# Patient Record
Sex: MALE | Race: WHITE | Employment: OTHER | URBAN - NONMETROPOLITAN AREA
[De-identification: names, ages, dates, MRNs, and addresses within clinical notes are randomized per-mention and may not be internally consistent; named-entity substitution may affect disease eponyms.]

---

## 2017-04-27 ENCOUNTER — HOSPITAL ENCOUNTER (OUTPATIENT)
Dept: CT IMAGING | Age: 82
Discharge: HOME | End: 2017-04-27
Payer: MEDICARE

## 2017-04-27 ENCOUNTER — OFFICE VISIT (OUTPATIENT)
Dept: VASCULAR SURGERY | Age: 82
End: 2017-04-27
Payer: COMMERCIAL

## 2017-04-27 VITALS
RESPIRATION RATE: 18 BRPM | DIASTOLIC BLOOD PRESSURE: 73 MMHG | TEMPERATURE: 96.7 F | SYSTOLIC BLOOD PRESSURE: 117 MMHG | HEART RATE: 63 BPM

## 2017-04-27 DIAGNOSIS — I72.3 ILIAC ARTERY ANEURYSM, RIGHT (HCC): ICD-10-CM

## 2017-04-27 DIAGNOSIS — I71.40 AAA (ABDOMINAL AORTIC ANEURYSM) WITHOUT RUPTURE: ICD-10-CM

## 2017-04-27 DIAGNOSIS — I72.3 ILIAC ARTERY ANEURYSM, RIGHT (HCC): Primary | ICD-10-CM

## 2017-04-27 PROCEDURE — 4040F PNEUMOC VAC/ADMIN/RCVD: CPT | Performed by: PHYSICIAN ASSISTANT

## 2017-04-27 PROCEDURE — 99213 OFFICE O/P EST LOW 20 MIN: CPT | Performed by: PHYSICIAN ASSISTANT

## 2017-04-27 PROCEDURE — G8427 DOCREV CUR MEDS BY ELIG CLIN: HCPCS | Performed by: PHYSICIAN ASSISTANT

## 2017-04-27 PROCEDURE — 74176 CT ABD & PELVIS W/O CONTRAST: CPT

## 2017-04-27 PROCEDURE — 1036F TOBACCO NON-USER: CPT | Performed by: PHYSICIAN ASSISTANT

## 2017-04-27 PROCEDURE — 1123F ACP DISCUSS/DSCN MKR DOCD: CPT | Performed by: PHYSICIAN ASSISTANT

## 2017-04-27 PROCEDURE — G8421 BMI NOT CALCULATED: HCPCS | Performed by: PHYSICIAN ASSISTANT

## 2017-04-27 RX ORDER — OXYCODONE AND ACETAMINOPHEN 10; 325 MG/1; MG/1
1 TABLET ORAL EVERY 4 HOURS PRN
COMMUNITY
End: 2019-01-31 | Stop reason: ALTCHOICE

## 2017-12-11 ENCOUNTER — TRANSCRIBE ORDERS (OUTPATIENT)
Dept: ADMINISTRATIVE | Facility: HOSPITAL | Age: 82
End: 2017-12-11

## 2017-12-11 DIAGNOSIS — M54.16 LUMBAR RADICULOPATHY: ICD-10-CM

## 2017-12-11 DIAGNOSIS — M54.12 RADICULOPATHY, CERVICAL: Primary | ICD-10-CM

## 2017-12-11 DIAGNOSIS — M54.14 THORACIC RADICULOPATHY: ICD-10-CM

## 2017-12-18 ENCOUNTER — HOSPITAL ENCOUNTER (OUTPATIENT)
Dept: GENERAL RADIOLOGY | Facility: HOSPITAL | Age: 82
Discharge: HOME | End: 2017-12-18
Admitting: NEUROLOGICAL SURGERY

## 2017-12-18 ENCOUNTER — APPOINTMENT (OUTPATIENT)
Dept: GENERAL RADIOLOGY | Facility: HOSPITAL | Age: 82
End: 2017-12-18

## 2017-12-18 ENCOUNTER — HOSPITAL ENCOUNTER (OUTPATIENT)
Dept: GENERAL RADIOLOGY | Facility: HOSPITAL | Age: 82
Discharge: HOME | End: 2017-12-18

## 2017-12-18 ENCOUNTER — APPOINTMENT (OUTPATIENT)
Dept: CT IMAGING | Facility: HOSPITAL | Age: 82
End: 2017-12-18

## 2017-12-18 VITALS
DIASTOLIC BLOOD PRESSURE: 92 MMHG | OXYGEN SATURATION: 97 % | RESPIRATION RATE: 16 BRPM | WEIGHT: 224.13 LBS | HEART RATE: 67 BPM | BODY MASS INDEX: 28.76 KG/M2 | SYSTOLIC BLOOD PRESSURE: 184 MMHG | HEIGHT: 74 IN | TEMPERATURE: 98.7 F

## 2017-12-18 DIAGNOSIS — M54.16 LUMBAR RADICULOPATHY: ICD-10-CM

## 2017-12-18 DIAGNOSIS — M54.14 THORACIC RADICULOPATHY: ICD-10-CM

## 2017-12-18 DIAGNOSIS — M54.12 RADICULOPATHY, CERVICAL: ICD-10-CM

## 2017-12-18 LAB
APTT PPP: 30.9 SECONDS (ref 24.1–34.8)
INR PPP: 1.05 (ref 0.91–1.09)
PLATELET # BLD AUTO: 282 10*3/MM3 (ref 130–400)
PROTHROMBIN TIME: 14 SECONDS (ref 11.9–14.6)

## 2017-12-18 PROCEDURE — 36415 COLL VENOUS BLD VENIPUNCTURE: CPT

## 2017-12-18 PROCEDURE — 85730 THROMBOPLASTIN TIME PARTIAL: CPT | Performed by: RADIOLOGY

## 2017-12-18 PROCEDURE — 72040 X-RAY EXAM NECK SPINE 2-3 VW: CPT

## 2017-12-18 PROCEDURE — 72120 X-RAY BEND ONLY L-S SPINE: CPT

## 2017-12-18 PROCEDURE — 85049 AUTOMATED PLATELET COUNT: CPT | Performed by: RADIOLOGY

## 2017-12-18 PROCEDURE — 85610 PROTHROMBIN TIME: CPT | Performed by: RADIOLOGY

## 2017-12-18 PROCEDURE — 72270 MYELOGPHY 2/> SPINE REGIONS: CPT

## 2017-12-18 PROCEDURE — 72020 X-RAY EXAM OF SPINE 1 VIEW: CPT

## 2017-12-18 RX ORDER — GABAPENTIN 300 MG/1
300 CAPSULE ORAL 4 TIMES DAILY
Status: ON HOLD | COMMUNITY
End: 2023-02-14 | Stop reason: SDUPTHER

## 2017-12-18 RX ORDER — LIDOCAINE HYDROCHLORIDE 10 MG/ML
15 INJECTION, SOLUTION INFILTRATION; PERINEURAL ONCE
Status: COMPLETED | OUTPATIENT
Start: 2017-12-18 | End: 2017-12-18

## 2017-12-18 RX ADMIN — LIDOCAINE HYDROCHLORIDE 15 ML: 10 INJECTION, SOLUTION INFILTRATION; PERINEURAL at 12:45

## 2017-12-18 NOTE — NURSING NOTE
Dr. Ramey, radiologist, speaks by phone with wife and patient about failed attempt at myelogram.

## 2017-12-20 ENCOUNTER — APPOINTMENT (OUTPATIENT)
Dept: MRI IMAGING | Facility: HOSPITAL | Age: 82
End: 2017-12-20

## 2017-12-20 ENCOUNTER — APPOINTMENT (OUTPATIENT)
Dept: GENERAL RADIOLOGY | Facility: HOSPITAL | Age: 82
End: 2017-12-20

## 2018-05-08 ENCOUNTER — OFFICE VISIT (OUTPATIENT)
Dept: GASTROENTEROLOGY | Facility: CLINIC | Age: 83
End: 2018-05-08

## 2018-05-08 VITALS
SYSTOLIC BLOOD PRESSURE: 150 MMHG | DIASTOLIC BLOOD PRESSURE: 80 MMHG | OXYGEN SATURATION: 98 % | BODY MASS INDEX: 31.22 KG/M2 | HEART RATE: 74 BPM | WEIGHT: 223 LBS | HEIGHT: 71 IN

## 2018-05-08 DIAGNOSIS — Z86.010 HX OF COLONIC POLYPS: Primary | ICD-10-CM

## 2018-05-08 DIAGNOSIS — I10 HTN (HYPERTENSION), BENIGN: ICD-10-CM

## 2018-05-08 DIAGNOSIS — K59.03 DRUG-INDUCED CONSTIPATION: ICD-10-CM

## 2018-05-08 PROBLEM — Z86.0100 HX OF COLONIC POLYPS: Status: ACTIVE | Noted: 2018-05-08

## 2018-05-08 PROCEDURE — 99212 OFFICE O/P EST SF 10 MIN: CPT | Performed by: CLINICAL NURSE SPECIALIST

## 2018-05-08 NOTE — PROGRESS NOTES
Jose Alfredo Kirkpatrick  1935 5/8/2018  Chief Complaint   Patient presents with   • Colonoscopy     Subjective   HPI  Jose Alfredo Kirkpatrick is a 82 y.o. male who presents as a referral for preventative maintenance. He has no complaints of nausea or vomiting. He has had predominant  Constipation while being on pain medications for his chronic neck pain which he is having neck surgery for next week. No wt loss. No BRBPR. No melena. There is NO family hx for colon cancer. No abdominal pain.  Past Medical History:   Diagnosis Date   • Arthritis    • Cataract     BILATERAL   • History of transfusion    • Hx of colonic polyps    • Hypertension    • Reflux esophagitis    • Streptococcosis     IN SPINE FROM BACK INJECTIONS     Past Surgical History:   Procedure Laterality Date   • CATARACT EXTRACTION Bilateral    • COLONOSCOPY  02/11/2013    Hyperplastic polyp at 25 cm, Diverticulosis repeat exam in 5 years   • ENDOSCOPY  01/15/2014    [facility]   • HERNIA REPAIR Right     INGUINAL    • REPLACEMENT TOTAL KNEE BILATERAL     • THORACIC LAMINECTOMY DECOMPRESSIVE POSTERIOR N/A 12/14/2016    Procedure: LAMINECTOMY DECOMPRESSION, UNINSTRUMENTED POSTERIOR SPINAL FUSION, T 11-12;  Surgeon: LUZ Adan MD;  Location: [facility] OR;  Service:      Outpatient Prescriptions Marked as Taking for the 5/8/18 encounter (Office Visit) with HARISH Pedroza   Medication Sig Dispense Refill   • gabapentin (NEURONTIN) 300 MG capsule Take 300 mg by mouth 3 (Three) Times a Day.     • lisinopril (PRINIVIL,ZESTRIL) 10 MG tablet Take 10 mg by mouth daily.     • oxyCODONE-acetaminophen (PERCOCET)  MG per tablet Take 1 tablet by mouth Every 6 (Six) Hours As Needed for moderate pain (4-6).       No Known Allergies  Social History     Social History   • Marital status:      Spouse name: N/A   • Number of children: N/A   • Years of education: N/A     Occupational History   • Not on file.     Social History Main Topics   • Smoking status:  "Never Smoker   • Smokeless tobacco: Never Used   • Alcohol use No   • Drug use: No   • Sexual activity: Defer     Other Topics Concern   • Not on file     Social History Narrative   • No narrative on file     Family History   Problem Relation Age of Onset   • Colon cancer Father    • Colon polyps Neg Hx      Health Maintenance   Topic Date Due   • TDAP/TD VACCINES (1 - Tdap) 05/27/1954   • PNEUMOCOCCAL VACCINES (65+ LOW/MEDIUM RISK) (1 of 2 - PCV13) 05/27/2001   • MEDICARE ANNUAL WELLNESS  11/07/2017   • ZOSTER VACCINE  11/07/2017   • INFLUENZA VACCINE  08/01/2018       REVIEW OF SYSTEMS  General: well appearing, no fever chills or sweats, no unexplained wt loss  HEENT: no acute visual or hearing disturbances  Cardiovascular: No chest pain or palpitations  Pulmonary: No shortness of breath, coughing, wheezing or hemoptysis  : No burning, urgency, hematuria, or dysuria  Musculoskeletal: No joint pain or stiffness  Peripheral: no edema  Skin: No lesions or rashes  Neuro: No dizziness, headaches, stroke, syncope  Endocrine: No hot or cold intolerances  Hematological: No blood dyscrasias    Objective   Vitals:    05/08/18 0852   BP: 150/80   Pulse: 74   SpO2: 98%   Weight: 101 kg (223 lb)   Height: 180.3 cm (71\")     Body mass index is 31.1 kg/m².  Patient's Body mass index is 31.1 kg/m². BMI is above normal parameters. Recommendations include: nutrition counseling.      PHYSICAL EXAM  General: age appropriate well nourished well appearing, no acute distress  Head: normocephalic and atraumatic  Global assessment-supple  Neck-No JVD noted, no lymphadenopathy  Pulmonary-clear to auscultation bilaterally, normal respiratory effort  Cardiovascular-normal rate and rhythm, normal heart sounds, S1 and S2 noted  Abdomen-soft, non tender, non distended, normal bowel sounds all 4 quadrants, no hepatosplenomegaly noted  Extremities-No clubbing cyanosis or edema  Neuro-Non focal, converses appropriately, awake, alert, " oriented    Assessment/Plan     Jose Alfredo was seen today for colonoscopy.    Diagnoses and all orders for this visit:    Hx of colonic polyps  -     Case Request; Standing  -     Implement Anesthesia Orders Day of Procedure; Standing  -     Obtain Informed Consent; Standing  -     Verify bowel prep was successful; Standing  -     Case Request    HTN (hypertension), benign  Comments:  cont the am of procedure    I have advised his to do Miralax up to twice per day every day while constipated I explained that this could be complicated after surgery the 16 on his neck and that with pain medications this is a concern. He verbalizes and agrees to increased water intake and Miralax as needed.     COLONOSCOPY WITH ANESTHESIA (N/A)  Body mass index is 31.1 kg/m².    Patient instructions on prep prior to procedure provided to the patient.    All risks, benefits, alternatives, and indications of colonoscopy procedure have been discussed with the patient. Risks to include perforation of the colon requiring possible surgery or colostomy, risk of bleeding from biopsies or removal of colon tissue, possibility of missing a colon polyp or cancer, or adverse drug reaction.  Benefits to include the diagnosis and management of disease of the colon and rectum. Alternatives to include barium enema, radiographic evaluation, lab testing or no intervention. Pt verbalizes understanding and agrees.     Radhika Brown, APRN  2018  9:10 AM      IF YOU SMOKE OR USE TOBACCO PLEASE READ THE FOLLOWIN minutes reading provided    Why is smoking bad for me?  Smoking increases the risk of heart disease, lung disease, vascular disease, stroke, and cancer.     If you smoke, STOP!    If you would like more information on quitting smoking, please visit the [employer] website: [email or web address]   This link will provide additional resources including the QUIT line and the [employer] support  groups.     For more information:    Quit Now [place]  1-800-QUIT-NOW  https://[facility].quitlogix.org/en-US/    Obesity, Adult  Obesity is the condition of having too much total body fat. Being overweight or obese means that your weight is greater than what is considered healthy for your body size. Obesity is determined by a measurement called BMI. BMI is an estimate of body fat and is calculated from height and weight. For adults, a BMI of 30 or higher is considered obese.  Obesity can eventually lead to other health concerns and major illnesses, including:  · Stroke.  · Coronary artery disease (CAD).  · Type 2 diabetes.  · Some types of cancer, including cancers of the colon, breast, uterus, and gallbladder.  · Osteoarthritis.  · High blood pressure (hypertension).  · High cholesterol.  · Sleep apnea.  · Gallbladder stones.  · Infertility problems.  What are the causes?  The main cause of obesity is taking in (consuming) more calories than your body uses for energy. Other factors that contribute to this condition may include:  · Being born with genes that make you more likely to become obese.  · Having a medical condition that causes obesity. These conditions include:  ¨ Hypothyroidism.  ¨ Polycystic ovarian syndrome (PCOS).  ¨ Binge-eating disorder.  ¨ Cushing syndrome.  · Taking certain medicines, such as steroids, antidepressants, and seizure medicines.  · Not being physically active (sedentary lifestyle).  · Living where there are limited places to exercise safely or buy healthy foods.  · Not getting enough sleep.  What increases the risk?  The following factors may increase your risk of this condition:  · Having a family history of obesity.  · Being a woman of -American descent.  · Being a man of  descent.  What are the signs or symptoms?  Having excessive body fat is the main symptom of this condition.  How is this diagnosed?  This condition may be diagnosed based on:  · Your  symptoms.  · Your medical history.  · A physical exam. Your health care provider may measure:  ¨ Your BMI. If you are an adult with a BMI between 25 and less than 30, you are considered overweight. If you are an adult with a BMI of 30 or higher, you are considered obese.  ¨ The distances around your hips and your waist (circumferences). These may be compared to each other to help diagnose your condition.  ¨ Your skinfold thickness. Your health care provider may gently pinch a fold of your skin and measure it.  How is this treated?  Treatment for this condition often includes changing your lifestyle. Treatment may include some or all of the following:  · Dietary changes. Work with your health care provider and a dietitian to set a weight-loss goal that is healthy and reasonable for you. Dietary changes may include eating:  ¨ Smaller portions. A portion size is the amount of a particular food that is healthy for you to eat at one time. This varies from person to person.  ¨ Low-calorie or low-fat options.  ¨ More whole grains, fruits, and vegetables.  · Regular physical activity. This may include aerobic activity (cardio) and strength training.  · Medicine to help you lose weight. Your health care provider may prescribe medicine if you are unable to lose 1 pound a week after 6 weeks of eating more healthily and doing more physical activity.  · Surgery. Surgical options may include gastric banding and gastric bypass. Surgery may be done if:  ¨ Other treatments have not helped to improve your condition.  ¨ You have a BMI of 40 or higher.  ¨ You have life-threatening health problems related to obesity.  Follow these instructions at home:     Eating and drinking     · Follow recommendations from your health care provider about what you eat and drink. Your health care provider may advise you to:  ¨ Limit fast foods, sweets, and processed snack foods.  ¨ Choose low-fat options, such as low-fat milk instead of whole  milk.  ¨ Eat 5 or more servings of fruits or vegetables every day.  ¨ Eat at home more often. This gives you more control over what you eat.  ¨ Choose healthy foods when you eat out.  ¨ Learn what a healthy portion size is.  ¨ Keep low-fat snacks on hand.  ¨ Avoid sugary drinks, such as soda, fruit juice, iced tea sweetened with sugar, and flavored milk.  ¨ Eat a healthy breakfast.  · Drink enough water to keep your urine clear or pale yellow.  · Do not go without eating for long periods of time (do not fast) or follow a fad diet. Fasting and fad diets can be unhealthy and even dangerous.  Physical Activity   · Exercise regularly, as told by your health care provider. Ask your health care provider what types of exercise are safe for you and how often you should exercise.  · Warm up and stretch before being active.  · Cool down and stretch after being active.  · Rest between periods of activity.  Lifestyle   · Limit the time that you spend in front of your TV, computer, or video game system.  · Find ways to reward yourself that do not involve food.  · Limit alcohol intake to no more than 1 drink a day for nonpregnant women and 2 drinks a day for men. One drink equals 12 oz of beer, 5 oz of wine, or 1½ oz of hard liquor.  General instructions   · Keep a weight loss journal to keep track of the food you eat and how much you exercise you get.  · Take over-the-counter and prescription medicines only as told by your health care provider.  · Take vitamins and supplements only as told by your health care provider.  · Consider joining a support group. Your health care provider may be able to recommend a support group.  · Keep all follow-up visits as told by your health care provider. This is important.  Contact a health care provider if:  · You are unable to meet your weight loss goal after 6 weeks of dietary and lifestyle changes.  This information is not intended to replace advice given to you by your health care provider.  Make sure you discuss any questions you have with your health care provider.  Document Released: 01/25/2006 Document Revised: 05/22/2017 Document Reviewed: 10/05/2016  Elsevier Interactive Patient Education © 2017 [employer]

## 2018-06-04 ENCOUNTER — HOSPITAL ENCOUNTER (OUTPATIENT)
Dept: GENERAL RADIOLOGY | Facility: HOSPITAL | Age: 83
Discharge: HOME | End: 2018-06-04
Admitting: NEUROLOGICAL SURGERY

## 2018-06-04 ENCOUNTER — TRANSCRIBE ORDERS (OUTPATIENT)
Dept: ADMINISTRATIVE | Facility: HOSPITAL | Age: 83
End: 2018-06-04

## 2018-06-04 DIAGNOSIS — M54.12 RADICULOPATHY, CERVICAL: Primary | ICD-10-CM

## 2018-06-04 DIAGNOSIS — M54.12 RADICULOPATHY, CERVICAL: ICD-10-CM

## 2018-06-04 PROCEDURE — 72040 X-RAY EXAM NECK SPINE 2-3 VW: CPT

## 2018-10-16 ENCOUNTER — HOSPITAL ENCOUNTER (OUTPATIENT)
Facility: HOSPITAL | Age: 83
Setting detail: HOSPITAL OUTPATIENT SURGERY
Discharge: HOME | End: 2018-10-16
Attending: INTERNAL MEDICINE | Admitting: INTERNAL MEDICINE

## 2018-10-16 ENCOUNTER — ANESTHESIA EVENT (OUTPATIENT)
Dept: GASTROENTEROLOGY | Facility: HOSPITAL | Age: 83
End: 2018-10-16

## 2018-10-16 ENCOUNTER — ANESTHESIA (OUTPATIENT)
Dept: GASTROENTEROLOGY | Facility: HOSPITAL | Age: 83
End: 2018-10-16

## 2018-10-16 VITALS
OXYGEN SATURATION: 95 % | BODY MASS INDEX: 28.63 KG/M2 | SYSTOLIC BLOOD PRESSURE: 158 MMHG | HEIGHT: 73 IN | WEIGHT: 216 LBS | DIASTOLIC BLOOD PRESSURE: 74 MMHG | HEART RATE: 50 BPM | RESPIRATION RATE: 18 BRPM | TEMPERATURE: 97.5 F

## 2018-10-16 DIAGNOSIS — Z86.010 HX OF COLONIC POLYPS: ICD-10-CM

## 2018-10-16 PROCEDURE — 45385 COLONOSCOPY W/LESION REMOVAL: CPT | Performed by: INTERNAL MEDICINE

## 2018-10-16 PROCEDURE — 25010000002 PROPOFOL 10 MG/ML EMULSION: Performed by: NURSE ANESTHETIST, CERTIFIED REGISTERED

## 2018-10-16 PROCEDURE — 88305 TISSUE EXAM BY PATHOLOGIST: CPT | Performed by: INTERNAL MEDICINE

## 2018-10-16 RX ORDER — SODIUM CHLORIDE 0.9 % (FLUSH) 0.9 %
3 SYRINGE (ML) INJECTION AS NEEDED
Status: DISCONTINUED | OUTPATIENT
Start: 2018-10-16 | End: 2018-10-16 | Stop reason: HOSPADM

## 2018-10-16 RX ORDER — LIDOCAINE HYDROCHLORIDE 20 MG/ML
INJECTION, SOLUTION INFILTRATION; PERINEURAL AS NEEDED
Status: DISCONTINUED | OUTPATIENT
Start: 2018-10-16 | End: 2018-10-16 | Stop reason: SURG

## 2018-10-16 RX ORDER — PROPOFOL 10 MG/ML
VIAL (ML) INTRAVENOUS AS NEEDED
Status: DISCONTINUED | OUTPATIENT
Start: 2018-10-16 | End: 2018-10-16 | Stop reason: SURG

## 2018-10-16 RX ORDER — SODIUM CHLORIDE 9 MG/ML
500 INJECTION, SOLUTION INTRAVENOUS CONTINUOUS PRN
Status: DISCONTINUED | OUTPATIENT
Start: 2018-10-16 | End: 2018-10-16 | Stop reason: HOSPADM

## 2018-10-16 RX ADMIN — SODIUM CHLORIDE 500 ML: 9 INJECTION, SOLUTION INTRAVENOUS at 08:52

## 2018-10-16 RX ADMIN — LIDOCAINE HYDROCHLORIDE 50 MG: 20 INJECTION, SOLUTION INFILTRATION; PERINEURAL at 09:05

## 2018-10-16 RX ADMIN — PROPOFOL 200 MG: 10 INJECTION, EMULSION INTRAVENOUS at 09:05

## 2018-10-16 NOTE — ANESTHESIA POSTPROCEDURE EVALUATION
Patient: Jose Alfredo Kirkpatrick    Procedure Summary     Date:  10/16/18 Room / Location:  [facility] ENDOSCOPY 4 / [facility] ENDOSCOPY    Anesthesia Start:  0859 Anesthesia Stop:  0931    Procedure:  COLONOSCOPY WITH ANESTHESIA (N/A ) Diagnosis:       Hx of colonic polyps      (Hx of colonic polyps [Z86.010])    Surgeon:  Marques Pérez MD Provider:  Ronak Conley CRNA    Anesthesia Type:  general ASA Status:  3          Anesthesia Type: general  Last vitals  BP   158/65 (10/16/18 0829)   Temp   97.5 °F (36.4 °C) (10/16/18 0829)   Pulse   67 (10/16/18 0829)   Resp   18 (10/16/18 0829)     SpO2   97 % (10/16/18 0829)     Post Anesthesia Care and Evaluation    Patient location during evaluation: PACU  Patient participation: complete - patient participated  Level of consciousness: awake and awake and alert  Pain score: 0  Pain management: adequate  Airway patency: patent  Anesthetic complications: No anesthetic complications    Cardiovascular status: acceptable and stable  Respiratory status: acceptable and unassisted  Hydration status: acceptable

## 2018-10-16 NOTE — H&P
[facility]  Pre Procedure History & Physical    Chief Complaint:   History of polyps    Subjective     HPI:   Here for colonoscopy.  History of polyps    Past Medical History:   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       Past Surgical History:  Past Surgical History:   Procedure Laterality Date   • BACK SURGERY      cervical   • CATARACT EXTRACTION Bilateral    • COLONOSCOPY  02/11/2013    Hyperplastic polyp at 25 cm, Diverticulosis repeat exam in 5 years   • ENDOSCOPY  01/15/2014       • HERNIA REPAIR Right     INGUINAL    • REPLACEMENT TOTAL KNEE BILATERAL     • THORACIC LAMINECTOMY DECOMPRESSIVE POSTERIOR N/A 12/14/2016    Procedure: LAMINECTOMY DECOMPRESSION, UNINSTRUMENTED POSTERIOR SPINAL FUSION, T 11-12;  Surgeon: LUZ Adan MD;  Location: [facility];  Service:        Family History:  Family History   Problem Relation Age of Onset   • Colon cancer Father    • Colon polyps Neg Hx        Social History:   reports that he has never smoked. He has never used smokeless tobacco. He reports that he does not drink alcohol or use drugs.    Medications:   Prior to Admission medications    Medication Sig Start Date End Date Taking? Authorizing Provider   gabapentin (NEURONTIN) 300 MG capsule Take 300 mg by mouth 3 (Three) Times a Day.   Yes Joi Alvarez MD   lisinopril (PRINIVIL,ZESTRIL) 10 MG tablet Take 10 mg by mouth daily.   Yes Joi Alvarez MD   oxyCODONE-acetaminophen (PERCOCET)  MG per tablet Take 1 tablet by mouth Every 6 (Six) Hours As Needed for moderate pain (4-6).   Yes Joi Alvarez MD   polyethylene glycol (GoLYTELY) 236 g solution Take as directed by office instructions. 5/8/18   Radhika Brown, APRN  "      Allergies:  Patient has no known allergies.    Objective     Blood pressure 158/65, pulse 67, temperature 97.5 °F (36.4 °C), temperature source Temporal Artery , resp. rate 18, height 185.4 cm (73\"), weight 98 kg (216 lb), SpO2 97 %.    Physical Exam   Constitutional: Pt is oriented to person, place, and in no distress.   HENT: Mouth/Throat: Oropharynx is clear.   Cardiovascular: Normal rate, regular rhythm.    Pulmonary/Chest: Effort normal. No respiratory distress. No  wheezes.   Abdominal: Soft. Non-distended.  Skin: Skin is warm and dry.   Psychiatric: Mood, memory, affect and judgment appear normal.     Assessment/Plan     Diagnosis:  History of polyps    Anticipated Surgical Procedure:    Proceed with colonoscopy as scheduled    The following major R/B/A were discussed with the patient, however the list is not all inclusive . Risk:  Bleeding (immediate and delayed), perforation (rupture or tear), reaction to medication, missed lesion/cancer, pain during the procedure, infection, need for surgery, need for ostomy, need for mechanical ventilation (breathing machine), death.  Benefits: removal of polyp/tissue, burn/clip/or inject to stop bleeding, removal of foreign body, dilate any stricture.  Alternatives: Xray or CT, surgery, do nothing with associated risk   The patient was given time to ask question and received explanation, and agrees to proceed as per History and Physical.   No guarantee given or expressed.    EMR Dragon/transcription disclaimer: Much of this encounter note is an electronic transcription/translation of spoken language to printed text.  The electronic translation of spoken language may permit erroneous, or at times, nonsensical words or phrases to be inadvertently transcribed.  Although I have reviewed the note for such errors, some may still exist.    Marques Pérez MD  9:11 AM  10/16/2018    "

## 2018-10-16 NOTE — ANESTHESIA PREPROCEDURE EVALUATION
Anesthesia Evaluation     Patient summary reviewed   no history of anesthetic complications:  NPO Solid Status: > 8 hours  NPO Liquid Status: > 8 hours           Airway   Mallampati: I  TM distance: >3 FB  Neck ROM: full  No difficulty expected  Dental - normal exam         Pulmonary    (-) asthma, sleep apnea, not a smoker  Cardiovascular     (+) hypertension,   (-) past MI, CAD, angina, cardiac stents      Neuro/Psych  (-) seizures, TIA, CVA  GI/Hepatic/Renal/Endo    (-) liver disease, no renal disease, diabetes    Musculoskeletal     (+) back pain, neck pain (surgery in [place]- 05/2018),   Abdominal    Substance History      OB/GYN          Other        ROS/Med Hx Other: Constipation                Anesthesia Plan    ASA 3     general   total IV anesthesia  intravenous induction   Anesthetic plan, all risks, benefits, and alternatives have been provided, discussed and informed consent has been obtained with: patient.

## 2018-10-17 LAB
CYTO UR: NORMAL
LAB AP CASE REPORT: NORMAL
PATH REPORT.FINAL DX SPEC: NORMAL
PATH REPORT.GROSS SPEC: NORMAL

## 2019-01-31 ENCOUNTER — OFFICE VISIT (OUTPATIENT)
Dept: SURGERY | Age: 84
End: 2019-01-31
Payer: MEDICARE

## 2019-01-31 VITALS
DIASTOLIC BLOOD PRESSURE: 68 MMHG | SYSTOLIC BLOOD PRESSURE: 114 MMHG | WEIGHT: 218.2 LBS | HEIGHT: 72 IN | BODY MASS INDEX: 29.55 KG/M2 | TEMPERATURE: 97.2 F

## 2019-01-31 DIAGNOSIS — K62.5 ANAL BLEEDING: Primary | ICD-10-CM

## 2019-01-31 DIAGNOSIS — K62.5 ANAL BLEEDING: ICD-10-CM

## 2019-01-31 LAB
ANION GAP SERPL CALCULATED.3IONS-SCNC: 18 MMOL/L (ref 7–19)
BUN BLDV-MCNC: 14 MG/DL (ref 8–23)
CALCIUM SERPL-MCNC: 9.9 MG/DL (ref 8.8–10.2)
CHLORIDE BLD-SCNC: 101 MMOL/L (ref 98–111)
CO2: 23 MMOL/L (ref 22–29)
CREAT SERPL-MCNC: 1.2 MG/DL (ref 0.5–1.2)
GFR NON-AFRICAN AMERICAN: 58
GLUCOSE BLD-MCNC: 137 MG/DL (ref 74–109)
HCT VFR BLD CALC: 42.6 % (ref 42–52)
HEMOGLOBIN: 13.1 G/DL (ref 14–18)
MCH RBC QN AUTO: 25.2 PG (ref 27–31)
MCHC RBC AUTO-ENTMCNC: 30.8 G/DL (ref 33–37)
MCV RBC AUTO: 82.1 FL (ref 80–94)
PDW BLD-RTO: 14.6 % (ref 11.5–14.5)
PLATELET # BLD: 353 K/UL (ref 130–400)
PMV BLD AUTO: 8.6 FL (ref 9.4–12.4)
POTASSIUM SERPL-SCNC: 4.7 MMOL/L (ref 3.5–5)
RBC # BLD: 5.19 M/UL (ref 4.7–6.1)
SODIUM BLD-SCNC: 142 MMOL/L (ref 136–145)
WBC # BLD: 15.4 K/UL (ref 4.8–10.8)

## 2019-01-31 PROCEDURE — G8484 FLU IMMUNIZE NO ADMIN: HCPCS | Performed by: SURGERY

## 2019-01-31 PROCEDURE — G8419 CALC BMI OUT NRM PARAM NOF/U: HCPCS | Performed by: SURGERY

## 2019-01-31 PROCEDURE — 1101F PT FALLS ASSESS-DOCD LE1/YR: CPT | Performed by: SURGERY

## 2019-01-31 PROCEDURE — 99204 OFFICE O/P NEW MOD 45 MIN: CPT | Performed by: SURGERY

## 2019-01-31 PROCEDURE — G8427 DOCREV CUR MEDS BY ELIG CLIN: HCPCS | Performed by: SURGERY

## 2019-01-31 PROCEDURE — 1036F TOBACCO NON-USER: CPT | Performed by: SURGERY

## 2019-01-31 PROCEDURE — 1123F ACP DISCUSS/DSCN MKR DOCD: CPT | Performed by: SURGERY

## 2019-01-31 PROCEDURE — 4040F PNEUMOC VAC/ADMIN/RCVD: CPT | Performed by: SURGERY

## 2019-01-31 RX ORDER — HYDROCODONE BITARTRATE AND ACETAMINOPHEN 7.5; 325 MG/1; MG/1
TABLET ORAL
Refills: 0 | COMMUNITY
Start: 2018-12-26

## 2019-03-06 ASSESSMENT — ENCOUNTER SYMPTOMS
SHORTNESS OF BREATH: 0
CONSTIPATION: 0
DIARRHEA: 0
SORE THROAT: 0
BACK PAIN: 0
ABDOMINAL DISTENTION: 0
EYE REDNESS: 0
ANAL BLEEDING: 1
ABDOMINAL PAIN: 0
BLOOD IN STOOL: 1
NAUSEA: 0
EYE PAIN: 0
WHEEZING: 0
COUGH: 0

## 2019-10-01 ENCOUNTER — TRANSCRIBE ORDERS (OUTPATIENT)
Dept: ADMINISTRATIVE | Facility: HOSPITAL | Age: 84
End: 2019-10-01

## 2019-10-01 ENCOUNTER — HOSPITAL ENCOUNTER (OUTPATIENT)
Dept: GENERAL RADIOLOGY | Facility: HOSPITAL | Age: 84
Discharge: HOME | End: 2019-10-01
Admitting: NURSE PRACTITIONER

## 2019-10-01 ENCOUNTER — LAB (OUTPATIENT)
Dept: LAB | Facility: HOSPITAL | Age: 84
End: 2019-10-01

## 2019-10-01 DIAGNOSIS — M79.601 PAIN IN RIGHT ARM: ICD-10-CM

## 2019-10-01 DIAGNOSIS — R06.00 DYSPNEA, UNSPECIFIED TYPE: ICD-10-CM

## 2019-10-01 DIAGNOSIS — R05.9 COUGH: ICD-10-CM

## 2019-10-01 DIAGNOSIS — R53.83 OTHER FATIGUE: ICD-10-CM

## 2019-10-01 DIAGNOSIS — R05.9 COUGH: Primary | ICD-10-CM

## 2019-10-01 DIAGNOSIS — R06.00 DYSPNEA, UNSPECIFIED TYPE: Primary | ICD-10-CM

## 2019-10-01 LAB — D DIMER PPP FEU-MCNC: 6.28 MG/L (FEU) (ref 0–0.5)

## 2019-10-01 PROCEDURE — 82553 CREATINE MB FRACTION: CPT | Performed by: NURSE PRACTITIONER

## 2019-10-01 PROCEDURE — 81003 URINALYSIS AUTO W/O SCOPE: CPT | Performed by: NURSE PRACTITIONER

## 2019-10-01 PROCEDURE — 85379 FIBRIN DEGRADATION QUANT: CPT | Performed by: NURSE PRACTITIONER

## 2019-10-01 PROCEDURE — 85025 COMPLETE CBC W/AUTO DIFF WBC: CPT | Performed by: NURSE PRACTITIONER

## 2019-10-01 PROCEDURE — 83874 ASSAY OF MYOGLOBIN: CPT | Performed by: NURSE PRACTITIONER

## 2019-10-01 PROCEDURE — 82607 VITAMIN B-12: CPT | Performed by: NURSE PRACTITIONER

## 2019-10-01 PROCEDURE — 71046 X-RAY EXAM CHEST 2 VIEWS: CPT

## 2019-10-01 PROCEDURE — 80053 COMPREHEN METABOLIC PANEL: CPT | Performed by: NURSE PRACTITIONER

## 2019-10-01 PROCEDURE — 84443 ASSAY THYROID STIM HORMONE: CPT | Performed by: NURSE PRACTITIONER

## 2019-10-01 PROCEDURE — 36415 COLL VENOUS BLD VENIPUNCTURE: CPT

## 2019-10-01 PROCEDURE — 84484 ASSAY OF TROPONIN QUANT: CPT | Performed by: NURSE PRACTITIONER

## 2019-10-01 PROCEDURE — 84439 ASSAY OF FREE THYROXINE: CPT | Performed by: NURSE PRACTITIONER

## 2019-10-02 ENCOUNTER — TRANSCRIBE ORDERS (OUTPATIENT)
Dept: ADMINISTRATIVE | Facility: HOSPITAL | Age: 84
End: 2019-10-02

## 2019-10-02 ENCOUNTER — NURSE TRIAGE (OUTPATIENT)
Dept: CALL CENTER | Facility: HOSPITAL | Age: 84
End: 2019-10-02

## 2019-10-02 ENCOUNTER — HOSPITAL ENCOUNTER (OUTPATIENT)
Dept: ULTRASOUND IMAGING | Facility: HOSPITAL | Age: 84
Discharge: HOME | End: 2019-10-02
Admitting: INTERNAL MEDICINE

## 2019-10-02 ENCOUNTER — TRANSCRIBE ORDERS (OUTPATIENT)
Dept: OTHER | Facility: OTHER | Age: 84
End: 2019-10-02

## 2019-10-02 ENCOUNTER — HOSPITAL ENCOUNTER (INPATIENT)
Facility: HOSPITAL | Age: 84
LOS: 3 days | Discharge: HOME HEALTH | End: 2019-10-05
Attending: EMERGENCY MEDICINE | Admitting: INTERNAL MEDICINE

## 2019-10-02 ENCOUNTER — HOSPITAL ENCOUNTER (OUTPATIENT)
Dept: CT IMAGING | Facility: HOSPITAL | Age: 84
Discharge: HOME | End: 2019-10-02

## 2019-10-02 DIAGNOSIS — R79.1 ABNORMAL COAGULATION PROFILE: ICD-10-CM

## 2019-10-02 DIAGNOSIS — Z74.09 IMPAIRED FUNCTIONAL MOBILITY AND ACTIVITY TOLERANCE: ICD-10-CM

## 2019-10-02 DIAGNOSIS — R79.89 OTHER SPECIFIED ABNORMAL FINDINGS OF BLOOD CHEMISTRY: ICD-10-CM

## 2019-10-02 DIAGNOSIS — R79.89 OTHER SPECIFIED ABNORMAL FINDINGS OF BLOOD CHEMISTRY: Primary | ICD-10-CM

## 2019-10-02 DIAGNOSIS — I82.403 ACUTE DEEP VEIN THROMBOSIS (DVT) OF BOTH LOWER EXTREMITIES, UNSPECIFIED VEIN (HCC): ICD-10-CM

## 2019-10-02 DIAGNOSIS — I82.413 ACUTE BILATERAL DEEP VEIN THROMBOSIS (DVT) OF FEMORAL VEINS (HCC): ICD-10-CM

## 2019-10-02 DIAGNOSIS — R79.1 ABNORMAL COAGULATION PROFILE: Primary | ICD-10-CM

## 2019-10-02 DIAGNOSIS — R06.02 SHORTNESS OF BREATH: Primary | ICD-10-CM

## 2019-10-02 DIAGNOSIS — K92.1 MELENA: ICD-10-CM

## 2019-10-02 DIAGNOSIS — I26.09 ACUTE PULMONARY EMBOLISM WITH ACUTE COR PULMONALE, UNSPECIFIED PULMONARY EMBOLISM TYPE (HCC): Primary | ICD-10-CM

## 2019-10-02 DIAGNOSIS — R06.02 SHORTNESS OF BREATH: ICD-10-CM

## 2019-10-02 PROBLEM — Z86.718 HISTORY OF DVT OF LOWER EXTREMITY: Status: ACTIVE | Noted: 2019-10-02

## 2019-10-02 PROBLEM — R19.5 POSITIVE FECAL OCCULT BLOOD TEST: Status: ACTIVE | Noted: 2019-10-02

## 2019-10-02 LAB
ALBUMIN SERPL-MCNC: 4.1 G/DL (ref 3.5–5.2)
ALBUMIN SERPL-MCNC: 4.8 G/DL (ref 3.5–5.2)
ALBUMIN/GLOB SERPL: 1.3 G/DL
ALBUMIN/GLOB SERPL: 1.5 G/DL
ALP SERPL-CCNC: 113 U/L (ref 39–117)
ALP SERPL-CCNC: 98 U/L (ref 39–117)
ALT SERPL W P-5'-P-CCNC: 10 U/L (ref 1–41)
ALT SERPL W P-5'-P-CCNC: 14 U/L (ref 1–41)
ANION GAP SERPL CALCULATED.3IONS-SCNC: 11 MMOL/L (ref 5–15)
ANION GAP SERPL CALCULATED.3IONS-SCNC: 13 MMOL/L (ref 5–15)
ANION GAP SERPL CALCULATED.3IONS-SCNC: 18.1 MMOL/L (ref 5–15)
AST SERPL-CCNC: 14 U/L (ref 1–40)
AST SERPL-CCNC: 15 U/L (ref 1–40)
BASOPHILS # BLD AUTO: 0.05 10*3/MM3 (ref 0–0.2)
BASOPHILS # BLD AUTO: 0.06 10*3/MM3 (ref 0–0.2)
BASOPHILS NFR BLD AUTO: 0.5 % (ref 0–1.5)
BASOPHILS NFR BLD AUTO: 0.5 % (ref 0–1.5)
BILIRUB SERPL-MCNC: 0.3 MG/DL (ref 0.2–1.2)
BILIRUB SERPL-MCNC: 0.3 MG/DL (ref 0.2–1.2)
BILIRUB UR QL STRIP: NEGATIVE
BUN BLD-MCNC: 21 MG/DL (ref 8–23)
BUN BLD-MCNC: 23 MG/DL (ref 8–23)
BUN BLD-MCNC: 24 MG/DL (ref 8–23)
BUN/CREAT SERPL: 20.2 (ref 7–25)
BUN/CREAT SERPL: 21.9 (ref 7–25)
BUN/CREAT SERPL: 25.6 (ref 7–25)
CALCIUM SPEC-SCNC: 9.1 MG/DL (ref 8.6–10.5)
CALCIUM SPEC-SCNC: 9.3 MG/DL (ref 8.6–10.5)
CALCIUM SPEC-SCNC: 9.5 MG/DL (ref 8.6–10.5)
CHLORIDE SERPL-SCNC: 101 MMOL/L (ref 98–107)
CHLORIDE SERPL-SCNC: 99 MMOL/L (ref 98–107)
CHLORIDE SERPL-SCNC: 99 MMOL/L (ref 98–107)
CK MB SERPL-CCNC: 4.41 NG/ML
CLARITY UR: CLEAR
CO2 SERPL-SCNC: 21.9 MMOL/L (ref 22–29)
CO2 SERPL-SCNC: 23 MMOL/L (ref 22–29)
CO2 SERPL-SCNC: 26 MMOL/L (ref 22–29)
COLOR UR: YELLOW
CREAT BLD-MCNC: 0.9 MG/DL (ref 0.76–1.27)
CREAT BLD-MCNC: 0.96 MG/DL (ref 0.76–1.27)
CREAT BLD-MCNC: 1.19 MG/DL (ref 0.76–1.27)
DEPRECATED RDW RBC AUTO: 44.3 FL (ref 37–54)
DEPRECATED RDW RBC AUTO: 45.7 FL (ref 37–54)
DEVELOPER EXPIRATION DATE: ABNORMAL
DEVELOPER LOT NUMBER: 149
EOSINOPHIL # BLD AUTO: 0.05 10*3/MM3 (ref 0–0.4)
EOSINOPHIL # BLD AUTO: 0.08 10*3/MM3 (ref 0–0.4)
EOSINOPHIL NFR BLD AUTO: 0.5 % (ref 0.3–6.2)
EOSINOPHIL NFR BLD AUTO: 0.6 % (ref 0.3–6.2)
ERYTHROCYTE [DISTWIDTH] IN BLOOD BY AUTOMATED COUNT: 15.2 % (ref 12.3–15.4)
ERYTHROCYTE [DISTWIDTH] IN BLOOD BY AUTOMATED COUNT: 16 % (ref 12.3–15.4)
EXPIRATION DATE: ABNORMAL
FECAL OCCULT BLOOD SCREEN, POC: POSITIVE
GFR SERPL CREATININE-BSD FRML MDRD: 58 ML/MIN/1.73
GFR SERPL CREATININE-BSD FRML MDRD: 75 ML/MIN/1.73
GFR SERPL CREATININE-BSD FRML MDRD: 81 ML/MIN/1.73
GLOBULIN UR ELPH-MCNC: 3.2 GM/DL
GLOBULIN UR ELPH-MCNC: 3.3 GM/DL
GLUCOSE BLD-MCNC: 123 MG/DL (ref 65–99)
GLUCOSE BLD-MCNC: 145 MG/DL (ref 65–99)
GLUCOSE BLD-MCNC: 173 MG/DL (ref 65–99)
GLUCOSE UR STRIP-MCNC: NEGATIVE MG/DL
HCT VFR BLD AUTO: 34.9 % (ref 37.5–51)
HCT VFR BLD AUTO: 38.7 % (ref 37.5–51)
HGB BLD-MCNC: 10.8 G/DL (ref 13–17.7)
HGB BLD-MCNC: 11.6 G/DL (ref 13–17.7)
HGB UR QL STRIP.AUTO: NEGATIVE
IMM GRANULOCYTES # BLD AUTO: 0.04 10*3/MM3 (ref 0–0.05)
IMM GRANULOCYTES # BLD AUTO: 0.07 10*3/MM3 (ref 0–0.05)
IMM GRANULOCYTES NFR BLD AUTO: 0.4 % (ref 0–0.5)
IMM GRANULOCYTES NFR BLD AUTO: 0.5 % (ref 0–0.5)
INR PPP: 1.1 (ref 0.91–1.09)
IRON 24H UR-MRATE: 20 MCG/DL (ref 59–158)
IRON SATN MFR SERPL: 4 % (ref 20–50)
KETONES UR QL STRIP: NEGATIVE
LEUKOCYTE ESTERASE UR QL STRIP.AUTO: NEGATIVE
LYMPHOCYTES # BLD AUTO: 1.49 10*3/MM3 (ref 0.7–3.1)
LYMPHOCYTES # BLD AUTO: 2 10*3/MM3 (ref 0.7–3.1)
LYMPHOCYTES NFR BLD AUTO: 14.9 % (ref 19.6–45.3)
LYMPHOCYTES NFR BLD AUTO: 15.2 % (ref 19.6–45.3)
Lab: 149
MCH RBC QN AUTO: 24.1 PG (ref 26.6–33)
MCH RBC QN AUTO: 24.4 PG (ref 26.6–33)
MCHC RBC AUTO-ENTMCNC: 30 G/DL (ref 31.5–35.7)
MCHC RBC AUTO-ENTMCNC: 30.9 G/DL (ref 31.5–35.7)
MCV RBC AUTO: 78.8 FL (ref 79–97)
MCV RBC AUTO: 80.3 FL (ref 79–97)
MONOCYTES # BLD AUTO: 0.79 10*3/MM3 (ref 0.1–0.9)
MONOCYTES # BLD AUTO: 1.16 10*3/MM3 (ref 0.1–0.9)
MONOCYTES NFR BLD AUTO: 7.9 % (ref 5–12)
MONOCYTES NFR BLD AUTO: 8.8 % (ref 5–12)
MYOGLOBIN SERPL-MCNC: 43.8 NG/ML (ref 28–72)
NEGATIVE CONTROL: NEGATIVE
NEUTROPHILS # BLD AUTO: 7.56 10*3/MM3 (ref 1.7–7)
NEUTROPHILS # BLD AUTO: 9.81 10*3/MM3 (ref 1.7–7)
NEUTROPHILS NFR BLD AUTO: 74.4 % (ref 42.7–76)
NEUTROPHILS NFR BLD AUTO: 75.8 % (ref 42.7–76)
NITRITE UR QL STRIP: NEGATIVE
NRBC BLD AUTO-RTO: 0 /100 WBC (ref 0–0.2)
NRBC BLD AUTO-RTO: 0 /100 WBC (ref 0–0.2)
PH UR STRIP.AUTO: 6 [PH] (ref 5–8)
PLATELET # BLD AUTO: 364 10*3/MM3 (ref 140–450)
PLATELET # BLD AUTO: 404 10*3/MM3 (ref 140–450)
PMV BLD AUTO: 8.8 FL (ref 6–12)
PMV BLD AUTO: 9 FL (ref 6–12)
POSITIVE CONTROL: POSITIVE
POTASSIUM BLD-SCNC: 4.2 MMOL/L (ref 3.5–5.2)
POTASSIUM BLD-SCNC: 4.4 MMOL/L (ref 3.5–5.2)
POTASSIUM BLD-SCNC: 4.8 MMOL/L (ref 3.5–5.2)
PROT SERPL-MCNC: 7.3 G/DL (ref 6–8.5)
PROT SERPL-MCNC: 8.1 G/DL (ref 6–8.5)
PROT UR QL STRIP: NEGATIVE
PROTHROMBIN TIME: 14.6 SECONDS (ref 11.9–14.6)
RBC # BLD AUTO: 4.43 10*6/MM3 (ref 4.14–5.8)
RBC # BLD AUTO: 4.82 10*6/MM3 (ref 4.14–5.8)
SODIUM BLD-SCNC: 135 MMOL/L (ref 136–145)
SODIUM BLD-SCNC: 138 MMOL/L (ref 136–145)
SODIUM BLD-SCNC: 139 MMOL/L (ref 136–145)
SP GR UR STRIP: 1.02 (ref 1–1.03)
T4 FREE SERPL-MCNC: 1.31 NG/DL (ref 0.93–1.7)
TIBC SERPL-MCNC: 487 MCG/DL (ref 298–536)
TRANSFERRIN SERPL-MCNC: 327 MG/DL (ref 200–360)
TROPONIN T SERPL-MCNC: 0.02 NG/ML (ref 0–0.03)
TROPONIN T SERPL-MCNC: 0.02 NG/ML (ref 0–0.03)
TSH SERPL DL<=0.05 MIU/L-ACNC: 1.68 UIU/ML (ref 0.27–4.2)
UROBILINOGEN UR QL STRIP: NORMAL
VIT B12 BLD-MCNC: <150 PG/ML (ref 211–946)
WBC NRBC COR # BLD: 13.18 10*3/MM3 (ref 3.4–10.8)
WBC NRBC COR # BLD: 9.98 10*3/MM3 (ref 3.4–10.8)
WHOLE BLOOD HOLD SPECIMEN: NORMAL

## 2019-10-02 PROCEDURE — 85610 PROTHROMBIN TIME: CPT | Performed by: INTERNAL MEDICINE

## 2019-10-02 PROCEDURE — 84484 ASSAY OF TROPONIN QUANT: CPT | Performed by: EMERGENCY MEDICINE

## 2019-10-02 PROCEDURE — 71275 CT ANGIOGRAPHY CHEST: CPT

## 2019-10-02 PROCEDURE — 25810000003 SODIUM CHLORIDE 0.9 % WITH KCL 20 MEQ 20-0.9 MEQ/L-% SOLUTION: Performed by: NURSE PRACTITIONER

## 2019-10-02 PROCEDURE — 84466 ASSAY OF TRANSFERRIN: CPT | Performed by: NURSE PRACTITIONER

## 2019-10-02 PROCEDURE — 99284 EMERGENCY DEPT VISIT MOD MDM: CPT

## 2019-10-02 PROCEDURE — 93010 ELECTROCARDIOGRAM REPORT: CPT | Performed by: INTERNAL MEDICINE

## 2019-10-02 PROCEDURE — 93005 ELECTROCARDIOGRAM TRACING: CPT | Performed by: EMERGENCY MEDICINE

## 2019-10-02 PROCEDURE — 0 IOPAMIDOL PER 1 ML: Performed by: INTERNAL MEDICINE

## 2019-10-02 PROCEDURE — 82270 OCCULT BLOOD FECES: CPT | Performed by: NURSE PRACTITIONER

## 2019-10-02 PROCEDURE — 25010000002 ENOXAPARIN PER 10 MG: Performed by: EMERGENCY MEDICINE

## 2019-10-02 PROCEDURE — 85025 COMPLETE CBC W/AUTO DIFF WBC: CPT | Performed by: EMERGENCY MEDICINE

## 2019-10-02 PROCEDURE — 99222 1ST HOSP IP/OBS MODERATE 55: CPT | Performed by: SURGERY

## 2019-10-02 PROCEDURE — 80053 COMPREHEN METABOLIC PANEL: CPT | Performed by: EMERGENCY MEDICINE

## 2019-10-02 PROCEDURE — 83540 ASSAY OF IRON: CPT | Performed by: NURSE PRACTITIONER

## 2019-10-02 PROCEDURE — 94799 UNLISTED PULMONARY SVC/PX: CPT

## 2019-10-02 PROCEDURE — 99222 1ST HOSP IP/OBS MODERATE 55: CPT | Performed by: INTERNAL MEDICINE

## 2019-10-02 PROCEDURE — 93970 EXTREMITY STUDY: CPT

## 2019-10-02 PROCEDURE — 93970 EXTREMITY STUDY: CPT | Performed by: SURGERY

## 2019-10-02 RX ORDER — PANTOPRAZOLE SODIUM 40 MG/1
40 TABLET, DELAYED RELEASE ORAL
Status: DISCONTINUED | OUTPATIENT
Start: 2019-10-02 | End: 2019-10-03

## 2019-10-02 RX ORDER — SODIUM CHLORIDE AND POTASSIUM CHLORIDE 150; 900 MG/100ML; MG/100ML
50 INJECTION, SOLUTION INTRAVENOUS CONTINUOUS
Status: DISCONTINUED | OUTPATIENT
Start: 2019-10-02 | End: 2019-10-03

## 2019-10-02 RX ORDER — LISINOPRIL 20 MG/1
20 TABLET ORAL DAILY
Status: ON HOLD | COMMUNITY
End: 2019-10-05 | Stop reason: SDUPTHER

## 2019-10-02 RX ORDER — HYDROCODONE BITARTRATE AND ACETAMINOPHEN 10; 325 MG/1; MG/1
1 TABLET ORAL EVERY 12 HOURS PRN
Status: ON HOLD | COMMUNITY
End: 2023-02-08

## 2019-10-02 RX ORDER — HYDROCODONE BITARTRATE AND ACETAMINOPHEN 5; 325 MG/1; MG/1
1 TABLET ORAL EVERY 4 HOURS PRN
Status: DISCONTINUED | OUTPATIENT
Start: 2019-10-02 | End: 2019-10-05 | Stop reason: HOSPADM

## 2019-10-02 RX ORDER — SODIUM CHLORIDE 0.9 % (FLUSH) 0.9 %
10 SYRINGE (ML) INJECTION EVERY 12 HOURS SCHEDULED
Status: DISCONTINUED | OUTPATIENT
Start: 2019-10-02 | End: 2019-10-05 | Stop reason: HOSPADM

## 2019-10-02 RX ORDER — LISINOPRIL 10 MG/1
10 TABLET ORAL DAILY
Status: DISCONTINUED | OUTPATIENT
Start: 2019-10-03 | End: 2019-10-05 | Stop reason: HOSPADM

## 2019-10-02 RX ORDER — ONDANSETRON 2 MG/ML
4 INJECTION INTRAMUSCULAR; INTRAVENOUS EVERY 6 HOURS PRN
Status: DISCONTINUED | OUTPATIENT
Start: 2019-10-02 | End: 2019-10-05 | Stop reason: HOSPADM

## 2019-10-02 RX ORDER — SODIUM CHLORIDE 0.9 % (FLUSH) 0.9 %
10 SYRINGE (ML) INJECTION AS NEEDED
Status: DISCONTINUED | OUTPATIENT
Start: 2019-10-02 | End: 2019-10-05 | Stop reason: HOSPADM

## 2019-10-02 RX ADMIN — HYDROCODONE POLISTIREX AND CHLORPHENIRAMINE POLISTIREX 2.5 ML: 10; 8 SUSPENSION, EXTENDED RELEASE ORAL at 17:07

## 2019-10-02 RX ADMIN — PANTOPRAZOLE SODIUM 40 MG: 40 TABLET, DELAYED RELEASE ORAL at 16:42

## 2019-10-02 RX ADMIN — POTASSIUM CHLORIDE AND SODIUM CHLORIDE 50 ML/HR: 900; 150 INJECTION, SOLUTION INTRAVENOUS at 16:42

## 2019-10-02 RX ADMIN — ENOXAPARIN SODIUM 100 MG: 100 INJECTION SUBCUTANEOUS at 14:48

## 2019-10-02 RX ADMIN — HYDROCODONE BITARTRATE AND ACETAMINOPHEN 1 TABLET: 5; 325 TABLET ORAL at 16:48

## 2019-10-02 RX ADMIN — SODIUM CHLORIDE, PRESERVATIVE FREE 10 ML: 5 INJECTION INTRAVENOUS at 20:00

## 2019-10-02 RX ADMIN — IOPAMIDOL 100 ML: 755 INJECTION, SOLUTION INTRAVENOUS at 12:00

## 2019-10-02 RX ADMIN — HYDROCODONE BITARTRATE AND ACETAMINOPHEN 1 TABLET: 5; 325 TABLET ORAL at 22:28

## 2019-10-02 NOTE — TELEPHONE ENCOUNTER
Lana from Radiology called with stat venous scan results for Dr Ramos. Call to Leonardo Vargas and given phone number to radiology-[facility] will call radiology for results.

## 2019-10-02 NOTE — TELEPHONE ENCOUNTER
"    Reason for Disposition  • Lab or radiology calling with CRITICAL test results    Additional Information  • Negative: Lab calling with strep throat test results and triager can call in prescription  • Negative: Lab calling with urinalysis test results and triager can call in prescription  • Negative: Medication questions  • Negative: ED call to PCP  • Negative: Physician call to PCP  • Negative: Call about patient who is currently hospitalized    Answer Assessment - Initial Assessment Questions  1. REASON FOR CALL or QUESTION: \"What is your reason for calling today?\" or \"How can I best  help you?\" or \"What question do you have that I can help answer?\"      Telephone call from [facility] radiology. The radiologist is wanting to speak with Dr. Ramos. Leonardo Vargas on call. Call placed to Leonardo's cell phone and voicemail message left.  12:50pm Leonardo returned call aware of radiologist request Leonardo will call Landen Maria Isabel for return call to radiologist as this patient is Landen's  2. CALLER: Document the source of call. (e.g., laboratory, patient).      Radiology    Protocols used: PCP CALL - NO TRIAGE-ADULT-      "

## 2019-10-02 NOTE — TELEPHONE ENCOUNTER
"  Reason for Disposition  • [1] Follow-up call from patient regarding patient's clinical status AND [2] information urgent    Additional Information  • Negative: [1] Caller is not with the adult (patient) AND [2] reporting urgent symptoms  • Negative: Medication questions  • Lab result questions  • Negative: Lab calling with strep throat test results and triager can call in prescription  • Negative: Lab calling with urinalysis test results and triager can call in prescription  • Negative: Medication questions  • Negative: ED call to PCP  • Negative: Physician call to PCP  • Negative: Call about patient who is currently hospitalized  • Negative: Lab or radiology calling with CRITICAL test results  • Negative: [1] Prescription not at pharmacy AND [2] was prescribed today by PCP    Answer Assessment - Initial Assessment Questions  1. REASON FOR CALL or QUESTION: \"What is your reason for calling today?\" or \"How can I best help you?\" or \"What question do you have that I can help answer?\"      Radiology called with results for stat venous scan ordered by Dr Ramos.    Answer Assessment - Initial Assessment Questions  1. REASON FOR CALL or QUESTION: \"What is your reason for calling today?\" or \"How can I best  help you?\" or \"What question do you have that I can help answer?\"      Lana from radiology calling with results from stat venous scan.  2. CALLER: Document the source of call. (e.g., laboratory, patient).      radiology    Protocols used: PCP CALL - NO TRIAGE-ADULT-, INFORMATION ONLY CALL-ADULT-    "

## 2019-10-03 ENCOUNTER — APPOINTMENT (OUTPATIENT)
Dept: CARDIOLOGY | Facility: HOSPITAL | Age: 84
End: 2019-10-03

## 2019-10-03 ENCOUNTER — ANESTHESIA (OUTPATIENT)
Dept: GASTROENTEROLOGY | Facility: HOSPITAL | Age: 84
End: 2019-10-03

## 2019-10-03 ENCOUNTER — ANESTHESIA EVENT (OUTPATIENT)
Dept: GASTROENTEROLOGY | Facility: HOSPITAL | Age: 84
End: 2019-10-03

## 2019-10-03 PROBLEM — K80.20 CHOLELITHIASIS: Status: ACTIVE | Noted: 2019-10-03

## 2019-10-03 PROBLEM — E53.8 VITAMIN B12 DEFICIENCY: Status: ACTIVE | Noted: 2019-10-03

## 2019-10-03 PROBLEM — D50.9 IDA (IRON DEFICIENCY ANEMIA): Status: ACTIVE | Noted: 2019-10-03

## 2019-10-03 LAB
ABO GROUP BLD: NORMAL
ANION GAP SERPL CALCULATED.3IONS-SCNC: 10 MMOL/L (ref 5–15)
BASOPHILS # BLD AUTO: 0.05 10*3/MM3 (ref 0–0.2)
BASOPHILS NFR BLD AUTO: 0.6 % (ref 0–1.5)
BH CV ECHO MEAS - AO MAX PG (FULL): 11.3 MMHG
BH CV ECHO MEAS - AO MAX PG: 18.5 MMHG
BH CV ECHO MEAS - AO MEAN PG (FULL): 5.5 MMHG
BH CV ECHO MEAS - AO MEAN PG: 9.5 MMHG
BH CV ECHO MEAS - AO ROOT AREA (BSA CORRECTED): 1.5
BH CV ECHO MEAS - AO ROOT AREA: 8.6 CM^2
BH CV ECHO MEAS - AO ROOT DIAM: 3.3 CM
BH CV ECHO MEAS - AO V2 MAX: 215 CM/SEC
BH CV ECHO MEAS - AO V2 MEAN: 144 CM/SEC
BH CV ECHO MEAS - AO V2 VTI: 39.4 CM
BH CV ECHO MEAS - AVA(I,A): 2 CM^2
BH CV ECHO MEAS - AVA(I,D): 2 CM^2
BH CV ECHO MEAS - AVA(V,A): 2.2 CM^2
BH CV ECHO MEAS - AVA(V,D): 2.2 CM^2
BH CV ECHO MEAS - BSA(HAYCOCK): 2.3 M^2
BH CV ECHO MEAS - BSA: 2.3 M^2
BH CV ECHO MEAS - BZI_BMI: 26.7 KILOGRAMS/M^2
BH CV ECHO MEAS - BZI_METRIC_HEIGHT: 190.5 CM
BH CV ECHO MEAS - BZI_METRIC_WEIGHT: 97.1 KG
BH CV ECHO MEAS - EDV(CUBED): 37.3 ML
BH CV ECHO MEAS - EDV(MOD-SP4): 168 ML
BH CV ECHO MEAS - EDV(TEICH): 45.4 ML
BH CV ECHO MEAS - EF(MOD-SP4): 69.4 %
BH CV ECHO MEAS - ESV(MOD-SP4): 51.4 ML
BH CV ECHO MEAS - IVS/LVPW: 1.1
BH CV ECHO MEAS - IVSD: 1.6 CM
BH CV ECHO MEAS - LA DIMENSION: 2.8 CM
BH CV ECHO MEAS - LA/AO: 0.85
BH CV ECHO MEAS - LAT PEAK E' VEL: 7.8 CM/SEC
BH CV ECHO MEAS - LV DIASTOLIC VOL/BSA (35-75): 74.4 ML/M^2
BH CV ECHO MEAS - LV MASS(C)D: 179.6 GRAMS
BH CV ECHO MEAS - LV MASS(C)DI: 79.5 GRAMS/M^2
BH CV ECHO MEAS - LV MAX PG: 7.2 MMHG
BH CV ECHO MEAS - LV MEAN PG: 4 MMHG
BH CV ECHO MEAS - LV SYSTOLIC VOL/BSA (12-30): 22.8 ML/M^2
BH CV ECHO MEAS - LV V1 MAX: 134 CM/SEC
BH CV ECHO MEAS - LV V1 MEAN: 96.7 CM/SEC
BH CV ECHO MEAS - LV V1 VTI: 22.2 CM
BH CV ECHO MEAS - LVIDD: 3.3 CM
BH CV ECHO MEAS - LVLD AP4: 8.7 CM
BH CV ECHO MEAS - LVLS AP4: 7.2 CM
BH CV ECHO MEAS - LVOT AREA (M): 3.5 CM^2
BH CV ECHO MEAS - LVOT AREA: 3.5 CM^2
BH CV ECHO MEAS - LVOT DIAM: 2.1 CM
BH CV ECHO MEAS - LVPWD: 1.4 CM
BH CV ECHO MEAS - MED PEAK E' VEL: 5.8 CM/SEC
BH CV ECHO MEAS - MR MAX PG: 127.7 MMHG
BH CV ECHO MEAS - MR MAX VEL: 565 CM/SEC
BH CV ECHO MEAS - MR MEAN PG: 105 MMHG
BH CV ECHO MEAS - MR MEAN VEL: 489 CM/SEC
BH CV ECHO MEAS - MR VTI: 123 CM
BH CV ECHO MEAS - MV A MAX VEL: 95.6 CM/SEC
BH CV ECHO MEAS - MV DEC SLOPE: 371 CM/SEC^2
BH CV ECHO MEAS - MV DEC TIME: 0.2 SEC
BH CV ECHO MEAS - MV E MAX VEL: 73.2 CM/SEC
BH CV ECHO MEAS - MV E/A: 0.77
BH CV ECHO MEAS - SI(AO): 149.2 ML/M^2
BH CV ECHO MEAS - SI(LVOT): 34 ML/M^2
BH CV ECHO MEAS - SI(MOD-SP4): 51.6 ML/M^2
BH CV ECHO MEAS - SV(AO): 337 ML
BH CV ECHO MEAS - SV(LVOT): 76.9 ML
BH CV ECHO MEAS - SV(MOD-SP4): 116.6 ML
BH CV ECHO MEAS - TR MAX VEL: 333 CM/SEC
BH CV ECHO MEASUREMENTS AVERAGE E/E' RATIO: 10.76
BLD GP AB SCN SERPL QL: NEGATIVE
BUN BLD-MCNC: 18 MG/DL (ref 8–23)
BUN/CREAT SERPL: 18.8 (ref 7–25)
CALCIUM SPEC-SCNC: 8.7 MG/DL (ref 8.6–10.5)
CHLORIDE SERPL-SCNC: 102 MMOL/L (ref 98–107)
CO2 SERPL-SCNC: 26 MMOL/L (ref 22–29)
CREAT BLD-MCNC: 0.96 MG/DL (ref 0.76–1.27)
DEPRECATED RDW RBC AUTO: 45.3 FL (ref 37–54)
EOSINOPHIL # BLD AUTO: 0.08 10*3/MM3 (ref 0–0.4)
EOSINOPHIL NFR BLD AUTO: 1 % (ref 0.3–6.2)
ERYTHROCYTE [DISTWIDTH] IN BLOOD BY AUTOMATED COUNT: 15.9 % (ref 12.3–15.4)
GFR SERPL CREATININE-BSD FRML MDRD: 75 ML/MIN/1.73
GLUCOSE BLD-MCNC: 122 MG/DL (ref 65–99)
HCT VFR BLD AUTO: 30.4 % (ref 37.5–51)
HCT VFR BLD AUTO: 31 % (ref 37.5–51)
HCT VFR BLD AUTO: 31.2 % (ref 37.5–51)
HGB BLD-MCNC: 9.3 G/DL (ref 13–17.7)
HGB BLD-MCNC: 9.5 G/DL (ref 13–17.7)
HGB BLD-MCNC: 9.6 G/DL (ref 13–17.7)
IMM GRANULOCYTES # BLD AUTO: 0.04 10*3/MM3 (ref 0–0.05)
IMM GRANULOCYTES NFR BLD AUTO: 0.5 % (ref 0–0.5)
LEFT ATRIUM VOLUME INDEX: 16.2 ML/M2
LEFT ATRIUM VOLUME: 36.7 CM3
LYMPHOCYTES # BLD AUTO: 2.04 10*3/MM3 (ref 0.7–3.1)
LYMPHOCYTES NFR BLD AUTO: 24.7 % (ref 19.6–45.3)
MAXIMAL PREDICTED HEART RATE: 137 BPM
MCH RBC QN AUTO: 24.2 PG (ref 26.6–33)
MCHC RBC AUTO-ENTMCNC: 30.6 G/DL (ref 31.5–35.7)
MCV RBC AUTO: 79.2 FL (ref 79–97)
MONOCYTES # BLD AUTO: 0.66 10*3/MM3 (ref 0.1–0.9)
MONOCYTES NFR BLD AUTO: 8 % (ref 5–12)
NEUTROPHILS # BLD AUTO: 5.4 10*3/MM3 (ref 1.7–7)
NEUTROPHILS NFR BLD AUTO: 65.2 % (ref 42.7–76)
NRBC BLD AUTO-RTO: 0 /100 WBC (ref 0–0.2)
PLATELET # BLD AUTO: 336 10*3/MM3 (ref 140–450)
PMV BLD AUTO: 8.9 FL (ref 6–12)
POTASSIUM BLD-SCNC: 4.9 MMOL/L (ref 3.5–5.2)
RBC # BLD AUTO: 3.84 10*6/MM3 (ref 4.14–5.8)
RH BLD: POSITIVE
SODIUM BLD-SCNC: 138 MMOL/L (ref 136–145)
STRESS TARGET HR: 116 BPM
T&S EXPIRATION DATE: NORMAL
WBC NRBC COR # BLD: 8.27 10*3/MM3 (ref 3.4–10.8)

## 2019-10-03 PROCEDURE — 99222 1ST HOSP IP/OBS MODERATE 55: CPT | Performed by: CLINICAL NURSE SPECIALIST

## 2019-10-03 PROCEDURE — 25010000002 CYANOCOBALAMIN PER 1000 MCG: Performed by: NURSE PRACTITIONER

## 2019-10-03 PROCEDURE — 85014 HEMATOCRIT: CPT | Performed by: NURSE PRACTITIONER

## 2019-10-03 PROCEDURE — 85018 HEMOGLOBIN: CPT | Performed by: NURSE PRACTITIONER

## 2019-10-03 PROCEDURE — 86901 BLOOD TYPING SEROLOGIC RH(D): CPT | Performed by: INTERNAL MEDICINE

## 2019-10-03 PROCEDURE — 25010000002 ONDANSETRON PER 1 MG: Performed by: NURSE PRACTITIONER

## 2019-10-03 PROCEDURE — 86850 RBC ANTIBODY SCREEN: CPT | Performed by: INTERNAL MEDICINE

## 2019-10-03 PROCEDURE — 25010000002 PROPOFOL 10 MG/ML EMULSION: Performed by: NURSE ANESTHETIST, CERTIFIED REGISTERED

## 2019-10-03 PROCEDURE — 86923 COMPATIBILITY TEST ELECTRIC: CPT

## 2019-10-03 PROCEDURE — 94760 N-INVAS EAR/PLS OXIMETRY 1: CPT

## 2019-10-03 PROCEDURE — 80048 BASIC METABOLIC PNL TOTAL CA: CPT | Performed by: NURSE PRACTITIONER

## 2019-10-03 PROCEDURE — 93306 TTE W/DOPPLER COMPLETE: CPT

## 2019-10-03 PROCEDURE — 85025 COMPLETE CBC W/AUTO DIFF WBC: CPT | Performed by: NURSE PRACTITIONER

## 2019-10-03 PROCEDURE — 99232 SBSQ HOSP IP/OBS MODERATE 35: CPT | Performed by: SURGERY

## 2019-10-03 PROCEDURE — 25010000002 ENOXAPARIN PER 10 MG: Performed by: NURSE PRACTITIONER

## 2019-10-03 PROCEDURE — 0DJ08ZZ INSPECTION OF UPPER INTESTINAL TRACT, VIA NATURAL OR ARTIFICIAL OPENING ENDOSCOPIC: ICD-10-PCS | Performed by: INTERNAL MEDICINE

## 2019-10-03 PROCEDURE — 86900 BLOOD TYPING SEROLOGIC ABO: CPT | Performed by: INTERNAL MEDICINE

## 2019-10-03 PROCEDURE — 25010000002 IRON SUCROSE PER 1 MG: Performed by: NURSE PRACTITIONER

## 2019-10-03 PROCEDURE — 25010000002 PERFLUTREN 6.52 MG/ML SUSPENSION: Performed by: INTERNAL MEDICINE

## 2019-10-03 PROCEDURE — 94799 UNLISTED PULMONARY SVC/PX: CPT

## 2019-10-03 PROCEDURE — 43235 EGD DIAGNOSTIC BRUSH WASH: CPT | Performed by: INTERNAL MEDICINE

## 2019-10-03 PROCEDURE — 93306 TTE W/DOPPLER COMPLETE: CPT | Performed by: INTERNAL MEDICINE

## 2019-10-03 RX ORDER — SODIUM CHLORIDE 9 MG/ML
INJECTION, SOLUTION INTRAVENOUS CONTINUOUS PRN
Status: DISCONTINUED | OUTPATIENT
Start: 2019-10-03 | End: 2019-10-03 | Stop reason: SURG

## 2019-10-03 RX ORDER — CYANOCOBALAMIN 1000 UG/ML
1000 INJECTION, SOLUTION INTRAMUSCULAR; SUBCUTANEOUS DAILY
Status: DISCONTINUED | OUTPATIENT
Start: 2019-10-03 | End: 2019-10-05 | Stop reason: HOSPADM

## 2019-10-03 RX ORDER — DEXTROSE AND SODIUM CHLORIDE 5; .9 G/100ML; G/100ML
75 INJECTION, SOLUTION INTRAVENOUS CONTINUOUS
Status: DISCONTINUED | OUTPATIENT
Start: 2019-10-03 | End: 2019-10-04

## 2019-10-03 RX ORDER — PROPOFOL 10 MG/ML
VIAL (ML) INTRAVENOUS AS NEEDED
Status: DISCONTINUED | OUTPATIENT
Start: 2019-10-03 | End: 2019-10-03 | Stop reason: SURG

## 2019-10-03 RX ADMIN — ONDANSETRON HYDROCHLORIDE 4 MG: 2 SOLUTION INTRAMUSCULAR; INTRAVENOUS at 23:28

## 2019-10-03 RX ADMIN — IRON SUCROSE 200 MG: 20 INJECTION, SOLUTION INTRAVENOUS at 10:54

## 2019-10-03 RX ADMIN — HYDROCODONE BITARTRATE AND ACETAMINOPHEN 1 TABLET: 5; 325 TABLET ORAL at 03:10

## 2019-10-03 RX ADMIN — PANTOPRAZOLE SODIUM 40 MG: 40 TABLET, DELAYED RELEASE ORAL at 09:34

## 2019-10-03 RX ADMIN — ENOXAPARIN SODIUM 100 MG: 100 INJECTION SUBCUTANEOUS at 03:10

## 2019-10-03 RX ADMIN — PROPOFOL 50 MG: 10 INJECTION, EMULSION INTRAVENOUS at 12:07

## 2019-10-03 RX ADMIN — LISINOPRIL 10 MG: 10 TABLET ORAL at 09:34

## 2019-10-03 RX ADMIN — SODIUM CHLORIDE, PRESERVATIVE FREE 10 ML: 5 INJECTION INTRAVENOUS at 09:35

## 2019-10-03 RX ADMIN — PERFLUTREN 8.48 MG: 6.52 INJECTION, SUSPENSION INTRAVENOUS at 10:15

## 2019-10-03 RX ADMIN — DEXTROSE AND SODIUM CHLORIDE 75 ML/HR: 5; 900 INJECTION, SOLUTION INTRAVENOUS at 09:34

## 2019-10-03 RX ADMIN — PROPOFOL 50 MG: 10 INJECTION, EMULSION INTRAVENOUS at 12:11

## 2019-10-03 RX ADMIN — CYANOCOBALAMIN 1000 MCG: 1000 INJECTION, SOLUTION INTRAMUSCULAR at 09:34

## 2019-10-03 RX ADMIN — SODIUM CHLORIDE 8 MG/HR: 900 INJECTION INTRAVENOUS at 16:41

## 2019-10-03 RX ADMIN — HYDROCODONE BITARTRATE AND ACETAMINOPHEN 1 TABLET: 5; 325 TABLET ORAL at 17:59

## 2019-10-03 RX ADMIN — HYDROCODONE BITARTRATE AND ACETAMINOPHEN 1 TABLET: 5; 325 TABLET ORAL at 14:13

## 2019-10-03 RX ADMIN — LIDOCAINE HYDROCHLORIDE 60 MG: 20 INJECTION, SOLUTION INTRAVENOUS at 12:07

## 2019-10-03 RX ADMIN — SODIUM CHLORIDE 8 MG/HR: 900 INJECTION INTRAVENOUS at 12:26

## 2019-10-03 RX ADMIN — PROPOFOL 50 MG: 10 INJECTION, EMULSION INTRAVENOUS at 12:08

## 2019-10-03 RX ADMIN — HYDROCODONE BITARTRATE AND ACETAMINOPHEN 1 TABLET: 5; 325 TABLET ORAL at 23:22

## 2019-10-03 RX ADMIN — SODIUM CHLORIDE: 9 INJECTION, SOLUTION INTRAVENOUS at 11:55

## 2019-10-03 NOTE — ANESTHESIA PREPROCEDURE EVALUATION
Anesthesia Evaluation     Patient summary reviewed   no history of anesthetic complications:  NPO Solid Status: > 8 hours  NPO Liquid Status: > 8 hours           Airway   Mallampati: I  TM distance: >3 FB  Neck ROM: full  No difficulty expected  Dental - normal exam         Pulmonary    (+) pulmonary embolism,   (-) asthma, sleep apnea, not a smoker  Cardiovascular     ECG reviewed  PT is on anticoagulation therapy    (+) hypertension,   (-) past MI, CAD, angina, cardiac stents    ROS comment: RV strain from bilateral PEs on CT    Neuro/Psych  (-) seizures, TIA, CVA  GI/Hepatic/Renal/Endo    (+)  hiatal hernia, GERD, GI bleeding,   (-) liver disease, no renal disease, diabetes    Musculoskeletal     (+) back pain, neck pain (surgery in [place]- 05/2018),   Abdominal    Substance History      OB/GYN          Other        ROS/Med Hx Other: Constipation         CTA Chest:  IMPRESSION:  1. Bilateral pulmonary emboli are noted.     The right ventricular/ left ventricular ratio is 1.9 this represents  right ventricular strain.  2. Cholelithiasis.              Anesthesia Plan    ASA 3 - emergent     MAC   (Patient with bilateral PEs, although remains HD stable. Echo read pending. In setting of emergent nature of GI bleed and DVT/PE requriring anticoagulation, will proceed at bedside in ICU)  intravenous induction   Anesthetic plan, all risks, benefits, and alternatives have been provided, discussed and informed consent has been obtained with: patient and spouse/significant other.

## 2019-10-03 NOTE — ANESTHESIA POSTPROCEDURE EVALUATION
Patient: Jose Alfredo Kirkpatrick    Procedure Summary     Date:  10/03/19 Room / Location:  [facility] ENDOSCOPY 4 / [facility] ENDOSCOPY    Anesthesia Start:  1155 Anesthesia Stop:  1223    Procedure:  ESOPHAGOGASTRODUODENOSCOPY WITH ANESTHESIA (N/A ) Diagnosis:       Melena      (Melena [K92.1])    Surgeon:  Marques Pérez MD Provider:  Franko Stinson CRNA    Anesthesia Type:  MAC ASA Status:  3 - Emergent          Anesthesia Type: MAC  Last vitals  BP   126/58 (10/03/19 1005)   Temp   97.5 °F (36.4 °C) (10/03/19 0715)   Pulse   69 (10/03/19 1005)   Resp   15 (10/02/19 1404)     SpO2   91 % (10/03/19 1005)     Post Anesthesia Care and Evaluation    Patient location during evaluation: PHASE II  Patient participation: complete - patient participated  Level of consciousness: awake and sleepy but conscious  Pain score: 0  Pain management: adequate  Airway patency: patent  Anesthetic complications: No anesthetic complications    Cardiovascular status: acceptable  Respiratory status: acceptable  Hydration status: acceptable

## 2019-10-04 ENCOUNTER — ANESTHESIA EVENT (OUTPATIENT)
Dept: PERIOP | Facility: HOSPITAL | Age: 84
End: 2019-10-04

## 2019-10-04 ENCOUNTER — ANESTHESIA (OUTPATIENT)
Dept: PERIOP | Facility: HOSPITAL | Age: 84
End: 2019-10-04

## 2019-10-04 ENCOUNTER — APPOINTMENT (OUTPATIENT)
Dept: INTERVENTIONAL RADIOLOGY/VASCULAR | Facility: HOSPITAL | Age: 84
End: 2019-10-04

## 2019-10-04 LAB
ANION GAP SERPL CALCULATED.3IONS-SCNC: 10 MMOL/L (ref 5–15)
BASOPHILS # BLD AUTO: 0.07 10*3/MM3 (ref 0–0.2)
BASOPHILS NFR BLD AUTO: 1.2 % (ref 0–1.5)
BUN BLD-MCNC: 14 MG/DL (ref 8–23)
BUN/CREAT SERPL: 14.7 (ref 7–25)
CALCIUM SPEC-SCNC: 8.7 MG/DL (ref 8.6–10.5)
CHLORIDE SERPL-SCNC: 105 MMOL/L (ref 98–107)
CO2 SERPL-SCNC: 26 MMOL/L (ref 22–29)
CREAT BLD-MCNC: 0.95 MG/DL (ref 0.76–1.27)
DEPRECATED RDW RBC AUTO: 45.2 FL (ref 37–54)
EOSINOPHIL # BLD AUTO: 0.09 10*3/MM3 (ref 0–0.4)
EOSINOPHIL NFR BLD AUTO: 1.5 % (ref 0.3–6.2)
ERYTHROCYTE [DISTWIDTH] IN BLOOD BY AUTOMATED COUNT: 16.4 % (ref 12.3–15.4)
GFR SERPL CREATININE-BSD FRML MDRD: 76 ML/MIN/1.73
GLUCOSE BLD-MCNC: 121 MG/DL (ref 65–99)
HCT VFR BLD AUTO: 31.8 % (ref 37.5–51)
HCT VFR BLD AUTO: 33.4 % (ref 37.5–51)
HCT VFR BLD AUTO: 34.1 % (ref 37.5–51)
HGB BLD-MCNC: 10.4 G/DL (ref 13–17.7)
HGB BLD-MCNC: 10.5 G/DL (ref 13–17.7)
HGB BLD-MCNC: 9.6 G/DL (ref 13–17.7)
LYMPHOCYTES # BLD AUTO: 1.44 10*3/MM3 (ref 0.7–3.1)
LYMPHOCYTES NFR BLD AUTO: 24.6 % (ref 19.6–45.3)
MCH RBC QN AUTO: 25 PG (ref 26.6–33)
MCHC RBC AUTO-ENTMCNC: 31.4 G/DL (ref 31.5–35.7)
MCV RBC AUTO: 79.5 FL (ref 79–97)
MONOCYTES # BLD AUTO: 0.61 10*3/MM3 (ref 0.1–0.9)
MONOCYTES NFR BLD AUTO: 10.4 % (ref 5–12)
NEUTROPHILS # BLD AUTO: 3.58 10*3/MM3 (ref 1.7–7)
NEUTROPHILS NFR BLD AUTO: 61.3 % (ref 42.7–76)
PLATELET # BLD AUTO: 263 10*3/MM3 (ref 140–450)
PMV BLD AUTO: 11.3 FL (ref 6–12)
POTASSIUM BLD-SCNC: 4.8 MMOL/L (ref 3.5–5.2)
RBC # BLD AUTO: 4.2 10*6/MM3 (ref 4.14–5.8)
SODIUM BLD-SCNC: 141 MMOL/L (ref 136–145)
WBC NRBC COR # BLD: 5.85 10*3/MM3 (ref 3.4–10.8)

## 2019-10-04 PROCEDURE — 99232 SBSQ HOSP IP/OBS MODERATE 35: CPT | Performed by: NURSE PRACTITIONER

## 2019-10-04 PROCEDURE — 85014 HEMATOCRIT: CPT | Performed by: NURSE PRACTITIONER

## 2019-10-04 PROCEDURE — 25010000002 FENTANYL CITRATE (PF) 100 MCG/2ML SOLUTION: Performed by: NURSE ANESTHETIST, CERTIFIED REGISTERED

## 2019-10-04 PROCEDURE — 85018 HEMOGLOBIN: CPT | Performed by: NURSE PRACTITIONER

## 2019-10-04 PROCEDURE — C1769 GUIDE WIRE: HCPCS | Performed by: SURGERY

## 2019-10-04 PROCEDURE — 76000 FLUOROSCOPY <1 HR PHYS/QHP: CPT

## 2019-10-04 PROCEDURE — 25010000002 PROPOFOL 10 MG/ML EMULSION: Performed by: NURSE ANESTHETIST, CERTIFIED REGISTERED

## 2019-10-04 PROCEDURE — 06H03DZ INSERTION OF INTRALUMINAL DEVICE INTO INFERIOR VENA CAVA, PERCUTANEOUS APPROACH: ICD-10-PCS | Performed by: SURGERY

## 2019-10-04 PROCEDURE — 25010000002 CYANOCOBALAMIN PER 1000 MCG: Performed by: NURSE PRACTITIONER

## 2019-10-04 PROCEDURE — 37191 INS ENDOVAS VENA CAVA FILTR: CPT | Performed by: SURGERY

## 2019-10-04 PROCEDURE — 25010000002 IOPAMIDOL 61 % SOLUTION: Performed by: SURGERY

## 2019-10-04 PROCEDURE — C1880 VENA CAVA FILTER: HCPCS | Performed by: SURGERY

## 2019-10-04 PROCEDURE — C1894 INTRO/SHEATH, NON-LASER: HCPCS | Performed by: SURGERY

## 2019-10-04 PROCEDURE — 85025 COMPLETE CBC W/AUTO DIFF WBC: CPT | Performed by: NURSE PRACTITIONER

## 2019-10-04 PROCEDURE — 99232 SBSQ HOSP IP/OBS MODERATE 35: CPT | Performed by: SURGERY

## 2019-10-04 PROCEDURE — B5191ZZ FLUOROSCOPY OF INFERIOR VENA CAVA USING LOW OSMOLAR CONTRAST: ICD-10-PCS | Performed by: SURGERY

## 2019-10-04 PROCEDURE — 25010000002 IRON SUCROSE PER 1 MG: Performed by: NURSE PRACTITIONER

## 2019-10-04 PROCEDURE — 25010000002 HEPARIN (PORCINE) PER 1000 UNITS: Performed by: SURGERY

## 2019-10-04 PROCEDURE — 80048 BASIC METABOLIC PNL TOTAL CA: CPT | Performed by: NURSE PRACTITIONER

## 2019-10-04 DEVICE — DENALI® VENA CAVA FILTER FEMORAL
Type: IMPLANTABLE DEVICE | Status: FUNCTIONAL
Brand: DENALI® VENA CAVA FILTER

## 2019-10-04 RX ORDER — SODIUM CHLORIDE 0.9 % (FLUSH) 0.9 %
3-10 SYRINGE (ML) INJECTION AS NEEDED
Status: DISCONTINUED | OUTPATIENT
Start: 2019-10-04 | End: 2019-10-04 | Stop reason: HOSPADM

## 2019-10-04 RX ORDER — LABETALOL HYDROCHLORIDE 5 MG/ML
5 INJECTION, SOLUTION INTRAVENOUS
Status: DISCONTINUED | OUTPATIENT
Start: 2019-10-04 | End: 2019-10-04 | Stop reason: HOSPADM

## 2019-10-04 RX ORDER — OXYCODONE AND ACETAMINOPHEN 7.5; 325 MG/1; MG/1
1 TABLET ORAL EVERY 4 HOURS PRN
Status: DISCONTINUED | OUTPATIENT
Start: 2019-10-04 | End: 2019-10-05 | Stop reason: HOSPADM

## 2019-10-04 RX ORDER — BUPIVACAINE HCL/0.9 % NACL/PF 0.1 %
2 PLASTIC BAG, INJECTION (ML) EPIDURAL ONCE
Status: COMPLETED | OUTPATIENT
Start: 2019-10-04 | End: 2019-10-04

## 2019-10-04 RX ORDER — IPRATROPIUM BROMIDE AND ALBUTEROL SULFATE 2.5; .5 MG/3ML; MG/3ML
3 SOLUTION RESPIRATORY (INHALATION) ONCE AS NEEDED
Status: DISCONTINUED | OUTPATIENT
Start: 2019-10-04 | End: 2019-10-04 | Stop reason: HOSPADM

## 2019-10-04 RX ORDER — LIDOCAINE HYDROCHLORIDE 10 MG/ML
INJECTION, SOLUTION EPIDURAL; INFILTRATION; INTRACAUDAL; PERINEURAL AS NEEDED
Status: DISCONTINUED | OUTPATIENT
Start: 2019-10-04 | End: 2019-10-04 | Stop reason: HOSPADM

## 2019-10-04 RX ORDER — FENTANYL CITRATE 50 UG/ML
25 INJECTION, SOLUTION INTRAMUSCULAR; INTRAVENOUS AS NEEDED
Status: DISCONTINUED | OUTPATIENT
Start: 2019-10-04 | End: 2019-10-04 | Stop reason: HOSPADM

## 2019-10-04 RX ORDER — PROPOFOL 10 MG/ML
VIAL (ML) INTRAVENOUS AS NEEDED
Status: DISCONTINUED | OUTPATIENT
Start: 2019-10-04 | End: 2019-10-04 | Stop reason: SURG

## 2019-10-04 RX ORDER — SODIUM CHLORIDE, SODIUM LACTATE, POTASSIUM CHLORIDE, CALCIUM CHLORIDE 600; 310; 30; 20 MG/100ML; MG/100ML; MG/100ML; MG/100ML
100 INJECTION, SOLUTION INTRAVENOUS CONTINUOUS
Status: DISCONTINUED | OUTPATIENT
Start: 2019-10-04 | End: 2019-10-04

## 2019-10-04 RX ORDER — NALOXONE HCL 0.4 MG/ML
0.4 VIAL (ML) INJECTION AS NEEDED
Status: DISCONTINUED | OUTPATIENT
Start: 2019-10-04 | End: 2019-10-04 | Stop reason: HOSPADM

## 2019-10-04 RX ORDER — FENTANYL CITRATE 50 UG/ML
INJECTION, SOLUTION INTRAMUSCULAR; INTRAVENOUS AS NEEDED
Status: DISCONTINUED | OUTPATIENT
Start: 2019-10-04 | End: 2019-10-04 | Stop reason: SURG

## 2019-10-04 RX ORDER — ONDANSETRON 2 MG/ML
4 INJECTION INTRAMUSCULAR; INTRAVENOUS ONCE AS NEEDED
Status: DISCONTINUED | OUTPATIENT
Start: 2019-10-04 | End: 2019-10-04 | Stop reason: HOSPADM

## 2019-10-04 RX ORDER — METOCLOPRAMIDE HYDROCHLORIDE 5 MG/ML
5 INJECTION INTRAMUSCULAR; INTRAVENOUS
Status: DISCONTINUED | OUTPATIENT
Start: 2019-10-04 | End: 2019-10-04 | Stop reason: HOSPADM

## 2019-10-04 RX ORDER — SODIUM CHLORIDE 0.9 % (FLUSH) 0.9 %
3 SYRINGE (ML) INJECTION EVERY 12 HOURS SCHEDULED
Status: DISCONTINUED | OUTPATIENT
Start: 2019-10-04 | End: 2019-10-04 | Stop reason: HOSPADM

## 2019-10-04 RX ORDER — OXYCODONE HYDROCHLORIDE AND ACETAMINOPHEN 5; 325 MG/1; MG/1
1 TABLET ORAL ONCE AS NEEDED
Status: DISCONTINUED | OUTPATIENT
Start: 2019-10-04 | End: 2019-10-04 | Stop reason: HOSPADM

## 2019-10-04 RX ADMIN — SODIUM CHLORIDE, POTASSIUM CHLORIDE, SODIUM LACTATE AND CALCIUM CHLORIDE 100 ML/HR: 600; 310; 30; 20 INJECTION, SOLUTION INTRAVENOUS at 12:46

## 2019-10-04 RX ADMIN — SODIUM CHLORIDE, PRESERVATIVE FREE 10 ML: 5 INJECTION INTRAVENOUS at 09:09

## 2019-10-04 RX ADMIN — HYDROCODONE BITARTRATE AND ACETAMINOPHEN 1 TABLET: 5; 325 TABLET ORAL at 16:11

## 2019-10-04 RX ADMIN — HYDROCODONE BITARTRATE AND ACETAMINOPHEN 1 TABLET: 5; 325 TABLET ORAL at 07:31

## 2019-10-04 RX ADMIN — SODIUM CHLORIDE 8 MG/HR: 900 INJECTION INTRAVENOUS at 21:49

## 2019-10-04 RX ADMIN — Medication 2 G: at 13:39

## 2019-10-04 RX ADMIN — LIDOCAINE HYDROCHLORIDE 100 MG: 20 INJECTION, SOLUTION INTRAVENOUS at 13:37

## 2019-10-04 RX ADMIN — FENTANYL CITRATE 100 MCG: 50 INJECTION, SOLUTION INTRAMUSCULAR; INTRAVENOUS at 14:10

## 2019-10-04 RX ADMIN — PROPOFOL 81 MCG/KG/MIN: 10 INJECTION, EMULSION INTRAVENOUS at 13:40

## 2019-10-04 RX ADMIN — SODIUM CHLORIDE 8 MG/HR: 900 INJECTION INTRAVENOUS at 01:55

## 2019-10-04 RX ADMIN — LISINOPRIL 10 MG: 10 TABLET ORAL at 09:08

## 2019-10-04 RX ADMIN — DEXTROSE AND SODIUM CHLORIDE 75 ML/HR: 5; 900 INJECTION, SOLUTION INTRAVENOUS at 01:56

## 2019-10-04 RX ADMIN — IRON SUCROSE 200 MG: 20 INJECTION, SOLUTION INTRAVENOUS at 09:09

## 2019-10-04 RX ADMIN — FENTANYL CITRATE 100 MCG: 50 INJECTION, SOLUTION INTRAMUSCULAR; INTRAVENOUS at 13:37

## 2019-10-04 RX ADMIN — PROPOFOL 50 MG: 10 INJECTION, EMULSION INTRAVENOUS at 13:37

## 2019-10-04 RX ADMIN — CYANOCOBALAMIN 1000 MCG: 1000 INJECTION, SOLUTION INTRAMUSCULAR at 09:08

## 2019-10-04 RX ADMIN — SODIUM CHLORIDE 8 MG/HR: 900 INJECTION INTRAVENOUS at 12:02

## 2019-10-04 NOTE — ANESTHESIA PREPROCEDURE EVALUATION
Anesthesia Evaluation     Patient summary reviewed   no history of anesthetic complications:  NPO Solid Status: > 8 hours  NPO Liquid Status: < 2 hours           Airway   Mallampati: I  TM distance: >3 FB  Neck ROM: full  No difficulty expected  Dental - normal exam         Pulmonary    (+) pulmonary embolism,   (-) asthma, sleep apnea, not a smoker  Cardiovascular     ECG reviewed  PT is on anticoagulation therapy    (+) hypertension, DVT,   (-) past MI, CAD, angina, cardiac stents    ROS comment: RV strain from bilateral PEs on CT    Echo:  ·Left ventricular systolic function is normal. Estimated EF appears to be in the range of 61 - 65%.  ·Left ventricular diastolic dysfunction (grade I) consistent with impaired relaxation.  ·Left ventricular wall thickness is consistent with mild-to-moderate concentric hypertrophy.  ·Right ventricular cavity is mildly dilated. Mildly reduced right ventricular systolic function noted.  ·Mild mitral valve regurgitation is present  ·Mild aortic valve stenosis is present.  ·Estimated right ventricular systolic pressure from tricuspid regurgitation is normal (<35 mmHg).    Neuro/Psych  (-) seizures, TIA, CVA  GI/Hepatic/Renal/Endo    (+)  hiatal hernia, GERD, PUD, GI bleeding,   (-) liver disease, no renal disease, diabetes    Musculoskeletal     (+) back pain, neck pain (surgery in [place]- 05/2018),   Abdominal    Substance History      OB/GYN          Other                 CTA Chest:  IMPRESSION:  1. Bilateral pulmonary emboli are noted.     The right ventricular/ left ventricular ratio is 1.9 this represents  right ventricular strain.  2. Cholelithiasis.              Anesthesia Plan    ASA 3 - emergent     MAC   (Patient reports drinking sprite approx 1 hour ago.Have discussed with surgeon the need to wait until 130    Add on per surgeon for large clot burden bilateral DVT, and bilateral PE)  intravenous induction   Anesthetic plan, all risks, benefits, and alternatives have  been provided, discussed and informed consent has been obtained with: patient.

## 2019-10-04 NOTE — ANESTHESIA POSTPROCEDURE EVALUATION
"Patient: Jose Alfredo Kirkpatrick    Procedure Summary     Date:  10/04/19 Room / Location:  [facility] OR  /  [facility] HYBRID OR 12    Anesthesia Start:  1332 Anesthesia Stop:  1430    Procedure:  VENA CAVA FILTER INSERTION (N/A Groin) Diagnosis:       Acute pulmonary embolism with acute cor pulmonale, unspecified pulmonary embolism type (CMS/HCC)      (Acute pulmonary embolism with acute cor pulmonale, unspecified pulmonary embolism type (CMS/HCC) [I26.09])    Surgeon:  Dallin Coronado MD Provider:  Tod Irving CRNA    Anesthesia Type:  MAC ASA Status:  3 - Emergent          Anesthesia Type: MAC  Last vitals  BP   123/61 (10/04/19 1434)   Temp   98.9 °F (37.2 °C) (10/04/19 1429)   Pulse   69 (10/04/19 1434)   Resp   16 (10/04/19 1434)     SpO2   96 % (10/04/19 1434)     Post Anesthesia Care and Evaluation    Patient location during evaluation: PACU  Patient participation: complete - patient participated  Level of consciousness: awake and alert  Pain management: adequate  Airway patency: patent  Anesthetic complications: No anesthetic complications    Cardiovascular status: acceptable  Respiratory status: acceptable  Hydration status: acceptable    Comments: Blood pressure 123/61, pulse 69, temperature 98.9 °F (37.2 °C), temperature source Temporal, resp. rate 16, height 190.5 cm (75\"), weight 89 kg (196 lb 3.2 oz), SpO2 96 %.    Pt discharged from PACU based on kena score >8      "

## 2019-10-05 VITALS
DIASTOLIC BLOOD PRESSURE: 78 MMHG | SYSTOLIC BLOOD PRESSURE: 145 MMHG | OXYGEN SATURATION: 92 % | WEIGHT: 199.06 LBS | HEART RATE: 66 BPM | BODY MASS INDEX: 24.75 KG/M2 | HEIGHT: 75 IN | TEMPERATURE: 97.6 F | RESPIRATION RATE: 18 BRPM

## 2019-10-05 LAB
ANION GAP SERPL CALCULATED.3IONS-SCNC: 11 MMOL/L (ref 5–15)
BASOPHILS # BLD AUTO: 0.04 10*3/MM3 (ref 0–0.2)
BASOPHILS NFR BLD AUTO: 0.5 % (ref 0–1.5)
BUN BLD-MCNC: 11 MG/DL (ref 8–23)
BUN/CREAT SERPL: 11.8 (ref 7–25)
CALCIUM SPEC-SCNC: 8.7 MG/DL (ref 8.6–10.5)
CHLORIDE SERPL-SCNC: 104 MMOL/L (ref 98–107)
CO2 SERPL-SCNC: 24 MMOL/L (ref 22–29)
CREAT BLD-MCNC: 0.93 MG/DL (ref 0.76–1.27)
DEPRECATED RDW RBC AUTO: 45.6 FL (ref 37–54)
EOSINOPHIL # BLD AUTO: 0.15 10*3/MM3 (ref 0–0.4)
EOSINOPHIL NFR BLD AUTO: 1.9 % (ref 0.3–6.2)
ERYTHROCYTE [DISTWIDTH] IN BLOOD BY AUTOMATED COUNT: 16 % (ref 12.3–15.4)
GFR SERPL CREATININE-BSD FRML MDRD: 78 ML/MIN/1.73
GLUCOSE BLD-MCNC: 95 MG/DL (ref 65–99)
HCT VFR BLD AUTO: 33.9 % (ref 37.5–51)
HGB BLD-MCNC: 10.2 G/DL (ref 13–17.7)
IMM GRANULOCYTES # BLD AUTO: 0.04 10*3/MM3 (ref 0–0.05)
IMM GRANULOCYTES NFR BLD AUTO: 0.5 % (ref 0–0.5)
LYMPHOCYTES # BLD AUTO: 1.57 10*3/MM3 (ref 0.7–3.1)
LYMPHOCYTES NFR BLD AUTO: 19.5 % (ref 19.6–45.3)
MCH RBC QN AUTO: 24.2 PG (ref 26.6–33)
MCHC RBC AUTO-ENTMCNC: 30.1 G/DL (ref 31.5–35.7)
MCV RBC AUTO: 80.3 FL (ref 79–97)
MONOCYTES # BLD AUTO: 0.67 10*3/MM3 (ref 0.1–0.9)
MONOCYTES NFR BLD AUTO: 8.3 % (ref 5–12)
NEUTROPHILS # BLD AUTO: 5.6 10*3/MM3 (ref 1.7–7)
NEUTROPHILS NFR BLD AUTO: 69.3 % (ref 42.7–76)
NRBC BLD AUTO-RTO: 0 /100 WBC (ref 0–0.2)
PLATELET # BLD AUTO: 359 10*3/MM3 (ref 140–450)
PMV BLD AUTO: 9.1 FL (ref 6–12)
POTASSIUM BLD-SCNC: 4.5 MMOL/L (ref 3.5–5.2)
RBC # BLD AUTO: 4.22 10*6/MM3 (ref 4.14–5.8)
SODIUM BLD-SCNC: 139 MMOL/L (ref 136–145)
WBC NRBC COR # BLD: 8.07 10*3/MM3 (ref 3.4–10.8)

## 2019-10-05 PROCEDURE — 80048 BASIC METABOLIC PNL TOTAL CA: CPT | Performed by: NURSE PRACTITIONER

## 2019-10-05 PROCEDURE — 85025 COMPLETE CBC W/AUTO DIFF WBC: CPT | Performed by: NURSE PRACTITIONER

## 2019-10-05 PROCEDURE — 25010000002 CYANOCOBALAMIN PER 1000 MCG: Performed by: NURSE PRACTITIONER

## 2019-10-05 PROCEDURE — 99231 SBSQ HOSP IP/OBS SF/LOW 25: CPT | Performed by: SURGERY

## 2019-10-05 PROCEDURE — 25010000002 IRON SUCROSE PER 1 MG: Performed by: NURSE PRACTITIONER

## 2019-10-05 PROCEDURE — 99232 SBSQ HOSP IP/OBS MODERATE 35: CPT | Performed by: NURSE PRACTITIONER

## 2019-10-05 RX ORDER — PANTOPRAZOLE SODIUM 40 MG/1
40 TABLET, DELAYED RELEASE ORAL 2 TIMES DAILY
Qty: 60 TABLET | Refills: 0 | Status: SHIPPED | OUTPATIENT
Start: 2019-10-05 | End: 2020-08-12

## 2019-10-05 RX ORDER — LISINOPRIL 20 MG/1
10 TABLET ORAL DAILY
Start: 2019-10-05 | End: 2020-03-10

## 2019-10-05 RX ADMIN — IRON SUCROSE 200 MG: 20 INJECTION, SOLUTION INTRAVENOUS at 08:01

## 2019-10-05 RX ADMIN — CYANOCOBALAMIN 1000 MCG: 1000 INJECTION, SOLUTION INTRAMUSCULAR at 08:01

## 2019-10-05 RX ADMIN — HYDROCODONE BITARTRATE AND ACETAMINOPHEN 1 TABLET: 5; 325 TABLET ORAL at 00:16

## 2019-10-05 RX ADMIN — SODIUM CHLORIDE, PRESERVATIVE FREE 10 ML: 5 INJECTION INTRAVENOUS at 08:02

## 2019-10-05 RX ADMIN — SODIUM CHLORIDE 8 MG/HR: 900 INJECTION INTRAVENOUS at 02:18

## 2019-10-05 RX ADMIN — LISINOPRIL 10 MG: 10 TABLET ORAL at 08:02

## 2019-10-05 RX ADMIN — SODIUM CHLORIDE 8 MG/HR: 900 INJECTION INTRAVENOUS at 08:01

## 2019-10-05 RX ADMIN — HYDROCODONE BITARTRATE AND ACETAMINOPHEN 1 TABLET: 5; 325 TABLET ORAL at 08:19

## 2019-10-07 LAB
ABO + RH BLD: NORMAL
ABO + RH BLD: NORMAL
BH BB BLOOD EXPIRATION DATE: NORMAL
BH BB BLOOD EXPIRATION DATE: NORMAL
BH BB BLOOD TYPE BARCODE: 5100
BH BB BLOOD TYPE BARCODE: 5100
BH BB DISPENSE STATUS: NORMAL
BH BB DISPENSE STATUS: NORMAL
BH BB PRODUCT CODE: NORMAL
BH BB PRODUCT CODE: NORMAL
BH BB UNIT NUMBER: NORMAL
BH BB UNIT NUMBER: NORMAL
UNIT  ABO: NORMAL
UNIT  ABO: NORMAL
UNIT  RH: NORMAL
UNIT  RH: NORMAL

## 2019-10-31 ENCOUNTER — TELEPHONE (OUTPATIENT)
Dept: VASCULAR SURGERY | Facility: CLINIC | Age: 84
End: 2019-10-31

## 2019-10-31 NOTE — TELEPHONE ENCOUNTER
Pt called and wanted to know why his appt wasn't until January.  He stated that he had blood clots and didn't think we would want to wait that long to be seen.  I spoke with Dr. Coronado.  The patient had a filter placed on 10/4/19.  The patient was to go back to gastro to see if he was okay to start back on an anticoagulant.  After that, if he was able to start back, Dr. Ramos would see him to start the meds back.  He would then f/u with us and have testing done to see what the medication had done.  I explained this to the patient and he stated understanding.

## 2019-11-04 ENCOUNTER — OFFICE VISIT (OUTPATIENT)
Dept: GASTROENTEROLOGY | Facility: CLINIC | Age: 84
End: 2019-11-04

## 2019-11-04 VITALS
HEIGHT: 75 IN | WEIGHT: 210 LBS | HEART RATE: 66 BPM | SYSTOLIC BLOOD PRESSURE: 130 MMHG | DIASTOLIC BLOOD PRESSURE: 78 MMHG | OXYGEN SATURATION: 98 % | BODY MASS INDEX: 26.11 KG/M2

## 2019-11-04 DIAGNOSIS — K25.4 GASTRIC ULCER WITH HEMORRHAGE, UNSPECIFIED CHRONICITY: Primary | ICD-10-CM

## 2019-11-04 DIAGNOSIS — I10 HTN (HYPERTENSION), BENIGN: ICD-10-CM

## 2019-11-04 PROCEDURE — 99213 OFFICE O/P EST LOW 20 MIN: CPT | Performed by: CLINICAL NURSE SPECIALIST

## 2019-11-04 RX ORDER — POLYETHYLENE GLYCOL 3350 17 G/17G
17 POWDER, FOR SOLUTION ORAL DAILY PRN
COMMUNITY

## 2019-11-04 NOTE — PROGRESS NOTES
Jose Alfredo Kirkpatrick  1935 11/4/2019  Chief Complaint   Patient presents with   • GI Problem     Here to discuss ulcer         HPI    Jose Alfredo Kirkpatrick is a  84 y.o. male here for a follow up visit for gastric ulcer one non bleeding cratered gastric ulcer with pigmented material found posterior wall of the stomach. No further signs of GI bleeding. No BRBPR. He was admitted on 10/2/2019 showing shortness of breath sent to the ER with a new found DVT and pulmonary emboli. He now has an IVC filter placed. hgb on 10/5/2019 showed H/H 10.2/33.9. Today he says he is doing well. No chest pain or shortness of breath. No dark stool. No abdominal pain. No nausea or vomiting. He is on Protonix at this time.     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     Past Surgical History:   Procedure Laterality Date   • BACK SURGERY      cervical   • CATARACT EXTRACTION Bilateral    • COLONOSCOPY  02/11/2013    Hyperplastic polyp at 25 cm, Diverticulosis repeat exam in 5 years   • COLONOSCOPY N/A 10/16/2018    Tubular adenoma ascending colon repet prn   • ENDOSCOPY  01/15/2014    HH   • ENDOSCOPY N/A 10/3/2019    Large HH, non-bleeding gastric ulcer   • HERNIA REPAIR Right     INGUINAL    • REPLACEMENT TOTAL KNEE BILATERAL     • THORACIC LAMINECTOMY DECOMPRESSIVE POSTERIOR N/A 12/14/2016    Procedure: LAMINECTOMY DECOMPRESSION, UNINSTRUMENTED POSTERIOR SPINAL FUSION, T 11-12;  Surgeon: LUZ Adan MD;  Location: [facility] OR;  Service:    • VENA CAVA FILTER INSERTION N/A 10/4/2019    Procedure: VENA CAVA FILTER INSERTION;  Surgeon: Dallin Coronado MD;  Location: [facility] HYBRID OR 12;  Service: Vascular       Outpatient Medications Marked as Taking for the 11/4/19 encounter (Office Visit) with  Radhika Brown APRN   Medication Sig Dispense Refill   • cyanocobalamin 100 MCG tablet Take 5 tablets by mouth Daily.     • gabapentin (NEURONTIN) 300 MG capsule Take 300 mg by mouth 3 (Three) Times a Day.     • HYDROcodone-acetaminophen (NORCO)  MG per tablet Take 1 tablet by mouth Every 12 (Twelve) Hours As Needed for Moderate Pain .     • lisinopril (PRINIVIL,ZESTRIL) 20 MG tablet Take 0.5 tablets by mouth Daily.     • pantoprazole (PROTONIX) 40 MG EC tablet Take 1 tablet by mouth 2 (Two) Times a Day. 60 tablet 0   • polyethylene glycol (MIRALAX) packet Take 17 g by mouth Daily As Needed.         No Known Allergies    Social History     Socioeconomic History   • Marital status:      Spouse name: Not on file   • Number of children: Not on file   • Years of education: Not on file   • Highest education level: Not on file   Tobacco Use   • Smoking status: Never Smoker   • Smokeless tobacco: Never Used   Substance and Sexual Activity   • Alcohol use: No   • Drug use: No   • Sexual activity: Defer       Family History   Problem Relation Age of Onset   • Colon cancer Father    • Colon polyps Neg Hx        Review of Systems   Constitutional: Negative for activity change, appetite change, chills, diaphoresis, fatigue, fever and unexpected weight change.   HENT: Negative for ear pain, hearing loss, mouth sores, sore throat, trouble swallowing and voice change.    Eyes: Negative.    Respiratory: Negative for cough, choking, shortness of breath and wheezing.    Cardiovascular: Negative for chest pain and palpitations.   Gastrointestinal: Negative for abdominal pain, blood in stool, constipation, diarrhea, nausea and vomiting.   Endocrine: Negative for cold intolerance and heat intolerance.   Genitourinary: Negative for decreased urine volume, dysuria, frequency, hematuria and urgency.   Musculoskeletal: Negative for back pain, gait problem and myalgias.   Skin: Negative for color change, pallor and rash.  "  Allergic/Immunologic: Negative for food allergies and immunocompromised state.   Neurological: Negative for dizziness, tremors, seizures, syncope, weakness, light-headedness, numbness and headaches.   Hematological: Negative for adenopathy. Does not bruise/bleed easily.   Psychiatric/Behavioral: Negative for agitation and confusion. The patient is not nervous/anxious.    All other systems reviewed and are negative.      /78   Pulse 66   Ht 190.5 cm (75\")   Wt 95.3 kg (210 lb)   SpO2 98%   BMI 26.25 kg/m²   Body mass index is 26.25 kg/m².    Physical Exam   Constitutional: He is oriented to person, place, and time. He appears well-developed and well-nourished.   HENT:   Head: Normocephalic and atraumatic.   Eyes: Pupils are equal, round, and reactive to light.   Neck: Normal range of motion. Neck supple. No tracheal deviation present.   Cardiovascular: Normal rate, regular rhythm and normal heart sounds. Exam reveals no gallop and no friction rub.   No murmur heard.  Pulmonary/Chest: Effort normal and breath sounds normal. No respiratory distress. He has no wheezes. He has no rales. He exhibits no tenderness.   Abdominal: Soft. Bowel sounds are normal. He exhibits no distension. There is no hepatosplenomegaly. There is no tenderness. There is no rigidity, no rebound and no guarding.   Musculoskeletal: Normal range of motion. He exhibits no edema, tenderness or deformity.   Neurological: He is alert and oriented to person, place, and time. He has normal reflexes.   Skin: Skin is warm and dry. No rash noted. No pallor.   Psychiatric: He has a normal mood and affect. His behavior is normal. Judgment and thought content normal.       ASSESSMENT AND PLAN    Patient's Body mass index is 26.25 kg/m². BMI is above normal parameters. Recommendations include: nutrition counseling.    Jose Alfredo was seen today for gi problem.    Diagnoses and all orders for this visit:    Gastric ulcer with hemorrhage, unspecified " chronicity  -     Case Request; Standing  -     Implement Anesthesia Orders Day of Procedure; Standing  -     Obtain Informed Consent; Standing  -     Case Request    HTN (hypertension), benign      ESOPHAGOGASTRODUODENOSCOPY WITH ANESTHESIA (N/A)    There are no Patient Instructions on file for this visit.  Radhika Brown, HARISH  9:17 AM  11/4/2019    Obesity, Adult  Obesity is the condition of having too much total body fat. Being overweight or obese means that your weight is greater than what is considered healthy for your body size. Obesity is determined by a measurement called BMI. BMI is an estimate of body fat and is calculated from height and weight. For adults, a BMI of 30 or higher is considered obese.  Obesity can eventually lead to other health concerns and major illnesses, including:  · Stroke.  · Coronary artery disease (CAD).  · Type 2 diabetes.  · Some types of cancer, including cancers of the colon, breast, uterus, and gallbladder.  · Osteoarthritis.  · High blood pressure (hypertension).  · High cholesterol.  · Sleep apnea.  · Gallbladder stones.  · Infertility problems.  What are the causes?  The main cause of obesity is taking in (consuming) more calories than your body uses for energy. Other factors that contribute to this condition may include:  · Being born with genes that make you more likely to become obese.  · Having a medical condition that causes obesity. These conditions include:  ¨ Hypothyroidism.  ¨ Polycystic ovarian syndrome (PCOS).  ¨ Binge-eating disorder.  ¨ Cushing syndrome.  · Taking certain medicines, such as steroids, antidepressants, and seizure medicines.  · Not being physically active (sedentary lifestyle).  · Living where there are limited places to exercise safely or buy healthy foods.  · Not getting enough sleep.  What increases the risk?  The following factors may increase your risk of this condition:  · Having a family history of obesity.  · Being a woman of  -American descent.  · Being a man of  descent.  What are the signs or symptoms?  Having excessive body fat is the main symptom of this condition.  How is this diagnosed?  This condition may be diagnosed based on:  · Your symptoms.  · Your medical history.  · A physical exam. Your health care provider may measure:  ¨ Your BMI. If you are an adult with a BMI between 25 and less than 30, you are considered overweight. If you are an adult with a BMI of 30 or higher, you are considered obese.  ¨ The distances around your hips and your waist (circumferences). These may be compared to each other to help diagnose your condition.  ¨ Your skinfold thickness. Your health care provider may gently pinch a fold of your skin and measure it.  How is this treated?  Treatment for this condition often includes changing your lifestyle. Treatment may include some or all of the following:  · Dietary changes. Work with your health care provider and a dietitian to set a weight-loss goal that is healthy and reasonable for you. Dietary changes may include eating:  ¨ Smaller portions. A portion size is the amount of a particular food that is healthy for you to eat at one time. This varies from person to person.  ¨ Low-calorie or low-fat options.  ¨ More whole grains, fruits, and vegetables.  · Regular physical activity. This may include aerobic activity (cardio) and strength training.  · Medicine to help you lose weight. Your health care provider may prescribe medicine if you are unable to lose 1 pound a week after 6 weeks of eating more healthily and doing more physical activity.  · Surgery. Surgical options may include gastric banding and gastric bypass. Surgery may be done if:  ¨ Other treatments have not helped to improve your condition.  ¨ You have a BMI of 40 or higher.  ¨ You have life-threatening health problems related to obesity.  Follow these instructions at home:     Eating and drinking     · Follow recommendations  from your health care provider about what you eat and drink. Your health care provider may advise you to:  ¨ Limit fast foods, sweets, and processed snack foods.  ¨ Choose low-fat options, such as low-fat milk instead of whole milk.  ¨ Eat 5 or more servings of fruits or vegetables every day.  ¨ Eat at home more often. This gives you more control over what you eat.  ¨ Choose healthy foods when you eat out.  ¨ Learn what a healthy portion size is.  ¨ Keep low-fat snacks on hand.  ¨ Avoid sugary drinks, such as soda, fruit juice, iced tea sweetened with sugar, and flavored milk.  ¨ Eat a healthy breakfast.  · Drink enough water to keep your urine clear or pale yellow.  · Do not go without eating for long periods of time (do not fast) or follow a fad diet. Fasting and fad diets can be unhealthy and even dangerous.  Physical Activity   · Exercise regularly, as told by your health care provider. Ask your health care provider what types of exercise are safe for you and how often you should exercise.  · Warm up and stretch before being active.  · Cool down and stretch after being active.  · Rest between periods of activity.  Lifestyle   · Limit the time that you spend in front of your TV, computer, or video game system.  · Find ways to reward yourself that do not involve food.  · Limit alcohol intake to no more than 1 drink a day for nonpregnant women and 2 drinks a day for men. One drink equals 12 oz of beer, 5 oz of wine, or 1½ oz of hard liquor.  General instructions   · Keep a weight loss journal to keep track of the food you eat and how much you exercise you get.  · Take over-the-counter and prescription medicines only as told by your health care provider.  · Take vitamins and supplements only as told by your health care provider.  · Consider joining a support group. Your health care provider may be able to recommend a support group.  · Keep all follow-up visits as told by your health care provider. This is  important.  Contact a health care provider if:  · You are unable to meet your weight loss goal after 6 weeks of dietary and lifestyle changes.  This information is not intended to replace advice given to you by your health care provider. Make sure you discuss any questions you have with your health care provider.  Document Released: 01/25/2006 Document Revised: 05/22/2017 Document Reviewed: 10/05/2016  [employer] Interactive Patient Education © 2017 [employer]      IF YOU SMOKE OR USE TOBACCO PLEASE READ THE FOLLOWING:    Why is smoking bad for me?  Smoking increases the risk of heart disease, lung disease, vascular disease, stroke, and cancer.     If you smoke, STOP!    If you would like more information on quitting smoking, please visit the [facility] website: www.[email or web address]/corporate/healthier-together/smoke   This link will provide additional resources including the QUIT line and the [employer] the [employer] support groups.     For more information:    Quit Now [place]  1-800-QUIT-NOW  https://[place].quitlogix.org/en-[place]/

## 2019-11-15 ENCOUNTER — TRANSCRIBE ORDERS (OUTPATIENT)
Dept: ADMINISTRATIVE | Facility: HOSPITAL | Age: 84
End: 2019-11-15

## 2019-11-15 DIAGNOSIS — R22.33 LOCALIZED SWELLING, MASS AND LUMP, UPPER LIMB, BILATERAL: ICD-10-CM

## 2019-11-15 DIAGNOSIS — M79.603 PAIN OF UPPER EXTREMITY, UNSPECIFIED LATERALITY: Primary | ICD-10-CM

## 2019-11-20 ENCOUNTER — HOSPITAL ENCOUNTER (OUTPATIENT)
Dept: ULTRASOUND IMAGING | Facility: HOSPITAL | Age: 84
Discharge: HOME | End: 2019-11-20
Admitting: INTERNAL MEDICINE

## 2019-11-20 DIAGNOSIS — R22.33 LOCALIZED SWELLING, MASS AND LUMP, UPPER LIMB, BILATERAL: ICD-10-CM

## 2019-11-20 PROCEDURE — 93970 EXTREMITY STUDY: CPT | Performed by: SURGERY

## 2019-11-20 PROCEDURE — 93970 EXTREMITY STUDY: CPT

## 2019-11-25 ENCOUNTER — HOSPITAL ENCOUNTER (OUTPATIENT)
Facility: HOSPITAL | Age: 84
Setting detail: HOSPITAL OUTPATIENT SURGERY
Discharge: HOME | End: 2019-11-25
Attending: INTERNAL MEDICINE | Admitting: INTERNAL MEDICINE

## 2019-11-25 ENCOUNTER — ANESTHESIA (OUTPATIENT)
Dept: GASTROENTEROLOGY | Facility: HOSPITAL | Age: 84
End: 2019-11-25

## 2019-11-25 ENCOUNTER — ANESTHESIA EVENT (OUTPATIENT)
Dept: GASTROENTEROLOGY | Facility: HOSPITAL | Age: 84
End: 2019-11-25

## 2019-11-25 VITALS
HEART RATE: 68 BPM | BODY MASS INDEX: 28.71 KG/M2 | WEIGHT: 212 LBS | OXYGEN SATURATION: 97 % | RESPIRATION RATE: 16 BRPM | DIASTOLIC BLOOD PRESSURE: 83 MMHG | HEIGHT: 72 IN | TEMPERATURE: 97.6 F | SYSTOLIC BLOOD PRESSURE: 161 MMHG

## 2019-11-25 DIAGNOSIS — K25.4 GASTRIC ULCER WITH HEMORRHAGE, UNSPECIFIED CHRONICITY: ICD-10-CM

## 2019-11-25 PROCEDURE — 43235 EGD DIAGNOSTIC BRUSH WASH: CPT | Performed by: INTERNAL MEDICINE

## 2019-11-25 PROCEDURE — 25010000002 PROPOFOL 10 MG/ML EMULSION: Performed by: NURSE ANESTHETIST, CERTIFIED REGISTERED

## 2019-11-25 RX ORDER — SODIUM CHLORIDE 9 MG/ML
500 INJECTION, SOLUTION INTRAVENOUS CONTINUOUS PRN
Status: DISCONTINUED | OUTPATIENT
Start: 2019-11-25 | End: 2019-11-25 | Stop reason: HOSPADM

## 2019-11-25 RX ORDER — SODIUM CHLORIDE 0.9 % (FLUSH) 0.9 %
10 SYRINGE (ML) INJECTION AS NEEDED
Status: DISCONTINUED | OUTPATIENT
Start: 2019-11-25 | End: 2019-11-25 | Stop reason: HOSPADM

## 2019-11-25 RX ORDER — PROPOFOL 10 MG/ML
VIAL (ML) INTRAVENOUS AS NEEDED
Status: DISCONTINUED | OUTPATIENT
Start: 2019-11-25 | End: 2019-11-25 | Stop reason: SURG

## 2019-11-25 RX ADMIN — PROPOFOL 140 MG: 10 INJECTION, EMULSION INTRAVENOUS at 12:30

## 2019-11-25 RX ADMIN — SODIUM CHLORIDE 500 ML: 9 INJECTION, SOLUTION INTRAVENOUS at 11:35

## 2019-11-25 RX ADMIN — LIDOCAINE HYDROCHLORIDE 50 MG: 20 INJECTION, SOLUTION INTRAVENOUS at 12:29

## 2019-11-25 NOTE — ANESTHESIA PREPROCEDURE EVALUATION
Anesthesia Evaluation     Patient summary reviewed   no history of anesthetic complications:  NPO Solid Status: > 8 hours             Airway   Mallampati: I  TM distance: >3 FB  Neck ROM: full  No difficulty expected  Dental          Pulmonary    (+) pulmonary embolism (ivc filter 10/2019),   (-) asthma, sleep apnea, not a smoker  Cardiovascular     ECG reviewed    (+) hypertension, DVT,   (-) past MI, CAD, angina, cardiac stents      Neuro/Psych  (-) seizures, TIA, CVA  GI/Hepatic/Renal/Endo    (+)  hiatal hernia, GERD, GI bleeding ,   (-) liver disease, no renal disease, diabetes    Musculoskeletal     (+) back pain, neck pain (surgery in [place]- 05/2018),   Abdominal    Substance History      OB/GYN          Other        ROS/Med Hx Other: Constipation           CTA Chest:  IMPRESSION:  1. Bilateral pulmonary emboli are noted.     The right ventricular/ left ventricular ratio is 1.9 this represents  right ventricular strain.  2. Cholelithiasis.              Anesthesia Plan    ASA 3     MAC       Anesthetic plan, all risks, benefits, and alternatives have been provided, discussed and informed consent has been obtained with: patient.

## 2019-11-25 NOTE — ANESTHESIA POSTPROCEDURE EVALUATION
Patient: Jose Alfredo Kirkpatrick    Procedure Summary     Date:  11/25/19 Room / Location:  [facility] ENDOSCOPY 2 / [facility] ENDOSCOPY    Anesthesia Start:  1229 Anesthesia Stop:  1238    Procedure:  ESOPHAGOGASTRODUODENOSCOPY WITH ANESTHESIA (N/A ) Diagnosis:       Gastric ulcer with hemorrhage, unspecified chronicity      (Gastric ulcer with hemorrhage, unspecified chronicity [K25.4])    Surgeon:  Marques Pérez MD Provider:  Ronak Conley CRNA    Anesthesia Type:  MAC ASA Status:  3          Anesthesia Type: MAC  Last vitals  BP   170/95 (11/25/19 1122)   Temp   97.6 °F (36.4 °C) (11/25/19 1122)   Pulse   68 (11/25/19 1122)   Resp   18 (11/25/19 1122)     SpO2   97 % (11/25/19 1122)     Post Anesthesia Care and Evaluation    Patient location during evaluation: PACU  Patient participation: complete - patient participated  Level of consciousness: awake and awake and alert  Pain score: 0  Pain management: adequate  Airway patency: patent  Anesthetic complications: No anesthetic complications    Cardiovascular status: acceptable and stable  Respiratory status: acceptable and unassisted  Hydration status: acceptable

## 2019-12-19 ENCOUNTER — TRANSCRIBE ORDERS (OUTPATIENT)
Dept: ADMINISTRATIVE | Facility: HOSPITAL | Age: 84
End: 2019-12-19

## 2019-12-19 ENCOUNTER — HOSPITAL ENCOUNTER (OUTPATIENT)
Dept: GENERAL RADIOLOGY | Facility: HOSPITAL | Age: 84
Discharge: HOME | End: 2019-12-19
Admitting: INTERNAL MEDICINE

## 2019-12-19 ENCOUNTER — TRANSCRIBE ORDERS (OUTPATIENT)
Dept: OTHER | Facility: OTHER | Age: 84
End: 2019-12-19

## 2019-12-19 DIAGNOSIS — M50.30 OTHER CERVICAL DISC DEGENERATION, UNSPECIFIED CERVICAL REGION: Primary | ICD-10-CM

## 2019-12-19 DIAGNOSIS — M50.30 OTHER CERVICAL DISC DEGENERATION, UNSPECIFIED CERVICAL REGION: ICD-10-CM

## 2019-12-19 PROCEDURE — 72050 X-RAY EXAM NECK SPINE 4/5VWS: CPT

## 2020-01-07 ENCOUNTER — TELEPHONE (OUTPATIENT)
Dept: PODIATRY | Facility: CLINIC | Age: 85
End: 2020-01-07

## 2020-01-07 NOTE — TELEPHONE ENCOUNTER
Called the patient to remind him of his appt tomorrow with Dr. Coronado.  The patient was aware and confirmed.

## 2020-01-08 ENCOUNTER — OFFICE VISIT (OUTPATIENT)
Dept: VASCULAR SURGERY | Facility: CLINIC | Age: 85
End: 2020-01-08

## 2020-01-08 VITALS
DIASTOLIC BLOOD PRESSURE: 56 MMHG | SYSTOLIC BLOOD PRESSURE: 130 MMHG | HEIGHT: 75 IN | HEART RATE: 35 BPM | WEIGHT: 216 LBS | OXYGEN SATURATION: 93 % | BODY MASS INDEX: 26.86 KG/M2

## 2020-01-08 DIAGNOSIS — I82.413 ACUTE DEEP VEIN THROMBOSIS (DVT) OF FEMORAL VEIN OF BOTH LOWER EXTREMITIES (HCC): Primary | ICD-10-CM

## 2020-01-08 PROCEDURE — 99213 OFFICE O/P EST LOW 20 MIN: CPT | Performed by: SURGERY

## 2020-01-08 RX ORDER — APIXABAN 5 MG/1
5 TABLET, FILM COATED ORAL 2 TIMES DAILY
COMMUNITY
Start: 2019-12-24 | End: 2020-08-12

## 2020-01-08 NOTE — PATIENT INSTRUCTIONS
"BMI for Adults    Body mass index (BMI) is a number that is calculated from a person's weight and height. BMI may help to estimate how much of a person's weight is composed of fat. BMI can help identify those who may be at higher risk for certain medical problems.  How is BMI used with adults?  BMI is used as a screening tool to identify possible weight problems. It is used to check whether a person is obese, overweight, healthy weight, or underweight.  How is BMI calculated?  BMI measures your weight and compares it to your height. This can be done either in English (U[place] or metric measurements. Note that charts are available to help you find your BMI quickly and easily without having to do these calculations yourself.  To calculate your BMI in English (U.S[place] measurements, your health care provider will:  1. Measure your weight in pounds (lb).  2. Multiply the number of pounds by 703.  ? For example, for a person who weighs 180 lb, multiply that number by 703, which equals 126,540.  3. Measure your height in inches (in). Then multiply that number by itself to get a measurement called \"inches squared.\"  ? For example, for a person who is 70 in tall, the \"inches squared\" measurement is 70 in x 70 in, which equals 4900 inches squared.  4. Divide the total from Step 2 (number of lb x 703) by the total from Step 3 (inches squared): 126,540 ÷ 4900 = 25.8. This is your BMI.  To calculate your BMI in metric measurements, your health care provider will:  1. Measure your weight in kilograms (kg).  2. Measure your height in meters (m). Then multiply that number by itself to get a measurement called \"meters squared.\"  ? For example, for a person who is 1.75 m tall, the \"meters squared\" measurement is 1.75 m x 1.75 m, which is equal to 3.1 meters squared.  3. Divide the number of kilograms (your weight) by the meters squared number. In this example: 70 ÷ 3.1 = 22.6. This is your BMI.  How is BMI interpreted?  To interpret your " Skin Complaint HPI





- HPI Summary


HPI Summary: 





57 yo male removed tick from abd this AM


unsure how long it was attached











- History of Current Complaint


Chief Complaint: UCBiteInjury


Stated Complaint: TICK BITE


Hx Obtained From: Patient


Timing: Constant


Pain Intensity: 0


Pain Scale Used: 0-10 Numeric


Location: Other - abd


Character: Redness


Aggravating Factor(s): Nothing


Alleviating Factor(s): Nothing


Associated Signs & Symptoms: Positive: Negative


Related History: Insect Bite/Sting





- Allergy/Home Medications


Allergies/Adverse Reactions: 


 Allergies











Allergy/AdvReac Type Severity Reaction Status Date / Time


 


Penicillins Allergy  "Convulsions" Verified 06/23/19 11:51





   as an  





   infant  


 


Environmental Allergies Allergy  Itchy, Uncoded 06/23/19 11:51





   watery  





   eyes and  





   congestion  











Home Medications: 


 Home Medications





Allergy Shots 2 each SUBCUT Q14D 06/23/19 [History Confirmed 06/23/19]


Losartan Potassium 100 mg PO DAILY 06/23/19 [History Confirmed 06/23/19]


Pravastatin (NF) [Pravachol (NF)] 40 mg PO DAILY 06/23/19 [History Confirmed 06/ 23/19]


Primidone TAB(*) [Mysoline TAB(*)] 150 mg PO DAILY 06/23/19 [History Confirmed 

06/23/19]


amLODIPine TAB* [Norvasc 5 mg TAB*] 5 mg PO DAILY 06/23/19 [History Confirmed 06 /23/19]











PMH/Surg Hx/FS Hx/Imm Hx


Previously Healthy: Yes


Endocrine History: Dyslipidemia


Cardiovascular History: Hypertension





- Surgical History


Surgical History: Yes


Surgery Procedure, Year, and Place: CLUB FEET AS AN INFANT, BILAT KNEE SURGERY 

SCOPE AND OPEN PROCEDURE, TONSILECTOMY





- Family History


Known Family History: Positive: Hypertension, Diabetes





- Social History


Alcohol Use: 1/daily


Substance Use Type: None


Smoking Status (MU): Heavy Every Day Tobacco Smoker


Type: Smokeless Tobacco


Amount Used/How Often: ~1/2 can daily


Length of Time of Smoking/Using Tobacco: Since Age 38





Review of Systems


All Other Systems Reviewed And Are Negative: Yes


Constitutional: Positive: Negative


Skin: Positive: Negative


Eyes: Positive: Negative


ENT: Positive: Negative


Respiratory: Positive: Negative


Cardiovascular: Positive: Negative


Gastrointestinal: Positive: Negative


Genitourinary: Positive: Negative


Motor: Positive: Negative


Neurovascular: Positive: Negative


Musculoskeletal: Positive: Negative


Neurological: Positive: Negative


Psychological: Positive: Negative





Physical Exam


Triage Information Reviewed: Yes


Appearance: Well-Appearing, No Pain Distress, Well-Nourished


Vital Signs: 


 Initial Vital Signs











Temp  98.6 F   06/23/19 11:47


 


Pulse  70   06/23/19 11:47


 


Resp  16   06/23/19 11:47


 


BP  134/92   06/23/19 11:47


 


Pulse Ox  98   06/23/19 11:47











Vital Signs Reviewed: Yes


Eyes: Positive: Conjunctiva Clear


ENT: Positive: Hearing grossly normal.  Negative: Nasal congestion, Nasal 

drainage, Trismus, Muffled voice, Hoarse voice


Neck: Positive: Supple, Nontender, No Lymphadenopathy


Respiratory: Positive: Lungs clear, Normal breath sounds, No respiratory 

distress


Cardiovascular: Positive: RRR, No Murmur


Abdomen Description: Positive: Nontender.  Negative: Distended


Bowel Sounds: Positive: Present


Musculoskeletal: Positive: ROM Intact, No Edema


Neurological: Positive: Alert


Psychological Exam: Normal


Skin Exam: Other - see image





Images


Front/Back of Body, Lg (Mono): 


  __________________________














  __________________________





 1 - tick bite.  1cm redness.  no tick mouth parts








Course/Dx





- Course


Course Of Treatment: 





lyme disease prophylaxis





- Diagnoses


Provider Diagnosis: 


 Tick bite of abdomen, Prophylactic antibiotic








Discharge





- Sign-Out/Discharge


Documenting (check all that apply): Patient Departure


All imaging exams completed and their final reports reviewed: No Studies





- Discharge Plan


Condition: Stable


Disposition: HOME


Prescriptions: 


DOXYcycline CAP(*) [DOXYcycline 100MG CAP(*)] 200 mg PO ONCE #2 cap


Patient Education Materials:  Tick Bite (ED)


Referrals: 


Santi Mohr DO [Primary Care Provider] - If Needed





- Billing Disposition and Condition


Condition: STABLE


Disposition: Home results, your health care provider will use BMI charts to identify whether you are underweight, normal weight, overweight, or obese. The following guidelines will be used:  · Underweight: BMI less than 18.5.  · Normal weight: BMI between 18.5 and 24.9.  · Overweight: BMI between 25 and 29.9.  · Obese: BMI of 30 and above.  Please note:  · Weight includes both fat and muscle, so someone with a muscular build, such as an athlete, may have a BMI that is higher than 24.9. In cases like these, BMI is not an accurate measure of body fat.  · To determine if excess body fat is the cause of a BMI of 25 or higher, further assessments may need to be done by a health care provider.  · BMI is usually interpreted in the same way for men and women.  Why is BMI a useful tool?  BMI is useful in two ways:  · Identifying a weight problem that may be related to a medical condition, or that may increase the risk for medical problems.  · Promoting lifestyle and diet changes in order to reach a healthy weight.  Summary  · Body mass index (BMI) is a number that is calculated from a person's weight and height.  · BMI may help to estimate how much of a person's weight is composed of fat. BMI can help identify those who may be at higher risk for certain medical problems.  · BMI can be measured using English measurements or metric measurements.  · To interpret your results, your health care provider will use BMI charts to identify whether you are underweight, normal weight, overweight, or obese.  This information is not intended to replace advice given to you by your health care provider. Make sure you discuss any questions you have with your health care provider.  Document Released: 08/29/2005 Document Revised: 10/31/2018 Document Reviewed: 10/31/2018  [employer] Patient Education © 2019 [employer]

## 2020-01-08 NOTE — PROGRESS NOTES
01/08/2020      Otilio Ramos MD  [address]  [facility] [address]        Jose Alfredo Kirkpatrick  1935    Chief Complaint   Patient presents with   • Post-op     3 month po f/u of insertion of a vena cava filter.  Pt states that he has been doing okay since the procedure.        Dear Otilio Ramos MD:    HPI     I had the pleasure of seeing your patient in the office today for follow up.  As you recall, the patient is a 84 y.o. male who we are currently following for history of DVT and PE.  He was initially seen in the hospital about 3 months ago after being admitted with shortness of breath.  Ultimately he was found to have bilateral lower extremity DVT as well as PE.  He initially was started on anticoagulation but also was found to have guaiac positive stool and so this was held.  EGD had showed a gastric ulcer with some pigmented material in the base but no signs of active bleeding.  Ultimately given these findings I was consulted and IVC filter was placed as decision was made to hold anticoagulation.  Since that time he has had repeat follow-up with GI and had a repeat endoscopy which showed no further evidence of any gastric ulcer and no further signs of any bleeding.  As such she is now been started on Eliquis for about the last month by his primary care physician.  However he has not had any repeat venous duplex since being in the hospital.  He otherwise feels well and is without any acute complaint.  He reports some mild lower extremity edema to the level of the knee but otherwise no other acute complaints.      Review of Systems   Constitutional: Negative.  Negative for activity change, appetite change, chills, diaphoresis, fatigue and fever.   HENT: Negative.  Negative for congestion, sneezing, sore throat and trouble swallowing.    Eyes: Negative.  Negative for visual disturbance.   Respiratory: Negative.  Negative for chest tightness and shortness of breath.    Cardiovascular:  "Negative.  Negative for chest pain, palpitations and leg swelling.   Gastrointestinal: Negative.  Negative for abdominal distention, abdominal pain, nausea and vomiting.   Endocrine: Negative.    Genitourinary: Negative.    Musculoskeletal: Negative.    Skin: Negative.    Allergic/Immunologic: Negative.    Neurological: Negative.    Hematological: Negative.    Psychiatric/Behavioral: Negative.        /56   Pulse (!) 35   Ht 190.5 cm (75\")   Wt 98 kg (216 lb)   SpO2 93%   BMI 27.00 kg/m²   Physical Exam   Constitutional: He is oriented to person, place, and time. He appears well-developed and well-nourished.   HENT:   Head: Normocephalic and atraumatic.   Eyes: Pupils are equal, round, and reactive to light. EOM are normal.   Neck: Normal range of motion. Neck supple. No JVD present.   Cardiovascular: Normal rate, regular rhythm, intact distal pulses and normal pulses.   Pulses:       Carotid pulses are 2+ on the right side, and 2+ on the left side.       Radial pulses are 2+ on the right side, and 2+ on the left side.        Femoral pulses are 2+ on the right side, and 2+ on the left side.       Popliteal pulses are 2+ on the right side, and 2+ on the left side.        Dorsalis pedis pulses are 2+ on the right side, and 2+ on the left side.        Posterior tibial pulses are 2+ on the right side, and 2+ on the left side.   Pulmonary/Chest: Effort normal. No respiratory distress.   Abdominal: Soft. He exhibits no distension and no mass. There is no tenderness.   Musculoskeletal: Normal range of motion. He exhibits edema (Mild bilateral lower extremity edema to the level of the knee.). He exhibits no tenderness or deformity.   Neurological: He is alert and oriented to person, place, and time. No sensory deficit. He exhibits normal muscle tone.   Skin: Skin is warm and dry. Capillary refill takes less than 2 seconds.   Psychiatric: He has a normal mood and affect. His behavior is normal. Judgment and thought " content normal.   Vitals reviewed.      DIAGNOSTIC DATA:    Xr Spine Cervical Complete 4 Or 5 View    Result Date: 12/19/2019  Narrative: EXAMINATION:  XR SPINE CERVICAL COMPLETE 4 OR 5 VW-  12/19/2019 11:04 AM CST  HISTORY: CERVICAL DISC DISEASE; M50.30-Other cervical disc degeneration, unspecified cervical region  COMPARISON: 06/04/2018 cervical spine radiographs  TECHNIQUE: 6 views: AP lateral oblique, and swimmer's projection imaging  FINDINGS:  Changes from laminectomy and posterior fusion observed at C4-C5 and C6. The pedicle screws and associated support rods stable without evidence of hardware complication.  There is disc degeneration diffusely in the cervical spine with disc space narrowing and osteophyte formation.  There is no fracture or subluxation. Prevertebral soft tissues are normal.  Radiographically, cervical spine is unchanged.      Impression: 1. Changes from posterior fusion C4, C5 and C6, stable without hardware/postoperative complications. 2. Diffuse spondylosis and disc degeneration. This report was finalized on 12/19/2019 11:58 by Dr. Landen Arciniega MD.      Patient Active Problem List   Diagnosis   • Spinal stenosis, lumbar   • Hx of colonic polyps   • Drug-induced constipation   • HTN (hypertension), benign   • Acute pulmonary embolism with acute cor pulmonale (CMS/HCC)   • Acute bilateral deep vein thrombosis (DVT) of femoral veins (CMS/HCC)   • History of DVT of left lower extremity   • Positive fecal occult blood test   • Melena   • Vitamin B12 deficiency   • FARZANA (iron deficiency anemia)   • Cholelithiasis   • Gastric ulcer with hemorrhage         ICD-10-CM ICD-9-CM   1. Acute deep vein thrombosis (DVT) of femoral vein of both lower extremities (CMS/HCC) I82.413 453.41       Lab Frequency Next Occurrence   Diet: Once 11/25/2019   Advance Diet as Tolerated Once 11/25/2019   US Venous Doppler Lower Extremity Bilateral (duplex) Once 01/15/2020       PLAN: After thoroughly evaluating Jose Alfredo  Clive Kirkpatrick, I believe the best course of action is to obtain further imaging.  He has not had any repeat venous imaging since being admitted to the hospital with his DVTs and so I have ordered a lower extremity venous duplex for further evaluation.  If this shows no further evidence of DVT then I think we can discuss removing his IVC filter as he would no longer have an indication for it.  As well since he is able to tolerate anticoagulation and is having no issues should he have any recurrent DVT that he can simply continue with anticoagulation as needed.  If there is still evidence of DVT then we can leave the filter in place for now and plan for repeat imaging the future and could discuss filter removal at that time once the DVTs have resolved.  I plan to see him back here in the office in 1 week with a repeat venous duplex and can make further recommendations at that time.  Otherwise I recommended that he use compression to the bilateral lower extremities to help aid in edema reduction given his history of DVT.  The patient is to continue taking their medications as previously discussed.   I did discuss vascular risk factors as they pertain to the progression of vascular disease including controlling hypertension. These factors remain stable. Patient's Body mass index is 27 kg/m². BMI is above normal parameters. Recommendations include: educational material. This was all discussed in full with complete understanding.  Thank you for allowing me to participate in the care of your patient.  Please do not hesitate to call with any questions or concerns.  We will keep you aware of any further encounters with Jose Alfredo Kirkpatrick.      Sincerely Yours,      Dallin Coronado MD

## 2020-01-15 ENCOUNTER — TELEPHONE (OUTPATIENT)
Dept: VASCULAR SURGERY | Facility: CLINIC | Age: 85
End: 2020-01-15

## 2020-01-15 NOTE — TELEPHONE ENCOUNTER
Tried to call the patient to remind him of his testing appt and to see Dr. Coronado on Friday, but had to leave a message.

## 2020-01-17 ENCOUNTER — OFFICE VISIT (OUTPATIENT)
Dept: VASCULAR SURGERY | Facility: CLINIC | Age: 85
End: 2020-01-17

## 2020-01-17 ENCOUNTER — HOSPITAL ENCOUNTER (OUTPATIENT)
Dept: ULTRASOUND IMAGING | Facility: HOSPITAL | Age: 85
Discharge: HOME | End: 2020-01-17
Admitting: SURGERY

## 2020-01-17 VITALS
BODY MASS INDEX: 27.23 KG/M2 | HEIGHT: 75 IN | WEIGHT: 219 LBS | OXYGEN SATURATION: 98 % | HEART RATE: 72 BPM | DIASTOLIC BLOOD PRESSURE: 94 MMHG | SYSTOLIC BLOOD PRESSURE: 136 MMHG

## 2020-01-17 DIAGNOSIS — I82.413 ACUTE DEEP VEIN THROMBOSIS (DVT) OF FEMORAL VEIN OF BOTH LOWER EXTREMITIES (HCC): Primary | ICD-10-CM

## 2020-01-17 DIAGNOSIS — I82.413 ACUTE DEEP VEIN THROMBOSIS (DVT) OF FEMORAL VEIN OF BOTH LOWER EXTREMITIES (HCC): ICD-10-CM

## 2020-01-17 PROCEDURE — 99214 OFFICE O/P EST MOD 30 MIN: CPT | Performed by: SURGERY

## 2020-01-17 PROCEDURE — 93970 EXTREMITY STUDY: CPT | Performed by: SURGERY

## 2020-01-17 PROCEDURE — 93970 EXTREMITY STUDY: CPT

## 2020-01-17 NOTE — PATIENT INSTRUCTIONS

## 2020-01-17 NOTE — PROGRESS NOTES
"01/17/2020      Otilio Ramos MD  [address] [facility] [address]  [facility] [address]        Jose Alfredo Kirkpatrick  1935    Chief Complaint   Patient presents with   • Follow-up     1 wk f/u with venous duplex.         Dear Otilio Ramos MD:    HPI     I had the pleasure of seeing your patient in the office today for follow up.  As you recall, the patient is a 84 y.o. male who we are currently following for lower extremity DVT and PE status post IVC filter placement.  He returns today after having undergone a repeat venous duplex for continued surveillance.  I did review these images in the office today and it does show resolution of the right lower extremity DVT however there is still residual DVT on the left and the SFA and popliteal veins.  He otherwise is tolerating Eliquis without issue.  He does complain of some mild left lower extremity edema and I again encouraged him to use compression to help relieve this as well as leg elevation.  Otherwise he is without complaints today.      Review of Systems   Constitutional: Negative.  Negative for activity change, appetite change, chills, diaphoresis, fatigue and fever.   HENT: Negative.  Negative for congestion, sneezing, sore throat and trouble swallowing.    Eyes: Negative.  Negative for visual disturbance.   Respiratory: Negative.  Negative for chest tightness and shortness of breath.    Cardiovascular: Negative.  Negative for chest pain, palpitations and leg swelling.   Gastrointestinal: Negative.  Negative for abdominal distention, abdominal pain, nausea and vomiting.   Endocrine: Negative.    Genitourinary: Negative.    Musculoskeletal: Negative.    Skin: Negative.    Allergic/Immunologic: Negative.    Neurological: Negative.    Hematological: Negative.    Psychiatric/Behavioral: Negative.        /94   Pulse 72   Ht 190.5 cm (75\")   Wt 99.3 kg (219 lb)   SpO2 98%   BMI 27.37 kg/m²   Physical Exam   Constitutional: He is oriented to person, " place, and time. He appears well-developed and well-nourished.   HENT:   Head: Normocephalic and atraumatic.   Eyes: Pupils are equal, round, and reactive to light. EOM are normal.   Neck: Normal range of motion. Neck supple. No JVD present.   Cardiovascular: Normal rate, regular rhythm, intact distal pulses and normal pulses.   Pulses:       Carotid pulses are 2+ on the right side, and 2+ on the left side.       Radial pulses are 2+ on the right side, and 2+ on the left side.        Femoral pulses are 2+ on the right side, and 2+ on the left side.       Popliteal pulses are 2+ on the right side, and 2+ on the left side.        Dorsalis pedis pulses are 2+ on the right side, and 2+ on the left side.        Posterior tibial pulses are 2+ on the right side, and 2+ on the left side.   Pulmonary/Chest: Effort normal. No respiratory distress.   Abdominal: Soft. He exhibits no distension and no mass. There is no tenderness.   Musculoskeletal: Normal range of motion. He exhibits edema (Mild bilateral lower extremity edema to the level of the knee.). He exhibits no tenderness or deformity.   Neurological: He is alert and oriented to person, place, and time. No sensory deficit. He exhibits normal muscle tone.   Skin: Skin is warm and dry. Capillary refill takes less than 2 seconds.   Psychiatric: He has a normal mood and affect. His behavior is normal. Judgment and thought content normal.   Vitals reviewed.      DIAGNOSTIC DATA:    Xr Spine Cervical Complete 4 Or 5 View    Result Date: 12/19/2019  Narrative: EXAMINATION:  XR SPINE CERVICAL COMPLETE 4 OR 5 VW-  12/19/2019 11:04 AM CST  HISTORY: CERVICAL DISC DISEASE; M50.30-Other cervical disc degeneration, unspecified cervical region  COMPARISON: 06/04/2018 cervical spine radiographs  TECHNIQUE: 6 views: AP lateral oblique, and swimmer's projection imaging  FINDINGS:  Changes from laminectomy and posterior fusion observed at C4-C5 and C6. The pedicle screws and associated  support rods stable without evidence of hardware complication.  There is disc degeneration diffusely in the cervical spine with disc space narrowing and osteophyte formation.  There is no fracture or subluxation. Prevertebral soft tissues are normal.  Radiographically, cervical spine is unchanged.      Impression: 1. Changes from posterior fusion C4, C5 and C6, stable without hardware/postoperative complications. 2. Diffuse spondylosis and disc degeneration. This report was finalized on 12/19/2019 11:58 by Dr. Landen Arciniega MD.    Us Venous Doppler Lower Extremity Bilateral (duplex)    Result Date: 1/17/2020  Narrative: History: Previous DVT/PE      Impression: Impression: There is evidence of residual thrombus in the left superficial femoral vein, as well as partially occlusive thrombus in the popliteal vein and one of the posterior tibial veins. There is no evidence of any thrombus in the right lower extremity today.  Comments: Bilateral lower extremity venous duplex exam was performed using color Doppler flow, Doppler waveform analysis, and grayscale imaging, with and without compression. There is evidence of residual thrombus in the left superficial femoral vein, as well as partially occlusive thrombus in the popliteal vein and one of the posterior tibial veins. There is no evidence of any thrombus in the right lower extremity today.No thrombus is identified in the saphenofemoral junctions and greater saphenous veins bilaterally.   This report was finalized on 01/17/2020 12:41 by Dr. Dallin Coronado MD.      Patient Active Problem List   Diagnosis   • Spinal stenosis, lumbar   • Hx of colonic polyps   • Drug-induced constipation   • HTN (hypertension), benign   • Acute pulmonary embolism with acute cor pulmonale (CMS/HCC)   • Acute bilateral deep vein thrombosis (DVT) of femoral veins (CMS/HCC)   • History of DVT of left lower extremity   • Positive fecal occult blood test   • Melena   • Vitamin B12 deficiency    • FARZANA (iron deficiency anemia)   • Cholelithiasis   • Gastric ulcer with hemorrhage         ICD-10-CM ICD-9-CM   1. Acute deep vein thrombosis (DVT) of femoral vein of both lower extremities (CMS/[facility]) I82.413 453.41       Lab Frequency Next Occurrence   Diet: Once 11/25/2019   Advance Diet as Tolerated Once 11/25/2019   US Venous Doppler Lower Extremity Bilateral (duplex) Once 04/16/2020       PLAN: After thoroughly evaluating Jose Alfredo Kirkpatrick, I believe the best course of action is to remain conservative from a vascular standpoint.  Given that today's venous duplex shows continued residual thrombus in the left lower extremity femoral and popliteal veins feel that his filter should remain in place.  I will plan to see him back here in 3 months with a repeat venous duplex for further evaluation.  If at that time his DVT has resolved and we can discuss filter removal at that time.  Otherwise he should continue on his Eliquis as previously started here in the hospital and is following with his primary care physician for this.  Otherwise as above I recommend he use compression to the bilateral lower extremities given his history of DVT to help aid in edema reduction can also use leg elevation to help aid in edema reduction.  The patient is to continue taking their medications as previously discussed.   I did discuss vascular risk factors as they pertain to the progression of vascular disease including controlling hypertension. These factors remain stable. Patient's Body mass index is 27.37 kg/m². BMI is above normal parameters. Recommendations include: educational material. This was all discussed in full with complete understanding.  Thank you for allowing me to participate in the care of your patient.  Please do not hesitate to call with any questions or concerns.  We will keep you aware of any further encounters with Jose Alfreod Kirkpatrick.      Sincerely Yours,      Dallin Coronado MD

## 2020-01-27 ENCOUNTER — OFFICE VISIT (OUTPATIENT)
Dept: NEUROSURGERY | Facility: CLINIC | Age: 85
End: 2020-01-27

## 2020-01-27 VITALS
DIASTOLIC BLOOD PRESSURE: 94 MMHG | BODY MASS INDEX: 27.78 KG/M2 | WEIGHT: 223.4 LBS | SYSTOLIC BLOOD PRESSURE: 150 MMHG | HEIGHT: 75 IN

## 2020-01-27 DIAGNOSIS — G89.29 CHRONIC PAIN OF BOTH SHOULDERS: ICD-10-CM

## 2020-01-27 DIAGNOSIS — Z78.9 NON-SMOKER: ICD-10-CM

## 2020-01-27 DIAGNOSIS — G89.29 CHRONIC BILATERAL LOW BACK PAIN WITHOUT SCIATICA: Primary | ICD-10-CM

## 2020-01-27 DIAGNOSIS — M25.512 CHRONIC PAIN OF BOTH SHOULDERS: ICD-10-CM

## 2020-01-27 DIAGNOSIS — M54.50 CHRONIC BILATERAL LOW BACK PAIN WITHOUT SCIATICA: Primary | ICD-10-CM

## 2020-01-27 DIAGNOSIS — M40.209 KYPHOSIS, UNSPECIFIED KYPHOSIS TYPE, UNSPECIFIED SPINAL REGION: ICD-10-CM

## 2020-01-27 DIAGNOSIS — M25.511 CHRONIC PAIN OF BOTH SHOULDERS: ICD-10-CM

## 2020-01-27 PROCEDURE — 99214 OFFICE O/P EST MOD 30 MIN: CPT | Performed by: NURSE PRACTITIONER

## 2020-01-27 NOTE — PATIENT INSTRUCTIONS

## 2020-01-27 NOTE — PROGRESS NOTES
Chief complaint:   Chief Complaint   Patient presents with   • Back Pain     Jose Alfredo has been referred here today fro Dr. Ramos for follow up for back, shoulder and arm pain.  He has not had recent x-rays for imaging.  He has had a history of neck and back pain with surgeries.         Subjective     HPI: This is a 84-year-old male gentleman who was referred to us by Dr. Otilio Ramos for bilateral arm pain and back pain.  He is here to be evaluated today.  The patient states that he has been having pain that starts in his shoulders and goes down to about his elbows and his arms bilaterally.  It is worse on the left arm than it is on the right.  The left arm is constant and the right arm is intermittent.  Is not really complaining of any meaningful neck pain.  He has had a previous C4-6 posterior laminectomy and fusion for what sounds like cervical myelopathy.  And this to the patient is also complaining of back pain that is constant that is worse with walking and better with therapy.  This is been an ongoing issue for many years.  He apparently did have osteomyelitis at L1-2 from what I can tell from at least 2011.  He has gotten over this infection and this area has autofused.  The pain is not radiating into his lower extremities nor does he have numbness and tingling in his legs.  Rates his pain overall on a scale of 0-10 at a 5.  He says it does interfere with his actives of daily living.  He does use a cane to help get around.  He is .  Denies any tobacco, alcohol, or illicit drug use.  He is right-hand dominant    Review of Systems   Respiratory: Positive for cough and wheezing.    Cardiovascular: Positive for palpitations and leg swelling.   All other systems reviewed and are negative.       Past Medical History:   Diagnosis Date   • Arthritis    • Cataract     BILATERAL   • GERD (gastroesophageal reflux disease)    • History of transfusion    • Hx of colonic polyps    • Hypertension    • Reflux  "esophagitis    • Streptococcosis     IN SPINE FROM BACK INJECTIONS     Past Surgical History:   Procedure Laterality Date   • BACK SURGERY      cervical   • CATARACT EXTRACTION Bilateral    • COLONOSCOPY  02/11/2013    Hyperplastic polyp at 25 cm, Diverticulosis repeat exam in 5 years   • COLONOSCOPY N/A 10/16/2018    Tubular adenoma ascending colon repet prn   • ENDOSCOPY  01/15/2014    HH   • ENDOSCOPY N/A 10/3/2019    Large HH, non-bleeding gastric ulcer   • ENDOSCOPY N/A 11/25/2019    Procedure: ESOPHAGOGASTRODUODENOSCOPY WITH ANESTHESIA;  Surgeon: Marques Pérez MD;  Location: [facility] ENDOSCOPY;  Service: Gastroenterology   • HERNIA REPAIR Right     INGUINAL    • REPLACEMENT TOTAL KNEE BILATERAL     • THORACIC LAMINECTOMY DECOMPRESSIVE POSTERIOR N/A 12/14/2016    Procedure: LAMINECTOMY DECOMPRESSION, UNINSTRUMENTED POSTERIOR SPINAL FUSION, T 11-12;  Surgeon: LUZ Adan MD;  Location: [facility] OR;  Service:    • VENA CAVA FILTER INSERTION N/A 10/4/2019    Procedure: VENA CAVA FILTER INSERTION;  Surgeon: Dallin Coronado MD;  Location: [facility] HYBRID OR 12;  Service: Vascular     Family History   Problem Relation Age of Onset   • Colon cancer Father    • Colon polyps Neg Hx      Social History     Tobacco Use   • Smoking status: Never Smoker   • Smokeless tobacco: Never Used   Substance Use Topics   • Alcohol use: No   • Drug use: No       (Not in a hospital admission)  Allergies:  Patient has no known allergies.    Objective      Vital Signs  /94 (BP Location: Right arm, Patient Position: Sitting)   Ht 190.5 cm (75\")   Wt 101 kg (223 lb 6.4 oz)   BMI 27.92 kg/m²     Physical Exam   Constitutional: He is oriented to person, place, and time. He appears well-developed and well-nourished.   HENT:   Head: Normocephalic.   Eyes: Pupils are equal, round, and reactive to light. Conjunctivae, EOM and lids are normal.   Neck: Normal range of motion.   Cardiovascular: Normal rate, regular rhythm and " normal heart sounds.   Pulmonary/Chest: Effort normal and breath sounds normal.   Abdominal: Normal appearance.   Musculoskeletal: Normal range of motion.        Right shoulder: He exhibits pain.        Left shoulder: He exhibits pain.        Lumbar back: He exhibits pain.   Neurological: He is alert and oriented to person, place, and time. He has normal strength and normal reflexes. He displays normal reflexes. No cranial nerve deficit or sensory deficit. GCS eye subscore is 4. GCS verbal subscore is 5. GCS motor subscore is 6.   Skin: Skin is warm.   Psychiatric: He has a normal mood and affect. His speech is normal and behavior is normal. Thought content normal. Cognition and memory are normal.       Results Review: X-ray of the cervical spine that was done at [facility] December 2019 shows a C4-6 posterior instrumentation with laminectomy.  There is degeneration noted throughout the cervical spine.    There is no recent MRI of the lumbar spine however an MRI dated from 2011 shows acute osteomyelitis at L1-2.  The last x-ray from 2017 shows autofusion of L1-2 and there does appear to be a kyphotic deformity in the thoracic spine.        Assessment/Plan: At this point the patient is already involved in physical therapy.  I would like to send the patient for x-rays of his shoulders bilaterally and also for scoliosis x-rays to see how bad his kyphotic deformity is.  This patient I do not think would require any further surgical intervention at this time but he may need to be considered for pain pump.  He was getting injections by Dr. Martini but apparently was not receiving much benefit and is currently taking hydrocodone from his primary care doctor.  We will follow-up with him once the imaging is completed.  BMI shows that he is overweight.  BMI chart was given to the patient.  He is a non-smoker    Jose Alfredo was seen today for back pain.    Diagnoses and all orders for this visit:    Chronic bilateral low back pain  without sciatica    Chronic pain of both shoulders  -     XR Shoulder 2+ View Left; Future  -     XR Shoulder 2+ View Right; Future    Kyphosis, unspecified kyphosis type, unspecified spinal region  -     XR Scoliosis Complete Including Supine & Erect; Future    Body mass index (bmi) 27.0-27.9, adult    Non-smoker          I discussed the patients findings and my recommendations with patient    Mauricio Lee, APRN  01/27/20  2:50 PM

## 2020-01-28 ENCOUNTER — HOSPITAL ENCOUNTER (OUTPATIENT)
Dept: GENERAL RADIOLOGY | Facility: HOSPITAL | Age: 85
Discharge: HOME | End: 2020-01-28
Admitting: NURSE PRACTITIONER

## 2020-01-28 DIAGNOSIS — M25.512 CHRONIC PAIN OF BOTH SHOULDERS: ICD-10-CM

## 2020-01-28 DIAGNOSIS — G89.29 CHRONIC PAIN OF BOTH SHOULDERS: ICD-10-CM

## 2020-01-28 DIAGNOSIS — M25.511 CHRONIC PAIN OF BOTH SHOULDERS: ICD-10-CM

## 2020-01-28 DIAGNOSIS — M40.209 KYPHOSIS, UNSPECIFIED KYPHOSIS TYPE, UNSPECIFIED SPINAL REGION: ICD-10-CM

## 2020-01-28 PROCEDURE — 72082 X-RAY EXAM ENTIRE SPI 2/3 VW: CPT

## 2020-01-28 PROCEDURE — 73030 X-RAY EXAM OF SHOULDER: CPT

## 2020-03-10 ENCOUNTER — OFFICE VISIT (OUTPATIENT)
Dept: NEUROSURGERY | Facility: CLINIC | Age: 85
End: 2020-03-10

## 2020-03-10 VITALS — BODY MASS INDEX: 27.73 KG/M2 | WEIGHT: 223 LBS | HEIGHT: 75 IN

## 2020-03-10 DIAGNOSIS — M48.061 SPINAL STENOSIS OF LUMBAR REGION WITHOUT NEUROGENIC CLAUDICATION: ICD-10-CM

## 2020-03-10 DIAGNOSIS — G89.29 CHRONIC BILATERAL LOW BACK PAIN WITHOUT SCIATICA: Primary | ICD-10-CM

## 2020-03-10 DIAGNOSIS — M40.14 OTHER SECONDARY KYPHOSIS, THORACIC REGION: ICD-10-CM

## 2020-03-10 DIAGNOSIS — Z78.9 NON-SMOKER: ICD-10-CM

## 2020-03-10 DIAGNOSIS — E66.3 OVERWEIGHT (BMI 25.0-29.9): ICD-10-CM

## 2020-03-10 DIAGNOSIS — M54.50 CHRONIC BILATERAL LOW BACK PAIN WITHOUT SCIATICA: Primary | ICD-10-CM

## 2020-03-10 PROCEDURE — 99213 OFFICE O/P EST LOW 20 MIN: CPT | Performed by: NURSE PRACTITIONER

## 2020-03-10 RX ORDER — AMLODIPINE BESYLATE 5 MG/1
TABLET ORAL DAILY
Status: ON HOLD | COMMUNITY
Start: 2020-02-03 | End: 2023-02-08

## 2020-03-10 RX ORDER — HYDROCHLOROTHIAZIDE 25 MG/1
TABLET ORAL
Status: ON HOLD | COMMUNITY
Start: 2020-03-04 | End: 2023-02-08

## 2020-03-10 RX ORDER — CELECOXIB 200 MG/1
CAPSULE ORAL
COMMUNITY
Start: 2020-02-09 | End: 2020-08-12

## 2020-03-10 NOTE — PATIENT INSTRUCTIONS
"PATIENT TO CONTINUE TO FOLLOW UP WITH HIS/HER PRIMARY CARE PROVIDER FOR YEARLY PHYSICAL EXAMS TO ENSURE COMPLETE HEALTH MAINTENANCE      DASH Eating Plan  DASH stands for \"Dietary Approaches to Stop Hypertension.\" The DASH eating plan is a healthy eating plan that has been shown to reduce high blood pressure (hypertension). It may also reduce your risk for type 2 diabetes, heart disease, and stroke. The DASH eating plan may also help with weight loss.  What are tips for following this plan?    General guidelines  · Avoid eating more than 2,300 mg (milligrams) of salt (sodium) a day. If you have hypertension, you may need to reduce your sodium intake to 1,500 mg a day.  · Limit alcohol intake to no more than 1 drink a day for nonpregnant women and 2 drinks a day for men. One drink equals 12 oz of beer, 5 oz of wine, or 1½ oz of hard liquor.  · Work with your health care provider to maintain a healthy body weight or to lose weight. Ask what an ideal weight is for you.  · Get at least 30 minutes of exercise that causes your heart to beat faster (aerobic exercise) most days of the week. Activities may include walking, swimming, or biking.  · Work with your health care provider or diet and nutrition specialist (dietitian) to adjust your eating plan to your individual calorie needs.  Reading food labels    · Check food labels for the amount of sodium per serving. Choose foods with less than 5 percent of the Daily Value of sodium. Generally, foods with less than 300 mg of sodium per serving fit into this eating plan.  · To find whole grains, look for the word \"whole\" as the first word in the ingredient list.  Shopping  · Buy products labeled as \"low-sodium\" or \"no salt added.\"  · Buy fresh foods. Avoid canned foods and premade or frozen meals.  Cooking  · Avoid adding salt when cooking. Use salt-free seasonings or herbs instead of table salt or sea salt. Check with your health care provider or pharmacist before using salt " substitutes.  · Do not pizarro foods. Cook foods using healthy methods such as baking, boiling, grilling, and broiling instead.  · Cook with heart-healthy oils, such as olive, canola, soybean, or sunflower oil.  Meal planning  · Eat a balanced diet that includes:  ? 5 or more servings of fruits and vegetables each day. At each meal, try to fill half of your plate with fruits and vegetables.  ? Up to 6-8 servings of whole grains each day.  ? Less than 6 oz of lean meat, poultry, or fish each day. A 3-oz serving of meat is about the same size as a deck of cards. One egg equals 1 oz.  ? 2 servings of low-fat dairy each day.  ? A serving of nuts, seeds, or beans 5 times each week.  ? Heart-healthy fats. Healthy fats called Omega-3 fatty acids are found in foods such as flaxseeds and coldwater fish, like sardines, salmon, and mackerel.  · Limit how much you eat of the following:  ? Canned or prepackaged foods.  ? Food that is high in trans fat, such as fried foods.  ? Food that is high in saturated fat, such as fatty meat.  ? Sweets, desserts, sugary drinks, and other foods with added sugar.  ? Full-fat dairy products.  · Do not salt foods before eating.  · Try to eat at least 2 vegetarian meals each week.  · Eat more home-cooked food and less restaurant, buffet, and fast food.  · When eating at a restaurant, ask that your food be prepared with less salt or no salt, if possible.  What foods are recommended?  The items listed may not be a complete list. Talk with your dietitian about what dietary choices are best for you.  Grains  Whole-grain or whole-wheat bread. Whole-grain or whole-wheat pasta. Brown rice. Oatmeal. Quinoa. Bulgur. Whole-grain and low-sodium cereals. Glory bread. Low-fat, low-sodium crackers. Whole-wheat flour tortillas.  Vegetables  Fresh or frozen vegetables (raw, steamed, roasted, or grilled). Low-sodium or reduced-sodium tomato and vegetable juice. Low-sodium or reduced-sodium tomato sauce and tomato  paste. Low-sodium or reduced-sodium canned vegetables.  Fruits  All fresh, dried, or frozen fruit. Canned fruit in natural juice (without added sugar).  Meat and other protein foods  Skinless chicken or turkey. Ground chicken or turkey. Pork with fat trimmed off. Fish and seafood. Egg whites. Dried beans, peas, or lentils. Unsalted nuts, nut butters, and seeds. Unsalted canned beans. Lean cuts of beef with fat trimmed off. Low-sodium, lean deli meat.  Dairy  Low-fat (1%) or fat-free (skim) milk. Fat-free, low-fat, or reduced-fat cheeses. Nonfat, low-sodium ricotta or cottage cheese. Low-fat or nonfat yogurt. Low-fat, low-sodium cheese.  Fats and oils  Soft margarine without trans fats. Vegetable oil. Low-fat, reduced-fat, or light mayonnaise and salad dressings (reduced-sodium). Canola, safflower, olive, soybean, and sunflower oils. Avocado.  Seasoning and other foods  Herbs. Spices. Seasoning mixes without salt. Unsalted popcorn and pretzels. Fat-free sweets.  What foods are not recommended?  The items listed may not be a complete list. Talk with your dietitian about what dietary choices are best for you.  Grains  Baked goods made with fat, such as croissants, muffins, or some breads. Dry pasta or rice meal packs.  Vegetables  Creamed or fried vegetables. Vegetables in a cheese sauce. Regular canned vegetables (not low-sodium or reduced-sodium). Regular canned tomato sauce and paste (not low-sodium or reduced-sodium). Regular tomato and vegetable juice (not low-sodium or reduced-sodium). Pickles. Olives.  Fruits  Canned fruit in a light or heavy syrup. Fried fruit. Fruit in cream or butter sauce.  Meat and other protein foods  Fatty cuts of meat. Ribs. Fried meat. Marie. Sausage. Bologna and other processed lunch meats. Salami. Fatback. Hotdogs. Bratwurst. Salted nuts and seeds. Canned beans with added salt. Canned or smoked fish. Whole eggs or egg yolks. Chicken or turkey with skin.  Dairy  Whole or 2% milk,  cream, and half-and-half. Whole or full-fat cream cheese. Whole-fat or sweetened yogurt. Full-fat cheese. Nondairy creamers. Whipped toppings. Processed cheese and cheese spreads.  Fats and oils  Butter. Stick margarine. Lard. Shortening. Ghee. Marie fat. Tropical oils, such as coconut, palm kernel, or palm oil.  Seasoning and other foods  Salted popcorn and pretzels. Onion salt, garlic salt, seasoned salt, table salt, and sea salt. Worcestershire sauce. Tartar sauce. Barbecue sauce. Teriyaki sauce. Soy sauce, including reduced-sodium. Steak sauce. Canned and packaged gravies. Fish sauce. Oyster sauce. Cocktail sauce. Horseradish that you find on the shelf. Ketchup. Mustard. Meat flavorings and tenderizers. Bouillon cubes. Hot sauce and Tabasco sauce. Premade or packaged marinades. Premade or packaged taco seasonings. Relishes. Regular salad dressings.  Where to find more information:  · National Heart, [employer], [employer] [place]: www.nhlbi.nih.gov  · American Heart Association: www.heart.org  Summary  · The DASH eating plan is a healthy eating plan that has been shown to reduce high blood pressure (hypertension). It may also reduce your risk for type 2 diabetes, heart disease, and stroke.  · With the DASH eating plan, you should limit salt (sodium) intake to 2,300 mg a day. If you have hypertension, you may need to reduce your sodium intake to 1,500 mg a day.  · When on the DASH eating plan, aim to eat more fresh fruits and vegetables, whole grains, lean proteins, low-fat dairy, and heart-healthy fats.  · Work with your health care provider or diet and nutrition specialist (dietitian) to adjust your eating plan to your individual calorie needs.  This information is not intended to replace advice given to you by your health care provider. Make sure you discuss any questions you have with your health care provider.  Document Released: 12/06/2012 Document Revised: 12/11/2017 Document Reviewed: 12/11/2017  Vivian  Interactive Patient Education © 2020 [employer]

## 2020-03-10 NOTE — PROGRESS NOTES
"    Chief complaint:   Chief Complaint   Patient presents with   • Follow-up     Here for follow up back pain and review of xrays.         Subjective     HPI: This is a 84-year-old male gentleman who been following for back pain.  He is here to be evaluated today.  The patient continues to have pain in his back but does not complain of any lower extremity pain or numbness and tingling.  The patient had osteomyelitis at L1-2 and at this point it has autofused.  He does have lumbar stenosis at L3 4-5 and L5-S1 that is causing both central and bilateral foraminal narrowing.  No fractures visualized.  Rates his pain on a scale of 0-10 at a 7.  He says it does interfere with his actives of daily living.  He does walk with a cane.  He does receive hydrocodone from his primary care doctor.  He has had injections in his back in the past without any significant improvement.        Review of Systems   Musculoskeletal: Positive for back pain.   Neurological: Negative.          Objective      Vital Signs  Ht 190.5 cm (75\")   Wt 101 kg (223 lb)   BMI 27.87 kg/m²     Physical Exam   Constitutional: He is oriented to person, place, and time. He appears well-developed and well-nourished.   HENT:   Head: Normocephalic.   Eyes: Pupils are equal, round, and reactive to light. Conjunctivae, EOM and lids are normal.   Neck: Normal range of motion.   Cardiovascular: Normal rate, regular rhythm and normal heart sounds.   Pulmonary/Chest: Effort normal and breath sounds normal.   Abdominal: Normal appearance.   Musculoskeletal: Normal range of motion.        Lumbar back: He exhibits pain.   Neurological: He is alert and oriented to person, place, and time. He has normal strength and normal reflexes. He displays normal reflexes. No cranial nerve deficit or sensory deficit. Gait abnormal. GCS eye subscore is 4. GCS verbal subscore is 5. GCS motor subscore is 6.   Skin: Skin is warm.   Psychiatric: He has a normal mood and affect. His " speech is normal and behavior is normal. Thought content normal. Cognition and memory are normal.       Results Review: X-rays of the spine from scoliosis do show degenerative scoliosis deformity prominent at L1-2.  There is also kyphosis present.    X-rays of the shoulder show degeneration        Assessment/Plan: At this point I am going to send the patient for an MRI of the lumbar spine to see if there is anything that would require any surgical intervention versus a consideration for pain pump.  I will also make referral to Dr. Janak Pop for evaluation.  His questions and concerns were addressed.  BMI shows that he is overweight.  BMI chart was given to the patient.  He is a non-smoker    Advance Care Planning   Patient does not have an advance directive, information provided.      Jose Alfredo was seen today for follow-up.    Diagnoses and all orders for this visit:    Chronic bilateral low back pain without sciatica  -     MRI Lumbar Spine Without Contrast; Future  -     Ambulatory Referral to Pain Management    Other secondary kyphosis, thoracic region  -     MRI Lumbar Spine Without Contrast; Future  -     Ambulatory Referral to Pain Management    Spinal stenosis of lumbar region without neurogenic claudication  -     MRI Lumbar Spine Without Contrast; Future  -     Ambulatory Referral to Pain Management    Overweight (BMI 25.0-29.9)    Non-smoker        I discussed the patients findings and my recommendations with patient  Mauricio Lee, APRN  03/10/20  10:42

## 2020-03-18 ENCOUNTER — TELEPHONE (OUTPATIENT)
Dept: NEUROSURGERY | Facility: CLINIC | Age: 85
End: 2020-03-18

## 2020-03-18 NOTE — TELEPHONE ENCOUNTER
Jose Alfredo was seen in the office on 03/10/2020 and an MRI of the lumbar was ordered, he has called the office requesting to cancel this MRI at this time, he may at some point in the future call back to have rescheduled.

## 2020-03-19 ENCOUNTER — APPOINTMENT (OUTPATIENT)
Dept: MRI IMAGING | Facility: HOSPITAL | Age: 85
End: 2020-03-19

## 2020-04-06 ENCOUNTER — TELEPHONE (OUTPATIENT)
Dept: VASCULAR SURGERY | Facility: CLINIC | Age: 85
End: 2020-04-06

## 2020-04-06 NOTE — TELEPHONE ENCOUNTER
Left message that appointment and testing had been rs due to Covid 19.  Mailed all information to patient.

## 2020-04-17 ENCOUNTER — APPOINTMENT (OUTPATIENT)
Dept: ULTRASOUND IMAGING | Facility: HOSPITAL | Age: 85
End: 2020-04-17

## 2020-05-08 ENCOUNTER — TELEPHONE (OUTPATIENT)
Dept: VASCULAR SURGERY | Facility: CLINIC | Age: 85
End: 2020-05-08

## 2020-05-08 NOTE — TELEPHONE ENCOUNTER
Called the patient to remind him of his testing and to see Dr. Coronado on Monday.  The patient was aware and confirmed.  The patient was aware of the special screening process going on at this time.

## 2020-05-11 ENCOUNTER — OFFICE VISIT (OUTPATIENT)
Dept: VASCULAR SURGERY | Facility: CLINIC | Age: 85
End: 2020-05-11

## 2020-05-11 ENCOUNTER — HOSPITAL ENCOUNTER (OUTPATIENT)
Dept: ULTRASOUND IMAGING | Facility: HOSPITAL | Age: 85
Discharge: HOME | End: 2020-05-11
Admitting: SURGERY

## 2020-05-11 VITALS
SYSTOLIC BLOOD PRESSURE: 110 MMHG | WEIGHT: 223 LBS | DIASTOLIC BLOOD PRESSURE: 62 MMHG | HEIGHT: 75 IN | HEART RATE: 51 BPM | OXYGEN SATURATION: 98 % | BODY MASS INDEX: 27.73 KG/M2

## 2020-05-11 DIAGNOSIS — I82.413 ACUTE DEEP VEIN THROMBOSIS (DVT) OF FEMORAL VEIN OF BOTH LOWER EXTREMITIES (HCC): ICD-10-CM

## 2020-05-11 DIAGNOSIS — I82.513 CHRONIC DEEP VEIN THROMBOSIS (DVT) OF FEMORAL VEIN OF BOTH LOWER EXTREMITIES (HCC): Primary | ICD-10-CM

## 2020-05-11 DIAGNOSIS — I10 HTN (HYPERTENSION), BENIGN: ICD-10-CM

## 2020-05-11 DIAGNOSIS — G89.29 CHRONIC BILATERAL LOW BACK PAIN WITHOUT SCIATICA: ICD-10-CM

## 2020-05-11 DIAGNOSIS — M54.50 CHRONIC BILATERAL LOW BACK PAIN WITHOUT SCIATICA: ICD-10-CM

## 2020-05-11 PROCEDURE — 99214 OFFICE O/P EST MOD 30 MIN: CPT | Performed by: SURGERY

## 2020-05-11 PROCEDURE — 93970 EXTREMITY STUDY: CPT | Performed by: SURGERY

## 2020-05-11 PROCEDURE — 93970 EXTREMITY STUDY: CPT

## 2020-05-11 RX ORDER — METOPROLOL SUCCINATE 25 MG/1
TABLET, EXTENDED RELEASE ORAL
Status: ON HOLD | COMMUNITY
Start: 2020-04-13 | End: 2023-02-08

## 2020-05-11 RX ORDER — LISINOPRIL 10 MG/1
10 TABLET ORAL DAILY
COMMUNITY

## 2020-05-11 NOTE — PROGRESS NOTES
05/11/2020      Otilio Ramos MD  [address]  [place] [address]        Jose Alfredo Kirkpatrick  1935    Chief Complaint   Patient presents with   • Follow-up     3 month f/u with testing.  Pt states that he has been having issues with swelling of both legs.  Pt states that it is happening more frequently.  Pt states that he wears compression, however, he can't be on his feet very long due to the swelling.       Dear Otilio Ramos MD:    HPI     I had the pleasure of seeing your patient in the office today for follow up.  As you recall, the patient is a 84 y.o. male who we are currently following for previous bilateral lower extremity DVT and PE back in October 2019.  On presentation at that time he was noted to have significant anemia due to GI bleed and so could not be anticoagulated.  As such she required placement of an IVC filter.  Subsequent follow-up with GI showed resolution of his ulcer that caused the GI bleeding and so he was started on Eliquis and has been on that for the last 6 months.  He had a repeat venous duplex today which I was able to review personally.  It shows some residual, chronic appearing thrombus within the left superficial femoral vein, and no evidence of any DVT in the right lower extremity.  He does report that he does have some bilateral lower extremity edema that is worse with prolonged standing and improved with leg elevation and lying supine.  At our previous visit I had recommended that he wear compression stockings and he does this most days and on the days that he does wear compression he notes that his edema is much better controlled.  Today in the office he wanted to discuss possibly discontinuing his anticoagulation as given that he is on a fixed income it would be one less expense.  He otherwise denies any chest pain or shortness of breath.  He has no other acute complaints.      Review of Systems   Constitutional: Negative.  Negative for activity  "change, appetite change, chills, diaphoresis, fatigue and fever.   HENT: Negative.  Negative for congestion, sneezing, sore throat and trouble swallowing.    Eyes: Negative.  Negative for visual disturbance.   Respiratory: Negative.  Negative for chest tightness and shortness of breath.    Cardiovascular: Negative.  Negative for chest pain, palpitations and leg swelling.   Gastrointestinal: Negative.  Negative for abdominal distention, abdominal pain, nausea and vomiting.   Endocrine: Negative.    Genitourinary: Negative.    Musculoskeletal: Positive for back pain (Chronic lower back pain).   Skin: Negative.    Allergic/Immunologic: Negative.    Neurological: Negative.    Hematological: Negative.    Psychiatric/Behavioral: Negative.        /62   Pulse 51   Ht 190.5 cm (75\")   Wt 101 kg (223 lb)   SpO2 98%   BMI 27.87 kg/m²   Physical Exam   Constitutional: He is oriented to person, place, and time. He appears well-developed and well-nourished.   HENT:   Head: Normocephalic and atraumatic.   Eyes: Pupils are equal, round, and reactive to light. EOM are normal.   Neck: Normal range of motion. Neck supple. No JVD present.   Cardiovascular: Normal rate, regular rhythm, intact distal pulses and normal pulses.   Pulses:       Carotid pulses are 2+ on the right side, and 2+ on the left side.       Radial pulses are 2+ on the right side, and 2+ on the left side.        Femoral pulses are 2+ on the right side, and 2+ on the left side.       Popliteal pulses are 2+ on the right side, and 2+ on the left side.        Dorsalis pedis pulses are 2+ on the right side, and 2+ on the left side.        Posterior tibial pulses are 2+ on the right side, and 2+ on the left side.   Pulmonary/Chest: Effort normal. No respiratory distress.   Abdominal: Soft. He exhibits no distension and no mass. There is no tenderness.   Musculoskeletal: Normal range of motion. He exhibits edema (Mild bilateral lower extremity edema to the level " of the knee.). He exhibits no tenderness or deformity.   Neurological: He is alert and oriented to person, place, and time. No sensory deficit. He exhibits normal muscle tone.   Skin: Skin is warm and dry. Capillary refill takes less than 2 seconds.   Psychiatric: He has a normal mood and affect. His behavior is normal. Judgment and thought content normal.   Vitals reviewed.      DIAGNOSTIC DATA:    No results found.    Patient Active Problem List   Diagnosis   • Spinal stenosis, lumbar   • Hx of colonic polyps   • Drug-induced constipation   • HTN (hypertension), benign   • Acute pulmonary embolism with acute cor pulmonale (CMS/HCC)   • Acute bilateral deep vein thrombosis (DVT) of femoral veins (CMS/HCC)   • History of DVT of left lower extremity   • Positive fecal occult blood test   • Melena   • Vitamin B12 deficiency   • FARZANA (iron deficiency anemia)   • Cholelithiasis   • Gastric ulcer with hemorrhage   • Chronic bilateral low back pain without sciatica   • Chronic pain of both shoulders   • Kyphosis   • Overweight (BMI 25.0-29.9)   • Non-smoker         ICD-10-CM ICD-9-CM   1. Chronic deep vein thrombosis (DVT) of femoral vein of both lower extremities (CMS/HCC) I82.513 453.51   2. HTN (hypertension), benign I10 401.1   3. Chronic bilateral low back pain without sciatica M54.5 724.2    G89.29 338.29       Lab Frequency Next Occurrence   Diet: Once 11/25/2019   Advance Diet as Tolerated Once 11/25/2019   US Venous Doppler Lower Extremity Bilateral (duplex) Once 08/11/2020       PLAN: After thoroughly evaluating Jose Alfredo Kirkpatrick, I believe the best course of action is to remain conservative from a vascular standpoint.  We had a long discussion today regarding his previous DVT/PE as well as his IVC filter.  He has now completed a 6-month course of anticoagulation with Eliquis and has had no further issues with any GI bleeding.  He wishes to stop his anticoagulation if possible is given that he is on a fixed  income it would be one less expense.  I think given that he has completed a six-month course and is not unreasonable to stop anticoagulation at this point and monitor him.  We also discussed the possibility of removing his filter but he wishes to leave it in place and stop his anticoagulation and monitor things to make sure that he does not form any new thrombus.  Ultimately I will plan to see him back here in the office in 3 months with him off anticoagulation and will repeat a lower extremity venous duplex for further surveillance.  We can rediscuss the possibility of removing his filter at that time if he has no further issues and wishes it to be removed.  Otherwise moving forward I recommended that he continue with compression to the bilateral lower extremities as well as leg elevation as much as possible to help aid in edema reduction.  The patient is to continue taking their medications as previously discussed.   I did discuss vascular risk factors as they pertain to the progression of vascular disease including controlling hypertension. These factors remain stable. Patient's Body mass index is 27.87 kg/m². BMI is above normal parameters. Recommendations include: educational material. This was all discussed in full with complete understanding.  Thank you for allowing me to participate in the care of your patient.  Please do not hesitate to call with any questions or concerns.  We will keep you aware of any further encounters with Jose Alfredo Clive Kirkpatrick.      Sincerely Yours,      Dallin Coronado MD

## 2020-05-11 NOTE — PATIENT INSTRUCTIONS

## 2020-08-11 ENCOUNTER — TELEPHONE (OUTPATIENT)
Dept: VASCULAR SURGERY | Facility: CLINIC | Age: 85
End: 2020-08-11

## 2020-08-11 NOTE — TELEPHONE ENCOUNTER
Called the patient to remind him of his testing and appt with Dr. Coronado tomorrow.  Pt was aware and confirmed.

## 2020-08-12 ENCOUNTER — OFFICE VISIT (OUTPATIENT)
Dept: VASCULAR SURGERY | Facility: CLINIC | Age: 85
End: 2020-08-12

## 2020-08-12 ENCOUNTER — HOSPITAL ENCOUNTER (OUTPATIENT)
Dept: ULTRASOUND IMAGING | Facility: HOSPITAL | Age: 85
Discharge: HOME | End: 2020-08-12
Admitting: SURGERY

## 2020-08-12 ENCOUNTER — APPOINTMENT (OUTPATIENT)
Dept: ULTRASOUND IMAGING | Facility: HOSPITAL | Age: 85
End: 2020-08-12

## 2020-08-12 VITALS
BODY MASS INDEX: 25.86 KG/M2 | DIASTOLIC BLOOD PRESSURE: 52 MMHG | WEIGHT: 208 LBS | OXYGEN SATURATION: 95 % | HEART RATE: 50 BPM | SYSTOLIC BLOOD PRESSURE: 98 MMHG | HEIGHT: 75 IN

## 2020-08-12 DIAGNOSIS — K25.4 GASTRIC ULCER WITH HEMORRHAGE, UNSPECIFIED CHRONICITY: ICD-10-CM

## 2020-08-12 DIAGNOSIS — Z86.718 HISTORY OF DVT OF LOWER EXTREMITY: ICD-10-CM

## 2020-08-12 DIAGNOSIS — I10 HTN (HYPERTENSION), BENIGN: Primary | ICD-10-CM

## 2020-08-12 DIAGNOSIS — I82.513 CHRONIC DEEP VEIN THROMBOSIS (DVT) OF FEMORAL VEIN OF BOTH LOWER EXTREMITIES (HCC): ICD-10-CM

## 2020-08-12 PROCEDURE — 93970 EXTREMITY STUDY: CPT | Performed by: SURGERY

## 2020-08-12 PROCEDURE — 99214 OFFICE O/P EST MOD 30 MIN: CPT | Performed by: SURGERY

## 2020-08-12 PROCEDURE — 93970 EXTREMITY STUDY: CPT

## 2020-08-12 NOTE — PROGRESS NOTES
08/12/2020      Otilio Ramos MD  [address]  [place] [address]        Jose Alfredo Kirkpatrick  1935    Chief Complaint   Patient presents with   • Follow-up     3 month f/u with venous duplex. Last seen in the office 5/11/20.  Pt states that his left leg feels very heavy.  Pt not sure of his medications.  He denies taking a daily aspirin.       Dear Otilio Ramos MD:    HPI     I had the pleasure of seeing your patient in the office today for follow up.  As you recall, the patient is a 85 y.o. male who we are currently following for history of left lower extremity DVT as well as GI bleed requiring placement of IVC filter.  Patient returns today after having undergone repeat venous duplex for continued surveillance.  Duplex today again shows evidence of chronic left lower extremity superficial femoral vein DVT as well as partially occlusive, chronic appearing DVT within the popliteal vein.  There is no evidence of any acute thrombus.  At our last visit the patient's anticoagulation was discontinued and he is remained off the Eliquis with no further issues.  He reports minimal lower extremity edema and he has been using compression intermittently as needed.  Otherwise his only major complaint continues to be chronic back and joint pain from arthritis which makes it at times difficult for him to walk and do physical activity.  He otherwise has no further acute complaints.      Review of Systems   Constitutional: Negative.  Negative for activity change, appetite change, chills, diaphoresis, fatigue and fever.   HENT: Negative.  Negative for congestion, sneezing, sore throat and trouble swallowing.    Eyes: Negative.  Negative for visual disturbance.   Respiratory: Negative.  Negative for chest tightness and shortness of breath.    Cardiovascular: Negative.  Negative for chest pain, palpitations and leg swelling.   Gastrointestinal: Negative.  Negative for abdominal distention, abdominal pain,  "nausea and vomiting.   Endocrine: Negative.    Genitourinary: Negative.    Musculoskeletal: Positive for back pain (Chronic lower back pain).   Skin: Negative.    Allergic/Immunologic: Negative.    Neurological: Negative.    Hematological: Negative.    Psychiatric/Behavioral: Negative.        BP 98/52   Pulse 50   Ht 190.5 cm (75\")   Wt 94.3 kg (208 lb)   SpO2 95%   BMI 26.00 kg/m²   Physical Exam   Constitutional: He is oriented to person, place, and time. He appears well-developed and well-nourished.   HENT:   Head: Normocephalic and atraumatic.   Eyes: Pupils are equal, round, and reactive to light. EOM are normal.   Neck: Normal range of motion. Neck supple. No JVD present.   Cardiovascular: Normal rate, regular rhythm, intact distal pulses and normal pulses.   Pulses:       Carotid pulses are 2+ on the right side, and 2+ on the left side.       Radial pulses are 2+ on the right side, and 2+ on the left side.        Femoral pulses are 2+ on the right side, and 2+ on the left side.       Popliteal pulses are 2+ on the right side, and 2+ on the left side.        Dorsalis pedis pulses are 2+ on the right side, and 2+ on the left side.        Posterior tibial pulses are 2+ on the right side, and 2+ on the left side.   Pulmonary/Chest: Effort normal. No respiratory distress.   Abdominal: Soft. He exhibits no distension and no mass. There is no tenderness.   Musculoskeletal: Normal range of motion. He exhibits edema (Mild bilateral lower extremity edema to the level of the knee.). He exhibits no tenderness or deformity.   Neurological: He is alert and oriented to person, place, and time. No sensory deficit. He exhibits normal muscle tone.   Skin: Skin is warm and dry. Capillary refill takes less than 2 seconds.   Psychiatric: He has a normal mood and affect. His behavior is normal. Judgment and thought content normal.   Vitals reviewed.      DIAGNOSTIC DATA:    Lower extremity venous duplex was done today which I " did personally review.  Again it shows evidence of chronic left lower extremity superficial femoral vein DVT as well as some chronic appearing, partially occlusive thrombus within the popliteal vein.        Patient Active Problem List   Diagnosis   • Spinal stenosis, lumbar   • Hx of colonic polyps   • Drug-induced constipation   • HTN (hypertension), benign   • Acute pulmonary embolism with acute cor pulmonale (CMS/HCC)   • Acute bilateral deep vein thrombosis (DVT) of femoral veins (CMS/HCC)   • History of DVT of left lower extremity   • Positive fecal occult blood test   • Melena   • Vitamin B12 deficiency   • FARZANA (iron deficiency anemia)   • Cholelithiasis   • Gastric ulcer with hemorrhage   • Chronic bilateral low back pain without sciatica   • Chronic pain of both shoulders   • Kyphosis   • Overweight (BMI 25.0-29.9)   • Non-smoker         ICD-10-CM ICD-9-CM   1. HTN (hypertension), benign I10 401.1   2. History of DVT of left lower extremity Z86.718 V12.51   3. Gastric ulcer with hemorrhage, unspecified chronicity K25.4 531.40       Lab Frequency Next Occurrence   Diet: Once 11/25/2019   Advance Diet as Tolerated Once 11/25/2019       PLAN: After thoroughly evaluating Jose Alfredo Kirkpatrick, I believe the best course of action is to remain conservative from a vascular standpoint.  Overall he seems to be doing quite well from a DVT standpoint.  There is evidence of residual chronic thrombus within the left superficial femoral vein and the left popliteal vein but this is basically unchanged from previous exams.  He really has no significant symptoms from this and only mild edema of the bilateral lower extremities that is managed with compression as needed.  He has now been off anticoagulation since our last visit and had no further recurrence of DVT.  We did discuss removal of his IVC filter today however at this point he wishes not to undergo any further surgical procedures and would like to leave the filter in  place.  As such at this point I will plan to see him back in the office in 1 year for continued follow-up and surveillance.  The patient is to continue taking their medications as previously discussed.   I did discuss vascular risk factors as they pertain to the progression of vascular disease including controlling hypertension. These factors remain stable. Patient's Body mass index is 26 kg/m². BMI is above normal parameters. Recommendations include: educational material.   This was all discussed in full with complete understanding. Thank you for allowing me to participate in the care of your patient.  Please do not hesitate to call with any questions or concerns.  We will keep you aware of any further encounters with Jose Alfredo Kirkpatrick.      Sincerely Yours,      Dallin Coronado MD

## 2020-08-12 NOTE — PATIENT INSTRUCTIONS

## 2020-08-25 LAB — HOLD SPECIMEN: NORMAL

## 2020-10-13 ENCOUNTER — TRANSCRIBE ORDERS (OUTPATIENT)
Dept: ADMINISTRATIVE | Facility: HOSPITAL | Age: 85
End: 2020-10-13

## 2020-10-13 ENCOUNTER — HOSPITAL ENCOUNTER (OUTPATIENT)
Dept: GENERAL RADIOLOGY | Facility: HOSPITAL | Age: 85
Discharge: HOME | End: 2020-10-13
Admitting: NURSE PRACTITIONER

## 2020-10-13 DIAGNOSIS — M54.50 LOW BACK PAIN, UNSPECIFIED BACK PAIN LATERALITY, UNSPECIFIED CHRONICITY, UNSPECIFIED WHETHER SCIATICA PRESENT: ICD-10-CM

## 2020-10-13 DIAGNOSIS — M51.36 DEGENERATION OF LUMBAR INTERVERTEBRAL DISC: ICD-10-CM

## 2020-10-13 DIAGNOSIS — M51.36 DEGENERATION OF LUMBAR INTERVERTEBRAL DISC: Primary | ICD-10-CM

## 2020-10-13 DIAGNOSIS — M54.6 PAIN IN THORACIC SPINE: ICD-10-CM

## 2020-10-13 DIAGNOSIS — M54.2 CERVICALGIA: ICD-10-CM
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
## 2020-10-13 PROCEDURE — 72050 X-RAY EXAM NECK SPINE 4/5VWS: CPT

## 2020-10-13 PROCEDURE — 72072 X-RAY EXAM THORAC SPINE 3VWS: CPT

## 2020-10-13 PROCEDURE — 72110 X-RAY EXAM L-2 SPINE 4/>VWS: CPT

## 2020-10-16 ENCOUNTER — TRANSCRIBE ORDERS (OUTPATIENT)
Dept: ADMINISTRATIVE | Facility: HOSPITAL | Age: 85
End: 2020-10-16

## 2020-10-16 DIAGNOSIS — R94.5 ABNORMAL RESULTS OF LIVER FUNCTION STUDIES: Primary | ICD-10-CM

## 2020-10-16 DIAGNOSIS — M25.512 LEFT SHOULDER PAIN, UNSPECIFIED CHRONICITY: ICD-10-CM

## 2020-10-19 ENCOUNTER — HOSPITAL ENCOUNTER (OUTPATIENT)
Dept: ULTRASOUND IMAGING | Facility: HOSPITAL | Age: 85
Discharge: HOME | End: 2020-10-19
Admitting: NURSE PRACTITIONER

## 2020-10-19 DIAGNOSIS — R94.5 ABNORMAL RESULTS OF LIVER FUNCTION STUDIES: ICD-10-CM

## 2020-10-19 PROCEDURE — 76705 ECHO EXAM OF ABDOMEN: CPT

## 2021-02-25 ENCOUNTER — IMMUNIZATION (OUTPATIENT)
Age: 86
End: 2021-02-25
Payer: MEDICARE

## 2021-02-25 PROCEDURE — 0001A PR IMM ADMN SARSCOV2 30MCG/0.3ML DIL RECON 1ST DOSE: CPT | Performed by: FAMILY MEDICINE

## 2021-02-25 PROCEDURE — 91300 COVID-19, PFIZER VACCINE 30MCG/0.3ML DOSE: CPT | Performed by: FAMILY MEDICINE

## 2021-03-18 ENCOUNTER — IMMUNIZATION (OUTPATIENT)
Age: 86
End: 2021-03-18
Payer: MEDICARE

## 2021-03-18 PROCEDURE — 91300 COVID-19, PFIZER VACCINE 30MCG/0.3ML DOSE: CPT | Performed by: FAMILY MEDICINE

## 2021-03-18 PROCEDURE — 0002A PR IMM ADMN SARSCOV2 30MCG/0.3ML DIL RECON 2ND DOSE: CPT | Performed by: FAMILY MEDICINE

## 2021-07-09 ENCOUNTER — TELEPHONE (OUTPATIENT)
Dept: VASCULAR SURGERY | Facility: CLINIC | Age: 86
End: 2021-07-09

## 2021-07-12 ENCOUNTER — OFFICE VISIT (OUTPATIENT)
Dept: VASCULAR SURGERY | Facility: CLINIC | Age: 86
End: 2021-07-12

## 2021-07-12 VITALS
SYSTOLIC BLOOD PRESSURE: 126 MMHG | BODY MASS INDEX: 26.43 KG/M2 | HEIGHT: 75 IN | DIASTOLIC BLOOD PRESSURE: 62 MMHG | OXYGEN SATURATION: 95 % | HEART RATE: 52 BPM | WEIGHT: 212.6 LBS

## 2021-07-12 DIAGNOSIS — Z86.718 HISTORY OF DVT OF LOWER EXTREMITY: Primary | ICD-10-CM

## 2021-07-12 DIAGNOSIS — G89.29 CHRONIC BILATERAL LOW BACK PAIN WITHOUT SCIATICA: ICD-10-CM

## 2021-07-12 DIAGNOSIS — M54.50 CHRONIC BILATERAL LOW BACK PAIN WITHOUT SCIATICA: ICD-10-CM

## 2021-07-12 DIAGNOSIS — I10 HTN (HYPERTENSION), BENIGN: ICD-10-CM

## 2021-07-12 PROCEDURE — 99213 OFFICE O/P EST LOW 20 MIN: CPT | Performed by: SURGERY

## 2021-07-12 NOTE — PATIENT INSTRUCTIONS
"BMI for Adults  What is BMI?  Body mass index (BMI) is a number that is calculated from a person's weight and height. BMI can help estimate how much of a person's weight is composed of fat. BMI does not measure body fat directly. Rather, it is an alternative to procedures that directly measure body fat, which can be difficult and expensive.  BMI can help identify people who may be at higher risk for certain medical problems.  What are BMI measurements used for?  BMI is used as a screening tool to identify possible weight problems. It helps determine whether a person is obese, overweight, a healthy weight, or underweight.  BMI is useful for:  · Identifying a weight problem that may be related to a medical condition or may increase the risk for medical problems.  · Promoting changes, such as changes in diet and exercise, to help reach a healthy weight. BMI screening can be repeated to see if these changes are working.  How is BMI calculated?  BMI involves measuring your weight in relation to your height. Both height and weight are measured, and the BMI is calculated from those numbers. This can be done either in English ([place] or metric measurements. Note that charts and online BMI calculators are available to help you find your BMI quickly and easily without having to do these calculations yourself.  To calculate your BMI in English ([place] measurements:    1. Measure your weight in pounds (lb).  2. Multiply the number of pounds by 703.  ? For example, for a person who weighs 180 lb, multiply that number by 703, which equals 126,540.  3. Measure your height in inches. Then multiply that number by itself to get a measurement called \"inches squared.\"  ? For example, for a person who is 70 inches tall, the \"inches squared\" measurement is 70 inches x 70 inches, which equals 4,900 inches squared.  4. Divide the total from step 2 (number of lb x 703) by the total from step 3 (inches squared): 126,540 ÷ 4,900 = 25.8. This is " Pt has discharge orders, home with hospice. Waiting to complete discharge instructions until after hospice evaluates pt. Per Care Manager Frida Gonzales. "your BMI.  To calculate your BMI in metric measurements:  1. Measure your weight in kilograms (kg).  2. Measure your height in meters (m). Then multiply that number by itself to get a measurement called \"meters squared.\"  ? For example, for a person who is 1.75 m tall, the \"meters squared\" measurement is 1.75 m x 1.75 m, which is equal to 3.1 meters squared.  3. Divide the number of kilograms (your weight) by the meters squared number. In this example: 70 ÷ 3.1 = 22.6. This is your BMI.  What do the results mean?  BMI charts are used to identify whether you are underweight, normal weight, overweight, or obese. The following guidelines will be used:  · Underweight: BMI less than 18.5.  · Normal weight: BMI between 18.5 and 24.9.  · Overweight: BMI between 25 and 29.9.  · Obese: BMI of 30 or above.  Keep these notes in mind:  · Weight includes both fat and muscle, so someone with a muscular build, such as an athlete, may have a BMI that is higher than 24.9. In cases like these, BMI is not an accurate measure of body fat.  · To determine if excess body fat is the cause of a BMI of 25 or higher, further assessments may need to be done by a health care provider.  · BMI is usually interpreted in the same way for men and women.  Where to find more information  For more information about BMI, including tools to quickly calculate your BMI, go to these websites:  · Centers for Disease Control and Prevention: www.cdc.gov  · American Heart Association: www.heart.org  · National Heart, Lung, and Blood [place]: www.nhlbi.nih.gov  Summary  · Body mass index (BMI) is a number that is calculated from a person's weight and height.  · BMI may help estimate how much of a person's weight is composed of fat. BMI can help identify those who may be at higher risk for certain medical problems.  · BMI can be measured using English measurements or metric measurements.  · BMI charts are used to identify whether you are underweight, normal " weight, overweight, or obese.  This information is not intended to replace advice given to you by your health care provider. Make sure you discuss any questions you have with your health care provider.  Document Revised: 09/09/2020 Document Reviewed: 07/17/2020  [employer]vier Patient Education © 2021 [employer]

## 2021-07-12 NOTE — PROGRESS NOTES
07/12/2021      Otilio Ramos MD  [address]  [facility] [address]        Jose Alfredo Kirkpatrick  1935    Chief Complaint   Patient presents with   • Follow-up     1 yr follow up. hypertension. history of DVT of left lower extremities. pt states he is not diabetic. pt is a non smoker. pt states he is having trouble with balance and walking.        Dear Otilio Ramos MD:    HPI     I had the pleasure of seeing your patient in the office today for follow up.  As you recall, the patient is a 86 y.o. male who we are currently following for prior left lower extremity DVT.  Back at that time he had had issues bleeding while on anticoagulation and so I was consulted and IVC filter was placed.  He has been doing well since that time.  We had previously discussed removal of his filter however he wished not to remove the filter and leave it in place.  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  Since that time he has been wearing compression intermittently to the bilateral lower extremities and reports only minimal lower extremity edema mainly from ankle to knee.  His biggest complaint is ongoing chronic back and joint pain and he feels as though he has become more unsteady on his feet given joint instability with fear that he may fall.  He now requires the assistance of a cane as well as a significant amount of time a walker to get around.  He denies any arterial symptoms with no claudication or ischemic rest pain.  Otherwise he has no new significant complaints today.      Review of Systems   Constitutional: Negative.  Negative for activity change, appetite change, chills, diaphoresis, fatigue and fever.   HENT: Negative.  Negative for congestion, sneezing, sore throat and trouble swallowing.    Eyes: Negative.  Negative for visual disturbance.   Respiratory: Negative.  Negative for chest tightness and shortness of  "breath.    Cardiovascular: Negative.  Negative for chest pain, palpitations and leg swelling.   Gastrointestinal: Negative.  Negative for abdominal distention, abdominal pain, nausea and vomiting.   Endocrine: Negative.    Genitourinary: Negative.    Musculoskeletal: Positive for back pain (Chronic lower back pain).   Skin: Negative.    Allergic/Immunologic: Negative.    Neurological: Negative.    Hematological: Negative.    Psychiatric/Behavioral: Negative.        /62 (BP Location: Right arm, Patient Position: Sitting, Cuff Size: Adult)   Pulse 52   Ht 190.5 cm (75\")   Wt 96.4 kg (212 lb 9.6 oz)   SpO2 95%   BMI 26.57 kg/m²   Physical Exam  Vitals reviewed.   Constitutional:       Appearance: Normal appearance.      Comments: Walking with a cane.  Somewhat unsteady gait.   HENT:      Head: Normocephalic and atraumatic.      Nose: Nose normal.      Mouth/Throat:      Mouth: Mucous membranes are moist.   Eyes:      Extraocular Movements: Extraocular movements intact.      Pupils: Pupils are equal, round, and reactive to light.   Cardiovascular:      Rate and Rhythm: Normal rate and regular rhythm.      Pulses:           Carotid pulses are 2+ on the right side and 2+ on the left side.       Radial pulses are 2+ on the right side and 2+ on the left side.        Femoral pulses are 2+ on the right side and 2+ on the left side.       Popliteal pulses are 2+ on the right side and 2+ on the left side.        Dorsalis pedis pulses are detected w/ Doppler on the right side and detected w/ Doppler on the left side.        Posterior tibial pulses are detected w/ Doppler on the right side and detected w/ Doppler on the left side.      Comments: Both feet are warm and appear well-perfused.  He has minimal edema of the bilateral lower legs from ankle to knee.  Pulmonary:      Effort: Pulmonary effort is normal.      Breath sounds: Normal breath sounds.   Abdominal:      General: There is no distension.      Palpations: " Abdomen is soft. There is no mass.      Tenderness: There is no abdominal tenderness.   Musculoskeletal:         General: Normal range of motion.      Cervical back: Normal range of motion.      Right lower leg: Edema present.      Left lower leg: Edema present.   Skin:     General: Skin is warm and dry.      Capillary Refill: Capillary refill takes less than 2 seconds.   Neurological:      General: No focal deficit present.      Mental Status: He is alert and oriented to person, place, and time.   Psychiatric:         Mood and Affect: Mood normal.         Behavior: Behavior normal.         Thought Content: Thought content normal.         Judgment: Judgment normal.         DIAGNOSTIC DATA:    No results found.    Patient Active Problem List   Diagnosis   • Spinal stenosis, lumbar   • Hx of colonic polyps   • Drug-induced constipation   • HTN (hypertension), benign   • Acute pulmonary embolism with acute cor pulmonale (CMS/HCC)   • Acute bilateral deep vein thrombosis (DVT) of femoral veins (CMS/HCC)   • History of DVT of left lower extremity   • Positive fecal occult blood test   • Melena   • Vitamin B12 deficiency   • FARZANA (iron deficiency anemia)   • Cholelithiasis   • Gastric ulcer with hemorrhage   • Chronic bilateral low back pain without sciatica   • Chronic pain of both shoulders   • Kyphosis   • Overweight (BMI 25.0-29.9)   • Non-smoker         ICD-10-CM ICD-9-CM   1. History of DVT of left lower extremity  Z86.718 V12.51   2. HTN (hypertension), benign  I10 401.1   3. Chronic bilateral low back pain without sciatica  M54.5 724.2    G89.29 338.29       Lab Frequency Next Occurrence   XR Shoulder 2+ View Left Once 10/21/2020       PLAN: After thoroughly evaluating Jose Alfredo Kirkpatrick, I believe the best course of action is to remain conservative from a vascular standpoint.  Overall from a lower extremity venous standpoint he seems to doing quite well.  He has no significant edema of the bilateral lower  extremities and he continues to wear compression most days with good result.  He denies any significant lower extremity pain with no arterial symptoms of claudication or ischemic rest pain.  He does have a known history of chronic back issues and had previous back surgery and notes that he is having more stiffness as well as difficulty with ambulating given some unsteadiness with his gait and some joint discomfort.  Such she is now walking more with a walker as well as a cane.  He plans to discuss this more with his primary care physician and an upcoming visit next week.  Otherwise from my standpoint he should continue with compression to the bilateral lower extremities to help reduce edema I will plan to see him back here in the office in 1 year for continued surveillance.  His previously placed IVC filter will remain in place.  The patient is to continue taking their medications as previously discussed.   I did discuss vascular risk factors as they pertain to the progression of vascular disease including controlling hypertension. These factors remain stable. Patient's Body mass index is 26.57 kg/m². indicating that he is overweight (BMI 25-29.9). Obesity-related health conditions include the following: hypertension and lower extremity venous stasis disease. Obesity is unchanged. BMI is is above average; BMI management plan is completed. We discussed portion control and increasing exercise. This was all discussed in full with complete understanding.  Thank you for allowing me to participate in the care of your patient.  Please do not hesitate to call with any questions or concerns.  We will keep you aware of any further encounters with Jose Alfredo Kirkpatrick.      Sincerely Yours,      Dallin Coronado MD

## 2022-01-21 ENCOUNTER — TRANSCRIBE ORDERS (OUTPATIENT)
Dept: ADMINISTRATIVE | Facility: HOSPITAL | Age: 87
End: 2022-01-21

## 2022-01-21 DIAGNOSIS — H53.8 OTHER VISUAL DISTURBANCES: Primary | ICD-10-CM

## 2022-02-01 ENCOUNTER — HOSPITAL ENCOUNTER (OUTPATIENT)
Dept: CT IMAGING | Facility: HOSPITAL | Age: 87
Discharge: HOME | End: 2022-02-01
Admitting: INTERNAL MEDICINE

## 2022-02-01 DIAGNOSIS — H53.8 OTHER VISUAL DISTURBANCES: ICD-10-CM

## 2022-02-01 PROCEDURE — 70450 CT HEAD/BRAIN W/O DYE: CPT

## 2022-07-07 ENCOUNTER — TELEPHONE (OUTPATIENT)
Dept: VASCULAR SURGERY | Facility: CLINIC | Age: 87
End: 2022-07-07

## 2022-07-08 ENCOUNTER — TELEPHONE (OUTPATIENT)
Dept: NEUROLOGY | Facility: OTHER | Age: 87
End: 2022-07-08

## 2022-07-08 ENCOUNTER — TELEPHONE (OUTPATIENT)
Dept: VASCULAR SURGERY | Facility: CLINIC | Age: 87
End: 2022-07-08

## 2022-07-08 NOTE — TELEPHONE ENCOUNTER
Per Dr Coronado called pt and asked him to talk to PCP about dizziness and if he needs to be referred to any provider or check medications.

## 2022-07-08 NOTE — TELEPHONE ENCOUNTER
Caller: Jose Alfredo Kirkpatrick    Relationship: Self    Best call back number: [phone]    What is the best time to reach you: ANYTIME    Who are you requesting to speak with (clinical staff, provider,  specific staff member): DR. RO    Do you know the name of the person who called:     What was the call regarding: PT IS REQUESTING A REFERRAL TO NEUROLOGY TO [facility] AT . PT STATES HE HAS BEEN EXPERIENCING DIZZINESS, AND FALLING MULTIPLE TIMES. PT DOES ASK IF NEUROLOGY CAN CALL FOR AN APPT    Do you require a callback: YES

## 2022-07-12 ENCOUNTER — TELEPHONE (OUTPATIENT)
Dept: VASCULAR SURGERY | Facility: CLINIC | Age: 87
End: 2022-07-12

## 2022-07-12 NOTE — TELEPHONE ENCOUNTER
Attempted to reschedule cancelled appt from 07/08/22 with Dr. Coronado.  Pt stated that he had fallen and he is still working on getting better.  Pt will call back when ready to reschedule.

## 2023-01-28 ENCOUNTER — APPOINTMENT (OUTPATIENT)
Dept: GENERAL RADIOLOGY | Age: 88
DRG: 521 | End: 2023-01-28
Payer: MEDICARE

## 2023-01-28 ENCOUNTER — HOSPITAL ENCOUNTER (INPATIENT)
Age: 88
LOS: 5 days | Discharge: NURSING, REHAB OR LONG-TERM CARE | DRG: 521 | End: 2023-02-02
Attending: PEDIATRICS | Admitting: HOSPITALIST
Payer: MEDICARE

## 2023-01-28 ENCOUNTER — APPOINTMENT (OUTPATIENT)
Dept: CT IMAGING | Age: 88
DRG: 521 | End: 2023-01-28
Payer: MEDICARE

## 2023-01-28 DIAGNOSIS — S72.001A CLOSED DISPLACED FRACTURE OF RIGHT FEMORAL NECK (HCC): ICD-10-CM

## 2023-01-28 DIAGNOSIS — S72.001A CLOSED RIGHT HIP FRACTURE, INITIAL ENCOUNTER (HCC): Primary | ICD-10-CM

## 2023-01-28 DIAGNOSIS — U07.1 COVID-19 VIRUS INFECTION: ICD-10-CM
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
## 2023-01-28 PROBLEM — R29.6 FREQUENT FALLS: Status: ACTIVE | Noted: 2023-01-28

## 2023-01-28 LAB
ALBUMIN SERPL-MCNC: 4.1 G/DL (ref 3.5–5.2)
ALP BLD-CCNC: 94 U/L (ref 40–130)
ALT SERPL-CCNC: 10 U/L (ref 5–41)
ANION GAP SERPL CALCULATED.3IONS-SCNC: 11 MMOL/L (ref 7–19)
AST SERPL-CCNC: 18 U/L (ref 5–40)
BASOPHILS ABSOLUTE: 0.1 K/UL (ref 0–0.2)
BASOPHILS RELATIVE PERCENT: 0.5 % (ref 0–1)
BILIRUB SERPL-MCNC: 0.5 MG/DL (ref 0.2–1.2)
BILIRUBIN URINE: NEGATIVE
BLOOD, URINE: NEGATIVE
BUN BLDV-MCNC: 14 MG/DL (ref 8–23)
C-REACTIVE PROTEIN: <0.3 MG/DL (ref 0–0.5)
CALCIUM SERPL-MCNC: 9.4 MG/DL (ref 8.8–10.2)
CHLORIDE BLD-SCNC: 100 MMOL/L (ref 98–111)
CLARITY: CLEAR
CO2: 27 MMOL/L (ref 22–29)
COLOR: YELLOW
CREAT SERPL-MCNC: 0.9 MG/DL (ref 0.5–1.2)
EOSINOPHILS ABSOLUTE: 0.2 K/UL (ref 0–0.6)
EOSINOPHILS RELATIVE PERCENT: 2.3 % (ref 0–5)
FERRITIN: 81.3 NG/ML (ref 30–400)
GFR SERPL CREATININE-BSD FRML MDRD: >60 ML/MIN/{1.73_M2}
GLUCOSE BLD-MCNC: 123 MG/DL (ref 74–109)
GLUCOSE URINE: NEGATIVE MG/DL
HCT VFR BLD CALC: 45.1 % (ref 42–52)
HEMOGLOBIN: 14.4 G/DL (ref 14–18)
IMMATURE GRANULOCYTES #: 0 K/UL
INR BLD: 1.2 (ref 0.88–1.18)
KETONES, URINE: 15 MG/DL
LEUKOCYTE ESTERASE, URINE: NEGATIVE
LYMPHOCYTES ABSOLUTE: 2.4 K/UL (ref 1.1–4.5)
LYMPHOCYTES RELATIVE PERCENT: 25.4 % (ref 20–40)
MCH RBC QN AUTO: 30.3 PG (ref 27–31)
MCHC RBC AUTO-ENTMCNC: 31.9 G/DL (ref 33–37)
MCV RBC AUTO: 94.7 FL (ref 80–94)
MONOCYTES ABSOLUTE: 0.7 K/UL (ref 0–0.9)
MONOCYTES RELATIVE PERCENT: 6.8 % (ref 0–10)
NEUTROPHILS ABSOLUTE: 6.1 K/UL (ref 1.5–7.5)
NEUTROPHILS RELATIVE PERCENT: 64.7 % (ref 50–65)
NITRITE, URINE: NEGATIVE
PDW BLD-RTO: 14 % (ref 11.5–14.5)
PH UA: 6 (ref 5–8)
PLATELET # BLD: 220 K/UL (ref 130–400)
PMV BLD AUTO: 9.1 FL (ref 9.4–12.4)
POTASSIUM SERPL-SCNC: 4.3 MMOL/L (ref 3.5–5)
PROTEIN UA: ABNORMAL MG/DL
PROTHROMBIN TIME: 15.2 SEC (ref 12–14.6)
RBC # BLD: 4.76 M/UL (ref 4.7–6.1)
SARS-COV-2, NAAT: DETECTED
SODIUM BLD-SCNC: 138 MMOL/L (ref 136–145)
SPECIFIC GRAVITY UA: 1.02 (ref 1–1.03)
TOTAL PROTEIN: 7.1 G/DL (ref 6.6–8.7)
TROPONIN: <0.01 NG/ML (ref 0–0.03)
UROBILINOGEN, URINE: 1 E.U./DL
VITAMIN D 25-HYDROXY: 7.1 NG/ML
WBC # BLD: 9.5 K/UL (ref 4.8–10.8)

## 2023-01-28 PROCEDURE — 85025 COMPLETE CBC W/AUTO DIFF WBC: CPT

## 2023-01-28 PROCEDURE — 96375 TX/PRO/DX INJ NEW DRUG ADDON: CPT

## 2023-01-28 PROCEDURE — 86850 RBC ANTIBODY SCREEN: CPT

## 2023-01-28 PROCEDURE — 85610 PROTHROMBIN TIME: CPT

## 2023-01-28 PROCEDURE — 81003 URINALYSIS AUTO W/O SCOPE: CPT

## 2023-01-28 PROCEDURE — 86140 C-REACTIVE PROTEIN: CPT

## 2023-01-28 PROCEDURE — 85379 FIBRIN DEGRADATION QUANT: CPT

## 2023-01-28 PROCEDURE — 36415 COLL VENOUS BLD VENIPUNCTURE: CPT

## 2023-01-28 PROCEDURE — 86900 BLOOD TYPING SEROLOGIC ABO: CPT

## 2023-01-28 PROCEDURE — 71045 X-RAY EXAM CHEST 1 VIEW: CPT | Performed by: RADIOLOGY

## 2023-01-28 PROCEDURE — 86901 BLOOD TYPING SEROLOGIC RH(D): CPT

## 2023-01-28 PROCEDURE — 73502 X-RAY EXAM HIP UNI 2-3 VIEWS: CPT | Performed by: RADIOLOGY

## 2023-01-28 PROCEDURE — 6360000002 HC RX W HCPCS: Performed by: PEDIATRICS

## 2023-01-28 PROCEDURE — 87635 SARS-COV-2 COVID-19 AMP PRB: CPT

## 2023-01-28 PROCEDURE — 82306 VITAMIN D 25 HYDROXY: CPT

## 2023-01-28 PROCEDURE — 1210000000 HC MED SURG R&B

## 2023-01-28 PROCEDURE — 96374 THER/PROPH/DIAG INJ IV PUSH: CPT

## 2023-01-28 PROCEDURE — 82728 ASSAY OF FERRITIN: CPT

## 2023-01-28 PROCEDURE — 84484 ASSAY OF TROPONIN QUANT: CPT

## 2023-01-28 PROCEDURE — 51702 INSERT TEMP BLADDER CATH: CPT

## 2023-01-28 PROCEDURE — 70450 CT HEAD/BRAIN W/O DYE: CPT

## 2023-01-28 PROCEDURE — 71045 X-RAY EXAM CHEST 1 VIEW: CPT

## 2023-01-28 PROCEDURE — 6360000002 HC RX W HCPCS: Performed by: HOSPITALIST

## 2023-01-28 PROCEDURE — 80053 COMPREHEN METABOLIC PANEL: CPT

## 2023-01-28 PROCEDURE — 99285 EMERGENCY DEPT VISIT HI MDM: CPT

## 2023-01-28 PROCEDURE — 6370000000 HC RX 637 (ALT 250 FOR IP): Performed by: HOSPITALIST

## 2023-01-28 PROCEDURE — 2580000003 HC RX 258: Performed by: PEDIATRICS

## 2023-01-28 PROCEDURE — 70450 CT HEAD/BRAIN W/O DYE: CPT | Performed by: RADIOLOGY

## 2023-01-28 PROCEDURE — 73502 X-RAY EXAM HIP UNI 2-3 VIEWS: CPT

## 2023-01-28 RX ORDER — MORPHINE SULFATE 2 MG/ML
2 INJECTION, SOLUTION INTRAMUSCULAR; INTRAVENOUS EVERY 4 HOURS PRN
Status: DISCONTINUED | OUTPATIENT
Start: 2023-01-28 | End: 2023-02-02 | Stop reason: HOSPADM

## 2023-01-28 RX ORDER — NALOXONE HYDROCHLORIDE 0.4 MG/ML
0.4 INJECTION, SOLUTION INTRAMUSCULAR; INTRAVENOUS; SUBCUTANEOUS PRN
Status: DISCONTINUED | OUTPATIENT
Start: 2023-01-28 | End: 2023-02-02 | Stop reason: HOSPADM

## 2023-01-28 RX ORDER — SODIUM CHLORIDE 0.9 % (FLUSH) 0.9 %
5-40 SYRINGE (ML) INJECTION EVERY 12 HOURS SCHEDULED
Status: DISCONTINUED | OUTPATIENT
Start: 2023-01-28 | End: 2023-02-02 | Stop reason: HOSPADM

## 2023-01-28 RX ORDER — SODIUM CHLORIDE 0.9 % (FLUSH) 0.9 %
5-40 SYRINGE (ML) INJECTION PRN
Status: DISCONTINUED | OUTPATIENT
Start: 2023-01-28 | End: 2023-02-02 | Stop reason: HOSPADM

## 2023-01-28 RX ORDER — SODIUM CHLORIDE 9 MG/ML
INJECTION, SOLUTION INTRAVENOUS PRN
Status: DISCONTINUED | OUTPATIENT
Start: 2023-01-28 | End: 2023-01-29 | Stop reason: SDUPTHER

## 2023-01-28 RX ORDER — MAGNESIUM SULFATE IN WATER 40 MG/ML
2000 INJECTION, SOLUTION INTRAVENOUS PRN
Status: DISCONTINUED | OUTPATIENT
Start: 2023-01-28 | End: 2023-02-02 | Stop reason: HOSPADM

## 2023-01-28 RX ORDER — POLYETHYLENE GLYCOL 3350 17 G/17G
17 POWDER, FOR SOLUTION ORAL AS NEEDED
COMMUNITY

## 2023-01-28 RX ORDER — POTASSIUM CHLORIDE 20 MEQ/1
40 TABLET, EXTENDED RELEASE ORAL PRN
Status: DISCONTINUED | OUTPATIENT
Start: 2023-01-28 | End: 2023-02-02 | Stop reason: HOSPADM

## 2023-01-28 RX ORDER — OXYCODONE HYDROCHLORIDE 5 MG/1
10 TABLET ORAL EVERY 4 HOURS PRN
Status: DISCONTINUED | OUTPATIENT
Start: 2023-01-28 | End: 2023-02-02 | Stop reason: HOSPADM

## 2023-01-28 RX ORDER — ONDANSETRON 2 MG/ML
4 INJECTION INTRAMUSCULAR; INTRAVENOUS ONCE
Status: COMPLETED | OUTPATIENT
Start: 2023-01-28 | End: 2023-01-28

## 2023-01-28 RX ORDER — HYDROMORPHONE HYDROCHLORIDE 1 MG/ML
0.5 INJECTION, SOLUTION INTRAMUSCULAR; INTRAVENOUS; SUBCUTANEOUS EVERY 30 MIN PRN
Status: COMPLETED | OUTPATIENT
Start: 2023-01-28 | End: 2023-01-29

## 2023-01-28 RX ORDER — POTASSIUM CHLORIDE 7.45 MG/ML
10 INJECTION INTRAVENOUS PRN
Status: DISCONTINUED | OUTPATIENT
Start: 2023-01-28 | End: 2023-02-02 | Stop reason: HOSPADM

## 2023-01-28 RX ORDER — OXYCODONE HYDROCHLORIDE 5 MG/1
5 TABLET ORAL EVERY 4 HOURS PRN
Status: DISCONTINUED | OUTPATIENT
Start: 2023-01-28 | End: 2023-02-02 | Stop reason: HOSPADM

## 2023-01-28 RX ORDER — MORPHINE SULFATE 4 MG/ML
4 INJECTION, SOLUTION INTRAMUSCULAR; INTRAVENOUS EVERY 4 HOURS PRN
Status: DISCONTINUED | OUTPATIENT
Start: 2023-01-28 | End: 2023-02-02 | Stop reason: HOSPADM

## 2023-01-28 RX ORDER — GUAIFENESIN/DEXTROMETHORPHAN 100-10MG/5
5 SYRUP ORAL EVERY 4 HOURS PRN
Status: DISCONTINUED | OUTPATIENT
Start: 2023-01-28 | End: 2023-02-02 | Stop reason: HOSPADM

## 2023-01-28 RX ORDER — ONDANSETRON 4 MG/1
4 TABLET, ORALLY DISINTEGRATING ORAL EVERY 8 HOURS PRN
Status: DISCONTINUED | OUTPATIENT
Start: 2023-01-28 | End: 2023-01-29 | Stop reason: SDUPTHER

## 2023-01-28 RX ORDER — ACETAMINOPHEN 500 MG
1000 TABLET ORAL EVERY 8 HOURS SCHEDULED
Status: DISCONTINUED | OUTPATIENT
Start: 2023-01-28 | End: 2023-02-02 | Stop reason: HOSPADM

## 2023-01-28 RX ORDER — CALCIUM CARBONATE 200(500)MG
500 TABLET,CHEWABLE ORAL 3 TIMES DAILY PRN
Status: DISCONTINUED | OUTPATIENT
Start: 2023-01-28 | End: 2023-02-02 | Stop reason: HOSPADM

## 2023-01-28 RX ORDER — MORPHINE SULFATE 2 MG/ML
1 INJECTION, SOLUTION INTRAMUSCULAR; INTRAVENOUS EVERY 4 HOURS PRN
Status: DISCONTINUED | OUTPATIENT
Start: 2023-01-28 | End: 2023-02-02 | Stop reason: HOSPADM

## 2023-01-28 RX ORDER — ORPHENADRINE CITRATE 30 MG/ML
60 INJECTION INTRAMUSCULAR; INTRAVENOUS ONCE
Status: COMPLETED | OUTPATIENT
Start: 2023-01-28 | End: 2023-01-28

## 2023-01-28 RX ORDER — LISINOPRIL 10 MG/1
10 TABLET ORAL DAILY
Status: DISCONTINUED | OUTPATIENT
Start: 2023-01-29 | End: 2023-02-02 | Stop reason: HOSPADM

## 2023-01-28 RX ORDER — GABAPENTIN 300 MG/1
300 CAPSULE ORAL 4 TIMES DAILY
Status: DISCONTINUED | OUTPATIENT
Start: 2023-01-28 | End: 2023-02-02 | Stop reason: HOSPADM

## 2023-01-28 RX ORDER — OXYCODONE HYDROCHLORIDE AND ACETAMINOPHEN 5; 325 MG/1; MG/1
1 TABLET ORAL 4 TIMES DAILY
Status: ON HOLD | COMMUNITY
End: 2023-02-02 | Stop reason: SDUPTHER

## 2023-01-28 RX ORDER — GABAPENTIN 300 MG/1
300 CAPSULE ORAL 4 TIMES DAILY
Status: ON HOLD | COMMUNITY
End: 2023-02-02 | Stop reason: SDUPTHER

## 2023-01-28 RX ORDER — ONDANSETRON 2 MG/ML
4 INJECTION INTRAMUSCULAR; INTRAVENOUS EVERY 6 HOURS PRN
Status: DISCONTINUED | OUTPATIENT
Start: 2023-01-28 | End: 2023-01-29 | Stop reason: SDUPTHER

## 2023-01-28 RX ORDER — 0.9 % SODIUM CHLORIDE 0.9 %
1000 INTRAVENOUS SOLUTION INTRAVENOUS ONCE
Status: COMPLETED | OUTPATIENT
Start: 2023-01-28 | End: 2023-01-28

## 2023-01-28 RX ORDER — MECOBALAMIN 5000 MCG
5 TABLET,DISINTEGRATING ORAL NIGHTLY PRN
Status: DISCONTINUED | OUTPATIENT
Start: 2023-01-28 | End: 2023-02-02 | Stop reason: HOSPADM

## 2023-01-28 RX ORDER — POLYETHYLENE GLYCOL 3350 17 G/17G
17 POWDER, FOR SOLUTION ORAL DAILY PRN
Status: DISCONTINUED | OUTPATIENT
Start: 2023-01-28 | End: 2023-01-29 | Stop reason: SDUPTHER

## 2023-01-28 RX ADMIN — GABAPENTIN 300 MG: 300 CAPSULE ORAL at 23:09

## 2023-01-28 RX ADMIN — ACETAMINOPHEN 1000 MG: 500 TABLET, FILM COATED ORAL at 21:59

## 2023-01-28 RX ADMIN — OXYCODONE 10 MG: 5 TABLET ORAL at 23:07

## 2023-01-28 RX ADMIN — ORPHENADRINE CITRATE 60 MG: 30 INJECTION INTRAMUSCULAR; INTRAVENOUS at 21:53

## 2023-01-28 RX ADMIN — MORPHINE SULFATE 2 MG: 2 INJECTION, SOLUTION INTRAMUSCULAR; INTRAVENOUS at 22:28

## 2023-01-28 RX ADMIN — HYDROMORPHONE HYDROCHLORIDE 0.5 MG: 1 INJECTION, SOLUTION INTRAMUSCULAR; INTRAVENOUS; SUBCUTANEOUS at 21:54

## 2023-01-28 RX ADMIN — SODIUM CHLORIDE 1000 ML: 9 INJECTION, SOLUTION INTRAVENOUS at 21:52

## 2023-01-28 RX ADMIN — HYDROMORPHONE HYDROCHLORIDE 0.5 MG: 1 INJECTION, SOLUTION INTRAMUSCULAR; INTRAVENOUS; SUBCUTANEOUS at 21:11

## 2023-01-28 RX ADMIN — ONDANSETRON 4 MG: 2 INJECTION INTRAMUSCULAR; INTRAVENOUS at 21:14

## 2023-01-28 ASSESSMENT — ENCOUNTER SYMPTOMS
CHEST TIGHTNESS: 0
ABDOMINAL PAIN: 0
SHORTNESS OF BREATH: 0
ABDOMINAL DISTENTION: 0
NAUSEA: 0
COLOR CHANGE: 0
EYE DISCHARGE: 0
BACK PAIN: 1
RHINORRHEA: 0
CONSTIPATION: 0
VOMITING: 0
WHEEZING: 0
COUGH: 0

## 2023-01-28 ASSESSMENT — PAIN SCALES - GENERAL
PAINLEVEL_OUTOF10: 8
PAINLEVEL_OUTOF10: 8
PAINLEVEL_OUTOF10: 4
PAINLEVEL_OUTOF10: 8
PAINLEVEL_OUTOF10: 8
PAINLEVEL_OUTOF10: 9
PAINLEVEL_OUTOF10: 8

## 2023-01-28 ASSESSMENT — PAIN DESCRIPTION - DESCRIPTORS: DESCRIPTORS: SHARP;SHOOTING;DISCOMFORT

## 2023-01-28 ASSESSMENT — PAIN - FUNCTIONAL ASSESSMENT
PAIN_FUNCTIONAL_ASSESSMENT: NONE - DENIES PAIN
PAIN_FUNCTIONAL_ASSESSMENT: 0-10
PAIN_FUNCTIONAL_ASSESSMENT: 0-10

## 2023-01-28 ASSESSMENT — PAIN DESCRIPTION - ORIENTATION: ORIENTATION: RIGHT

## 2023-01-28 ASSESSMENT — PAIN DESCRIPTION - LOCATION: LOCATION: HIP

## 2023-01-29 ENCOUNTER — ANESTHESIA (OUTPATIENT)
Dept: OPERATING ROOM | Age: 88
End: 2023-01-29
Payer: MEDICARE

## 2023-01-29 ENCOUNTER — ANESTHESIA EVENT (OUTPATIENT)
Dept: OPERATING ROOM | Age: 88
End: 2023-01-29
Payer: MEDICARE

## 2023-01-29 ENCOUNTER — APPOINTMENT (OUTPATIENT)
Dept: CT IMAGING | Age: 88
DRG: 521 | End: 2023-01-29
Payer: MEDICARE

## 2023-01-29 LAB
ABO/RH: NORMAL
ANION GAP SERPL CALCULATED.3IONS-SCNC: 11 MMOL/L (ref 7–19)
ANTIBODY SCREEN: NORMAL
BASOPHILS ABSOLUTE: 0 K/UL (ref 0–0.2)
BASOPHILS RELATIVE PERCENT: 0.4 % (ref 0–1)
BUN BLDV-MCNC: 13 MG/DL (ref 8–23)
CALCIUM SERPL-MCNC: 8.5 MG/DL (ref 8.8–10.2)
CHLORIDE BLD-SCNC: 103 MMOL/L (ref 98–111)
CO2: 23 MMOL/L (ref 22–29)
CREAT SERPL-MCNC: 0.8 MG/DL (ref 0.5–1.2)
D DIMER: >20 UG/ML FEU (ref 0–0.48)
EKG P AXIS: 31 DEGREES
EKG P-R INTERVAL: 260 MS
EKG Q-T INTERVAL: 376 MS
EKG QRS DURATION: 92 MS
EKG QTC CALCULATION (BAZETT): 393 MS
EKG T AXIS: 5 DEGREES
EOSINOPHILS ABSOLUTE: 0.1 K/UL (ref 0–0.6)
EOSINOPHILS RELATIVE PERCENT: 0.5 % (ref 0–5)
GFR SERPL CREATININE-BSD FRML MDRD: >60 ML/MIN/{1.73_M2}
GLUCOSE BLD-MCNC: 126 MG/DL (ref 74–109)
HCT VFR BLD CALC: 41.2 % (ref 42–52)
HEMOGLOBIN: 13.6 G/DL (ref 14–18)
IMMATURE GRANULOCYTES #: 0 K/UL
LYMPHOCYTES ABSOLUTE: 1.2 K/UL (ref 1.1–4.5)
LYMPHOCYTES RELATIVE PERCENT: 10.7 % (ref 20–40)
MCH RBC QN AUTO: 30.7 PG (ref 27–31)
MCHC RBC AUTO-ENTMCNC: 33 G/DL (ref 33–37)
MCV RBC AUTO: 93 FL (ref 80–94)
MONOCYTES ABSOLUTE: 0.6 K/UL (ref 0–0.9)
MONOCYTES RELATIVE PERCENT: 5.4 % (ref 0–10)
NEUTROPHILS ABSOLUTE: 9 K/UL (ref 1.5–7.5)
NEUTROPHILS RELATIVE PERCENT: 82.6 % (ref 50–65)
PDW BLD-RTO: 14 % (ref 11.5–14.5)
PLATELET # BLD: 191 K/UL (ref 130–400)
PMV BLD AUTO: 9.6 FL (ref 9.4–12.4)
POTASSIUM REFLEX MAGNESIUM: 4.3 MMOL/L (ref 3.5–5)
RBC # BLD: 4.43 M/UL (ref 4.7–6.1)
SODIUM BLD-SCNC: 137 MMOL/L (ref 136–145)
WBC # BLD: 10.9 K/UL (ref 4.8–10.8)

## 2023-01-29 PROCEDURE — 6360000002 HC RX W HCPCS: Performed by: HOSPITALIST

## 2023-01-29 PROCEDURE — 0SRR0JZ REPLACEMENT OF RIGHT HIP JOINT, FEMORAL SURFACE WITH SYNTHETIC SUBSTITUTE, OPEN APPROACH: ICD-10-PCS | Performed by: ORTHOPAEDIC SURGERY

## 2023-01-29 PROCEDURE — 3600000014 HC SURGERY LEVEL 4 ADDTL 15MIN: Performed by: ORTHOPAEDIC SURGERY

## 2023-01-29 PROCEDURE — 94760 N-INVAS EAR/PLS OXIMETRY 1: CPT

## 2023-01-29 PROCEDURE — 80048 BASIC METABOLIC PNL TOTAL CA: CPT

## 2023-01-29 PROCEDURE — 3700000000 HC ANESTHESIA ATTENDED CARE: Performed by: ORTHOPAEDIC SURGERY

## 2023-01-29 PROCEDURE — 2580000003 HC RX 258: Performed by: ORTHOPAEDIC SURGERY

## 2023-01-29 PROCEDURE — 71275 CT ANGIOGRAPHY CHEST: CPT | Performed by: RADIOLOGY

## 2023-01-29 PROCEDURE — 3E0333Z INTRODUCTION OF ANTI-INFLAMMATORY INTO PERIPHERAL VEIN, PERCUTANEOUS APPROACH: ICD-10-PCS | Performed by: HOSPITALIST

## 2023-01-29 PROCEDURE — 2580000003 HC RX 258

## 2023-01-29 PROCEDURE — 6360000004 HC RX CONTRAST MEDICATION: Performed by: NURSE PRACTITIONER

## 2023-01-29 PROCEDURE — 2709999900 HC NON-CHARGEABLE SUPPLY: Performed by: ORTHOPAEDIC SURGERY

## 2023-01-29 PROCEDURE — 2580000003 HC RX 258: Performed by: HOSPITALIST

## 2023-01-29 PROCEDURE — 1210000000 HC MED SURG R&B

## 2023-01-29 PROCEDURE — 6360000002 HC RX W HCPCS

## 2023-01-29 PROCEDURE — 6370000000 HC RX 637 (ALT 250 FOR IP): Performed by: ORTHOPAEDIC SURGERY

## 2023-01-29 PROCEDURE — 2500000003 HC RX 250 WO HCPCS

## 2023-01-29 PROCEDURE — 7100000000 HC PACU RECOVERY - FIRST 15 MIN: Performed by: ORTHOPAEDIC SURGERY

## 2023-01-29 PROCEDURE — 71275 CT ANGIOGRAPHY CHEST: CPT

## 2023-01-29 PROCEDURE — 85025 COMPLETE CBC W/AUTO DIFF WBC: CPT

## 2023-01-29 PROCEDURE — 93005 ELECTROCARDIOGRAM TRACING: CPT | Performed by: PEDIATRICS

## 2023-01-29 PROCEDURE — 3700000001 HC ADD 15 MINUTES (ANESTHESIA): Performed by: ORTHOPAEDIC SURGERY

## 2023-01-29 PROCEDURE — C1776 JOINT DEVICE (IMPLANTABLE): HCPCS | Performed by: ORTHOPAEDIC SURGERY

## 2023-01-29 PROCEDURE — 3600000004 HC SURGERY LEVEL 4 BASE: Performed by: ORTHOPAEDIC SURGERY

## 2023-01-29 PROCEDURE — 6360000002 HC RX W HCPCS: Performed by: ANESTHESIOLOGY

## 2023-01-29 PROCEDURE — 36415 COLL VENOUS BLD VENIPUNCTURE: CPT

## 2023-01-29 PROCEDURE — 7100000001 HC PACU RECOVERY - ADDTL 15 MIN: Performed by: ORTHOPAEDIC SURGERY

## 2023-01-29 PROCEDURE — 2700000000 HC OXYGEN THERAPY PER DAY

## 2023-01-29 PROCEDURE — 93010 ELECTROCARDIOGRAM REPORT: CPT | Performed by: INTERNAL MEDICINE

## 2023-01-29 PROCEDURE — 6360000002 HC RX W HCPCS: Performed by: PEDIATRICS

## 2023-01-29 DEVICE — STEM FEM SZ 8 L111MM STD OFFSET HIP 12/14 TAPR GRIPTION FOR: Type: IMPLANTABLE DEVICE | Site: HIP | Status: FUNCTIONAL

## 2023-01-29 DEVICE — HEAD FEM DIA28MM NK L+1.5MM HIP MTL ON MTL 12/14 TAPR: Type: IMPLANTABLE DEVICE | Site: HIP | Status: FUNCTIONAL

## 2023-01-29 DEVICE — HEAD BPLR OD50MM ID28MM FEM HIP SELF CNTR: Type: IMPLANTABLE DEVICE | Site: HIP | Status: FUNCTIONAL

## 2023-01-29 RX ORDER — SODIUM CHLORIDE 0.9 % (FLUSH) 0.9 %
5-40 SYRINGE (ML) INJECTION PRN
Status: DISCONTINUED | OUTPATIENT
Start: 2023-01-29 | End: 2023-02-02 | Stop reason: HOSPADM

## 2023-01-29 RX ORDER — SODIUM CHLORIDE 0.9 % (FLUSH) 0.9 %
5-40 SYRINGE (ML) INJECTION EVERY 12 HOURS SCHEDULED
Status: DISCONTINUED | OUTPATIENT
Start: 2023-01-29 | End: 2023-02-02 | Stop reason: HOSPADM

## 2023-01-29 RX ORDER — SODIUM CHLORIDE, SODIUM LACTATE, POTASSIUM CHLORIDE, CALCIUM CHLORIDE 600; 310; 30; 20 MG/100ML; MG/100ML; MG/100ML; MG/100ML
INJECTION, SOLUTION INTRAVENOUS CONTINUOUS
Status: DISCONTINUED | OUTPATIENT
Start: 2023-01-29 | End: 2023-01-29 | Stop reason: ALTCHOICE

## 2023-01-29 RX ORDER — POLYETHYLENE GLYCOL 3350 17 G/17G
17 POWDER, FOR SOLUTION ORAL AS NEEDED
Status: DISCONTINUED | OUTPATIENT
Start: 2023-01-29 | End: 2023-01-29 | Stop reason: SDUPTHER

## 2023-01-29 RX ORDER — ONDANSETRON 2 MG/ML
INJECTION INTRAMUSCULAR; INTRAVENOUS PRN
Status: DISCONTINUED | OUTPATIENT
Start: 2023-01-29 | End: 2023-01-29 | Stop reason: SDUPTHER

## 2023-01-29 RX ORDER — HYDROMORPHONE HYDROCHLORIDE 1 MG/ML
0.25 INJECTION, SOLUTION INTRAMUSCULAR; INTRAVENOUS; SUBCUTANEOUS EVERY 5 MIN PRN
Status: DISCONTINUED | OUTPATIENT
Start: 2023-01-29 | End: 2023-01-29 | Stop reason: HOSPADM

## 2023-01-29 RX ORDER — LIDOCAINE HYDROCHLORIDE 10 MG/ML
INJECTION, SOLUTION EPIDURAL; INFILTRATION; INTRACAUDAL; PERINEURAL PRN
Status: DISCONTINUED | OUTPATIENT
Start: 2023-01-29 | End: 2023-01-29 | Stop reason: SDUPTHER

## 2023-01-29 RX ORDER — SODIUM CHLORIDE 9 MG/ML
INJECTION, SOLUTION INTRAVENOUS PRN
Status: DISCONTINUED | OUTPATIENT
Start: 2023-01-29 | End: 2023-01-29 | Stop reason: ALTCHOICE

## 2023-01-29 RX ORDER — ERGOCALCIFEROL 1.25 MG/1
50000 CAPSULE ORAL
Status: DISCONTINUED | OUTPATIENT
Start: 2023-01-30 | End: 2023-02-02 | Stop reason: HOSPADM

## 2023-01-29 RX ORDER — DEXAMETHASONE SODIUM PHOSPHATE 10 MG/ML
INJECTION, SOLUTION INTRAMUSCULAR; INTRAVENOUS PRN
Status: DISCONTINUED | OUTPATIENT
Start: 2023-01-29 | End: 2023-01-29 | Stop reason: SDUPTHER

## 2023-01-29 RX ORDER — SODIUM CHLORIDE 9 MG/ML
INJECTION, SOLUTION INTRAVENOUS PRN
Status: DISCONTINUED | OUTPATIENT
Start: 2023-01-29 | End: 2023-02-02 | Stop reason: HOSPADM

## 2023-01-29 RX ORDER — PROPOFOL 10 MG/ML
INJECTION, EMULSION INTRAVENOUS PRN
Status: DISCONTINUED | OUTPATIENT
Start: 2023-01-29 | End: 2023-01-29 | Stop reason: SDUPTHER

## 2023-01-29 RX ORDER — HYDROMORPHONE HYDROCHLORIDE 1 MG/ML
0.5 INJECTION, SOLUTION INTRAMUSCULAR; INTRAVENOUS; SUBCUTANEOUS EVERY 5 MIN PRN
Status: DISCONTINUED | OUTPATIENT
Start: 2023-01-29 | End: 2023-01-29 | Stop reason: HOSPADM

## 2023-01-29 RX ORDER — POLYETHYLENE GLYCOL 3350 17 G/17G
17 POWDER, FOR SOLUTION ORAL AS NEEDED
Status: DISCONTINUED | OUTPATIENT
Start: 2023-01-29 | End: 2023-02-02 | Stop reason: HOSPADM

## 2023-01-29 RX ORDER — SODIUM CHLORIDE 0.9 % (FLUSH) 0.9 %
5-40 SYRINGE (ML) INJECTION EVERY 12 HOURS SCHEDULED
Status: DISCONTINUED | OUTPATIENT
Start: 2023-01-29 | End: 2023-01-29 | Stop reason: HOSPADM

## 2023-01-29 RX ORDER — ROCURONIUM BROMIDE 10 MG/ML
INJECTION, SOLUTION INTRAVENOUS PRN
Status: DISCONTINUED | OUTPATIENT
Start: 2023-01-29 | End: 2023-01-29 | Stop reason: SDUPTHER

## 2023-01-29 RX ORDER — ONDANSETRON 4 MG/1
4 TABLET, ORALLY DISINTEGRATING ORAL EVERY 8 HOURS PRN
Status: DISCONTINUED | OUTPATIENT
Start: 2023-01-29 | End: 2023-02-02 | Stop reason: HOSPADM

## 2023-01-29 RX ORDER — ONDANSETRON 2 MG/ML
4 INJECTION INTRAMUSCULAR; INTRAVENOUS EVERY 6 HOURS PRN
Status: DISCONTINUED | OUTPATIENT
Start: 2023-01-29 | End: 2023-02-02 | Stop reason: HOSPADM

## 2023-01-29 RX ORDER — SODIUM CHLORIDE 9 MG/ML
INJECTION, SOLUTION INTRAVENOUS PRN
Status: DISCONTINUED | OUTPATIENT
Start: 2023-01-29 | End: 2023-01-29 | Stop reason: HOSPADM

## 2023-01-29 RX ORDER — FENTANYL CITRATE 50 UG/ML
INJECTION, SOLUTION INTRAMUSCULAR; INTRAVENOUS PRN
Status: DISCONTINUED | OUTPATIENT
Start: 2023-01-29 | End: 2023-01-29 | Stop reason: SDUPTHER

## 2023-01-29 RX ORDER — SODIUM CHLORIDE, SODIUM LACTATE, POTASSIUM CHLORIDE, CALCIUM CHLORIDE 600; 310; 30; 20 MG/100ML; MG/100ML; MG/100ML; MG/100ML
INJECTION, SOLUTION INTRAVENOUS CONTINUOUS PRN
Status: DISCONTINUED | OUTPATIENT
Start: 2023-01-29 | End: 2023-01-29 | Stop reason: SDUPTHER

## 2023-01-29 RX ORDER — ONDANSETRON 2 MG/ML
4 INJECTION INTRAMUSCULAR; INTRAVENOUS
Status: DISCONTINUED | OUTPATIENT
Start: 2023-01-29 | End: 2023-01-29 | Stop reason: HOSPADM

## 2023-01-29 RX ORDER — SODIUM CHLORIDE 9 MG/ML
INJECTION, SOLUTION INTRAVENOUS CONTINUOUS
Status: DISCONTINUED | OUTPATIENT
Start: 2023-01-29 | End: 2023-02-02 | Stop reason: HOSPADM

## 2023-01-29 RX ORDER — SODIUM CHLORIDE 0.9 % (FLUSH) 0.9 %
5-40 SYRINGE (ML) INJECTION PRN
Status: DISCONTINUED | OUTPATIENT
Start: 2023-01-29 | End: 2023-01-29 | Stop reason: HOSPADM

## 2023-01-29 RX ADMIN — PROPOFOL 30 MG: 10 INJECTION, EMULSION INTRAVENOUS at 11:06

## 2023-01-29 RX ADMIN — LIDOCAINE HYDROCHLORIDE 5 ML: 10 INJECTION, SOLUTION EPIDURAL; INFILTRATION; INTRACAUDAL; PERINEURAL at 11:04

## 2023-01-29 RX ADMIN — PROPOFOL 50 MG: 10 INJECTION, EMULSION INTRAVENOUS at 11:04

## 2023-01-29 RX ADMIN — SODIUM CHLORIDE: 9 INJECTION, SOLUTION INTRAVENOUS at 19:20

## 2023-01-29 RX ADMIN — SODIUM CHLORIDE, PRESERVATIVE FREE 10 ML: 5 INJECTION INTRAVENOUS at 19:22

## 2023-01-29 RX ADMIN — PROPOFOL 30 MG: 10 INJECTION, EMULSION INTRAVENOUS at 11:42

## 2023-01-29 RX ADMIN — SODIUM CHLORIDE, SODIUM LACTATE, POTASSIUM CHLORIDE, AND CALCIUM CHLORIDE: 600; 310; 30; 20 INJECTION, SOLUTION INTRAVENOUS at 12:07

## 2023-01-29 RX ADMIN — OXYCODONE 10 MG: 5 TABLET ORAL at 14:29

## 2023-01-29 RX ADMIN — CEFAZOLIN 2000 MG: 2 INJECTION, POWDER, FOR SOLUTION INTRAMUSCULAR; INTRAVENOUS at 19:20

## 2023-01-29 RX ADMIN — CEFAZOLIN 2000 MG: 2 INJECTION, POWDER, FOR SOLUTION INTRAMUSCULAR; INTRAVENOUS at 11:20

## 2023-01-29 RX ADMIN — FENTANYL CITRATE 50 MCG: 50 INJECTION, SOLUTION INTRAMUSCULAR; INTRAVENOUS at 11:40

## 2023-01-29 RX ADMIN — FENTANYL CITRATE 25 MCG: 50 INJECTION, SOLUTION INTRAMUSCULAR; INTRAVENOUS at 10:59

## 2023-01-29 RX ADMIN — ONDANSETRON 4 MG: 2 INJECTION INTRAMUSCULAR; INTRAVENOUS at 12:02

## 2023-01-29 RX ADMIN — MORPHINE SULFATE 1 MG: 2 INJECTION, SOLUTION INTRAMUSCULAR; INTRAVENOUS at 09:04

## 2023-01-29 RX ADMIN — FENTANYL CITRATE 25 MCG: 50 INJECTION, SOLUTION INTRAMUSCULAR; INTRAVENOUS at 11:04

## 2023-01-29 RX ADMIN — OXYCODONE 10 MG: 5 TABLET ORAL at 19:21

## 2023-01-29 RX ADMIN — HYDROMORPHONE HYDROCHLORIDE 0.25 MG: 1 INJECTION, SOLUTION INTRAMUSCULAR; INTRAVENOUS; SUBCUTANEOUS at 13:05

## 2023-01-29 RX ADMIN — HYDROMORPHONE HYDROCHLORIDE 0.25 MG: 1 INJECTION, SOLUTION INTRAMUSCULAR; INTRAVENOUS; SUBCUTANEOUS at 13:00

## 2023-01-29 RX ADMIN — GABAPENTIN 300 MG: 300 CAPSULE ORAL at 20:00

## 2023-01-29 RX ADMIN — GABAPENTIN 300 MG: 300 CAPSULE ORAL at 16:50

## 2023-01-29 RX ADMIN — ROCURONIUM BROMIDE 50 MG: 10 INJECTION, SOLUTION INTRAVENOUS at 11:04

## 2023-01-29 RX ADMIN — SODIUM CHLORIDE, SODIUM LACTATE, POTASSIUM CHLORIDE, AND CALCIUM CHLORIDE: 600; 310; 30; 20 INJECTION, SOLUTION INTRAVENOUS at 10:59

## 2023-01-29 RX ADMIN — FENTANYL CITRATE 50 MCG: 50 INJECTION, SOLUTION INTRAMUSCULAR; INTRAVENOUS at 12:02

## 2023-01-29 RX ADMIN — HYDROMORPHONE HYDROCHLORIDE 0.5 MG: 1 INJECTION, SOLUTION INTRAMUSCULAR; INTRAVENOUS; SUBCUTANEOUS at 12:53

## 2023-01-29 RX ADMIN — FENTANYL CITRATE 50 MCG: 50 INJECTION, SOLUTION INTRAMUSCULAR; INTRAVENOUS at 12:36

## 2023-01-29 RX ADMIN — DEXAMETHASONE SODIUM PHOSPHATE 4 MG: 10 INJECTION, SOLUTION INTRAMUSCULAR; INTRAVENOUS at 11:04

## 2023-01-29 RX ADMIN — HYDROMORPHONE HYDROCHLORIDE 0.5 MG: 1 INJECTION, SOLUTION INTRAMUSCULAR; INTRAVENOUS; SUBCUTANEOUS at 09:46

## 2023-01-29 RX ADMIN — SUGAMMADEX 100 MG: 100 INJECTION, SOLUTION INTRAVENOUS at 12:15

## 2023-01-29 RX ADMIN — MORPHINE SULFATE 2 MG: 2 INJECTION, SOLUTION INTRAMUSCULAR; INTRAVENOUS at 01:41

## 2023-01-29 RX ADMIN — ACETAMINOPHEN 1000 MG: 500 TABLET, FILM COATED ORAL at 14:29

## 2023-01-29 RX ADMIN — IOPAMIDOL 70 ML: 755 INJECTION, SOLUTION INTRAVENOUS at 10:46

## 2023-01-29 RX ADMIN — SUGAMMADEX 100 MG: 100 INJECTION, SOLUTION INTRAVENOUS at 12:19

## 2023-01-29 ASSESSMENT — PAIN SCALES - GENERAL
PAINLEVEL_OUTOF10: 7
PAINLEVEL_OUTOF10: 5
PAINLEVEL_OUTOF10: 0
PAINLEVEL_OUTOF10: 9
PAINLEVEL_OUTOF10: 5
PAINLEVEL_OUTOF10: 6

## 2023-01-29 ASSESSMENT — ENCOUNTER SYMPTOMS
CONSTIPATION: 0
COUGH: 0
WHEEZING: 0
ABDOMINAL DISTENTION: 0
DIARRHEA: 0
SHORTNESS OF BREATH: 0
ABDOMINAL PAIN: 0
NAUSEA: 0
COLOR CHANGE: 0
VOMITING: 0
BLOOD IN STOOL: 0

## 2023-01-29 ASSESSMENT — PAIN DESCRIPTION - LOCATION
LOCATION: HIP

## 2023-01-29 ASSESSMENT — PAIN DESCRIPTION - DESCRIPTORS
DESCRIPTORS: THROBBING
DESCRIPTORS: ACHING

## 2023-01-29 ASSESSMENT — PAIN DESCRIPTION - ORIENTATION
ORIENTATION: RIGHT

## 2023-01-29 ASSESSMENT — LIFESTYLE VARIABLES: SMOKING_STATUS: 0

## 2023-01-29 NOTE — H&P
[facility] (formerly [facility]3]ists      Hospitalist - History & Physical      PCP: Whitney Wiseman MD    Date of Admission: 1/28/2023    Date of Service: 1/28/2023    Chief Complaint: Fall    History Of Present Illness: The patient is a 81 y.o. male who presented to [facility] ER with PMH DVT, HTN, OA, s/p back surgery, complaining of fall. Patient states he was in his normal state of health today when he was arranging his clothing, reached up and fell backwards hitting his head on the door frame and landing on his right hip. He denies loss of consciousness, headache, visual change, or loss of bowel or bladder. Family assisted him to vehicle to [address] [place] [address] for further evaluation. Patient states he he was unable to bear weight, decreased range of motion and significant pain. Right hip is internally rotated and shortened. In addition, he does state that he has been falling more often and has been dizzy upon position change. Denies fever, chills, nausea, vomiting, abdominal pain, shortness of breath, chest pain. Work up in [address] no acute cardiopulmonary process, XR right hip pelvis right femoral neck fracture, troponin negative, and COVID positive. Has had COVID vaccination. Patient is to be admitted to hospitalist service with consultation to orthopedic surgery. Past Medical History:        Diagnosis Date    DVT (deep venous thrombosis) (HCC)     Hypertension     Iliac artery aneurysm, right ([address] 3/19/2013    Osteoarthritis        Past Surgical History:        Procedure Laterality Date    BACK SURGERY  2008    Dr Leila Alcocer  2017    Dr Hernandez Mina      Dr Ethan Rodriguez Left     separation repair-pin placed       Home Medications:  Prior to Admission medications    Medication Sig Start Date End Date Taking?  Authorizing Provider   oxyCODONE-acetaminophen (PERCOCET) 5-325 MG per tablet Take 1 tablet by mouth 4 times daily. Yes Historical Provider, MD   gabapentin (NEURONTIN) 300 MG capsule Take 300 mg by mouth 4 times daily. Yes Historical Provider, MD   lisinopril (PRINIVIL;ZESTRIL) 10 MG tablet Take 10 mg by mouth daily. Historical Provider, MD       Allergies:    Patient has no known allergies. Social History:    The patient currently lives home   Tobacco:   reports that he has never smoked. He has never used smokeless tobacco.  Alcohol:   reports no history of alcohol use. Illicit Drugs: denies    Family History:      Problem Relation Age of Onset    Stroke Mother     High Blood Pressure Mother     High Blood Pressure Father        Review of Systems:   Review of Systems   Constitutional:  Negative for chills, diaphoresis, fatigue and fever. Respiratory:  Negative for cough, chest tightness, shortness of breath and wheezing. Cardiovascular:  Negative for chest pain and palpitations. Gastrointestinal:  Negative for abdominal distention, abdominal pain, constipation, nausea and vomiting. Musculoskeletal:  Positive for arthralgias (right hip), gait problem and myalgias. Skin:  Negative for color change, pallor and rash. Neurological:  Negative for tremors, syncope, weakness, light-headedness, numbness and headaches. Psychiatric/Behavioral:  Negative for agitation, behavioral problems and confusion. 14 point review of systems is negative except as specifically addressed above. Physical Examination:  BP (!) 172/84   Pulse 72   Temp 98 °F (36.7 °C)   Resp 20   Ht 5' 11\" (1.803 m)   Wt 217 lb (98.4 kg)   SpO2 94%   BMI 30.27 kg/m²   Physical Exam  Vitals and nursing note reviewed. Constitutional:       Appearance: Normal appearance. HENT:      Mouth/Throat:      Mouth: Mucous membranes are moist.      Pharynx: Oropharynx is clear. Eyes:      Extraocular Movements: Extraocular movements intact.       Conjunctiva/sclera: Conjunctivae normal.      Pupils: Pupils are equal, round, and reactive to light. Cardiovascular:      Rate and Rhythm: Normal rate and regular rhythm. Pulses: Normal pulses. Heart sounds: Normal heart sounds. No murmur heard. Pulmonary:      Effort: Pulmonary effort is normal. No respiratory distress. Breath sounds: Normal breath sounds. Abdominal:      General: Bowel sounds are normal. There is no distension. Palpations: Abdomen is soft. Tenderness: There is no abdominal tenderness. There is no guarding or rebound. Musculoskeletal:         General: No swelling. Cervical back: Normal range of motion and neck supple. No rigidity or tenderness. Right hip: Tenderness present. Decreased range of motion. Decreased strength. Right lower leg: No edema. Left lower leg: No edema. Comments: Internally rotated, shortened  Neurovascular intact    Skin:     General: Skin is warm and dry. Capillary Refill: Capillary refill takes less than 2 seconds. Neurological:      General: No focal deficit present. Mental Status: He is alert and oriented to person, place, and time. GCS: GCS eye subscore is 4. GCS verbal subscore is 5. GCS motor subscore is 6. Cranial Nerves: No cranial nerve deficit. Psychiatric:         Mood and Affect: Mood normal.         Behavior: Behavior normal.        Diagnostic Data:  CBC:  Recent Labs     01/28/23 2052   WBC 9.5   HGB 14.4   HCT 45.1        BMP:  Recent Labs     01/28/23 2052      K 4.3      CO2 27   BUN 14   CREATININE 0.9   CALCIUM 9.4     Recent Labs     01/28/23 2052   AST 18   ALT 10   BILITOT 0.5   ALKPHOS 94     Coag Panel:   Recent Labs     01/28/23 2052   INR 1.20*   PROTIME 15.2*     Cardiac Enzymes:   Recent Labs     01/28/23 2052   TROPONINI <0.01       XR CHEST PORTABLE    Result Date: 1/28/2023  NO PRIOR REPORT AVAILABLE Exam: X-RAY OF [facility] Clinical data:Fall, preop.  Technique:Single view of the chest. Prior studies: No prior studies submitted. Findings: The lungs are grossly clear; noevidence of acute infiltrate or pleural effusion. Mild cardiomegaly. No acute osseous abnormality is detected. No acute pulmonary abnormality. Mild cardiomegaly. Recommendation: Follow up as clinically indicated. Dictated and Electronically Signed by Carson Nguyễn at 28-Jan-2023 10:43:58 PM EST               Assessment/Plan:  Principal Problem:    Closed right hip fracture, initial encounter ([facility] [address]  -Consultation to orthopedic surgery   -XR right hip pelvis   -CXR   -Urinalysis    -Neurovascular checks    -Bedrest   -As needed pain medication   Active Problems:    COVID  -Currently asymptomatic on RA 94%   -CRP, D-Dimer    - Incentive spirometry   - Encourage deep breathing and cough   - Supplemental oxygen as needed   - Droplet plus isolation  Frequent falls   -CT head    -Fall precautions    -bed alarm on      DVT prophylactic: SCD     Signed:  JOE Gonsalez - CNP, 1/28/2023 9:52 PM       EMR Dragon/Transcription disclaimer:   Much of this encounter note is an electronic transcription/translation of spoken language to printed text.  The electronic translation of spoken language may permit erroneous, or at times, nonsensical words or phrases to be inadvertently transcribed; although attempts have made to review the note for such errors, some may still exist.

## 2023-01-29 NOTE — CONSULTS
Orthopaedic Surgery  Inpatient Consultation    Jaye Mao ([id number])  1/29/2023    Requesting Physician: DR Anahi Santizo Physician: DR Kari Ho  1/28/2023  8:45 PM    CHIEF COMPLAINT:  right HIP FX S/P FALL    History Obtained From:  patient/CHART/STAFF    HISTORY OF PRESENT ILLNESS:               Jaye Mao is a 81 y.o. male who presents to the emergency department after fall. Patient states that he was getting close ready for [employer] when he fell backwards and hurt his right hip. Patient was not able to stand or move. Patient states that \"my hip muscles are spasming. \"  Patient gives severity is an 8 out of 10. Patient notes that he is unable to move his right hip without severe pain. Alleviating factors are \"keeping it still. \"  Patient denies radiation. Patient denies head injury or loss of consciousness    PAIN IS CONSTANT, WORSE WITH MOVEMENT. SHARP, SEVERE. NO SIMILAR C/O. COVID +, ELEVATED D DIMER, CTA PENDING.     Past Medical History:        Diagnosis Date    DVT (deep venous thrombosis) (HCC)     Hypertension     Iliac artery aneurysm, right ([address] 3/19/2013    Osteoarthritis        Past Surgical History:        Procedure Laterality Date    BACK SURGERY  2008    Dr Patel Weldona  2017    Dr Ramesh Tatum Left     separation repair-pin placed       Current Medications:   Current Facility-Administered Medications: tranexamic acid (CYKLOKAPRON) 1,000 mg in sodium chloride 0.9 % 100 mL IVPB, 1,000 mg, IntraVENous, On Call to OR  ceFAZolin (ANCEF) 2,000 mg in sterile water 20 mL IV syringe, 2,000 mg, IntraVENous, On Call to OR  [START ON 1/30/2023] vitamin D (ERGOCALCIFEROL) capsule 50,000 Units, 50,000 Units, Oral, Once per day on Mon Wed Fri  HYDROmorphone HCl PF (DILAUDID) injection 0.5 mg, 0.5 mg, IntraVENous, Q30 Min PRN  naloxone (NARCAN) injection 0.4 mg, 0.4 mg, IntraVENous, PRN  morphine (PF) injection 1 mg, 1 mg, IntraVENous, Q4H PRN  morphine sulfate (PF) injection 4 mg, 4 mg, IntraVENous, Q4H PRN  morphine (PF) injection 2 mg, 2 mg, IntraVENous, Q4H PRN  acetaminophen (TYLENOL) tablet 1,000 mg, 1,000 mg, Oral, 3 times per day  sodium chloride flush 0.9 % injection 5-40 mL, 5-40 mL, IntraVENous, 2 times per day  sodium chloride flush 0.9 % injection 5-40 mL, 5-40 mL, IntraVENous, PRN  0.9 % sodium chloride infusion, , IntraVENous, PRN  ondansetron (ZOFRAN-ODT) disintegrating tablet 4 mg, 4 mg, Oral, Q8H PRN **OR** ondansetron (ZOFRAN) injection 4 mg, 4 mg, IntraVENous, Q6H PRN  potassium chloride (KLOR-CON M) extended release tablet 40 mEq, 40 mEq, Oral, PRN **OR** potassium bicarb-citric acid (EFFER-K) effervescent tablet 40 mEq, 40 mEq, Oral, PRN **OR** potassium chloride 10 mEq/100 mL IVPB (Peripheral Line), 10 mEq, IntraVENous, PRN  magnesium sulfate 2000 mg in 50 mL IVPB premix, 2,000 mg, IntraVENous, PRN  oxyCODONE (ROXICODONE) immediate release tablet 5 mg, 5 mg, Oral, Q4H PRN **OR** oxyCODONE (ROXICODONE) immediate release tablet 10 mg, 10 mg, Oral, Q4H PRN  polyethylene glycol (GLYCOLAX) packet 17 g, 17 g, Oral, Daily PRN  melatonin disintegrating tablet 5 mg, 5 mg, Oral, Nightly PRN  calcium carbonate (TUMS) chewable tablet 500 mg, 500 mg, Oral, TID PRN  lisinopril (PRINIVIL;ZESTRIL) tablet 10 mg, 10 mg, Oral, Daily  gabapentin (NEURONTIN) capsule 300 mg, 300 mg, Oral, 4x Daily  guaiFENesin-dextromethorphan (ROBITUSSIN DM) 100-10 MG/5ML syrup 5 mL, 5 mL, Oral, Q4H PRN  Prior to Admission medications    Medication Sig Start Date End Date Taking? Authorizing Provider   oxyCODONE-acetaminophen (PERCOCET) 5-325 MG per tablet Take 1 tablet by mouth 4 times daily. Yes Historical Provider, MD   gabapentin (NEURONTIN) 300 MG capsule Take 300 mg by mouth 4 times daily.    Yes Historical Provider, MD   polyethylene glycol (MIRALAX) 17 g PACK packet Take 17 g by mouth as needed   Yes Historical Provider, MD   lisinopril (PRINIVIL;ZESTRIL) 10 MG tablet Take 10 mg by mouth daily. Historical Provider, MD       Allergies:  Patient has no known allergies.     Social History:   Social History     Socioeconomic History    Marital status:      Spouse name: Not on file    Number of children: Not on file    Years of education: Not on file    Highest education level: Not on file   Occupational History    Not on file   Tobacco Use    Smoking status: Never    Smokeless tobacco: Never   Substance and Sexual Activity    Alcohol use: No    Drug use: No    Sexual activity: Not on file   Other Topics Concern    Not on file   Social History Narrative    Not on file     Social Determinants of Health     Financial Resource Strain: Not on file   Food Insecurity: Not on file   Transportation Needs: Not on file   Physical Activity: Not on file   Stress: Not on file   Social Connections: Not on file   Intimate Partner Violence: Not on file   Housing Stability: Not on file       Family History:   Family History   Problem Relation Age of Onset    Stroke Mother     High Blood Pressure Mother     High Blood Pressure Father        REVIEW OF SYSTEMS:    CONSTITUTIONAL:  negative for  fevers, chills, sweats, fatigue, malaise, anorexia and weight loss  EYES:  negative for  double vision, blurred vision and visual disturbance  HEENT:  negative for  hearing loss, tinnitus, ear drainage, earaches, nasal congestion, epistaxis, snoring, sore mouth, sore throat, hoarseness and voice change  RESPIRATORY:  negative for  dry cough, cough with sputum, dyspnea, wheezing, hemoptysis, chest pain, pleuritic pain and cyanosis  CARDIOVASCULAR:  negative for  chest pain, palpitations, orthopnea, exertional chest pressure/discomfort, edema  GASTROINTESTINAL:  negative for nausea, vomiting, change in bowel habits, diarrhea, constipation, abdominal pain, jaundice, dysphagia, regurgitation, hematemesis and hemtochezia  GENITOURINARY:  negative for frequency, dysuria, nocturia, hesitancy and hematuria  INTEGUMENT/BREAST:  negative for rash, skin lesion(s), dryness, skin color change, pruritus, changes in hair and changes in nails  HEMATOLOGIC/LYMPHATIC:  negative for easy bruising, bleeding, lymphadenopathy, petechiae and swelling/edema  ALLERGIC/IMMUNOLOGIC:  negative for recurrent infections and urticaria  ENDOCRINE:  negative for heat intolerance, cold intolerance, tremor, weight changes, hair loss and diabetic symptoms including neither polyuria nor polydipsia  MUSCULOSKELETAL:  AS ABOVE  NEUROLOGICAL:  negative for headaches, dizziness, seizures, memory problems, speech problems, visual disturbance, coordination problems, gait problems, tremor, syncope and near syncope  BEHAVIOR/PSYCH:  negative for depressed mood, elated mood, increased agitation and anxiety    PHYSICAL EXAM:    Vitals:   Vitals:    01/28/23 2229 01/28/23 2258 01/29/23 0423 01/29/23 0745   BP: (!) 146/88 (!) 161/88 (!) 143/70 (!) 163/82   Pulse: 88 88 71 75   Resp: 20 18 18 18   Temp: 98 °F (36.7 °C) 97.8 °F (36.6 °C) 98.7 °F (37.1 °C) 97.5 °F (36.4 °C)   TempSrc:  Temporal  Temporal   SpO2: 95% 92% 90% 91%   Weight:  191 lb (86.6 kg)     Height:  5' 11\" (1.803 m)       General:  Appears stated age, no distress. Orientation:  Alert and oriented to time, place, and person. Mood and Affect:  Cooperative and pleasant. Gait:  Resting comfortably in bed. Cardiovascular:  Symmetric 1-2 plus pulses in upper and lower extremities. Lymph:  No cervical or inguinal lymphadenopathy noted. Sensation:  Grossly intact to light touch. DTR:  Normal, no pathologic reflexes. Coordination/balance:  NOT TESTED    Musculoskeletal:  Right upper extremity exam:  There is no tenderness to palpation about the shoulder, elbow, wrist or hand. Unrestricted full function motion is present.   Stability is normal with provocative tests, 5/5 strength, and skin is normal. Left upper extremity exam:  There is no tenderness to palpation about the shoulder, elbow, wrist or hand. Unrestricted full function motion is present. Stability is normal with provocative tests, 5/5 strength, and skin is normal.     Right lower extremity exam:  ROTATED, SHORTENED. NO ROM TESTED. NVI DISTALLY  Left lower extremity exam:  There is no tenderness to palpation about the hip, knee, ankle or foot. Unrestricted full function motion is present.   Stability is normal with provocative tests, 5/5 strength, and skin is normal.      DATA:    CBC with Differential:    Lab Results   Component Value Date/Time    WBC 10.9 01/29/2023 03:50 AM    RBC 4.43 01/29/2023 03:50 AM    HGB 13.6 01/29/2023 03:50 AM    HCT 41.2 01/29/2023 03:50 AM     01/29/2023 03:50 AM    MCV 93.0 01/29/2023 03:50 AM    MCH 30.7 01/29/2023 03:50 AM    MCHC 33.0 01/29/2023 03:50 AM    RDW 14.0 01/29/2023 03:50 AM    LYMPHOPCT 10.7 01/29/2023 03:50 AM    MONOPCT 5.4 01/29/2023 03:50 AM    BASOPCT 0.4 01/29/2023 03:50 AM    MONOSABS 0.60 01/29/2023 03:50 AM    LYMPHSABS 1.2 01/29/2023 03:50 AM    EOSABS 0.10 01/29/2023 03:50 AM    BASOSABS 0.00 01/29/2023 03:50 AM     CMP:    Lab Results   Component Value Date/Time     01/29/2023 03:50 AM    K 4.3 01/29/2023 03:50 AM     01/29/2023 03:50 AM    CO2 23 01/29/2023 03:50 AM    BUN 13 01/29/2023 03:50 AM    CREATININE 0.8 01/29/2023 03:50 AM    LABGLOM >60 01/29/2023 03:50 AM    GLUCOSE 126 01/29/2023 03:50 AM    PROT 7.1 01/28/2023 08:52 PM    CALCIUM 8.5 01/29/2023 03:50 AM    BILITOT 0.5 01/28/2023 08:52 PM    ALKPHOS 94 01/28/2023 08:52 PM    AST 18 01/28/2023 08:52 PM    ALT 10 01/28/2023 08:52 PM     BMP:    Lab Results   Component Value Date/Time     01/29/2023 03:50 AM    K 4.3 01/29/2023 03:50 AM     01/29/2023 03:50 AM    CO2 23 01/29/2023 03:50 AM    BUN 13 01/29/2023 03:50 AM    CREATININE 0.8 01/29/2023 03:50 AM    CALCIUM 8.5 01/29/2023 03:50 AM LABGLOM >60 01/29/2023 03:50 AM    GLUCOSE 126 01/29/2023 03:50 AM       Radiology:    CTA PULMONARY W CONTRAST [[id number]]      Order Status: No result      CT HEAD WO CONTRAST [[id number]] Resulted: 01/28/23 2229     Order Status: Completed Updated: 01/28/23 2331     Narrative:       Patient: Olivier Dickerson  Time Out: 23:29Exam(s): CT HEAD Without Contrast EXAM:  CT Head Without Intravenous ContrastCLINICAL HISTORY:   Reason for exam: frequent falls, dizziness. TECHNIQUE:  Axial computed tomography images of the head/brain without   intravenous contrast.COMPARISON:  No relevant prior studies available. FINDINGS:No acute intracranial hemorrhage. No midline shift or mass effect. The territorial gray-white matter differentiation is maintained throughout. Age-related cerebral volume   loss. Periventricular and subcortical white matter hypoattenuation, consistent with chronic microangiopathy. The visualized orbits appear grossly unremarkable. The calvarium is intact. Opacified maxillary sinuses. Impression:       No acute intracranial hemorrhage, midline shift, or mass effect. Electronically signed by Gus Guerrier MD on 01-28-23 at 2329     XR HIP 2-3 VW W PELVIS RIGHT [[id number]] Resulted: 01/28/23 2209     Order Status: Completed Updated: 01/28/23 2212     Narrative:       NO PRIOR REPORT AVAILABLE   Exam: X-RAYS OF THE RIGHT HIP   Clinical data: Fall, deformity. Technique: Three views of the right hip with AP view of the pelvis. Prior studies: No prior studies submitted. Findings: Right femoral neck fracture with varus angulation and slight superior migration along shaft component whereas the femoral head remains within the acetabulum. No evidence of superior pubic ramus fracture. Impression:       Right femoral neck fracture as described above. Recommendation:   Follow up as clinically indicated.    Dictated and Electronically Signed by Daniele Prieto at 28-Jan-2023 11:09:05 PM EST              XR CHEST PORTABLE [[id number]] Resulted: 01/28/23 2143     Order Status: Completed Updated: 01/28/23 2147     Narrative:       NO PRIOR REPORT AVAILABLE   Exam: X-RAY OF [facility]   Clinical data:Fall, preop. Technique:Single view of the chest.   Prior studies: No prior studies submitted. Findings: The lungs are grossly clear; noevidence of acute infiltrate or pleural effusion. Mild cardiomegaly. No acute osseous abnormality is detected. Impression:       No acute pulmonary abnormality. Mild cardiomegaly. Recommendation: Follow up as clinically indicated. Dictated and Electronically Signed by Eneida Mix at 28-Jan-2023 10:43:58 PM EST                  IMPRESSION/RECOMMENDATIONS:    Assessment:   RIGHT CLOSED, DISPLACED FEMORAL NECK FX S/P FALL  COVID +  ELEVATED D DIMER-- CTA PENDING. H/O DVT      Plan:  RIGHT HIP JAMIL THIS MORNING. RISKS D/W PT TO INCUDE BUT NOT LIMITED TO POST ANESTHESIA DELIRIUM, BLEEDING, INFECTION, NVI, DVT/PE, INCOMPLETE RESOLUTION OF PAIN, HARDWARE MALFXN, DEATH. HE UNDERSTANDS AND WISHES TO PROCEED. WILL FOLLOW ACCORDINGLY. THANKS FOR THE CONSULT. Approximately 45 minutes was spent face to face with the patient discussing the current condition. All risks and benefits of treatment options were discussed at great length and all expressed understanding and agreement with medial reasoning.     Timothy Vera PA-C 01/29/23 9:30 AM

## 2023-01-29 NOTE — ANESTHESIA POSTPROCEDURE EVALUATION
Department of Anesthesiology  Postprocedure Note    Patient: Luz Aguillon  MRN: [id number]  YOB: 1935  Date of evaluation: 1/29/2023      Procedure Summary     Date: 01/29/23 Room / Location: [address]    Anesthesia Start: 1059 Anesthesia Stop: 1237    Procedure: HIP HEMIARTHROPLASTY (Right: Hip) Diagnosis:       Fracture      (Fracture [T14. [place] Cruise)    Surgeons: Rinku Johnson MD Responsible Provider: JOE Hanna CRNA    Anesthesia Type: general ASA Status: 3 - Emergent          Anesthesia Type: No value filed.     Laura Phase I:      Laura Phase II:        Anesthesia Post Evaluation    Patient location during evaluation: PACU  Patient participation: complete - patient participated  Level of consciousness: sleepy but conscious  Pain score: 0  Airway patency: patent  Nausea & Vomiting: no nausea and no vomiting  Complications: no  Cardiovascular status: blood pressure returned to baseline  Respiratory status: acceptable and nasal cannula  Hydration status: euvolemic

## 2023-01-29 NOTE — ANESTHESIA PRE PROCEDURE
Department of Anesthesiology  Preprocedure Note       Name:  Wero Newby   Age:  81 y.o.  :  1935                                          MRN:  [id number]         Date:  2023      Surgeon: Anabella Hay):  Any Jain MD    Procedure: Procedure(s):  HIP HEMIARTHROPLASTY    Medications prior to admission:   Prior to Admission medications    Medication Sig Start Date End Date Taking? Authorizing Provider   oxyCODONE-acetaminophen (PERCOCET) 5-325 MG per tablet Take 1 tablet by mouth 4 times daily. Yes Historical Provider, MD   gabapentin (NEURONTIN) 300 MG capsule Take 300 mg by mouth 4 times daily. Yes Historical Provider, MD   polyethylene glycol (MIRALAX) 17 g PACK packet Take 17 g by mouth as needed   Yes Historical Provider, MD   lisinopril (PRINIVIL;ZESTRIL) 10 MG tablet Take 10 mg by mouth daily.     Historical Provider, MD       Current medications:    Current Facility-Administered Medications   Medication Dose Route Frequency Provider Last Rate Last Admin    tranexamic acid (CYKLOKAPRON) 1,000 mg in sodium chloride 0.9 % 100 mL IVPB  1,000 mg IntraVENous On Call to Melanie Horton MD        ceFAZolin (ANCEF) 2,000 mg in sterile water 20 mL IV syringe  2,000 mg IntraVENous On Call to MD Maisha Arora Seley Older ON 2023] vitamin D (ERGOCALCIFEROL) capsule 50,000 Units  50,000 Units Oral Once per day on  JOE Mcnamara        naloxone [facility]) injection 0.4 mg  0.4 mg IntraVENous PRN Iker Esqueda MD        morphine (PF) injection 1 mg  1 mg IntraVENous Q4H PRN Iker Esqueda MD   1 mg at 23 0904    morphine sulfate (PF) injection 4 mg  4 mg IntraVENous Q4H PRN Iker Esqueda MD        morphine (PF) injection 2 mg  2 mg IntraVENous Q4H PRN Iker Esqueda MD   2 mg at 23 0141    acetaminophen (TYLENOL) tablet 1,000 mg  1,000 mg Oral 3 times per day Iker Esqueda MD   1,000 mg at 23 215    sodium chloride flush 0.9 % injection 5-40 mL 5-40 mL IntraVENous 2 times per day Beverlie Dubin, MD        sodium chloride flush 0.9 % injection 5-40 mL  5-40 mL IntraVENous PRN Beverlie Dubin, MD        0.9 % sodium chloride infusion   IntraVENous PRN Beverlie Dubin, MD        ondansetron (ZOFRAN-ODT) disintegrating tablet 4 mg  4 mg Oral Q8H PRN Beverlie Dubin, MD        Or    ondansetron [facility]) injection 4 mg  4 mg IntraVENous Q6H PRN Beverlie Dubin, MD        potassium chloride (KLOR-CON M) extended release tablet 40 mEq  40 mEq Oral PRN Beverlie Dubin, MD        Or    potassium bicarb-citric acid (EFFER-K) effervescent tablet 40 mEq  40 mEq Oral PRN Beverlie Dubin, MD        Or    potassium chloride 10 mEq/100 mL IVPB (Peripheral Line)  10 mEq IntraVENous PRN Beverlie Dubin, MD        magnesium sulfate 2000 mg in 50 mL IVPB premix  2,000 mg IntraVENous PRN Beverlie Dubin, MD        oxyCODONE (ROXICODONE) immediate release tablet 5 mg  5 mg Oral Q4H PRN Beverlie Dubin, MD        Or    oxyCODONE (ROXICODONE) immediate release tablet 10 mg  10 mg Oral Q4H PRN Beverlie Dubin, MD   10 mg at 01/28/23 2307    polyethylene glycol (GLYCOLAX) packet 17 g  17 g Oral Daily PRN Beverlie Dubin, MD        melatonin disintegrating tablet 5 mg  5 mg Oral Nightly PRN Beverlie Dubin, MD        calcium carbonate (TUMS) chewable tablet 500 mg  500 mg Oral TID PRN Beverlie Dubin, MD        lisinopril (PRINIVIL;ZESTRIL) tablet 10 mg  10 mg Oral Daily Beverlie Dubin, MD        gabapentin (NEURONTIN) capsule 300 mg  300 mg Oral 4x Daily Beverlie Dubin, MD   300 mg at 01/28/23 2309    guaiFENesin-dextromethorphan (ROBITUSSIN DM) 100-10 MG/5ML syrup 5 mL  5 mL Oral Q4H PRN Beverlie Dubin, MD           Allergies:  No Known Allergies    Problem List:    Patient Active Problem List   Diagnosis Code    Hypertension I10    Osteoarthritis M19.90    AAA (abdominal aortic aneurysm) without rupture I71.40    Iliac artery aneurysm, right ([address] I72.3    Closed right hip fracture, initial encounter ([facility] S72.001A    COVID U07.1    Frequent falls R29.6       Past Medical History:        Diagnosis Date    DVT (deep venous thrombosis) (HCC)     Hypertension     Iliac artery aneurysm, right ([facility] 3/19/2013    Osteoarthritis        Past Surgical History:        Procedure Laterality Date    BACK SURGERY  2008    Dr Fabio Grider  2017    Dr Roddie Lagos Dr Donnice Frankel Left     separation repair-pin placed       Social History:    Social History     Tobacco Use    Smoking status: Never    Smokeless tobacco: Never   Substance Use Topics    Alcohol use: No                                Counseling given: Not Answered      Vital Signs (Current):   Vitals:    01/28/23 2229 01/28/23 2258 01/29/23 0423 01/29/23 0745   BP: (!) 146/88 (!) 161/88 (!) 143/70 (!) 163/82   Pulse: 88 88 71 75   Resp: 20 18 18 18   Temp: 98 °F (36.7 °C) 97.8 °F (36.6 °C) 98.7 °F (37.1 °C) 97.5 °F (36.4 °C)   TempSrc:  Temporal  Temporal   SpO2: 95% 92% 90% 91%   Weight:  191 lb (86.6 kg)     Height:  5' 11\" (1.803 m)                                                BP Readings from Last 3 Encounters:   01/29/23 (!) 163/82   01/31/19 114/68   04/27/17 117/73       NPO Status:                                                                                 BMI:   Wt Readings from Last 3 Encounters:   01/28/23 191 lb (86.6 kg)   01/31/19 218 lb 3.2 oz (99 kg)   04/12/16 206 lb (93.4 kg)     Body mass index is 26.64 kg/m².     CBC:   Lab Results   Component Value Date/Time    WBC 10.9 01/29/2023 03:50 AM    RBC 4.43 01/29/2023 03:50 AM    HGB 13.6 01/29/2023 03:50 AM    HCT 41.2 01/29/2023 03:50 AM    MCV 93.0 01/29/2023 03:50 AM    RDW 14.0 01/29/2023 03:50 AM     01/29/2023 03:50 AM       CMP:   Lab Results   Component Value Date/Time     01/29/2023 03:50 AM    K 4.3 01/29/2023 03:50 AM    CL 103 01/29/2023 03:50 AM    CO2 23 01/29/2023 03:50 AM    BUN 13 01/29/2023 03:50 AM    CREATININE 0.8 01/29/2023 03:50 AM    LABGLOM >60 01/29/2023 03:50 AM    GLUCOSE 126 01/29/2023 03:50 AM    PROT 7.1 01/28/2023 08:52 PM    CALCIUM 8.5 01/29/2023 03:50 AM    BILITOT 0.5 01/28/2023 08:52 PM    ALKPHOS 94 01/28/2023 08:52 PM    AST 18 01/28/2023 08:52 PM    ALT 10 01/28/2023 08:52 PM       POC Tests: No results for input(s): POCGLU, POCNA, POCK, POCCL, POCBUN, POCHEMO, POCHCT in the last 72 hours. Coags:   Lab Results   Component Value Date/Time    PROTIME 15.2 01/28/2023 08:52 PM    INR 1.20 01/28/2023 08:52 PM       HCG (If Applicable): No results found for: PREGTESTUR, PREGSERUM, HCG, HCGQUANT     ABGs: No results found for: PHART, PO2ART, FUF3CBI, UWN6JOM, BEART, T8WIUEKQ     Type & Screen (If Applicable):  No results found for: LABABO, LABRH    Drug/Infectious Status (If Applicable):  No results found for: HIV, HEPCAB    COVID-19 Screening (If Applicable):   Lab Results   Component Value Date/Time    COVID19 DETECTED 01/28/2023 09:14 PM           Anesthesia Evaluation  Patient summary reviewed no history of anesthetic complications:   Airway: Mallampati: II  TM distance: >3 FB   Neck ROM: full  Mouth opening: > = 3 FB   Dental:          Pulmonary:normal exam  breath sounds clear to auscultation      (-) asthma, recent URI, sleep apnea and not a current smoker          Patient did not smoke on day of surgery.                  Cardiovascular:  Exercise tolerance: good (>4 METS),   (+) hypertension:,     (-) pacemaker, past MI, CABG/stent and  angina    ECG reviewed  Rhythm: regular  Rate: normal           Beta Blocker:  Dose within 24 Hrs         Neuro/Psych:      (-) seizures, TIA and CVA           GI/Hepatic/Renal:        (-) GERD, liver disease and no renal disease       Endo/Other:    (+) : arthritis: OA., .    (-) diabetes mellitus, hypothyroidism, hyperthyroidism               Abdominal: Vascular:   + PVD, aortic or cerebral (AAA), . Other Findings:           Anesthesia Plan      general     ASA 3 - emergent     (COVID +)  Induction: intravenous. MIPS: Postoperative opioids intended and Prophylactic antiemetics administered. Anesthetic plan and risks discussed with patient. Use of blood products discussed with patient whom consented to blood products.                      Nicci Barone MD   1/29/2023

## 2023-01-29 NOTE — ED PROVIDER NOTES
[facility] [address]  eMERGENCY dEPARTMENT eNCOUnter      Pt Name: Dylan Chambers  MRN: [id number]  [place] 1935  Date of evaluation: 1/28/2023  Provider: Amanda Max MD    CHIEF COMPLAINT       Chief Complaint   Patient presents with    Hip Pain     Charyl Decent while getting clothes ready for [employer]  C/o pain with movement unable to stretch out         HISTORY OF PRESENT ILLNESS   (Location/Symptom, Timing/Onset,Context/Setting, Quality, Duration, Modifying Factors, Severity)  Note limiting factors. Dylan Chambers is a 81 y.o. male who presents to the emergency department after fall. Patient states that he was getting close ready for [employer] when he fell backwards and hurt his right hip. Patient was not able to stand or move. Patient states that \"my hip muscles are spasming. \"  Patient gives severity is an 8 out of 10. Patient notes that he is unable to move his right hip without severe pain. Alleviating factors are \"keeping it still. \"  Patient denies radiation. Patient denies head injury or loss of consciousness. HPI    NursingNotes were reviewed. REVIEW OF SYSTEMS    (2-9 systems for level 4, 10 or more for level 5)     Review of Systems   Constitutional:  Negative for chills and fever. HENT:  Negative for congestion and rhinorrhea. Eyes:  Negative for discharge. Respiratory:  Negative for cough and shortness of breath. Cardiovascular:  Negative for chest pain and palpitations. Gastrointestinal:  Negative for abdominal pain, nausea and vomiting. Genitourinary:  Negative for difficulty urinating and dysuria. Musculoskeletal:  Positive for back pain (Chronic but unchanged). Right hip pain   Skin:  Negative for color change and pallor. Neurological:  Negative for syncope and light-headedness. Psychiatric/Behavioral:  Negative for agitation and confusion. All other systems reviewed and are negative.          PAST MEDICALHISTORY     Past Medical History:   Diagnosis Date DVT (deep venous thrombosis) (HCC)     Hypertension     Iliac artery aneurysm, right ([address] 3/19/2013    Osteoarthritis          SURGICAL HISTORY       Past Surgical History:   Procedure Laterality Date    BACK SURGERY  2008    Dr Bary Boxer  2017    Dr Martha Grande Adjutant Left     separation repair-pin placed         CURRENT MEDICATIONS     Current Discharge Medication List        CONTINUE these medications which have NOT CHANGED    Details   oxyCODONE-acetaminophen (PERCOCET) 5-325 MG per tablet Take 1 tablet by mouth 4 times daily. gabapentin (NEURONTIN) 300 MG capsule Take 300 mg by mouth 4 times daily. polyethylene glycol (MIRALAX) 17 g PACK packet Take 17 g by mouth as needed      lisinopril (PRINIVIL;ZESTRIL) 10 MG tablet Take 10 mg by mouth daily. ALLERGIES     Patient has no known allergies. FAMILY HISTORY       Family History   Problem Relation Age of Onset    Stroke Mother     High Blood Pressure Mother     High Blood Pressure Father           SOCIAL HISTORY       Social History     Socioeconomic History    Marital status:      Spouse name: None    Number of children: None    Years of education: None    Highest education level: None   Tobacco Use    Smoking status: Never    Smokeless tobacco: Never   Substance and Sexual Activity    Alcohol use: No    Drug use: No       SCREENINGS    Lampe Coma Scale  Eye Opening: Spontaneous  Best Verbal Response: Oriented  Best Motor Response: Obeys commands  Lampe Coma Scale Score: 15        PHYSICAL EXAM    (up to 7 for level 4, 8 or more for level 5)     ED Triage Vitals [01/28/23 2050]   BP Temp Temp src Heart Rate Resp SpO2 Height Weight   (!) 172/84 98 °F (36.7 °C) -- 72 20 94 % 5' 11\" (1.803 m) 217 lb (98.4 kg)       Physical Exam  Vitals and nursing note reviewed.    Constitutional:       General: He is not in acute distress. Appearance: Normal appearance. HENT:      Head: Normocephalic and atraumatic. Right Ear: External ear normal.      Left Ear: External ear normal.      Nose: Nose normal. No congestion or rhinorrhea. Mouth/Throat:      Mouth: Mucous membranes are moist.      Pharynx: Oropharynx is clear. No oropharyngeal exudate. Eyes:      General: No scleral icterus. Conjunctiva/sclera: Conjunctivae normal.      Pupils: Pupils are equal, round, and reactive to light. Cardiovascular:      Rate and Rhythm: Normal rate and regular rhythm. Pulses: Normal pulses. Heart sounds: Normal heart sounds. Pulmonary:      Effort: Pulmonary effort is normal. No respiratory distress. Breath sounds: Normal breath sounds. No stridor. No wheezing, rhonchi or rales. Abdominal:      General: Bowel sounds are normal. There is no distension. Palpations: Abdomen is soft. Tenderness: There is no abdominal tenderness. There is no guarding or rebound. Musculoskeletal:         General: Tenderness, deformity and signs of injury present. Cervical back: Neck supple. No rigidity. Right lower leg: No edema. Left lower leg: No edema. Comments: Right hip is tender to palpation with shortening and external rotation. Sensation is intact to foot. Palpable pulses are equal dorsalis pedis bilaterally. Skin:     General: Skin is warm and dry. Capillary Refill: Capillary refill takes less than 2 seconds. Coloration: Skin is not jaundiced. Neurological:      General: No focal deficit present. Mental Status: He is alert and oriented to person, place, and time. Mental status is at baseline. Cranial Nerves: No cranial nerve deficit. Sensory: No sensory deficit. Motor: No weakness.       Coordination: Coordination normal.   Psychiatric:         Mood and Affect: Mood normal.         Behavior: Behavior normal.       DIAGNOSTIC RESULTS     EKG: All EKG's areinterpreted by the Emergency Department Physician who either signs or Co-signs this chart in the absence of a cardiologist.    EKG dated 1/28/2023 at 20 2:10 PM: Normal sinus rhythm, rate 87. Possible left atrial enlargement. Artifact present. T wave flattening or inversion in 3 and aVF.  QRS 94 . RADIOLOGY:  Non-plain film images such as CT, Ultrasound and MRI are read by the radiologist. Plain radiographic images are visualized and preliminarily interpreted bythe emergency physician with the below findings:          XR CHEST PORTABLE   Final Result   No acute pulmonary abnormality. Mild cardiomegaly. Recommendation: Follow up as clinically indicated. Dictated and Electronically Signed by Lucius Hanks at 28-Jan-2023 10:43:58 PM EST               XR HIP 2-3 VW W PELVIS RIGHT   Final Result   Right femoral neck fracture as described above. Recommendation:    Follow up as clinically indicated.    Dictated and Electronically Signed by Lucius Hanks at 28-Jan-2023 11:09:05 PM EST               CT HEAD WO CONTRAST    (Results Pending)           LABS:  Labs Reviewed   COVID-19, RAPID - Abnormal; Notable for the following components:       Result Value    SARS-CoV-2, NAAT DETECTED (*)     All other components within normal limits   CBC WITH AUTO DIFFERENTIAL - Abnormal; Notable for the following components:    MCV 94.7 (*)     MCHC 31.9 (*)     MPV 9.1 (*)     All other components within normal limits   COMPREHENSIVE METABOLIC PANEL - Abnormal; Notable for the following components:    Glucose 123 (*)     All other components within normal limits   PROTIME-INR - Abnormal; Notable for the following components:    Protime 15.2 (*)     INR 1.20 (*)     All other components within normal limits   VITAMIN D 25 HYDROXY - Abnormal; Notable for the following components:    Vit D, 25-Hydroxy 7.1 (*)     All other components within normal limits   URINALYSIS WITH REFLEX TO CULTURE - Abnormal; Notable for the following components:    Ketones, Urine 15 (*)     Protein, UA TRACE (*)     All other components within normal limits   TROPONIN   FERRITIN   C-REACTIVE PROTEIN   D-DIMER, QUANTITATIVE   CBC WITH AUTO DIFFERENTIAL   BASIC METABOLIC PANEL W/ REFLEX TO MG FOR LOW K       All other labs were within normal range or not returned as of this dictation. EMERGENCY DEPARTMENT COURSE and DIFFERENTIAL DIAGNOSIS/MDM:   Vitals:    Vitals:    01/28/23 2050 01/28/23 2150 01/28/23 2229   BP: (!) 172/84 (!) 172/88 (!) 146/88   Pulse: 72 78 88   Resp: 20 20 20   Temp: 98 °F (36.7 °C)  98 °F (36.7 °C)   SpO2: 94% 98% 95%   Weight: 217 lb (98.4 kg)     Height: 5' 11\" (1.803 m)         MDM     Amount and/or Complexity of Data Reviewed  Clinical lab tests: reviewed  Tests in the radiology section of CPT®: reviewed    81-year-old male presents after fall at home. Patient has right hip pain. Lab, EKG, and radiology results were reviewed. Patient has fracture of right femoral neck. Patient is also COVID-positive. Dr. Renita Rowley has evaluated patient and will admit patient to hospitalist service for further evaluation and treatment. Orthopedic set to be contacted by hospitalist service in a.m. CONSULTS:  IP CONSULT TO ORTHOPEDIC SURGERY    PROCEDURES:  Unless otherwise noted below, none     Procedures    FINAL IMPRESSION      1. Closed displaced fracture of right femoral neck ([address]    2. Fall, initial encounter    3.  COVID-19 virus infection          DISPOSITION/PLAN   DISPOSITION Admitted 01/28/2023 09:47:19 PM               (Please note that portions of this note were completed with a voice recognition program.  Efforts were made to edit thedictations but occasionally words are mis-transcribed.)    Nicolle Ace MD (electronically signed)  Attending Emergency Physician          Nicolle Ace MD  01/28/23 9123

## 2023-01-29 NOTE — OP NOTE
Operative Note        Pt Name: Jacqueline Hurd  MRN: [id number]  YOB: 1935  Date: 1/29/2023    PREOPERATIVE DIAGNOSIS:  Right subcapital femoral neck fracture    POSTOPERATIVE DIAGNOSIS:  Right subcapital femoral neck fracture    PROCEDURE: Right Hip Hemiarthroplasty      SURGEON:  Celena Gill MD    ASSISTANT:  Elena Comer PA-C  The assistant aided in limb position as well as retractor placement. The assistant was also critical in implantation of the prosthesis. In addition to this, the assistant was responsible for wound closure. It was necessary to have the assistant present in order to complete the procedure. ANESTHESIA:  General    ESTIMATED BLOOD LOSS:  Minimal.    COMPLICATIONS:  None. CONDITION:  Stable. Implant Name Type Inv. Item Serial No.  Lot No. LRB No. Used Action   STEM FEM SZ 8 L111MM STD OFFSET HIP 12/14 TAPR GRIPTION FOR - [id number]  STEM FEM SZ 8 L111MM STD OFFSET HIP 12/14 TAPR GRIPTION FOR  [facility] [employer] [id number] Right 1 Implanted   HEAD FEM [id number] NK L+1.5MM HIP MTL ON MTL 12/14 TAPR - [id number]  HEAD FEM [id number] NK L+1.5MM HIP MTL ON MTL 12/14 TAPR  [employer] [id number] Right 1 Implanted   HEAD BPLR OD50MM ID28MM FEM HIP SELF CNTR - [id number]  HEAD BPLR OD50MM ID28MM FEM HIP SELF CNTR  [employer] [id number] Right 1 Implanted       BRIEF HISTORY:  This is an 21-year-old male who fell at home. The patient presented  to the Emergency Department with hip pain where x-rays demonstrated a femoral  neck fracture at the mid portion of the femoral neck. This was a displaced,  closed fracture. Based on this, decision made to take the patient to the  operating room for the above-mentioned procedure. After risks and benefits  had been discussed with the patient, she agreed to proceed.     DESCRIPTION OF PROCEDURE:  The patient was interviewed in the preanesthesia  area where her   operative hip was marked with a marking pen. She was then taken to the  operative suite where general endotracheal anesthesia was performed by the  anesthesia team.  A timeout was then called confirming the patient, the  operative site as well as the planned procedure and administration of  antibiotics. The patient had been positioned in lateral decubitus position  over an axillary roll on a peg board. She was then prepped and draped in  standard sterile fashion using ChloraPrep. Once fully prepped and draped, a standard posterior approach was carried out  to the hip. The IT band and gluteal femoral fascia were divided in line with  its fibers. The Charnley retractor was placed, the anterior blade beneath the  gluteus medius and the posterior blade retracting the gluteus jamaal. Short  external rotators were then identified. The short external rotators and  capsules were then taken down in a T-type fashion and tagged with a #2 Vicryl  sutures. The femoral head was then removed. It sized on the back table for a size 50mm. Attention is then turned to the femur. With the femur in an exposed position  with the foot facing the ceiling and a proximal femoral elevator placed behind  the femur, we began initially with a box osteotome and then a canal finding  reamer. We then broached up to a size 8 broach. We did use the calcar planer  to make our neck resection about a fingerbreadth above the lesser trochanter. We did use the lateralizing rasp as well to lateralize. We got excellent  fill with the trial stem matching the patient's own anteversion. A trial reduction was then performed with a +1.5 mm head and a 50 mm outer  bipolar cup. Limb lengths felt symmetric at the level of the patella and  heel. The leg was also stable up to about 75 degrees of internal rotation. It is also stable in the sleeper position. All trial components were removed at this point in time. The hip was  copiously irrigated.   We then placed a size 8 DePuy Tri-Lock stem with a +1.5  mm head and a 50 mm outer bipolar cup. The hip was then reduced. Once again,  it was taken through an arc of motion and found to be very stable with  symmetric limb lengths. Attention was then turned to meticulous posterior capsular closure. A drill  bit was used to drill through the trochanter and a Church suture passer were  used to pass our sutures. The capsule and short external rotators were then  tied over a bone bridge. We then reinforced the posterior capsule repair with  a #2 Vicryl figure-of-eight sutures. The iliotibial band was then closed with  a #2 Vicryl suture. The deep tissue was approximated with Vicryl. The skin  was then approximated with 3-0 Vicryl and closed with a Prineo wound closure  system. The patient was placed in an abductor pillow. The patient awoke from  anesthesia without difficulty and transferred to the PACU in stable condition. All sponge, needle, and instrument counts were correct from procedure.         David Leach MD       Electronically signed by David Leach MD on 1/29/2023 at 12:14 PM

## 2023-01-29 NOTE — ED NOTES
\[facility]""[facility]     Melly Montana RN  01/28/23 4905

## 2023-01-29 NOTE — PROGRESS NOTES
[facility]      Patient:  Nataliya Garcia  YOB: 1935  Date of Service: 1/29/2023  MRN: [id number]   Acct: [de-identified]   Primary Care Physician: Ezequiel Macedo MD  Advance Directive: Full Code  Admit Date: 1/28/2023       Hospital Day: 1  Portions of this note have been copied forward, however, changed to reflect the most current clinical status of this patient. CHIEF COMPLAINT Fall    SUBJECTIVE: Seen postoperatively He states that he is \"glad the surgery is over\" and pain is currently well controlled. He remains on RA. Denies cough, congestion, or SOA. BP improved    CUMULATIVE HOSPITAL STAY:  The patient is a 81 y.o. male PMH DVT, HTN, OA, s/p back surgery who presented to [facility] ER on 1/28/2023 complaining of fall and right hip pain. He reported that he was arranging his [employer] clothes when he was reaching up to hang them up, lost his balance, and fell against the door frame and landing on his right hip. He denies hitting his head, LOC, HA, or vision changes. His family assisted his to POV to ED for further evaluation. Pt reported that he was unable to bear weight on his right leg, had severe pain, and decreased ROM. He reported that he has been having \"balance issues\" in they last 2-3 months. Work up in [facility] [address] no acute cardiopulmonary process, XR right hip pelvis right femoral neck fracture, troponin negative, and COVID positive. He has been vaccinated for Covid-19. He was admitted to hospital medicine for right femoral neck fracture after a fall with an orthopedic consult. D-Dimer was >20.00, therefore, a CTA pulmonary was ordered-which was negative for PE. His chemistries are WNL with the exception of mild hyperglycemia-126. WBC-10.9, H&H-13.6/41.2 and platelet-191. Remains on RA and relatively asymptomatic for covid symptoms. Vitamin D-7.1-supplemented appropriately. He underwent a right hemiarthroplasty per Dr. Tanesha Sethi today. Case mangagement consulted for discharge planning.      Review of Systems:   Review of Systems   Constitutional:  Positive for fatigue. Negative for chills, diaphoresis and fever. HENT:  Negative for congestion and ear pain. Eyes:  Negative for visual disturbance. Respiratory:  Negative for cough, shortness of breath and wheezing. Cardiovascular:  Negative for chest pain, palpitations and leg swelling. Gastrointestinal:  Negative for abdominal distention, abdominal pain, blood in stool, constipation, diarrhea, nausea and vomiting. Endocrine: Negative for cold intolerance and heat intolerance. Genitourinary:  Negative for difficulty urinating, flank pain, frequency and urgency. Musculoskeletal:  Positive for arthralgias, gait problem and joint swelling. Negative for myalgias. Skin:  Negative for color change and wound. Neurological:  Positive for dizziness and weakness. Negative for syncope, light-headedness, numbness and headaches. Hematological:  Does not bruise/bleed easily. Psychiatric/Behavioral:  Negative for agitation, confusion and dysphoric mood. 14 point review of systems is negative except as specifically addressed above. Objective:   VITALS:  BP 90/63   Pulse 88   Temp 98 °F (36.7 °C) (Temporal)   Resp 18   Ht 5' 11\" (1.803 m)   Wt 191 lb (86.6 kg)   SpO2 91%   BMI 26.64 kg/m²   24HR INTAKE/OUTPUT:    Intake/Output Summary (Last 24 hours) at 1/29/2023 1800  Last data filed at 1/29/2023 1207  Gross per 24 hour   Intake 1000 ml   Output 1285 ml   Net -285 ml       Physical Exam  Vitals and nursing note reviewed. Constitutional:       General: He is not in acute distress. Appearance: Normal appearance. He is not ill-appearing. HENT:      Head: Normocephalic and atraumatic. Right Ear: External ear normal.      Left Ear: External ear normal.      Nose: Nose normal.      Mouth/Throat:      Mouth: Mucous membranes are moist.   Eyes:      Extraocular Movements: Extraocular movements intact.       Conjunctiva/sclera: Conjunctivae normal.      Pupils: Pupils are equal, round, and reactive to light. Cardiovascular:      Rate and Rhythm: Normal rate and regular rhythm. Pulses: Normal pulses. Heart sounds: Normal heart sounds. Pulmonary:      Effort: Pulmonary effort is normal. No respiratory distress. Breath sounds: Normal breath sounds. No wheezing, rhonchi or rales. Abdominal:      General: Bowel sounds are normal. There is no distension. Palpations: Abdomen is soft. Tenderness: There is no abdominal tenderness. Musculoskeletal:         General: No swelling or deformity. Normal range of motion. Cervical back: Normal range of motion and neck supple. No muscular tenderness. Right lower leg: Tenderness present. Left lower leg: No edema. Skin:     General: Skin is warm and dry. Coloration: Skin is pale. Findings: No bruising or lesion. Neurological:      Mental Status: He is alert and oriented to person, place, and time. Motor: Weakness present. Psychiatric:         Mood and Affect: Mood normal.         Behavior: Behavior normal.         Thought Content:  Thought content normal.           Medications:      sodium chloride        [START ON 1/30/2023] vitamin D  50,000 Units Oral Once per day on Mon Wed Fri    sodium chloride flush  5-40 mL IntraVENous 2 times per day    ceFAZolin (ANCEF) IVPB  2,000 mg IntraVENous Q8H    acetaminophen  1,000 mg Oral 3 times per day    [facility] Hold] sodium chloride flush  5-40 mL IntraVENous 2 times per day    lisinopril  10 mg Oral Daily    gabapentin  300 mg Oral 4x Daily     sodium chloride flush, naloxone, morphine, morphine, morphine, [MAR Hold] sodium chloride flush, sodium chloride, ondansetron **OR** ondansetron, potassium chloride **OR** potassium alternative oral replacement **OR** potassium chloride, magnesium sulfate, oxyCODONE **OR** oxyCODONE, polyethylene glycol, melatonin, calcium carbonate, guaiFENesin-dextromethorphan  ADULT DIET; Regular     Lab and other Data:     Recent Labs     01/28/23 2052 01/29/23  0350   WBC 9.5 10.9*   HGB 14.4 13.6*    191     Recent Labs     01/28/23 2052 01/29/23  0350    137   K 4.3 4.3    103   CO2 27 23   BUN 14 13   CREATININE 0.9 0.8   GLUCOSE 123* 126*     Recent Labs     01/28/23 2052   AST 18   ALT 10   BILITOT 0.5   ALKPHOS 94     Troponin T:   Recent Labs     01/28/23 2052   TROPONINI <0.01     Pro-BNP: No results for input(s): BNP in the last 72 hours. INR:   Recent Labs     01/28/23 2052   INR 1.20*     UA:  Recent Labs     01/28/23 2219   COLORU YELLOW   PHUR 6.0   CLARITYU Clear   SPECGRAV 1.019   LEUKOCYTESUR Negative   UROBILINOGEN 1.0   BILIRUBINUR Negative   BLOODU Negative   GLUCOSEU Negative     A1C: No results for input(s): LABA1C in the last 72 hours. ABG:No results for input(s): PHART, KLP8PBK, PO2ART, NIN7OHS, BEART, HGBAE, M8RTWLDQ, CARBOXHGBART in the last 72 hours. RAD:   CT HEAD WO CONTRAST    Result Date: 1/28/2023  No acute intracranial hemorrhage, midline shift, or mass effect. Electronically signed by Dominique Mcleod MD on 01-28-23 at 2329    XR CHEST PORTABLE    Result Date: 1/28/2023  No acute pulmonary abnormality. Mild cardiomegaly. Recommendation: Follow up as clinically indicated. Dictated and Electronically Signed by Jyoti Ureña at 28-Jan-2023 10:43:58 PM EST             CTA PULMONARY W CONTRAST    Result Date: 1/29/2023  1. No acute pulmonary emboli appreciated, within the limits, as above. 2. Large hiatal hernia, as described. 3. Mild bilateral pleuroparenchymal pathology, as detailed above. 4. Chronic-appearing osseous changes in the visualized spine, as detailed above, correlate clinically. 5. Cholelithiasis. 6. Borderline aneurysmal thoracic aortic arch. Mild cardiomegaly, CAD, atherosclerosis. 7. Other findings, as above. Please see comments above. Recommendation: Follow up as clinically indicated.  All CT scans at this facility utilize dose modulation, iterative reconstruction, and/or weight based dosing when appropriate to reduce radiation dose to as low as reasonably achievable. Dictated and Electronically Signed by Mima Canchola MD at 29-Jan-2023 02:18:02 PM EST             XR HIP 2-3 VW W PELVIS RIGHT    Result Date: 1/28/2023  Right femoral neck fracture as described above. Recommendation: Follow up as clinically indicated. Dictated and Electronically Signed by Liz Hoover at 28-Jan-2023 11:09:05 PM EST                  Assessment/Plan   Principal Problem:    Closed right hip fracture, initial encounter ([facility] [address]   -S/P right hemiarthroplasty per Dr. Rogerio Phillips   -Pain management   -Defer Pt/OT to ortho   -Case management consult for discharge planning   -Monitor daily labs   -vitals per unit routine   -Supportive care    Active Problems:    COVID   -D-Dimer >20-CTA pulmonary ordered-negative for PE   -O2 per protocol if needed   -Droplet + precautions   -Monitor vital signs   -Daily labs       Frequent falls   -Fall precautions       Hypertension  Resolved Problems:    * No resolved hospital problems.  *      DVT Prophylaxis: SCD's    Disposition: TBBA Qureshi, JOE, 1/29/2023 6:00 PM

## 2023-01-30 LAB
ADENOVIRUS BY PCR: NOT DETECTED
ANION GAP SERPL CALCULATED.3IONS-SCNC: 9 MMOL/L (ref 7–19)
BASOPHILS ABSOLUTE: 0 K/UL (ref 0–0.2)
BASOPHILS RELATIVE PERCENT: 0.1 % (ref 0–1)
BORDETELLA PARAPERTUSSIS BY PCR: NOT DETECTED
BORDETELLA PERTUSSIS BY PCR: NOT DETECTED
BUN BLDV-MCNC: 13 MG/DL (ref 8–23)
CALCIUM SERPL-MCNC: 8.1 MG/DL (ref 8.8–10.2)
CHLAMYDOPHILIA PNEUMONIAE BY PCR: NOT DETECTED
CHLORIDE BLD-SCNC: 102 MMOL/L (ref 98–111)
CO2: 24 MMOL/L (ref 22–29)
CORONAVIRUS 229E BY PCR: NOT DETECTED
CORONAVIRUS HKU1 BY PCR: NOT DETECTED
CORONAVIRUS NL63 BY PCR: NOT DETECTED
CORONAVIRUS OC43 BY PCR: NOT DETECTED
CREAT SERPL-MCNC: 0.8 MG/DL (ref 0.5–1.2)
EOSINOPHILS ABSOLUTE: 0 K/UL (ref 0–0.6)
EOSINOPHILS RELATIVE PERCENT: 0 % (ref 0–5)
GFR SERPL CREATININE-BSD FRML MDRD: >60 ML/MIN/{1.73_M2}
GLUCOSE BLD-MCNC: 168 MG/DL (ref 74–109)
HCT VFR BLD CALC: 37.1 % (ref 42–52)
HEMOGLOBIN: 12 G/DL (ref 14–18)
HUMAN METAPNEUMOVIRUS BY PCR: NOT DETECTED
HUMAN RHINOVIRUS/ENTEROVIRUS BY PCR: NOT DETECTED
IMMATURE GRANULOCYTES #: 0 K/UL
INFLUENZA A BY PCR: NOT DETECTED
INFLUENZA B BY PCR: NOT DETECTED
LYMPHOCYTES ABSOLUTE: 0.6 K/UL (ref 1.1–4.5)
LYMPHOCYTES RELATIVE PERCENT: 6.2 % (ref 20–40)
MCH RBC QN AUTO: 30.3 PG (ref 27–31)
MCHC RBC AUTO-ENTMCNC: 32.3 G/DL (ref 33–37)
MCV RBC AUTO: 93.7 FL (ref 80–94)
MONOCYTES ABSOLUTE: 0.5 K/UL (ref 0–0.9)
MONOCYTES RELATIVE PERCENT: 5.1 % (ref 0–10)
MYCOPLASMA PNEUMONIAE BY PCR: NOT DETECTED
NEUTROPHILS ABSOLUTE: 8.5 K/UL (ref 1.5–7.5)
NEUTROPHILS RELATIVE PERCENT: 88.2 % (ref 50–65)
PARAINFLUENZA VIRUS 1 BY PCR: NOT DETECTED
PARAINFLUENZA VIRUS 2 BY PCR: NOT DETECTED
PARAINFLUENZA VIRUS 3 BY PCR: NOT DETECTED
PARAINFLUENZA VIRUS 4 BY PCR: NOT DETECTED
PDW BLD-RTO: 14.2 % (ref 11.5–14.5)
PLATELET # BLD: 166 K/UL (ref 130–400)
PMV BLD AUTO: 9.7 FL (ref 9.4–12.4)
POTASSIUM REFLEX MAGNESIUM: 4.4 MMOL/L (ref 3.5–5)
RBC # BLD: 3.96 M/UL (ref 4.7–6.1)
RESPIRATORY SYNCYTIAL VIRUS BY PCR: NOT DETECTED
SARS-COV-2, PCR: NOT DETECTED
SODIUM BLD-SCNC: 135 MMOL/L (ref 136–145)
WBC # BLD: 9.6 K/UL (ref 4.8–10.8)

## 2023-01-30 PROCEDURE — 97162 PT EVAL MOD COMPLEX 30 MIN: CPT

## 2023-01-30 PROCEDURE — 2580000003 HC RX 258: Performed by: HOSPITALIST

## 2023-01-30 PROCEDURE — 97165 OT EVAL LOW COMPLEX 30 MIN: CPT

## 2023-01-30 PROCEDURE — 94760 N-INVAS EAR/PLS OXIMETRY 1: CPT

## 2023-01-30 PROCEDURE — 80048 BASIC METABOLIC PNL TOTAL CA: CPT

## 2023-01-30 PROCEDURE — 97530 THERAPEUTIC ACTIVITIES: CPT

## 2023-01-30 PROCEDURE — 36415 COLL VENOUS BLD VENIPUNCTURE: CPT

## 2023-01-30 PROCEDURE — 2580000003 HC RX 258: Performed by: ORTHOPAEDIC SURGERY

## 2023-01-30 PROCEDURE — 6370000000 HC RX 637 (ALT 250 FOR IP): Performed by: ORTHOPAEDIC SURGERY

## 2023-01-30 PROCEDURE — 97110 THERAPEUTIC EXERCISES: CPT

## 2023-01-30 PROCEDURE — 85025 COMPLETE CBC W/AUTO DIFF WBC: CPT

## 2023-01-30 PROCEDURE — 0202U NFCT DS 22 TRGT SARS-COV-2: CPT

## 2023-01-30 PROCEDURE — 97116 GAIT TRAINING THERAPY: CPT

## 2023-01-30 PROCEDURE — 2580000003 HC RX 258: Performed by: ANESTHESIOLOGY

## 2023-01-30 PROCEDURE — 6360000002 HC RX W HCPCS: Performed by: HOSPITALIST

## 2023-01-30 PROCEDURE — 1210000000 HC MED SURG R&B

## 2023-01-30 RX ORDER — POTASSIUM CHLORIDE 7.45 MG/ML
10 INJECTION INTRAVENOUS PRN
Status: DISCONTINUED | OUTPATIENT
Start: 2023-01-30 | End: 2023-02-02 | Stop reason: HOSPADM

## 2023-01-30 RX ADMIN — GABAPENTIN 300 MG: 300 CAPSULE ORAL at 14:08

## 2023-01-30 RX ADMIN — GABAPENTIN 300 MG: 300 CAPSULE ORAL at 17:05

## 2023-01-30 RX ADMIN — CEFAZOLIN 2000 MG: 2 INJECTION, POWDER, FOR SOLUTION INTRAMUSCULAR; INTRAVENOUS at 03:17

## 2023-01-30 RX ADMIN — OXYCODONE 10 MG: 5 TABLET ORAL at 20:40

## 2023-01-30 RX ADMIN — SODIUM CHLORIDE, PRESERVATIVE FREE 10 ML: 5 INJECTION INTRAVENOUS at 20:38

## 2023-01-30 RX ADMIN — SODIUM CHLORIDE, PRESERVATIVE FREE 10 ML: 5 INJECTION INTRAVENOUS at 20:41

## 2023-01-30 RX ADMIN — ACETAMINOPHEN 1000 MG: 500 TABLET, FILM COATED ORAL at 20:41

## 2023-01-30 RX ADMIN — OXYCODONE 10 MG: 5 TABLET ORAL at 09:10

## 2023-01-30 RX ADMIN — ACETAMINOPHEN 1000 MG: 500 TABLET, FILM COATED ORAL at 05:22

## 2023-01-30 RX ADMIN — ACETAMINOPHEN 1000 MG: 500 TABLET, FILM COATED ORAL at 14:08

## 2023-01-30 RX ADMIN — ERGOCALCIFEROL 50000 UNITS: 1.25 CAPSULE ORAL at 09:00

## 2023-01-30 RX ADMIN — SODIUM CHLORIDE: 9 INJECTION, SOLUTION INTRAVENOUS at 20:38

## 2023-01-30 RX ADMIN — SODIUM CHLORIDE, PRESERVATIVE FREE 10 ML: 5 INJECTION INTRAVENOUS at 09:02

## 2023-01-30 RX ADMIN — OXYCODONE 10 MG: 5 TABLET ORAL at 03:16

## 2023-01-30 RX ADMIN — GABAPENTIN 300 MG: 300 CAPSULE ORAL at 20:40

## 2023-01-30 RX ADMIN — ACETAMINOPHEN 1000 MG: 500 TABLET, FILM COATED ORAL at 00:19

## 2023-01-30 RX ADMIN — GABAPENTIN 300 MG: 300 CAPSULE ORAL at 09:00

## 2023-01-30 RX ADMIN — OXYCODONE 10 MG: 5 TABLET ORAL at 14:09

## 2023-01-30 RX ADMIN — LISINOPRIL 10 MG: 10 TABLET ORAL at 09:05

## 2023-01-30 ASSESSMENT — PAIN DESCRIPTION - DESCRIPTORS
DESCRIPTORS: DISCOMFORT
DESCRIPTORS: DISCOMFORT
DESCRIPTORS: ACHING;DISCOMFORT
DESCRIPTORS: THROBBING
DESCRIPTORS: ACHING;CRAMPING
DESCRIPTORS: ACHING

## 2023-01-30 ASSESSMENT — PAIN DESCRIPTION - LOCATION
LOCATION: HIP

## 2023-01-30 ASSESSMENT — PAIN DESCRIPTION - ORIENTATION
ORIENTATION: RIGHT

## 2023-01-30 ASSESSMENT — ENCOUNTER SYMPTOMS
EYES NEGATIVE: 1
RESPIRATORY NEGATIVE: 1
GASTROINTESTINAL NEGATIVE: 1
ALLERGIC/IMMUNOLOGIC NEGATIVE: 1

## 2023-01-30 ASSESSMENT — PAIN SCALES - WONG BAKER: WONGBAKER_NUMERICALRESPONSE: 4

## 2023-01-30 ASSESSMENT — PAIN SCALES - GENERAL
PAINLEVEL_OUTOF10: 3
PAINLEVEL_OUTOF10: 7
PAINLEVEL_OUTOF10: 3
PAINLEVEL_OUTOF10: 8
PAINLEVEL_OUTOF10: 2
PAINLEVEL_OUTOF10: 2
PAINLEVEL_OUTOF10: 7
PAINLEVEL_OUTOF10: 7

## 2023-01-30 ASSESSMENT — PAIN - FUNCTIONAL ASSESSMENT
PAIN_FUNCTIONAL_ASSESSMENT: ACTIVITIES ARE NOT PREVENTED

## 2023-01-30 ASSESSMENT — PAIN DESCRIPTION - FREQUENCY: FREQUENCY: INTERMITTENT

## 2023-01-30 NOTE — ACP (ADVANCE CARE PLANNING)
Advance Care Planning     Advance Care Planning Activator (Inpatient)  Conversation Note      Date of ACP Conversation: 1/30/2023     Conversation Conducted with: Patient with Decision Making Capacity    ACP Activator: [id number] Decision Maker:     Current Designated Health Care Decision Maker:     Primary Decision Maker: Jarvis Barksdale Child - [phone]    Care Preferences    Ventilation: \"If you were in your present state of health and suddenly became very ill and were unable to breathe on your own, what would your preference be about the use of a ventilator (breathing machine) if it were available to you? \"      Would the patient desire the use of ventilator (breathing machine)?: no    \"If your health worsens and it becomes clear that your chance of recovery is unlikely, what would your preference be about the use of a ventilator (breathing machine) if it were available to you? \"     Would the patient desire the use of ventilator (breathing machine)?: No      Resuscitation  \"CPR works best to restart the heart when there is a sudden event, like a heart attack, in someone who is otherwise healthy. Unfortunately, CPR does not typically restart the heart for people who have serious health conditions or who are very sick. \"    \"In the event your heart stopped as a result of an underlying serious health condition, would you want attempts to be made to restart your heart (answer \"yes\" for attempt to resuscitate) or would you prefer a natural death (answer \"no\" for do not attempt to resuscitate)? \" no       [] Yes   [] No   Educated Patient / Alberto Row regarding differences between Advance Directives and portable DNR orders.     Length of ACP Conversation in minutes:  25    Conversation Outcomes:  [x] ACP discussion completed      Follow-up plan:  None    Electronically signed by ALEX Pandey on 1/30/2023 at 3:59 PM

## 2023-01-30 NOTE — PROGRESS NOTES
Orthopedic Surgery Progress Note    Ruthann Monge  1/30/2023    Subjective:     Post-Operative Day # 1 s/p right  hip hemiarthroplasty    Systemic or Specific Complaints: No Complaints, doing well. PT/OT in room    Objective:     /62   Pulse 58   Temp 97.5 °F (36.4 °C) (Temporal)   Resp 20   Ht 5' 11\" (1.803 m)   Wt 191 lb 9 oz (86.9 kg)   SpO2 91%   BMI 26.72 kg/m²     General: alert, appears stated age and cooperative   Wound: clean, dry, intact              Dressing: clean, dry, and intact   Extremity: Distal NVI, mild pain with passive hip flexion            DVT Exam: No evidence of DVT seen on physical exam.                   Data Review  CBC:   Lab Results   Component Value Date/Time    WBC 9.6 01/30/2023 02:23 AM    RBC 3.96 01/30/2023 02:23 AM    HGB 12.0 01/30/2023 02:23 AM    HCT 37.1 01/30/2023 02:23 AM     01/30/2023 02:23 AM       Assessment:     Status Post right hip surgery, doing well     Plan:      1:  DVT prophylaxis  2:  Continue pain control  3:  Physical therapy as per protocol  4:  Anticipate discharge when medically stable  5: Weight bearing as tolerated  6: f/u in office 2 weeks.     Charlotte Elam PA-C

## 2023-01-30 NOTE — CARE COORDINATION
Case Management Assessment  Initial Evaluation    Date/Time of Evaluation: 1/30/2023 1:32 PM  Assessment Completed by: Shweta Gama RN    If patient is discharged prior to next notation, then this note serves as note for discharge by case management. Patient Name: Shane Wade                   YOB: 1935  Diagnosis: Closed right hip fracture, initial encounter [facility]) Emmy Arenas                   Date / Time: 1/28/2023  8:45 PM    Patient Admission Status: Inpatient   Readmission Risk (Low < 19, Mod (19-27), High > 27): Readmission Risk Score: 9.7    Current PCP: Faby Otero MD  PCP verified by CM? Yes    Chart Reviewed: Yes      History Provided by: Patient  Patient Orientation: Alert and Oriented    Patient Cognition: Alert    Hospitalization in the last 30 days (Readmission):  No    If yes, Readmission Assessment in CM Navigator will be completed. Advance Directives:      Code Status: Full Code   Patient's Primary Decision Maker is:        Discharge Planning:    Patient lives with: Alone Type of Home: House  Primary Care Giver: Private caregiver  Patient Support Systems include: Children, Other (Comment) (Private CGs through [facility])   Current Financial resources:  (no financial needs identified)  Current community resources:    Current services prior to admission: [facility], [facility] (private CGs 8 hours day/7)            Current DME: Leisa Caraballo            Type of Home Care services:  OT, PT, Nursing Services    ADLS  Prior functional level: Assistance with the following:, Cooking, Housework, Shopping  Current functional level: Assistance with the following:, Bathing, Dressing, Toileting, Cooking, Housework, Shopping, Mobility    PT AM-PAC:   /24  OT AM-PAC:   /24    Family can provide assistance at DC: Would you like Case Management to discuss the discharge plan with any other family members/significant others, and if so, who?     Plans to Return to Present Housing: Yes  Other Identified Issues/Barriers to RETURNING to current housing: N/A  Potential Assistance needed at discharge: 1 Yoanna Tony, Other (Comment) (increase in amount of time care is provided by private CGs)            Potential DME:    Patient expects to discharge to: [address] [place] [address] for transportation at discharge: Family    Financial    Payor: Walter Correa / Plan: Regina Sanabria ESSENTIAL/PLUS / Product Type: *No Product type* /     Does insurance require precert for SNF: Yes    Potential assistance Purchasing Medications: No  Meds-to-Beds request:        [facility] [address] [place] [address] [facility] Sonia Restrepo [id number] [facility] [address]  Phone: [phone] Fax: [phone]      Notes:    Factors facilitating achievement of predicted outcomes: Caregiver support, Cooperative, and Pleasant    Barriers to discharge: N/A    Additional Case Management Notes:   Patient lives at home alone and plans to return home at discharge. Prior to admission, patient was receiving 8 hours of Caregiver service from [facility]. [facility]    He has been receiving Caregiver service since last month after his spouse of 61 years passed away. He plans to increase their services from 8 hours a day to 24 hours day after discharge. This CM spoke with Rio Matta at [facility]. She has requested an advanced notice of discharge in order to schedule staff. Note placed on chart. Patient has 2 walkers and a Lift Juanita. He is interested in [address] [place] [address] services. He received the Covid vaccine. His daughter, Gay Cogan, or a Caregiver will provide transportation home.     The Plan for Transition of Care is related to the following treatment goals of Closed right hip fracture, initial encounter ([facility] [address] [Z07.520F]    IF APPLICABLE: The Patient and/or patient representative Michelle Livingston and his family were provided with a choice of provider and agrees with the discharge plan. Freedom of choice list with basic dialogue that supports the patient's individualized plan of care/goals and shares the quality data associated with the providers was provided to: Patient   Patient Representative Name:       The Patient and/or Patient Representative Agree with the Discharge Plan?  Yes    Freya Galvan RN  Case Management Department  Ph: [phone] Fax: [phone]

## 2023-01-30 NOTE — CARE COORDINATION
Spoke with patient regarding MD orders for [place] services. Patient agreeable and has chosen [facility]. Referral Faxed. [address] [place] [address] [phone]. [phone]. Please notify [facility] when patient discharges and fax DC Summary,  DC med list and any new [place] orders. The Patient and/or patient representative was provided with a choice of provider and agrees   with the discharge plan. [x] Yes [] No    Freedom of choice list was provided with basic dialogue that supports the patient's individualized plan of care/goals, treatment preferences and shares the quality data associated with the providers.  [x] Yes [] No  Electronically signed by Rio David on 1/30/2023 at 3:48 PM

## 2023-01-30 NOTE — PROGRESS NOTES
01/30/23 1409   Subjective   Subjective Patient BTB. Reviewed PHP. Patient only knew not to cross legs. All 3 reviewed. Pain Assessment   Pain Level 7   Vitals   Resp 18   Patient Education   Education Given To Patient   Education Provided Role of Therapy;Plan of Care;Precautions; Fall Prevention Strategies   Education Provided Comments PHP'S   Education Method Demonstration;Verbal   Barriers to Learning None   Education Outcome Verbalized understanding;Continued education needed       Electronically signed by Tania Patten PTA on 1/30/2023 at 2:58 PM

## 2023-01-30 NOTE — PROGRESS NOTES
Occupational Therapy  Facility/Department: [facility] 4 ONCOLOGY UNIT  Occupational Therapy Initial Assessment    Name: Dylan Chambers  : 1935  MRN: [id number]  Date of Service: 2023    Discharge Recommendations:             Patient Diagnosis(es): The primary encounter diagnosis was Closed displaced fracture of right femoral neck ([facility] [address] Diagnoses of Fall, initial encounter and COVID-19 virus infection were also pertinent to this visit. Past Medical History:  has a past medical history of DVT (deep venous thrombosis) ([facility] [address] Hypertension, Iliac artery aneurysm, right ([facility] [address] and Osteoarthritis. Past Surgical History:  has a past surgical history that includes back surgery (2008); joint replacement (Bilateral); back surgery (2017); hernia repair; Neck surgery (2018); and shoulder surgery (Left). Treatment Diagnosis: R posterior hip replacement, Covid      Assessment   Performance deficits / Impairments: Decreased ADL status; Decreased balance;Decreased functional mobility ; Decreased strength;Decreased endurance  Assessment: Will progress as tolerated. Will likely need extended therapy following acute care before returning home  Treatment Diagnosis: R posterior hip replacement, Covid  Prognosis: Good  Decision Making: Low Complexity  REQUIRES OT FOLLOW-UP: Yes  Activity Tolerance  Activity Tolerance: Patient Tolerated treatment well        Plan   Occupational Therapy Plan  Times Per Week: 3-5x/week  Times Per Day:  Once a day     Restrictions  Position Activity Restriction  Hip Precautions: Posterior hip precautions    Subjective   General  Chart Reviewed: Yes  Patient assessed for rehabilitation services?: Yes  Family / Caregiver Present: No  Diagnosis: R posterior hip, COVID     Social/Functional History  Social/Functional History  Lives With: Alone (Has caregivers 6-8 hours a day.)  Home Equipment: Lift chair, Walker, rolling  Receives Help From: Personal care attendant  ADL Assistance: Independent  Ambulation Assistance: Independent  Transfer Assistance: Independent       Objective   Heart Rate: 58  Heart Rate Source: Monitor  BP: 107/62  BP Location: Left upper arm  MAP (Calculated): 77  Resp: 18  SpO2: 91 %  O2 Device: None (Room air)                      AROM: Within functional limits  Strength: Within functional limits  ADL  Feeding: Independent  Grooming: Independent  UE Bathing: Supervision  LE Bathing: Maximum assistance  UE Dressing: Supervision  LE Dressing: Dependent/Total  Toileting: Maximum assistance  Additional Comments: Posterior hip precautions           Transfers  Stand Step Transfers: Minimal assistance  Sit to stand: 2 Person assistance; Moderate assistance;Minimal assistance (Mod of 1 and Vanda of 2)  Transfer Comments: RW  Vision  Vision: Within Functional Limits  Hearing  Hearing: Within functional limits  Cognition  Overall Cognitive Status: WFL  Orientation  Overall Orientation Status: Within Functional Limits                                           G-Code     OutComes Score                                                  AM-PAC Score             Tinneti Score       Goals  Short Term Goals  Time Frame for Short Term Goals: 1 week  Short Term Goal 1: Vanda of 1 BSC tfers  Short Term Goal 2: Vanda with balance for managing clothing in standing  Short Term Goal 3: Vanda with donning shorts to knee level with AE  Short Term Goal 4: Verbalize posterior hip precautions  Long Term Goals  Long Term Goal 1: Return to PLOF       Therapy Time   Individual Concurrent Group Co-treatment   Time In           Time Out           Minutes                   Zachary England OT

## 2023-01-30 NOTE — PROGRESS NOTES
Occupational Therapy     01/30/23 1700   Subjective   Subjective Pt sitting up in recliner upon arrival for therapy. Pt agreeable to participate. Pain Assessment   Pain Assessment Banegas-Baker FACES   Banegas-Baker Pain Rating 4   Pain Location Hip   Pain Orientation Right   Pain Descriptors Discomfort   Functional Pain Assessment Activities are not prevented   Pain Frequency Intermittent   Non-Pharmaceutical Pain Intervention(s) Ambulation/Increased Activity;Repositioned; Rest   Response to Pain Intervention Patient satisfied   Cognition   Overall Cognitive Status WFL   Orientation   Overall Orientation Status WFL   Bed Mobility Training   Bed Mobility Training Yes   Overall Level of Assistance Minimum assistance; Moderate assistance   Interventions Verbal cues; Tactile cues;Manual cues   Sit to Supine Minimum assistance; Moderate assistance   Scooting Minimum assistance   Transfer Training   Transfer Training Yes   Overall Level of Assistance Moderate assistance   Interventions Verbal cues; Tactile cues;Manual cues   Sit to Stand Moderate assistance   Stand to Sit Moderate assistance   Bed to Chair Moderate assistance;Minimum assistance   Balance   Sitting Intact   Standing With support  (RW)   ADL   Feeding Independent   Grooming Independent   UE Bathing Supervision   LE Bathing Maximum assistance   UE Dressing Supervision   LE Dressing Maximum assistance;Dependent/Total   Toileting Maximum assistance   Additional Comments Posterior hip precautions   Assessment   Assessment Tx focused on functional t/f from recliner to EOB at RW level with Mod assist. Pt demo fear of falling. Pt instructed on AROM exercises sitting EOB.  Pt instructed on bed mobility to get back in bed.,   Activity Tolerance Patient tolerated treatment well   Discharge Recommendations Patient would benefit from continued therapy after discharge   Occupational Therapy Plan   Times Per Week 3-5x/week   Times Per Day Once a day   OT Plan of Care   Monday X Electronically signed by SHANNAN Alexander on 1/30/2023 at 5:15 PM

## 2023-01-30 NOTE — PROGRESS NOTES
Physical Therapy  Facility/Department: [facility] 4 ONCOLOGY UNIT  Physical Therapy Initial Assessment    Name: Tori Vicente  : 1935  MRN: [id number]  Date of Service: 2023    Discharge Recommendations:  Continue to assess pending progress, Patient would benefit from continued therapy after discharge (IF Pt WERE TO [facility], HE WOULD REQUIRE ASSIST . ADVISE SHORT TERM REHAB PRIOR TO DC)          Patient Diagnosis(es): The primary encounter diagnosis was Closed displaced fracture of right femoral neck ([address] Diagnoses of Fall, initial encounter and COVID-19 virus infection were also pertinent to this visit. Past Medical History:  has a past medical history of DVT (deep venous thrombosis) ([address] Hypertension, Iliac artery aneurysm, right ([address] and Osteoarthritis. Past Surgical History:  has a past surgical history that includes back surgery (2008); joint replacement (Bilateral); back surgery (2017); hernia repair; Neck surgery (2018); and shoulder surgery (Left). Assessment   Body Structures, Functions, Activity Limitations Requiring Skilled Therapeutic Intervention: Decreased functional mobility ; Decreased strength;Decreased endurance;Decreased balance;Decreased posture; Increased pain  Assessment: pt WOULD BENEFIT FROM SKILLED PT IN THIS SETTING TO ADDRESS HIS MOBILITY DEFICITS POST FALL WITH R HIP FX. Therapy Prognosis: Good  Decision Making: Medium Complexity  Requires PT Follow-Up: Yes  Activity Tolerance  Activity Tolerance: Patient tolerated treatment well     Plan   Physcial Therapy Plan  General Plan: 5-7 times per week  Therapy Duration: 2 Weeks  Current Treatment Recommendations: Strengthening, Functional mobility training, Transfer training, Gait training, Pain management, Safety education & training, Positioning, Therapeutic activities  Additional Comments: WILL REQUIRE ASSIST OF TWO INITIALLY FOR SAFETY. PHP'S, ABD PILLOW IN BED.   Safety Devices  Type of Devices: Call light within reach, Gait belt, Nurse notified, Left in chair     Restrictions  Restrictions/Precautions  Restrictions/Precautions: Weight Bearing  Lower Extremity Weight Bearing Restrictions  Right Lower Extremity Weight Bearing: Weight Bearing As Tolerated  Position Activity Restriction  Hip Precautions: Posterior hip precautions  Other position/activity restrictions: ABDUCTION PILLOW WHEN IN BED     Subjective   General  Patient assessed for rehabilitation services?: Yes  Diagnosis: FALL WITH R HIP FX, JAMIL HIP BY DR Melanie Fontenot 1-29  Follows Commands: Within Functional Limits  Subjective  Subjective: pt STATES HE HAS HAD PAIN MEDICINE AND IS READY TO WORK WITH PT. Social/Functional History  Social/Functional History  Lives With: Alone  Home Equipment: Lift chair, Walker, rolling  Receives Help From: Personal care attendant (8 HRS/DAY)  ADL Assistance: Independent  Ambulation Assistance: Independent  Transfer Assistance: Independent  Vision/Hearing       Cognition   Orientation  Overall Orientation Status: Within Functional Limits  Cognition  Overall Cognitive Status: WFL     Objective   Heart Rate: 58  Heart Rate Source: Monitor  BP: 107/62  BP Location: Left upper arm  MAP (Calculated): 77  Resp: 18  SpO2: 91 %  O2 Device: None (Room air)              AROM RLE (degrees)  RLE AROM: WFL  AROM LLE (degrees)  LLE AROM : WFL  Strength RLE  Comment: AS ANTICIPATED FOR POST OP  Strength LLE  Strength LLE: WFL           Bed mobility  Supine to Sit: Moderate assistance  Transfers  Sit to Stand: Moderate Assistance (1+1 FOR SAFETY)  Stand to Sit: Minimal Assistance  Ambulation  Surface: Level tile  Device: Rolling Walker  Assistance: Minimal assistance (1+1)  Quality of Gait: FLEXED POSTURE, ANTALGIC.   Gait Deviations: Slow Vibha;Decreased step length;Decreased step height  Distance: 3 FT FROM BED TO RECLINER                   Goals  Short Term Goals  Time Frame for Short Term Goals: 2 WKS  Short Term Goal 1: SUP<>SIT, MIN A 1  Short Term Goal 2: TF'S WITH RW, CGA 1  Short Term Goal 3: AMB 50 FT WITH RW, CGA       Education  Patient Education  Education Given To: Patient  Education Provided: Role of Therapy;Plan of Care;Precautions;Transfer Training  Education Provided Comments: PHP'S  Education Method: Demonstration;Verbal  Barriers to Learning: None  Education Outcome: Verbalized understanding;Continued education needed      Therapy Time   Individual Concurrent Group Co-treatment   Time In           Time Out           Minutes                   Kirk Fatima PT   Electronically signed by Kirk Fatima PT on 1/30/2023 at 10:52 AM

## 2023-01-30 NOTE — PROGRESS NOTES
[facility]      Progress Note    Patient:  Rai Goff  YOB: 1935  Date of Service: 1/30/2023  MRN: [id number]   Acct: [de-identified]   Primary Care Physician: Fan Bean MD  Advance Directive: Full Code  Admit Date: 1/28/2023       Hospital Day: 2    Portions of this note have been copied forward, however, updated to reflect the most current clinical status of this patient. CHIEF COMPLAINT fall with right hip pain    SUBJECTIVE: Sitting up in chair on room air no complaints of COVID symptoms pain is controlled and anxious to get home      Brittni 10: Patient is a 81year-old with past medical history of DVT, hypertension, osteoarthritis, history of back surgery. On date of admission patient was getting dressed and fell losing his balance while trying to hang up close. He fell backwards hit the door frame and landed on his right hip patient had immediate pain. Any other injury or loss of consciousness. Was ambulatory to his private vehicle and brought to the ED. Patient did report balance issues for 2 to 3 months. [facility] x-ray of the right pelvis showed a right femoral neck fracture troponins negative and he was COVID-positive. All other lab was within normal limits. She underwent a right hemiarthroplasty by Dr. Tommy Galo. He is done well and progressing with physical therapy . Andreas Enciso He wants to return home he has a previous sitter service hired and ready and he has all durable medical equipment he needs. Repeat COVID PCR done in case he requires SNF placement. Review of Systems   Constitutional: Negative. Eyes: Negative. Respiratory: Negative. Cardiovascular: Negative. Gastrointestinal: Negative. Endocrine: Negative. Genitourinary: Negative. Musculoskeletal: Negative. Skin: Negative. Allergic/Immunologic: Negative. Neurological: Negative. Hematological: Negative. Psychiatric/Behavioral: Negative.         Objective:   VITALS: /62   Pulse 58   Temp 97.5 °F (36.4 °C) (Temporal)   Resp 18   Ht 5' 11\" (1.803 m)   Wt 191 lb 9 oz (86.9 kg)   SpO2 90%   BMI 26.72 kg/m²   24HR INTAKE/OUTPUT:    Intake/Output Summary (Last 24 hours) at 1/30/2023 1506  Last data filed at 1/30/2023 1027  Gross per 24 hour   Intake 240 ml   Output 750 ml   Net -510 ml       Physical Exam  Vitals and nursing note reviewed. Constitutional:       Appearance: Normal appearance. HENT:      Mouth/Throat:      Mouth: Mucous membranes are moist.      Pharynx: Oropharynx is clear. Eyes:      Extraocular Movements: Extraocular movements intact. Conjunctiva/sclera: Conjunctivae normal.      Pupils: Pupils are equal, round, and reactive to light. Cardiovascular:      Rate and Rhythm: Normal rate and regular rhythm. Pulses: Normal pulses. Heart sounds: Normal heart sounds. No murmur heard. Pulmonary:      Effort: Pulmonary effort is normal. No respiratory distress. Breath sounds: Normal breath sounds. Abdominal:      General: Bowel sounds are normal. There is no distension. Palpations: Abdomen is soft. Tenderness: There is no abdominal tenderness. There is no guarding or rebound. Musculoskeletal:         General: No swelling. Cervical back: Normal range of motion and neck supple. No rigidity or tenderness. Right hip: Tenderness present. Decreased range of motion. Decreased strength. Right lower leg: No edema. Left lower leg: No edema. Comments:   Neurovascular intact    Skin:     General: Skin is warm and dry. Capillary Refill: Capillary refill takes less than 2 seconds. Neurological:      General: No focal deficit present. Mental Status: He is alert and oriented to person, place, and time. GCS: GCS eye subscore is 4. GCS verbal subscore is 5. GCS motor subscore is 6. Cranial Nerves: No cranial nerve deficit.    Psychiatric:         Mood and Affect: Mood normal. Behavior: Behavior normal.        Medications:      sodium chloride      sodium chloride Stopped (01/30/23 0317)      vitamin D  50,000 Units Oral Once per day on Mon Wed Fri    sodium chloride flush  5-40 mL IntraVENous 2 times per day    sodium chloride flush  5-40 mL IntraVENous 2 times per day    acetaminophen  1,000 mg Oral 3 times per day    sodium chloride flush  5-40 mL IntraVENous 2 times per day    lisinopril  10 mg Oral Daily    gabapentin  300 mg Oral 4x Daily     potassium chloride, sodium chloride flush, sodium chloride flush, sodium chloride, ondansetron **OR** ondansetron, polyethylene glycol, naloxone, morphine, morphine, morphine, sodium chloride flush, potassium chloride **OR** potassium alternative oral replacement **OR** potassium chloride, magnesium sulfate, oxyCODONE **OR** oxyCODONE, melatonin, calcium carbonate, guaiFENesin-dextromethorphan  ADULT DIET; Regular; Safety Tray; Safety Tray (Disposables)     Lab and other Data:     Recent Labs     01/28/23 2052 01/29/23 0350 01/30/23 0223   WBC 9.5 10.9* 9.6   HGB 14.4 13.6* 12.0*    191 166     Recent Labs     01/28/23 2052 01/29/23 0350 01/30/23 0223    137 135*   K 4.3 4.3 4.4    103 102   CO2 27 23 24   BUN 14 13 13   CREATININE 0.9 0.8 0.8   GLUCOSE 123* 126* 168*     Recent Labs     01/28/23 2052   AST 18   ALT 10   BILITOT 0.5   ALKPHOS 94     Troponin T:   Recent Labs     01/28/23 2052   TROPONINI <0.01     Pro-BNP: No results for input(s): BNP in the last 72 hours. INR:   Recent Labs     01/28/23 2052   INR 1.20*     UA:  Recent Labs     01/28/23 2219   COLORU YELLOW   PHUR 6.0   CLARITYU Clear   SPECGRAV 1.019   LEUKOCYTESUR Negative   UROBILINOGEN 1.0   BILIRUBINUR Negative   BLOODU Negative   GLUCOSEU Negative     A1C: No results for input(s): LABA1C in the last 72 hours. ABG:No results for input(s): PHART, AUM0ZBW, PO2ART, UBJ4SPK, BEART, HGBAE, O2CGCKBC, CARBOXHGBART in the last 72 hours.     RAD:   CT HEAD WO CONTRAST    Result Date: 1/28/2023  Patient: Geetha Section  Time Out: 23:29Exam(s): CT HEAD Without Contrast EXAM:  CT Head Without Intravenous ContrastCLINICAL HISTORY:   Reason for exam: frequent falls, dizziness. TECHNIQUE:  Axial computed tomography images of the head/brain without intravenous contrast.COMPARISON:  No relevant prior studies available. FINDINGS:No acute intracranial hemorrhage. No midline shift or mass effect. The territorial gray-white matter differentiation is maintained throughout. Age-related cerebral volume loss. Periventricular and subcortical white matter hypoattenuation, consistent with chronic microangiopathy. The visualized orbits appear grossly unremarkable. The calvarium is intact. Opacified maxillary sinuses. No acute intracranial hemorrhage, midline shift, or mass effect. Electronically signed by Larry Mcacrthy MD on 01-28-23 at 2329    XR CHEST PORTABLE    Result Date: 1/28/2023  NO PRIOR REPORT AVAILABLE Exam: X-RAY OF [facility] Clinical data:Fall, preop. Technique:Single view of the chest. Prior studies: No prior studies submitted. Findings: The lungs are grossly clear; noevidence of acute infiltrate or pleural effusion. Mild cardiomegaly. No acute osseous abnormality is detected. No acute pulmonary abnormality. Mild cardiomegaly. Recommendation: Follow up as clinically indicated. Dictated and Electronically Signed by Marah Gottlieb at 28-Jan-2023 10:43:58 PM EST             CTA PULMONARY W CONTRAST    Result Date: 1/29/2023  NO PRIOR REPORT AVAILABLE Exam: CTA OF THE CHEST WITH CONTRAST Clinical data: Elevated D-dimer. Technique: Axial CT angiography images through the lungs were acquired with contrast and imaged using soft tissue and lung algorithms. Coronal, sagittal, and 3D volume reconstructions were performed. Reformatted/3D-MPR images were performed. Radiation dose: CTDIvol =38.77 mGy, DLP =736 mGy x cm. Prior studies: Radiographs of the chest dated 01/28/2023. Findings: Lungs: Tiny bilateral pleural effusions, greater on the left, with associated dependent atelectasis, as well as some compressive atelectasis in the left lower lobe abutting the large hiatal hernia. Presumable focal nodular scarring at the left base, image 118. Scattered bilateral subsegmental atelectasis/scarring. No pneumothorax. Soft Tissues: There is a large hiatal hernia, extending to the left, containing most of the stomach. Scattered small to borderline nonspecific lymph nodes. Vascular: No filling defect within the pulmonary arteries to the segmental branch level. The more distal evaluation is partially suboptimal. Mildly enlarged pulmonary artery could be seen with pulmonary arterial hypertension. 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 No evidence of dissection, although opacification of the descending thoracic aorta is partially suboptimal. Mild cardiomegaly, CAD, atherosclerosis. Bony structures: Significant multilevel spondylosis, DJD, possible osteopenia. There is a nondisplaced to minimally displaced distraction-type fracture involving the T9 vertebral body, which appears to be more likely chronic than acute, partially corticated, although correlate with patient's symptoms. The adjacent posterior elements are intact. Chronic L1-2 ankylosis, with a focal kyphosis a chronic compression fracture deformity, with a moderate chronic retropulsion. Some postoperative changes noted in the lower thoracic spine, with posterior laminectomies. Upper Abdomen:  Cholelithiasis. 1.1 cm left adrenal adenoma. IVC filter. Mildly aneurysmal upper abdominal aorta, 3.6 cm significant atherosclerosis. Significant amount of stool within the visualized portion of the colon. Diverticulosis, without diverticulitis in the field of view. Calcified granulomas in the spleen. 1. No acute pulmonary emboli appreciated, within the limits, as above. 2. Large hiatal hernia, as described.  3. Mild bilateral pleuroparenchymal pathology, as detailed above. 4. Chronic-appearing osseous changes in the visualized spine, as detailed above, correlate clinically. 5. Cholelithiasis. 6. Borderline aneurysmal thoracic aortic arch. Mild cardiomegaly, CAD, atherosclerosis. 7. Other findings, as above. Please see comments above. Recommendation: Follow up as clinically indicated. All CT scans at this facility utilize dose modulation, iterative reconstruction, and/or weight based dosing when appropriate to reduce radiation dose to as low as reasonably achievable. Dictated and Electronically Signed by Dylan Garcia MD at 29-Jan-2023 02:18:02 PM EST             XR HIP 2-3 VW W PELVIS RIGHT    Result Date: 1/28/2023  NO PRIOR REPORT AVAILABLE Exam: X-RAYS OF THE RIGHT HIP Clinical data: Fall, deformity. Technique: Three views of the right hip with AP view of the pelvis. Prior studies: No prior studies submitted. Findings: Right femoral neck fracture with varus angulation and slight superior migration along shaft component whereas the femoral head remains within the acetabulum. No evidence of superior pubic ramus fracture. Right femoral neck fracture as described above. Recommendation: Follow up as clinically indicated. Dictated and Electronically Signed by Sosa Marvin at 28-Jan-2023 11:09:05 PM EST                       Assessment/Plan   Principal Problem:    Closed right hip fracture, initial encounter ([address]   Followed by [facility] right hip repain   PT/OT    Active Problems:    COVID   Asymptomatic   Repeat PCR    Frequent falls   PT/OT     Fall precautions    Hypertension   Cont home meds   Moniter for need to change  Resolved Problems:    * No resolved hospital problems. *     DVT Prophylaxis: SCD     Discharge planning: Pt plans to DC home. Has sitter service hired and will order [facility]       Further Orders per Clinical course/attending.      Electronically signed by JOE Mccartney CNP on 1/30/2023 at 3:06 PM       EMR Dragon/Transcription disclaimer:   Much of this encounter note is an electronic transcription/translation of spoken language to printed text.  The electronic translation of spoken language may permit erroneous, or at times, nonsensical words or phrases to be inadvertently transcribed; although attempts have made to review the note for such errors, some may still exist.

## 2023-01-31 LAB
ADENOVIRUS BY PCR: NOT DETECTED
ANION GAP SERPL CALCULATED.3IONS-SCNC: 7 MMOL/L (ref 7–19)
BASOPHILS ABSOLUTE: 0.1 K/UL (ref 0–0.2)
BASOPHILS RELATIVE PERCENT: 0.6 % (ref 0–1)
BORDETELLA PARAPERTUSSIS BY PCR: NOT DETECTED
BORDETELLA PERTUSSIS BY PCR: NOT DETECTED
BUN BLDV-MCNC: 14 MG/DL (ref 8–23)
CALCIUM SERPL-MCNC: 7.9 MG/DL (ref 8.8–10.2)
CHLAMYDOPHILIA PNEUMONIAE BY PCR: NOT DETECTED
CHLORIDE BLD-SCNC: 107 MMOL/L (ref 98–111)
CO2: 25 MMOL/L (ref 22–29)
CORONAVIRUS 229E BY PCR: NOT DETECTED
CORONAVIRUS HKU1 BY PCR: NOT DETECTED
CORONAVIRUS NL63 BY PCR: NOT DETECTED
CORONAVIRUS OC43 BY PCR: NOT DETECTED
CREAT SERPL-MCNC: 0.8 MG/DL (ref 0.5–1.2)
EOSINOPHILS ABSOLUTE: 0.3 K/UL (ref 0–0.6)
EOSINOPHILS RELATIVE PERCENT: 3.6 % (ref 0–5)
GFR SERPL CREATININE-BSD FRML MDRD: >60 ML/MIN/{1.73_M2}
GLUCOSE BLD-MCNC: 105 MG/DL (ref 74–109)
HCT VFR BLD CALC: 34 % (ref 42–52)
HEMOGLOBIN: 11 G/DL (ref 14–18)
HUMAN METAPNEUMOVIRUS BY PCR: NOT DETECTED
HUMAN RHINOVIRUS/ENTEROVIRUS BY PCR: NOT DETECTED
IMMATURE GRANULOCYTES #: 0.1 K/UL
INFLUENZA A BY PCR: NOT DETECTED
INFLUENZA B BY PCR: NOT DETECTED
LYMPHOCYTES ABSOLUTE: 1.5 K/UL (ref 1.1–4.5)
LYMPHOCYTES RELATIVE PERCENT: 18.2 % (ref 20–40)
MCH RBC QN AUTO: 30.7 PG (ref 27–31)
MCHC RBC AUTO-ENTMCNC: 32.4 G/DL (ref 33–37)
MCV RBC AUTO: 95 FL (ref 80–94)
MONOCYTES ABSOLUTE: 0.8 K/UL (ref 0–0.9)
MONOCYTES RELATIVE PERCENT: 9.4 % (ref 0–10)
MYCOPLASMA PNEUMONIAE BY PCR: NOT DETECTED
NEUTROPHILS ABSOLUTE: 5.7 K/UL (ref 1.5–7.5)
NEUTROPHILS RELATIVE PERCENT: 67.5 % (ref 50–65)
PARAINFLUENZA VIRUS 1 BY PCR: NOT DETECTED
PARAINFLUENZA VIRUS 2 BY PCR: NOT DETECTED
PARAINFLUENZA VIRUS 3 BY PCR: NOT DETECTED
PARAINFLUENZA VIRUS 4 BY PCR: NOT DETECTED
PDW BLD-RTO: 14.4 % (ref 11.5–14.5)
PLATELET # BLD: 150 K/UL (ref 130–400)
PMV BLD AUTO: 9.3 FL (ref 9.4–12.4)
POTASSIUM REFLEX MAGNESIUM: 4.3 MMOL/L (ref 3.5–5)
RBC # BLD: 3.58 M/UL (ref 4.7–6.1)
RESPIRATORY SYNCYTIAL VIRUS BY PCR: NOT DETECTED
SARS-COV-2, PCR: NOT DETECTED
SODIUM BLD-SCNC: 139 MMOL/L (ref 136–145)
WBC # BLD: 8.4 K/UL (ref 4.8–10.8)

## 2023-01-31 PROCEDURE — 94760 N-INVAS EAR/PLS OXIMETRY 1: CPT

## 2023-01-31 PROCEDURE — 6370000000 HC RX 637 (ALT 250 FOR IP): Performed by: ORTHOPAEDIC SURGERY

## 2023-01-31 PROCEDURE — 85025 COMPLETE CBC W/AUTO DIFF WBC: CPT

## 2023-01-31 PROCEDURE — 6360000002 HC RX W HCPCS: Performed by: ORTHOPAEDIC SURGERY

## 2023-01-31 PROCEDURE — 97530 THERAPEUTIC ACTIVITIES: CPT

## 2023-01-31 PROCEDURE — 36415 COLL VENOUS BLD VENIPUNCTURE: CPT

## 2023-01-31 PROCEDURE — 97110 THERAPEUTIC EXERCISES: CPT

## 2023-01-31 PROCEDURE — 80048 BASIC METABOLIC PNL TOTAL CA: CPT

## 2023-01-31 PROCEDURE — 2580000003 HC RX 258: Performed by: ORTHOPAEDIC SURGERY

## 2023-01-31 PROCEDURE — 2580000003 HC RX 258: Performed by: ANESTHESIOLOGY

## 2023-01-31 PROCEDURE — 0202U NFCT DS 22 TRGT SARS-COV-2: CPT

## 2023-01-31 PROCEDURE — 97116 GAIT TRAINING THERAPY: CPT

## 2023-01-31 PROCEDURE — 1210000000 HC MED SURG R&B

## 2023-01-31 RX ADMIN — OXYCODONE 10 MG: 5 TABLET ORAL at 04:53

## 2023-01-31 RX ADMIN — GABAPENTIN 300 MG: 300 CAPSULE ORAL at 18:21

## 2023-01-31 RX ADMIN — OXYCODONE 10 MG: 5 TABLET ORAL at 11:23

## 2023-01-31 RX ADMIN — SODIUM CHLORIDE: 9 INJECTION, SOLUTION INTRAVENOUS at 04:51

## 2023-01-31 RX ADMIN — SODIUM CHLORIDE, PRESERVATIVE FREE 10 ML: 5 INJECTION INTRAVENOUS at 20:52

## 2023-01-31 RX ADMIN — LISINOPRIL 10 MG: 10 TABLET ORAL at 11:03

## 2023-01-31 RX ADMIN — MORPHINE SULFATE 2 MG: 2 INJECTION, SOLUTION INTRAMUSCULAR; INTRAVENOUS at 20:48

## 2023-01-31 RX ADMIN — MORPHINE SULFATE 2 MG: 2 INJECTION, SOLUTION INTRAMUSCULAR; INTRAVENOUS at 15:20

## 2023-01-31 RX ADMIN — SODIUM CHLORIDE: 9 INJECTION, SOLUTION INTRAVENOUS at 20:47

## 2023-01-31 RX ADMIN — SODIUM CHLORIDE, PRESERVATIVE FREE 10 ML: 5 INJECTION INTRAVENOUS at 11:26

## 2023-01-31 RX ADMIN — GABAPENTIN 300 MG: 300 CAPSULE ORAL at 20:48

## 2023-01-31 RX ADMIN — SODIUM CHLORIDE, PRESERVATIVE FREE 10 ML: 5 INJECTION INTRAVENOUS at 09:00

## 2023-01-31 RX ADMIN — ACETAMINOPHEN 1000 MG: 500 TABLET, FILM COATED ORAL at 20:48

## 2023-01-31 RX ADMIN — GABAPENTIN 300 MG: 300 CAPSULE ORAL at 11:04

## 2023-01-31 RX ADMIN — ACETAMINOPHEN 1000 MG: 500 TABLET, FILM COATED ORAL at 04:53

## 2023-01-31 RX ADMIN — GABAPENTIN 300 MG: 300 CAPSULE ORAL at 15:15

## 2023-01-31 RX ADMIN — ACETAMINOPHEN 1000 MG: 500 TABLET, FILM COATED ORAL at 15:15

## 2023-01-31 RX ADMIN — SODIUM CHLORIDE, PRESERVATIVE FREE 10 ML: 5 INJECTION INTRAVENOUS at 20:48

## 2023-01-31 RX ADMIN — Medication 5 MG: at 20:48

## 2023-01-31 ASSESSMENT — PAIN DESCRIPTION - LOCATION
LOCATION: HIP
LOCATION: LEG
LOCATION: HIP

## 2023-01-31 ASSESSMENT — PAIN SCALES - GENERAL
PAINLEVEL_OUTOF10: 8
PAINLEVEL_OUTOF10: 7
PAINLEVEL_OUTOF10: 7

## 2023-01-31 ASSESSMENT — PAIN DESCRIPTION - DESCRIPTORS
DESCRIPTORS: ACHING
DESCRIPTORS: ACHING;CRAMPING;DISCOMFORT
DESCRIPTORS: ACHING

## 2023-01-31 ASSESSMENT — PAIN DESCRIPTION - ORIENTATION
ORIENTATION: RIGHT;LEFT
ORIENTATION: RIGHT

## 2023-01-31 ASSESSMENT — ENCOUNTER SYMPTOMS
EYES NEGATIVE: 1
RESPIRATORY NEGATIVE: 1
ALLERGIC/IMMUNOLOGIC NEGATIVE: 1
GASTROINTESTINAL NEGATIVE: 1

## 2023-01-31 ASSESSMENT — PAIN DESCRIPTION - FREQUENCY
FREQUENCY: INTERMITTENT
FREQUENCY: INTERMITTENT

## 2023-01-31 ASSESSMENT — PAIN - FUNCTIONAL ASSESSMENT
PAIN_FUNCTIONAL_ASSESSMENT: PREVENTS OR INTERFERES SOME ACTIVE ACTIVITIES AND ADLS

## 2023-01-31 NOTE — PROGRESS NOTES
01/31/23 1200   Subjective   Subjective \" Why is this leg so weak \"   Pain Assessment   Pain Assessment 0-10   Pain Level 8   Patient's Stated Pain Goal 0 - No pain   Pain Location Hip   Pain Orientation Right   Pain Descriptors Aching   Functional Pain Assessment Prevents or interferes some active activities and ADLs   Pain Frequency Intermittent  (With movement.)   Non-Pharmaceutical Pain Intervention(s) Rest   Response to Pain Intervention Patient satisfied   Cognition   Overall Cognitive Status WFL   Orientation   Overall Orientation Status WFL   Vitals   O2 Device None (Room air)   Bed Mobility Training   Bed Mobility Training Yes   Supine to Sit Moderate assistance  (With increased time.)   Scooting Minimum assistance   Balance   Sitting Intact   Standing With support   Transfer Training   Transfer Training Yes   Sit to Stand Maximum assistance  (From bed 2nd attempt.)   Stand to Sit Minimum assistance   Bed to Chair Moderate assistance   Gait Training   Gait Training Yes   Gait   Overall Level of Assistance Moderate assistance   Speed/Vibha Slow   Step Length Right lengthened;Left shortened   Distance (ft) 3 Feet  (Cues  for weight shifting;proper RW placement;Proper gait sequence.)   Assistive Device Walker, rolling   Patient Education   Education Given To Patient   Education Provided Plan of Care; Fall Prevention Strategies; Equipment   Education Provided Comments PHP   Education Method Demonstration;Verbal   Barriers to Learning None   Education Outcome Verbalized understanding;Continued education needed   Other Specialty Interventions   Other Treatments/Modalities Patient in chair all needs in reach;personal alarm attached;warm blanket;ABD pillow between legs.    Assessment   Activity Tolerance Patient tolerated treatment well   PT Plan of Care   Tuesday X       Electronically signed by Shannan Malagon PTA on 1/31/2023 at 12:48 PM

## 2023-01-31 NOTE — CARE COORDINATION
Spoke to Pt and Pt daughter who was on speaker phone at Pt bedside about d/c plans. [facility] has offered Pt bed. Pt has accepted the bed offer. Per Dyke Lundborg Pt will need  two negative test 24hr apart before d/c to snf. Pt will need precert as well. Once two negatives are reported to SNF Dyke Lundborg will start precert.  Bettye J[address]: [phone] B:[phone]  Electronically signed by OLI Sierra on 1/31/2023 at 11:36 AM

## 2023-01-31 NOTE — PROGRESS NOTES
[facility]      Progress Note    Patient:  Pema Espinosa  YOB: 1935  Date of Service: 1/31/2023  MRN: [id number]   Acct: [de-identified]   Primary Care Physician: Misha Bonilla MD  Advance Directive: Full Code  Admit Date: 1/28/2023       Hospital Day: 3    Portions of this note have been copied forward, however, updated to reflect the most current clinical status of this patient. CHIEF COMPLAINT fall with right hip pain    SUBJECTIVE:  Tearful today. Has decided he cannot afford a 24 hour sitter service      Brittni 10:      Patient is a 81year-old with past medical history of DVT, hypertension, osteoarthritis, history of back surgery. On date of admission patient was getting dressed and fell losing his balance while trying to hang up close. He fell backwards hit the door frame and landed on his right hip patient had immediate pain. Any other injury or loss of consciousness. Was ambulatory to his private vehicle and brought to the ED. Patient did report balance issues for 2 to 3 months. [facility] x-ray of the right pelvis showed a right femoral neck fracture troponins negative and he was COVID-positive. All other lab was within normal limits. She underwent a right hemiarthroplasty by Dr. Jessi Erwin. He is done well and progressing with physical therapy . Lucy Poole He wants to return home he has a previous sitter service hired and ready and he has all durable medical equipment he needs. Repeat COVID PCR was done and is negative. Repeat tonight at 6 p.m. planned. Precert in progress for [facility]. Review of Systems   Constitutional: Negative. HENT: Negative. Eyes: Negative. Respiratory: Negative. Cardiovascular: Negative. Gastrointestinal: Negative. Endocrine: Negative. Genitourinary: Negative. Musculoskeletal: Negative. RIght hip pain   Skin: Negative. Allergic/Immunologic: Negative. Neurological: Negative.     Hematological: Negative. Psychiatric/Behavioral: Negative. Objective:   VITALS:  BP (!) 149/82   Pulse 63   Temp 97.5 °F (36.4 °C) (Temporal)   Resp 17   Ht 5' 11\" (1.803 m)   Wt 191 lb 9 oz (86.9 kg)   SpO2 92%   BMI 26.72 kg/m²   24HR INTAKE/OUTPUT:    Intake/Output Summary (Last 24 hours) at 1/31/2023 1243  Last data filed at 1/31/2023 0452  Gross per 24 hour   Intake 1231.67 ml   Output 1850 ml   Net -618.33 ml           Physical Exam  Vitals and nursing note reviewed. Constitutional:       Appearance: He is ill-appearing. HENT:      Head: Normocephalic and atraumatic. Nose: Nose normal.      Mouth/Throat:      Mouth: Mucous membranes are moist.   Eyes:      Extraocular Movements: Extraocular movements intact. Cardiovascular:      Rate and Rhythm: Normal rate and regular rhythm. Heart sounds: Normal heart sounds. Pulmonary:      Effort: Pulmonary effort is normal.      Breath sounds: Normal breath sounds. Abdominal:      General: Bowel sounds are normal.      Palpations: Abdomen is soft. Musculoskeletal:      Cervical back: Normal range of motion. Comments: Right hip pain   Skin:     General: Skin is warm and dry. Coloration: Skin is pale. Neurological:      General: No focal deficit present. Mental Status: He is alert.    Psychiatric:      Comments: Tearful today          Medications:      sodium chloride      sodium chloride 125 mL/hr at 01/31/23 0451      vitamin D  50,000 Units Oral Once per day on Mon Wed Fri    sodium chloride flush  5-40 mL IntraVENous 2 times per day    sodium chloride flush  5-40 mL IntraVENous 2 times per day    acetaminophen  1,000 mg Oral 3 times per day    sodium chloride flush  5-40 mL IntraVENous 2 times per day    lisinopril  10 mg Oral Daily    gabapentin  300 mg Oral 4x Daily     potassium chloride, sodium chloride flush, sodium chloride flush, sodium chloride, ondansetron **OR** ondansetron, polyethylene glycol, naloxone, morphine, morphine, morphine, sodium chloride flush, potassium chloride **OR** potassium alternative oral replacement **OR** potassium chloride, magnesium sulfate, oxyCODONE **OR** oxyCODONE, melatonin, calcium carbonate, guaiFENesin-dextromethorphan  ADULT DIET; Regular; Safety Tray; Safety Tray (Disposables)     Lab and other Data:     Recent Labs     01/29/23 0350 01/30/23 0223 01/31/23 0312   WBC 10.9* 9.6 8.4   HGB 13.6* 12.0* 11.0*    166 150     Recent Labs     01/29/23  0350 01/30/23 0223 01/31/23 0312    135* 139   K 4.3 4.4 4.3    102 107   CO2 23 24 25   BUN 13 13 14   CREATININE 0.8 0.8 0.8   GLUCOSE 126* 168* 105     Recent Labs     01/28/23 2052   AST 18   ALT 10   BILITOT 0.5   ALKPHOS 94     Troponin T:   Recent Labs     01/28/23 2052   TROPONINI <0.01     Pro-BNP: No results for input(s): BNP in the last 72 hours. INR:   Recent Labs     01/28/23 2052   INR 1.20*     UA:  Recent Labs     01/28/23 2219   COLORU YELLOW   PHUR 6.0   CLARITYU Clear   SPECGRAV 1.019   LEUKOCYTESUR Negative   UROBILINOGEN 1.0   BILIRUBINUR Negative   BLOODU Negative   GLUCOSEU Negative     A1C: No results for input(s): LABA1C in the last 72 hours. ABG:No results for input(s): PHART, EHC0OIV, PO2ART, WXN8QMV, BEART, HGBAE, S1YJQTIA, CARBOXHGBART in the last 72 hours. RAD:   CT HEAD WO CONTRAST    Result Date: 1/28/2023  Patient: Buzz Easley  Time Out: 23:29Exam(s): CT HEAD Without Contrast EXAM:  CT Head Without Intravenous ContrastCLINICAL HISTORY:   Reason for exam: frequent falls, dizziness. TECHNIQUE:  Axial computed tomography images of the head/brain without intravenous contrast.COMPARISON:  No relevant prior studies available. FINDINGS:No acute intracranial hemorrhage. No midline shift or mass effect. The territorial gray-white matter differentiation is maintained throughout. Age-related cerebral volume loss.   Periventricular and subcortical white matter hypoattenuation, consistent with chronic microangiopathy. The visualized orbits appear grossly unremarkable. The calvarium is intact. Opacified maxillary sinuses. No acute intracranial hemorrhage, midline shift, or mass effect. Electronically signed by Anahy Francois MD on 01-28-23 at 2329    XR CHEST PORTABLE    Result Date: 1/28/2023  NO PRIOR REPORT AVAILABLE Exam: X-RAY OF [facility] Clinical data:Fall, preop. Technique:Single view of the chest. Prior studies: No prior studies submitted. Findings: The lungs are grossly clear; noevidence of acute infiltrate or pleural effusion. Mild cardiomegaly. No acute osseous abnormality is detected. No acute pulmonary abnormality. Mild cardiomegaly. Recommendation: Follow up as clinically indicated. Dictated and Electronically Signed by Sushil Albert at 28-Jan-2023 10:43:58 PM EST             CTA PULMONARY W CONTRAST    Result Date: 1/29/2023  NO PRIOR REPORT AVAILABLE Exam: CTA OF THE CHEST WITH CONTRAST Clinical data: Elevated D-dimer. Technique: Axial CT angiography images through the lungs were acquired with contrast and imaged using soft tissue and lung algorithms. Coronal, sagittal, and 3D volume reconstructions were performed. Reformatted/3D-MPR images were performed. Radiation dose: CTDIvol =38.77 mGy, DLP =736 mGy x cm. Prior studies: Radiographs of the chest dated 01/28/2023. Findings: Lungs: Tiny bilateral pleural effusions, greater on the left, with associated dependent atelectasis, as well as some compressive atelectasis in the left lower lobe abutting the large hiatal hernia. Presumable focal nodular scarring at the left base, image 118. Scattered bilateral subsegmental atelectasis/scarring. No pneumothorax. Soft Tissues: There is a large hiatal hernia, extending to the left, containing most of the stomach. Scattered small to borderline nonspecific lymph nodes. Vascular: No filling defect within the pulmonary arteries to the segmental branch level.  The more distal evaluation is partially suboptimal. Mildly enlarged pulmonary artery could be seen with pulmonary arterial hypertension. 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 No evidence of dissection, although opacification of the descending thoracic aorta is partially suboptimal. Mild cardiomegaly, CAD, atherosclerosis. Bony structures: Significant multilevel spondylosis, DJD, possible osteopenia. There is a nondisplaced to minimally displaced distraction-type fracture involving the T9 vertebral body, which appears to be more likely chronic than acute, partially corticated, although correlate with patient's symptoms. The adjacent posterior elements are intact. Chronic L1-2 ankylosis, with a focal kyphosis a chronic compression fracture deformity, with a moderate chronic retropulsion. Some postoperative changes noted in the lower thoracic spine, with posterior laminectomies. Upper Abdomen:  Cholelithiasis. 1.1 cm left adrenal adenoma. IVC filter. Mildly aneurysmal upper abdominal aorta, 3.6 cm significant atherosclerosis. Significant amount of stool within the visualized portion of the colon. Diverticulosis, without diverticulitis in the field of view. Calcified granulomas in the spleen. 1. No acute pulmonary emboli appreciated, within the limits, as above. 2. Large hiatal hernia, as described. 3. Mild bilateral pleuroparenchymal pathology, as detailed above. 4. Chronic-appearing osseous changes in the visualized spine, as detailed above, correlate clinically. 5. Cholelithiasis. 6. Borderline aneurysmal thoracic aortic arch. Mild cardiomegaly, CAD, atherosclerosis. 7. Other findings, as above. Please see comments above. Recommendation: Follow up as clinically indicated. All CT scans at this facility utilize dose modulation, iterative reconstruction, and/or weight based dosing when appropriate to reduce radiation dose to as low as reasonably achievable.  Dictated and Electronically Signed by Viktoria Andersen MD at 29-Jan-2023 02:18:02 PM EST             XR HIP 2-3 VW W PELVIS RIGHT    Result Date: 1/28/2023  NO PRIOR REPORT AVAILABLE Exam: X-RAYS OF THE RIGHT HIP Clinical data: Fall, deformity. Technique: Three views of the right hip with AP view of the pelvis. Prior studies: No prior studies submitted. Findings: Right femoral neck fracture with varus angulation and slight superior migration along shaft component whereas the femoral head remains within the acetabulum. No evidence of superior pubic ramus fracture. Right femoral neck fracture as described above. Recommendation: Follow up as clinically indicated. Dictated and Electronically Signed by Flor Pfeiffer at 28-Jan-2023 11:09:05 PM EST                       Assessment/Plan     Principal Problem:    Closed right hip fracture, initial encounter ([facility] [address]              Followed by Ortho SP right hip repain              PT/OT     Active Problems:    COVID              Asymptomatic              Repeat PCR negative  Repeat at 6 p.m for 2nd reading    Frequent falls              PT/OT                Fall precautions    Hypertension              Cont home meds              Moniter for need to change  Resolved Problems:    * No resolved hospital problems. *      DVT Prophylaxis: SCD      Discharge planning: SNF placement      Further Orders per Clinical course/attending. DVT Prophylaxis: SCD     Discharge planning: SNF       Further Orders per Clinical course/attending. Electronically signed by JOE Madrid CNP on 1/31/2023 at 12:43 PM       EMR Dragon/Transcription disclaimer:   Much of this encounter note is an electronic transcription/translation of spoken language to printed text.  The electronic translation of spoken language may permit erroneous, or at times, nonsensical words or phrases to be inadvertently transcribed; although attempts have made to review the note for such errors, some may still exist.

## 2023-01-31 NOTE — PLAN OF CARE
Problem: Safety - Adult  Goal: Free from fall injury  Outcome: Progressing  Flowsheets (Taken 1/31/2023 0248)  Free From Fall Injury: Based on caregiver fall risk screen, instruct family/caregiver to ask for assistance with transferring infant if caregiver noted to have fall risk factors     Problem: Pain  Goal: Verbalizes/displays adequate comfort level or baseline comfort level  Outcome: Progressing

## 2023-01-31 NOTE — PROGRESS NOTES
01/31/23 1500   Subjective   Subjective Patient in chair ready for BTB. Pain Assessment   Pain Assessment 0-10   Pain Level 8   Patient's Stated Pain Goal 0 - No pain   Pain Location Hip   Pain Orientation Right   Pain Descriptors Aching   Functional Pain Assessment Prevents or interferes some active activities and ADLs   Pain Frequency Intermittent   Non-Pharmaceutical Pain Intervention(s) Rest   Response to Pain Intervention Patient satisfied   Cognition   Overall Cognitive Status WFL   Orientation   Overall Orientation Status WFL   Vitals   O2 Device None (Room air)   Bed Mobility Training   Bed Mobility Training Yes   Sit to Supine Maximum assistance;Assist X2   Scooting Minimum assistance   Balance   Sitting Intact   Standing With support   Transfer Training   Transfer Training Yes   Sit to Stand Maximum assistance;Assist X2  (From recliner)   Stand to Sit Moderate assistance;Assist X2   Bed to Chair Moderate assistance;Assist X2  (Several verbal and tactile cues with TR.)   Gait   Overall Level of Assistance Moderate assistance;Assist X2   Speed/Vibha Slow   Step Length Right lengthened;Left shortened   Distance (ft) 2 Feet  (Bed moved closer to chair.)   Assistive Device Walker, rolling   Other Specialty Interventions   Other Treatments/Modalities Patient BTB alarm set;SOCORRO Moran performing zimmerman care when this therapist left.    PT Plan of Care   Tuesday X       Electronically signed by Tania Patten PTA on 1/31/2023 at 3:15 PM

## 2023-01-31 NOTE — PROGRESS NOTES
Occupational Therapy  Facility/Department: [facility] 4 ONCOLOGY UNIT  Daily Treatment Note  NAME: Hardeep Gonzales  : 1935  MRN: [id number]    Date of Service: 2023    Discharge Recommendations:  Patient would benefit from continued therapy after discharge       Assessment   Performance deficits / Impairments: Decreased ADL status; Decreased balance;Decreased functional mobility ; Decreased strength;Decreased endurance  Assessment: Pt sitting up in bed and able to participate with UE ex. Pt repositioned in bed with all needs within reach. Pt continues to benefit from skilled OT services. Pt will need inpatient therapy before returning home. Treatment Diagnosis: R posterior hip replacement, Covid  Prognosis: Good  Activity Tolerance  Activity Tolerance: Patient Tolerated treatment well         Patient Diagnosis(es): The primary encounter diagnosis was Closed displaced fracture of right femoral neck ([facility] [address] Diagnoses of Fall, initial encounter and COVID-19 virus infection were also pertinent to this visit. has a past medical history of DVT (deep venous thrombosis) ([address] Hypertension, Iliac artery aneurysm, right ([address] and Osteoarthritis. has a past surgical history that includes back surgery (2008); joint replacement (Bilateral); back surgery (2017); hernia repair; Neck surgery (2018); shoulder surgery (Left); and hip surgery (Right, 2023).     Restrictions  Restrictions/Precautions  Restrictions/Precautions: Weight Bearing  Lower Extremity Weight Bearing Restrictions  Right Lower Extremity Weight Bearing: Weight Bearing As Tolerated  Position Activity Restriction  Hip Precautions: Posterior hip precautions  Other position/activity restrictions: ABDUCTION PILLOW WHEN IN BED  Subjective   General  Chart Reviewed: Yes  Patient assessed for rehabilitation services?: Yes  Response to previous treatment: Patient with no complaints from previous session  Family / Caregiver Present: No  Diagnosis: R posterior hip, COVID  Pre Treatment Pain Screening  Pain at present: 5  Scale Used: Faces  Intervention List: Patient able to continue with treatment;Nurse/Physician notified  Comments / Details: Hurts with movement. Orientation     Objective        01/31/23 1708   OT Exercises   Exercise Treatment Gregory UE ex 10x1. Cognition  Overall Cognitive Status: [facility]   Occupational Therapy Plan  Times Per Week: 3-5x/week  Times Per Day:  Once a day  Goals  Short Term Goals  Time Frame for Short Term Goals: 1 week  Short Term Goal 1: Vanda of 1 BSC tfers  Short Term Goal 2: Vanda with balance for managing clothing in standing  Short Term Goal 3: Vanda with donning shorts to knee level with AE  Short Term Goal 4: Verbalize posterior hip precautions  Long Term Goals  Long Term Goal 1: Return to PLOF       Therapy Time   Individual Concurrent Group Co-treatment   Time In           Time Out           Minutes              SHANNAN Glynn  Electronically signed by SHANNAN Glynn on 1/31/2023 at 5:12 PM

## 2023-01-31 NOTE — CARE COORDINATION
Pt requested to speak to SW about D/C plans. Pt reports that he does not have enough funds to cover 24hr care though Love, Right at [facility]. Pt is agreeable to SNF referral [facility]. Pt will have to quarantine before d/c to snf [facility]. Jasmine PH: [phone] Q:[phone]  Sw made referral to [facility]. Pt gave permission for CESAR to contact his daughter Fouzia Crow with updates to d/c plans. Cesar reached out to Daisy to discuss Pt wishes for D/C. Cesar explained the 10 quarantine and the Snf of Pt choice. Daisy was supportive of Pt plans. No other questions or concerns.    Electronically signed by OLI Paul on 1/31/2023 at 9:30 AM

## 2023-02-01 LAB
ANION GAP SERPL CALCULATED.3IONS-SCNC: 9 MMOL/L (ref 7–19)
BASOPHILS ABSOLUTE: 0.1 K/UL (ref 0–0.2)
BASOPHILS RELATIVE PERCENT: 0.6 % (ref 0–1)
BUN BLDV-MCNC: 10 MG/DL (ref 8–23)
CALCIUM SERPL-MCNC: 8 MG/DL (ref 8.8–10.2)
CHLORIDE BLD-SCNC: 107 MMOL/L (ref 98–111)
CO2: 24 MMOL/L (ref 22–29)
CREAT SERPL-MCNC: 0.7 MG/DL (ref 0.5–1.2)
EOSINOPHILS ABSOLUTE: 0.5 K/UL (ref 0–0.6)
EOSINOPHILS RELATIVE PERCENT: 6 % (ref 0–5)
GFR SERPL CREATININE-BSD FRML MDRD: >60 ML/MIN/{1.73_M2}
GLUCOSE BLD-MCNC: 107 MG/DL (ref 74–109)
HCT VFR BLD CALC: 35.9 % (ref 42–52)
HEMOGLOBIN: 11.4 G/DL (ref 14–18)
IMMATURE GRANULOCYTES #: 0 K/UL
LYMPHOCYTES ABSOLUTE: 1.6 K/UL (ref 1.1–4.5)
LYMPHOCYTES RELATIVE PERCENT: 20.2 % (ref 20–40)
MCH RBC QN AUTO: 29.9 PG (ref 27–31)
MCHC RBC AUTO-ENTMCNC: 31.8 G/DL (ref 33–37)
MCV RBC AUTO: 94.2 FL (ref 80–94)
MONOCYTES ABSOLUTE: 0.9 K/UL (ref 0–0.9)
MONOCYTES RELATIVE PERCENT: 10.8 % (ref 0–10)
NEUTROPHILS ABSOLUTE: 5 K/UL (ref 1.5–7.5)
NEUTROPHILS RELATIVE PERCENT: 62 % (ref 50–65)
PDW BLD-RTO: 14.3 % (ref 11.5–14.5)
PLATELET # BLD: 172 K/UL (ref 130–400)
PMV BLD AUTO: 9.5 FL (ref 9.4–12.4)
POTASSIUM REFLEX MAGNESIUM: 4 MMOL/L (ref 3.5–5)
RBC # BLD: 3.81 M/UL (ref 4.7–6.1)
SODIUM BLD-SCNC: 140 MMOL/L (ref 136–145)
WBC # BLD: 8.1 K/UL (ref 4.8–10.8)

## 2023-02-01 PROCEDURE — 6370000000 HC RX 637 (ALT 250 FOR IP): Performed by: ORTHOPAEDIC SURGERY

## 2023-02-01 PROCEDURE — 2580000003 HC RX 258: Performed by: ORTHOPAEDIC SURGERY

## 2023-02-01 PROCEDURE — 97116 GAIT TRAINING THERAPY: CPT

## 2023-02-01 PROCEDURE — 97530 THERAPEUTIC ACTIVITIES: CPT

## 2023-02-01 PROCEDURE — 1210000000 HC MED SURG R&B

## 2023-02-01 PROCEDURE — 97110 THERAPEUTIC EXERCISES: CPT

## 2023-02-01 PROCEDURE — 85025 COMPLETE CBC W/AUTO DIFF WBC: CPT

## 2023-02-01 PROCEDURE — 6360000002 HC RX W HCPCS: Performed by: ORTHOPAEDIC SURGERY

## 2023-02-01 PROCEDURE — 36415 COLL VENOUS BLD VENIPUNCTURE: CPT

## 2023-02-01 PROCEDURE — 80048 BASIC METABOLIC PNL TOTAL CA: CPT

## 2023-02-01 PROCEDURE — 2580000003 HC RX 258: Performed by: ANESTHESIOLOGY

## 2023-02-01 RX ORDER — GABAPENTIN 300 MG/1
300 CAPSULE ORAL 4 TIMES DAILY
Qty: 12 CAPSULE | Refills: 0 | Status: CANCELLED | OUTPATIENT
Start: 2023-02-01 | End: 2023-02-04

## 2023-02-01 RX ADMIN — ACETAMINOPHEN 1000 MG: 500 TABLET, FILM COATED ORAL at 20:13

## 2023-02-01 RX ADMIN — GABAPENTIN 300 MG: 300 CAPSULE ORAL at 13:00

## 2023-02-01 RX ADMIN — Medication 5 MG: at 20:13

## 2023-02-01 RX ADMIN — SODIUM CHLORIDE: 9 INJECTION, SOLUTION INTRAVENOUS at 03:27

## 2023-02-01 RX ADMIN — SODIUM CHLORIDE, PRESERVATIVE FREE 10 ML: 5 INJECTION INTRAVENOUS at 08:40

## 2023-02-01 RX ADMIN — SODIUM CHLORIDE, PRESERVATIVE FREE 10 ML: 5 INJECTION INTRAVENOUS at 20:13

## 2023-02-01 RX ADMIN — ACETAMINOPHEN 1000 MG: 500 TABLET, FILM COATED ORAL at 05:02

## 2023-02-01 RX ADMIN — LISINOPRIL 10 MG: 10 TABLET ORAL at 08:40

## 2023-02-01 RX ADMIN — GABAPENTIN 300 MG: 300 CAPSULE ORAL at 20:13

## 2023-02-01 RX ADMIN — ERGOCALCIFEROL 50000 UNITS: 1.25 CAPSULE ORAL at 16:47

## 2023-02-01 RX ADMIN — ACETAMINOPHEN 1000 MG: 500 TABLET, FILM COATED ORAL at 13:00

## 2023-02-01 RX ADMIN — GABAPENTIN 300 MG: 300 CAPSULE ORAL at 08:40

## 2023-02-01 RX ADMIN — OXYCODONE 10 MG: 5 TABLET ORAL at 20:12

## 2023-02-01 RX ADMIN — MORPHINE SULFATE 2 MG: 2 INJECTION, SOLUTION INTRAMUSCULAR; INTRAVENOUS at 05:01

## 2023-02-01 RX ADMIN — GABAPENTIN 300 MG: 300 CAPSULE ORAL at 16:47

## 2023-02-01 RX ADMIN — OXYCODONE 10 MG: 5 TABLET ORAL at 03:24

## 2023-02-01 RX ADMIN — SODIUM CHLORIDE: 9 INJECTION, SOLUTION INTRAVENOUS at 20:12

## 2023-02-01 ASSESSMENT — ENCOUNTER SYMPTOMS
RESPIRATORY NEGATIVE: 1
EYES NEGATIVE: 1
ALLERGIC/IMMUNOLOGIC NEGATIVE: 1
GASTROINTESTINAL NEGATIVE: 1

## 2023-02-01 ASSESSMENT — PAIN DESCRIPTION - FREQUENCY: FREQUENCY: INTERMITTENT

## 2023-02-01 ASSESSMENT — PAIN DESCRIPTION - LOCATION
LOCATION: HIP
LOCATION: HIP

## 2023-02-01 ASSESSMENT — PAIN DESCRIPTION - ORIENTATION
ORIENTATION: RIGHT
ORIENTATION: RIGHT

## 2023-02-01 ASSESSMENT — PAIN DESCRIPTION - DESCRIPTORS: DESCRIPTORS: ACHING;DISCOMFORT

## 2023-02-01 ASSESSMENT — PAIN DESCRIPTION - PAIN TYPE: TYPE: ACUTE PAIN;SURGICAL PAIN

## 2023-02-01 ASSESSMENT — PAIN SCALES - GENERAL
PAINLEVEL_OUTOF10: 7
PAINLEVEL_OUTOF10: 7
PAINLEVEL_OUTOF10: 6

## 2023-02-01 ASSESSMENT — PAIN - FUNCTIONAL ASSESSMENT: PAIN_FUNCTIONAL_ASSESSMENT: PREVENTS OR INTERFERES SOME ACTIVE ACTIVITIES AND ADLS

## 2023-02-01 ASSESSMENT — PAIN SCALES - WONG BAKER: WONGBAKER_NUMERICALRESPONSE: 4

## 2023-02-01 NOTE — PROGRESS NOTES
Occupational Therapy     02/01/23 1500   Subjective   Subjective Pt in bed and agreeable to participate. Pain Assessment   Pain Assessment Banegas-Baker FACES   Banegas-Baker Pain Rating 4   Pain Location Hip   Pain Orientation Right   Pain Descriptors Aching;Discomfort   Functional Pain Assessment Prevents or interferes some active activities and ADLs   Pain Type Acute pain;Surgical pain   Pain Frequency Intermittent   Non-Pharmaceutical Pain Intervention(s) Ambulation/Increased Activity;Repositioned; Rest   Response to Pain Intervention Patient satisfied   Cognition   Overall Cognitive Status WFL   Orientation   Overall Orientation Status WFL   Bed Mobility Training   Bed Mobility Training Yes   Overall Level of Assistance Moderate assistance   Interventions Verbal cues; Tactile cues;Manual cues   Supine to Sit Moderate assistance   Sit to Supine Moderate assistance   Scooting Minimum assistance   Transfer Training   Transfer Training Yes   Overall Level of Assistance Moderate assistance   Interventions Verbal cues; Tactile cues;Manual cues   Sit to Stand Moderate assistance   Stand to Sit Minimum assistance   Balance   Sitting Intact   Standing With support   ADL   Feeding Independent   Grooming Independent   UE Bathing Supervision   LE Bathing Maximum assistance   UE Dressing Supervision   LE Dressing Maximum assistance;Dependent/Total   Toileting Maximum assistance   Additional Comments Posterior hip precautions   Assessment   Assessment Tx focused on sitting EOB, STS mobility at RW level, functional mobility at RW level in room, and AROM exercises while sitting EOB.    Activity Tolerance Patient tolerated treatment well;Patient limited by pain   Discharge Recommendations Patient would benefit from continued therapy after discharge   Occupational Therapy Plan   Times Per Week 3-5x/week   Times Per Day Once a day   OT Plan of Care   Wednesday X   Electronically signed by SHANNAN Watts on 2/1/2023 at 3:16 PM

## 2023-02-01 NOTE — CARE COORDINATION
02/01/23 0906   IMM Letter   IMM Letter given to Patient/Family/Significant other/Guardian/POA/by: Zelda Ramirez gave Pt IMM  letter. IMM Letter date given: 02/01/23   IMM Letter time given: 0907   Zelda gave Pt IMM letter and explained Pt rights. All questions and concerns answered.    Electronically signed by OLI Ordonez on 2/1/2023 at 9:08 AM

## 2023-02-01 NOTE — DISCHARGE INSTR - DIET

## 2023-02-01 NOTE — PROGRESS NOTES
Physical Therapy  Name: Chris Eid  MRN:  [id number]  Date of service:  2/1/2023 02/01/23 1506   Restrictions/Precautions   Restrictions/Precautions Weight Bearing   Position Activity Restriction   Hip Precautions Posterior hip precautions   Other position/activity restrictions ABDUCTION PILLOW WHEN IN BED   Subjective   Subjective I am so stiff. I am confused as to what is going on. I need to move back to my house   Ambulation   Surface Level tile   Device Rolling Walker   Assistance Minimal assistance   Quality of Gait FLEXED POSTURE, ANTALGIC. Gait Deviations Slow Vibha;Decreased step length;Decreased step height   Distance 20'   Exercises   Heelslides x 10   Gluteal Sets x 10   Hip Abduction x 10   Knee Long Arc Quad x 10   Ankle Pumps x 10   Conditions Requiring Skilled Therapeutic Intervention   Assessment Patient did well with session. I think her surprised himself at how well he did.    PT Whiteboard Notes   Therapy Whiteboard lLHEMI HIP, VARKAUS, PHP'S RE 2-13         Electronically signed by Patricia Oneill PTA on 2/1/2023 at 3:20 PM

## 2023-02-01 NOTE — DISCHARGE INSTRUCTIONS
Dr Smith Ill and ice affected extremity 20-30 minutes every 2 hours as needed for swelling and pain. Use a barrier ( cloth) between ice pack and skin to prevent frostbite. *Surgical Site Care: You may shower on post op day 4 and keep incision open to the air         The adhesive dressing (glue strip) will be removed at the office appointment          May shower and clean wound but do not scrub incision. Pat dry. NO tub bathing or swimming. Wash hands frequently during the day. *Activity:         SAFETY FIRST walk with a walker          Do NOT walk for exercise ( it will increase pain and swelling )          Walk several times a day but only for short distances                                                                                                           FEVER of 101.5 or less  *Pain Medicines:                                                                            Take Tylenol x 2          Take prescribed medicine as needed for pain                         Deep breath x 10          Take Tylenol as your pain decreases                                      Cough, Cough, Cough          Take a stool softener of your choice while on pain meds        Recheck in 1-11/2 hours     *To prevent blood clots: Take prescribed blood thinner as directed          Do ankle pump exercises when sitting in the chair             *To Prevent Pneumonia:           Sit up and take deep breaths several times a day and use the incentive spirometer     *Call the office if:           Increased redness, drainage or an opening at your incision, severe pain, new onset fever or chills. Also notify of any calf pain, tenderness, swelling or redness.            Notify of any rash, nausea and vomiting             **If you have any questions or problems please call our office at 1 [phone]**   or contact Ortho Navigator at [phone].      Thank you

## 2023-02-01 NOTE — PLAN OF CARE
Problem: Pain  Goal: Verbalizes/displays adequate comfort level or baseline comfort level  Outcome: Progressing     Problem: Safety - Adult  Goal: Free from fall injury  Outcome: Progressing  Flowsheets (Taken 1/31/2023 2222)  Free From Fall Injury: Based on caregiver fall risk screen, instruct family/caregiver to ask for assistance with transferring infant if caregiver noted to have fall risk factors

## 2023-02-01 NOTE — CARE COORDINATION
Zelda informed Madison Corona at [facility] that Pt has two negative PCR test. Pt is negative for Covid-19. Zelda asked Madison Coroan to start Pt Precert.  [facility]: [phone]  Electronically signed by Karl Landau, MSW on 2/1/2023 at 8:08 AM

## 2023-02-02 VITALS
HEIGHT: 71 IN | TEMPERATURE: 98.6 F | BODY MASS INDEX: 25.93 KG/M2 | RESPIRATION RATE: 18 BRPM | OXYGEN SATURATION: 95 % | HEART RATE: 70 BPM | DIASTOLIC BLOOD PRESSURE: 104 MMHG | SYSTOLIC BLOOD PRESSURE: 198 MMHG | WEIGHT: 185.19 LBS

## 2023-02-02 PROCEDURE — 97116 GAIT TRAINING THERAPY: CPT

## 2023-02-02 PROCEDURE — 97530 THERAPEUTIC ACTIVITIES: CPT

## 2023-02-02 PROCEDURE — 6370000000 HC RX 637 (ALT 250 FOR IP): Performed by: ORTHOPAEDIC SURGERY

## 2023-02-02 PROCEDURE — 94760 N-INVAS EAR/PLS OXIMETRY 1: CPT

## 2023-02-02 PROCEDURE — 6370000000 HC RX 637 (ALT 250 FOR IP): Performed by: HOSPITALIST

## 2023-02-02 RX ORDER — CYCLOBENZAPRINE HCL 10 MG
5 TABLET ORAL 3 TIMES DAILY PRN
Status: DISCONTINUED | OUTPATIENT
Start: 2023-02-02 | End: 2023-02-02 | Stop reason: HOSPADM

## 2023-02-02 RX ORDER — BISACODYL 10 MG
10 SUPPOSITORY, RECTAL RECTAL DAILY PRN
Status: DISCONTINUED | OUTPATIENT
Start: 2023-02-02 | End: 2023-02-02 | Stop reason: HOSPADM

## 2023-02-02 RX ORDER — ERGOCALCIFEROL 1.25 MG/1
50000 CAPSULE ORAL
Qty: 5 CAPSULE | Refills: 0 | Status: SHIPPED | OUTPATIENT
Start: 2023-02-03

## 2023-02-02 RX ORDER — CYCLOBENZAPRINE HCL 5 MG
5 TABLET ORAL 2 TIMES DAILY PRN
Qty: 6 TABLET | Refills: 0 | Status: SHIPPED | OUTPATIENT
Start: 2023-02-02 | End: 2023-02-05

## 2023-02-02 RX ORDER — HYDRALAZINE HYDROCHLORIDE 50 MG/1
50 TABLET, FILM COATED ORAL EVERY 8 HOURS SCHEDULED
Status: DISCONTINUED | OUTPATIENT
Start: 2023-02-02 | End: 2023-02-02 | Stop reason: HOSPADM

## 2023-02-02 RX ORDER — LACTULOSE 10 G/15ML
20 SOLUTION ORAL 2 TIMES DAILY
Status: DISCONTINUED | OUTPATIENT
Start: 2023-02-02 | End: 2023-02-02 | Stop reason: HOSPADM

## 2023-02-02 RX ORDER — HYDRALAZINE HYDROCHLORIDE 50 MG/1
50 TABLET, FILM COATED ORAL EVERY 8 HOURS SCHEDULED
Qty: 90 TABLET | Refills: 3 | Status: SHIPPED | OUTPATIENT
Start: 2023-02-02

## 2023-02-02 RX ORDER — LACTULOSE 10 G/15ML
20 SOLUTION ORAL 2 TIMES DAILY
Status: DISCONTINUED | OUTPATIENT
Start: 2023-02-02 | End: 2023-02-02

## 2023-02-02 RX ORDER — OXYCODONE HYDROCHLORIDE AND ACETAMINOPHEN 5; 325 MG/1; MG/1
1 TABLET ORAL EVERY 6 HOURS PRN
Qty: 12 TABLET | Refills: 0 | Status: SHIPPED | OUTPATIENT
Start: 2023-02-02 | End: 2023-02-05

## 2023-02-02 RX ORDER — NIFEDIPINE 60 MG/1
60 TABLET, EXTENDED RELEASE ORAL DAILY
Status: DISCONTINUED | OUTPATIENT
Start: 2023-02-02 | End: 2023-02-02 | Stop reason: HOSPADM

## 2023-02-02 RX ORDER — NIFEDIPINE 60 MG/1
60 TABLET, EXTENDED RELEASE ORAL DAILY
Qty: 30 TABLET | Refills: 3 | Status: SHIPPED | OUTPATIENT
Start: 2023-02-03

## 2023-02-02 RX ORDER — GABAPENTIN 300 MG/1
300 CAPSULE ORAL 4 TIMES DAILY
Qty: 12 CAPSULE | Refills: 0 | Status: SHIPPED | OUTPATIENT
Start: 2023-02-02 | End: 2023-02-05

## 2023-02-02 RX ADMIN — OXYCODONE 10 MG: 5 TABLET ORAL at 02:17

## 2023-02-02 RX ADMIN — POLYETHYLENE GLYCOL 3350 17 G: 17 POWDER, FOR SOLUTION ORAL at 09:07

## 2023-02-02 RX ADMIN — NIFEDIPINE 60 MG: 60 TABLET, EXTENDED RELEASE ORAL at 08:58

## 2023-02-02 RX ADMIN — OXYCODONE 10 MG: 5 TABLET ORAL at 08:58

## 2023-02-02 RX ADMIN — ACETAMINOPHEN 1000 MG: 500 TABLET, FILM COATED ORAL at 05:01

## 2023-02-02 RX ADMIN — GABAPENTIN 300 MG: 300 CAPSULE ORAL at 08:58

## 2023-02-02 RX ADMIN — HYDRALAZINE HYDROCHLORIDE 50 MG: 50 TABLET, FILM COATED ORAL at 15:09

## 2023-02-02 RX ADMIN — ACETAMINOPHEN 1000 MG: 500 TABLET, FILM COATED ORAL at 15:08

## 2023-02-02 RX ADMIN — LISINOPRIL 10 MG: 10 TABLET ORAL at 08:58

## 2023-02-02 RX ADMIN — GABAPENTIN 300 MG: 300 CAPSULE ORAL at 15:08

## 2023-02-02 ASSESSMENT — PAIN SCALES - WONG BAKER: WONGBAKER_NUMERICALRESPONSE: 10

## 2023-02-02 ASSESSMENT — PAIN SCALES - GENERAL: PAINLEVEL_OUTOF10: 7

## 2023-02-02 ASSESSMENT — PAIN DESCRIPTION - PAIN TYPE: TYPE: ACUTE PAIN;SURGICAL PAIN

## 2023-02-02 ASSESSMENT — PAIN - FUNCTIONAL ASSESSMENT: PAIN_FUNCTIONAL_ASSESSMENT: PREVENTS OR INTERFERES SOME ACTIVE ACTIVITIES AND ADLS

## 2023-02-02 ASSESSMENT — PAIN DESCRIPTION - DESCRIPTORS: DESCRIPTORS: ACHING;DISCOMFORT

## 2023-02-02 ASSESSMENT — PAIN DESCRIPTION - ORIENTATION: ORIENTATION: RIGHT

## 2023-02-02 ASSESSMENT — PAIN DESCRIPTION - FREQUENCY: FREQUENCY: INTERMITTENT

## 2023-02-02 ASSESSMENT — PAIN DESCRIPTION - LOCATION: LOCATION: HIP

## 2023-02-02 NOTE — CARE COORDINATION
No updates on on Pt precert as of 3:20KJ. Awaiting approval or denial from [facility].    Electronically signed by OLI Blackmon on 2/2/2023 at 9:20 AM

## 2023-02-02 NOTE — PROGRESS NOTES
02/02/23 1400   Subjective   Subjective Patient in ready for BTB   Pain Assessment   Pain Assessment Banegas-Baker FACES   Banegas-Baker Pain Rating 10   Patient's Stated Pain Goal 0 - No pain   Pain Location Hip   Pain Orientation Right   Pain Descriptors Aching;Discomfort   Functional Pain Assessment Prevents or interferes some active activities and ADLs   Pain Type Acute pain;Surgical pain   Pain Frequency Intermittent  (More with WB and movement.)   Non-Pharmaceutical Pain Intervention(s) Rest   Response to Pain Intervention Patient satisfied   Cognition   Overall Cognitive Status WFL   Orientation   Overall Orientation Status WFL   Vitals   O2 Device None (Room air)   Bed Mobility Training   Bed Mobility Training Yes   Rolling Moderate assistance;Minimum assistance;Assist X2   Sit to Supine Maximum assistance;Assist X2   Transfer Training   Transfer Training Yes   Sit to Stand Moderate assistance;Assist X2   Stand to Sit Minimum assistance;Assist X2   Bed to Chair Moderate assistance;Assist X2  (Couple steps BTB with RW.)   Other Specialty Interventions   Other Treatments/Modalities Patient BTB bed alarm set.    Assessment   Activity Tolerance Patient tolerated treatment well;Patient limited by pain   PT Plan of Care   Thursday X       Electronically signed by Sunil Washington PTA on 2/2/2023 at 3:04 PM

## 2023-02-02 NOTE — PROGRESS NOTES
Occupational Therapy  Facility/Department: [facility] 4 ONCOLOGY UNIT  Daily Treatment Note  NAME: Shane Wade  : 1935  MRN: [id number]    Date of Service: 2023    Discharge Recommendations:  Patient would benefit from continued therapy after discharge       Assessment   Performance deficits / Impairments: Decreased ADL status; Decreased balance;Decreased functional mobility ; Decreased strength;Decreased endurance  Assessment: Pt ready to get up to chair. Pt transfered to chair with min assist x 2. Physical Therapy arrived and assisted pt with getting on bedside commode. Pt continues to benefit from skilled OT services. Treatment Diagnosis: R posterior hip replacement, Covid  Prognosis: Good  Activity Tolerance  Activity Tolerance: Patient Tolerated treatment well         Patient Diagnosis(es): The primary encounter diagnosis was Closed right hip fracture, initial encounter ([facility] [address] Diagnoses of Closed displaced fracture of right femoral neck ([facility] [address] Fall, initial encounter, and COVID-19 virus infection were also pertinent to this visit. has a past medical history of DVT (deep venous thrombosis) ([facility] [address] Hypertension, Iliac artery aneurysm, right ([address] and Osteoarthritis. has a past surgical history that includes back surgery (2008); joint replacement (Bilateral); back surgery (2017); hernia repair; Neck surgery (2018); shoulder surgery (Left); and hip surgery (Right, 2023).     Restrictions  Restrictions/Precautions  Restrictions/Precautions: Weight Bearing  Lower Extremity Weight Bearing Restrictions  Right Lower Extremity Weight Bearing: Weight Bearing As Tolerated  Position Activity Restriction  Hip Precautions: Posterior hip precautions  Other position/activity restrictions: ABDUCTION PILLOW WHEN IN BED  Subjective   General  Chart Reviewed: Yes  Patient assessed for rehabilitation services?: Yes  Response to previous treatment: Patient with no complaints from previous session  Family / Caregiver Present: Yes (DTR)  Diagnosis: R posterior hip, COVID  Pre Treatment Pain Screening  Pain at present: 5  Scale Used: Faces  Intervention List: Patient able to continue with treatment;Nurse/Physician notified  Comments / Details: Hurts with movement. Orientation     Objective                Bed mobility  Supine to Sit: Moderate assistance  Transfers  Stand Step Transfers: Minimal assistance  Sit to stand: Minimal assistance; Moderate assistance  Stand to sit: Minimal assistance  Transfer Comments: RW                       Cognition  Overall Cognitive Status: [facility]   Occupational Therapy Plan  Times Per Week: 3-5x/week  Times Per Day:  Once a day  Goals  Short Term Goals  Time Frame for Short Term Goals: 1 week  Short Term Goal 1: Vanda of 1 BSC tfers  Short Term Goal 2: Vanda with balance for managing clothing in standing  Short Term Goal 3: Vanda with donning shorts to knee level with AE  Short Term Goal 4: Verbalize posterior hip precautions  Long Term Goals  Long Term Goal 1: Return to PLOF       Therapy Time   Individual Concurrent Group Co-treatment   Time In           Time Out           Minutes              SHANNAN Troy  Electronically signed by SHANNAN Troy on 2/2/2023 at 3:27 PM

## 2023-02-02 NOTE — PLAN OF CARE
Problem: Pain  Goal: Verbalizes/displays adequate comfort level or baseline comfort level  2/2/2023 0952 by Hue Curtis RN  Outcome: Progressing  2/2/2023 0006 by Juanito Valdes RN  Outcome: Progressing     Problem: Safety - Adult  Goal: Free from fall injury  2/2/2023 0952 by Hue Curtis RN  Outcome: Progressing  2/2/2023 0006 by Juanito Valdes RN  Outcome: Progressing  Flowsheets (Taken 2/2/2023 0005)  Free From Fall Injury: Instruct family/caregiver on patient safety     Problem: ABCDS Injury Assessment  Goal: Absence of physical injury  2/2/2023 0952 by Hue Curtis RN  Outcome: Progressing  2/2/2023 0006 by Juanito Valdes RN  Outcome: Progressing  Flowsheets (Taken 2/2/2023 0005)  Absence of Physical Injury: Implement safety measures based on patient assessment     Problem: Discharge Planning  Goal: Discharge to home or other facility with appropriate resources  2/2/2023 0952 by Hue Curtis RN  Outcome: Progressing  2/2/2023 0006 by Juanito Valdes RN  Outcome: Progressing  Flowsheets (Taken 2/1/2023 2016)  Discharge to home or other facility with appropriate resources: Identify barriers to discharge with patient and caregiver     Problem: Skin/Tissue Integrity  Goal: Absence of new skin breakdown  Description: 1. Monitor for areas of redness and/or skin breakdown  2. Assess vascular access sites hourly  3. Every 4-6 hours minimum:  Change oxygen saturation probe site  4. Every 4-6 hours:  If on nasal continuous positive airway pressure, respiratory therapy assess nares and determine need for appliance change or resting period.   2/2/2023 8373 by Hue Curtis RN  Outcome: Progressing  2/2/2023 0006 by Juanito Valdes RN  Outcome: Progressing

## 2023-02-02 NOTE — PROGRESS NOTES
02/02/23 1200   Subjective   Subjective Patient TR with Kadi Hutchison and daughter when this therapist arrived. Pain Denies. Cognition   Overall Cognitive Status WFL   Orientation   Overall Orientation Status WFL   Vitals   O2 Device None (Room air)   Balance   Sitting Intact   Standing With support   Transfer Training   Transfer Training Yes   Sit to Stand Maximum assistance;Assist X2  (From Recliner and BSC.)   Stand to Sit Moderate assistance;Assist X2  (To chair)   Bed to Chair Maximum assistance;Assist X2  (Chair to [facility] and back.)   Gait Training   Gait Training Yes   Right Side Weight Bearing As tolerated   Gait   Overall Level of Assistance Maximum assistance;Assist X2   Interventions Demonstration;Manual cues; Tactile cues; Verbal cues   Speed/Vibha Slow   Distance (ft) 3 Feet   Assistive Device Walker, rolling  (Cues for RW use.)   Patient Education   Education Given To Patient; Family   Education Provided Role of Therapy;Plan of Care;Family Education;Equipment; Fall Prevention Strategies   Education Method Demonstration;Verbal   Barriers to Learning None   Education Outcome Verbalized understanding;Continued education needed   Other Specialty Interventions   Other Treatments/Modalities Patien in chair. Amy Christopher RN present changing linens when this therapist left. Patient required extra time 2 to extended time on [facility].    Assessment   Activity Tolerance Patient tolerated treatment well   PT Plan of Care   Thursday X     Electronically signed by Radha Gray PTA on 2/2/2023 at 12:49 PM

## 2023-02-02 NOTE — PLAN OF CARE
Problem: Pain  Goal: Verbalizes/displays adequate comfort level or baseline comfort level  Outcome: Progressing     Problem: Safety - Adult  Goal: Free from fall injury  Outcome: Progressing  Flowsheets (Taken 2/2/2023 0005)  Free From Fall Injury: Instruct family/caregiver on patient safety

## 2023-02-02 NOTE — DISCHARGE SUMMARY
Hospitalist  Discharge Summary    Patient ID: Jose Stoddard      Patient's PCP: Catalina Runner, MD    Admit Date: 1/28/2023     Discharge Date:   02/02/2023    Admitting Physician: Tru Villavicencio MD     Discharge Physician: JOE Awan - CNP     Discharge Diagnoses:  Principal Problem:    Closed right hip fracture, initial encounter [facility])  Active Problems:    COVID    Frequent falls    Hypertension  Resolved Problems:    * No resolved hospital problems. *       Active Hospital Problems    Diagnosis Date Noted    Closed right hip fracture, initial encounter [facility]) [S72.001A] 01/28/2023     Priority: Medium    COVID [U07.1] 01/28/2023     Priority: Medium    Frequent falls [R29.6] 01/28/2023     Priority: Medium    Hypertension [I10]        The patient was seen and examined on day of discharge and this discharge summary is in conjunction with any daily progress note from day of discharge. Hospital Course:  Patient is a 70-year-old with past medical history of DVT, hypertension, osteoarthritis, history of back surgery. On date of admission patient was getting dressed and fell losing his balance while trying to hang up close. He fell backwards hit the door frame and landed on his right hip patient had immediate pain. Any other injury or loss of consciousness. Was ambulatory to his private vehicle and brought to the ED. Patient did report balance issues for 2 to 3 months. Chanaaldoy Gaucher x-ray of the right pelvis showed a right femoral neck fracture troponins negative and he was COVID-positive. All other lab was within normal limits. She underwent a right hemiarthroplasty by Dr. Yovani Bhardwaj. He is done well and progressing with physical therapy . [place] He wants to return home he has a previous sitter service hired and ready and he has all durable medical equipment he needs. Repeat COVID PCR was done and is negative. Repeat tonight at 6 p.m. planned. Precert in progress for [facility].   His vitals and labs are stable. His second pcr covid was neg. He is stable and ready for discharge. General appearance: alert, appears stated age, cooperative and no distress  Head: Normocephalic, without obvious abnormality, atraumatic  Eyes: conjunctivae/corneas clear. PERRL, EOM's intact. Ears: normal external ears  Neck: no adenopathy, no carotid bruit, no JVD, supple, symmetrical, trachea midline and thyroid not enlarged, symmetric, no tenderness/mass/nodules  Lungs: clear to auscultation bilaterally,no rales or wheezes   Heart: regular rate and rhythm, S1, S2 normal, no murmur,  regular rate and rhythm   Abdomen:soft, non-tender; non-distended normal bowel sounds no masses, no organomegaly  Extremities:Pedal edema none  Homans sign is negative, no sign of DVT, warm and dry  Skin: Skin color, texture, turgor normal. No rashes or lesions  Lymphatic: No palpable lymph node enlargment  Neurologic: Alert and oriented X 3, normal strength and tone. Normal symmetric reflexes. Mental status: Alert, oriented, thought content appropriate  Cranial nerves:II-XII Grossly intact Sensory: normal Motor:grossly normal  Psychiatric:  Alert and oriented, thought content appropriate, normal insight, mood appropriate      Consults Made:   IP CONSULT TO ORTHOPEDIC SURGERY  IP CONSULT TO CASE MANAGEMENT  IP CONSULT TO SOCIAL WORK  IP CONSULT TO HOME CARE NEEDS      Significant Diagnostic Studies:     CT HEAD WO CONTRAST    Result Date: 1/28/2023  Patient: Tarun Gibson  Time Out: 23:29Exam(s): CT HEAD Without Contrast EXAM:  CT Head Without Intravenous ContrastCLINICAL HISTORY:   Reason for exam: frequent falls, dizziness. TECHNIQUE:  Axial computed tomography images of the head/brain without intravenous contrast.COMPARISON:  No relevant prior studies available. FINDINGS:No acute intracranial hemorrhage. No midline shift or mass effect. The territorial gray-white matter differentiation is maintained throughout. Age-related cerebral volume loss. Periventricular and subcortical white matter hypoattenuation, consistent with chronic microangiopathy. The visualized orbits appear grossly unremarkable. The calvarium is intact. Opacified maxillary sinuses. No acute intracranial hemorrhage, midline shift, or mass effect. Electronically signed by Anjelica Sen MD on 01-28-23 at 2329    XR CHEST PORTABLE    Result Date: 1/28/2023  NO PRIOR REPORT AVAILABLE Exam: X-RAY OF [facility] Clinical data:Fall, preop. Technique:Single view of the chest. Prior studies: No prior studies submitted. Findings: The lungs are grossly clear; noevidence of acute infiltrate or pleural effusion. Mild cardiomegaly. No acute osseous abnormality is detected. No acute pulmonary abnormality. Mild cardiomegaly. Recommendation: Follow up as clinically indicated. Dictated and Electronically Signed by Altaf Young at 28-Jan-2023 10:43:58 PM EST             CTA PULMONARY W CONTRAST    Result Date: 1/29/2023  NO PRIOR REPORT AVAILABLE Exam: CTA OF THE CHEST WITH CONTRAST Clinical data: Elevated D-dimer. Technique: Axial CT angiography images through the lungs were acquired with contrast and imaged using soft tissue and lung algorithms. Coronal, sagittal, and 3D volume reconstructions were performed. Reformatted/3D-MPR images were performed. Radiation dose: CTDIvol =38.77 mGy, DLP =736 mGy x cm. Prior studies: Radiographs of the chest dated 01/28/2023. Findings: Lungs: Tiny bilateral pleural effusions, greater on the left, with associated dependent atelectasis, as well as some compressive atelectasis in the left lower lobe abutting the large hiatal hernia. Presumable focal nodular scarring at the left base, image 118. Scattered bilateral subsegmental atelectasis/scarring. No pneumothorax. Soft Tissues: There is a large hiatal hernia, extending to the left, containing most of the stomach. Scattered small to borderline nonspecific lymph nodes.  Vascular: No filling defect within the pulmonary arteries to the segmental branch level. The more distal evaluation is partially suboptimal. Mildly enlarged pulmonary artery could be seen with pulmonary arterial hypertension. 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 No evidence of dissection, although opacification of the descending thoracic aorta is partially suboptimal. Mild cardiomegaly, CAD, atherosclerosis. Bony structures: Significant multilevel spondylosis, DJD, possible osteopenia. There is a nondisplaced to minimally displaced distraction-type fracture involving the T9 vertebral body, which appears to be more likely chronic than acute, partially corticated, although correlate with patient's symptoms. The adjacent posterior elements are intact. Chronic L1-2 ankylosis, with a focal kyphosis a chronic compression fracture deformity, with a moderate chronic retropulsion. Some postoperative changes noted in the lower thoracic spine, with posterior laminectomies. Upper Abdomen:  Cholelithiasis. 1.1 cm left adrenal adenoma. IVC filter. Mildly aneurysmal upper abdominal aorta, 3.6 cm significant atherosclerosis. Significant amount of stool within the visualized portion of the colon. Diverticulosis, without diverticulitis in the field of view. Calcified granulomas in the spleen. 1. No acute pulmonary emboli appreciated, within the limits, as above. 2. Large hiatal hernia, as described. 3. Mild bilateral pleuroparenchymal pathology, as detailed above. 4. Chronic-appearing osseous changes in the visualized spine, as detailed above, correlate clinically. 5. Cholelithiasis. 6. Borderline aneurysmal thoracic aortic arch. Mild cardiomegaly, CAD, atherosclerosis. 7. Other findings, as above. Please see comments above. Recommendation: Follow up as clinically indicated. All CT scans at this facility utilize dose modulation, iterative reconstruction, and/or weight based dosing when appropriate to reduce radiation dose to as low as reasonably achievable.  Dictated and Electronically Signed by Reina Mariano MD at 29-Jan-2023 02:18:02 PM EST             XR HIP 2-3 VW W PELVIS RIGHT    Result Date: 1/28/2023  NO PRIOR REPORT AVAILABLE Exam: X-RAYS OF THE RIGHT HIP Clinical data: Fall, deformity. Technique: Three views of the right hip with AP view of the pelvis. Prior studies: No prior studies submitted. Findings: Right femoral neck fracture with varus angulation and slight superior migration along shaft component whereas the femoral head remains within the acetabulum. No evidence of superior pubic ramus fracture. Right femoral neck fracture as described above. Recommendation: Follow up as clinically indicated. Dictated and Electronically Signed by Roselyn Lima at 28-Jan-2023 11:09:05 PM EST               Disposition:  SNF     Discharge Instructions/Follow-up:  Ortho-see discharge sumary    Code Status:  Full Code     Activity: activity as tolerated    Diet: regular diet    Labs: For convenience and continuity at follow-up the following most recent labs are provided:  CBC:   Recent Labs     01/31/23  0312 02/01/23  0204   WBC 8.4 8.1   HGB 11.0* 11.4*   HCT 34.0* 35.9*    172       Renal:   Recent Labs     01/31/23  0312 02/01/23  0204    140   K 4.3 4.0    107   CO2 25 24   BUN 14 10   CREATININE 0.8 0.7   GLUCOSE 105 107   CALCIUM 7.9* 8.0*        Other:  No results found for: LABA1C  No results found for: EAG  No results found for: TSHFT4, TSH  No results found for: CHOL  No results found for: TRIG  No results found for: HDL  No results found for: LDLCHOLESTEROL, LDLCALC  No results found for: LABVLDL, VLDL  No results found for: CHOLHDLRATIO    ABGs:   No results found for: PHART, PO2ART, APK3RMS    Culture:   No results for input(s): BC, BLOODCULT2, ORG in the last 72 hours.     Discharge Medications:   Current Discharge Medication List             Details   cyclobenzaprine (FLEXERIL) 5 MG tablet Take 1 tablet by mouth 2 times daily as needed for Muscle spasms  Qty: 6 tablet, Refills: 0      hydrALAZINE (APRESOLINE) 50 MG tablet Take 1 tablet by mouth every 8 hours  Qty: 90 tablet, Refills: 3      NIFEdipine (PROCARDIA XL) 60 MG extended release tablet Take 1 tablet by mouth daily  Qty: 30 tablet, Refills: 3      Ergocalciferol (VITAMIN D) 14035 units CAPS Take 50,000 Units by mouth three times a week  Qty: 5 capsule, Refills: 0                Details   oxyCODONE-acetaminophen (PERCOCET) 5-325 MG per tablet Take 1 tablet by mouth every 6 hours as needed for Pain for up to 3 days. Max Daily Amount: 4 tablets  Qty: 12 tablet, Refills: 0    Comments: Reduce doses taken as pain becomes manageable  Associated Diagnoses: Closed right hip fracture, initial encounter ([facility])      gabapentin (NEURONTIN) 300 MG capsule Take 1 capsule by mouth 4 times daily for 3 days. Max Daily Amount: 1,200 mg  Qty: 12 capsule, Refills: 0    Associated Diagnoses: [facility]                Details   polyethylene glycol (MIRALAX) 17 g PACK packet Take 17 g by mouth as needed      lisinopril (PRINIVIL;ZESTRIL) 10 MG tablet Take 10 mg by mouth daily.            Current Facility-Administered Medications   Medication Dose Route Frequency Provider Last Rate Last Admin    NIFEdipine (PROCARDIA XL) extended release tablet 60 mg  60 mg Oral Daily Rae Wright MD   60 mg at 02/02/23 0858    cyclobenzaprine (FLEXERIL) tablet 5 mg  5 mg Oral TID PRN JOE Alonso - KAROL        hydrALAZINE (APRESOLINE) tablet 50 mg  50 mg Oral 3 times per day Rae Wright MD        potassium chloride 10 mEq/100 mL IVPB (Peripheral Line)  10 mEq IntraVENous PRN JOE Alonso CNP        vitamin D (ERGOCALCIFEROL) capsule 50,000 Units  50,000 Units Oral Once per day on Mon Wed Fri Priscilla Junior MD   50,000 Units at 02/01/23 1647    sodium chloride flush 0.9 % injection 5-40 mL  5-40 mL IntraVENous 2 times per day Edin Giron MD   10 mL at 02/01/23 2013    sodium chloride flush 0.9 % injection 5-40 mL  5-40 mL IntraVENous PRN Daquan Garcia MD        sodium chloride flush 0.9 % injection 5-40 mL  5-40 mL IntraVENous 2 times per day Antonieta Doll MD   10 mL at 02/01/23 2013    sodium chloride flush 0.9 % injection 5-40 mL  5-40 mL IntraVENous PRN Antonieta Doll MD        0.9 % sodium chloride infusion   IntraVENous PRN Antonieta Doll MD   Stopped at 02/01/23 1047    ondansetron (ZOFRAN-ODT) disintegrating tablet 4 mg  4 mg Oral Q8H PRN Antonieta Doll MD        Or    [facility]ron [facility]) injection 4 mg  4 mg IntraVENous Q6H PRN Antonieta Doll MD        0.9 % sodium chloride infusion   IntraVENous Continuous Antonieta Doll  mL/hr at 02/01/23 2012 New Bag at 02/01/23 2012    polyethylene glycol (GLYCOLAX) packet 17 g  17 g Oral PRN Antonieta Doll MD   17 g at 02/02/23 0907    naloxone [facility]) injection 0.4 mg  0.4 mg IntraVENous PRN Antonieta Doll MD        morphine (PF) injection 1 mg  1 mg IntraVENous Q4H PRN Antonieta Doll MD   1 mg at 01/29/23 0904    morphine sulfate (PF) injection 4 mg  4 mg IntraVENous Q4H PRN Antonieta Doll MD        morphine (PF) injection 2 mg  2 mg IntraVENous Q4H PRN Antonieta Doll MD   2 mg at 02/01/23 0501    acetaminophen (TYLENOL) tablet 1,000 mg  1,000 mg Oral 3 times per day Antonieta Doll MD   1,000 mg at 02/02/23 0501    sodium chloride flush 0.9 % injection 5-40 mL  5-40 mL IntraVENous 2 times per day Antonieta Doll MD   10 mL at 02/01/23 2013    sodium chloride flush 0.9 % injection 5-40 mL  5-40 mL IntraVENous PRN Antonieta Doll MD        potassium chloride (KLOR-CON M) extended release tablet 40 mEq  40 mEq Oral PRN Antonieta Doll MD        Or    potassium bicarb-citric acid (EFFER-K) effervescent tablet 40 mEq  40 mEq Oral PRN Antonieta Doll MD        Or    potassium chloride 10 mEq/100 mL IVPB (Peripheral Line)  10 mEq IntraVENous PRN Antonieta Doll MD        magnesium sulfate 2000 mg in 50 mL IVPB premix  2,000 mg IntraVENous PRN Antonieta Doll MD        oxyCODONE ([facility]) immediate release tablet 5 mg  5 mg Oral Q4H PRN Alannah Elmore MD        Or    oxyCODONE (ROXICODONE) immediate release tablet 10 mg  10 mg Oral Q4H PRN Alannah Elmore MD   10 mg at 02/02/23 6679    melatonin disintegrating tablet 5 mg  5 mg Oral Nightly PRN Alannah Elmore MD   5 mg at 02/01/23 2013    calcium carbonate (TUMS) chewable tablet 500 mg  500 mg Oral TID PRN Alannah Elmore MD        lisinopril (PRINIVIL;ZESTRIL) tablet 10 mg  10 mg Oral Daily Alannah Elmore MD   10 mg at 02/02/23 0858    gabapentin (NEURONTIN) capsule 300 mg  300 mg Oral 4x Daily Alannah Elmore MD   300 mg at 02/02/23 0858    guaiFENesin-dextromethorphan (ROBITUSSIN DM) 100-10 MG/5ML syrup 5 mL  5 mL Oral Q4H PRN Alannah Elmore MD              DISCHARGE STATUS:    Condition: Good    Extended Emergency Contact Information  Primary Emergency Contact: Marilia Mustafa Phone: [phone]  Relation: Child         Time Spent on discharge is more than 30 minutes in the examination, evaluation, counseling and review of medications and discharge plan. JOE Robbins - CNP  Internal Medicine Hospitalist    Thank you Ezequiel Macedo MD for the opportunity to be involved in this patient's care.  If you have any questions or concerns please feel free to contact me at [phone]

## 2023-02-03 ENCOUNTER — TELEPHONE (OUTPATIENT)
Dept: INPATIENT UNIT | Age: 88
End: 2023-02-03

## 2023-02-03 ENCOUNTER — TELEPHONE (OUTPATIENT)
Dept: PODIATRY | Facility: CLINIC | Age: 88
End: 2023-02-03
Payer: MEDICARE

## 2023-02-03 ENCOUNTER — LAB REQUISITION (OUTPATIENT)
Dept: LAB | Facility: HOSPITAL | Age: 88
End: 2023-02-03
Payer: MEDICARE

## 2023-02-03 DIAGNOSIS — Z00.00 ENCOUNTER FOR GENERAL ADULT MEDICAL EXAMINATION WITHOUT ABNORMAL FINDINGS: ICD-10-CM

## 2023-02-03 LAB
25(OH)D3 SERPL-MCNC: 14.9 NG/ML (ref 30–100)
ALBUMIN SERPL-MCNC: 3.4 G/DL (ref 3.5–5.2)
ALP SERPL-CCNC: 79 U/L (ref 39–117)
ALT SERPL W P-5'-P-CCNC: 5 U/L (ref 1–41)
ANION GAP SERPL CALCULATED.3IONS-SCNC: 11 MMOL/L (ref 5–15)
AST SERPL-CCNC: 12 U/L (ref 1–40)
BASOPHILS # BLD AUTO: 0.02 10*3/MM3 (ref 0–0.2)
BASOPHILS NFR BLD AUTO: 0.1 % (ref 0–1.5)
BILIRUB CONJ SERPL-MCNC: 0.3 MG/DL (ref 0–0.3)
BILIRUB INDIRECT SERPL-MCNC: 0.6 MG/DL
BILIRUB SERPL-MCNC: 0.9 MG/DL (ref 0–1.2)
BUN SERPL-MCNC: 23 MG/DL (ref 8–23)
BUN/CREAT SERPL: 35.4 (ref 7–25)
CALCIUM SPEC-SCNC: 8.7 MG/DL (ref 8.6–10.5)
CHLORIDE SERPL-SCNC: 99 MMOL/L (ref 98–107)
CHOLEST SERPL-MCNC: 96 MG/DL (ref 0–200)
CO2 SERPL-SCNC: 24 MMOL/L (ref 22–29)
CREAT SERPL-MCNC: 0.65 MG/DL (ref 0.76–1.27)
DEPRECATED RDW RBC AUTO: 47.5 FL (ref 37–54)
EGFRCR SERPLBLD CKD-EPI 2021: 91.8 ML/MIN/1.73
EOSINOPHIL # BLD AUTO: 0 10*3/MM3 (ref 0–0.4)
EOSINOPHIL NFR BLD AUTO: 0 % (ref 0.3–6.2)
ERYTHROCYTE [DISTWIDTH] IN BLOOD BY AUTOMATED COUNT: 14 % (ref 12.3–15.4)
GLUCOSE SERPL-MCNC: 175 MG/DL (ref 65–99)
HBA1C MFR BLD: 5.5 % (ref 4.8–5.6)
HCT VFR BLD AUTO: 37.6 % (ref 37.5–51)
HDLC SERPL-MCNC: 30 MG/DL (ref 40–60)
HGB BLD-MCNC: 11.9 G/DL (ref 13–17.7)
IMM GRANULOCYTES # BLD AUTO: 0.09 10*3/MM3 (ref 0–0.05)
IMM GRANULOCYTES NFR BLD AUTO: 0.6 % (ref 0–0.5)
LDLC SERPL CALC-MCNC: 48 MG/DL (ref 0–100)
LDLC/HDLC SERPL: 1.6 {RATIO}
LYMPHOCYTES # BLD AUTO: 0.6 10*3/MM3 (ref 0.7–3.1)
LYMPHOCYTES NFR BLD AUTO: 3.9 % (ref 19.6–45.3)
MCH RBC QN AUTO: 29.3 PG (ref 26.6–33)
MCHC RBC AUTO-ENTMCNC: 31.6 G/DL (ref 31.5–35.7)
MCV RBC AUTO: 92.6 FL (ref 79–97)
MONOCYTES # BLD AUTO: 1.03 10*3/MM3 (ref 0.1–0.9)
MONOCYTES NFR BLD AUTO: 6.6 % (ref 5–12)
NEUTROPHILS NFR BLD AUTO: 13.78 10*3/MM3 (ref 1.7–7)
NEUTROPHILS NFR BLD AUTO: 88.8 % (ref 42.7–76)
NRBC BLD AUTO-RTO: 0 /100 WBC (ref 0–0.2)
PLATELET # BLD AUTO: 267 10*3/MM3 (ref 140–450)
PMV BLD AUTO: 9.6 FL (ref 6–12)
POTASSIUM SERPL-SCNC: 3.7 MMOL/L (ref 3.5–5.2)
PREALB SERPL-MCNC: 8.1 MG/DL (ref 20–40)
PROT SERPL-MCNC: 5.8 G/DL (ref 6–8.5)
RBC # BLD AUTO: 4.06 10*6/MM3 (ref 4.14–5.8)
SODIUM SERPL-SCNC: 134 MMOL/L (ref 136–145)
T3FREE SERPL-MCNC: 2.28 PG/ML (ref 2–4.4)
T4 FREE SERPL-MCNC: 1.43 NG/DL (ref 0.93–1.7)
TRIGL SERPL-MCNC: 90 MG/DL (ref 0–150)
TSH SERPL DL<=0.05 MIU/L-ACNC: 1.1 UIU/ML (ref 0.27–4.2)
VIT B12 BLD-MCNC: 154 PG/ML (ref 211–946)
VLDLC SERPL-MCNC: 18 MG/DL (ref 5–40)
WBC NRBC COR # BLD: 15.52 10*3/MM3 (ref 3.4–10.8)

## 2023-02-03 PROCEDURE — 84134 ASSAY OF PREALBUMIN: CPT | Performed by: INTERNAL MEDICINE

## 2023-02-03 PROCEDURE — 80076 HEPATIC FUNCTION PANEL: CPT | Performed by: INTERNAL MEDICINE

## 2023-02-03 PROCEDURE — 80048 BASIC METABOLIC PNL TOTAL CA: CPT | Performed by: INTERNAL MEDICINE

## 2023-02-03 PROCEDURE — 36415 COLL VENOUS BLD VENIPUNCTURE: CPT | Performed by: INTERNAL MEDICINE

## 2023-02-03 PROCEDURE — 84439 ASSAY OF FREE THYROXINE: CPT | Performed by: INTERNAL MEDICINE

## 2023-02-03 PROCEDURE — 84481 FREE ASSAY (FT-3): CPT | Performed by: INTERNAL MEDICINE

## 2023-02-03 PROCEDURE — 82607 VITAMIN B-12: CPT | Performed by: INTERNAL MEDICINE

## 2023-02-03 PROCEDURE — 80061 LIPID PANEL: CPT | Performed by: INTERNAL MEDICINE

## 2023-02-03 PROCEDURE — 85025 COMPLETE CBC W/AUTO DIFF WBC: CPT | Performed by: INTERNAL MEDICINE

## 2023-02-03 PROCEDURE — 82306 VITAMIN D 25 HYDROXY: CPT | Performed by: INTERNAL MEDICINE

## 2023-02-03 PROCEDURE — 84443 ASSAY THYROID STIM HORMONE: CPT | Performed by: INTERNAL MEDICINE

## 2023-02-03 PROCEDURE — 83036 HEMOGLOBIN GLYCOSYLATED A1C: CPT | Performed by: INTERNAL MEDICINE

## 2023-02-03 NOTE — TELEPHONE ENCOUNTER
Called patient regarding appt on 02/06/2023. Left message for patient to return call if any questions or concerns arise.

## 2023-02-08 ENCOUNTER — HOSPITAL ENCOUNTER (INPATIENT)
Facility: HOSPITAL | Age: 88
LOS: 6 days | Discharge: NURSING, REHAB OR LONG-TERM CARE | DRG: 177 | End: 2023-02-14
Attending: INTERNAL MEDICINE | Admitting: FAMILY MEDICINE
Payer: MEDICARE

## 2023-02-08 ENCOUNTER — APPOINTMENT (OUTPATIENT)
Dept: GENERAL RADIOLOGY | Facility: HOSPITAL | Age: 88
DRG: 177 | End: 2023-02-08
Payer: MEDICARE

## 2023-02-08 ENCOUNTER — APPOINTMENT (OUTPATIENT)
Dept: CT IMAGING | Facility: HOSPITAL | Age: 88
DRG: 177 | End: 2023-02-08
Payer: MEDICARE

## 2023-02-08 DIAGNOSIS — K92.1 BLOODY STOOLS: ICD-10-CM

## 2023-02-08 DIAGNOSIS — K44.9 HIATAL HERNIA: ICD-10-CM

## 2023-02-08 DIAGNOSIS — K80.80 BILIARY CALCULUS OF OTHER SITE WITHOUT OBSTRUCTION: ICD-10-CM

## 2023-02-08 DIAGNOSIS — I71.43 INFRARENAL ABDOMINAL AORTIC ANEURYSM (AAA) WITHOUT RUPTURE: ICD-10-CM

## 2023-02-08 DIAGNOSIS — N39.0 URINARY TRACT INFECTION WITHOUT HEMATURIA, SITE UNSPECIFIED: ICD-10-CM

## 2023-02-08 DIAGNOSIS — R13.10 DYSPHAGIA: ICD-10-CM

## 2023-02-08 DIAGNOSIS — K57.90 DIVERTICULOSIS: ICD-10-CM

## 2023-02-08 DIAGNOSIS — Z74.09 IMPAIRED MOBILITY: ICD-10-CM

## 2023-02-08 DIAGNOSIS — J18.9 PNEUMONIA OF LEFT LOWER LOBE DUE TO INFECTIOUS ORGANISM: Primary | ICD-10-CM

## 2023-02-08 DIAGNOSIS — R77.8 ELEVATED TROPONIN: ICD-10-CM

## 2023-02-08 DIAGNOSIS — Z78.9 DECREASED ACTIVITIES OF DAILY LIVING (ADL): ICD-10-CM

## 2023-02-08 LAB
ALBUMIN SERPL-MCNC: 3.2 G/DL (ref 3.5–5.2)
ALBUMIN/GLOB SERPL: 1 G/DL
ALP SERPL-CCNC: 89 U/L (ref 39–117)
ALT SERPL W P-5'-P-CCNC: 20 U/L (ref 1–41)
ANION GAP SERPL CALCULATED.3IONS-SCNC: 9 MMOL/L (ref 5–15)
APTT PPP: 34.7 SECONDS (ref 24.1–35)
AST SERPL-CCNC: 32 U/L (ref 1–40)
BACTERIA UR QL AUTO: ABNORMAL /HPF
BASOPHILS # BLD AUTO: 0.06 10*3/MM3 (ref 0–0.2)
BASOPHILS NFR BLD AUTO: 0.6 % (ref 0–1.5)
BILIRUB SERPL-MCNC: 0.4 MG/DL (ref 0–1.2)
BILIRUB UR QL STRIP: NEGATIVE
BUN SERPL-MCNC: 18 MG/DL (ref 8–23)
BUN/CREAT SERPL: 24.3 (ref 7–25)
CALCIUM SPEC-SCNC: 8.3 MG/DL (ref 8.6–10.5)
CHLORIDE SERPL-SCNC: 100 MMOL/L (ref 98–107)
CK SERPL-CCNC: 28 U/L (ref 20–200)
CLARITY UR: CLEAR
CO2 SERPL-SCNC: 25 MMOL/L (ref 22–29)
COLOR UR: YELLOW
CREAT SERPL-MCNC: 0.74 MG/DL (ref 0.76–1.27)
D-LACTATE SERPL-SCNC: 1.2 MMOL/L (ref 0.5–2)
DEPRECATED RDW RBC AUTO: 49.2 FL (ref 37–54)
DEVELOPER EXPIRATION DATE: ABNORMAL
DEVELOPER LOT NUMBER: 225
EGFRCR SERPLBLD CKD-EPI 2021: 88.2 ML/MIN/1.73
EOSINOPHIL # BLD AUTO: 0.17 10*3/MM3 (ref 0–0.4)
EOSINOPHIL NFR BLD AUTO: 1.7 % (ref 0.3–6.2)
ERYTHROCYTE [DISTWIDTH] IN BLOOD BY AUTOMATED COUNT: 14.4 % (ref 12.3–15.4)
EXPIRATION DATE: ABNORMAL
FECAL OCCULT BLOOD SCREEN, POC: POSITIVE
FLUAV RNA RESP QL NAA+PROBE: NOT DETECTED
FLUBV RNA RESP QL NAA+PROBE: NOT DETECTED
GEN 5 2HR TROPONIN T REFLEX: 30 NG/L
GLOBULIN UR ELPH-MCNC: 3.2 GM/DL
GLUCOSE SERPL-MCNC: 136 MG/DL (ref 65–99)
GLUCOSE UR STRIP-MCNC: NEGATIVE MG/DL
HCT VFR BLD AUTO: 37.4 % (ref 37.5–51)
HGB BLD-MCNC: 11.6 G/DL (ref 13–17.7)
HGB UR QL STRIP.AUTO: NEGATIVE
HOLD SPECIMEN: NORMAL
HOLD SPECIMEN: NORMAL
HYALINE CASTS UR QL AUTO: ABNORMAL /LPF
IMM GRANULOCYTES # BLD AUTO: 0.14 10*3/MM3 (ref 0–0.05)
IMM GRANULOCYTES NFR BLD AUTO: 1.4 % (ref 0–0.5)
INR PPP: 1.18 (ref 0.91–1.09)
KETONES UR QL STRIP: NEGATIVE
LEUKOCYTE ESTERASE UR QL STRIP.AUTO: ABNORMAL
LIPASE SERPL-CCNC: 34 U/L (ref 13–60)
LYMPHOCYTES # BLD AUTO: 1.56 10*3/MM3 (ref 0.7–3.1)
LYMPHOCYTES NFR BLD AUTO: 15.7 % (ref 19.6–45.3)
Lab: 225
MAGNESIUM SERPL-MCNC: 2.2 MG/DL (ref 1.6–2.4)
MCH RBC QN AUTO: 28.9 PG (ref 26.6–33)
MCHC RBC AUTO-ENTMCNC: 31 G/DL (ref 31.5–35.7)
MCV RBC AUTO: 93.3 FL (ref 79–97)
MONOCYTES # BLD AUTO: 0.69 10*3/MM3 (ref 0.1–0.9)
MONOCYTES NFR BLD AUTO: 6.9 % (ref 5–12)
NEGATIVE CONTROL: NEGATIVE
NEUTROPHILS NFR BLD AUTO: 7.32 10*3/MM3 (ref 1.7–7)
NEUTROPHILS NFR BLD AUTO: 73.7 % (ref 42.7–76)
NITRITE UR QL STRIP: NEGATIVE
NRBC BLD AUTO-RTO: 0 /100 WBC (ref 0–0.2)
NT-PROBNP SERPL-MCNC: 458.9 PG/ML (ref 0–1800)
PH UR STRIP.AUTO: 6 [PH] (ref 5–8)
PLATELET # BLD AUTO: 608 10*3/MM3 (ref 140–450)
PMV BLD AUTO: 9.1 FL (ref 6–12)
POSITIVE CONTROL: POSITIVE
POTASSIUM SERPL-SCNC: 4.1 MMOL/L (ref 3.5–5.2)
PROCALCITONIN SERPL-MCNC: 0.19 NG/ML (ref 0–0.25)
PROT SERPL-MCNC: 6.4 G/DL (ref 6–8.5)
PROT UR QL STRIP: NEGATIVE
PROTHROMBIN TIME: 15.2 SECONDS (ref 11.8–14.8)
RBC # BLD AUTO: 4.01 10*6/MM3 (ref 4.14–5.8)
RBC # UR STRIP: ABNORMAL /HPF
REF LAB TEST METHOD: ABNORMAL
RSV RNA NPH QL NAA+NON-PROBE: NOT DETECTED
SARS-COV-2 RNA RESP QL NAA+PROBE: NOT DETECTED
SODIUM SERPL-SCNC: 134 MMOL/L (ref 136–145)
SP GR UR STRIP: 1.01 (ref 1–1.03)
SQUAMOUS #/AREA URNS HPF: ABNORMAL /HPF
TROPONIN T DELTA: -6 NG/L
TROPONIN T SERPL HS-MCNC: 36 NG/L
UROBILINOGEN UR QL STRIP: ABNORMAL
WBC # UR STRIP: ABNORMAL /HPF
WBC NRBC COR # BLD: 9.94 10*3/MM3 (ref 3.4–10.8)
WHOLE BLOOD HOLD COAG: NORMAL
WHOLE BLOOD HOLD SPECIMEN: NORMAL

## 2023-02-08 PROCEDURE — 25010000002 PIPERACILLIN SOD-TAZOBACTAM PER 1 G: Performed by: INTERNAL MEDICINE

## 2023-02-08 PROCEDURE — 82270 OCCULT BLOOD FECES: CPT | Performed by: PHYSICIAN ASSISTANT

## 2023-02-08 PROCEDURE — 36415 COLL VENOUS BLD VENIPUNCTURE: CPT

## 2023-02-08 PROCEDURE — P9612 CATHETERIZE FOR URINE SPEC: HCPCS

## 2023-02-08 PROCEDURE — 84484 ASSAY OF TROPONIN QUANT: CPT | Performed by: PHYSICIAN ASSISTANT

## 2023-02-08 PROCEDURE — 99285 EMERGENCY DEPT VISIT HI MDM: CPT

## 2023-02-08 PROCEDURE — 87186 SC STD MICRODIL/AGAR DIL: CPT | Performed by: PHYSICIAN ASSISTANT

## 2023-02-08 PROCEDURE — 81001 URINALYSIS AUTO W/SCOPE: CPT | Performed by: PHYSICIAN ASSISTANT

## 2023-02-08 PROCEDURE — 87086 URINE CULTURE/COLONY COUNT: CPT | Performed by: PHYSICIAN ASSISTANT

## 2023-02-08 PROCEDURE — 0 IOPAMIDOL PER 1 ML: Performed by: PHYSICIAN ASSISTANT

## 2023-02-08 PROCEDURE — 71275 CT ANGIOGRAPHY CHEST: CPT

## 2023-02-08 PROCEDURE — 80053 COMPREHEN METABOLIC PANEL: CPT | Performed by: PHYSICIAN ASSISTANT

## 2023-02-08 PROCEDURE — 83880 ASSAY OF NATRIURETIC PEPTIDE: CPT | Performed by: PHYSICIAN ASSISTANT

## 2023-02-08 PROCEDURE — 83735 ASSAY OF MAGNESIUM: CPT | Performed by: PHYSICIAN ASSISTANT

## 2023-02-08 PROCEDURE — 74176 CT ABD & PELVIS W/O CONTRAST: CPT

## 2023-02-08 PROCEDURE — 82550 ASSAY OF CK (CPK): CPT | Performed by: PHYSICIAN ASSISTANT

## 2023-02-08 PROCEDURE — 84145 PROCALCITONIN (PCT): CPT | Performed by: PHYSICIAN ASSISTANT

## 2023-02-08 PROCEDURE — 25010000002 MORPHINE PER 10 MG: Performed by: INTERNAL MEDICINE

## 2023-02-08 PROCEDURE — 87077 CULTURE AEROBIC IDENTIFY: CPT | Performed by: PHYSICIAN ASSISTANT

## 2023-02-08 PROCEDURE — 85610 PROTHROMBIN TIME: CPT | Performed by: PHYSICIAN ASSISTANT

## 2023-02-08 PROCEDURE — 87040 BLOOD CULTURE FOR BACTERIA: CPT | Performed by: PHYSICIAN ASSISTANT

## 2023-02-08 PROCEDURE — 25010000002 AZITHROMYCIN PER 500 MG: Performed by: PHYSICIAN ASSISTANT

## 2023-02-08 PROCEDURE — 83605 ASSAY OF LACTIC ACID: CPT | Performed by: PHYSICIAN ASSISTANT

## 2023-02-08 PROCEDURE — 87147 CULTURE TYPE IMMUNOLOGIC: CPT | Performed by: PHYSICIAN ASSISTANT

## 2023-02-08 PROCEDURE — 93005 ELECTROCARDIOGRAM TRACING: CPT | Performed by: EMERGENCY MEDICINE

## 2023-02-08 PROCEDURE — 25010000002 CEFTRIAXONE PER 250 MG: Performed by: PHYSICIAN ASSISTANT

## 2023-02-08 PROCEDURE — 93010 ELECTROCARDIOGRAM REPORT: CPT | Performed by: INTERNAL MEDICINE

## 2023-02-08 PROCEDURE — 83690 ASSAY OF LIPASE: CPT | Performed by: PHYSICIAN ASSISTANT

## 2023-02-08 PROCEDURE — 85025 COMPLETE CBC W/AUTO DIFF WBC: CPT | Performed by: PHYSICIAN ASSISTANT

## 2023-02-08 PROCEDURE — 85730 THROMBOPLASTIN TIME PARTIAL: CPT | Performed by: PHYSICIAN ASSISTANT

## 2023-02-08 PROCEDURE — 87150 DNA/RNA AMPLIFIED PROBE: CPT | Performed by: PHYSICIAN ASSISTANT

## 2023-02-08 PROCEDURE — 87637 SARSCOV2&INF A&B&RSV AMP PRB: CPT | Performed by: PHYSICIAN ASSISTANT

## 2023-02-08 PROCEDURE — 71045 X-RAY EXAM CHEST 1 VIEW: CPT

## 2023-02-08 PROCEDURE — 25010000002 FUROSEMIDE PER 20 MG: Performed by: INTERNAL MEDICINE

## 2023-02-08 RX ORDER — HYDRALAZINE HYDROCHLORIDE 20 MG/ML
20 INJECTION INTRAMUSCULAR; INTRAVENOUS EVERY 6 HOURS PRN
Status: DISCONTINUED | OUTPATIENT
Start: 2023-02-08 | End: 2023-02-14 | Stop reason: HOSPADM

## 2023-02-08 RX ORDER — DOCUSATE SODIUM 100 MG/1
100 CAPSULE, LIQUID FILLED ORAL 2 TIMES DAILY
COMMUNITY

## 2023-02-08 RX ORDER — ONDANSETRON 4 MG/1
4 TABLET, FILM COATED ORAL EVERY 6 HOURS PRN
COMMUNITY

## 2023-02-08 RX ORDER — PANTOPRAZOLE SODIUM 40 MG/1
40 TABLET, DELAYED RELEASE ORAL
Status: DISCONTINUED | OUTPATIENT
Start: 2023-02-09 | End: 2023-02-08

## 2023-02-08 RX ORDER — OMEPRAZOLE 20 MG/1
20 CAPSULE, DELAYED RELEASE ORAL DAILY
COMMUNITY
End: 2023-02-14 | Stop reason: HOSPADM

## 2023-02-08 RX ORDER — CYCLOBENZAPRINE HCL 5 MG
5 TABLET ORAL 2 TIMES DAILY PRN
Status: DISCONTINUED | OUTPATIENT
Start: 2023-02-08 | End: 2023-02-14 | Stop reason: HOSPADM

## 2023-02-08 RX ORDER — ERGOCALCIFEROL 1.25 MG/1
50000 CAPSULE ORAL 3 TIMES WEEKLY
COMMUNITY

## 2023-02-08 RX ORDER — BUSPIRONE HYDROCHLORIDE 5 MG/1
5 TABLET ORAL 2 TIMES DAILY
COMMUNITY

## 2023-02-08 RX ORDER — NIFEDIPINE 60 MG/1
60 TABLET, EXTENDED RELEASE ORAL DAILY
COMMUNITY

## 2023-02-08 RX ORDER — LEVALBUTEROL INHALATION SOLUTION 0.31 MG/3ML
1 SOLUTION RESPIRATORY (INHALATION) EVERY 8 HOURS PRN
COMMUNITY

## 2023-02-08 RX ORDER — ACETAMINOPHEN 325 MG/1
650 TABLET ORAL 2 TIMES DAILY
COMMUNITY

## 2023-02-08 RX ORDER — BUSPIRONE HYDROCHLORIDE 5 MG/1
5 TABLET ORAL 2 TIMES DAILY
Status: DISCONTINUED | OUTPATIENT
Start: 2023-02-09 | End: 2023-02-14 | Stop reason: HOSPADM

## 2023-02-08 RX ORDER — OXYCODONE AND ACETAMINOPHEN 10; 325 MG/1; MG/1
1 TABLET ORAL EVERY 6 HOURS PRN
COMMUNITY
End: 2023-02-14 | Stop reason: HOSPADM

## 2023-02-08 RX ORDER — GABAPENTIN 300 MG/1
300 CAPSULE ORAL 3 TIMES DAILY
Status: DISCONTINUED | OUTPATIENT
Start: 2023-02-09 | End: 2023-02-09

## 2023-02-08 RX ORDER — GUAIFENESIN 600 MG/1
600 TABLET, EXTENDED RELEASE ORAL 2 TIMES DAILY
COMMUNITY

## 2023-02-08 RX ORDER — SODIUM CHLORIDE 0.9 % (FLUSH) 0.9 %
10 SYRINGE (ML) INJECTION AS NEEDED
Status: DISCONTINUED | OUTPATIENT
Start: 2023-02-08 | End: 2023-02-14 | Stop reason: HOSPADM

## 2023-02-08 RX ORDER — MORPHINE SULFATE 2 MG/ML
2 INJECTION, SOLUTION INTRAMUSCULAR; INTRAVENOUS EVERY 4 HOURS PRN
Status: DISCONTINUED | OUTPATIENT
Start: 2023-02-08 | End: 2023-02-13 | Stop reason: RX

## 2023-02-08 RX ORDER — CYCLOBENZAPRINE HCL 5 MG
5 TABLET ORAL 2 TIMES DAILY PRN
COMMUNITY

## 2023-02-08 RX ORDER — FUROSEMIDE 10 MG/ML
40 INJECTION INTRAMUSCULAR; INTRAVENOUS EVERY 12 HOURS SCHEDULED
Status: DISCONTINUED | OUTPATIENT
Start: 2023-02-08 | End: 2023-02-11

## 2023-02-08 RX ORDER — HYDRALAZINE HYDROCHLORIDE 50 MG/1
50 TABLET, FILM COATED ORAL EVERY 8 HOURS PRN
COMMUNITY

## 2023-02-08 RX ORDER — SIMETHICONE 80 MG
80 TABLET,CHEWABLE ORAL EVERY 4 HOURS PRN
COMMUNITY

## 2023-02-08 RX ORDER — SIMETHICONE 80 MG
80 TABLET,CHEWABLE ORAL EVERY 4 HOURS PRN
Status: DISCONTINUED | OUTPATIENT
Start: 2023-02-08 | End: 2023-02-14 | Stop reason: HOSPADM

## 2023-02-08 RX ORDER — ENOXAPARIN SODIUM 100 MG/ML
40 INJECTION SUBCUTANEOUS DAILY
COMMUNITY

## 2023-02-08 RX ORDER — SUCRALFATE 1 G/1
1 TABLET ORAL
Status: DISCONTINUED | OUTPATIENT
Start: 2023-02-09 | End: 2023-02-09

## 2023-02-08 RX ORDER — NIFEDIPINE 60 MG/1
60 TABLET, EXTENDED RELEASE ORAL DAILY
Status: DISCONTINUED | OUTPATIENT
Start: 2023-02-09 | End: 2023-02-14 | Stop reason: HOSPADM

## 2023-02-08 RX ORDER — OXYCODONE AND ACETAMINOPHEN 10; 325 MG/1; MG/1
1 TABLET ORAL EVERY 6 HOURS PRN
Status: DISCONTINUED | OUTPATIENT
Start: 2023-02-08 | End: 2023-02-14 | Stop reason: HOSPADM

## 2023-02-08 RX ORDER — SUCRALFATE ORAL 1 G/10ML
1 SUSPENSION ORAL 3 TIMES DAILY
COMMUNITY
End: 2023-02-14 | Stop reason: HOSPADM

## 2023-02-08 RX ORDER — PANTOPRAZOLE SODIUM 40 MG/10ML
40 INJECTION, POWDER, LYOPHILIZED, FOR SOLUTION INTRAVENOUS EVERY 12 HOURS SCHEDULED
Status: DISCONTINUED | OUTPATIENT
Start: 2023-02-08 | End: 2023-02-13

## 2023-02-08 RX ORDER — IPRATROPIUM BROMIDE AND ALBUTEROL SULFATE 2.5; .5 MG/3ML; MG/3ML
3 SOLUTION RESPIRATORY (INHALATION)
Status: DISCONTINUED | OUTPATIENT
Start: 2023-02-08 | End: 2023-02-14 | Stop reason: HOSPADM

## 2023-02-08 RX ADMIN — FUROSEMIDE 40 MG: 10 INJECTION, SOLUTION INTRAMUSCULAR; INTRAVENOUS at 22:52

## 2023-02-08 RX ADMIN — AZITHROMYCIN 500 MG: 500 INJECTION, POWDER, LYOPHILIZED, FOR SOLUTION INTRAVENOUS at 21:50

## 2023-02-08 RX ADMIN — CEFTRIAXONE 1 G: 1 INJECTION, POWDER, FOR SOLUTION INTRAMUSCULAR; INTRAVENOUS at 20:27

## 2023-02-08 RX ADMIN — IOPAMIDOL 100 ML: 755 INJECTION, SOLUTION INTRAVENOUS at 16:56

## 2023-02-08 RX ADMIN — MORPHINE SULFATE 2 MG: 2 INJECTION, SOLUTION INTRAMUSCULAR; INTRAVENOUS at 22:52

## 2023-02-08 RX ADMIN — TAZOBACTAM SODIUM AND PIPERACILLIN SODIUM 3.38 G: 375; 3 INJECTION, SOLUTION INTRAVENOUS at 23:30

## 2023-02-08 RX ADMIN — PANTOPRAZOLE SODIUM 40 MG: 40 INJECTION, POWDER, FOR SOLUTION INTRAVENOUS at 23:30

## 2023-02-08 NOTE — ED PROVIDER NOTES
"Subjective   History of Present Illness    Patient is an 87-year-old male presenting to ED via EMS with abnormal cardiac labs.  PMH significant for recent right hip fracture status post replacement, arthritis, hypertension, s/p bilateral knee replacements.  Daughter at bedside to provide additional history.  Daughter states that just over a week ago patient had a mechanical trip and fall in his house for which on Sunday, 1/29/2023 he had surgical intervention by Dr. Quintana at [facility].  Daughter states that patient was then admitted for which \"we had a bad experience.\"  Daughter states that patient had not had a bowel movement prior to leaving and was discharged to [facility] as he lives at home alone by himself.  Daughter states that they have been working since he was placed at [facility] to regulate his bowels for which she is now over the past week had excessive diarrhea.  Patient began complaining of difficulties with swallowing when he had his first attempt at solid food yesterday describing pain in his upper chest.  Daughter states that they had patient on a liquid diet due to his constipation and yesterday they gave him solid food for the first time.  Patient has had intermittent chest pain since for which they did lab work at the facility and daughter was contacted stating \"something was abnormal with his cardiac labs.\"  Patient states that over the past few days he has also felt generalized weakness for which he is not sure is due to deconditioning as he has been resting in bed mostly since his hip was broken, from the excessive diarrhea, or from another cause.  Patient did state that he has taken Norco's 4 times a day for many years and has been cut back on those recently as they have been trying to work with his bowels and he is not sure if he is also withdrawing.  Patient denies any further falls.  Patient states that he is having significant improvement in the pain in his hip with no lower " extremity numbness or weakness, no areas of focal weakness, no fevers, chills, or diaphoresis.  Daughter did add that when patient was seen in the ED he had an incidental positive COVID finding as he had been asymptomatic and was unaware of this.  Daughter also added that patient's wife of 68 years passed away in November and he was recently started on BuSpar for difficulties with depression.    Records reviewed show patient was last seen in the ED on 1/28/2023 and admitted for closed right hip fracture, closed displaced fracture right femoral neck, fall, COVID-19 virus.  Patient had a hip hemiarthroplasty performed.    Patient had lab work done earlier today with no acute abnormalities to CBC, CMP unremarkable, high sensititvity trop elevated at 36, normal amylase and lipase.    Review of Systems   Constitutional: Negative for chills, diaphoresis and fever.   HENT: Negative.    Eyes: Negative.    Respiratory: Negative.  Negative for shortness of breath.    Cardiovascular: Positive for chest pain (worse with eating).   Gastrointestinal: Positive for diarrhea. Negative for abdominal distention, abdominal pain, constipation, nausea and vomiting.   Genitourinary: Negative.    Musculoskeletal: Positive for arthralgias (right hip, improving). Negative for back pain, gait problem, myalgias and neck pain.   Skin: Positive for wound (healing surgical wound, right hip).   Neurological: Positive for weakness (generalized).   Psychiatric/Behavioral: Negative.    All other systems reviewed and are negative.      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       No Known Allergies    Past Surgical History:   Procedure Laterality Date   • BACK SURGERY      cervical   • CATARACT EXTRACTION Bilateral    •  COLONOSCOPY  02/11/2013    Hyperplastic polyp at 25 cm, Diverticulosis repeat exam in 5 years   • COLONOSCOPY N/A 10/16/2018    Tubular adenoma ascending colon repet prn   • ENDOSCOPY  01/15/2014    HH   • ENDOSCOPY N/A 10/03/2019    Large HH, non-bleeding gastric ulcer   • ENDOSCOPY N/A 11/25/2019    Procedure: ESOPHAGOGASTRODUODENOSCOPY WITH ANESTHESIA;  Surgeon: Marques Pérez MD;  Location: [facility] ENDOSCOPY;  Service: Gastroenterology   • HERNIA REPAIR Right     INGUINAL    • HIP FRACTURE SURGERY Right 02/04/2023   • REPLACEMENT TOTAL KNEE BILATERAL     • THORACIC LAMINECTOMY DECOMPRESSIVE POSTERIOR N/A 12/14/2016    Procedure: LAMINECTOMY DECOMPRESSION, UNINSTRUMENTED POSTERIOR SPINAL FUSION, T 11-12;  Surgeon: LUZ Adan MD;  Location:  [facility] OR;  Service:    • VENA CAVA FILTER INSERTION N/A 10/04/2019    Procedure: VENA CAVA FILTER INSERTION;  Surgeon: Dallin Coronado MD;  Location:  [facility] HYBRID OR 12;  Service: Vascular       Family History   Problem Relation Age of Onset   • Colon cancer Father    • Colon polyps Neg Hx        Social History     Socioeconomic History   • Marital status:    Tobacco Use   • Smoking status: Never   • Smokeless tobacco: Never   Substance and Sexual Activity   • Alcohol use: No   • Drug use: No   • Sexual activity: Defer           Objective   Physical Exam  Vitals and nursing note reviewed.   Constitutional:       Appearance: Normal appearance. He is well-developed and well-groomed. He is not toxic-appearing or diaphoretic.   HENT:      Head: Normocephalic and atraumatic.      Mouth/Throat:      Mouth: Mucous membranes are moist.      Pharynx: Oropharynx is clear.   Eyes:      Conjunctiva/sclera: Conjunctivae normal.      Pupils: Pupils are equal, round, and reactive to light.   Cardiovascular:      Rate and Rhythm: Normal rate.   Pulmonary:      Effort: Pulmonary effort is normal. No respiratory distress.      Breath sounds: Normal breath sounds. No  wheezing.   Chest:      Chest wall: No tenderness.   Abdominal:      General: Bowel sounds are normal.      Palpations: Abdomen is soft.      Tenderness: There is no abdominal tenderness.   Musculoskeletal:         General: Tenderness present. No swelling.      Cervical back: Normal range of motion and neck supple.      Comments: Well-healing surgical incision to the right lateral hip consistent with recent right hemiarthroplasty with overlying Steri-Strips, no wound dehiscence, no surrounding or streaking erythema.  Minimal tenderness to this area.  Bilateral lower extremities are neurovascular intact distally   Skin:     General: Skin is warm and dry.      Findings: Wound (as described in MSK section) present. No erythema.   Neurological:      Mental Status: He is alert and oriented to person, place, and time.      Motor: No weakness.   Psychiatric:         Attention and Perception: Attention normal.         Mood and Affect: Mood is anxious.         Speech: Speech normal.         Behavior: Behavior normal. Behavior is cooperative.         Procedures           ED Course                                           Medical Decision Making  Biliary calculus of other site without obstruction: chronic illness or injury  Bloody stools: acute illness or injury  Diverticulosis: acute illness or injury  Elevated troponin: acute illness or injury  Hiatal hernia: acute illness or injury  Infrarenal abdominal aortic aneurysm (AAA) without rupture: acute illness or injury  Pneumonia of left lower lobe due to infectious organism: acute illness or injury  Urinary tract infection without hematuria, site unspecified: acute illness or injury  Amount and/or Complexity of Data Reviewed  Independent Historian:      Details: Daughter  External Data Reviewed: labs, radiology, ECG and notes.  Labs: ordered. Decision-making details documented in ED Course.  Radiology: ordered. Decision-making details documented in ED Course.  ECG/medicine  tests: ordered. Decision-making details documented in ED Course.  Discussion of management or test interpretation with external provider(s): Dr. Abelardo Salas (attending)  Dr. Edmond (GI)  Dr. Norman (hospitalist)    Risk  Prescription drug management.          Patient is an 87-year-old male presenting to ED via EMS with abnormal cardiac labs.  PMH significant for recent right hip fracture status post replacement, arthritis, hypertension, s/p bilateral knee replacements. Work-up revealed normal white blood cell count of 9.94 with normal lactic acid and procalcitonin.  CBC otherwise with H&H 11.6/37.4 which is stable at patient's baseline with no further CBC abnormalities.  CMP with no acute abnormalities including no significant electrolyte disturbances, normal renal and hepatic function.  Magnesium WNL.  Lipase WNL with low concern for pancreatitis.  PT/INR 15.2/1.18 with PTT 34.7.  BNP within normal meds at 458.9.  CK normal at 28.  COVID, influenza, RSV testing negative.  Urinalysis revealed evidence of infection with small leukocytes, 4+ bacteria, 31-50 white blood cells for which patient was given 1 g IV Rocephin and a culture was sent.  While in ED patient had a large bowel movement which was Hemoccult positive.  Patient's initial high-sensitivity troponin prior to arrival was 36, initial troponin upon ED was 36, and repeat was 30 with a troponin T delta change -6.  EKG with no acute abnormalities.  Chest x-ray showed: Stable chest exam without acute process, hiatal hernia.  Chest CTA showed: Left lower lobe pneumonia, no pulmonary embolism.  Blood cultures were obtained and patient was given the Rocephin for coverage of both his urinary tract infection as well as pneumonia process.  CT imaging of the abdomen and pelvis showed: Colonic diverticulosis, bowel nondistended, no inflammatory changes, hiatal hernia, cholelithiasis, bladder wall trabeculation may be secondary to chronic partial outlet obstruction,  infrarenal AAA measuring 3.6 cm in diameter.  Case was discussed with Dr. Edmond, GI, who recommends patient be placed on clear liquids and will kindly consult during admission with recommendation for hospitalist admission.  Case further discussed with Dr. Fam, hospitalist, who will kindly accept patient for admission under his services.     Final diagnoses:   Elevated troponin   Diverticulosis   Infrarenal abdominal aortic aneurysm (AAA) without rupture   Urinary tract infection without hematuria, site unspecified   Hiatal hernia   Biliary calculus of other site without obstruction   Pneumonia of left lower lobe due to infectious organism   Bloody stools       ED Disposition  ED Disposition     ED Disposition   Decision to Admit    Condition   --    Comment   Level of Care: Med/Surg [1]   Diagnosis: Pneumonia of left lower lobe due to infectious organism [[id number]]   Admitting Physician: JORJE FAM [[id number]]   Certification: I Certify That Inpatient Hospital Services Are Medically Necessary For Greater Than 2 Midnights               No follow-up provider specified.       Medication List      No changes were made to your prescriptions during this visit.          Kashif Zhong PA-C  02/08/23 2005

## 2023-02-09 ENCOUNTER — ANESTHESIA EVENT (OUTPATIENT)
Dept: GASTROENTEROLOGY | Facility: HOSPITAL | Age: 88
DRG: 177 | End: 2023-02-09
Payer: MEDICARE

## 2023-02-09 ENCOUNTER — ANESTHESIA (OUTPATIENT)
Dept: GASTROENTEROLOGY | Facility: HOSPITAL | Age: 88
DRG: 177 | End: 2023-02-09
Payer: MEDICARE

## 2023-02-09 ENCOUNTER — APPOINTMENT (OUTPATIENT)
Dept: CARDIOLOGY | Facility: HOSPITAL | Age: 88
DRG: 177 | End: 2023-02-09
Payer: MEDICARE

## 2023-02-09 PROBLEM — K22.10 EROSIVE ESOPHAGITIS: Status: ACTIVE | Noted: 2023-02-09

## 2023-02-09 PROBLEM — N30.00 ACUTE CYSTITIS WITHOUT HEMATURIA: Status: ACTIVE | Noted: 2023-02-09

## 2023-02-09 PROBLEM — R63.4 WEIGHT LOSS: Status: ACTIVE | Noted: 2023-02-09

## 2023-02-09 PROBLEM — K44.9 LARGE HIATAL HERNIA: Status: ACTIVE | Noted: 2023-02-09

## 2023-02-09 LAB
ANION GAP SERPL CALCULATED.3IONS-SCNC: 12 MMOL/L (ref 5–15)
BASOPHILS # BLD AUTO: 0.07 10*3/MM3 (ref 0–0.2)
BASOPHILS NFR BLD AUTO: 0.7 % (ref 0–1.5)
BH CV ECHO MEAS - AO MAX PG: 18.1 MMHG
BH CV ECHO MEAS - AO MEAN PG: 8 MMHG
BH CV ECHO MEAS - AO ROOT DIAM: 3.9 CM
BH CV ECHO MEAS - AO V2 MAX: 213 CM/SEC
BH CV ECHO MEAS - AO V2 VTI: 35.4 CM
BH CV ECHO MEAS - AVA(I,D): 2.9 CM2
BH CV ECHO MEAS - EDV(CUBED): 97.3 ML
BH CV ECHO MEAS - EDV(MOD-SP2): 81.1 ML
BH CV ECHO MEAS - EDV(MOD-SP4): 84.6 ML
BH CV ECHO MEAS - EF(MOD-BP): 52.3 %
BH CV ECHO MEAS - EF(MOD-SP2): 51.3 %
BH CV ECHO MEAS - EF(MOD-SP4): 57.6 %
BH CV ECHO MEAS - ESV(CUBED): 19.7 ML
BH CV ECHO MEAS - ESV(MOD-SP2): 39.5 ML
BH CV ECHO MEAS - ESV(MOD-SP4): 35.9 ML
BH CV ECHO MEAS - FS: 41.3 %
BH CV ECHO MEAS - IVS/LVPW: 1 CM
BH CV ECHO MEAS - IVSD: 1.3 CM
BH CV ECHO MEAS - LA DIMENSION: 3.5 CM
BH CV ECHO MEAS - LAT PEAK E' VEL: 5.9 CM/SEC
BH CV ECHO MEAS - LV DIASTOLIC VOL/BSA (35-75): 39.5 CM2
BH CV ECHO MEAS - LV MASS(C)D: 230.2 GRAMS
BH CV ECHO MEAS - LV MAX PG: 11 MMHG
BH CV ECHO MEAS - LV MEAN PG: 4 MMHG
BH CV ECHO MEAS - LV SYSTOLIC VOL/BSA (12-30): 16.8 CM2
BH CV ECHO MEAS - LV V1 MAX: 166 CM/SEC
BH CV ECHO MEAS - LV V1 VTI: 24.9 CM
BH CV ECHO MEAS - LVIDD: 4.6 CM
BH CV ECHO MEAS - LVIDS: 2.7 CM
BH CV ECHO MEAS - LVOT AREA: 4.2 CM2
BH CV ECHO MEAS - LVOT DIAM: 2.3 CM
BH CV ECHO MEAS - LVPWD: 1.3 CM
BH CV ECHO MEAS - MED PEAK E' VEL: 3.7 CM/SEC
BH CV ECHO MEAS - MV A MAX VEL: 101 CM/SEC
BH CV ECHO MEAS - MV DEC SLOPE: 357 CM/SEC2
BH CV ECHO MEAS - MV DEC TIME: 0.2 MSEC
BH CV ECHO MEAS - MV E MAX VEL: 70.3 CM/SEC
BH CV ECHO MEAS - MV E/A: 0.7
BH CV ECHO MEAS - MV MAX PG: 6.9 MMHG
BH CV ECHO MEAS - MV MEAN PG: 2 MMHG
BH CV ECHO MEAS - MV V2 VTI: 23.5 CM
BH CV ECHO MEAS - MVA(VTI): 4.4 CM2
BH CV ECHO MEAS - PA V2 MAX: 116 CM/SEC
BH CV ECHO MEAS - PI END-D VEL: 74.7 CM/SEC
BH CV ECHO MEAS - RAP SYSTOLE: 5 MMHG
BH CV ECHO MEAS - RV MAX PG: 4.3 MMHG
BH CV ECHO MEAS - RV V1 MAX: 104 CM/SEC
BH CV ECHO MEAS - RV V1 VTI: 20.1 CM
BH CV ECHO MEAS - RVDD: 3 CM
BH CV ECHO MEAS - RVSP: 51 MMHG
BH CV ECHO MEAS - SI(MOD-SP2): 19.4 ML/M2
BH CV ECHO MEAS - SI(MOD-SP4): 22.7 ML/M2
BH CV ECHO MEAS - SV(LVOT): 103.5 ML
BH CV ECHO MEAS - SV(MOD-SP2): 41.6 ML
BH CV ECHO MEAS - SV(MOD-SP4): 48.7 ML
BH CV ECHO MEAS - TAPSE (>1.6): 2.23 CM
BH CV ECHO MEAS - TR MAX PG: 46 MMHG
BH CV ECHO MEAS - TR MAX VEL: 339 CM/SEC
BH CV ECHO MEASUREMENTS AVERAGE E/E' RATIO: 14.65
BH CV XLRA - TDI S': 17.1 CM/SEC
BUN SERPL-MCNC: 14 MG/DL (ref 8–23)
BUN/CREAT SERPL: 16.1 (ref 7–25)
CALCIUM SPEC-SCNC: 8.3 MG/DL (ref 8.6–10.5)
CHLORIDE SERPL-SCNC: 99 MMOL/L (ref 98–107)
CO2 SERPL-SCNC: 25 MMOL/L (ref 22–29)
CREAT SERPL-MCNC: 0.87 MG/DL (ref 0.76–1.27)
DEPRECATED RDW RBC AUTO: 49.7 FL (ref 37–54)
EGFRCR SERPLBLD CKD-EPI 2021: 84 ML/MIN/1.73
EOSINOPHIL # BLD AUTO: 0.12 10*3/MM3 (ref 0–0.4)
EOSINOPHIL NFR BLD AUTO: 1.2 % (ref 0.3–6.2)
ERYTHROCYTE [DISTWIDTH] IN BLOOD BY AUTOMATED COUNT: 14.5 % (ref 12.3–15.4)
GLUCOSE SERPL-MCNC: 109 MG/DL (ref 65–99)
HCT VFR BLD AUTO: 37.6 % (ref 37.5–51)
HCT VFR BLD AUTO: 37.9 % (ref 37.5–51)
HCT VFR BLD AUTO: 39.6 % (ref 37.5–51)
HGB BLD-MCNC: 11.7 G/DL (ref 13–17.7)
HGB BLD-MCNC: 11.8 G/DL (ref 13–17.7)
HGB BLD-MCNC: 12.5 G/DL (ref 13–17.7)
IMM GRANULOCYTES # BLD AUTO: 0.13 10*3/MM3 (ref 0–0.05)
IMM GRANULOCYTES NFR BLD AUTO: 1.3 % (ref 0–0.5)
LEFT ATRIUM VOLUME INDEX: 35.1 ML/M2
LEFT ATRIUM VOLUME: 75.2 ML
LYMPHOCYTES # BLD AUTO: 1.97 10*3/MM3 (ref 0.7–3.1)
LYMPHOCYTES NFR BLD AUTO: 20.4 % (ref 19.6–45.3)
MAXIMAL PREDICTED HEART RATE: 134 BPM
MCH RBC QN AUTO: 29.1 PG (ref 26.6–33)
MCHC RBC AUTO-ENTMCNC: 31.1 G/DL (ref 31.5–35.7)
MCV RBC AUTO: 93.6 FL (ref 79–97)
MONOCYTES # BLD AUTO: 0.83 10*3/MM3 (ref 0.1–0.9)
MONOCYTES NFR BLD AUTO: 8.6 % (ref 5–12)
NEUTROPHILS NFR BLD AUTO: 6.56 10*3/MM3 (ref 1.7–7)
NEUTROPHILS NFR BLD AUTO: 67.8 % (ref 42.7–76)
NRBC BLD AUTO-RTO: 0 /100 WBC (ref 0–0.2)
PLATELET # BLD AUTO: 643 10*3/MM3 (ref 140–450)
PMV BLD AUTO: 8.9 FL (ref 6–12)
POTASSIUM SERPL-SCNC: 3.9 MMOL/L (ref 3.5–5.2)
QT INTERVAL: 444 MS
QTC INTERVAL: 489 MS
RBC # BLD AUTO: 4.05 10*6/MM3 (ref 4.14–5.8)
SODIUM SERPL-SCNC: 136 MMOL/L (ref 136–145)
STRESS TARGET HR: 114 BPM
TROPONIN T SERPL HS-MCNC: 44 NG/L
WBC NRBC COR # BLD: 9.68 10*3/MM3 (ref 3.4–10.8)

## 2023-02-09 PROCEDURE — 80048 BASIC METABOLIC PNL TOTAL CA: CPT | Performed by: INTERNAL MEDICINE

## 2023-02-09 PROCEDURE — 94664 DEMO&/EVAL PT USE INHALER: CPT

## 2023-02-09 PROCEDURE — 93306 TTE W/DOPPLER COMPLETE: CPT

## 2023-02-09 PROCEDURE — 93306 TTE W/DOPPLER COMPLETE: CPT | Performed by: EMERGENCY MEDICINE

## 2023-02-09 PROCEDURE — 25010000002 PIPERACILLIN SOD-TAZOBACTAM PER 1 G: Performed by: INTERNAL MEDICINE

## 2023-02-09 PROCEDURE — 94640 AIRWAY INHALATION TREATMENT: CPT

## 2023-02-09 PROCEDURE — 88342 IMHCHEM/IMCYTCHM 1ST ANTB: CPT | Performed by: INTERNAL MEDICINE

## 2023-02-09 PROCEDURE — 88305 TISSUE EXAM BY PATHOLOGIST: CPT | Performed by: INTERNAL MEDICINE

## 2023-02-09 PROCEDURE — 43239 EGD BIOPSY SINGLE/MULTIPLE: CPT | Performed by: INTERNAL MEDICINE

## 2023-02-09 PROCEDURE — 94799 UNLISTED PULMONARY SVC/PX: CPT

## 2023-02-09 PROCEDURE — 25010000002 PROPOFOL 10 MG/ML EMULSION

## 2023-02-09 PROCEDURE — 85014 HEMATOCRIT: CPT | Performed by: CLINICAL NURSE SPECIALIST

## 2023-02-09 PROCEDURE — 85025 COMPLETE CBC W/AUTO DIFF WBC: CPT | Performed by: INTERNAL MEDICINE

## 2023-02-09 PROCEDURE — 84484 ASSAY OF TROPONIN QUANT: CPT | Performed by: INTERNAL MEDICINE

## 2023-02-09 PROCEDURE — 25010000002 MORPHINE PER 10 MG: Performed by: INTERNAL MEDICINE

## 2023-02-09 PROCEDURE — 85018 HEMOGLOBIN: CPT | Performed by: CLINICAL NURSE SPECIALIST

## 2023-02-09 PROCEDURE — 0DB68ZX EXCISION OF STOMACH, VIA NATURAL OR ARTIFICIAL OPENING ENDOSCOPIC, DIAGNOSTIC: ICD-10-PCS | Performed by: INTERNAL MEDICINE

## 2023-02-09 PROCEDURE — 99222 1ST HOSP IP/OBS MODERATE 55: CPT | Performed by: INTERNAL MEDICINE

## 2023-02-09 PROCEDURE — 25010000002 FUROSEMIDE PER 20 MG: Performed by: INTERNAL MEDICINE

## 2023-02-09 PROCEDURE — 94761 N-INVAS EAR/PLS OXIMETRY MLT: CPT

## 2023-02-09 RX ORDER — SUCRALFATE ORAL 1 G/10ML
1 SUSPENSION ORAL
Status: DISCONTINUED | OUTPATIENT
Start: 2023-02-09 | End: 2023-02-14 | Stop reason: HOSPADM

## 2023-02-09 RX ORDER — SODIUM CHLORIDE 0.9 % (FLUSH) 0.9 %
10 SYRINGE (ML) INJECTION EVERY 12 HOURS SCHEDULED
Status: DISCONTINUED | OUTPATIENT
Start: 2023-02-09 | End: 2023-02-09 | Stop reason: HOSPADM

## 2023-02-09 RX ORDER — SODIUM CHLORIDE 0.9 % (FLUSH) 0.9 %
10 SYRINGE (ML) INJECTION AS NEEDED
Status: DISCONTINUED | OUTPATIENT
Start: 2023-02-09 | End: 2023-02-09 | Stop reason: HOSPADM

## 2023-02-09 RX ORDER — BISACODYL 10 MG
10 SUPPOSITORY, RECTAL RECTAL DAILY PRN
COMMUNITY

## 2023-02-09 RX ORDER — SODIUM CHLORIDE 9 MG/ML
40 INJECTION, SOLUTION INTRAVENOUS AS NEEDED
Status: DISCONTINUED | OUTPATIENT
Start: 2023-02-09 | End: 2023-02-09 | Stop reason: HOSPADM

## 2023-02-09 RX ORDER — LANOLIN ALCOHOL/MO/W.PET/CERES
1000 CREAM (GRAM) TOPICAL DAILY
COMMUNITY

## 2023-02-09 RX ORDER — SODIUM CHLORIDE 9 MG/ML
50 INJECTION, SOLUTION INTRAVENOUS CONTINUOUS
Status: DISCONTINUED | OUTPATIENT
Start: 2023-02-09 | End: 2023-02-12

## 2023-02-09 RX ORDER — LIDOCAINE HYDROCHLORIDE 20 MG/ML
INJECTION, SOLUTION EPIDURAL; INFILTRATION; INTRACAUDAL; PERINEURAL AS NEEDED
Status: DISCONTINUED | OUTPATIENT
Start: 2023-02-09 | End: 2023-02-09 | Stop reason: SURG

## 2023-02-09 RX ORDER — PROPOFOL 10 MG/ML
VIAL (ML) INTRAVENOUS AS NEEDED
Status: DISCONTINUED | OUTPATIENT
Start: 2023-02-09 | End: 2023-02-09 | Stop reason: SURG

## 2023-02-09 RX ADMIN — SUCRALFATE 1 G: 1 SUSPENSION ORAL at 20:00

## 2023-02-09 RX ADMIN — LIDOCAINE HYDROCHLORIDE 50 MG: 20 INJECTION, SOLUTION EPIDURAL; INFILTRATION; INTRACAUDAL; PERINEURAL at 11:31

## 2023-02-09 RX ADMIN — MORPHINE SULFATE 2 MG: 2 INJECTION, SOLUTION INTRAMUSCULAR; INTRAVENOUS at 21:36

## 2023-02-09 RX ADMIN — MORPHINE SULFATE 2 MG: 2 INJECTION, SOLUTION INTRAMUSCULAR; INTRAVENOUS at 03:10

## 2023-02-09 RX ADMIN — MORPHINE SULFATE 2 MG: 2 INJECTION, SOLUTION INTRAMUSCULAR; INTRAVENOUS at 09:19

## 2023-02-09 RX ADMIN — MORPHINE SULFATE 2 MG: 2 INJECTION, SOLUTION INTRAMUSCULAR; INTRAVENOUS at 17:41

## 2023-02-09 RX ADMIN — PANTOPRAZOLE SODIUM 40 MG: 40 INJECTION, POWDER, FOR SOLUTION INTRAVENOUS at 09:09

## 2023-02-09 RX ADMIN — IPRATROPIUM BROMIDE AND ALBUTEROL SULFATE 3 ML: .5; 2.5 SOLUTION RESPIRATORY (INHALATION) at 14:22

## 2023-02-09 RX ADMIN — BUSPIRONE HYDROCHLORIDE 5 MG: 5 TABLET ORAL at 20:00

## 2023-02-09 RX ADMIN — PANTOPRAZOLE SODIUM 40 MG: 40 INJECTION, POWDER, FOR SOLUTION INTRAVENOUS at 20:00

## 2023-02-09 RX ADMIN — FUROSEMIDE 40 MG: 10 INJECTION, SOLUTION INTRAMUSCULAR; INTRAVENOUS at 09:09

## 2023-02-09 RX ADMIN — Medication 10 ML: at 09:09

## 2023-02-09 RX ADMIN — IPRATROPIUM BROMIDE AND ALBUTEROL SULFATE 3 ML: .5; 2.5 SOLUTION RESPIRATORY (INHALATION) at 19:17

## 2023-02-09 RX ADMIN — PROPOFOL INJECTABLE EMULSION 150 MG: 10 INJECTION, EMULSION INTRAVENOUS at 11:31

## 2023-02-09 RX ADMIN — TAZOBACTAM SODIUM AND PIPERACILLIN SODIUM 3.38 G: 375; 3 INJECTION, SOLUTION INTRAVENOUS at 14:12

## 2023-02-09 RX ADMIN — Medication 10 ML: at 20:01

## 2023-02-09 RX ADMIN — SUCRALFATE 1 G: 1 SUSPENSION ORAL at 18:01

## 2023-02-09 RX ADMIN — SODIUM CHLORIDE 100 ML/HR: 9 INJECTION, SOLUTION INTRAVENOUS at 11:15

## 2023-02-09 RX ADMIN — IPRATROPIUM BROMIDE AND ALBUTEROL SULFATE 3 ML: .5; 2.5 SOLUTION RESPIRATORY (INHALATION) at 06:20

## 2023-02-09 RX ADMIN — IPRATROPIUM BROMIDE AND ALBUTEROL SULFATE 3 ML: .5; 2.5 SOLUTION RESPIRATORY (INHALATION) at 01:11

## 2023-02-09 RX ADMIN — TAZOBACTAM SODIUM AND PIPERACILLIN SODIUM 3.38 G: 375; 3 INJECTION, SOLUTION INTRAVENOUS at 05:24

## 2023-02-09 RX ADMIN — IPRATROPIUM BROMIDE AND ALBUTEROL SULFATE 3 ML: .5; 2.5 SOLUTION RESPIRATORY (INHALATION) at 10:28

## 2023-02-09 RX ADMIN — CYCLOBENZAPRINE HYDROCHLORIDE 5 MG: 5 TABLET, FILM COATED ORAL at 20:20

## 2023-02-09 RX ADMIN — FUROSEMIDE 40 MG: 10 INJECTION, SOLUTION INTRAMUSCULAR; INTRAVENOUS at 20:00

## 2023-02-09 RX ADMIN — TAZOBACTAM SODIUM AND PIPERACILLIN SODIUM 3.38 G: 375; 3 INJECTION, SOLUTION INTRAVENOUS at 20:00

## 2023-02-09 NOTE — PAYOR COMM NOTE
"2/9/23 [facility] [phone]  [phone]    ER ADMIT TO INPATIENT ON 2/8/23. FAXING FOR INPATIENT REVIEW.            Jose Alfredo Kirkpatrick (87 y.o. Male)     Date of Birth   1935    Social Security Number       Address   [address]  [place] [address]    Home Phone   [phone]    MRN   [id number]       Scientologist   [facility]    Marital Status                               Admission Date   2/8/23    Admission Type   Emergency    Admitting Provider   Carolina Austin    Attending Provider   Carolina Austin    Department, Room/Bed   [facility] ENDOSCOPY, ENDO/ENDO       Discharge Date       Discharge Disposition       Discharge Destination                               Attending Provider: Carolina Austin    Allergies: No Known Allergies    Isolation: None   Infection: COVID (History) (02/09/23)   Code Status: CPR    Ht: 190.5 cm (75\")   Wt: 85.7 kg (189 lb)    Admission Cmt: None   Principal Problem: Pneumonia of left lower lobe due to infectious organism [J18.9]                 Active Insurance as of 2/8/2023     Primary Coverage     Payor Plan Insurance Group Employer/Plan Group    ANTHEM MEDICARE REPLACEMENT ANTHEM MEDICARE ADVANTAGE [id number]     Payor Plan Address Payor Plan Phone Number Payor Plan Fax Number Effective Dates     [address] [phone]  1/1/2023 - None Entered    [facility] [phone]       Subscriber Name Subscriber Birth Date Member ID       JOSE ALFREDO KIRKPATRICK 1935 [id number]                 Emergency Contacts      (Rel.) Home Phone Work Phone Mobile Phone    LOUIS WELLS (Daughter) -- -- [phone]           [facility] Encounter Date/Time: 2/8/2023 1440   Hospital Account: [id number]    MRN: [id number]   Patient:  Jose Alfredo Kirkpatrick   Contact Serial #: [id number]   SSN:          ENCOUNTER             Patient Class: Inpatient   Unit: [address] Service: " Medicine     Bed: 406/1   Admitting Provider: Carolina Austin   Referring Physician: Provider, No Known   Attending Provider: Carolina Austin   Adm Diagnosis: Pneumonia of left lower *               PATIENT          Name: Jose Alfredo Kirkpatrick : 1935 (87 yrs)   Address: [address] Sex: Male   City: [place] [address]   County: [facility]   Marital Status:  Ethnicity: NOT                                                                              Race: WHITE   Primary Care Provider: Otilio Ramos MD Patients Phone: Home Phone: [phone]     Mobile Phone: [phone]   EMERGENCY CONTACT   Contact Name Legal Guardian? Relationship to Patient Home Phone Work Phone   1. LOUIS WELLS  2. *No Contact Specified* No    Daughter                   GUARANTOR            Guarantor: Jose Alfredo Kirkpatrick     : 1935   Address: [address] Sex: [place]     [place] [address]     Relation to Patient: Self       Home Phone: [phone]   Guarantor ID: [id number]       Work Phone:     GUARANTOR EMPLOYER   Employer:           Status: RETIRED   COVERAGE  PRIMARY INSURANCE   Payor: [employer] MEDICARE REPLACEMENT Plan: [employer] MEDICARE ADVANTAGE   Group Number: [id number] Insurance Type: INDEMNITY   Subscriber Name: JOSE ALFREDO KIRKPATRICK Subscriber : 1935   Subscriber ID: [id number] Coverage Address: [address] [address]   [place]. Rel. to Subscriber: Self Coverage Phone: [phone]   SECONDARY INSURANCE   Payor: N/A Plan: N/A   Group Number:   Insurance Type:     Subscriber Name:   Subscriber :     Subscriber ID:   Coverage Address:     [place]. Rel. to Subscriber:   Coverage Phone:        Contact Serial # ([id number])         2023    Chart ID ([id number])           Fallon Norman MD   Physician  Hospitalist  H&P      Signed  Date of Service:  23  Creation Time:  23     Signed        [facility]  "[facility] Services  HISTORY AND PHYSICAL     Date of Admission: 2/8/2023  Primary Care Physician: Otilio Ramos MD     Subjective   Primary Historian: Patient        Chief Complaint: Generalized weakness        History of Present Illness  Patient is a 87-year-old male with medical history of hypertension, AAA, GERD, and recent fall with right hip fracture status post repair. Patient presents with complaints of shortness of breath, weakness and constipation.  Patient was discharged to rehab after his hip surgery. He reports that he was unable to participate in rehab because of feeling weak and constipated for over a week.  He blames his pain medications for his constipation.  He eventually was able to have a bowel movement in the emergency room which was reported to be bloody.  He had a CT abdomen/pelvis done with finding of diverticulosis.  CT also shows bilateral pleural effusion with concern of parapneumonic effusion.  On further questioning, patient complains of dysphagia with sensation of food getting stuck in his throat and pain on swallowing.  He is admitted for further medical evaluation and management.           Review of Systems   Otherwise complete ROS reviewed and negative except as mentioned in the HPI.     Past Medical History:   Medical History                   Past Medical History:   Diagnosis Date   • Arthritis     • Cataract       BILATERAL   • GERD (gastroesophageal reflux disease)     • History of transfusion     • Hx of colonic polyps     • Hypertension     • Reflux esophagitis     • Streptococcosis       IN SPINE FROM BACK INJECTIONS       Vital Signs: /79   Pulse 87   Temp 98.1 °F (36.7 °C)   Resp 18   Ht 190.5 cm (75\")   Wt 98.9 kg (218 lb)   SpO2 94%   BMI 27.25 kg/m²   Physical Exam:   Temp:  [97.7 °F (36.5 °C)-98.1 °F (36.7 °C)] 98.1 °F (36.7 °C)  Heart Rate:  [80-87] 87  Resp:  [16-18] 18  BP: (116-139)/(67-79) 139/79  Physical " Exam  General: NC/AT, appears stated age, alert and oriented x3  HEENT: PERRLA, EOMI, no scleral icterus, no conjunctival injection,   NECK:  Neck is supple, no JVD, no supraclavicular lymphadenopathy  CV: S1 S2 RRR, no murmurs, no gallops, no heaves, pulses palpable.  LUNG: Diminished breath sounds in lower lung field with bibasilar Rales.  ABD: Nondistended, nontender, bowel sounds present, no guarding or rebound, organomegaly not appreciated.   EXT: FROM, strength is intact, no pitting edema, no joint effusion, no calf tenderness.  Pulses palpable  Neuro: CN 2-12, grossly intact,no  focal weakness appreciated  Skin: Warm and dry, no rash or lesions      Extra Tube Hold for add-ons.         Comment: Auto resulted.        High Sensitivity Troponin T 2Hr [[id number]]  (Abnormal) Collected: 02/08/23 1716     Specimen: Blood Updated: 02/08/23 1850       HS Troponin T 30 ng/L         Troponin T Delta -6       Microscopic Only - Straight Cath [[id number]]  (Abnormal) Collected: 02/08/23 1716      Specimen: Urine from Straight Cath Updated: 02/08/23 1748       RBC, UA 0-2 /HPF         WBC, UA 31-50 /HPF         Bacteria, UA 4+ /HPF         Squamous Epithelial Cells, UA None Seen /HPF         Hyaline Casts, UA 0-2 /LPF         Methodology Automated Microscopy     Urinalysis With Culture If Indicated - Straight Cath [[id number]]  (Abnormal) Collected: 02/08/23 1716     Specimen: Urine from Straight Cath Updated: 02/08/23 1748       Color, UA Yellow       Appearance, UA Clear       pH, UA 6.0       Specific Gravity, UA 1.015       Glucose, UA Negative       Ketones, UA Negative       Bilirubin, UA Negative       Blood, UA Negative       Protein, UA Negative       Leuk Esterase, UA Small (1+)       Nitrite, UA Negative       Urobilinogen, UA 1.0 E.U./dL     Narrative:       In absence of clinical symptoms, the presence of pyuria, bacteria, and/or nitrites on the urinalysis result does not correlate with infection.     Urine  Culture - Urine, Straight Cath [[id number]] Collected: 02/08/23 1716     Specimen: Urine from Straight Cath Updated: 02/08/23 1748     POC Occult Blood Stool [[id number]]  (Abnormal) Collected: 02/08/23 1740     Specimen: Stool from Per Rectum Updated: 02/08/23 1741       Fecal Occult Blood Positive           Comprehensive Metabolic Panel [[id number]]  (Abnormal) Collected: 02/08/23 1459     Specimen: Blood Updated: 02/08/23 1620       Glucose 136 mg/dL         BUN 18 mg/dL         Creatinine 0.74 mg/dL         Sodium 134 mmol/L         Potassium 4.1 mmol/L         Comment: Slight hemolysis detected by analyzer. Results may be affected.          Chloride 100 mmol/L         CO2 25.0 mmol/L         Calcium 8.3 mg/dL         Total Protein 6.4 g/dL         Albumin 3.2 g/dL         ALT (SGPT) 20 U/L         AST (SGOT) 32 U/L         Alkaline Phosphatase 89 U/L         Total Bilirubin 0.4 mg/dL         Globulin 3.2 gm/dL         A/G Ratio 1.0 g/dL         BUN/Creatinine Ratio 24.3       Anion Gap 9.0 mmol/L         eGFR 88.2 mL/min/1.73       Narrative:       GFR Normal >60     these tests on the individual orders.      CBC Auto Differential [[id number]]  (Abnormal) Collected: 02/08/23 1459     Specimen: Blood Updated: 02/08/23 1603       WBC 9.94 10*3/mm3         RBC 4.01 10*6/mm3         Hemoglobin 11.6 g/dL         Hematocrit 37.4 %         MCV 93.3 fL         MCH 28.9 pg         MCHC 31.0 g/dL         RDW 14.4 %         RDW-SD 49.2 fl         MPV 9.1 fL         Platelets 608 10*3/mm3         Neutrophil % 73.7 %         Lymphocyte % 15.7 %         Monocyte % 6.9 %         Eosinophil % 1.7 %         Basophil % 0.6 %         Immature Grans % 1.4 %         Neutrophils, Absolute 7.32 10*3/mm3         Lymphocytes, Absolute 1.56 10*3/mm3         Monocytes, Absolute 0.69 10*3/mm3         Eosinophils, Absolute 0.17 10*3/mm3         Basophils, Absolute 0.06 10*3/mm3         Immature Grans, Absolute 0.14                 CT ABDOMEN  PELVIS WO CONTRAST- 2/8/2023 6:08 PM CST     HISTORY: new onset bloody stools, recent diarrhea      COMPARISON: None      DOSE LENGTH PRODUCT: 337 mGy cm. Automated exposure control was also  utilized to decrease patient radiation dose.     TECHNIQUE: Noncontrast enhanced images of the abdomen and pelvis  obtained without oral contrast. Multiplanar reformatted images were  provided for review.      FINDINGS:   LOWER CHEST: Large hiatal hernia. Atelectasis present in the left lower  lobe and there is also a trace layering left pleural effusion. Coronary  atheromatous calcification.      LIVER: No focal liver lesion within limits of a noncontrast study.      BILIARY SYSTEM: Cholelithiasis. Gallbladder is nondistended. Biliary  tree is nondilated.      PANCREAS: No focal pancreatic lesion within limits of a noncontrast  study.      SPLEEN: Unremarkable.      KIDNEYS: Iodinated contrast identified in the upper urinary tracts. No  hydronephrosis. Ureters are nondilated.      ADRENALS: Unremarkable.     RETROPERITONEUM: No mass, lymphadenopathy or hemorrhage.      GI TRACT: Large hiatal hernia. The stomach is nondistended. Small bowel  loops are nondilated. Colonic diverticulosis. The colon is nondistended.  No inflammatory changes are identified along the colon. No bowel wall  thickening identified. Surrounding fat is clean.     OTHER: There is no mesenteric mass, lymphadenopathy or fluid collection.  No intraperitoneal free fluid or free air. IVC filter is in place.  Infrarenal AAA measuring up to 3.6 cm in diameter. Right hip  arthroplasty. No acute bony abnormality is seen. There appears to be a  high-grade spinal stenosis at the L1-L2 disc level with complete loss of  disc height at this level. There is an old fracture identified across  the inferior endplate of T9.     PELVIS: Bladder wall trabeculation. Air identified in the urinary  bladder. No pelvic adenopathy identified.        Impression:       1. Colonic  diverticulosis. Bowel is nondistended. No inflammatory  changes are seen along the bowel.  2. Stomach and small bowel are decompressed. Hiatal hernia is present.  3. Cholelithiasis.  4. Bladder wall trabeculation which may be secondary to chronic partial  outlet obstruction.  5. Infrarenal AAA measuring up to 3.6 cm in diameter.  This report was finalized on 02/08/2023 18:28 by Dr Geremias Martinez, .     CT Angiogram Chest [[id number]] Collected: 02/08/23 1659       Updated: 02/08/23 1704     Narrative:       EXAMINATION: CT ANGIOGRAM CHEST-      2/8/2023 4:43 PM CST     HISTORY: Shortness of breath, chest pain, weakness, recent surgery and  immobilization since.     In order to have a CT radiation dose as low as reasonably achievable  Automated Exposure Control was utilized for adjustment of the mA and/or  KV according to patient size.     DLP in mGycm= 194.     CT angiogram chest with IV contrast.  CT angiography protocol.   CT imaging with bolus IV contrast injection.   Under concurrent supervision axial, sagittal, coronal,  three-dimensional, and MIP data sets were constructed on an independent  work station.     Normal heart size.  Intrathoracic stomach with left lung base consolidation compatible with  pneumonia.     Small left and trace right pleural effusion.     Symmetric and normally opacified pulmonary arteries.  No pulmonary embolism.     Upper lobes are clear.  No pneumothorax.     Small gallstones incidentally noted.     Summary:  1. Left lower lobe pneumonia.  2. No pulmonary embolism.              This report was finalized on 02/08/2023 17:01 by Dr. Fawad Smith MD.      XR Chest 1 View [[id number]] Collected: 02/08/23 1634       Updated: 02/08/23 1639     Narrative:       XR CHEST 1 VW- 2/8/2023 4:25 PM CST     HISTORY: chest pain, weakness     COMPARISON: Chest exam dated 10/01/2019.     FINDINGS:      No lung consolidation. No pleural effusion or pneumothorax. Heart size  is normal. Pulmonary  vasculature are nondilated. Hiatal hernia  superimposed over the cardiac silhouette. The osseous structures and  surrounding soft tissues demonstrate no acute abnormality.        Impression:       1. Stable chest exam without acute process.  2. Hiatal hernia.        This report was finalized on 02/08/2023 16:36 by Dr Geremias Martinez, .          I have personally reviewed and interpreted the radiology studies and ECG obtained at time of admission.      Assessment / Plan   Assessment and plan:   87-year-old male with history of hypertension, AAA, GERD, recent hip surgery with repair, and CHF with diastolic dysfunction grade 1 preserved EF as of echo 2019. Patient presents with complaints of worsening generalized weakness, shortness of breath and an episode of bloody bowel movement. He also complains of dysphagia. CT scan shows bilateral pleural effusions, possibly parapneumonic. He also has diverticulosis but no diverticulitis identified. He is admitted for further medical eval and management.           Problem list:       Active Hospital Problems     Diagnosis     • **Pneumonia of left lower lobe due to infectious organism     Pleural effusion  Diverticulosis  Lower GI bleed  Dysphagia        Treatment Plan  The patient will be admitted to my service here at [facility].  - Admit to inpatient  - Oxygen supplementation as needed  - Breathing treatments with DuoNebs  - IV diuresis Lasix 40 mg twice a day  - Antibiotic coverage with Zosyn for HCAP  - Stool softener and pain control  - Echocardiogram in the a.m.  - Barium enema  - GI consult for GI bleed and dysphagia.        Anticipated length of stay greater than 2 nights  Time spent on admission greater than 30 minutes.  DVT and GI prophylaxis covered.  MDM Data  External documents reviewed: As discussed above  Cardiac tracing (EKG, telemetry) interpretation: As discussed above  Radiology interpretation: As discussed above  Labs reviewed: As discussed  above           Care Planning  Patient is a full code     Disposition  Patient can discharge home after resolution of acute condition and returned to baseline.    Patient possibly need to go back to rehab after resolution of his acute condition and return to baseline.           Electronically signed by Fallon Norman MD, 02/08/23, 21:52 CST.        Sushant Edmond MD   Physician  Gastroenterology  Consults      Signed  Date of Service:  02/09/23 1047  Creation Time:  02/09/23 1047  Consult Orders   Inpatient Gastroenterology Consult [[id number]] ordered by Fallon Norman MD at 02/08/23 2209          Signed        [facility]               [facility] Gastroenterology  Inpatient Consult Note  Today's date:  02/09/23     Jose Alfredo Clive Kirkpatrick  1935         Referring Provider: No Known Provider  Primary Physician: Otilio Ramos MD         Date of Admission: 2/8/2023  Date of Service:  02/09/23     Reason for Consultation/Chief Complaint: GI bleeding.     History of present illness: 87-year-old man who was brought to the emergency room and subsequently admitted from rehab center, after patient sustained right hip fracture 11 days ago, which was managed with arthroplasty essentially on the same day.  Patient was brought to the emergency room with complaints of increasing fatigue, weakness as well as dysphagia and a dyne aphasia along with 3 days of moving bowels with black stool.  In the emergency room patient reportedly had bloody bowel movement which was tested positive for occult blood.  Patient states that he has been experiencing dysphagia for solids over the last several years although never had any food impaction requiring emergency endoscopic disimpaction.  Over the last few days he also has been experiencing a dyne aphasia along with a feeling of resistant for the food to pass through the esophagus into the stomach.  Patient states that he never had an upper endoscopy, although  reviewing his chart in the [facility] I found that patient had at least 3 upper endoscopy between 2014 and 2019.  Obviously patient has some memory deficits.  In 2019 patient experienced melena and was found to have a gastric ulcer, which was managed conservatively and repeat endoscopy several weeks later showed complete healing of an ulcer.  Patient also had a history of colon polyps and had 3 colonoscopies, last one in 2019 again revealing couple small polyps which were removed.  After patient had surgery 11 days ago, he was on pain medications which cause constipation, but again 3 days ago he started to move his bowels which is somewhat loose and black stool.  Patient states that he lost about 24 pounds over the last several months, but recently his wife has passed away and patient was morning and was depressed with significant decrease in his oral intake due to poor appetite.  Lab work in the emergency room reviewed minimal normocytic anemia with a hemoglobin of 11.6, which remained stable overnight and this morning it is 11.8.  Normal white blood cell count.  Normal prothrombin time.  CMP was remarkable for mild elevated glucose and mildly low albumin with calcium.  LFTs were otherwise normal.  Abdominal CT revealed colonic diverticulosis without any inflammatory changes, unremarkable stomach and small bowel, hiatal hernia, cholelithiasis without signs of cholecystitis, bladder wall trabeculation and 3.6 cm infrarenal abdominal aortic aneurysm.  Troponins were elevated, but patient denies any chest pain.  EKG reviewed normal sinus rhythm with second-degree AV block Mobitz 1 and nonspecific T wave abnormalities.     Medical History        Past Medical History:   Diagnosis Date   • Arthritis     • Cataract       BILATERAL   • GERD (gastroesophageal reflux disease)     • History of transfusion     • Hx of colonic polyps     • Hypertension     • Reflux esophagitis     • Streptococcosis       IN SPINE FROM BACK INJECTIONS             Surgical History         Past Surgical History:   Procedure Laterality Date   • BACK SURGERY         cervical   • CATARACT EXTRACTION Bilateral     • COLONOSCOPY   02/11/2013     Hyperplastic polyp at 25 cm, Diverticulosis repeat exam in 5 years   • COLONOSCOPY N/A 10/16/2018     Tubular adenoma ascending colon repet prn   • ENDOSCOPY   01/15/2014     HH   • ENDOSCOPY N/A 10/03/2019     Large HH, non-bleeding gastric ulcer   • ENDOSCOPY N/A 11/25/2019     Procedure: ESOPHAGOGASTRODUODENOSCOPY WITH ANESTHESIA;  Surgeon: Marques Pérez MD;  Location: [facility] ENDOSCOPY;  Service: Gastroenterology   • HERNIA REPAIR Right       INGUINAL    • HIP FRACTURE SURGERY Right 02/04/2023   • REPLACEMENT TOTAL KNEE BILATERAL       • THORACIC LAMINECTOMY DECOMPRESSIVE POSTERIOR N/A 12/14/2016     Procedure: LAMINECTOMY DECOMPRESSION, UNINSTRUMENTED POSTERIOR SPINAL FUSION, T 11-12;  Surgeon: LUZ Adan MD;  Location: [facility] OR;  Service:    • VENA CAVA FILTER INSERTION N/A 10/04/2019     Procedure: VENA CAVA FILTER INSERTION;  Surgeon: Dallin Coronado MD;  Location: [facility] HYBRID OR 12;  Service: Vascular                           of Systems:   Constitutional: No unexpected weight change, + fatigue, no unexplained fever, no sweats or chills.           HEENT: No icteric sclera.  No hearing or visual deficits.  No sore throat.  No chronic nasal discharge.  Pulmonary: No chronic cough.  No hemoptysis.  + exertional shortness of breath.  Cardiovascular: No chest pain.  No palpitations.  + exertional shortness of breath.  Gastrointestinal: As above.  Musculoskeletal/extremities: No peripheral edema.  No cyanosis.  No claudications.  No back pain. + Pain in the right hip after recent fracture and arthroplasty.  Genitourinary: No dysuria.  No blood in stool.  No urethral discharges.  Neurologic: No seizures.  No headaches.  No dizziness.  No gait problems.  Skin: No rash.  No icterus.  Mental: No psychosis.  No  confusions.  No hallucinations. + Memory deficits - as it was obvious today while obtaining patient's history        Physical Exam:  Temp:  [97.4 °F (36.3 °C)-98.1 °F (36.7 °C)] 97.4 °F (36.3 °C)  Heart Rate:  [74-87] 82  Resp:  [16-18] 16  BP: (116-139)/(57-80) 130/57  Body mass index is 23.62 kg/m².     Intake/Output Summary (Last 24 hours) at 2/9/2023 1048  Last data filed at 2/9/2023 0825      Gross per 24 hour   Intake --   Output 1100 ml   Net -1100 ml      I/O this shift:      General appearance: 87-year-old male who currently appears to be in no acute distress.  Awake, alert and oriented x3  HEENT: Nonicteric sclerae.  Moist oral mucosa.  PERRLA.  EOMI.  Clear pharynx.  Lungs: Clear to auscultation bilaterally.  No wheezing, rales or rhonchi.  Heart: Regular rate and rhythm.  Normal S1 and S2, no S3, S4 or murmur.  Abdomen: Soft, nondistended, nontender to palpation, with normoactive bowel sounds, no hepatosplenomegaly, no palpable masses.  Extremities: No cyanosis, edema or pulse deficits.  Skin: No rash or jaundice.     Results Review:          Lab Results (last 24 hours)      Procedure Component Value Units Date/Time     Basic Metabolic Panel [[id number]]  (Abnormal) Collected: 02/09/23 0732     Specimen: Blood Updated: 02/09/23 0825       Glucose 109 mg/dL         BUN 14 mg/dL         Creatinine 0.87 mg/dL         Sodium 136 mmol/L         Potassium 3.9 mmol/L         Chloride 99 mmol/L         CO2 25.0 mmol/L         Calcium 8.3 mg/dL         BUN/Creatinine Ratio 16.1       Anion Gap 12.0 mmol/L         eGFR 84.0 mL/min/1.73       Narrative:             CBC & Differential [[id number]]  (Abnormal) Collected: 02/09/23 0732     Specimen: Blood Updated: 02/09/23 0809     Narrative:       The following orders were created for panel order CBC & Differential.  Procedure                               Abnormality         Status                     ---------                               -----------         ------                      CBC Auto Differential[[id number]]        Abnormal            Final result                  Please view results for these tests on the individual orders.     CBC Auto Differential [[id number]]  (Abnormal) Collected: 02/09/23 0732     Specimen: Blood Updated: 02/09/23 0809       WBC 9.68 10*3/mm3         RBC 4.05 10*6/mm3         Hemoglobin 11.8 g/dL         Hematocrit 37.9 %         MCV 93.6 fL         MCH 29.1 pg         MCHC 31.1 g/dL         RDW 14.5 %         RDW-SD 49.7 fl         MPV 8.9 fL         Platelets 643 10*3/mm3         Neutrophil % 67.8 %         Lymphocyte % 20.4 %         Monocyte % 8.6 %         Eosinophil % 1.2 %         Basophil % 0.7 %         Immature Grans % 1.3 %         Neutrophils, Absolute 6.56 10*3/mm3         Lymphocytes, Absolute 1.97 10*3/mm3         Monocytes, Absolute 0.83 10*3/mm3         Eosinophils, Absolute 0.12 10*3/mm3         Basophils, Absolute 0.07 10*3/mm3         Immature Grans, Absolute 0.13 10*3/mm3         nRBC 0.0 /100 WBC       Troponin [[id number]]  (Abnormal) Collected: 02/09/23 0009     Specimen: Blood Updated: 02/09/23 0140       HS Troponin T 44 ng/L       Narrative:       High Sensitive Troponin T Reference Range:  <10.0 ng/L- Negative Female for AMI  <15.0 ng/L- Negative Male for AMI  >=10 - Abnormal Female indicating possible myocardial injury.  >=15 - Abnormal Male indicating possible myocardial injury.   Clinicians would have to utilize clinical acumen, EKG, Troponin, and serial changes to determine if it is an Acute Myocardial Infarction or myocardial injury due to an underlying chronic condition.           Hemoglobin & Hematocrit, Blood [[id number]]  (Abnormal)               Impression/Plan: 87-year-old man with numerous medical problems, admitted with profound fatigue and weakness as well as a dyne aphasia and melena.  Patient sustained right hip fracture about 11 days ago which was managed with a arthroplasty on the same day.   Postoperatively patient was constipated, most likely due to opioid pain medications.  However over the last 3 days patient has been moving bowels with a loose black stool.  In addition he has been experiencing a dyne aphasia over the last several days, while reporting history of dysphagia for solids bothering him over the last several years.  Patient did have an upper endoscopies back in 2019 as at that time patient was found to have a bleeding gastric ulcer, which subsequently has healed.  History of colon polyps, with a last colonoscopy in 2018.  At this point I believe that the patient should proceed with upper endoscopy for evaluation of melena and dysphagia/odynophagia.  Risk and benefits of this procedure were discussed with the patient.  Consent was obtained.  Further management will depend upon the findings on upper endoscopy and patient's clinical progress.           Sushant Edmond MD  02/09/23   10:48 CST        Sushant Edmond MD   Physician  Gastroenterology  Op Note      Incomplete  Date of Service:  02/09/23 1143  Creation Time:  02/09/23 1143     Incomplete           ESOPHAGOGASTRODUODENOSCOPY     Date:  2/9/2023     Indications: Dysphagia and odynophagia.  Melena.       Procedure: ESOPHAGOGASTRODUODENOSCOPY      Sedation: As per anesthesia.     Surgeon: Sushant Edmond MD     Procedure  Description: After informed consent was obtained, a timeout was called in the endoscopy suite to confirm the correct patient and appropriate procedure.  Thereafter with the patient in the left lateral position, esophagus was intubated under direct vision with adult Olympus gastroscope.  Thereafter scope was slowly advanced into the second portion of the duodenum under direct vision.  Careful examination of the duodenum, stomach and esophagus was performed while slowly withdrawing the scope.  Retroflex examination of the gastric cardia and fundus were also performed.     Findings: Hypopharynx and larynx grossly appeared  normal.  Because of the proximal esophagus appeared normal.  Severe, grade IV, erosive esophagitis of mid and distal esophagus, manifested by numerous fibrin covering ulcerations and erosions, edema and erythema, but without signs of any active bleeding.  Very large hiatal hernia,     Complications: No immediate complications.     Recommendations:      Procedure CPT code: 75813     Sushant Edmond MD                 23 2100 -- 87 18 139/79 -- -- 94   02/08/23 2000 -- 80 16 135/67 -- -- 94   02/08/23 17:06:03 98.1 (36.7) 80 18 119/74 -- -- 94   02/08/23 1449 97.7 (36.5) 80 16 116/68 -- room air 96               Current Facility-Administered Medications   Medication Dose Route Frequency Provider Last Rate Last Admin   • busPIRone (BUSPAR) tablet 5 mg  5 mg Oral BID Fallon Norman MD       • cyclobenzaprine (FLEXERIL) tablet 5 mg  5 mg Oral BID PRN Fallon Norman MD       • furosemide (LASIX) injection 40 mg  40 mg Intravenous Q12H Fallon Norman MD   40 mg at 02/09/23 0909   • gabapentin (NEURONTIN) capsule 300 mg  300 mg Oral TID Fallon Norman MD       • hydrALAZINE (APRESOLINE) injection 20 mg  20 mg Intravenous Q6H PRN Fallon Norman MD       • ipratropium-albuterol (DUO-NEB) nebulizer solution 3 mL  3 mL Nebulization 4x Daily - RT Fallon Norman MD   3 mL at 02/09/23 1028   • Morphine sulfate (PF) injection 2 mg  2 mg Intravenous Q4H PRN Fallon Norman MD   2 mg at 02/09/23 0919   • NIFEdipine XL (PROCARDIA XL) 24 hr tablet 60 mg  60 mg Oral Daily Fallon Norman MD       • oxyCODONE-acetaminophen (PERCOCET)  MG per tablet 1 tablet  1 tablet Oral Q6H PRN Fallon Norman MD       • pantoprazole (PROTONIX) injection 40 mg  40 mg Intravenous Q12H Radhika Brown APRN   40 mg at 02/09/23 0909   • piperacillin-tazobactam (ZOSYN) 3.375 g in iso-osmotic dextrose 50 ml (premix)  3.375 g Intravenous Q8H Fallon Norman MD   3.375 g at 02/09/23 0524   • simethicone  (MYLICON) chewable tablet 80 mg  80 mg Oral Q4H PRN Fallon Norman MD       • sodium chloride 0.9 % flush 10 mL  10 mL Intravenous PRN Kashif Zhong PA-C   10 mL at 02/09/23 0909   • sodium chloride 0.9 % infusion  100 mL/hr Intravenous Continuous Gerardo Sinclair  mL/hr at 02/09/23 1129 Restarted at 02/09/23 1138   • sucralfate (CARAFATE) tablet 1 g  1 g Oral TID AC Fallon Norman MD

## 2023-02-09 NOTE — CONSULTS
[facility] Gastroenterology  Inpatient Consult Note  Today's date:  02/09/23    Jose Alfredo Kirkpatrick  1935       Referring Provider: No Known Provider  Primary Physician: Otilio Ramos MD       Date of Admission: 2/8/2023  Date of Service:  02/09/23    Reason for Consultation/Chief Complaint: GI bleeding.    History of present illness: 87-year-old man who was brought to the emergency room and subsequently admitted from rehab center, after patient sustained right hip fracture 11 days ago, which was managed with arthroplasty essentially on the same day.  Patient was brought to the emergency room with complaints of increasing fatigue, weakness as well as dysphagia and a dyne aphasia along with 3 days of moving bowels with black stool.  In the emergency room patient reportedly had bloody bowel movement which was tested positive for occult blood.  Patient states that he has been experiencing dysphagia for solids over the last several years although never had any food impaction requiring emergency endoscopic disimpaction.  Over the last few days he also has been experiencing a dyne aphasia along with a feeling of resistant for the food to pass through the esophagus into the stomach.  Patient states that he never had an upper endoscopy, although reviewing his chart in the [facility] I found that patient had at least 3 upper endoscopy between 2014 and 2019.  Obviously patient has some memory deficits.  In 2019 patient experienced melena and was found to have a gastric ulcer, which was managed conservatively and repeat endoscopy several weeks later showed complete healing of an ulcer.  Patient also had a history of colon polyps and had 3 colonoscopies, last one in 2019 again revealing couple small polyps which were removed.  After patient had surgery 11 days ago, he was on pain medications which cause constipation, but again 3 days ago he started to move his bowels which is somewhat loose and black stool.   Patient states that he lost about 24 pounds over the last several months, but recently his wife has passed away and patient was morning and was depressed with significant decrease in his oral intake due to poor appetite.  Lab work in the emergency room reviewed minimal normocytic anemia with a hemoglobin of 11.6, which remained stable overnight and this morning it is 11.8.  Normal white blood cell count.  Normal prothrombin time.  CMP was remarkable for mild elevated glucose and mildly low albumin with calcium.  LFTs were otherwise normal.  Abdominal CT revealed colonic diverticulosis without any inflammatory changes, unremarkable stomach and small bowel, hiatal hernia, cholelithiasis without signs of cholecystitis, bladder wall trabeculation and 3.6 cm infrarenal abdominal aortic aneurysm.  Troponins were elevated, but patient denies any chest pain.  EKG reviewed normal sinus rhythm with second-degree AV block Mobitz 1 and nonspecific T wave abnormalities.    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       Past Surgical History:   Procedure Laterality Date   • BACK SURGERY      cervical   • CATARACT EXTRACTION Bilateral    • COLONOSCOPY  02/11/2013    Hyperplastic polyp at 25 cm, Diverticulosis repeat exam in 5 years   • COLONOSCOPY N/A 10/16/2018    Tubular adenoma ascending colon repet prn   • ENDOSCOPY  01/15/2014    HH   • ENDOSCOPY N/A 10/03/2019    Large HH, non-bleeding gastric ulcer   • ENDOSCOPY N/A 11/25/2019    Procedure: ESOPHAGOGASTRODUODENOSCOPY WITH ANESTHESIA;  Surgeon: Marques Pérez MD;  Location: [facility] ENDOSCOPY;  Service: Gastroenterology   • HERNIA REPAIR Right     INGUINAL    • HIP FRACTURE SURGERY Right 02/04/2023   • REPLACEMENT TOTAL KNEE BILATERAL     •  THORACIC LAMINECTOMY DECOMPRESSIVE POSTERIOR N/A 12/14/2016    Procedure: LAMINECTOMY DECOMPRESSION, UNINSTRUMENTED POSTERIOR SPINAL FUSION, T 11-12;  Surgeon: LUZ Adan MD;  Location: [facility] OR;  Service:    • VENA CAVA FILTER INSERTION N/A 10/04/2019    Procedure: VENA CAVA FILTER INSERTION;  Surgeon: Dallin Coronado MD;  Location: [facility] HYBRID OR 12;  Service: Vascular        No Known Allergies    Medications Prior to Admission   Medication Sig Dispense Refill Last Dose   • acetaminophen (TYLENOL) 325 MG tablet Take 650 mg by mouth 2 (Two) Times a Day.      • busPIRone (BUSPAR) 5 MG tablet Take 5 mg by mouth 2 (Two) Times a Day.      • cyanocobalamin 100 MCG tablet Take 5 tablets by mouth Daily.      • cyclobenzaprine (FLEXERIL) 10 MG tablet Take 5 mg by mouth 2 (Two) Times a Day As Needed for Muscle Spasms.      • docusate sodium (COLACE) 100 MG capsule Take 100 mg by mouth 2 (Two) Times a Day.      • Enoxaparin Sodium (LOVENOX) 40 MG/0.4ML solution prefilled syringe syringe Inject 40 mg under the skin into the appropriate area as directed Daily.      • gabapentin (NEURONTIN) 300 MG capsule Take 300 mg by mouth 3 (Three) Times a Day.      • guaiFENesin (MUCINEX) 600 MG 12 hr tablet Take 600 mg by mouth 2 (Two) Times a Day.      • hydrALAZINE (APRESOLINE) 10 MG tablet Take 50 mg by mouth Every 8 (Eight) Hours As Needed.      • levalbuterol (XOPENEX) 0.31 MG/3ML nebulizer solution Take 1 ampule by nebulization Every 8 (Eight) Hours As Needed for Wheezing.      • linaclotide (LINZESS) 290 MCG capsule capsule Take 145 mcg by mouth Daily.      • LISINOPRIL PO Take 10 mg by mouth Daily.      • NIFEdipine XL (PROCARDIA XL) 60 MG 24 hr tablet Take 60 mg by mouth Daily.      • omeprazole (priLOSEC) 20 MG capsule Take 20 mg by mouth Daily.      • ondansetron (ZOFRAN) 4 MG tablet Take 4 mg by mouth Every 6 (Six) Hours As Needed for Nausea or Vomiting.      • oxyCODONE-acetaminophen (PERCOCET)  MG per  tablet Take 1 tablet by mouth Every 6 (Six) Hours As Needed for Moderate Pain.      • polyethylene glycol (MIRALAX) packet Take 17 g by mouth Daily As Needed.      • simethicone (MYLICON) 80 MG chewable tablet Chew 80 mg Every 4 (Four) Hours As Needed for Flatulence.      • sucralfate (Carafate) 1 GM/10ML suspension Take 1 g by mouth 3 (Three) Times a Day.      • vitamin D (ERGOCALCIFEROL) 1.25 MG (32050 UT) capsule capsule Take 50,000 Units by mouth 1 (One) Time Per Week. Give one time a day every Mon, Wed, Fri          Hospital Medications (active)       Dose Frequency Start End    busPIRone (BUSPAR) tablet 5 mg 5 mg 2 Times Daily 2/9/2023     Admin Instructions: Caution: Look alike/sound alike drug alert. Take with food.  Avoid grapefruit juice.    Route: Oral    cyclobenzaprine (FLEXERIL) tablet 5 mg 5 mg 2 Times Daily PRN 2/8/2023     Route: Oral    furosemide (LASIX) injection 40 mg 40 mg Every 12 Hours Scheduled 2/8/2023     Route: Intravenous    gabapentin (NEURONTIN) capsule 300 mg 300 mg 3 Times Daily 2/9/2023     Admin Instructions:     Route: Oral    hydrALAZINE (APRESOLINE) injection 20 mg 20 mg Every 6 Hours PRN 2/8/2023     Admin Instructions: For SBP > 160 or DBP>80  Caution: Look alike/sound alike drug alert    Route: Intravenous    ipratropium-albuterol (DUO-NEB) nebulizer solution 3 mL 3 mL 4 Times Daily - RT 2/8/2023     Admin Instructions: Include Respiratory Treatment Education    Route: Nebulization    Morphine sulfate (PF) injection 2 mg 2 mg Every 4 Hours PRN 2/8/2023 2/15/2023    Admin Instructions: Based on patient request - if ordered for moderate or severe pain, provider allows for administration of a medication prescribed for a lower pain scale.      Caution: Look alike/sound alike drug alert    If given for pain, use the following pain scale:  Mild Pain = Pain Score of 1-3, CPOT 1-2  Moderate Pain = Pain Score of 4-6, CPOT 3-4  Severe Pain = Pain Score of 7-10, CPOT 5-8    Route:  Intravenous    NIFEdipine XL (PROCARDIA XL) 24 hr tablet 60 mg 60 mg Daily 2/9/2023     Admin Instructions: Swallow whole. Do not crush, split or chew.  Caution: Look alike/sound alike drug alert. Avoid grapefruit juice.    Route: Oral    oxyCODONE-acetaminophen (PERCOCET)  MG per tablet 1 tablet 1 tablet Every 6 Hours PRN 2/8/2023     Admin Instructions: Based on patient request - if ordered for moderate or severe pain, provider allows for administration of a medication prescribed for a lower pain scale.  [XAVIER]    Do not exceed 4 grams of acetaminophen in a 24 hr period. Max dose of 2gm for AST/ALT greater than 120 units/L        If given for pain, use the following pain scale:   Mild Pain = Pain Score of 1-3, CPOT 1-2  Moderate Pain = Pain Score of 4-6, CPOT 3-4  Severe Pain = Pain Score of 7-10, CPOT 5-8    Route: Oral    pantoprazole (PROTONIX) injection 40 mg 40 mg Every 12 Hours Scheduled 2/8/2023     Admin Instructions: Dilute with 10 mL of 0.9% NaCl and give IV push over 2 minutes.    Route: Intravenous    piperacillin-tazobactam (ZOSYN) 3.375 g in iso-osmotic dextrose 50 ml (premix) 3.375 g Every 8 Hours 2/9/2023 2/14/2023    Admin Instructions: Refrigerate    Route: Intravenous    simethicone (MYLICON) chewable tablet 80 mg 80 mg Every 4 Hours PRN 2/8/2023     Route: Oral    sodium chloride 0.9 % flush 10 mL 10 mL As Needed 2/8/2023     Route: Intravenous    Cosign for Ordering: Accepted by Fallon Norman MD on 2/8/2023 10:06 PM    Linked Group 1: See [facility] for full Linked Orders Report.        sucralfate (CARAFATE) tablet 1 g 1 g 3 Times Daily Before Meals 2/9/2023     Admin Instructions: For nasogastric or slurry administration:   1. Remove the cap and plunger from a 60 mL syringe   2. Place the sucralfate tablet inside the syringe   3. Replace the plunger so that minimal airspace exists around the tablet   4. Draw up approximately 20 mL water into the syringe   5. Replace the syringe  cap   6. Allow the syringe to stand for ~5 minutes, shaking occasionally   7. Shake the suspension and administer directly from the syringe into the tube   **Flush tube before and after administration**    Route: Oral          Social History     Tobacco Use   • Smoking status: Never   • Smokeless tobacco: Never   Substance Use Topics   • Alcohol use: No        Past Family History:  Family History   Problem Relation Age of Onset   • Colon cancer Father    • Colon polyps Neg Hx        Review of Systems:   Constitutional: No unexpected weight change, + fatigue, no unexplained fever, no sweats or chills.   HEENT: No icteric sclera.  No hearing or visual deficits.  No sore throat.  No chronic nasal discharge.  Pulmonary: No chronic cough.  No hemoptysis.  + exertional shortness of breath.  Cardiovascular: No chest pain.  No palpitations.  + exertional shortness of breath.  Gastrointestinal: As above.  Musculoskeletal/extremities: No peripheral edema.  No cyanosis.  No claudications.  No back pain. + Pain in the right hip after recent fracture and arthroplasty.  Genitourinary: No dysuria.  No blood in stool.  No urethral discharges.  Neurologic: No seizures.  No headaches.  No dizziness.  No gait problems.  Skin: No rash.  No icterus.  Mental: No psychosis.  No confusions.  No hallucinations. + Memory deficits - as it was obvious today while obtaining patient's history      Physical Exam:  Temp:  [97.4 °F (36.3 °C)-98.1 °F (36.7 °C)] 97.4 °F (36.3 °C)  Heart Rate:  [74-87] 82  Resp:  [16-18] 16  BP: (116-139)/(57-80) 130/57  Body mass index is 23.62 kg/m².    Intake/Output Summary (Last 24 hours) at 2/9/2023 1048  Last data filed at 2/9/2023 0825  Gross per 24 hour   Intake --   Output 1100 ml   Net -1100 ml     I/O this shift:  In: -   Out: 300 [Urine:300]    General appearance: 87-year-old male who currently appears to be in no acute distress.  Awake, alert and oriented x3  HEENT: Nonicteric sclerae.  Moist oral mucosa.   PERRLA.  EOMI.  Clear pharynx.  Lungs: Clear to auscultation bilaterally.  No wheezing, rales or rhonchi.  Heart: Regular rate and rhythm.  Normal S1 and S2, no S3, S4 or murmur.  Abdomen: Soft, nondistended, nontender to palpation, with normoactive bowel sounds, no hepatosplenomegaly, no palpable masses.  Extremities: No cyanosis, edema or pulse deficits.  Skin: No rash or jaundice.    Results Review:  Lab Results (last 24 hours)     Procedure Component Value Units Date/Time    Basic Metabolic Panel [[id number]]  (Abnormal) Collected: 02/09/23 0732    Specimen: Blood Updated: 02/09/23 0825     Glucose 109 mg/dL      BUN 14 mg/dL      Creatinine 0.87 mg/dL      Sodium 136 mmol/L      Potassium 3.9 mmol/L      Chloride 99 mmol/L      CO2 25.0 mmol/L      Calcium 8.3 mg/dL      BUN/Creatinine Ratio 16.1     Anion Gap 12.0 mmol/L      eGFR 84.0 mL/min/1.73     Narrative:      GFR Normal >60  Chronic Kidney Disease <60  Kidney Failure <15    The GFR formula is only valid for adults with stable renal function between ages 18 and 70.    CBC & Differential [[id number]]  (Abnormal) Collected: 02/09/23 0732    Specimen: Blood Updated: 02/09/23 0809    Narrative:      The following orders were created for panel order CBC & Differential.  Procedure                               Abnormality         Status                     ---------                               -----------         ------                     CBC Auto Differential[[id number]]        Abnormal            Final result                 Please view results for these tests on the individual orders.    CBC Auto Differential [[id number]]  (Abnormal) Collected: 02/09/23 0732    Specimen: Blood Updated: 02/09/23 0809     WBC 9.68 10*3/mm3      RBC 4.05 10*6/mm3      Hemoglobin 11.8 g/dL      Hematocrit 37.9 %      MCV 93.6 fL      MCH 29.1 pg      MCHC 31.1 g/dL      RDW 14.5 %      RDW-SD 49.7 fl      MPV 8.9 fL      Platelets 643 10*3/mm3      Neutrophil % 67.8 %       Lymphocyte % 20.4 %      Monocyte % 8.6 %      Eosinophil % 1.2 %      Basophil % 0.7 %      Immature Grans % 1.3 %      Neutrophils, Absolute 6.56 10*3/mm3      Lymphocytes, Absolute 1.97 10*3/mm3      Monocytes, Absolute 0.83 10*3/mm3      Eosinophils, Absolute 0.12 10*3/mm3      Basophils, Absolute 0.07 10*3/mm3      Immature Grans, Absolute 0.13 10*3/mm3      nRBC 0.0 /100 WBC     Troponin [[id number]]  (Abnormal) Collected: 02/09/23 0009    Specimen: Blood Updated: 02/09/23 0140     HS Troponin T 44 ng/L     Narrative:      High Sensitive Troponin T Reference Range:  <10.0 ng/L- Negative Female for AMI  <15.0 ng/L- Negative Male for AMI  >=10 - Abnormal Female indicating possible myocardial injury.  >=15 - Abnormal Male indicating possible myocardial injury.   Clinicians would have to utilize clinical acumen, EKG, Troponin, and serial changes to determine if it is an Acute Myocardial Infarction or myocardial injury due to an underlying chronic condition.         Hemoglobin & Hematocrit, Blood [[id number]]  (Abnormal) Collected: 02/09/23 0009    Specimen: Blood Updated: 02/09/23 0118     Hemoglobin 11.7 g/dL      Hematocrit 37.6 %     Blood Culture - Blood, Arm, Left [[id number]] Collected: 02/08/23 2022    Specimen: Blood from Arm, Left Updated: 02/08/23 2039    Blood Culture - Blood, Arm, Right [[id number]] Collected: 02/08/23 2012    Specimen: Blood from Arm, Right Updated: 02/08/23 2038    [place] Draw [[id number]] Collected: 02/08/23 1459    Specimen: Blood Updated: 02/08/23 1900    Narrative:      The following orders were created for panel order [place] Draw.  Procedure                               Abnormality         Status                     ---------                               -----------         ------                     Green Top (Gel)[[id number]]                                  Final result               Lavender Top[[id number]]                                     Final result               Hagen  Top[[id number]]                                         Final result               Light Blue Top[[id number]]                                   Final result                 Please view results for these tests on the individual orders.    Hagen Top [[id number]] Collected: 02/08/23 1459    Specimen: Blood Updated: 02/08/23 1900     Extra Tube Hold for add-ons.     Comment: Auto resulted.       High Sensitivity Troponin T 2Hr [[id number]]  (Abnormal) Collected: 02/08/23 1716    Specimen: Blood Updated: 02/08/23 1850     HS Troponin T 30 ng/L      Troponin T Delta -6 ng/L     Narrative:      High Sensitive Troponin T Reference Range:  <10.0 ng/L- Negative Female for AMI  <15.0 ng/L- Negative Male for AMI  >=10 - Abnormal Female indicating possible myocardial injury.  >=15 - Abnormal Male indicating possible myocardial injury.   Clinicians would have to utilize clinical acumen, EKG, Troponin, and serial changes to determine if it is an Acute Myocardial Infarction or myocardial injury due to an underlying chronic condition.         COVID-19, FLU A/B, RSV PCR - Swab, Nasopharynx [[id number]]  (Normal) Collected: 02/08/23 1715    Specimen: Swab from Nasopharynx Updated: 02/08/23 1820     COVID19 Not Detected     Influenza A PCR Not Detected     Influenza B PCR Not Detected     RSV, PCR Not Detected    Narrative:      Fact sheet for providers: https://www.fda.gov/media/527288/download    Fact sheet for patients: https://www.fda.gov/media/104762/download    Test performed by PCR.    Urinalysis, Microscopic Only - Straight Cath [[id number]]  (Abnormal) Collected: 02/08/23 1716    Specimen: Urine from Straight Cath Updated: 02/08/23 1748     RBC, UA 0-2 /HPF      WBC, UA 31-50 /HPF      Bacteria, UA 4+ /HPF      Squamous Epithelial Cells, UA None Seen /HPF      Hyaline Casts, UA 0-2 /LPF      Methodology Automated Microscopy    Urinalysis With Culture If Indicated - Straight Cath [[id number]]  (Abnormal) Collected: 02/08/23 1716     "Specimen: Urine from Straight Cath Updated: 02/08/23 1748     Color, UA Yellow     Appearance, UA Clear     pH, UA 6.0     Specific Gravity, UA 1.015     Glucose, UA Negative     Ketones, UA Negative     Bilirubin, UA Negative     Blood, UA Negative     Protein, UA Negative     Leuk Esterase, UA Small (1+)     Nitrite, UA Negative     Urobilinogen, UA 1.0 E.U./dL    Narrative:      In absence of clinical symptoms, the presence of pyuria, bacteria, and/or nitrites on the urinalysis result does not correlate with infection.    Urine Culture - Urine, Straight Cath [[id number]] Collected: 02/08/23 1716    Specimen: Urine from Straight Cath Updated: 02/08/23 1748    POC Occult Blood Stool [[id number]]  (Abnormal) Collected: 02/08/23 1740    Specimen: Stool from Per Rectum Updated: 02/08/23 1741     Fecal Occult Blood Positive     Lot Number [id number]     Expiration Date 03/31/2023     DEVELOPER LOT NUMBER 225     DEVELOPER EXPIRATION DATE 03/31/2023     Positive Control Positive     Negative Control Negative    Procalcitonin [[id number]]  (Normal) Collected: 02/08/23 1459    Specimen: Blood Updated: 02/08/23 1624     Procalcitonin 0.19 ng/mL     Narrative:      As a Marker for Sepsis (Non-Neonates):    1. <0.5 ng/mL represents a low risk of severe sepsis and/or septic shock.  2. >2 ng/mL represents a high risk of severe sepsis and/or septic shock.    As a Marker for Lower Respiratory Tract Infections that require antibiotic therapy:    PCT on Admission    Antibiotic Therapy       6-12 Hrs later    >0.5                Strongly Recommended  >0.25 - <0.5        Recommended   0.1 - 0.25          Discouraged              Remeasure/reassess PCT  <0.1                Strongly Discouraged     Remeasure/reassess PCT    As 28 day mortality risk marker: \"Change in Procalcitonin Result\" (>80% or <=80%) if Day 0 (or Day 1) and Day 4 values are available. Refer to http://www.[facility]-pct-calculator.com    Change in PCT <=80%  A decrease of PCT " levels below or equal to 80% defines a positive change in PCT test result representing a higher risk for 28-day all-cause mortality of patients diagnosed with severe sepsis for septic shock.    Change in PCT >80%  A decrease of PCT levels of more than 80% defines a negative change in PCT result representing a lower risk for 28-day all-cause mortality of patients diagnosed with severe sepsis or septic shock.       Comprehensive Metabolic Panel [[id number]]  (Abnormal) Collected: 02/08/23 1459    Specimen: Blood Updated: 02/08/23 1620     Glucose 136 mg/dL      BUN 18 mg/dL      Creatinine 0.74 mg/dL      Sodium 134 mmol/L      Potassium 4.1 mmol/L      Comment: Slight hemolysis detected by analyzer. Results may be affected.        Chloride 100 mmol/L      CO2 25.0 mmol/L      Calcium 8.3 mg/dL      Total Protein 6.4 g/dL      Albumin 3.2 g/dL      ALT (SGPT) 20 U/L      AST (SGOT) 32 U/L      Alkaline Phosphatase 89 U/L      Total Bilirubin 0.4 mg/dL      Globulin 3.2 gm/dL      A/G Ratio 1.0 g/dL      BUN/Creatinine Ratio 24.3     Anion Gap 9.0 mmol/L      eGFR 88.2 mL/min/1.73     Narrative:      GFR Normal >60  Chronic Kidney Disease <60  Kidney Failure <15    The GFR formula is only valid for adults with stable renal function between ages 18 and 70.    CK [[id number]]  (Normal) Collected: 02/08/23 1459    Specimen: Blood Updated: 02/08/23 1619     Creatine Kinase 28 U/L     Lactic Acid, Plasma [[id number]]  (Normal) Collected: 02/08/23 1459    Specimen: Blood Updated: 02/08/23 1617     Lactate 1.2 mmol/L     BNP [[id number]]  (Normal) Collected: 02/08/23 1459    Specimen: Blood Updated: 02/08/23 1617     proBNP 458.9 pg/mL     Narrative:      Among patients with dyspnea, NT-proBNP is highly sensitive for the detection of acute congestive heart failure. In addition NT-proBNP of <300 pg/ml effectively rules out acute congestive heart failure with 99% negative predictive value.    Results may be falsely decreased if  patient taking Biotin.      Troponin [[id number]]  (Abnormal) Collected: 02/08/23 1459    Specimen: Blood Updated: 02/08/23 1617     HS Troponin T 36 ng/L     Narrative:      High Sensitive Troponin T Reference Range:  <10.0 ng/L- Negative Female for AMI  <15.0 ng/L- Negative Male for AMI  >=10 - Abnormal Female indicating possible myocardial injury.  >=15 - Abnormal Male indicating possible myocardial injury.   Clinicians would have to utilize clinical acumen, EKG, Troponin, and serial changes to determine if it is an Acute Myocardial Infarction or myocardial injury due to an underlying chronic condition.         Lipase [[id number]]  (Normal) Collected: 02/08/23 1459    Specimen: Blood Updated: 02/08/23 1615     Lipase 34 U/L     Magnesium [[id number]]  (Normal) Collected: 02/08/23 1459    Specimen: Blood Updated: 02/08/23 1614     Magnesium 2.2 mg/dL     Protime-INR [[id number]]  (Abnormal) Collected: 02/08/23 1459    Specimen: Blood Updated: 02/08/23 1611     Protime 15.2 Seconds      INR 1.18    aPTT [[id number]]  (Normal) Collected: 02/08/23 1459    Specimen: Blood Updated: 02/08/23 1611     PTT 34.7 seconds     CBC & Differential [[id number]]  (Abnormal) Collected: 02/08/23 1459    Specimen: Blood Updated: 02/08/23 1603    Narrative:      The following orders were created for panel order CBC & Differential.  Procedure                               Abnormality         Status                     ---------                               -----------         ------                     CBC Auto Differential[[id number]]        Abnormal            Final result                 Please view results for these tests on the individual orders.    CBC Auto Differential [[id number]]  (Abnormal) Collected: 02/08/23 1459    Specimen: Blood Updated: 02/08/23 1603     WBC 9.94 10*3/mm3      RBC 4.01 10*6/mm3      Hemoglobin 11.6 g/dL      Hematocrit 37.4 %      MCV 93.3 fL      MCH 28.9 pg      MCHC 31.0 g/dL      RDW 14.4 %      RDW-SD  49.2 fl      MPV 9.1 fL      Platelets 608 10*3/mm3      Neutrophil % 73.7 %      Lymphocyte % 15.7 %      Monocyte % 6.9 %      Eosinophil % 1.7 %      Basophil % 0.6 %      Immature Grans % 1.4 %      Neutrophils, Absolute 7.32 10*3/mm3      Lymphocytes, Absolute 1.56 10*3/mm3      Monocytes, Absolute 0.69 10*3/mm3      Eosinophils, Absolute 0.17 10*3/mm3      Basophils, Absolute 0.06 10*3/mm3      Immature Grans, Absolute 0.14 10*3/mm3      nRBC 0.0 /100 WBC     Light Blue Top [[id number]] Collected: 02/08/23 1459    Specimen: Blood Updated: 02/08/23 1600     Extra Tube Hold for add-ons.     Comment: Auto resulted       Green Top (Gel) [[id number]] Collected: 02/08/23 1459    Specimen: Blood Updated: 02/08/23 1600     Extra Tube Hold for add-ons.     Comment: Auto resulted.       Lavender Top [[id number]] Collected: 02/08/23 1459    Specimen: Blood Updated: 02/08/23 1600     Extra Tube hold for add-on     Comment: Auto resulted             Radiology Review:  Imaging Results (Last 72 Hours)     Procedure Component Value Units Date/Time    CT Abdomen Pelvis Without Contrast [[id number]] Collected: 02/08/23 1820     Updated: 02/08/23 1831    Narrative:      CT ABDOMEN PELVIS WO CONTRAST- 2/8/2023 6:08 PM CST     HISTORY: new onset bloody stools, recent diarrhea      COMPARISON: None      DOSE LENGTH PRODUCT: 337 mGy cm. Automated exposure control was also  utilized to decrease patient radiation dose.     TECHNIQUE: Noncontrast enhanced images of the abdomen and pelvis  obtained without oral contrast. Multiplanar reformatted images were  provided for review.      FINDINGS:   LOWER CHEST: Large hiatal hernia. Atelectasis present in the left lower  lobe and there is also a trace layering left pleural effusion. Coronary  atheromatous calcification.      LIVER: No focal liver lesion within limits of a noncontrast study.      BILIARY SYSTEM: Cholelithiasis. Gallbladder is nondistended. Biliary  tree is nondilated.       PANCREAS: No focal pancreatic lesion within limits of a noncontrast  study.      SPLEEN: Unremarkable.      KIDNEYS: Iodinated contrast identified in the upper urinary tracts. No  hydronephrosis. Ureters are nondilated.      ADRENALS: Unremarkable.     RETROPERITONEUM: No mass, lymphadenopathy or hemorrhage.      GI TRACT: Large hiatal hernia. The stomach is nondistended. Small bowel  loops are nondilated. Colonic diverticulosis. The colon is nondistended.  No inflammatory changes are identified along the colon. No bowel wall  thickening identified. Surrounding fat is clean.     OTHER: There is no mesenteric mass, lymphadenopathy or fluid collection.  No intraperitoneal free fluid or free air. IVC filter is in place.  Infrarenal AAA measuring up to 3.6 cm in diameter. Right hip  arthroplasty. No acute bony abnormality is seen. There appears to be a  high-grade spinal stenosis at the L1-L2 disc level with complete loss of  disc height at this level. There is an old fracture identified across  the inferior endplate of T9.     PELVIS: Bladder wall trabeculation. Air identified in the urinary  bladder. No pelvic adenopathy identified.       Impression:      1. Colonic diverticulosis. Bowel is nondistended. No inflammatory  changes are seen along the bowel.  2. Stomach and small bowel are decompressed. Hiatal hernia is present.  3. Cholelithiasis.  4. Bladder wall trabeculation which may be secondary to chronic partial  outlet obstruction.  5. Infrarenal AAA measuring up to 3.6 cm in diameter.  This report was finalized on 02/08/2023 18:28 by Dr Geremias Martinez, .    CT Angiogram Chest [[id number]] Collected: 02/08/23 1659     Updated: 02/08/23 1704    Narrative:      EXAMINATION: CT ANGIOGRAM CHEST-      2/8/2023 4:43 PM CST     HISTORY: Shortness of breath, chest pain, weakness, recent surgery and  immobilization since.     In order to have a CT radiation dose as low as reasonably achievable  Automated Exposure Control  was utilized for adjustment of the mA and/or  KV according to patient size.     DLP in mGycm= 194.     CT angiogram chest with IV contrast.  CT angiography protocol.   CT imaging with bolus IV contrast injection.   Under concurrent supervision axial, sagittal, coronal,  three-dimensional, and MIP data sets were constructed on an independent  work station.     Normal heart size.  Intrathoracic stomach with left lung base consolidation compatible with  pneumonia.     Small left and trace right pleural effusion.     Symmetric and normally opacified pulmonary arteries.  No pulmonary embolism.     Upper lobes are clear.  No pneumothorax.     Small gallstones incidentally noted.     Summary:  1. Left lower lobe pneumonia.  2. No pulmonary embolism.                                   This report was finalized on 02/08/2023 17:01 by Dr. Fawad Smith MD.    XR Chest 1 View [[id number]] Collected: 02/08/23 1634     Updated: 02/08/23 1639    Narrative:      XR CHEST 1 VW- 2/8/2023 4:25 PM CST     HISTORY: chest pain, weakness     COMPARISON: Chest exam dated 10/01/2019.     FINDINGS:      No lung consolidation. No pleural effusion or pneumothorax. Heart size  is normal. Pulmonary vasculature are nondilated. Hiatal hernia  superimposed over the cardiac silhouette. The osseous structures and  surrounding soft tissues demonstrate no acute abnormality.       Impression:      1. Stable chest exam without acute process.  2. Hiatal hernia.        This report was finalized on 02/08/2023 16:36 by Dr Geremias Martinez, .          Impression/Plan: 87-year-old man with numerous medical problems, admitted with profound fatigue and weakness as well as a dyne aphasia and melena.  Patient sustained right hip fracture about 11 days ago which was managed with a arthroplasty on the same day.  Postoperatively patient was constipated, most likely due to opioid pain medications.  However over the last 3 days patient has been moving bowels with a loose  black stool.  In addition he has been experiencing a dyne aphasia over the last several days, while reporting history of dysphagia for solids bothering him over the last several years.  Patient did have an upper endoscopies back in 2019 as at that time patient was found to have a bleeding gastric ulcer, which subsequently has healed.  History of colon polyps, with a last colonoscopy in 2018.  At this point I believe that the patient should proceed with upper endoscopy for evaluation of melena and dysphagia/odynophagia.  Risk and benefits of this procedure were discussed with the patient.  Consent was obtained.  Further management will depend upon the findings on upper endoscopy and patient's clinical progress.        Sushant Edmond MD  02/09/23   10:48 CST

## 2023-02-09 NOTE — ANESTHESIA POSTPROCEDURE EVALUATION
Patient: Jose Alfredo Kirkpatrick    Procedure Summary     Date: 02/09/23 Room / Location: [facility] ENDOSCOPY 4 / [facility] ENDOSCOPY    Anesthesia Start: 1129 Anesthesia Stop: 1143    Procedure: ESOPHAGOGASTRODUODENOSCOPY WITH ANESTHESIA Diagnosis:     Surgeons: Sushant Edmond MD Provider: Mekhi Castillo CRNA    Anesthesia Type: MAC ASA Status: 3 - Emergent          Anesthesia Type: MAC    Vitals  No vitals data found for the desired time range.          Post Anesthesia Care and Evaluation    Patient location during evaluation: PHASE II  Patient participation: complete - patient participated  Level of consciousness: awake and alert  Pain score: 0    Airway patency: patent  Anesthetic complications: No anesthetic complications  PONV Status: none  Cardiovascular status: acceptable  Respiratory status: acceptable  Hydration status: acceptable  No anesthesia care post op

## 2023-02-09 NOTE — PROGRESS NOTES
[facility] Medicine Services  INPATIENT PROGRESS NOTE    Patient Name: Jose Alfredo Kirkpatrick  Date of Admission: 2/8/2023  Today's Date: 02/09/23  Length of Stay: 1  Primary Care Physician: Otilio Ramos MD    Subjective   Chief Complaint: Follow-up weakness, dark stools, dysphagia  HPI   Sitting up in bed.  No oxygen use.  No family in room.  Patient reports weight loss since the death of his wife.  He recently had right hip hemiarthroplasty by Dr. Quintana at [facility] and was discharged on 2/2.  He reports he has not been able to participate with therapy due to weakness that he contributes to constipation secondary to narcotics.  He denies nausea, vomiting or abdominal pain.  He does report difficulty swallowing and feels as though food has become hung in his throat.  GI consulted and proceeded with endoscopy today.  Findings included erosive esophagitis, large hiatal hernia almost entire stomach and chest.  Recommends general surgery consultation.  Discussed with Brendan Petersen PA-C with orthopedics today and weightbearing as tolerated.  We will consult physical therapy.  Patient denies palpitations or shortness of breath.  Not requiring oxygen.    Review of Systems   Constitutional: Positive for activity change (After recent hip surgery) and fatigue. Negative for chills and fever.   HENT: Negative for congestion and trouble swallowing.    Eyes: Negative for photophobia and visual disturbance.   Respiratory: Negative for cough, shortness of breath and wheezing.    Cardiovascular: Negative for chest pain, palpitations and leg swelling.   Gastrointestinal: Positive for anal bleeding (Black stools) and constipation. Negative for abdominal pain, nausea and vomiting.   Endocrine: Negative for cold intolerance, heat intolerance and polyuria.   Genitourinary: Positive for frequency. Negative for dysuria and urgency.   Musculoskeletal: Positive for gait problem.   Skin: Negative for  color change, pallor, rash and wound.   Allergic/Immunologic: Negative for immunocompromised state.   Neurological: Positive for weakness. Negative for light-headedness.   Hematological: Negative for adenopathy. Does not bruise/bleed easily.   Psychiatric/Behavioral: Negative for agitation, behavioral problems and confusion.      All pertinent negatives and positives are as above. All other systems have been reviewed and are negative unless otherwise stated.     Objective    Temp:  [97.4 °F (36.3 °C)-98 °F (36.7 °C)] 97.5 °F (36.4 °C)  Heart Rate:  [] 94  Resp:  [15-23] 16  BP: ()/(57-93) 134/93  Physical Exam  Vitals and nursing note reviewed.   Constitutional:       Comments: Sitting up in bed.  No oxygen in use.  No visitors in room.   HENT:      Head: Normocephalic and atraumatic.      Nose: No congestion.      Mouth/Throat:      Pharynx: Oropharynx is clear. No oropharyngeal exudate or posterior oropharyngeal erythema.   Eyes:      Extraocular Movements: Extraocular movements intact.      Pupils: Pupils are equal, round, and reactive to light.   Cardiovascular:      Rate and Rhythm: Normal rate and regular rhythm.      Heart sounds: No murmur heard.     Comments: Sinus rhythm 75 on telemetry.  Pulmonary:      Breath sounds: No wheezing, rhonchi or rales.      Comments: No oxygen in use.  Abdominal:      Palpations: Abdomen is soft.      Tenderness: There is no abdominal tenderness.   Genitourinary:     Comments: Voiding.  Musculoskeletal:         General: No swelling or tenderness.      Cervical back: Normal range of motion and neck supple.      Comments: SCDs bilateral lower extremities   Skin:     General: Skin is warm and dry.   Neurological:      General: No focal deficit present.      Mental Status: He is alert and oriented to person, place, and time.   Psychiatric:         Mood and Affect: Mood normal.         Behavior: Behavior normal.         Thought Content: Thought content normal.          Judgment: Judgment normal.       Results Review:  I have reviewed the labs, radiology results, and diagnostic studies.    Laboratory Data:   Results from last 7 days   Lab Units 02/09/23  0732 02/09/23  0009 02/08/23  1459 02/08/23  1100   WBC 10*3/mm3 9.68  --  9.94 10.85*   HEMOGLOBIN g/dL 11.8* 11.7* 11.6* 11.9*   HEMATOCRIT % 37.9 37.6 37.4* 38.3   PLATELETS 10*3/mm3 643*  --  608* 582*     Results from last 7 days   Lab Units 02/09/23  0732 02/08/23  1459 02/08/23  1100 02/03/23  0950   SODIUM mmol/L 136 134* 135* 134*   POTASSIUM mmol/L 3.9 4.1 4.0 3.7   CHLORIDE mmol/L 99 100 98 99   CO2 mmol/L 25.0 25.0 23.0 24.0   BUN mg/dL 14 18 20 23   CREATININE mg/dL 0.87 0.74* 0.70* 0.65*   CALCIUM mg/dL 8.3* 8.3* 8.5* 8.7   BILIRUBIN mg/dL  --  0.4 0.4 0.9   ALK PHOS U/L  --  89 95 79   ALT (SGPT) U/L  --  20 19 5   AST (SGOT) U/L  --  32 34 12   GLUCOSE mg/dL 109* 136* 148* 175*     Culture Data:   Urine Culture   Date Value Ref Range Status   02/08/2023 >100,000 CFU/mL Gram Negative Bacilli (A)  Preliminary     Radiology Data:   Imaging Results (Last 24 Hours)     Procedure Component Value Units Date/Time    CT Abdomen Pelvis Without Contrast [[id number]] Collected: 02/08/23 1820     Updated: 02/08/23 1831    Narrative:      CT ABDOMEN PELVIS WO CONTRAST- 2/8/2023 6:08 PM CST     HISTORY: new onset bloody stools, recent diarrhea      COMPARISON: None      DOSE LENGTH PRODUCT: 337 mGy cm. Automated exposure control was also  utilized to decrease patient radiation dose.     TECHNIQUE: Noncontrast enhanced images of the abdomen and pelvis  obtained without oral contrast. Multiplanar reformatted images were  provided for review.      FINDINGS:   LOWER CHEST: Large hiatal hernia. Atelectasis present in the left lower  lobe and there is also a trace layering left pleural effusion. Coronary  atheromatous calcification.      LIVER: No focal liver lesion within limits of a noncontrast study.      BILIARY SYSTEM:  Cholelithiasis. Gallbladder is nondistended. Biliary  tree is nondilated.      PANCREAS: No focal pancreatic lesion within limits of a noncontrast  study.      SPLEEN: Unremarkable.      KIDNEYS: Iodinated contrast identified in the upper urinary tracts. No  hydronephrosis. Ureters are nondilated.      ADRENALS: Unremarkable.     RETROPERITONEUM: No mass, lymphadenopathy or hemorrhage.      GI TRACT: Large hiatal hernia. The stomach is nondistended. Small bowel  loops are nondilated. Colonic diverticulosis. The colon is nondistended.  No inflammatory changes are identified along the colon. No bowel wall  thickening identified. Surrounding fat is clean.     OTHER: There is no mesenteric mass, lymphadenopathy or fluid collection.  No intraperitoneal free fluid or free air. IVC filter is in place.  Infrarenal AAA measuring up to 3.6 cm in diameter. Right hip  arthroplasty. No acute bony abnormality is seen. There appears to be a  high-grade spinal stenosis at the L1-L2 disc level with complete loss of  disc height at this level. There is an old fracture identified across  the inferior endplate of T9.     PELVIS: Bladder wall trabeculation. Air identified in the urinary  bladder. No pelvic adenopathy identified.       Impression:      1. Colonic diverticulosis. Bowel is nondistended. No inflammatory  changes are seen along the bowel.  2. Stomach and small bowel are decompressed. Hiatal hernia is present.  3. Cholelithiasis.  4. Bladder wall trabeculation which may be secondary to chronic partial  outlet obstruction.  5. Infrarenal AAA measuring up to 3.6 cm in diameter.  This report was finalized on 02/08/2023 18:28 by Dr Geremias Martinez, .        Results for orders placed during the hospital encounter of 10/02/19    Adult Transthoracic Echo Complete W/ Cont if Necessary Per Protocol    Interpretation Summary  · Left ventricular systolic function is normal. Estimated EF appears to be in the range of 61 - 65%.  · Left  ventricular diastolic dysfunction (grade I) consistent with impaired relaxation.  · Left ventricular wall thickness is consistent with mild-to-moderate concentric hypertrophy.  · Right ventricular cavity is mildly dilated. Mildly reduced right ventricular systolic function noted.  · Mild mitral valve regurgitation is present  · Mild aortic valve stenosis is present.  · Estimated right ventricular systolic pressure from tricuspid regurgitation is normal (<35 mmHg).    Schedule medications:  busPIRone, 5 mg, Oral, BID  furosemide, 40 mg, Intravenous, Q12H  ipratropium-albuterol, 3 mL, Nebulization, 4x Daily - RT  NIFEdipine XL, 60 mg, Oral, Daily  pantoprazole, 40 mg, Intravenous, Q12H  piperacillin-tazobactam, 3.375 g, Intravenous, Q8H  sucralfate, 1 g, Oral, 4x Daily AC & at Bedtime        I have reviewed the patient's current medications.     Assessment/Plan   Assessment  Active Hospital Problems    Diagnosis    • **Pneumonia of left lower lobe due to infectious organism    • Erosive esophagitis    • Large hiatal hernia    • Weight loss    • Gram-negative UTI, acute cystitis without hematuria, present on admission    • Positive fecal occult blood test    • Primary hypertension      Treatment Plan  Mr. Kirkpatrick presented to ER 2/8/2023 from SNF with fatigue, weakness, dysphagia and black stool.  Patient had recent right hip hemiarthroplasty1/28/2023 by Dr. Quintana at [facility] and was discharged to skilled nursing facility on 2/2/2023.  Patient reported constipation that he attributed to pain medication.  In ER, he tested fecal occult blood positive.  Patient reported dysphagia of solid foods for the past several years and reported that food felt as though it was getting hung in his esophagus.  Patient had 3 previous endoscopies between 2014 and 2019.  2019 patient had melena and was found to have gastric ulcer managed conservatively.  Repeat endoscopy showed completely healed ulcer.  Last colonoscopy 2019 revealed  small polyps removed.  Patient reported 24 pound weight loss over the last several months but most recently after his wife passed away within the past month.  Hemoglobin 11.6.  WBC normal.  LFTs normal.  CT scan of abdomen noted colonic diverticulosis without any inflammatory changes unremarkable stomach, small bowel, hiatal hernia, cholelithiasis without cholecystitis, bladder wall trabeculation and 3.6 cm infrarenal abdominal aortic aneurysm.  Patient denied chest pain.  CT scan showed bilateral pleural effusions with concern for parapneumonic effusion.  CTA chest noted no pulmonary emboli.  Left lower lobe pneumonia.    Pneumonia left lower lobe.  Initially received azithromycin and Rocephin.  Antibiotic changed to Zosyn due to recent hospital stay.  Follow-up blood cultures.  Check strep pneumonia, Legionella, MRSA PCR.  Sputum culture.  Duo nebs 4 times daily, incentive spirometry.  Repeat CBC and BMP in AM.    Fecal occult blood positive.  Hemoglobin stable.  Hemoglobin 11.6 on admission, hemoglobin 11.8 today.  Patient attributes dark stools to constipation secondary to narcotics.    Dysphagia.  GI consulted.  Dr. Edmond recommended endoscopy for evaluation of melena, dysphagia and odynophagia.  Patient agreeable.  Findings: Hypopharynx and larynx grossly appeared normal.  Because of the proximal esophagus appeared normal.  Severe, grade IV, erosive esophagitis of mid and distal esophagus, manifested by numerous linear and irregular shaped fibrin covering ulcerations and erosions, edema and erythema, but without signs of any active bleeding.  Very large hiatal hernia, with essentially almost entire stomach patient currently chest, with configuration suggestive of, at least, partial volvulus of the stomach.  No ulcerations, but mild diffuse edema and erythema of gastric mucosa.  Gastric biopsy obtained with cold biopsy forceps to rule out H. pylori.  Normal-appearing duodenum.     Recommendations: Protonix 40 mg  twice daily. Carafate suspension 10 cc before every meal and at bedtime.  Full liquid diet.  Evaluation by surgery for for possible repair of hiatal hernia/gastropexy.     Gram-negative bacilli UTI, present on admission.  Urinalysis 31-50 WBC, 4+ bacteria.  Continue Zosyn.  Await urine culture.    Recent right hip hemiarthroplasty 1/28/2023 Dr. Quintana.  Discussed with Brendan Petersen PA-C today.  Weightbearing as tolerated.  Consult physical therapy.    Primary hypertension.  Blood pressure 126/79, 131/71.  Continue Procardia.  Hold hydralazine.    Echocardiogram today pending    SCDs for deep vein phimosis prophylaxis.  Reviewed recent medications.  Hold Lovenox.    Medical Decision Making  Number and Complexity of problems: 9  Pneumonia left lower lobe, acute, high complexity  Fecal occult blood positive, lower GI bleed, acute high complexity, GI consultation.  Gram-negative UTI, acute high complexity  Large hiatal hernia with stomach and chest suggesting volvulus stomach, acute, high complexity requiring general surgery consultation.  Erosive gastritis, acute, moderate complexity  Primary hypertension, chronic, moderate complexity  Weight loss, chronic, moderate complexity  Status post right hip hemiarthroplasty 1/28/2023, stable, moderate complexity    Differential Diagnosis: Pulmonary emboli    Conditions and Status        Condition is worsening.     [facility] Data  External documents reviewed: [facility] admission 1/28/2020 3- 2/2/2023  Cardiac tracing (EKG, telemetry) interpretation: EKG 2/8/2023 normal sinus rhythm with second-degree AV block.  T wave abnormalities.  Radiology interpretation: Radiology CT abdomen, CTA chest  Labs reviewed:   BMP 2/9/2023.  Creatinine 0.87.  CBC 2/9/2023 hemoglobin 11.8  Urinalysis 4+ bacteria, 31-50 WBC, urine culture gram-negative bacilli  Blood cultures pending    Any tests that were considered but not ordered: None     Decision rules/scores evaluated (example [id number], Wells,  etc): None     Discussed with: Dr. Austin, Brendan Petersen PA-C, orthopedics, and patient     Care Planning  Shared decision making: Dr. Morocho and patient.  Patient agreed to GI consultation, physical therapy, general surgery consultation.  Code status and discussions: Full code  The patient surrogate decision maker is his daughter, Lula Cope  Social Determinants of Health that impact treatment or disposition: Recent right hip hemiarthroplasty admitted to SNF  I expect the patient to be discharged to SNF in 2-4 days.     Electronically signed by HARISH Edwards, 02/09/23, 17:06 CST.

## 2023-02-09 NOTE — ANESTHESIA PREPROCEDURE EVALUATION
Anesthesia Evaluation     Patient summary reviewed   no history of anesthetic complications:  NPO Solid Status: > 8 hours             Airway   Mallampati: I  TM distance: >3 FB  Neck ROM: full  No difficulty expected  Dental          Pulmonary    (+) pneumonia , pulmonary embolism (ivc filter 10/2019),   (-) asthma, sleep apnea, not a smoker  Cardiovascular   Exercise tolerance: poor (<4 METS)    ECG reviewed    (+) hypertension, PVD (AAA), DVT,   (-) past MI, CAD, angina, cardiac stents      Neuro/Psych  (-) seizures, TIA, CVA  GI/Hepatic/Renal/Endo    (+)  hiatal hernia, GERD, PUD, GI bleeding ,   (-) liver disease, no renal disease, diabetes    Musculoskeletal     (+) back pain, neck pain (surgery in [place]- 05/2018),   Abdominal    Substance History      OB/GYN          Other   arthritis, blood dyscrasia anemia,     ROS/Med Hx Other: Constipation           CTA Chest:  IMPRESSION:  1. Bilateral pulmonary emboli are noted.     The right ventricular/ left ventricular ratio is 1.9 this represents  right ventricular strain.  2. Cholelithiasis.              Anesthesia Plan    ASA 3 - emergent     MAC     (dysphagia with sensation of food getting stuck in his throat and pain on swallowing. Poor recovery/rehab s/p hip fx; Mobitz 1 )  intravenous induction     Anesthetic plan, risks, benefits, and alternatives have been provided, discussed and informed consent has been obtained with: patient.

## 2023-02-09 NOTE — H&P
[facility] Medicine Services  HISTORY AND PHYSICAL    Date of Admission: 2/8/2023  Primary Care Physician: Otilio Ramos MD    Subjective   Primary Historian: Patient      Chief Complaint: Generalized weakness      History of Present Illness  Patient is a 87-year-old male with medical history of hypertension, AAA, GERD, and recent fall with right hip fracture status post repair. Patient presents with complaints of shortness of breath, weakness and constipation.  Patient was discharged to rehab after his hip surgery. He reports that he was unable to participate in rehab because of feeling weak and constipated for over a week.  He blames his pain medications for his constipation.  He eventually was able to have a bowel movement in the emergency room which was reported to be bloody.  He had a CT abdomen/pelvis done with finding of diverticulosis.  CT also shows bilateral pleural effusion with concern of parapneumonic effusion.  On further questioning, patient complains of dysphagia with sensation of food getting stuck in his throat and pain on swallowing.  He is admitted for further medical evaluation and management.        Review of Systems   Otherwise complete ROS reviewed and negative except as mentioned in the HPI.    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 ENDOSCOPY;  Service: Gastroenterology   • HERNIA REPAIR Right     INGUINAL    • HIP FRACTURE SURGERY Right 02/04/2023   • REPLACEMENT TOTAL KNEE BILATERAL     • THORACIC LAMINECTOMY DECOMPRESSIVE POSTERIOR N/A 12/14/2016    Procedure: LAMINECTOMY DECOMPRESSION, UNINSTRUMENTED POSTERIOR SPINAL FUSION, T 11-12;  Surgeon: LUZ Adan MD;  Location: [facility] OR;  Service:    • VENA CAVA FILTER INSERTION N/A 10/04/2019    Procedure: VENA CAVA FILTER INSERTION;  Surgeon: Dallin Coronado MD;  Location: [facility] HYBRID OR 12;  Service: Vascular     Social History:  reports that he has never smoked. He has never used smokeless tobacco. He reports that he does not drink alcohol and does not use drugs.    Family History: family history includes Colon cancer in his father.       Allergies:  No Known Allergies    Medications:  Prior to Admission medications    Medication Sig Start Date End Date Taking? Authorizing Provider   acetaminophen (TYLENOL) 325 MG tablet Take 650 mg by mouth 2 (Two) Times a Day.    Joi Alvarez MD   busPIRone (BUSPAR) 5 MG tablet Take 5 mg by mouth 2 (Two) Times a Day.    Joi Alvarez MD   cyanocobalamin 100 MCG tablet Take 5 tablets by mouth Daily. 10/5/19   Selwyn Soler APRN   cyclobenzaprine (FLEXERIL) 10 MG tablet Take 5 mg by mouth 2 (Two) Times a Day As Needed for Muscle Spasms.    Joi Alvarez MD   docusate sodium (COLACE) 100 MG capsule Take 100 mg by mouth 2 (Two) Times a Day.    Joi Alvarez MD   Enoxaparin Sodium (LOVENOX) 40 MG/0.4ML solution prefilled syringe syringe Inject 40 mg under the skin into the appropriate area as directed Daily.    Joi Alvarez MD   gabapentin (NEURONTIN) 300 MG capsule Take 300 mg by mouth 3 (Three) Times a Day.     Joi Alvarez MD   guaiFENesin (MUCINEX) 600 MG 12 hr tablet Take 600 mg by mouth 2 (Two) Times a Day.    Joi Alvarez MD   hydrALAZINE (APRESOLINE) 10 MG tablet Take 50 mg by mouth Every 8 (Eight) Hours As Needed.    Joi Alvarez MD   levalbuterol (XOPENEX) 0.31 MG/3ML nebulizer solution Take 1 ampule by nebulization Every 8 (Eight) Hours As Needed for Wheezing.    Joi Alvarez MD   linaclotide (LINZESS) 290 MCG capsule capsule Take 145 mcg by mouth Daily.    Joi Alvarez MD   LISINOPRIL PO Take 10 mg by mouth Daily.    Joi Alvarez MD   NIFEdipine XL (PROCARDIA XL) 60 MG 24 hr tablet Take 60 mg by mouth Daily.    Joi Alvarez MD   omeprazole (priLOSEC) 20 MG capsule Take 20 mg by mouth Daily.    Joi Alvarez MD   ondansetron (ZOFRAN) 4 MG tablet Take 4 mg by mouth Every 6 (Six) Hours As Needed for Nausea or Vomiting.    Joi Alvarez MD   oxyCODONE-acetaminophen (PERCOCET)  MG per tablet Take 1 tablet by mouth Every 6 (Six) Hours As Needed for Moderate Pain.    Joi Alvarez MD   polyethylene glycol (MIRALAX) packet Take 17 g by mouth Daily As Needed.    Joi Alvarez MD   simethicone (MYLICON) 80 MG chewable tablet Chew 80 mg Every 4 (Four) Hours As Needed for Flatulence.    Joi Alvarez MD   sucralfate (Carafate) 1 GM/10ML suspension Take 1 g by mouth 3 (Three) Times a Day.    Joi Alvarez MD   vitamin D (ERGOCALCIFEROL) 1.25 MG (02895 UT) capsule capsule Take 50,000 Units by mouth 1 (One) Time Per Week. Give one time a day every Mon, Wed, Fri    Joi Alvarez MD   amLODIPine (NORVASC) 5 MG tablet Take  by mouth Daily. 2/3/20 2/8/23  Joi Alvarez MD   hydroCHLOROthiazide (HYDRODIURIL) 25 MG tablet TK 1 T PO ONCE D 3/4/20 2/8/23  Joi Alvarez MD   HYDROcodone-acetaminophen (NORCO)  MG per tablet Take 1 tablet by mouth Every 12 (Twelve) Hours As Needed for Moderate  "Pain .  2/8/23  ProviderJoi MD   metoprolol succinate XL (TOPROL-XL) 25 MG 24 hr tablet TK 1 T PO  NIGHTLY 4/13/20 2/8/23  ProviderJoi MD     I have utilized all available immediate resources to obtain, update, or review the patient's current medications (including all prescriptions, over-the-counter products, herbals, cannabis/cannabidiol products, and vitamin/mineral/dietary (nutritional) supplements).    Objective     Vital Signs: /79   Pulse 87   Temp 98.1 °F (36.7 °C)   Resp 18   Ht 190.5 cm (75\")   Wt 98.9 kg (218 lb)   SpO2 94%   BMI 27.25 kg/m²   Physical Exam:   Temp:  [97.7 °F (36.5 °C)-98.1 °F (36.7 °C)] 98.1 °F (36.7 °C)  Heart Rate:  [80-87] 87  Resp:  [16-18] 18  BP: (116-139)/(67-79) 139/79  Physical Exam  General: NC/AT, appears stated age, alert and oriented x3  HEENT: PERRLA, EOMI, no scleral icterus, no conjunctival injection,   NECK:  Neck is supple, no JVD, no supraclavicular lymphadenopathy  CV: S1 S2 RRR, no murmurs, no gallops, no heaves, pulses palpable.  LUNG: Diminished breath sounds in lower lung field with bibasilar Rales.  ABD: Nondistended, nontender, bowel sounds present, no guarding or rebound, organomegaly not appreciated.   EXT: FROM, strength is intact, no pitting edema, no joint effusion, no calf tenderness.  Pulses palpable  Neuro: CN 2-12, grossly intact,no  focal weakness appreciated  Skin: Warm and dry, no rash or lesions.      Results Reviewed:  Lab Results (last 24 hours)     Procedure Component Value Units Date/Time    Blood Culture - Blood, Arm, Left [[id number]] Collected: 02/08/23 2022    Specimen: Blood from Arm, Left Updated: 02/08/23 2039    Blood Culture - Blood, Arm, Right [[id number]] Collected: 02/08/23 2012    Specimen: Blood from Arm, Right Updated: 02/08/23 2038    [place] Draw [[id number]] Collected: 02/08/23 1459    Specimen: Blood Updated: 02/08/23 1900    Narrative:      The following orders were created for panel order " [place] Draw.  Procedure                               Abnormality         Status                     ---------                               -----------         ------                     Green Top (Gel)[[id number]]                                  Final result               Lavender Top[[id number]]                                     Final result               Hagen Top[[id number]]                                         Final result               Light Blue Top[[id number]]                                   Final result                 Please view results for these tests on the individual orders.    Hagen Top [[id number]] Collected: 02/08/23 1459    Specimen: Blood Updated: 02/08/23 1900     Extra Tube Hold for add-ons.     Comment: Auto resulted.       High Sensitivity Troponin T 2Hr [[id number]]  (Abnormal) Collected: 02/08/23 1716    Specimen: Blood Updated: 02/08/23 1850     HS Troponin T 30 ng/L      Troponin T Delta -6 ng/L     Narrative:      High Sensitive Troponin T Reference Range:  <10.0 ng/L- Negative Female for AMI  <15.0 ng/L- Negative Male for AMI  >=10 - Abnormal Female indicating possible myocardial injury.  >=15 - Abnormal Male indicating possible myocardial injury.   Clinicians would have to utilize clinical acumen, EKG, Troponin, and serial changes to determine if it is an Acute Myocardial Infarction or myocardial injury due to an underlying chronic condition.         COVID-19, FLU A/B, RSV PCR - Swab, Nasopharynx [[id number]]  (Normal) Collected: 02/08/23 1715    Specimen: Swab from Nasopharynx Updated: 02/08/23 1820     COVID19 Not Detected     Influenza A PCR Not Detected     Influenza B PCR Not Detected     RSV, PCR Not Detected    Narrative:      Fact sheet for providers: https://www.fda.gov/media/745966/download    Fact sheet for patients: https://www.fda.gov/media/231872/download    Test performed by PCR.    Urinalysis, Microscopic Only - Straight Cath [[id number]]  (Abnormal) Collected: 02/08/23  1716    Specimen: Urine from Straight Cath Updated: 02/08/23 1748     RBC, UA 0-2 /HPF      WBC, UA 31-50 /HPF      Bacteria, UA 4+ /HPF      Squamous Epithelial Cells, UA None Seen /HPF      Hyaline Casts, UA 0-2 /LPF      Methodology Automated Microscopy    Urinalysis With Culture If Indicated - Straight Cath [[id number]]  (Abnormal) Collected: 02/08/23 1716    Specimen: Urine from Straight Cath Updated: 02/08/23 1748     Color, UA Yellow     Appearance, UA Clear     pH, UA 6.0     Specific Gravity, UA 1.015     Glucose, UA Negative     Ketones, UA Negative     Bilirubin, UA Negative     Blood, UA Negative     Protein, UA Negative     Leuk Esterase, UA Small (1+)     Nitrite, UA Negative     Urobilinogen, UA 1.0 E.U./dL    Narrative:      In absence of clinical symptoms, the presence of pyuria, bacteria, and/or nitrites on the urinalysis result does not correlate with infection.    Urine Culture - Urine, Straight Cath [[id number]] Collected: 02/08/23 1716    Specimen: Urine from Straight Cath Updated: 02/08/23 1748    POC Occult Blood Stool [[id number]]  (Abnormal) Collected: 02/08/23 1740    Specimen: Stool from Per Rectum Updated: 02/08/23 1741     Fecal Occult Blood Positive     Lot Number [id number]     Expiration Date 03/31/2023     DEVELOPER LOT NUMBER [id number]     DEVELOPER EXPIRATION DATE 03/31/2023     Positive Control Positive     Negative Control Negative    Procalcitonin [[id number]]  (Normal) Collected: 02/08/23 1459    Specimen: Blood Updated: 02/08/23 1624     Procalcitonin 0.19 ng/mL     Narrative:      As a Marker for Sepsis (Non-Neonates):    1. <0.5 ng/mL represents a low risk of severe sepsis and/or septic shock.  2. >2 ng/mL represents a high risk of severe sepsis and/or septic shock.    As a Marker for Lower Respiratory Tract Infections that require antibiotic therapy:    PCT on Admission    Antibiotic Therapy       6-12 Hrs later    >0.5                Strongly Recommended  >0.25 - <0.5        Recommended  "  0.1 - 0.25          Discouraged              Remeasure/reassess PCT  <0.1                Strongly Discouraged     Remeasure/reassess PCT    As 28 day mortality risk marker: \"Change in Procalcitonin Result\" (>80% or <=80%) if Day 0 (or Day 1) and Day 4 values are available. Refer to http://[email or web address]-pct-calculator.[email or web address]    Change in PCT <=80%  A decrease of PCT levels below or equal to 80% defines a positive change in PCT test result representing a higher risk for 28-day all-cause mortality of patients diagnosed with severe sepsis for septic shock.    Change in PCT >80%  A decrease of PCT levels of more than 80% defines a negative change in PCT result representing a lower risk for 28-day all-cause mortality of patients diagnosed with severe sepsis or septic shock.       Comprehensive Metabolic Panel [[id number]]  (Abnormal) Collected: 02/08/23 1459    Specimen: Blood Updated: 02/08/23 1620     Glucose 136 mg/dL      BUN 18 mg/dL      Creatinine 0.74 mg/dL      Sodium 134 mmol/L      Potassium 4.1 mmol/L      Comment: Slight hemolysis detected by analyzer. Results may be affected.        Chloride 100 mmol/L      CO2 25.0 mmol/L      Calcium 8.3 mg/dL      Total Protein 6.4 g/dL      Albumin 3.2 g/dL      ALT (SGPT) 20 U/L      AST (SGOT) 32 U/L      Alkaline Phosphatase 89 U/L      Total Bilirubin 0.4 mg/dL      Globulin 3.2 gm/dL      A/G Ratio 1.0 g/dL      BUN/Creatinine Ratio 24.3     Anion Gap 9.0 mmol/L      eGFR 88.2 mL/min/1.73     Narrative:      GFR Normal >60  Chronic Kidney Disease <60  Kidney Failure <15    The GFR formula is only valid for adults with stable renal function between ages 18 and 70.    CK [[id number]]  (Normal) Collected: 02/08/23 1459    Specimen: Blood Updated: 02/08/23 1619     Creatine Kinase 28 U/L     Lactic Acid, Plasma [[id number]]  (Normal) Collected: 02/08/23 1459    Specimen: Blood Updated: 02/08/23 1617     Lactate 1.2 mmol/L     BNP [[id number]]  (Normal) Collected: 02/08/23 1459 "    Specimen: Blood Updated: 02/08/23 1617     proBNP 458.9 pg/mL     Narrative:      Among patients with dyspnea, NT-proBNP is highly sensitive for the detection of acute congestive heart failure. In addition NT-proBNP of <300 pg/ml effectively rules out acute congestive heart failure with 99% negative predictive value.    Results may be falsely decreased if patient taking Biotin.      Troponin [[id number]]  (Abnormal) Collected: 02/08/23 1459    Specimen: Blood Updated: 02/08/23 1617     HS Troponin T 36 ng/L     Narrative:      High Sensitive Troponin T Reference Range:  <10.0 ng/L- Negative Female for AMI  <15.0 ng/L- Negative Male for AMI  >=10 - Abnormal Female indicating possible myocardial injury.  >=15 - Abnormal Male indicating possible myocardial injury.   Clinicians would have to utilize clinical acumen, EKG, Troponin, and serial changes to determine if it is an Acute Myocardial Infarction or myocardial injury due to an underlying chronic condition.         Lipase [[id number]]  (Normal) Collected: 02/08/23 1459    Specimen: Blood Updated: 02/08/23 1615     Lipase 34 U/L     Magnesium [[id number]]  (Normal) Collected: 02/08/23 1459    Specimen: Blood Updated: 02/08/23 1614     Magnesium 2.2 mg/dL     Protime-INR [[id number]]  (Abnormal) Collected: 02/08/23 1459    Specimen: Blood Updated: 02/08/23 1611     Protime 15.2 Seconds      INR 1.18    aPTT [[id number]]  (Normal) Collected: 02/08/23 1459    Specimen: Blood Updated: 02/08/23 1611     PTT 34.7 seconds     CBC & Differential [[id number]]  (Abnormal) Collected: 02/08/23 1459    Specimen: Blood Updated: 02/08/23 1603    Narrative:      The following orders were created for panel order CBC & Differential.  Procedure                               Abnormality         Status                     ---------                               -----------         ------                     CBC Auto Differential[[id number]]        Abnormal            Final result                  Please view results for these tests on the individual orders.    CBC Auto Differential [[id number]]  (Abnormal) Collected: 02/08/23 1459    Specimen: Blood Updated: 02/08/23 1603     WBC 9.94 10*3/mm3      RBC 4.01 10*6/mm3      Hemoglobin 11.6 g/dL      Hematocrit 37.4 %      MCV 93.3 fL      MCH 28.9 pg      MCHC 31.0 g/dL      RDW 14.4 %      RDW-SD 49.2 fl      MPV 9.1 fL      Platelets 608 10*3/mm3      Neutrophil % 73.7 %      Lymphocyte % 15.7 %      Monocyte % 6.9 %      Eosinophil % 1.7 %      Basophil % 0.6 %      Immature Grans % 1.4 %      Neutrophils, Absolute 7.32 10*3/mm3      Lymphocytes, Absolute 1.56 10*3/mm3      Monocytes, Absolute 0.69 10*3/mm3      Eosinophils, Absolute 0.17 10*3/mm3      Basophils, Absolute 0.06 10*3/mm3      Immature Grans, Absolute 0.14 10*3/mm3      nRBC 0.0 /100 WBC     Light Blue Top [[id number]] Collected: 02/08/23 1459    Specimen: Blood Updated: 02/08/23 1600     Extra Tube Hold for add-ons.     Comment: Auto resulted       Green Top (Gel) [[id number]] Collected: 02/08/23 1459    Specimen: Blood Updated: 02/08/23 1600     Extra Tube Hold for add-ons.     Comment: Auto resulted.       Lavender Top [[id number]] Collected: 02/08/23 1459    Specimen: Blood Updated: 02/08/23 1600     Extra Tube hold for add-on     Comment: Auto resulted           Imaging Results (Last 24 Hours)     Procedure Component Value Units Date/Time    CT Abdomen Pelvis Without Contrast [[id number]] Collected: 02/08/23 1820     Updated: 02/08/23 1831    Narrative:      CT ABDOMEN PELVIS WO CONTRAST- 2/8/2023 6:08 PM CST     HISTORY: new onset bloody stools, recent diarrhea      COMPARISON: None      DOSE LENGTH PRODUCT: 337 mGy cm. Automated exposure control was also  utilized to decrease patient radiation dose.     TECHNIQUE: Noncontrast enhanced images of the abdomen and pelvis  obtained without oral contrast. Multiplanar reformatted images were  provided for review.      FINDINGS:   LOWER CHEST:  Large hiatal hernia. Atelectasis present in the left lower  lobe and there is also a trace layering left pleural effusion. Coronary  atheromatous calcification.      LIVER: No focal liver lesion within limits of a noncontrast study.      BILIARY SYSTEM: Cholelithiasis. Gallbladder is nondistended. Biliary  tree is nondilated.      PANCREAS: No focal pancreatic lesion within limits of a noncontrast  study.      SPLEEN: Unremarkable.      KIDNEYS: Iodinated contrast identified in the upper urinary tracts. No  hydronephrosis. Ureters are nondilated.      ADRENALS: Unremarkable.     RETROPERITONEUM: No mass, lymphadenopathy or hemorrhage.      GI TRACT: Large hiatal hernia. The stomach is nondistended. Small bowel  loops are nondilated. Colonic diverticulosis. The colon is nondistended.  No inflammatory changes are identified along the colon. No bowel wall  thickening identified. Surrounding fat is clean.     OTHER: There is no mesenteric mass, lymphadenopathy or fluid collection.  No intraperitoneal free fluid or free air. IVC filter is in place.  Infrarenal AAA measuring up to 3.6 cm in diameter. Right hip  arthroplasty. No acute bony abnormality is seen. There appears to be a  high-grade spinal stenosis at the L1-L2 disc level with complete loss of  disc height at this level. There is an old fracture identified across  the inferior endplate of T9.     PELVIS: Bladder wall trabeculation. Air identified in the urinary  bladder. No pelvic adenopathy identified.       Impression:      1. Colonic diverticulosis. Bowel is nondistended. No inflammatory  changes are seen along the bowel.  2. Stomach and small bowel are decompressed. Hiatal hernia is present.  3. Cholelithiasis.  4. Bladder wall trabeculation which may be secondary to chronic partial  outlet obstruction.  5. Infrarenal AAA measuring up to 3.6 cm in diameter.  This report was finalized on 02/08/2023 18:28 by Dr Geremias Martinez, .    CT Angiogram Chest  [[id number]] Collected: 02/08/23 1659     Updated: 02/08/23 1704    Narrative:      EXAMINATION: CT ANGIOGRAM CHEST-      2/8/2023 4:43 PM CST     HISTORY: Shortness of breath, chest pain, weakness, recent surgery and  immobilization since.     In order to have a CT radiation dose as low as reasonably achievable  Automated Exposure Control was utilized for adjustment of the mA and/or  KV according to patient size.     DLP in mGycm= 194.     CT angiogram chest with IV contrast.  CT angiography protocol.   CT imaging with bolus IV contrast injection.   Under concurrent supervision axial, sagittal, coronal,  three-dimensional, and MIP data sets were constructed on an independent  work station.     Normal heart size.  Intrathoracic stomach with left lung base consolidation compatible with  pneumonia.     Small left and trace right pleural effusion.     Symmetric and normally opacified pulmonary arteries.  No pulmonary embolism.     Upper lobes are clear.  No pneumothorax.     Small gallstones incidentally noted.     Summary:  1. Left lower lobe pneumonia.  2. No pulmonary embolism.                                   This report was finalized on 02/08/2023 17:01 by Dr. Fawad Smith MD.    XR Chest 1 View [[id number]] Collected: 02/08/23 1634     Updated: 02/08/23 1639    Narrative:      XR CHEST 1 VW- 2/8/2023 4:25 PM CST     HISTORY: chest pain, weakness     COMPARISON: Chest exam dated 10/01/2019.     FINDINGS:      No lung consolidation. No pleural effusion or pneumothorax. Heart size  is normal. Pulmonary vasculature are nondilated. Hiatal hernia  superimposed over the cardiac silhouette. The osseous structures and  surrounding soft tissues demonstrate no acute abnormality.       Impression:      1. Stable chest exam without acute process.  2. Hiatal hernia.        This report was finalized on 02/08/2023 16:36 by Dr Geremias Martinez, .        I have personally reviewed and interpreted the radiology studies and ECG  obtained at time of admission.     Assessment / Plan   Assessment and plan:   87-year-old male with history of hypertension, AAA, GERD, recent hip surgery with repair, and CHF with diastolic dysfunction grade 1 preserved EF as of echo 2019. Patient presents with complaints of worsening generalized weakness, shortness of breath and an episode of bloody bowel movement. He also complains of dysphagia. CT scan shows bilateral pleural effusions, possibly parapneumonic. He also has diverticulosis but no diverticulitis identified. He is admitted for further medical eval and management.        Problem list:  Active Hospital Problems    Diagnosis    • **Pneumonia of left lower lobe due to infectious organism    Pleural effusion  Diverticulosis  Lower GI bleed  Dysphagia      Treatment Plan  The patient will be admitted to my service here at [facility].  - Admit to inpatient  - Oxygen supplementation as needed  - Breathing treatments with DuoNebs  - IV diuresis Lasix 40 mg twice a day  - Antibiotic coverage with Zosyn for HCAP  - Stool softener and pain control  - Echocardiogram in the a.m.  - Barium enema  - GI consult for GI bleed and dysphagia.      Anticipated length of stay greater than 2 nights  Time spent on admission greater than 30 minutes.  DVT and GI prophylaxis covered.      Medical Decision Making  As discussed above      Conditions and Status  Patient is clinically stable       MDM Data  External documents reviewed: As discussed above  Cardiac tracing (EKG, telemetry) interpretation: As discussed above  Radiology interpretation: As discussed above  Labs reviewed: As discussed above         Care Planning  Patient is a full code    Disposition  Patient can discharge home after resolution of acute condition and returned to baseline.    Patient possibly need to go back to rehab after resolution of his acute condition and return to baseline.        Electronically signed by Fallon Norman MD, 02/08/23,  21:52 CST.

## 2023-02-09 NOTE — CASE MANAGEMENT/SOCIAL WORK
Continued Stay Note   [place]     Patient Name: Jose Alfredo Kirkpatrick  MRN: [id number]  Today's Date: 2/9/2023    Admit Date: 2/8/2023    Plan: [facility]   Discharge Plan     Row Name 02/09/23 1030       Plan    Plan [facility]    Patient/Family in Agreement with Plan yes    Plan Comments Pt resides at [facility].  Pt does not have a bed hold but can return if a male bed is open at [place].  Pt will require precert. to return.  SW will follow.               Discharge Codes    No documentation.               Expected Discharge Date and Time     Expected Discharge Date Expected Discharge Time    Feb 11, 2023             RICA Viera

## 2023-02-09 NOTE — OP NOTE
ESOPHAGOGASTRODUODENOSCOPY    Date:  2/9/2023    Indications: Dysphagia and odynophagia.  Melena.       Procedure: ESOPHAGOGASTRODUODENOSCOPY     Sedation: As per anesthesia.    Surgeon: Sushant Edmond MD    Procedure  Description: After informed consent was obtained, a timeout was called in the endoscopy suite to confirm the correct patient and appropriate procedure.  Thereafter with the patient in the left lateral position, esophagus was intubated under direct vision with adult Olympus gastroscope.  Thereafter scope was slowly advanced into the second portion of the duodenum under direct vision.  Careful examination of the duodenum, stomach and esophagus was performed while slowly withdrawing the scope.  Retroflex examination of the gastric cardia and fundus were also performed.    Findings: Hypopharynx and larynx grossly appeared normal.  Because of the proximal esophagus appeared normal.  Severe, grade IV, erosive esophagitis of mid and distal esophagus, manifested by numerous linear and irregular shaped fibrin covering ulcerations and erosions, edema and erythema, but without signs of any active bleeding.  Very large hiatal hernia, with essentially almost entire stomach patient currently chest, with configuration suggestive of, at least, partial volvulus of the stomach.  No ulcerations, but mild diffuse edema and erythema of gastric mucosa.  Gastric biopsy obtained with cold biopsy forceps to rule out H. pylori.  Normal-appearing duodenum.    Complications: No immediate complications.    Recommendations: Protonix 40 mg twice daily. Carafate suspension 10 cc before every meal and at bedtime.  Full liquid diet.  Evaluation by surgery for for possible repair of hiatal hernia/gastropexy.    Procedure CPT code: 05894    Sushant Edmond MD

## 2023-02-10 ENCOUNTER — APPOINTMENT (OUTPATIENT)
Dept: GENERAL RADIOLOGY | Facility: HOSPITAL | Age: 88
DRG: 177 | End: 2023-02-10
Payer: MEDICARE

## 2023-02-10 PROBLEM — K44.9 PARAESOPHAGEAL HIATAL HERNIA: Status: ACTIVE | Noted: 2023-02-10

## 2023-02-10 LAB
ANION GAP SERPL CALCULATED.3IONS-SCNC: 12 MMOL/L (ref 5–15)
BACTERIA BLD CULT: ABNORMAL
BACTERIA SPEC AEROBE CULT: ABNORMAL
BASOPHILS # BLD AUTO: 0.07 10*3/MM3 (ref 0–0.2)
BASOPHILS NFR BLD AUTO: 0.9 % (ref 0–1.5)
BOTTLE TYPE: ABNORMAL
BUN SERPL-MCNC: 16 MG/DL (ref 8–23)
BUN/CREAT SERPL: 16.5 (ref 7–25)
CALCIUM SPEC-SCNC: 8.6 MG/DL (ref 8.6–10.5)
CHLORIDE SERPL-SCNC: 101 MMOL/L (ref 98–107)
CO2 SERPL-SCNC: 26 MMOL/L (ref 22–29)
CREAT SERPL-MCNC: 0.97 MG/DL (ref 0.76–1.27)
CYTO UR: NORMAL
DEPRECATED RDW RBC AUTO: 49.9 FL (ref 37–54)
EGFRCR SERPLBLD CKD-EPI 2021: 76 ML/MIN/1.73
EOSINOPHIL # BLD AUTO: 0.24 10*3/MM3 (ref 0–0.4)
EOSINOPHIL NFR BLD AUTO: 3.1 % (ref 0.3–6.2)
ERYTHROCYTE [DISTWIDTH] IN BLOOD BY AUTOMATED COUNT: 14.6 % (ref 12.3–15.4)
GLUCOSE SERPL-MCNC: 112 MG/DL (ref 65–99)
HCT VFR BLD AUTO: 34.2 % (ref 37.5–51)
HCT VFR BLD AUTO: 36.5 % (ref 37.5–51)
HCT VFR BLD AUTO: 42.1 % (ref 37.5–51)
HGB BLD-MCNC: 11 G/DL (ref 13–17.7)
HGB BLD-MCNC: 11.4 G/DL (ref 13–17.7)
HGB BLD-MCNC: 13.1 G/DL (ref 13–17.7)
IMM GRANULOCYTES # BLD AUTO: 0.13 10*3/MM3 (ref 0–0.05)
IMM GRANULOCYTES NFR BLD AUTO: 1.7 % (ref 0–0.5)
L PNEUMO1 AG UR QL IA: NEGATIVE
LAB AP CASE REPORT: NORMAL
LYMPHOCYTES # BLD AUTO: 1.37 10*3/MM3 (ref 0.7–3.1)
LYMPHOCYTES NFR BLD AUTO: 17.6 % (ref 19.6–45.3)
Lab: NORMAL
MCH RBC QN AUTO: 29.2 PG (ref 26.6–33)
MCHC RBC AUTO-ENTMCNC: 31.2 G/DL (ref 31.5–35.7)
MCV RBC AUTO: 93.4 FL (ref 79–97)
MONOCYTES # BLD AUTO: 0.76 10*3/MM3 (ref 0.1–0.9)
MONOCYTES NFR BLD AUTO: 9.7 % (ref 5–12)
MRSA DNA SPEC QL NAA+PROBE: NORMAL
NEUTROPHILS NFR BLD AUTO: 5.23 10*3/MM3 (ref 1.7–7)
NEUTROPHILS NFR BLD AUTO: 67 % (ref 42.7–76)
NRBC BLD AUTO-RTO: 0 /100 WBC (ref 0–0.2)
PATH REPORT.FINAL DX SPEC: NORMAL
PATH REPORT.GROSS SPEC: NORMAL
PLATELET # BLD AUTO: 667 10*3/MM3 (ref 140–450)
PMV BLD AUTO: 8.8 FL (ref 6–12)
POTASSIUM SERPL-SCNC: 3.9 MMOL/L (ref 3.5–5.2)
RBC # BLD AUTO: 3.91 10*6/MM3 (ref 4.14–5.8)
S PNEUM AG SPEC QL LA: NEGATIVE
SODIUM SERPL-SCNC: 139 MMOL/L (ref 136–145)
WBC NRBC COR # BLD: 7.8 10*3/MM3 (ref 3.4–10.8)

## 2023-02-10 PROCEDURE — 85014 HEMATOCRIT: CPT | Performed by: CLINICAL NURSE SPECIALIST

## 2023-02-10 PROCEDURE — 74240 X-RAY XM UPR GI TRC 1CNTRST: CPT

## 2023-02-10 PROCEDURE — 87641 MR-STAPH DNA AMP PROBE: CPT | Performed by: NURSE PRACTITIONER

## 2023-02-10 PROCEDURE — 74018 RADEX ABDOMEN 1 VIEW: CPT

## 2023-02-10 PROCEDURE — 93010 ELECTROCARDIOGRAM REPORT: CPT | Performed by: INTERNAL MEDICINE

## 2023-02-10 PROCEDURE — 94799 UNLISTED PULMONARY SVC/PX: CPT

## 2023-02-10 PROCEDURE — 87449 NOS EACH ORGANISM AG IA: CPT | Performed by: NURSE PRACTITIONER

## 2023-02-10 PROCEDURE — 25010000002 PIPERACILLIN SOD-TAZOBACTAM PER 1 G: Performed by: INTERNAL MEDICINE

## 2023-02-10 PROCEDURE — 0 DIATRIZOATE MEGLUMINE & SODIUM PER 1 ML: Performed by: FAMILY MEDICINE

## 2023-02-10 PROCEDURE — 80048 BASIC METABOLIC PNL TOTAL CA: CPT | Performed by: INTERNAL MEDICINE

## 2023-02-10 PROCEDURE — 87899 AGENT NOS ASSAY W/OPTIC: CPT | Performed by: NURSE PRACTITIONER

## 2023-02-10 PROCEDURE — 97535 SELF CARE MNGMENT TRAINING: CPT

## 2023-02-10 PROCEDURE — 93005 ELECTROCARDIOGRAM TRACING: CPT | Performed by: NURSE PRACTITIONER

## 2023-02-10 PROCEDURE — 85025 COMPLETE CBC W/AUTO DIFF WBC: CPT | Performed by: INTERNAL MEDICINE

## 2023-02-10 PROCEDURE — 25010000002 MORPHINE PER 10 MG: Performed by: INTERNAL MEDICINE

## 2023-02-10 PROCEDURE — 85018 HEMOGLOBIN: CPT | Performed by: CLINICAL NURSE SPECIALIST

## 2023-02-10 PROCEDURE — 99222 1ST HOSP IP/OBS MODERATE 55: CPT | Performed by: SPECIALIST

## 2023-02-10 PROCEDURE — 94761 N-INVAS EAR/PLS OXIMETRY MLT: CPT

## 2023-02-10 PROCEDURE — 97530 THERAPEUTIC ACTIVITIES: CPT | Performed by: PHYSICAL THERAPIST

## 2023-02-10 PROCEDURE — 99231 SBSQ HOSP IP/OBS SF/LOW 25: CPT | Performed by: INTERNAL MEDICINE

## 2023-02-10 PROCEDURE — 97162 PT EVAL MOD COMPLEX 30 MIN: CPT | Performed by: PHYSICAL THERAPIST

## 2023-02-10 PROCEDURE — 97165 OT EVAL LOW COMPLEX 30 MIN: CPT

## 2023-02-10 PROCEDURE — 25010000002 FUROSEMIDE PER 20 MG: Performed by: INTERNAL MEDICINE

## 2023-02-10 RX ORDER — LISINOPRIL 10 MG/1
10 TABLET ORAL
Status: DISCONTINUED | OUTPATIENT
Start: 2023-02-10 | End: 2023-02-14 | Stop reason: HOSPADM

## 2023-02-10 RX ORDER — GABAPENTIN 300 MG/1
300 CAPSULE ORAL 4 TIMES DAILY
Status: DISCONTINUED | OUTPATIENT
Start: 2023-02-10 | End: 2023-02-14 | Stop reason: HOSPADM

## 2023-02-10 RX ADMIN — SUCRALFATE 1 G: 1 SUSPENSION ORAL at 12:41

## 2023-02-10 RX ADMIN — NIFEDIPINE 60 MG: 60 TABLET, FILM COATED, EXTENDED RELEASE ORAL at 09:54

## 2023-02-10 RX ADMIN — SUCRALFATE 1 G: 1 SUSPENSION ORAL at 21:49

## 2023-02-10 RX ADMIN — MORPHINE SULFATE 2 MG: 2 INJECTION, SOLUTION INTRAMUSCULAR; INTRAVENOUS at 22:34

## 2023-02-10 RX ADMIN — DIATRIZOATE MEGLUMINE AND DIATRIZOATE SODIUM 240 ML: 660; 100 LIQUID ORAL; RECTAL at 15:16

## 2023-02-10 RX ADMIN — BUSPIRONE HYDROCHLORIDE 5 MG: 5 TABLET ORAL at 21:48

## 2023-02-10 RX ADMIN — FUROSEMIDE 40 MG: 10 INJECTION, SOLUTION INTRAMUSCULAR; INTRAVENOUS at 09:54

## 2023-02-10 RX ADMIN — MORPHINE SULFATE 2 MG: 2 INJECTION, SOLUTION INTRAMUSCULAR; INTRAVENOUS at 10:01

## 2023-02-10 RX ADMIN — FUROSEMIDE 40 MG: 10 INJECTION, SOLUTION INTRAMUSCULAR; INTRAVENOUS at 21:48

## 2023-02-10 RX ADMIN — IPRATROPIUM BROMIDE AND ALBUTEROL SULFATE 3 ML: .5; 2.5 SOLUTION RESPIRATORY (INHALATION) at 18:32

## 2023-02-10 RX ADMIN — TAZOBACTAM SODIUM AND PIPERACILLIN SODIUM 3.38 G: 375; 3 INJECTION, SOLUTION INTRAVENOUS at 05:17

## 2023-02-10 RX ADMIN — GABAPENTIN 300 MG: 300 CAPSULE ORAL at 12:40

## 2023-02-10 RX ADMIN — MORPHINE SULFATE 2 MG: 2 INJECTION, SOLUTION INTRAMUSCULAR; INTRAVENOUS at 18:23

## 2023-02-10 RX ADMIN — SUCRALFATE 1 G: 1 SUSPENSION ORAL at 09:54

## 2023-02-10 RX ADMIN — IPRATROPIUM BROMIDE AND ALBUTEROL SULFATE 3 ML: .5; 2.5 SOLUTION RESPIRATORY (INHALATION) at 14:07

## 2023-02-10 RX ADMIN — GABAPENTIN 300 MG: 300 CAPSULE ORAL at 21:48

## 2023-02-10 RX ADMIN — TAZOBACTAM SODIUM AND PIPERACILLIN SODIUM 3.38 G: 375; 3 INJECTION, SOLUTION INTRAVENOUS at 21:48

## 2023-02-10 RX ADMIN — TAZOBACTAM SODIUM AND PIPERACILLIN SODIUM 3.38 G: 375; 3 INJECTION, SOLUTION INTRAVENOUS at 12:41

## 2023-02-10 RX ADMIN — IPRATROPIUM BROMIDE AND ALBUTEROL SULFATE 3 ML: .5; 2.5 SOLUTION RESPIRATORY (INHALATION) at 06:52

## 2023-02-10 RX ADMIN — CYCLOBENZAPRINE HYDROCHLORIDE 5 MG: 5 TABLET, FILM COATED ORAL at 12:40

## 2023-02-10 RX ADMIN — PANTOPRAZOLE SODIUM 40 MG: 40 INJECTION, POWDER, FOR SOLUTION INTRAVENOUS at 21:48

## 2023-02-10 RX ADMIN — PANTOPRAZOLE SODIUM 40 MG: 40 INJECTION, POWDER, FOR SOLUTION INTRAVENOUS at 09:54

## 2023-02-10 RX ADMIN — SUCRALFATE 1 G: 1 SUSPENSION ORAL at 18:23

## 2023-02-10 RX ADMIN — LISINOPRIL 10 MG: 10 TABLET ORAL at 12:40

## 2023-02-10 RX ADMIN — GABAPENTIN 300 MG: 300 CAPSULE ORAL at 19:26

## 2023-02-10 RX ADMIN — BUSPIRONE HYDROCHLORIDE 5 MG: 5 TABLET ORAL at 09:54

## 2023-02-10 NOTE — THERAPY EVALUATION
Acute Care - Occupational Therapy Initial Evaluation  [facility]     Patient Name: Jose Alfredo Kirkpatrick  : 1935  MRN: [id number]  Today's Date: 2/10/2023  Onset of Illness/Injury or Date of Surgery: 23  Date of Referral to OT: 02/10/23  Referring Physician: Dr. Austin    Admit Date: 2023       ICD-10-CM ICD-9-CM   1. Pneumonia of left lower lobe due to infectious organism  J18.9 486   2. Elevated troponin  R77.8 790.6   3. Diverticulosis  K57.90 562.10   4. Infrarenal abdominal aortic aneurysm (AAA) without rupture  I71.43 441.4   5. Urinary tract infection without hematuria, site unspecified  N39.0 599.0   6. Hiatal hernia  K44.9 553.3   7. Biliary calculus of other site without obstruction  K80.80 574.20   8. Bloody stools  K92.1 578.1   9. Dysphagia  R13.10 787.20   10. Decreased activities of daily living (ADL)  Z78.9 V49.89     Patient Active Problem List   Diagnosis   • Spinal stenosis, lumbar   • Hx of colonic polyps   • Drug-induced constipation   • Primary hypertension   • Acute pulmonary embolism with acute cor pulmonale (HCC)   • Acute bilateral deep vein thrombosis (DVT) of femoral veins (HCC)   • History of DVT of left lower extremity   • Positive fecal occult blood test   • Melena   • Vitamin B12 deficiency   • FARZANA (iron deficiency anemia)   • Cholelithiasis   • Gastric ulcer with hemorrhage   • Chronic bilateral low back pain without sciatica   • Chronic pain of both shoulders   • Kyphosis   • Overweight (BMI 25.0-29.9)   • Non-smoker   • Pneumonia of left lower lobe due to infectious organism   • Erosive esophagitis   • Large hiatal hernia   • Weight loss   • Gram-negative UTI, acute cystitis without hematuria, present on admission   • Paraesophageal hiatal hernia     Past Medical History:   Diagnosis Date   • Arthritis    • Cataract     BILATERAL   • GERD (gastroesophageal reflux disease)    • History of transfusion    • Hx of colonic polyps    • Hypertension    • Reflux esophagitis     • Streptococcosis     IN SPINE FROM BACK INJECTIONS     Past Surgical History:   Procedure Laterality Date   • BACK SURGERY      cervical   • CATARACT EXTRACTION Bilateral    • COLONOSCOPY  02/11/2013    Hyperplastic polyp at 25 cm, Diverticulosis repeat exam in 5 years   • COLONOSCOPY N/A 10/16/2018    Tubular adenoma ascending colon repet prn   • ENDOSCOPY  01/15/2014    HH   • ENDOSCOPY N/A 10/03/2019    Large HH, non-bleeding gastric ulcer   • ENDOSCOPY N/A 11/25/2019    Procedure: ESOPHAGOGASTRODUODENOSCOPY WITH ANESTHESIA;  Surgeon: Marques Pérez MD;  Location: [facility] ENDOSCOPY;  Service: Gastroenterology   • ENDOSCOPY N/A 2/9/2023    Procedure: ESOPHAGOGASTRODUODENOSCOPY WITH ANESTHESIA;  Surgeon: Sushant Edmond MD;  Location: [facility] ENDOSCOPY;  Service: Gastroenterology;  Laterality: N/A;  pre GI bleed  post hiatal hernia; esophagitis  Otilio Ramos MD   • HERNIA REPAIR Right     INGUINAL    • HIP FRACTURE SURGERY Right 02/04/2023   • REPLACEMENT TOTAL KNEE BILATERAL     • THORACIC LAMINECTOMY DECOMPRESSIVE POSTERIOR N/A 12/14/2016    Procedure: LAMINECTOMY DECOMPRESSION, UNINSTRUMENTED POSTERIOR SPINAL FUSION, T 11-12;  Surgeon: LUZ Adan MD;  Location: [facility] OR;  Service:    • VENA CAVA FILTER INSERTION N/A 10/04/2019    Procedure: VENA CAVA FILTER INSERTION;  Surgeon: Dallin Coronado MD;  Location: [facility] HYBRID OR 12;  Service: Vascular         OT ASSESSMENT FLOWSHEET (last 12 hours)     OT Evaluation and Treatment     Row Name 02/10/23 1030                   OT Time and Intention    Subjective Information complains of;dyspnea  restless leg syndrome  -AC (r) EC (t) AC (c)        Document Type evaluation  -AC (r) EC (t) AC (c)        Mode of Treatment occupational therapy  -AC (r) EC (t) AC (c)           General Information    Patient Profile Reviewed yes  -AC (r) EC (t) AC (c)        Onset of Illness/Injury or Date of Surgery 02/08/23  -AC (r) EC (t) AC (c)        Referring  Physician Dr. Austin  -AC (r) EC (t) AC (c)        Prior Level of Function independent:;all household mobility;grooming;dressing;bathing;dependent:;home management;cooking;cleaning  -AC (r) EC (t) AC (c)        Equipment Currently Used at Home walker, rolling;walker, standard;shower chair;grab bar  -AC (r) EC (t) AC (c)        Pertinent History of Current Functional Problem SOA, weakness, constipation, abnormal cardiac labs Dx: diverticulosis, B pleural effusion, hiatal hernia PMH: recent R hip replacement 1/31/23, arthritis, s/p B knee replacement, HTN  -AC (r) EC (t) AC (c)        Existing Precautions/Restrictions fall;right;hip, posterior  -AC (r) EC (t) AC (c)        Barriers to Rehab medically complex;physical barrier  -AC (r) EC (t) AC (c)           Living Environment    Current Living Arrangements home  -AC (r) EC (t) AC (c)        Home Accessibility stairs to enter home;stairs within home  -AC (r) EC (t) AC (c)        People in Home alone  has a caregiver 7 days/wk to provide assistance w/ home management and other minimal needs  -AC (r) EC (t) AC (c)           Home Main Entrance    Number of Stairs, Main Entrance two  -AC (r) EC (t) AC (c)        Stair Railings, Main Entrance railing on right side (ascending)  -AC (r) EC (t) AC (c)           Stairs Within Home, Primary    Stairs, Within Home, Primary --  3 story home but pt stays on one level  -AC (r) EC (t) AC (c)           Pain Assessment    Pretreatment Pain Rating 0/10 - no pain  -AC (r) EC (t) AC (c)        Posttreatment Pain Rating 10/10  -AC (r) EC (t) AC (c)        Pain Location - chest  and R hip  -AC (r) EC (t) AC (c)        Pain Intervention(s) Nursing Notified;Repositioned;Rest  -AC (r) EC (t) AC (c)           Cognition    Orientation Status (Cognition) oriented x 4  -AC (r) EC (t) AC (c)           Range of Motion Comprehensive    Comment, General Range of Motion B shoulder, elbow & wrist WFL  performed in supine  -AC (r) EC (t) AC (c)            Strength Comprehensive (MMT)    Comment, General Manual Muscle Testing (MMT) Assessment B shoulder 4-/5, B biceps, triceps, &  4/5  -AC (r) EC (t) AC (c)           Sensory    Additional Documentation Sensory Assessment (Somatosensory) (Group)  -AC (r) EC (t) AC (c)           Sensory Assessment (Somatosensory)    Sensory Assessment (Somatosensory) UE sensation intact  -AC (r) EC (t) AC (c)           Activities of Daily Living    BADL Assessment/Intervention toileting  -AC (r) EC (t) AC (c)           Toileting Assessment/Training    [place] Level (Toileting) change pad/brief;perform perineal hygiene;dependent (less than 25% patient effort)  -AC (r) EC (t) AC (c)        Position (Toileting) supine  -AC (r) EC (t) AC (c)           BADL Safety/Performance    Impairments, BADL Safety/Performance endurance/activity tolerance;pain;shortness of breath;strength  -AC (r) EC (t) AC (c)           Bed Mobility    Bed Mobility rolling left;rolling right;scooting/bridging  -AC (r) EC (t) AC (c)        Rolling Left [place] (Bed Mobility) moderate assist (50% patient effort);verbal cues  -AC (r) EC (t) AC (c)        Rolling Right [place] (Bed Mobility) moderate assist (50% patient effort);verbal cues  -AC (r) EC (t) AC (c)        Scooting/Bridging [place] (Bed Mobility) maximum assist (25% patient effort);2 person assist  -AC (r) EC (t) AC (c)        Bed Mobility, Safety Issues decreased use of legs for bridging/pushing  -AC (r) EC (t) AC (c)        Assistive Device (Bed Mobility) bed rails;draw sheet;head of bed elevated  -AC (r) EC (t) AC (c)           Functional Mobility    Functional Mobility- Comment not attempted due to pain and fatigue  -AC (r) EC (t) AC (c)           Safety Issues, Functional Mobility    Safety Issues Affecting Function (Mobility) friction/shear risk  -AC (r) EC (t) AC (c)        Impairments Affecting Function (Mobility) endurance/activity tolerance;pain;shortness of breath;strength   -AC (r) EC (t) AC (c)           Plan of Care Review    Plan of Care Reviewed With patient  -AC (r) EC (t) AC (c)        Progress no change  -AC (r) EC (t) AC (c)        Outcome Evaluation OT eval complete. Pt A&Ox4 w/ caregiver attentive at bedside. Prior to hip sx, pt had a caregiver 7 days/wk to assist w/ home management and other IADLs, reports being independent w/ ADLs. Pt and caregiver report a functional decline since hip sx. Pt was incontinent, had 2 BM, requiring a new external catheter. Pt voided twice during clean-up. Pt dependent for donning brief and completing perineal hygiene. ModA for rolling L & R, maxA x2 for scooting. Due to fatigue and 10/10 chest pain, pt did not perform any further mobility. Pt would benefit from skilled OT to increase activity tolerance, independence w/ ADLs and safety w/ fxnal mobility. Recommend SNF at d/c.  -AC (r) EC (t) AC (c)           Positioning and Restraints    Pre-Treatment Position in bed  -AC (r) EC (t) AC (c)        Post Treatment Position bed  -AC (r) EC (t) AC (c)        In Bed fowlers;notified nsg;call light within reach;encouraged to call for assist;with family/caregiver  -AC (r) EC (t) AC (c)           Therapy Assessment/Plan (OT)    Date of Referral to OT 02/10/23  -AC (r) EC (t) AC (c)        OT Diagnosis decreased ADLs  -AC (r) EC (t) AC (c)        Rehab Potential (OT) good, to achieve stated therapy goals  -AC (r) EC (t) AC (c)        Criteria for Skilled Therapeutic Interventions Met (OT) yes;meets criteria;skilled treatment is necessary  -AC (r) EC (t) AC (c)        Therapy Frequency (OT) 5 times/wk  -AC (r) EC (t) AC (c)        Predicted Duration of Therapy Intervention (OT) 10 days  -AC (r) EC (t) AC (c)        Planned Therapy Interventions (OT) activity tolerance training;adaptive equipment training;BADL retraining;functional balance retraining;occupation/activity based interventions;patient/caregiver education/training;strengthening  exercise;transfer/mobility retraining  -AC (r) EC (t) AC (c)           OT Goals    Bathing Goal Selection (OT) bathing, OT goal 1  -AC (r) EC (t) AC (c)        Toileting Goal Selection (OT) toileting, OT goal 1  -AC (r) EC (t) AC (c)           Transfer Goal 1 (OT)    Activity/Assistive Device (Transfer Goal 1, OT) sit-to-stand/stand-to-sit;shower chair;toilet;commode, bedside with drop arms  -AC (r) EC (t) AC (c)        [place] Level/Cues Needed (Transfer Goal 1, OT) minimum assist (75% or more patient effort)  -AC (r) EC (t) AC (c)        Time Frame (Transfer Goal 1, OT) long term goal (LTG)  -AC (r) EC (t) AC (c)        Progress/Outcome (Transfer Goal 1, OT) new goal  -AC (r) EC (t) AC (c)           Bathing Goal 1 (OT)    Activity/Device (Bathing Goal 1, OT) upper body bathing;lower body bathing;grab bar, tub/shower;shower chair;long-handled sponge  -AC (r) EC (t) AC (c)        [place] Level/Cues Needed (Bathing Goal 1, OT) minimum assist (75% or more patient effort)  -AC (r) EC (t) AC (c)        Time Frame (Bathing Goal 1, OT) long term goal (LTG)  -AC (r) EC (t) AC (c)        Progress/Outcomes (Bathing Goal 1, OT) new goal  -AC (r) EC (t) AC (c)           Toileting Goal 1 (OT)    Activity/Device (Toileting Goal 1, OT) adjust/manage clothing;perform perineal hygiene;commode;commode, bedside with drop arms;grab bar/safety frame  -AC (r) EC (t) AC (c)        [place] Level/Cues Needed (Toileting Goal 1, OT) minimum assist (75% or more patient effort)  -AC (r) EC (t) AC (c)        Time Frame (Toileting Goal 1, OT) long term goal (LTG)  -AC (r) EC (t) AC (c)        Progress/Outcome (Toileting Goal 1, OT) new goal  -AC (r) EC (t) AC (c)              User Key  (r) = Recorded By, (t) = Taken By, (c) = Cosigned By    Initials Name Effective Dates    Adam Calles, OTR/L, CNT 02/03/23 -     EC Radha Delgado, OT Student 12/12/22 -                  Occupational Therapy Education     Title: PT OT SLP  Therapies (In Progress)     Topic: Occupational Therapy (Done)     Point: ADL training (Done)     Description:   Instruct learner(s) on proper safety adaptation and remediation techniques during self care or transfers.   Instruct in proper use of assistive devices.              Learning Progress Summary           Patient Acceptance, E, VU,NR by  at 2/10/2023 1323    Comment: bed mobility techniques, pursed lip breathing, hip precautions, perineal hygiene                   Point: Precautions (Done)     Description:   Instruct learner(s) on prescribed precautions during self-care and functional transfers.              Learning Progress Summary           Patient Acceptance, E, VU,NR by  at 2/10/2023 1323    Comment: bed mobility techniques, pursed lip breathing, hip precautions, perineal hygiene                   Point: Body mechanics (Done)     Description:   Instruct learner(s) on proper positioning and spine alignment during self-care, functional mobility activities and/or exercises.              Learning Progress Summary           Patient Acceptance, E, VU,NR by  at 2/10/2023 1323    Comment: bed mobility techniques, pursed lip breathing, hip precautions, perineal hygiene                               User Key     Initials Effective Dates Name Provider Type Discipline     12/12/22 -  Radha Delgado, OT Student OT Student OT                  OT Recommendation and Plan  Planned Therapy Interventions (OT): activity tolerance training, adaptive equipment training, BADL retraining, functional balance retraining, occupation/activity based interventions, patient/caregiver education/training, strengthening exercise, transfer/mobility retraining  Therapy Frequency (OT): 5 times/wk  Plan of Care Review  Plan of Care Reviewed With: patient  Progress: no change  Outcome Evaluation: OT eval complete. Pt A&Ox4 w/ caregiver attentive at bedside. Prior to hip sx, pt had a caregiver 7 days/wk to assist w/ home management  and other IADLs, reports being independent w/ ADLs. Pt and caregiver report a functional decline since hip sx. Pt was incontinent, had 2 BM, requiring a new external catheter. Pt voided twice during clean-up. Pt dependent for donning brief and completing perineal hygiene. ModA for rolling L & R, maxA x2 for scooting. Due to fatigue and 10/10 chest pain, pt did not perform any further mobility. Pt would benefit from skilled OT to increase activity tolerance, independence w/ ADLs and safety w/ fxnal mobility. Recommend SNF at d/c.  Plan of Care Reviewed With: patient  Outcome Evaluation: OT eval complete. Pt A&Ox4 w/ caregiver attentive at bedside. Prior to hip sx, pt had a caregiver 7 days/wk to assist w/ home management and other IADLs, reports being independent w/ ADLs. Pt and caregiver report a functional decline since hip sx. Pt was incontinent, had 2 BM, requiring a new external catheter. Pt voided twice during clean-up. Pt dependent for donning brief and completing perineal hygiene. ModA for rolling L & R, maxA x2 for scooting. Due to fatigue and 10/10 chest pain, pt did not perform any further mobility. Pt would benefit from skilled OT to increase activity tolerance, independence w/ ADLs and safety w/ fxnal mobility. Recommend SNF at d/c.     Outcome Measures     Row Name 02/10/23 1300             How much help from another is currently needed...    Putting on and taking off regular lower body clothing? 2  -AC (r) EC (t) AC (c)      Bathing (including washing, rinsing, and drying) 2  -AC (r) EC (t) AC (c)      Toileting (which includes using toilet bed pan or urinal) 1  -AC (r) EC (t) AC (c)      Putting on and taking off regular upper body clothing 2  -AC (r) EC (t) AC (c)      Taking care of personal grooming (such as brushing teeth) 3  -AC (r) EC (t) AC (c)      Eating meals 3  -AC (r) EC (t) AC (c)      AM-PAC 6 Clicks Score (OT) 13  -AC (r) EC (t)         Functional Assessment    Outcome Measure Options  AM-PAC 6 Clicks Daily Activity (OT)  -AC (r) EC (t) AC (c)            User Key  (r) = Recorded By, (t) = Taken By, (c) = Cosigned By    Initials Name Provider Type    Adam Calles, OTR/L, CNT Occupational Therapist    Radha Pereira, OT Student OT Student                Time Calculation:    Time Calculation- OT     Row Name 02/10/23 1322             Time Calculation- OT    OT Start Time 1030  -AC (r) EC (t) AC (c)      OT Stop Time 1200  -AC (r) EC (t) AC (c)      OT Time Calculation (min) 90 min  -AC (r) EC (t)      Total Timed Code Minutes- OT 30 minute(s)  -AC (r) EC (t) AC (c)      OT Received On 02/10/23  -AC (r) EC (t) AC (c)      OT Goal Re-Cert Due Date 02/20/23  -AC (r) EC (t) AC (c)            User Key  (r) = Recorded By, (t) = Taken By, (c) = Cosigned By    Initials Name Provider Type    Adam Calles, OTR/L, CNT Occupational Therapist    Radha Pereira, OT Student OT Student                       Radha Delgado OT Student  2/10/2023

## 2023-02-10 NOTE — PROGRESS NOTES
Patient is feeling about the same.  Remains quite weak.  Major complaint is lower extremity cramping, most likely secondary to her restless leg syndrome.  Tolerates clears without regurgitations, nausea or vomiting.  No abdominal pain.  Remains afebrile.  Case was discussed with Dr. Vicky Funes -obviously patient appears to be high risk for surgery considering his general condition and advanced age.  Upper GI series was ordered for further evaluation of upper GI anatomy.  I believe that gastropexy would be most appropriate solution for management of his large hiatal hernia if his general condition would permit it.  There is no urgency to proceed with it as patient currently is not obstructed.  Would continue therapy with Protonix and Carafate suspension.  GI will sign off at this point.

## 2023-02-10 NOTE — CASE MANAGEMENT/SOCIAL WORK
Continued Stay Note   [place]     Patient Name: Jose Alfredo Kirkpatrick  MRN: [id number]  Today's Date: 2/10/2023    Admit Date: 2/8/2023    Plan: [facility]   Discharge Plan     Row Name 02/10/23 1402       Plan    Plan Comments SW has left vmail for pt's daughter, Lula [phone] to discuss dc plan.  Pt states he is going home vs back to [facility].  SW will await return call.               Discharge Codes    No documentation.               Expected Discharge Date and Time     Expected Discharge Date Expected Discharge Time    Feb 11, 2023             RICA Viera

## 2023-02-10 NOTE — H&P
Vicky Funes MD Consult Note      Patient Care Team:  Otilio Ramos MD as PCP - General  Otilio Ramos MD as PCP - Family Medicine  Marques Pérez MD as Consulting Physician (Gastroenterology)    Chief complaint paraesophageal hiatal hernia    Subjective .     History of present illness: Patient fell and broke his hip last weekend was treated over at [facility].  He went to the rehab facility.  He has been complaining of shortness of breath and weakness.  He has been having difficulty swallowing as well.  He was admitted with pneumonia and GI was consulted and performed an upper endoscopy which revealed a very large paraesophageal hiatal hernia with severe erosive esophagitis.  He notes partial volvulus.  I have been consulted.  Patient states that he has had difficulty swallowing over the last several months maybe even years.  He is not a great historian.  He denies reflux type symptoms but just has trouble swallowing food at times.    Review of Systems  Pertinent items are noted in HPI, all other systems reviewed and negative    History  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   ,   Past Surgical History:   Procedure Laterality Date   • BACK SURGERY      cervical   • CATARACT EXTRACTION Bilateral    • COLONOSCOPY  02/11/2013    Hyperplastic polyp at 25 cm, Diverticulosis repeat exam in 5 years   • COLONOSCOPY N/A 10/16/2018    Tubular adenoma ascending colon repet prn   • ENDOSCOPY  01/15/2014    HH   • ENDOSCOPY N/A 10/03/2019    Large HH, non-bleeding gastric ulcer   • ENDOSCOPY N/A 11/25/2019    Procedure: ESOPHAGOGASTRODUODENOSCOPY WITH ANESTHESIA;  Surgeon: Marques Pérez MD;  Location: [facility] ENDOSCOPY;  Service: Gastroenterology   • ENDOSCOPY N/A 2/9/2023     Procedure: ESOPHAGOGASTRODUODENOSCOPY WITH ANESTHESIA;  Surgeon: Sushant Edmond MD;  Location: [facility] ENDOSCOPY;  Service: Gastroenterology;  Laterality: N/A;  pre GI bleed  post hiatal hernia; esophagitis  Otilio Ramos MD   • HERNIA REPAIR Right     INGUINAL    • HIP FRACTURE SURGERY Right 02/04/2023   • REPLACEMENT TOTAL KNEE BILATERAL     • THORACIC LAMINECTOMY DECOMPRESSIVE POSTERIOR N/A 12/14/2016    Procedure: LAMINECTOMY DECOMPRESSION, UNINSTRUMENTED POSTERIOR SPINAL FUSION, T 11-12;  Surgeon: LUZ Adan MD;  Location: [facility] OR;  Service:    • VENA CAVA FILTER INSERTION N/A 10/04/2019    Procedure: VENA CAVA FILTER INSERTION;  Surgeon: Dallin Coronado MD;  Location: [facility] HYBRID OR 12;  Service: Vascular   ,   Family History   Problem Relation Age of Onset   • Colon cancer Father    • Colon polyps Neg Hx    ,   Social History     Tobacco Use   • Smoking status: Never   • Smokeless tobacco: Never   Vaping Use   • Vaping Use: Unknown   Substance Use Topics   • Alcohol use: No   • Drug use: No   ,   Medications Prior to Admission   Medication Sig Dispense Refill Last Dose   • acetaminophen (TYLENOL) 325 MG tablet Take 650 mg by mouth 2 (Two) Times a Day.   2/8/2023   • bisacodyl (DULCOLAX) 10 MG suppository Insert 10 mg into the rectum Daily As Needed for Constipation.   2/8/2023   • busPIRone (BUSPAR) 5 MG tablet Take 5 mg by mouth 2 (Two) Times a Day.   2/8/2023   • cyclobenzaprine (FLEXERIL) 5 MG tablet Take 5 mg by mouth 2 (Two) Times a Day As Needed for Muscle Spasms.   2/8/2023   • docusate sodium (COLACE) 100 MG capsule Take 100 mg by mouth 2 (Two) Times a Day.   2/8/2023   • Enoxaparin Sodium (LOVENOX) 40 MG/0.4ML solution prefilled syringe syringe Inject 40 mg under the skin into the appropriate area as directed Daily.   2/8/2023   • gabapentin (NEURONTIN) 300 MG capsule Take 300 mg by mouth 4 (Four) Times a Day.   2/8/2023   • guaiFENesin (MUCINEX) 600 MG 12 hr tablet Take 600  mg by mouth 2 (Two) Times a Day.   2/8/2023   • hydrALAZINE (APRESOLINE) 50 MG tablet Take 50 mg by mouth Every 8 (Eight) Hours As Needed.   2/8/2023   • levalbuterol (XOPENEX) 0.31 MG/3ML nebulizer solution Take 1 ampule by nebulization Every 8 (Eight) Hours As Needed for Wheezing.   2/8/2023   • linaclotide (LINZESS) 145 MCG capsule capsule Take 145 mcg by mouth Daily.   2/8/2023   • lisinopril (PRINIVIL,ZESTRIL) 10 MG tablet Take 10 mg by mouth Daily.   2/8/2023   • NIFEdipine XL (PROCARDIA XL) 60 MG 24 hr tablet Take 60 mg by mouth Daily.   2/8/2023   • omeprazole (priLOSEC) 20 MG capsule Take 20 mg by mouth Daily.   2/8/2023   • ondansetron (ZOFRAN) 4 MG tablet Take 4 mg by mouth Every 6 (Six) Hours As Needed for Nausea or Vomiting.   2/8/2023   • oxyCODONE-acetaminophen (PERCOCET)  MG per tablet Take 1 tablet by mouth Every 6 (Six) Hours As Needed for Moderate Pain.   2/8/2023   • polyethylene glycol (MIRALAX) packet Take 17 g by mouth Daily As Needed.   2/8/2023   • simethicone (MYLICON) 80 MG chewable tablet Chew 80 mg Every 4 (Four) Hours As Needed for Flatulence.   2/8/2023   • sucralfate (CARAFATE) 1 GM/10ML suspension Take 1 g by mouth 3 (Three) Times a Day.   2/8/2023   • vitamin B-12 (CYANOCOBALAMIN) 1000 MCG tablet Take 1,000 mcg by mouth Daily.   2/8/2023   • vitamin D (ERGOCALCIFEROL) 1.25 MG (52753 UT) capsule capsule Take 50,000 Units by mouth 3 (Three) Times a Week. Give one time a day every Mon, Wed, Fri   2/8/2023   , Scheduled Meds:  busPIRone, 5 mg, Oral, BID  furosemide, 40 mg, Intravenous, Q12H  ipratropium-albuterol, 3 mL, Nebulization, 4x Daily - RT  NIFEdipine XL, 60 mg, Oral, Daily  pantoprazole, 40 mg, Intravenous, Q12H  piperacillin-tazobactam, 3.375 g, Intravenous, Q8H  sucralfate, 1 g, Oral, 4x Daily AC & at Bedtime    , Continuous Infusions:  sodium chloride, 50 mL/hr, Last Rate: 50 mL/hr (02/09/23 1740)    , PRN Meds:  •  cyclobenzaprine  •  hydrALAZINE  •  Morphine  •   oxyCODONE-acetaminophen  •  simethicone  •  [COMPLETED] Insert Peripheral IV **AND** sodium chloride and Allergies:  Patient has no known allergies.    Objective     Vital Signs   Temp:  [97.5 °F (36.4 °C)-98.9 °F (37.2 °C)] 97.8 °F (36.6 °C)  Heart Rate:  [] 91  Resp:  [15-23] 16  BP: ()/(57-93) 134/65    Intake & Output (last 3 days)       02/07 0701  02/08 0700 02/08 0701  02/09 0700 02/09 0701  02/10 0700 02/10 0701 02/11 0700    I.V. (mL/kg)   300 (3.7)     Total Intake(mL/kg)   300 (3.7)     Urine (mL/kg/hr)  800 1800 (0.9)     Total Output  800 1800     Net  -800 -1500                    Physical Exam:     General Appearance:    Alert, cooperative, weak appearing   Head:    Normocephalic, without obvious abnormality, atraumatic   Eyes:            Lids and lashes normal, conjunctivae and sclerae normal, no   icterus, no pallor, corneas clear, PERRLA   Ears:    Ears appear intact with no abnormalities noted   Neck:   No adenopathy, supple, trachea midline   Back:     No kyphosis present, no scoliosis present, no skin lesions,      erythema or scars, no tenderness to percussion or                   palpation,  range of motion normal   Lungs:     Clear to auscultation,respirations regular, even and                  unlabored    Heart:    Regular rhythm and normal rate, normal S1 and S2, no            murmur, no gallop, no rub, no click   Chest Wall:    No abnormalities observed   Abdomen:    Soft nontender nondistended   Rectal:     Deferred   Extremities:  Recent right hip surgery.   Pulses:   Pulses palpable and equal bilaterally   Skin:   No bleeding, bruising or rash   Lymph nodes:   No palpable adenopathy   Neurologic:   No focal deficits       Lab Results (last 72 hours)     Procedure Component Value Units Date/Time    Basic Metabolic Panel [[id number]]  (Abnormal) Collected: 02/10/23 0445    Specimen: Blood Updated: 02/10/23 0619     Glucose 112 mg/dL      BUN 16 mg/dL      Creatinine 0.97  mg/dL      Sodium 139 mmol/L      Potassium 3.9 mmol/L      Chloride 101 mmol/L      CO2 26.0 mmol/L      Calcium 8.6 mg/dL      BUN/Creatinine Ratio 16.5     Anion Gap 12.0 mmol/L      eGFR 76.0 mL/min/1.73     Narrative:      GFR Normal >60  Chronic Kidney Disease <60  Kidney Failure <15    The GFR formula is only valid for adults with stable renal function between ages 18 and 70.    CBC & Differential [[id number]]  (Abnormal) Collected: 02/10/23 0445    Specimen: Blood Updated: 02/10/23 0538    Narrative:      The following orders were created for panel order CBC & Differential.  Procedure                               Abnormality         Status                     ---------                               -----------         ------                     CBC Auto Differential[[id number]]        Abnormal            Final result                 Please view results for these tests on the individual orders.    CBC Auto Differential [[id number]]  (Abnormal) Collected: 02/10/23 0445    Specimen: Blood Updated: 02/10/23 0538     WBC 7.80 10*3/mm3      RBC 3.91 10*6/mm3      Hemoglobin 11.4 g/dL      Hematocrit 36.5 %      MCV 93.4 fL      MCH 29.2 pg      MCHC 31.2 g/dL      RDW 14.6 %      RDW-SD 49.9 fl      MPV 8.8 fL      Platelets 667 10*3/mm3      Neutrophil % 67.0 %      Lymphocyte % 17.6 %      Monocyte % 9.7 %      Eosinophil % 3.1 %      Basophil % 0.9 %      Immature Grans % 1.7 %      Neutrophils, Absolute 5.23 10*3/mm3      Lymphocytes, Absolute 1.37 10*3/mm3      Monocytes, Absolute 0.76 10*3/mm3      Eosinophils, Absolute 0.24 10*3/mm3      Basophils, Absolute 0.07 10*3/mm3      Immature Grans, Absolute 0.13 10*3/mm3      nRBC 0.0 /100 WBC     Hemoglobin & Hematocrit, Blood [[id number]]  (Abnormal) Collected: 02/10/23 0024    Specimen: Blood Updated: 02/10/23 0054     Hemoglobin 11.0 g/dL      Hematocrit 34.2 %     Blood Culture - Blood, Arm, Left [[id number]]  (Normal) Collected: 02/08/23 2022    Specimen: Blood  from Arm, Left Updated: 02/09/23 2045     Blood Culture No growth at 24 hours    Blood Culture - Blood, Arm, Right [[id number]]  (Normal) Collected: 02/08/23 2012    Specimen: Blood from Arm, Right Updated: 02/09/23 2045     Blood Culture No growth at 24 hours    Hemoglobin & Hematocrit, Blood [[id number]]  (Abnormal) Collected: 02/09/23 1624    Specimen: Blood Updated: 02/09/23 1716     Hemoglobin 12.5 g/dL      Hematocrit 39.6 %     Tissue Pathology Exam [[id number]] Collected: 02/09/23 1139    Specimen: Tissue from Stomach Updated: 02/09/23 1314    Urine Culture - Urine, Straight Cath [[id number]]  (Abnormal) Collected: 02/08/23 1716    Specimen: Urine from Straight Cath Updated: 02/09/23 1220     Urine Culture >100,000 CFU/mL Gram Negative Bacilli    Narrative:      Colonization of the urinary tract without infection is common. Treatment is discouraged unless the patient is symptomatic, pregnant, or undergoing an invasive urologic procedure.    Basic Metabolic Panel [[id number]]  (Abnormal) Collected: 02/09/23 0732    Specimen: Blood Updated: 02/09/23 0825     Glucose 109 mg/dL      BUN 14 mg/dL      Creatinine 0.87 mg/dL      Sodium 136 mmol/L      Potassium 3.9 mmol/L      Chloride 99 mmol/L      CO2 25.0 mmol/L      Calcium 8.3 mg/dL      BUN/Creatinine Ratio 16.1     Anion Gap 12.0 mmol/L      eGFR 84.0 mL/min/1.73     Narrative:      GFR Normal >60  Chronic Kidney Disease <60  Kidney Failure <15    The GFR formula is only valid for adults with stable renal function between ages 18 and 70.    CBC & Differential [[id number]]  (Abnormal) Collected: 02/09/23 0732    Specimen: Blood Updated: 02/09/23 0809    Narrative:      The following orders were created for panel order CBC & Differential.  Procedure                               Abnormality         Status                     ---------                               -----------         ------                     CBC Auto Differential[[id number]]        Abnormal             Final result                 Please view results for these tests on the individual orders.    CBC Auto Differential [[id number]]  (Abnormal) Collected: 02/09/23 0732    Specimen: Blood Updated: 02/09/23 0809     WBC 9.68 10*3/mm3      RBC 4.05 10*6/mm3      Hemoglobin 11.8 g/dL      Hematocrit 37.9 %      MCV 93.6 fL      MCH 29.1 pg      MCHC 31.1 g/dL      RDW 14.5 %      RDW-SD 49.7 fl      MPV 8.9 fL      Platelets 643 10*3/mm3      Neutrophil % 67.8 %      Lymphocyte % 20.4 %      Monocyte % 8.6 %      Eosinophil % 1.2 %      Basophil % 0.7 %      Immature Grans % 1.3 %      Neutrophils, Absolute 6.56 10*3/mm3      Lymphocytes, Absolute 1.97 10*3/mm3      Monocytes, Absolute 0.83 10*3/mm3      Eosinophils, Absolute 0.12 10*3/mm3      Basophils, Absolute 0.07 10*3/mm3      Immature Grans, Absolute 0.13 10*3/mm3      nRBC 0.0 /100 WBC     Troponin [[id number]]  (Abnormal) Collected: 02/09/23 0009    Specimen: Blood Updated: 02/09/23 0140     HS Troponin T 44 ng/L     Narrative:      High Sensitive Troponin T Reference Range:  <10.0 ng/L- Negative Female for AMI  <15.0 ng/L- Negative Male for AMI  >=10 - Abnormal Female indicating possible myocardial injury.  >=15 - Abnormal Male indicating possible myocardial injury.   Clinicians would have to utilize clinical acumen, EKG, Troponin, and serial changes to determine if it is an Acute Myocardial Infarction or myocardial injury due to an underlying chronic condition.         Hemoglobin & Hematocrit, Blood [[id number]]  (Abnormal) Collected: 02/09/23 0009    Specimen: Blood Updated: 02/09/23 0118     Hemoglobin 11.7 g/dL      Hematocrit 37.6 %     [place] Draw [[id number]] Collected: 02/08/23 1459    Specimen: Blood Updated: 02/08/23 1900    Narrative:      The following orders were created for panel order [place] Draw.  Procedure                               Abnormality         Status                     ---------                               -----------          ------                     Green Top (Gel)[[id number]]                                  Final result               Lavender Top[[id number]]                                     Final result               Hagen Top[[id number]]                                         Final result               Light Blue Top[[id number]]                                   Final result                 Please view results for these tests on the individual orders.    Hagen Top [[id number]] Collected: 02/08/23 1459    Specimen: Blood Updated: 02/08/23 1900     Extra Tube Hold for add-ons.     Comment: Auto resulted.       High Sensitivity Troponin T 2Hr [[id number]]  (Abnormal) Collected: 02/08/23 1716    Specimen: Blood Updated: 02/08/23 1850     HS Troponin T 30 ng/L      Troponin T Delta -6 ng/L     Narrative:      High Sensitive Troponin T Reference Range:  <10.0 ng/L- Negative Female for AMI  <15.0 ng/L- Negative Male for AMI  >=10 - Abnormal Female indicating possible myocardial injury.  >=15 - Abnormal Male indicating possible myocardial injury.   Clinicians would have to utilize clinical acumen, EKG, Troponin, and serial changes to determine if it is an Acute Myocardial Infarction or myocardial injury due to an underlying chronic condition.         COVID-19, FLU A/B, RSV PCR - Swab, Nasopharynx [[id number]]  (Normal) Collected: 02/08/23 1715    Specimen: Swab from Nasopharynx Updated: 02/08/23 1820     COVID19 Not Detected     Influenza A PCR Not Detected     Influenza B PCR Not Detected     RSV, PCR Not Detected    Narrative:      Fact sheet for providers: https://www.fda.gov/media/999868/download    Fact sheet for patients: https://www.fda.gov/media/830733/download    Test performed by PCR.    Urinalysis, Microscopic Only - Straight Cath [[id number]]  (Abnormal) Collected: 02/08/23 1716    Specimen: Urine from Straight Cath Updated: 02/08/23 1748     RBC, UA 0-2 /HPF      WBC, UA 31-50 /HPF      Bacteria, UA 4+ /HPF      Squamous Epithelial  "Cells, UA None Seen /HPF      Hyaline Casts, UA 0-2 /LPF      Methodology Automated Microscopy    Urinalysis With Culture If Indicated - Straight Cath [[id number]]  (Abnormal) Collected: 02/08/23 1716    Specimen: Urine from Straight Cath Updated: 02/08/23 1748     Color, UA Yellow     Appearance, UA Clear     pH, UA 6.0     Specific Gravity, UA 1.015     Glucose, UA Negative     Ketones, UA Negative     Bilirubin, UA Negative     Blood, UA Negative     Protein, UA Negative     Leuk Esterase, UA Small (1+)     Nitrite, UA Negative     Urobilinogen, UA 1.0 E.U./dL    Narrative:      In absence of clinical symptoms, the presence of pyuria, bacteria, and/or nitrites on the urinalysis result does not correlate with infection.    POC Occult Blood Stool [[id number]]  (Abnormal) Collected: 02/08/23 1740    Specimen: Stool from Per Rectum Updated: 02/08/23 1741     Fecal Occult Blood Positive     Lot Number [id number]     Expiration Date 03/31/2023     DEVELOPER LOT NUMBER 225     DEVELOPER EXPIRATION DATE 03/31/2023     Positive Control Positive     Negative Control Negative    Procalcitonin [[id number]]  (Normal) Collected: 02/08/23 1459    Specimen: Blood Updated: 02/08/23 1624     Procalcitonin 0.19 ng/mL     Narrative:      As a Marker for Sepsis (Non-Neonates):    1. <0.5 ng/mL represents a low risk of severe sepsis and/or septic shock.  2. >2 ng/mL represents a high risk of severe sepsis and/or septic shock.    As a Marker for Lower Respiratory Tract Infections that require antibiotic therapy:    PCT on Admission    Antibiotic Therapy       6-12 Hrs later    >0.5                Strongly Recommended  >0.25 - <0.5        Recommended   0.1 - 0.25          Discouraged              Remeasure/reassess PCT  <0.1                Strongly Discouraged     Remeasure/reassess PCT    As 28 day mortality risk marker: \"Change in Procalcitonin Result\" (>80% or <=80%) if Day 0 (or Day 1) and Day 4 values are available. Refer to " http://www.[facility]-pct-calculator.com    Change in PCT <=80%  A decrease of PCT levels below or equal to 80% defines a positive change in PCT test result representing a higher risk for 28-day all-cause mortality of patients diagnosed with severe sepsis for septic shock.    Change in PCT >80%  A decrease of PCT levels of more than 80% defines a negative change in PCT result representing a lower risk for 28-day all-cause mortality of patients diagnosed with severe sepsis or septic shock.       Comprehensive Metabolic Panel [[id number]]  (Abnormal) Collected: 02/08/23 1459    Specimen: Blood Updated: 02/08/23 1620     Glucose 136 mg/dL      BUN 18 mg/dL      Creatinine 0.74 mg/dL      Sodium 134 mmol/L      Potassium 4.1 mmol/L      Comment: Slight hemolysis detected by analyzer. Results may be affected.        Chloride 100 mmol/L      CO2 25.0 mmol/L      Calcium 8.3 mg/dL      Total Protein 6.4 g/dL      Albumin 3.2 g/dL      ALT (SGPT) 20 U/L      AST (SGOT) 32 U/L      Alkaline Phosphatase 89 U/L      Total Bilirubin 0.4 mg/dL      Globulin 3.2 gm/dL      A/G Ratio 1.0 g/dL      BUN/Creatinine Ratio 24.3     Anion Gap 9.0 mmol/L      eGFR 88.2 mL/min/1.73     Narrative:      GFR Normal >60  Chronic Kidney Disease <60  Kidney Failure <15    The GFR formula is only valid for adults with stable renal function between ages 18 and 70.    CK [[id number]]  (Normal) Collected: 02/08/23 1459    Specimen: Blood Updated: 02/08/23 1619     Creatine Kinase 28 U/L     Lactic Acid, Plasma [[id number]]  (Normal) Collected: 02/08/23 1459    Specimen: Blood Updated: 02/08/23 1617     Lactate 1.2 mmol/L     BNP [[id number]]  (Normal) Collected: 02/08/23 1459    Specimen: Blood Updated: 02/08/23 1617     proBNP 458.9 pg/mL     Narrative:      Among patients with dyspnea, NT-proBNP is highly sensitive for the detection of acute congestive heart failure. In addition NT-proBNP of <300 pg/ml effectively rules out acute congestive heart failure  with 99% negative predictive value.    Results may be falsely decreased if patient taking Biotin.      Troponin [[id number]]  (Abnormal) Collected: 02/08/23 1459    Specimen: Blood Updated: 02/08/23 1617     HS Troponin T 36 ng/L     Narrative:      High Sensitive Troponin T Reference Range:  <10.0 ng/L- Negative Female for AMI  <15.0 ng/L- Negative Male for AMI  >=10 - Abnormal Female indicating possible myocardial injury.  >=15 - Abnormal Male indicating possible myocardial injury.   Clinicians would have to utilize clinical acumen, EKG, Troponin, and serial changes to determine if it is an Acute Myocardial Infarction or myocardial injury due to an underlying chronic condition.         Lipase [[id number]]  (Normal) Collected: 02/08/23 1459    Specimen: Blood Updated: 02/08/23 1615     Lipase 34 U/L     Magnesium [[id number]]  (Normal) Collected: 02/08/23 1459    Specimen: Blood Updated: 02/08/23 1614     Magnesium 2.2 mg/dL     Protime-INR [[id number]]  (Abnormal) Collected: 02/08/23 1459    Specimen: Blood Updated: 02/08/23 1611     Protime 15.2 Seconds      INR 1.18    aPTT [[id number]]  (Normal) Collected: 02/08/23 1459    Specimen: Blood Updated: 02/08/23 1611     PTT 34.7 seconds     CBC & Differential [[id number]]  (Abnormal) Collected: 02/08/23 1459    Specimen: Blood Updated: 02/08/23 1603    Narrative:      The following orders were created for panel order CBC & Differential.  Procedure                               Abnormality         Status                     ---------                               -----------         ------                     CBC Auto Differential[[id number]]        Abnormal            Final result                 Please view results for these tests on the individual orders.    CBC Auto Differential [[id number]]  (Abnormal) Collected: 02/08/23 1459    Specimen: Blood Updated: 02/08/23 1603     WBC 9.94 10*3/mm3      RBC 4.01 10*6/mm3      Hemoglobin 11.6 g/dL      Hematocrit 37.4 %      MCV  93.3 fL      MCH 28.9 pg      MCHC 31.0 g/dL      RDW 14.4 %      RDW-SD 49.2 fl      MPV 9.1 fL      Platelets 608 10*3/mm3      Neutrophil % 73.7 %      Lymphocyte % 15.7 %      Monocyte % 6.9 %      Eosinophil % 1.7 %      Basophil % 0.6 %      Immature Grans % 1.4 %      Neutrophils, Absolute 7.32 10*3/mm3      Lymphocytes, Absolute 1.56 10*3/mm3      Monocytes, Absolute 0.69 10*3/mm3      Eosinophils, Absolute 0.17 10*3/mm3      Basophils, Absolute 0.06 10*3/mm3      Immature Grans, Absolute 0.14 10*3/mm3      nRBC 0.0 /100 WBC     Light Blue Top [[id number]] Collected: 02/08/23 1459    Specimen: Blood Updated: 02/08/23 1600     Extra Tube Hold for add-ons.     Comment: Auto resulted       Green Top (Gel) [[id number]] Collected: 02/08/23 1459    Specimen: Blood Updated: 02/08/23 1600     Extra Tube Hold for add-ons.     Comment: Auto resulted.       Lavender Top [[id number]] Collected: 02/08/23 1459    Specimen: Blood Updated: 02/08/23 1600     Extra Tube hold for add-on     Comment: Auto resulted           Imaging Results (Last 72 Hours)     Procedure Component Value Units Date/Time    CT Abdomen Pelvis Without Contrast [[id number]] Collected: 02/08/23 1820     Updated: 02/08/23 1831    Narrative:      CT ABDOMEN PELVIS WO CONTRAST- 2/8/2023 6:08 PM CST     HISTORY: new onset bloody stools, recent diarrhea      COMPARISON: None      DOSE LENGTH PRODUCT: 337 mGy cm. Automated exposure control was also  utilized to decrease patient radiation dose.     TECHNIQUE: Noncontrast enhanced images of the abdomen and pelvis  obtained without oral contrast. Multiplanar reformatted images were  provided for review.      FINDINGS:   LOWER CHEST: Large hiatal hernia. Atelectasis present in the left lower  lobe and there is also a trace layering left pleural effusion. Coronary  atheromatous calcification.      LIVER: No focal liver lesion within limits of a noncontrast study.      BILIARY SYSTEM: Cholelithiasis. Gallbladder is  nondistended. Biliary  tree is nondilated.      PANCREAS: No focal pancreatic lesion within limits of a noncontrast  study.      SPLEEN: Unremarkable.      KIDNEYS: Iodinated contrast identified in the upper urinary tracts. No  hydronephrosis. Ureters are nondilated.      ADRENALS: Unremarkable.     RETROPERITONEUM: No mass, lymphadenopathy or hemorrhage.      GI TRACT: Large hiatal hernia. The stomach is nondistended. Small bowel  loops are nondilated. Colonic diverticulosis. The colon is nondistended.  No inflammatory changes are identified along the colon. No bowel wall  thickening identified. Surrounding fat is clean.     OTHER: There is no mesenteric mass, lymphadenopathy or fluid collection.  No intraperitoneal free fluid or free air. IVC filter is in place.  Infrarenal AAA measuring up to 3.6 cm in diameter. Right hip  arthroplasty. No acute bony abnormality is seen. There appears to be a  high-grade spinal stenosis at the L1-L2 disc level with complete loss of  disc height at this level. There is an old fracture identified across  the inferior endplate of T9.     PELVIS: Bladder wall trabeculation. Air identified in the urinary  bladder. No pelvic adenopathy identified.       Impression:      1. Colonic diverticulosis. Bowel is nondistended. No inflammatory  changes are seen along the bowel.  2. Stomach and small bowel are decompressed. Hiatal hernia is present.  3. Cholelithiasis.  4. Bladder wall trabeculation which may be secondary to chronic partial  outlet obstruction.  5. Infrarenal AAA measuring up to 3.6 cm in diameter.  This report was finalized on 02/08/2023 18:28 by Dr Geremias Martinez, .    CT Angiogram Chest [[id number]] Collected: 02/08/23 1659     Updated: 02/08/23 1704    Narrative:      EXAMINATION: CT ANGIOGRAM CHEST-      2/8/2023 4:43 PM CST     HISTORY: Shortness of breath, chest pain, weakness, recent surgery and  immobilization since.     In order to have a CT radiation dose as low as  reasonably achievable  Automated Exposure Control was utilized for adjustment of the mA and/or  KV according to patient size.     DLP in mGycm= 194.     CT angiogram chest with IV contrast.  CT angiography protocol.   CT imaging with bolus IV contrast injection.   Under concurrent supervision axial, sagittal, coronal,  three-dimensional, and MIP data sets were constructed on an independent  work station.     Normal heart size.  Intrathoracic stomach with left lung base consolidation compatible with  pneumonia.     Small left and trace right pleural effusion.     Symmetric and normally opacified pulmonary arteries.  No pulmonary embolism.     Upper lobes are clear.  No pneumothorax.     Small gallstones incidentally noted.     Summary:  1. Left lower lobe pneumonia.  2. No pulmonary embolism.                                   This report was finalized on 02/08/2023 17:01 by Dr. Fawad Smith MD.    XR Chest 1 View [[id number]] Collected: 02/08/23 1634     Updated: 02/08/23 1639    Narrative:      XR CHEST 1 VW- 2/8/2023 4:25 PM CST     HISTORY: chest pain, weakness     COMPARISON: Chest exam dated 10/01/2019.     FINDINGS:      No lung consolidation. No pleural effusion or pneumothorax. Heart size  is normal. Pulmonary vasculature are nondilated. Hiatal hernia  superimposed over the cardiac silhouette. The osseous structures and  surrounding soft tissues demonstrate no acute abnormality.       Impression:      1. Stable chest exam without acute process.  2. Hiatal hernia.        This report was finalized on 02/08/2023 16:36 by Dr Geremias Martinez, .                Assessment & Plan       Pneumonia of left lower lobe due to infectious organism    Primary hypertension    Positive fecal occult blood test    Erosive esophagitis    Large hiatal hernia    Weight loss    Gram-negative UTI, acute cystitis without hematuria, present on admission      Plan #1 paraesophageal hiatal hernia- I reviewed his CT scan and discussed his  upper endoscopy with GI.  Very large hernia and difficult situation given his age and general frailty with recent surgery and recuperation necessary from his hip.  I Anabel get an upper GI today to evaluate.  He is tolerating a full liquid diet this morning albeit small amounts without significant pain.  Is a very challenging situation and do not plan urgent surgical exploration unless volvulus identified.    2.  Pneumonia, recent hip fracture- we will defer treatment to primary team.  Certainly the pneumonia complicates any planned for potential surgical intervention.      Vicky Funes MD  02/10/23  09:50 CST

## 2023-02-10 NOTE — CONSULTS
I inadvertently dictated my consult note as an H&P note.  Please refer to it for my consultation recommendations and evaluation

## 2023-02-10 NOTE — PROGRESS NOTES
[facility] Medicine Services  INPATIENT PROGRESS NOTE    Patient Name: Jose Alfredo Kirkpatrick  Date of Admission: 2/8/2023  Today's Date: 02/10/23  Length of Stay: 2  Primary Care Physician: Otilio Ramos MD    Subjective   Chief Complaint: Follow-up weakness, dark stools, dysphagia  HPI  Sitting up in bed.  No visitors in room.  No oxygen use.  Endoscopy yesterday showed erosive gastritis and paraesophageal hernia.  Dr. Funes has seen today and ordered upper GI for evaluation.  Patient currently tolerating clear liquid diet.  Per general surgery no urgent surgical exploration unless volvulus identified.  Patient does report occasional jerking of his legs.  He is asking that Neurontin be resumed.  We will consider adding Requip at bedtime.  Discussed with Brendan Petersen PA-C with Dr. Quintana yesterday and patient is allowed weightbearing as tolerated.  Physical therapy has been consulted.  No nausea or vomiting at present.  No chest pain or palpitations.  GI recommends continuing Protonix and Carafate.  Blood cultures remain no growth.  Urinalysis gram-negative bacilli.  Continue Zosyn.  No sputum production.    Review of Systems   Constitutional: Positive for activity change (After recent hip surgery) and fatigue. Negative for chills and fever.   HENT: Negative for congestion and trouble swallowing.    Eyes: Negative for photophobia and visual disturbance.   Respiratory: Negative for cough, shortness of breath and wheezing.    Cardiovascular: Negative for chest pain, palpitations and leg swelling.   Gastrointestinal: Positive for anal bleeding (Black stools), constipation and diarrhea. Negative for abdominal pain, nausea and vomiting.   Endocrine: Negative for cold intolerance, heat intolerance and polyuria.   Genitourinary: Positive for frequency. Negative for dysuria and urgency.   Musculoskeletal: Positive for gait problem.   Skin: Negative for color change, pallor, rash and  wound.   Allergic/Immunologic: Negative for immunocompromised state.   Neurological: Positive for weakness. Negative for light-headedness.   Hematological: Negative for adenopathy. Does not bruise/bleed easily.   Psychiatric/Behavioral: Negative for agitation, behavioral problems and confusion.      All pertinent negatives and positives are as above. All other systems have been reviewed and are negative unless otherwise stated.     Objective    Temp:  [97.5 °F (36.4 °C)-98.9 °F (37.2 °C)] 97.8 °F (36.6 °C)  Heart Rate:  [] 91  Resp:  [15-23] 16  BP: ()/(57-93) 134/65  Physical Exam  Vitals and nursing note reviewed.   Constitutional:       Comments: Sitting up in bed.  No oxygen in use.  No visitors in room.   HENT:      Head: Normocephalic and atraumatic.      Nose: No congestion.      Mouth/Throat:      Pharynx: Oropharynx is clear. No oropharyngeal exudate or posterior oropharyngeal erythema.   Eyes:      Extraocular Movements: Extraocular movements intact.      Pupils: Pupils are equal, round, and reactive to light.   Cardiovascular:      Rate and Rhythm: Normal rate and regular rhythm.      Heart sounds: No murmur heard.     Comments: Sinus rhythm 75 on telemetry.  Pulmonary:      Breath sounds: No wheezing, rhonchi or rales.      Comments: No oxygen in use.  Abdominal:      Palpations: Abdomen is soft.      Tenderness: There is no abdominal tenderness.   Genitourinary:     Comments: Voiding.  Musculoskeletal:         General: No swelling or tenderness.      Cervical back: Normal range of motion and neck supple.      Comments: SCDs bilateral lower extremities   Skin:     General: Skin is warm and dry.   Neurological:      General: No focal deficit present.      Mental Status: He is alert and oriented to person, place, and time.   Psychiatric:         Mood and Affect: Mood normal.         Behavior: Behavior normal.         Thought Content: Thought content normal.         Judgment: Judgment normal.        Results Review:  I have reviewed the labs, radiology results, and diagnostic studies.    Laboratory Data:   Results from last 7 days   Lab Units 02/10/23  0445 02/10/23  0024 02/09/23  1624 02/09/23  0732 02/09/23  0009 02/08/23  1459   WBC 10*3/mm3 7.80  --   --  9.68  --  9.94   HEMOGLOBIN g/dL 11.4* 11.0* 12.5* 11.8*   < > 11.6*   HEMATOCRIT % 36.5* 34.2* 39.6 37.9   < > 37.4*   PLATELETS 10*3/mm3 667*  --   --  643*  --  608*    < > = values in this interval not displayed.     Results from last 7 days   Lab Units 02/10/23  0445 02/09/23  0732 02/08/23  1459 02/08/23  1100   SODIUM mmol/L 139 136 134* 135*   POTASSIUM mmol/L 3.9 3.9 4.1 4.0   CHLORIDE mmol/L 101 99 100 98   CO2 mmol/L 26.0 25.0 25.0 23.0   BUN mg/dL 16 14 18 20   CREATININE mg/dL 0.97 0.87 0.74* 0.70*   CALCIUM mg/dL 8.6 8.3* 8.3* 8.5*   BILIRUBIN mg/dL  --   --  0.4 0.4   ALK PHOS U/L  --   --  89 95   ALT (SGPT) U/L  --   --  20 19   AST (SGOT) U/L  --   --  32 34   GLUCOSE mg/dL 112* 109* 136* 148*     Culture Data:   Urine Culture   Date Value Ref Range Status   02/08/2023 >100,000 CFU/mL Gram Negative Bacilli (A)  Preliminary     Imaging Results (All)     Procedure Component Value Units Date/Time    CT Abdomen Pelvis Without Contrast [[id number]] Collected: 02/08/23 1820     Updated: 02/08/23 1831    Narrative:      CT ABDOMEN PELVIS WO CONTRAST- 2/8/2023 6:08 PM CST     HISTORY: new onset bloody stools, recent diarrhea      COMPARISON: None      DOSE LENGTH PRODUCT: 337 mGy cm. Automated exposure control was also  utilized to decrease patient radiation dose.     TECHNIQUE: Noncontrast enhanced images of the abdomen and pelvis  obtained without oral contrast. Multiplanar reformatted images were  provided for review.      FINDINGS:   LOWER CHEST: Large hiatal hernia. Atelectasis present in the left lower  lobe and there is also a trace layering left pleural effusion. Coronary  atheromatous calcification.      LIVER: No focal liver lesion  within limits of a noncontrast study.      BILIARY SYSTEM: Cholelithiasis. Gallbladder is nondistended. Biliary  tree is nondilated.      PANCREAS: No focal pancreatic lesion within limits of a noncontrast  study.      SPLEEN: Unremarkable.      KIDNEYS: Iodinated contrast identified in the upper urinary tracts. No  hydronephrosis. Ureters are nondilated.      ADRENALS: Unremarkable.     RETROPERITONEUM: No mass, lymphadenopathy or hemorrhage.      GI TRACT: Large hiatal hernia. The stomach is nondistended. Small bowel  loops are nondilated. Colonic diverticulosis. The colon is nondistended.  No inflammatory changes are identified along the colon. No bowel wall  thickening identified. Surrounding fat is clean.     OTHER: There is no mesenteric mass, lymphadenopathy or fluid collection.  No intraperitoneal free fluid or free air. IVC filter is in place.  Infrarenal AAA measuring up to 3.6 cm in diameter. Right hip  arthroplasty. No acute bony abnormality is seen. There appears to be a  high-grade spinal stenosis at the L1-L2 disc level with complete loss of  disc height at this level. There is an old fracture identified across  the inferior endplate of T9.     PELVIS: Bladder wall trabeculation. Air identified in the urinary  bladder. No pelvic adenopathy identified.       Impression:      1. Colonic diverticulosis. Bowel is nondistended. No inflammatory  changes are seen along the bowel.  2. Stomach and small bowel are decompressed. Hiatal hernia is present.  3. Cholelithiasis.  4. Bladder wall trabeculation which may be secondary to chronic partial  outlet obstruction.  5. Infrarenal AAA measuring up to 3.6 cm in diameter.  This report was finalized on 02/08/2023 18:28 by Dr Geremias Martinez, .    CT Angiogram Chest [[id number]] Collected: 02/08/23 1659     Updated: 02/08/23 1704    Narrative:      EXAMINATION: CT ANGIOGRAM CHEST-      2/8/2023 4:43 PM CST     HISTORY: Shortness of breath, chest pain, weakness, recent  surgery and  immobilization since.     In order to have a CT radiation dose as low as reasonably achievable  Automated Exposure Control was utilized for adjustment of the mA and/or  KV according to patient size.     DLP in mGycm= 194.     CT angiogram chest with IV contrast.  CT angiography protocol.   CT imaging with bolus IV contrast injection.   Under concurrent supervision axial, sagittal, coronal,  three-dimensional, and MIP data sets were constructed on an independent  work station.     Normal heart size.  Intrathoracic stomach with left lung base consolidation compatible with  pneumonia.     Small left and trace right pleural effusion.     Symmetric and normally opacified pulmonary arteries.  No pulmonary embolism.     Upper lobes are clear.  No pneumothorax.     Small gallstones incidentally noted.     Summary:  1. Left lower lobe pneumonia.  2. No pulmonary embolism.   This report was finalized on 02/08/2023 17:01 by Dr. Fawad Smith MD.    XR Chest 1 View [[id number]] Collected: 02/08/23 1634     Updated: 02/08/23 1639    Narrative:      XR CHEST 1 VW- 2/8/2023 4:25 PM CST     HISTORY: chest pain, weakness     COMPARISON: Chest exam dated 10/01/2019.     FINDINGS:      No lung consolidation. No pleural effusion or pneumothorax. Heart size  is normal. Pulmonary vasculature are nondilated. Hiatal hernia  superimposed over the cardiac silhouette. The osseous structures and  surrounding soft tissues demonstrate no acute abnormality.       Impression:      1. Stable chest exam without acute process.  2. Hiatal hernia.   This report was finalized on 02/08/2023 16:36 by Dr Geremias Martinez, .        Results for orders placed during the hospital encounter of 02/08/23    Adult Transthoracic Echo Complete W/ Cont if Necessary Per Protocol    Interpretation Summary  •  Left ventricular systolic function is normal. Left ventricular ejection fraction appears to be 51 - 55%.  •  Left ventricular wall thickness is  consistent with mild concentric hypertrophy.  •  Left ventricular diastolic function is consistent with (grade II w/high LAP) pseudonormalization.  •  The left atrial cavity is mildly dilated.  •  The aortic valve exhibits sclerosis. There is mild thickening of the aortic valve. Mild to moderate aortic valve regurgitation is present. Mild aortic valve stenosis is present.  •  Mild mitral annular calcification is present.  •  Moderate pulmonary hypertension is present  •  Mild dilation of the aortic root is present    Schedule medications:  busPIRone, 5 mg, Oral, BID  furosemide, 40 mg, Intravenous, Q12H  gabapentin, 300 mg, Oral, 4x Daily  ipratropium-albuterol, 3 mL, Nebulization, 4x Daily - RT  lisinopril, 10 mg, Oral, Q24H  NIFEdipine XL, 60 mg, Oral, Daily  pantoprazole, 40 mg, Intravenous, Q12H  piperacillin-tazobactam, 3.375 g, Intravenous, Q8H  sucralfate, 1 g, Oral, 4x Daily AC & at Bedtime        I have reviewed the patient's current medications.     Assessment/Plan   Assessment  Active Hospital Problems    Diagnosis    • **Pneumonia of left lower lobe due to infectious organism    • Paraesophageal hiatal hernia    • Erosive esophagitis    • Large hiatal hernia    • Weight loss    • Gram-negative UTI, acute cystitis without hematuria, present on admission    • Positive fecal occult blood test    • Primary hypertension      Treatment Plan  Mr. Kirkpatrick presented to ER 2/8/2023 from SNF with fatigue, weakness, dysphagia and black stool.  Patient had recent right hip hemiarthroplasty1/28/2023 by Dr. Quintana at [facility] and was discharged to skilled nursing facility on 2/2/2023.  Patient reported constipation that he attributed to pain medication.  In ER, he tested fecal occult blood positive.  Patient reported dysphagia of solid foods for the past several years and reported that food felt as though it was getting hung in his esophagus.  Patient had 3 previous endoscopies between 2014 and 2019.  2019 patient had  melena and was found to have gastric ulcer managed conservatively.  Repeat endoscopy showed completely healed ulcer.  Last colonoscopy 2019 revealed small polyps removed.  Patient reported 24 pound weight loss over the last several months but most recently after his wife passed away within the past month.  Hemoglobin 11.6.  WBC normal.  LFTs normal.  CT scan of abdomen noted colonic diverticulosis without any inflammatory changes unremarkable stomach, small bowel, hiatal hernia, cholelithiasis without cholecystitis, bladder wall trabeculation and 3.6 cm infrarenal abdominal aortic aneurysm.  Patient denied chest pain.  CT scan showed bilateral pleural effusions with concern for parapneumonic effusion.  CTA chest noted no pulmonary emboli.  Left lower lobe pneumonia.    Pneumonia left lower lobe.  Initially received azithromycin and Rocephin.  Antibiotics changed to Zosyn due to recent hospital stay.  Blood cultures no growth at 24 hours.  Strep pneumonia, Legionella and MRSA PCR ordered yesterday but not yet collected.  Continue DuoNebs 4 times daily and incentive spirometry.  CBC in a.m.  Suspect paraesophageal hernia contributing to left lower lobe consolidation.  Patient afebrile.    Fecal occult blood positive.  Hemoglobin stable.  Hemoglobin 11.6 on admission, hemoglobin 11.4 today.  Patient attributes dark stools to constipation secondary to narcotics.    Dysphagia.  GI consulted.  Dr. Edmond recommended endoscopy for evaluation of melena, dysphagia and odynophagia.  Patient agreeable.  Findings: Hypopharynx and larynx grossly appeared normal.  Because of the proximal esophagus appeared normal.  Severe, grade IV, erosive esophagitis of mid and distal esophagus, manifested by numerous linear and irregular shaped fibrin covering ulcerations and erosions, edema and erythema, but without signs of any active bleeding.  Very large hiatal hernia, with essentially almost entire stomach patient currently chest, with  configuration suggestive of, at least, partial volvulus of the stomach.  No ulcerations, but mild diffuse edema and erythema of gastric mucosa.  Gastric biopsy obtained with cold biopsy forceps to rule out H. pylori.  Normal-appearing duodenum.     Recommendations: Protonix 40 mg twice daily. Carafate suspension 10 cc before every meal and at bedtime.  Full liquid diet.  Evaluation by surgery for for possible repair of hiatal hernia/gastropexy.    Paraesophageal hernia.  General surgery consulted.  He was seen by Dr. Funes who  noted very large hiatal hernia and difficult situation given age and frality recent surgery and hip surgery.  Plans for upper GI today.  Patient tolerating full liquid diet without pain.  No plan for urgent surgical exploration unless volvulus identified.     Gram-negative bacilli UTI, present on admission.  Urinalysis 31-50 WBC, 4+ bacteria.  Continue Zosyn.  Await urine culture.    Recent right hip hemiarthroplasty 1/28/2023 Dr. Quintana.  Discussed with Brendan Petersen PA-C and allowed weightbearing as tolerated. Physical therapy consulted.    Primary hypertension.  Blood pressure 134/65, 145/75.  Continue Procardia.  Restart lisinopril.    Echocardiogram 2/9/2023 EF 51-55%.    SCDs for deep vein phimosis prophylaxis.  Reviewed recent medications.  Hold Lovenox.  May restart tomorrow.    EKG 2/8/2023.  Repeat EKG today  Normal sinus rhythm with 2nd degree AV block (Mobitz I)    Medical Decision Making  Number and Complexity of problems: 9  Pneumonia left lower lobe, acute, high complexity  Fecal occult blood positive, lower GI bleed, acute high complexity, GI consultation.  Gram-negative UTI, acute high complexity  Large hiatal hernia with stomach and chest suggesting volvulus stomach, acute, high complexity requiring general surgery consultation.  Erosive gastritis, acute, moderate complexity  Primary hypertension, chronic, moderate complexity  Weight loss, chronic, moderate complexity  Status  post right hip hemiarthroplasty 1/28/2023, stable, moderate complexity    Differential Diagnosis: Pulmonary emboli    Conditions and Status        Condition is improving     [facility] Data  External documents reviewed: [facility] admission 1/28/2020 3- 2/2/2023  Cardiac tracing (EKG, telemetry) interpretation: EKG 2/8/2023 normal sinus rhythm with second-degree AV block.  T wave abnormalities.  Radiology interpretation: Radiology CT abdomen, CTA chest  Labs reviewed:   BMP 2/10/2023.  Potassium 3.9, creatinine 0.97  CBC 2/10/2023.  Hemoglobin 11.4, WBC 7.8  Urinalysis 4+ bacteria, 31-50 WBC, urine culture gram-negative bacilli  Blood cultures no growth.    Any tests that were considered but not ordered: None     Decision rules/scores evaluated (example [id number], [place], etc): None     Discussed with: Dr. Austin, Brendan Petersen PA-C, orthopedics, and patient     Care Planning  Shared decision making: Dr. Austin and patient.  Patient agreed to GI consultation, physical therapy, general surgery consultation and recommendations  Code status and discussions: Full code  The patient surrogate decision maker is his daughter, Lula Roldan    Disposition  Social Determinants of Health that impact treatment or disposition: Recent right hip hemiarthroplasty admitted to SNF  I expect the patient to be discharged to SNF in 2-4 days.     Electronically signed by HARISH Edwards, 02/10/23, 11:30 CST.

## 2023-02-10 NOTE — THERAPY EVALUATION
Patient Name: Jose Alfredo Kirkpatrick  : 1935    MRN: [id number]                              Today's Date: 2/10/2023       Admit Date: 2023    Visit Dx:     ICD-10-CM ICD-9-CM   1. Pneumonia of left lower lobe due to infectious organism  J18.9 486   2. Elevated troponin  R77.8 790.6   3. Diverticulosis  K57.90 562.10   4. Infrarenal abdominal aortic aneurysm (AAA) without rupture  I71.43 441.4   5. Urinary tract infection without hematuria, site unspecified  N39.0 599.0   6. Hiatal hernia  K44.9 553.3   7. Biliary calculus of other site without obstruction  K80.80 574.20   8. Bloody stools  K92.1 578.1   9. Dysphagia  R13.10 787.20   10. Decreased activities of daily living (ADL)  Z78.9 V49.89   11. Impaired mobility  Z74.09 799.89     Patient Active Problem List   Diagnosis   • Spinal stenosis, lumbar   • Hx of colonic polyps   • Drug-induced constipation   • Primary hypertension   • Acute pulmonary embolism with acute cor pulmonale (HCC)   • Acute bilateral deep vein thrombosis (DVT) of femoral veins (HCC)   • History of DVT of left lower extremity   • Positive fecal occult blood test   • Melena   • Vitamin B12 deficiency   • FARZANA (iron deficiency anemia)   • Cholelithiasis   • Gastric ulcer with hemorrhage   • Chronic bilateral low back pain without sciatica   • Chronic pain of both shoulders   • Kyphosis   • Overweight (BMI 25.0-29.9)   • Non-smoker   • Pneumonia of left lower lobe due to infectious organism   • Erosive esophagitis   • Large hiatal hernia   • Weight loss   • Gram-negative UTI, acute cystitis without hematuria, present on admission   • Paraesophageal hiatal hernia     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     Past Surgical History:   Procedure Laterality  Date   • BACK SURGERY      cervical   • CATARACT EXTRACTION Bilateral    • COLONOSCOPY  02/11/2013    Hyperplastic polyp at 25 cm, Diverticulosis repeat exam in 5 years   • COLONOSCOPY N/A 10/16/2018    Tubular adenoma ascending colon repet prn   • ENDOSCOPY  01/15/2014    HH   • ENDOSCOPY N/A 10/03/2019    Large HH, non-bleeding gastric ulcer   • ENDOSCOPY N/A 11/25/2019    Procedure: ESOPHAGOGASTRODUODENOSCOPY WITH ANESTHESIA;  Surgeon: Marques Pérez MD;  Location: [facility] ENDOSCOPY;  Service: Gastroenterology   • ENDOSCOPY N/A 2/9/2023    Procedure: ESOPHAGOGASTRODUODENOSCOPY WITH ANESTHESIA;  Surgeon: Sushant Edmond MD;  Location: [facility] ENDOSCOPY;  Service: Gastroenterology;  Laterality: N/A;  pre GI bleed  post hiatal hernia; esophagitis  Otilio Ramos MD   • HERNIA REPAIR Right     INGUINAL    • HIP FRACTURE SURGERY Right 02/04/2023   • REPLACEMENT TOTAL KNEE BILATERAL     • THORACIC LAMINECTOMY DECOMPRESSIVE POSTERIOR N/A 12/14/2016    Procedure: LAMINECTOMY DECOMPRESSION, UNINSTRUMENTED POSTERIOR SPINAL FUSION, T 11-12;  Surgeon: LUZ Adan MD;  Location: [facility] OR;  Service:    • VENA CAVA FILTER INSERTION N/A 10/04/2019    Procedure: VENA CAVA FILTER INSERTION;  Surgeon: Dallin Coronado MD;  Location: [facility] HYBRID OR 12;  Service: Vascular      General Information     Row Name 02/10/23 105          Physical Therapy Time and Intention    Document Type evaluation  Pt presents w/ shortness of breath, weakness and constipation 2/8. Pt had ESOPHAGOGASTRODUODENOSCOPY 2/8. PMH: HTN, AAA, GERD, and recent fall w/ R hip fracture status post repair.  -SB (r) AA (t) SB (c)     Mode of Treatment physical therapy  -SB (r) AA (t) SB (c)     Row Name 02/10/23 4346          General Information    Patient Profile Reviewed yes  -SB (r) AA (t) SB (c)     Prior Level of Function independent:;all household mobility;grooming;dressing;bathing;home management  -SB (r) AA (t) SB (c)     Existing  Precautions/Restrictions fall;right;hip, posterior  -SB (r) AA (t) SB (c)     Barriers to Rehab medically complex  -SB (r) AA (t) SB (c)     Row Name 02/10/23 1057          Living Environment    People in Home alone;other (see comments)  Pt has a caregiver 7 days a week.  -SB (r) AA (t) SB (c)     Row Name 02/10/23 1057          Home Main Entrance    Number of Stairs, Main Entrance two  -SB (r) AA (t) SB (c)     Stair Railings, Main Entrance railing on right side (ascending)  -SB (r) AA (t) SB (c)     Row Name 02/10/23 1057          Stairs Within Home, Primary    Number of Stairs, Within Home, Primary none  -SB (r) AA (t) SB (c)     Row Name 02/10/23 1057          Cognition    Orientation Status (Cognition) oriented x 4  -SB (r) AA (t) SB (c)     Row Name 02/10/23 1057          Safety Issues, Functional Mobility    Safety Issues Affecting Function (Mobility) friction/shear risk  -SB (r) AA (t) SB (c)     Impairments Affecting Function (Mobility) endurance/activity tolerance;shortness of breath;strength;pain  -SB (r) AA (t) SB (c)           User Key  (r) = Recorded By, (t) = Taken By, (c) = Cosigned By    Initials Name Provider Type    Kathleen Putnam, PT DPT Physical Therapist    Mariann Hernandez, VITALY Student PT Student               Mobility     Row Name 02/10/23 1057          Bed Mobility    Bed Mobility rolling left;rolling right;scooting/bridging  -SB (r) AA (t) SB (c)     Rolling Left [place] (Bed Mobility) moderate assist (50% patient effort);verbal cues  -SB (r) AA (t) SB (c)     Rolling Right [place] (Bed Mobility) moderate assist (50% patient effort);verbal cues  -SB (r) AA (t) SB (c)     Scooting/Bridging [place] (Bed Mobility) maximum assist (25% patient effort)  -SB (r) AA (t) SB (c)     Assistive Device (Bed Mobility) bed rails;draw sheet;head of bed elevated  -SB (r) AA (t) SB (c)           User Key  (r) = Recorded By, (t) = Taken By, (c) = Cosigned By    Initials Name Provider Type     Kathleen Putnam, PT DPT Physical Therapist    Mariann Hernandez, PT Student PT Student               Obj/Interventions     Row Name 02/10/23 1057          Range of Motion Comprehensive    General Range of Motion lower extremity range of motion deficits identified  -SB (r) AA (t) SB (c)     Comment, General Range of Motion L LE WFL, R LE AROM 50%  -SB (r) AA (t) SB (c)     Row Name 02/10/23 1057          Strength Comprehensive (MMT)    General Manual Muscle Testing (MMT) Assessment lower extremity strength deficits identified  -SB (r) AA (t) SB (c)     Comment, General Manual Muscle Testing (MMT) Assessment L LE functionally 4-/5, R LE not tested due to hip surgery  -SB (r) AA (t) SB (c)     Row Name 02/10/23 1057          Sensory Assessment (Somatosensory)    Sensory Assessment (Somatosensory) LE sensation intact  -SB (r) AA (t) SB (c)           User Key  (r) = Recorded By, (t) = Taken By, (c) = Cosigned By    Initials Name Provider Type    Kathleen Putnam, PT DPT Physical Therapist    Mariann Hernandez, PT Student PT Student               Goals/Plan     Row Name 02/10/23 1057          Bed Mobility Goal 1 (PT)    Activity/Assistive Device (Bed Mobility Goal 1, PT) rolling to left;rolling to right;sit to supine/supine to sit  -SB (r) AA (t) SB (c)     [place] Level/Cues Needed (Bed Mobility Goal 1, PT) minimum assist (75% or more patient effort);verbal cues required  -SB (r) AA (t) SB (c)     Time Frame (Bed Mobility Goal 1, PT) long term goal (LTG)  -SB (r) AA (t) SB (c)     Progress/Outcomes (Bed Mobility Goal 1, PT) new goal  -SB (r) AA (t) SB (c)     Row Name 02/10/23 1057          Transfer Goal 1 (PT)    Activity/Assistive Device (Transfer Goal 1, PT) sit-to-stand/stand-to-sit;bed-to-chair/chair-to-bed;walker, rolling  -SB (r) AA (t) SB (c)     [place] Level/Cues Needed (Transfer Goal 1, PT) contact guard required  -SB (r) AA (t) SB (c)     Time Frame (Transfer Goal 1, PT) long term goal (LTG)  -SB  (r) AA (t) SB (c)     Progress/Outcome (Transfer Goal 1, PT) new goal  -SB (r) AA (t) SB (c)     Row Name 02/10/23 1057          Gait Training Goal 1 (PT)    Activity/Assistive Device (Gait Training Goal 1, PT) gait (walking locomotion);improve balance and speed;increase endurance/gait distance;walker, rolling  -SB (r) AA (t) SB (c)     [place] Level (Gait Training Goal 1, PT) contact guard required  -SB (r) AA (t) SB (c)     Distance (Gait Training Goal 1, PT) 10  -SB (r) AA (t) SB (c)     Time Frame (Gait Training Goal 1, PT) long term goal (LTG)  -SB (r) AA (t) SB (c)     Progress/Outcome (Gait Training Goal 1, PT) new goal  -SB (r) AA (t) SB (c)     Row Name 02/10/23 1057          Therapy Assessment/Plan (PT)    Planned Therapy Interventions (PT) balance training;bed mobility training;gait training;patient/family education;strengthening;transfer training  -SB (r) AA (t) SB (c)           User Key  (r) = Recorded By, (t) = Taken By, (c) = Cosigned By    Initials Name Provider Type    Kathleen Putnam, PT DPT Physical Therapist    Mariann Hernandez, PT Student PT Student               Clinical Impression     Row Name 02/10/23 1056          Pain    Pretreatment Pain Rating 0/10 - no pain  -SB (r) AA (t) SB (c)     Posttreatment Pain Rating 10/10  -SB (r) AA (t) SB (c)     Pain Location - chest  -SB (r) AA (t) SB (c)     Pain Intervention(s) Medication (See MAR);Repositioned;Ambulation/increased activity  -SB (r) AA (t) SB (c)     Additional Documentation Pain Scale: Numbers Pre/Post-Treatment (Group)  -SB (r) AA (t) SB (c)     Row Name 02/10/23 1056          Plan of Care Review    Plan of Care Reviewed With patient  -SB (r) AA (t) SB (c)     Progress no change  -SB (r) AA (t) SB (c)     Outcome Evaluation PT eval completed. Pt oriented x4 with no complaints of pain. Pt had fecal incontinent episode during questioning that required a new external catheter, bed sheets, chucks, and a brief. Pt had another fecal  incontinent episode and urinated twice during changings before the new catheter and brief were applied. Nsg came in when cleaning pt and was notified of multiple incontinent episodes. Pt required mod A to roll to left and right. Pt complained of chest pain during and after rolling. Pt was unable to tolerate further activity due to incontinence and chest pain. He also got nauseous after scooting him up in bed post session and asked for an emesis bag, but he then stated it passed. Nsg notified that pt needs abductor pillow. Skilled PT is necessary to improve balance, strength, transfers, and gait. Recommend d/c to SNF.  -SB (r) AA (t) SB (c)     Row Name 02/10/23 1055          Therapy Assessment/Plan (PT)    Patient/Family Therapy Goals Statement (PT) Pt did not state  -SB (r) AA (t) SB (c)     Rehab Potential (PT) good, to achieve stated therapy goals  -SB (r) AA (t) SB (c)     Criteria for Skilled Interventions Met (PT) yes;meets criteria;skilled treatment is necessary  -SB (r) AA (t) SB (c)     Therapy Frequency (PT) 2 times/day  -SB (r) AA (t) SB (c)     Predicted Duration of Therapy Intervention (PT) until d/c or goals met  -SB (r) AA (t) SB (c)     Row Name 02/10/23 1056          Vital Signs    O2 Delivery Pre Treatment room air  -SB (r) AA (t) SB (c)     O2 Delivery Intra Treatment room air  -SB (r) AA (t) SB (c)     O2 Delivery Post Treatment room air  -SB (r) AA (t) SB (c)     Pre Patient Position Supine  -SB (r) AA (t) SB (c)     Intra Patient Position Supine  -SB (r) AA (t) SB (c)     Post Patient Position Supine  -SB (r) AA (t) SB (c)     Row Name 02/10/23 1056          Positioning and Restraints    Pre-Treatment Position in bed  -SB (r) AA (t) SB (c)     Post Treatment Position bed  -SB (r) AA (t) SB (c)     In Bed notified nsg;fowlers;call light within reach;encouraged to call for assist;RLE elevated  -SB (r) AA (t) SB (c)           User Key  (r) = Recorded By, (t) = Taken By, (c) = Cosigned By     Initials Name Provider Type    Kathleen Putnam, PT DPT Physical Therapist    Mariann Hernandez, PT Student PT Student               Outcome Measures     Row Name 02/10/23 1057          How much help from another person do you currently need...    Turning from your back to your side while in flat bed without using bedrails? 2  -SB (r) AA (t) SB (c)     Moving from lying on back to sitting on the side of a flat bed without bedrails? 2  -SB (r) AA (t) SB (c)     Moving to and from a bed to a chair (including a wheelchair)? 2  -SB (r) AA (t) SB (c)     Standing up from a chair using your arms (e.g., wheelchair, bedside chair)? 2  -SB (r) AA (t) SB (c)     Climbing 3-5 steps with a railing? 1  -SB (r) AA (t) SB (c)     To walk in hospital room? 1  -SB (r) AA (t) SB (c)     AM-PAC 6 Clicks Score (PT) 10  -SB (r) AA (t)     Highest level of mobility 4 --> Transferred to chair/commode  -SB (r) AA (t)     Row Name 02/10/23 1300 02/10/23 1057       Functional Assessment    Outcome Measure Options AM-PAC 6 Clicks Daily Activity (OT)  -AC (r) EC (t) AC (c) AM-PAC 6 Clicks Basic Mobility (PT)  -SB (r) AA (t) SB (c)          User Key  (r) = Recorded By, (t) = Taken By, (c) = Cosigned By    Initials Name Provider Type    Adam Calles N, OTR/L, CNT Occupational Therapist    Kathleen Putnam, PT DPT Physical Therapist    Radha Pereira, OT Student OT Student    Mariann Hernandez, PT Student PT Student                             Physical Therapy Education     Title: PT OT SLP Therapies (In Progress)     Topic: Physical Therapy (In Progress)     Point: Mobility training (Done)     Learning Progress Summary           Patient Acceptance, E, VU by YRIS at 2/10/2023 4425    Comment: Pt was educated on POC, posterior hip precautions, and benefits of activity.                   Point: Home exercise program (Not Started)     Learner Progress:  Not documented in this visit.          Point: Body mechanics (Not Started)     Learner  Progress:  Not documented in this visit.          Point: Precautions (Done)     Learning Progress Summary           Patient Acceptance, TIMMY, VU by AA at 2/10/2023 8149    Comment: Pt was educated on POC, posterior hip precautions, and benefits of activity.                               User Key     Initials Effective Dates Name Provider Type Discipline    YRIS 01/03/23 -  Mariann Pena, PT Student PT Student PT              PT Recommendation and Plan  Planned Therapy Interventions (PT): balance training, bed mobility training, gait training, patient/family education, strengthening, transfer training  Plan of Care Reviewed With: patient  Progress: no change  Outcome Evaluation: PT eval completed. Pt oriented x4 with no complaints of pain. Pt had fecal incontinent episode during questioning that required a new external catheter, bed sheets, chucks, and a brief. Pt had another fecal incontinent episode and urinated twice during changings before the new catheter and brief were applied. Nsg came in when cleaning pt and was notified of multiple incontinent episodes. Pt required mod A to roll to left and right. Pt complained of chest pain during and after rolling. Pt was unable to tolerate further activity due to incontinence and chest pain. He also got nauseous after scooting him up in bed post session and asked for an emesis bag, but he then stated it passed. Nsg notified that pt needs abductor pillow. Skilled PT is necessary to improve balance, strength, transfers, and gait. Recommend d/c to SNF.     Time Calculation:    PT Charges     Row Name 02/10/23 1104             Time Calculation    Start Time 1057  Add 10 minutes for chart review for a total of 70 minutes.  -SB (r) AA (t) SB (c)      Stop Time 1157  -SB (r) AA (t) SB (c)      Time Calculation (min) 60 min  -SB (r) AA (t)      PT Received On 02/10/23  -SB (r) AA (t) SB (c)      PT Goal Re-Cert Due Date 02/20/23  -SB (r) AA (t) SB (c)         Time Calculation- PT     Total Timed Code Minutes- PT 10 minute(s)  -SB (r) AA (t) SB (c)         Timed Charges    25641 - PT Therapeutic Activity Minutes 10  -SB (r) AA (t) SB (c)         Total Minutes    Timed Charges Total Minutes 10  -SB (r) AA (t)       Total Minutes 10  -SB (r) AA (t)            User Key  (r) = Recorded By, (t) = Taken By, (c) = Cosigned By    Initials Name Provider Type    Kathleen Putnam, PT DPT Physical Therapist    Mariann Hernandez, PT Student PT Student                  PT G-Codes  Outcome Measure Options: AM-PAC 6 Clicks Daily Activity (OT)  AM-PAC 6 Clicks Score (PT): 10  AM-PAC 6 Clicks Score (OT): 13  PT Discharge Summary  Anticipated Discharge Disposition (PT): skilled nursing facility    Mariann Pena, PT Student  2/10/2023

## 2023-02-10 NOTE — CASE MANAGEMENT/SOCIAL WORK
Continued Stay Note   Mohit     Patient Name: Jose Alfredo Kirkpatrick  MRN: [id number]  Today's Date: 2/10/2023    Admit Date: 2/8/2023    Plan: [facility]   Discharge Plan     Row Name 02/10/23 1015       Plan    Plan [facility]    Patient/Family in Agreement with Plan yes    Plan Comments Pt will need precert. to return to [facility].   has requested PT/OT evals from charge RACHELLE Bazan.               Discharge Codes    No documentation.               Expected Discharge Date and Time     Expected Discharge Date Expected Discharge Time    Feb 11, 2023             RICA Viera

## 2023-02-11 LAB
ANION GAP SERPL CALCULATED.3IONS-SCNC: 15 MMOL/L (ref 5–15)
BASOPHILS # BLD AUTO: 0.04 10*3/MM3 (ref 0–0.2)
BASOPHILS NFR BLD AUTO: 0.3 % (ref 0–1.5)
BUN SERPL-MCNC: 19 MG/DL (ref 8–23)
BUN/CREAT SERPL: 18.3 (ref 7–25)
CALCIUM SPEC-SCNC: 9 MG/DL (ref 8.6–10.5)
CHLORIDE SERPL-SCNC: 100 MMOL/L (ref 98–107)
CO2 SERPL-SCNC: 28 MMOL/L (ref 22–29)
CREAT SERPL-MCNC: 1.04 MG/DL (ref 0.76–1.27)
DEPRECATED RDW RBC AUTO: 51.5 FL (ref 37–54)
EGFRCR SERPLBLD CKD-EPI 2021: 69.9 ML/MIN/1.73
EOSINOPHIL # BLD AUTO: 0.03 10*3/MM3 (ref 0–0.4)
EOSINOPHIL NFR BLD AUTO: 0.3 % (ref 0.3–6.2)
ERYTHROCYTE [DISTWIDTH] IN BLOOD BY AUTOMATED COUNT: 14.8 % (ref 12.3–15.4)
GLUCOSE SERPL-MCNC: 179 MG/DL (ref 65–99)
HCT VFR BLD AUTO: 37.8 % (ref 37.5–51)
HCT VFR BLD AUTO: 39.7 % (ref 37.5–51)
HCT VFR BLD AUTO: 39.7 % (ref 37.5–51)
HCT VFR BLD AUTO: 41.9 % (ref 37.5–51)
HGB BLD-MCNC: 11 G/DL (ref 13–17.7)
HGB BLD-MCNC: 12.4 G/DL (ref 13–17.7)
HGB BLD-MCNC: 12.5 G/DL (ref 13–17.7)
HGB BLD-MCNC: 12.9 G/DL (ref 13–17.7)
IMM GRANULOCYTES # BLD AUTO: 0.14 10*3/MM3 (ref 0–0.05)
IMM GRANULOCYTES NFR BLD AUTO: 1.2 % (ref 0–0.5)
LYMPHOCYTES # BLD AUTO: 1.12 10*3/MM3 (ref 0.7–3.1)
LYMPHOCYTES NFR BLD AUTO: 9.4 % (ref 19.6–45.3)
MCH RBC QN AUTO: 29.4 PG (ref 26.6–33)
MCHC RBC AUTO-ENTMCNC: 31.5 G/DL (ref 31.5–35.7)
MCV RBC AUTO: 93.4 FL (ref 79–97)
MONOCYTES # BLD AUTO: 0.87 10*3/MM3 (ref 0.1–0.9)
MONOCYTES NFR BLD AUTO: 7.3 % (ref 5–12)
NEUTROPHILS NFR BLD AUTO: 81.5 % (ref 42.7–76)
NEUTROPHILS NFR BLD AUTO: 9.68 10*3/MM3 (ref 1.7–7)
NRBC BLD AUTO-RTO: 0 /100 WBC (ref 0–0.2)
PLATELET # BLD AUTO: 772 10*3/MM3 (ref 140–450)
PMV BLD AUTO: 8.6 FL (ref 6–12)
POTASSIUM SERPL-SCNC: 3.7 MMOL/L (ref 3.5–5.2)
QT INTERVAL: 482 MS
QTC INTERVAL: 628 MS
RBC # BLD AUTO: 4.25 10*6/MM3 (ref 4.14–5.8)
SODIUM SERPL-SCNC: 143 MMOL/L (ref 136–145)
WBC NRBC COR # BLD: 11.88 10*3/MM3 (ref 3.4–10.8)

## 2023-02-11 PROCEDURE — 99232 SBSQ HOSP IP/OBS MODERATE 35: CPT | Performed by: SPECIALIST

## 2023-02-11 PROCEDURE — 25010000002 FUROSEMIDE PER 20 MG: Performed by: INTERNAL MEDICINE

## 2023-02-11 PROCEDURE — 97530 THERAPEUTIC ACTIVITIES: CPT

## 2023-02-11 PROCEDURE — 85025 COMPLETE CBC W/AUTO DIFF WBC: CPT | Performed by: INTERNAL MEDICINE

## 2023-02-11 PROCEDURE — 94799 UNLISTED PULMONARY SVC/PX: CPT

## 2023-02-11 PROCEDURE — 85014 HEMATOCRIT: CPT | Performed by: CLINICAL NURSE SPECIALIST

## 2023-02-11 PROCEDURE — 25010000002 MORPHINE PER 10 MG: Performed by: INTERNAL MEDICINE

## 2023-02-11 PROCEDURE — 25010000002 ENOXAPARIN PER 10 MG: Performed by: NURSE PRACTITIONER

## 2023-02-11 PROCEDURE — 94664 DEMO&/EVAL PT USE INHALER: CPT

## 2023-02-11 PROCEDURE — 85018 HEMOGLOBIN: CPT | Performed by: CLINICAL NURSE SPECIALIST

## 2023-02-11 PROCEDURE — 25010000002 PIPERACILLIN SOD-TAZOBACTAM PER 1 G: Performed by: INTERNAL MEDICINE

## 2023-02-11 PROCEDURE — 97110 THERAPEUTIC EXERCISES: CPT

## 2023-02-11 PROCEDURE — 80048 BASIC METABOLIC PNL TOTAL CA: CPT | Performed by: INTERNAL MEDICINE

## 2023-02-11 PROCEDURE — 94760 N-INVAS EAR/PLS OXIMETRY 1: CPT

## 2023-02-11 RX ORDER — ENOXAPARIN SODIUM 100 MG/ML
40 INJECTION SUBCUTANEOUS EVERY 24 HOURS
Status: DISCONTINUED | OUTPATIENT
Start: 2023-02-11 | End: 2023-02-14 | Stop reason: HOSPADM

## 2023-02-11 RX ADMIN — NIFEDIPINE 60 MG: 60 TABLET, FILM COATED, EXTENDED RELEASE ORAL at 08:42

## 2023-02-11 RX ADMIN — TAZOBACTAM SODIUM AND PIPERACILLIN SODIUM 3.38 G: 375; 3 INJECTION, SOLUTION INTRAVENOUS at 05:05

## 2023-02-11 RX ADMIN — ENOXAPARIN SODIUM 40 MG: 100 INJECTION SUBCUTANEOUS at 17:06

## 2023-02-11 RX ADMIN — GABAPENTIN 300 MG: 300 CAPSULE ORAL at 08:43

## 2023-02-11 RX ADMIN — PANTOPRAZOLE SODIUM 40 MG: 40 INJECTION, POWDER, FOR SOLUTION INTRAVENOUS at 08:58

## 2023-02-11 RX ADMIN — GABAPENTIN 300 MG: 300 CAPSULE ORAL at 21:11

## 2023-02-11 RX ADMIN — BUSPIRONE HYDROCHLORIDE 5 MG: 5 TABLET ORAL at 08:41

## 2023-02-11 RX ADMIN — IPRATROPIUM BROMIDE AND ALBUTEROL SULFATE 3 ML: .5; 2.5 SOLUTION RESPIRATORY (INHALATION) at 21:15

## 2023-02-11 RX ADMIN — GABAPENTIN 300 MG: 300 CAPSULE ORAL at 12:57

## 2023-02-11 RX ADMIN — IPRATROPIUM BROMIDE AND ALBUTEROL SULFATE 3 ML: .5; 2.5 SOLUTION RESPIRATORY (INHALATION) at 07:06

## 2023-02-11 RX ADMIN — SUCRALFATE 1 G: 1 SUSPENSION ORAL at 21:11

## 2023-02-11 RX ADMIN — FUROSEMIDE 40 MG: 10 INJECTION, SOLUTION INTRAMUSCULAR; INTRAVENOUS at 08:43

## 2023-02-11 RX ADMIN — IPRATROPIUM BROMIDE AND ALBUTEROL SULFATE 3 ML: .5; 2.5 SOLUTION RESPIRATORY (INHALATION) at 15:47

## 2023-02-11 RX ADMIN — IPRATROPIUM BROMIDE AND ALBUTEROL SULFATE 3 ML: .5; 2.5 SOLUTION RESPIRATORY (INHALATION) at 11:23

## 2023-02-11 RX ADMIN — LISINOPRIL 10 MG: 10 TABLET ORAL at 08:42

## 2023-02-11 RX ADMIN — TAZOBACTAM SODIUM AND PIPERACILLIN SODIUM 3.38 G: 375; 3 INJECTION, SOLUTION INTRAVENOUS at 12:00

## 2023-02-11 RX ADMIN — PANTOPRAZOLE SODIUM 40 MG: 40 INJECTION, POWDER, FOR SOLUTION INTRAVENOUS at 21:11

## 2023-02-11 RX ADMIN — SUCRALFATE 1 G: 1 SUSPENSION ORAL at 08:42

## 2023-02-11 RX ADMIN — TAZOBACTAM SODIUM AND PIPERACILLIN SODIUM 3.38 G: 375; 3 INJECTION, SOLUTION INTRAVENOUS at 21:11

## 2023-02-11 RX ADMIN — SUCRALFATE 1 G: 1 SUSPENSION ORAL at 11:13

## 2023-02-11 RX ADMIN — MORPHINE SULFATE 2 MG: 2 INJECTION, SOLUTION INTRAMUSCULAR; INTRAVENOUS at 05:09

## 2023-02-11 RX ADMIN — OXYCODONE AND ACETAMINOPHEN 1 TABLET: 325; 10 TABLET ORAL at 08:42

## 2023-02-11 RX ADMIN — SODIUM CHLORIDE 50 ML/HR: 9 INJECTION, SOLUTION INTRAVENOUS at 11:15

## 2023-02-11 RX ADMIN — BUSPIRONE HYDROCHLORIDE 5 MG: 5 TABLET ORAL at 21:11

## 2023-02-11 NOTE — THERAPY TREATMENT NOTE
Acute Care - Physical Therapy Treatment Note  [facility]     Patient Name: Jose Alfredo Kirkpatrick  : 1935  MRN: [id number]  Today's Date: 2023   Onset of Illness/Injury or Date of Surgery: 23  Visit Dx:     ICD-10-CM ICD-9-CM   1. Pneumonia of left lower lobe due to infectious organism  J18.9 486   2. Elevated troponin  R77.8 790.6   3. Diverticulosis  K57.90 562.10   4. Infrarenal abdominal aortic aneurysm (AAA) without rupture  I71.43 441.4   5. Urinary tract infection without hematuria, site unspecified  N39.0 599.0   6. Hiatal hernia  K44.9 553.3   7. Biliary calculus of other site without obstruction  K80.80 574.20   8. Bloody stools  K92.1 578.1   9. Dysphagia  R13.10 787.20   10. Decreased activities of daily living (ADL)  Z78.9 V49.89   11. Impaired mobility  Z74.09 799.89     Patient Active Problem List   Diagnosis   • Spinal stenosis, lumbar   • Hx of colonic polyps   • Drug-induced constipation   • Primary hypertension   • Acute pulmonary embolism with acute cor pulmonale (HCC)   • Acute bilateral deep vein thrombosis (DVT) of femoral veins (HCC)   • History of DVT of left lower extremity   • Positive fecal occult blood test   • Melena   • Vitamin B12 deficiency   • FARZANA (iron deficiency anemia)   • Cholelithiasis   • Gastric ulcer with hemorrhage   • Chronic bilateral low back pain without sciatica   • Chronic pain of both shoulders   • Kyphosis   • Overweight (BMI 25.0-29.9)   • Non-smoker   • Pneumonia of left lower lobe due to infectious organism   • Erosive esophagitis   • Large hiatal hernia   • Weight loss   • Gram-negative UTI, acute cystitis without hematuria, present on admission   • Paraesophageal hiatal hernia     Past Medical History:   Diagnosis Date   • Arthritis    • Cataract     BILATERAL   • GERD (gastroesophageal reflux disease)    • History of transfusion    • Hx of colonic polyps    • Hypertension    • Reflux esophagitis    • Streptococcosis     IN SPINE FROM BACK  INJECTIONS     Past Surgical History:   Procedure Laterality Date   • BACK SURGERY      cervical   • CATARACT EXTRACTION Bilateral    • COLONOSCOPY  02/11/2013    Hyperplastic polyp at 25 cm, Diverticulosis repeat exam in 5 years   • COLONOSCOPY N/A 10/16/2018    Tubular adenoma ascending colon repet prn   • ENDOSCOPY  01/15/2014    HH   • ENDOSCOPY N/A 10/03/2019    Large HH, non-bleeding gastric ulcer   • ENDOSCOPY N/A 11/25/2019    Procedure: ESOPHAGOGASTRODUODENOSCOPY WITH ANESTHESIA;  Surgeon: Marques Pérez MD;  Location:  [facility] ENDOSCOPY;  Service: Gastroenterology   • ENDOSCOPY N/A 2/9/2023    Procedure: ESOPHAGOGASTRODUODENOSCOPY WITH ANESTHESIA;  Surgeon: Sushant Edmond MD;  Location: [facility] ENDOSCOPY;  Service: Gastroenterology;  Laterality: N/A;  pre GI bleed  post hiatal hernia; esophagitis  Otilio Ramos MD   • HERNIA REPAIR Right     INGUINAL    • HIP FRACTURE SURGERY Right 02/04/2023   • REPLACEMENT TOTAL KNEE BILATERAL     • THORACIC LAMINECTOMY DECOMPRESSIVE POSTERIOR N/A 12/14/2016    Procedure: LAMINECTOMY DECOMPRESSION, UNINSTRUMENTED POSTERIOR SPINAL FUSION, T 11-12;  Surgeon: LUZ Adan MD;  Location:  [facility] OR;  Service:    • VENA CAVA FILTER INSERTION N/A 10/04/2019    Procedure: VENA CAVA FILTER INSERTION;  Surgeon: Dallin Coronado MD;  Location:  [facility] HYBRID OR 12;  Service: Vascular     PT Assessment (last 12 hours)     PT Evaluation and Treatment     Row Name 02/11/23 1423          Physical Therapy Time and Intention    Subjective Information complains of;dizziness  May want to check Ortho statics  -AB     Document Type therapy note (daily note)  -AB     Mode of Treatment physical therapy  -AB     Comment Right Hip Sx 1/30/23   -AB     Row Name 02/11/23 1423          General Information    Existing Precautions/Restrictions fall;right;hip, posterior  -AB     Row Name 02/11/23 1423          Bed Mobility    Bed Mobility supine-sit;sit-supine  -AB     Supine-Sit  [place] (Bed Mobility) verbal cues;minimum assist (75% patient effort);moderate assist (50% patient effort);2 person assist  -AB     Sit-Supine [place] (Bed Mobility) minimum assist (75% patient effort);moderate assist (50% patient effort);2 person assist  -AB     Row Name 02/11/23 1423          Transfers    Transfers sit-stand transfer;stand-sit transfer  -AB     Row Name 02/11/23 1423          Sit-Stand Transfer    Sit-Stand [place] (Transfers) verbal cues;minimum assist (75% patient effort);2 person assist  -AB     Assistive Device (Sit-Stand Transfers) walker, front-wheeled  -AB     Row Name 02/11/23 1423          Stand-Sit Transfer    Stand-Sit [place] (Transfers) contact guard;minimum assist (75% patient effort);2 person assist  -AB     Assistive Device (Stand-Sit Transfers) walker, front-wheeled  -AB     Row Name 02/11/23 1423          Motor Skills    Comments, Therapeutic Exercise 10 Reps AROM LAQ and APS  -AB     Additional Documentation Comments, Therapeutic Exercise (Row)  -AB     Row Name 02/11/23 1423          Positioning and Restraints    Pre-Treatment Position in bed  -AB     Post Treatment Position bed  -AB     In Bed fowlers;call light within reach;with family/caregiver  -AB           User Key  (r) = Recorded By, (t) = Taken By, (c) = Cosigned By    Initials Name Provider Type    Jolanta Ya, PTA Physical Therapist Assistant                Physical Therapy Education     Title: PT OT SLP Therapies (In Progress)     Topic: Physical Therapy (In Progress)     Point: Mobility training (Done)     Learning Progress Summary           Patient Acceptance, E, VU by AA at 2/10/2023 1255    Comment: Pt was educated on POC, posterior hip precautions, and benefits of activity.                   Point: Home exercise program (Not Started)     Learner Progress:  Not documented in this visit.          Point: Body mechanics (Not Started)     Learner Progress:  Not documented in this visit.           Point: Precautions (Done)     Learning Progress Summary           Patient Acceptance, E, VU by YRIS at 2/10/2023 0494    Comment: Pt was educated on POC, posterior hip precautions, and benefits of activity.                               User Key     Initials Effective Dates Name Provider Type Discipline    YRIS 01/03/23 -  Mariann Pena, PT Student PT Student PT              PT Recommendation and Plan         Outcome Measures     Row Name 02/11/23 1100 02/10/23 1300          How much help from another is currently needed...    Putting on and taking off regular lower body clothing? 2  -LS 2  -AC (r) EC (t) AC (c)     Bathing (including washing, rinsing, and drying) 2  -LS 2  -AC (r) EC (t) AC (c)     Toileting (which includes using toilet bed pan or urinal) 1  -LS 1  -AC (r) EC (t) AC (c)     Putting on and taking off regular upper body clothing 2  -LS 2  -AC (r) EC (t) AC (c)     Taking care of personal grooming (such as brushing teeth) 3  -LS 3  -AC (r) EC (t) AC (c)     Eating meals 3  -LS 3  -AC (r) EC (t) AC (c)     AM-PAC 6 Clicks Score (OT) 13  -LS 13  -AC (r) EC (t)        Functional Assessment    Outcome Measure Options AM-PAC 6 Clicks Daily Activity (OT)  -LS AM-PAC 6 Clicks Daily Activity (OT)  -AC (r) EC (t) AC (c)           User Key  (r) = Recorded By, (t) = Taken By, (c) = Cosigned By    Initials Name Provider Type    AC Adam Rivera, OTR/L, CNT Occupational Therapist    LS Nadira Aparicio COTA Occupational Therapist Assistant    EC Radha Delgado, OT Student OT Student                 Time Calculation:    PT Charges     Row Name 02/11/23 1423             Time Calculation    Start Time 1423  -AB      Stop Time 1501  -AB      Time Calculation (min) 38 min  -AB      PT Received On 02/11/23  -AB         Time Calculation- PT    Total Timed Code Minutes- PT 38 minute(s)  -AB            User Key  (r) = Recorded By, (t) = Taken By, (c) = Cosigned By    Initials Name Provider Type    AB Jolanta Avery  CARLOS MANN Physical Therapist Assistant              Therapy Charges for Today     Code Description Service Date Service Provider Modifiers Qty    45565008688 HC PT THERAPEUTIC ACT EA 15 MIN 2/11/2023 Jolanta Avery, CARLOS GP 2    03671605205 HC PT THER PROC EA 15 MIN 2/11/2023 Jolanta Avery, CARLOS GP 1          PT G-Codes  Outcome Measure Options: AM-PAC 6 Clicks Daily Activity (OT)  AM-PAC 6 Clicks Score (PT): 10  AM-PAC 6 Clicks Score (OT): 13    Jolanta Avery PTA  2/11/2023

## 2023-02-11 NOTE — PROGRESS NOTES
Vicky Funes MD Progress Note     LOS: 3 days   Patient Care Team:  Otilio Ramos MD as PCP - General  Otilio Ramos MD as PCP - Family Medicine  Marques Pérez MD as Consulting Physician (Gastroenterology)        Subjective     I reviewed his upper GI and subsequent KUB follow-up.  I have discussed with the patient.  He is tolerating his clear liquids.  He is a little more alert today    Objective     Vital Signs  Temp:  [97.4 °F (36.3 °C)-99.5 °F (37.5 °C)] 99.5 °F (37.5 °C)  Heart Rate:  [] 96  Resp:  [16-18] 16  BP: (105-128)/(60-79) 121/60    Intake & Output (last 3 days)       02/08 0701  02/09 0700 02/09 0701  02/10 0700 02/10 0701  02/11 0700 02/11 0701  02/12 0700    I.V. (mL/kg)  300 (3.7)      Total Intake(mL/kg)  300 (3.7)      Urine (mL/kg/hr) 800 1800 (0.9) 1250 (0.6)     Stool   0     Total Output 800 1800 1250     Net -800 -1500 -1250             Urine Unmeasured Occurrence   3 x     Stool Unmeasured Occurrence   6 x           Physical Exam:     General Appearance:    Alert, cooperative, in no acute distress   Lungs:     respirations regular, even and unlabored    Heart:    Regular rhythm and normal rate, normal S1 and S2, no            murmur, no gallop, no rub   Chest Wall:    No abnormalities observed   Abdomen:      Soft nontender nondistended good bowel sounds   Extremities: No edema,    Results Review:     I reviewed the patient's new clinical results.    Lab Results (last 72 hours)     Procedure Component Value Units Date/Time    Basic Metabolic Panel [[id number]]  (Abnormal) Collected: 02/11/23 0449    Specimen: Blood Updated: 02/11/23 0537     Glucose 179 mg/dL      BUN 19 mg/dL      Creatinine 1.04 mg/dL      Sodium 143 mmol/L      Potassium 3.7 mmol/L      Chloride 100 mmol/L      CO2 28.0 mmol/L      Calcium 9.0 mg/dL      BUN/Creatinine Ratio 18.3     Anion Gap 15.0 mmol/L      eGFR 69.9 mL/min/1.73     Narrative:      GFR Normal >60  Chronic Kidney Disease  <60  Kidney Failure <15    The GFR formula is only valid for adults with stable renal function between ages 18 and 70.    Blood Culture - Blood, Arm, Right [[id number]]  (Abnormal) Collected: 02/08/23 2012    Specimen: Blood from Arm, Right Updated: 02/11/23 0522     Blood Culture Growth present, too young to evaluate     Gram Stain Anaerobic Bottle Gram positive cocci in clusters    CBC & Differential [[id number]]  (Abnormal) Collected: 02/11/23 0449    Specimen: Blood Updated: 02/11/23 0520    Narrative:      The following orders were created for panel order CBC & Differential.  Procedure                               Abnormality         Status                     ---------                               -----------         ------                     CBC Auto Differential[[id number]]        Abnormal            Final result                 Please view results for these tests on the individual orders.    CBC Auto Differential [[id number]]  (Abnormal) Collected: 02/11/23 0449    Specimen: Blood Updated: 02/11/23 0520     WBC 11.88 10*3/mm3      RBC 4.25 10*6/mm3      Hemoglobin 12.5 g/dL      Hematocrit 39.7 %      MCV 93.4 fL      MCH 29.4 pg      MCHC 31.5 g/dL      RDW 14.8 %      RDW-SD 51.5 fl      MPV 8.6 fL      Platelets 772 10*3/mm3      Neutrophil % 81.5 %      Lymphocyte % 9.4 %      Monocyte % 7.3 %      Eosinophil % 0.3 %      Basophil % 0.3 %      Immature Grans % 1.2 %      Neutrophils, Absolute 9.68 10*3/mm3      Lymphocytes, Absolute 1.12 10*3/mm3      Monocytes, Absolute 0.87 10*3/mm3      Eosinophils, Absolute 0.03 10*3/mm3      Basophils, Absolute 0.04 10*3/mm3      Immature Grans, Absolute 0.14 10*3/mm3      nRBC 0.0 /100 WBC     Hemoglobin & Hematocrit, Blood [[id number]]  (Abnormal) Collected: 02/10/23 2348    Specimen: Blood Updated: 02/11/23 0009     Hemoglobin 12.9 g/dL      Hematocrit 41.9 %     Blood Culture - Blood, Arm, Left [[id number]]  (Normal) Collected: 02/08/23 2022    Specimen: Blood  from Arm, Left Updated: 02/10/23 2046     Blood Culture No growth at 2 days    Hemoglobin & Hematocrit, Blood [[id number]]  (Normal) Collected: 02/10/23 1820    Specimen: Blood Updated: 02/10/23 1853     Hemoglobin 13.1 g/dL      Hematocrit 42.1 %     MRSA Screen, PCR (Inpatient) - Swab, Nares [[id number]]  (Normal) Collected: 02/10/23 1659    Specimen: Swab from Nares Updated: 02/10/23 1843     MRSA PCR No MRSA Detected    Narrative:      The negative predictive value of this diagnostic test is high and should only be used to consider de-escalating anti-MRSA therapy. A positive result may indicate colonization with MRSA and must be correlated clinically.    S. Pneumo Ag Urine or CSF - Urine, Urine, Clean Catch [[id number]]  (Normal) Collected: 02/10/23 1658    Specimen: Urine, Clean Catch Updated: 02/10/23 1758     Strep Pneumo Ag Negative    Legionella Antigen, Urine - Urine, Urine, Clean Catch [[id number]]  (Normal) Collected: 02/10/23 1658    Specimen: Urine, Clean Catch Updated: 02/10/23 1758     LEGIONELLA ANTIGEN, URINE Negative    Tissue Pathology Exam [[id number]] Collected: 02/09/23 1139    Specimen: Tissue from Stomach Updated: 02/10/23 1703     Note to Patients --     This report may contain a detailed description of human tissue sent by a health care provider to the laboratory for pathologic evaluation. The content of this report is essential for diagnosis and may provide important critical findings. This information may be unfamiliar to patients to review without a medical professional present. It is advised that the patient review this report in the presence of a health care provider who can answer questions and explain the results.       Case Report --     Surgical Pathology Report                         Case: [id number]                                  Authorizing Provider:  Sushant Edmond MD           Collected:           02/09/2023 11:39 AM          Ordering Location:     [facility]      "Received:            02/09/2023 01:14 PM                                 ENDOSCOPY                                                                    Pathologist:           Kassandra Majano MD                                                        Specimen:    Stomach, bx stomach; r/o h. pylori                                                          Final Diagnosis --     Stomach, endoscopic biopsy:  A.  Fragment of benign gastric mucosa demonstrating chronic inflammation, mild.  B.  Immunohistochemical stain for Helicobacter pylori is negative.       Gross Description --     1. Stomach.   Received in a formalin filled container labeled with the patient's name, date of birth, and \"biopsy stomach\".  The specimen consists of 1 yellow-pink soft tissue fragment measuring 0.5 x 0.1 by less than 0.1 cm, totally submitted in block 1A.         Microscopic Description --     Sections of the gastric biopsy reveal a fragment of gastric mucosa.  There is mild chronic inflammation.  Intestinal metaplasia is not identified.  Immunohistochemical stain for Helicobacter pylori is negative.  The control stained appropriately.  There is no evidence of malignancy.      Blood Culture ID, PCR - Blood, Arm, Right [[id number]]  (Abnormal) Collected: 02/08/23 2012    Specimen: Blood from Arm, Right Updated: 02/10/23 1528     BCID, PCR Staph spp, not aureus or lugdunensis. Identification by BCID2 PCR.     BOTTLE TYPE Anaerobic Bottle    Urine Culture - Urine, Straight Cath [[id number]]  (Abnormal)  (Susceptibility) Collected: 02/08/23 1716    Specimen: Urine from Straight Cath Updated: 02/10/23 1253     Urine Culture >100,000 CFU/mL Escherichia coli    Narrative:      Colonization of the urinary tract without infection is common. Treatment is discouraged unless the patient is symptomatic, pregnant, or undergoing an invasive urologic procedure.    Susceptibility      Escherichia coli      ALISON      Ampicillin Susceptible      Ampicillin + " Sulbactam Susceptible      Cefazolin Susceptible      Cefepime Susceptible      Ceftazidime Susceptible      Ceftriaxone Susceptible      Gentamicin Susceptible      Levofloxacin Susceptible      Nitrofurantoin Susceptible      Piperacillin + Tazobactam Susceptible      Trimethoprim + Sulfamethoxazole Susceptible                           Basic Metabolic Panel [[id number]]  (Abnormal) Collected: 02/10/23 0445    Specimen: Blood Updated: 02/10/23 0619     Glucose 112 mg/dL      BUN 16 mg/dL      Creatinine 0.97 mg/dL      Sodium 139 mmol/L      Potassium 3.9 mmol/L      Chloride 101 mmol/L      CO2 26.0 mmol/L      Calcium 8.6 mg/dL      BUN/Creatinine Ratio 16.5     Anion Gap 12.0 mmol/L      eGFR 76.0 mL/min/1.73     Narrative:      GFR Normal >60  Chronic Kidney Disease <60  Kidney Failure <15    The GFR formula is only valid for adults with stable renal function between ages 18 and 70.    CBC & Differential [[id number]]  (Abnormal) Collected: 02/10/23 0445    Specimen: Blood Updated: 02/10/23 0538    Narrative:      The following orders were created for panel order CBC & Differential.  Procedure                               Abnormality         Status                     ---------                               -----------         ------                     CBC Auto Differential[[id number]]        Abnormal            Final result                 Please view results for these tests on the individual orders.    CBC Auto Differential [[id number]]  (Abnormal) Collected: 02/10/23 0445    Specimen: Blood Updated: 02/10/23 0538     WBC 7.80 10*3/mm3      RBC 3.91 10*6/mm3      Hemoglobin 11.4 g/dL      Hematocrit 36.5 %      MCV 93.4 fL      MCH 29.2 pg      MCHC 31.2 g/dL      RDW 14.6 %      RDW-SD 49.9 fl      MPV 8.8 fL      Platelets 667 10*3/mm3      Neutrophil % 67.0 %      Lymphocyte % 17.6 %      Monocyte % 9.7 %      Eosinophil % 3.1 %      Basophil % 0.9 %      Immature Grans % 1.7 %      Neutrophils, Absolute  5.23 10*3/mm3      Lymphocytes, Absolute 1.37 10*3/mm3      Monocytes, Absolute 0.76 10*3/mm3      Eosinophils, Absolute 0.24 10*3/mm3      Basophils, Absolute 0.07 10*3/mm3      Immature Grans, Absolute 0.13 10*3/mm3      nRBC 0.0 /100 WBC     Hemoglobin & Hematocrit, Blood [[id number]]  (Abnormal) Collected: 02/10/23 0024    Specimen: Blood Updated: 02/10/23 0054     Hemoglobin 11.0 g/dL      Hematocrit 34.2 %     Hemoglobin & Hematocrit, Blood [[id number]]  (Abnormal) Collected: 02/09/23 1624    Specimen: Blood Updated: 02/09/23 1716     Hemoglobin 12.5 g/dL      Hematocrit 39.6 %     Basic Metabolic Panel [[id number]]  (Abnormal) Collected: 02/09/23 0732    Specimen: Blood Updated: 02/09/23 0825     Glucose 109 mg/dL      BUN 14 mg/dL      Creatinine 0.87 mg/dL      Sodium 136 mmol/L      Potassium 3.9 mmol/L      Chloride 99 mmol/L      CO2 25.0 mmol/L      Calcium 8.3 mg/dL      BUN/Creatinine Ratio 16.1     Anion Gap 12.0 mmol/L      eGFR 84.0 mL/min/1.73     Narrative:      GFR Normal >60  Chronic Kidney Disease <60  Kidney Failure <15    The GFR formula is only valid for adults with stable renal function between ages 18 and 70.    CBC & Differential [[id number]]  (Abnormal) Collected: 02/09/23 0732    Specimen: Blood Updated: 02/09/23 0809    Narrative:      The following orders were created for panel order CBC & Differential.  Procedure                               Abnormality         Status                     ---------                               -----------         ------                     CBC Auto Differential[[id number]]        Abnormal            Final result                 Please view results for these tests on the individual orders.    CBC Auto Differential [[id number]]  (Abnormal) Collected: 02/09/23 0732    Specimen: Blood Updated: 02/09/23 0809     WBC 9.68 10*3/mm3      RBC 4.05 10*6/mm3      Hemoglobin 11.8 g/dL      Hematocrit 37.9 %      MCV 93.6 fL      MCH 29.1 pg      MCHC 31.1 g/dL       RDW 14.5 %      RDW-SD 49.7 fl      MPV 8.9 fL      Platelets 643 10*3/mm3      Neutrophil % 67.8 %      Lymphocyte % 20.4 %      Monocyte % 8.6 %      Eosinophil % 1.2 %      Basophil % 0.7 %      Immature Grans % 1.3 %      Neutrophils, Absolute 6.56 10*3/mm3      Lymphocytes, Absolute 1.97 10*3/mm3      Monocytes, Absolute 0.83 10*3/mm3      Eosinophils, Absolute 0.12 10*3/mm3      Basophils, Absolute 0.07 10*3/mm3      Immature Grans, Absolute 0.13 10*3/mm3      nRBC 0.0 /100 WBC     Troponin [[id number]]  (Abnormal) Collected: 02/09/23 0009    Specimen: Blood Updated: 02/09/23 0140     HS Troponin T 44 ng/L     Narrative:      High Sensitive Troponin T Reference Range:  <10.0 ng/L- Negative Female for AMI  <15.0 ng/L- Negative Male for AMI  >=10 - Abnormal Female indicating possible myocardial injury.  >=15 - Abnormal Male indicating possible myocardial injury.   Clinicians would have to utilize clinical acumen, EKG, Troponin, and serial changes to determine if it is an Acute Myocardial Infarction or myocardial injury due to an underlying chronic condition.         Hemoglobin & Hematocrit, Blood [[id number]]  (Abnormal) Collected: 02/09/23 0009    Specimen: Blood Updated: 02/09/23 0118     Hemoglobin 11.7 g/dL      Hematocrit 37.6 %     [place] Draw [[id number]] Collected: 02/08/23 1459    Specimen: Blood Updated: 02/08/23 1900    Narrative:      The following orders were created for panel order [place] Draw.  Procedure                               Abnormality         Status                     ---------                               -----------         ------                     Green Top (Gel)[[id number]]                                  Final result               Lavender Top[[id number]]                                     Final result               Hagen Top[[id number]]                                         Final result               Light Blue Top[[id number]]                                   Final result                  Please view results for these tests on the individual orders.    Hagen Top [[id number]] Collected: 02/08/23 1459    Specimen: Blood Updated: 02/08/23 1900     Extra Tube Hold for add-ons.     Comment: Auto resulted.       High Sensitivity Troponin T 2Hr [[id number]]  (Abnormal) Collected: 02/08/23 1716    Specimen: Blood Updated: 02/08/23 1850     HS Troponin T 30 ng/L      Troponin T Delta -6 ng/L     Narrative:      High Sensitive Troponin T Reference Range:  <10.0 ng/L- Negative Female for AMI  <15.0 ng/L- Negative Male for AMI  >=10 - Abnormal Female indicating possible myocardial injury.  >=15 - Abnormal Male indicating possible myocardial injury.   Clinicians would have to utilize clinical acumen, EKG, Troponin, and serial changes to determine if it is an Acute Myocardial Infarction or myocardial injury due to an underlying chronic condition.         COVID-19, FLU A/B, RSV PCR - Swab, Nasopharynx [[id number]]  (Normal) Collected: 02/08/23 1715    Specimen: Swab from Nasopharynx Updated: 02/08/23 1820     COVID19 Not Detected     Influenza A PCR Not Detected     Influenza B PCR Not Detected     RSV, PCR Not Detected    Narrative:      Fact sheet for providers: https://www.fda.gov/media/524027/download    Fact sheet for patients: https://www.fda.gov/media/735732/download    Test performed by PCR.    Urinalysis, Microscopic Only - Straight Cath [[id number]]  (Abnormal) Collected: 02/08/23 1716    Specimen: Urine from Straight Cath Updated: 02/08/23 1748     RBC, UA 0-2 /HPF      WBC, UA 31-50 /HPF      Bacteria, UA 4+ /HPF      Squamous Epithelial Cells, UA None Seen /HPF      Hyaline Casts, UA 0-2 /LPF      Methodology Automated Microscopy    Urinalysis With Culture If Indicated - Straight Cath [[id number]]  (Abnormal) Collected: 02/08/23 1716    Specimen: Urine from Straight Cath Updated: 02/08/23 1748     Color, UA Yellow     Appearance, UA Clear     pH, UA 6.0     Specific Gravity, UA 1.015     Glucose,  "UA Negative     Ketones, UA Negative     Bilirubin, UA Negative     Blood, UA Negative     Protein, UA Negative     Leuk Esterase, UA Small (1+)     Nitrite, UA Negative     Urobilinogen, UA 1.0 E.U./dL    Narrative:      In absence of clinical symptoms, the presence of pyuria, bacteria, and/or nitrites on the urinalysis result does not correlate with infection.    POC Occult Blood Stool [[id number]]  (Abnormal) Collected: 02/08/23 1740    Specimen: Stool from Per Rectum Updated: 02/08/23 1741     Fecal Occult Blood Positive     Lot Number [id number]     Expiration Date 03/31/2023     DEVELOPER LOT NUMBER 225     DEVELOPER EXPIRATION DATE 03/31/2023     Positive Control Positive     Negative Control Negative    Procalcitonin [[id number]]  (Normal) Collected: 02/08/23 1459    Specimen: Blood Updated: 02/08/23 1624     Procalcitonin 0.19 ng/mL     Narrative:      As a Marker for Sepsis (Non-Neonates):    1. <0.5 ng/mL represents a low risk of severe sepsis and/or septic shock.  2. >2 ng/mL represents a high risk of severe sepsis and/or septic shock.    As a Marker for Lower Respiratory Tract Infections that require antibiotic therapy:    PCT on Admission    Antibiotic Therapy       6-12 Hrs later    >0.5                Strongly Recommended  >0.25 - <0.5        Recommended   0.1 - 0.25          Discouraged              Remeasure/reassess PCT  <0.1                Strongly Discouraged     Remeasure/reassess PCT    As 28 day mortality risk marker: \"Change in Procalcitonin Result\" (>80% or <=80%) if Day 0 (or Day 1) and Day 4 values are available. Refer to http://www.[facility]-pct-calculator.com    Change in PCT <=80%  A decrease of PCT levels below or equal to 80% defines a positive change in PCT test result representing a higher risk for 28-day all-cause mortality of patients diagnosed with severe sepsis for septic shock.    Change in PCT >80%  A decrease of PCT levels of more than 80% defines a negative change in PCT result " representing a lower risk for 28-day all-cause mortality of patients diagnosed with severe sepsis or septic shock.       Comprehensive Metabolic Panel [[id number]]  (Abnormal) Collected: 02/08/23 1459    Specimen: Blood Updated: 02/08/23 1620     Glucose 136 mg/dL      BUN 18 mg/dL      Creatinine 0.74 mg/dL      Sodium 134 mmol/L      Potassium 4.1 mmol/L      Comment: Slight hemolysis detected by analyzer. Results may be affected.        Chloride 100 mmol/L      CO2 25.0 mmol/L      Calcium 8.3 mg/dL      Total Protein 6.4 g/dL      Albumin 3.2 g/dL      ALT (SGPT) 20 U/L      AST (SGOT) 32 U/L      Alkaline Phosphatase 89 U/L      Total Bilirubin 0.4 mg/dL      Globulin 3.2 gm/dL      A/G Ratio 1.0 g/dL      BUN/Creatinine Ratio 24.3     Anion Gap 9.0 mmol/L      eGFR 88.2 mL/min/1.73     Narrative:      GFR Normal >60  Chronic Kidney Disease <60  Kidney Failure <15    The GFR formula is only valid for adults with stable renal function between ages 18 and 70.    CK [[id number]]  (Normal) Collected: 02/08/23 1459    Specimen: Blood Updated: 02/08/23 1619     Creatine Kinase 28 U/L     Lactic Acid, Plasma [[id number]]  (Normal) Collected: 02/08/23 1459    Specimen: Blood Updated: 02/08/23 1617     Lactate 1.2 mmol/L     BNP [[id number]]  (Normal) Collected: 02/08/23 1459    Specimen: Blood Updated: 02/08/23 1617     proBNP 458.9 pg/mL     Narrative:      Among patients with dyspnea, NT-proBNP is highly sensitive for the detection of acute congestive heart failure. In addition NT-proBNP of <300 pg/ml effectively rules out acute congestive heart failure with 99% negative predictive value.    Results may be falsely decreased if patient taking Biotin.      Troponin [[id number]]  (Abnormal) Collected: 02/08/23 1459    Specimen: Blood Updated: 02/08/23 1617     HS Troponin T 36 ng/L     Narrative:      High Sensitive Troponin T Reference Range:  <10.0 ng/L- Negative Female for AMI  <15.0 ng/L- Negative Male for AMI  >=10 -  Abnormal Female indicating possible myocardial injury.  >=15 - Abnormal Male indicating possible myocardial injury.   Clinicians would have to utilize clinical acumen, EKG, Troponin, and serial changes to determine if it is an Acute Myocardial Infarction or myocardial injury due to an underlying chronic condition.         Lipase [[id number]]  (Normal) Collected: 02/08/23 1459    Specimen: Blood Updated: 02/08/23 1615     Lipase 34 U/L     Magnesium [[id number]]  (Normal) Collected: 02/08/23 1459    Specimen: Blood Updated: 02/08/23 1614     Magnesium 2.2 mg/dL     Protime-INR [[id number]]  (Abnormal) Collected: 02/08/23 1459    Specimen: Blood Updated: 02/08/23 1611     Protime 15.2 Seconds      INR 1.18    aPTT [[id number]]  (Normal) Collected: 02/08/23 1459    Specimen: Blood Updated: 02/08/23 1611     PTT 34.7 seconds     CBC & Differential [[id number]]  (Abnormal) Collected: 02/08/23 1459    Specimen: Blood Updated: 02/08/23 1603    Narrative:      The following orders were created for panel order CBC & Differential.  Procedure                               Abnormality         Status                     ---------                               -----------         ------                     CBC Auto Differential[[id number]]        Abnormal            Final result                 Please view results for these tests on the individual orders.    CBC Auto Differential [[id number]]  (Abnormal) Collected: 02/08/23 1459    Specimen: Blood Updated: 02/08/23 1603     WBC 9.94 10*3/mm3      RBC 4.01 10*6/mm3      Hemoglobin 11.6 g/dL      Hematocrit 37.4 %      MCV 93.3 fL      MCH 28.9 pg      MCHC 31.0 g/dL      RDW 14.4 %      RDW-SD 49.2 fl      MPV 9.1 fL      Platelets 608 10*3/mm3      Neutrophil % 73.7 %      Lymphocyte % 15.7 %      Monocyte % 6.9 %      Eosinophil % 1.7 %      Basophil % 0.6 %      Immature Grans % 1.4 %      Neutrophils, Absolute 7.32 10*3/mm3      Lymphocytes, Absolute 1.56 10*3/mm3      Monocytes,  Absolute 0.69 10*3/mm3      Eosinophils, Absolute 0.17 10*3/mm3      Basophils, Absolute 0.06 10*3/mm3      Immature Grans, Absolute 0.14 10*3/mm3      nRBC 0.0 /100 WBC     Light Blue Top [[id number]] Collected: 02/08/23 1459    Specimen: Blood Updated: 02/08/23 1600     Extra Tube Hold for add-ons.     Comment: Auto resulted       Green Top (Gel) [[id number]] Collected: 02/08/23 1459    Specimen: Blood Updated: 02/08/23 1600     Extra Tube Hold for add-ons.     Comment: Auto resulted.       Lavender Top [[id number]] Collected: 02/08/23 1459    Specimen: Blood Updated: 02/08/23 1600     Extra Tube hold for add-on     Comment: Auto resulted           Imaging Results (Last 72 Hours)     Procedure Component Value Units Date/Time    XR Abdomen KUB [[id number]] Collected: 02/10/23 2134     Updated: 02/10/23 2141    Narrative:      HISTORY: Large hiatal hernia with organoaxial rotation of the stomach.  Delayed emptying of the stomach from upper GI exam performed earlier  this afternoon. Follow-up KUB recommended.     KUB: A frontal view the abdomen and pelvis obtained at 7:23 PM on  02/10/2023. A chest x-ray was performed for 445 pm incidentally and  included with this exam. Upper GI study performed this afternoon  completed at  3pm.     On the 4:45 PM chest x-ray, there is retained contrast the distal  esophagus as well as the herniated stomach indicating delayed gastric  emptying. On this 7:23 PM KUB study, there is contrast present  throughout the nondilated small and large bowel contrast present in the  rectum. IVC filter in place. Right hip prosthesis. Vascular  calcifications.       Impression:      1. Follow-up imaging after upper GI exam is suggested delayed gastric  emptying, however there is no gastric outlet obstruction with contrast  present throughout the small and large bowel to the level of the rectum  on the KUB performed 4 hours following completion of the upper GI exam.  This report was finalized on  02/10/2023 21:38 by Dr. Radhika Ross MD.    FL Upper GI Water Soluble [[id number]] Collected: 02/10/23 1520     Updated: 02/10/23 1534    Narrative:      EXAMINATION: FL UPPER GI WATER SOLUBLE-     2/10/2023 2:38 PM CST     HISTORY: Paraesophageal hiatal hernia; J18.9-Pneumonia, unspecified  organism; R77.8-Other specified abnormalities of plasma proteins;  K57.90-Diverticulosis of intestine, part unspecified, without  perforation or abscess without bleeding; I71.43-Infrarenal abdominal  aortic aneurysm, without rupture; N39.0-Urinary tract infection, site  not specified; K44.9-Diaphragmatic hernia without obstruction or  gangrene     The fluoroscopy is performed and images of the upper GI are obtained  during and after ingestion of water soluble contrast  material/Gastrografin.     There is no previous study for comparison.     There is no difficulty in initiating the swallowing.     There is normal propagation of the bolus through the oropharynx into the  esophagus.     Normal esophagus seen. The esophagus is displaced to the left. No  intrinsic abnormality.     There is a large hiatal hernia. 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 There is gastroesophageal reflux.     The entire stomach is contained in the hiatal hernia in the left chest.  The gastric antrum is at the hiatus with severe narrowing of the antrum.  No contrast is seen passing the antrum/esophageal hiatus into the  abdominal part of the antrum/duodenum after several minutes.       Impression:      1. A large hiatal hernia containing most of the stomach. Organoaxial  rotation of the stomach.  2. Severe narrowing of the gastric antrum at the level of the esophagus  hiatus. No contrast passed beyond the antrum into the abdominal part of  the GI tract.  3. A delayed image after 4 hours would be obtained to see the  propagation of the contrast bolus.  4. Fluoroscopy time: 2 minutes 36 seconds.  5. The dose: 82.2mGy.  6. Number of images:  25  This report was finalized on 02/10/2023 15:31 by Dr. Mirtha Perez MD.    CT Abdomen Pelvis Without Contrast [[id number]] Collected: 02/08/23 1820     Updated: 02/08/23 1831    Narrative:      CT ABDOMEN PELVIS WO CONTRAST- 2/8/2023 6:08 PM CST     HISTORY: new onset bloody stools, recent diarrhea      COMPARISON: None      DOSE LENGTH PRODUCT: 337 mGy cm. Automated exposure control was also  utilized to decrease patient radiation dose.     TECHNIQUE: Noncontrast enhanced images of the abdomen and pelvis  obtained without oral contrast. Multiplanar reformatted images were  provided for review.      FINDINGS:   LOWER CHEST: Large hiatal hernia. Atelectasis present in the left lower  lobe and there is also a trace layering left pleural effusion. Coronary  atheromatous calcification.      LIVER: No focal liver lesion within limits of a noncontrast study.      BILIARY SYSTEM: Cholelithiasis. Gallbladder is nondistended. Biliary  tree is nondilated.      PANCREAS: No focal pancreatic lesion within limits of a noncontrast  study.      SPLEEN: Unremarkable.      KIDNEYS: Iodinated contrast identified in the upper urinary tracts. No  hydronephrosis. Ureters are nondilated.      ADRENALS: Unremarkable.     RETROPERITONEUM: No mass, lymphadenopathy or hemorrhage.      GI TRACT: Large hiatal hernia. The stomach is nondistended. Small bowel  loops are nondilated. Colonic diverticulosis. The colon is nondistended.  No inflammatory changes are identified along the colon. No bowel wall  thickening identified. Surrounding fat is clean.     OTHER: There is no mesenteric mass, lymphadenopathy or fluid collection.  No intraperitoneal free fluid or free air. IVC filter is in place.  Infrarenal AAA measuring up to 3.6 cm in diameter. Right hip  arthroplasty. No acute bony abnormality is seen. There appears to be a  high-grade spinal stenosis at the L1-L2 disc level with complete loss of  disc height at this level. There is an old  fracture identified across  the inferior endplate of T9.     PELVIS: Bladder wall trabeculation. Air identified in the urinary  bladder. No pelvic adenopathy identified.       Impression:      1. Colonic diverticulosis. Bowel is nondistended. No inflammatory  changes are seen along the bowel.  2. Stomach and small bowel are decompressed. Hiatal hernia is present.  3. Cholelithiasis.  4. Bladder wall trabeculation which may be secondary to chronic partial  outlet obstruction.  5. Infrarenal AAA measuring up to 3.6 cm in diameter.  This report was finalized on 02/08/2023 18:28 by Dr Geremias Martinez, .    CT Angiogram Chest [[id number]] Collected: 02/08/23 1659     Updated: 02/08/23 1704    Narrative:      EXAMINATION: CT ANGIOGRAM CHEST-      2/8/2023 4:43 PM CST     HISTORY: Shortness of breath, chest pain, weakness, recent surgery and  immobilization since.     In order to have a CT radiation dose as low as reasonably achievable  Automated Exposure Control was utilized for adjustment of the mA and/or  KV according to patient size.     DLP in mGycm= 194.     CT angiogram chest with IV contrast.  CT angiography protocol.   CT imaging with bolus IV contrast injection.   Under concurrent supervision axial, sagittal, coronal,  three-dimensional, and MIP data sets were constructed on an independent  work station.     Normal heart size.  Intrathoracic stomach with left lung base consolidation compatible with  pneumonia.     Small left and trace right pleural effusion.     Symmetric and normally opacified pulmonary arteries.  No pulmonary embolism.     Upper lobes are clear.  No pneumothorax.     Small gallstones incidentally noted.     Summary:  1. Left lower lobe pneumonia.  2. No pulmonary embolism.                                   This report was finalized on 02/08/2023 17:01 by Dr. Fawad Smith MD.    XR Chest 1 View [[id number]] Collected: 02/08/23 1634     Updated: 02/08/23 1639    Narrative:      XR CHEST 1 VW-  2/8/2023 4:25 PM CST     HISTORY: chest pain, weakness     COMPARISON: Chest exam dated 10/01/2019.     FINDINGS:      No lung consolidation. No pleural effusion or pneumothorax. Heart size  is normal. Pulmonary vasculature are nondilated. Hiatal hernia  superimposed over the cardiac silhouette. The osseous structures and  surrounding soft tissues demonstrate no acute abnormality.       Impression:      1. Stable chest exam without acute process.  2. Hiatal hernia.        This report was finalized on 02/08/2023 16:36 by Dr Geremias Martinez, .        His upper GI shows the entire stomach in the chest with early on outlet obstruction at the level of the hiatus although the follow-up KUB does show contrast throughout the small intestine and colon      Assessment & Plan       Pneumonia of left lower lobe due to infectious organism    Primary hypertension    Positive fecal occult blood test    Erosive esophagitis    Large hiatal hernia    Weight loss    Gram-negative UTI, acute cystitis without hematuria, present on admission    Paraesophageal hiatal hernia      Plan #1 paraesophageal hiatal hernia-I do lengthy discussion with the patient regarding this including conservative management versus laparoscopic repair of large hiatal hernia.  Ideally we can adamson him through this perioperative period from his hip and he can recover from this before we embark on at least have further discussions regarding surgical intervention.  He is very much in favor that.  He understands that its possible that this would need to be addressed more urgently.  He is tolerated clear liquids now so we will just continue to support.    I have discussed this with the medicine team      Vicky Funes MD  02/11/23  09:13 CST

## 2023-02-11 NOTE — PROGRESS NOTES
[facility] Medicine Services  INPATIENT PROGRESS NOTE    Patient Name: Jose Alfredo Kirkpatrick  Date of Admission: 2/8/2023  Today's Date: 02/11/23  Length of Stay: 3  Primary Care Physician: Otilio Ramos MD    Subjective   Chief Complaint: Follow-up weakness, dark stools, dysphagia  HPI  Sitting up in bed.  No visitors in room.  No oxygen use.  Patient tells me he feels some better today.  He was able to get some rest last night.  He does report some right shoulder discomfort that has improved.  Patient is tolerating clear liquid diet.  Discussed with Dr. Funes this morning and would like to have him recover from recent hip surgery before surgical intervention.  Patient understands that it might be needed to address more urgently.  Have encouraged patient to work with physical therapy more today.  Urine culture positive for E. coli.  Will continue Zosyn which would also treat pneumonia.  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  Patient has no sputum production.  Continue Protonix and Carafate.  Blood cultures remain no growth.  Suspect blood culture 2/8 contaminant.  Patient continues to have stools but they are not black but are yellow-green and loose.  Hemoglobin remained stable at 12.5.    Review of Systems   Constitutional: Positive for activity change (After recent hip surgery) and fatigue. Negative for chills and fever.   HENT: Negative for congestion and trouble swallowing.    Eyes: Negative for photophobia and visual disturbance.   Respiratory: Negative for cough, shortness of breath and wheezing.    Cardiovascular: Negative for chest pain, palpitations and leg swelling.   Gastrointestinal: Positive for constipation and diarrhea. Negative for abdominal pain, anal bleeding, nausea and vomiting.   Endocrine: Negative for cold intolerance, heat intolerance and polyuria.   Genitourinary: Positive for frequency. Negative for dysuria and urgency.    Musculoskeletal: Positive for gait problem.   Skin: Negative for color change, pallor, rash and wound.   Allergic/Immunologic: Negative for immunocompromised state.   Neurological: Positive for weakness. Negative for light-headedness.   Hematological: Negative for adenopathy. Does not bruise/bleed easily.   Psychiatric/Behavioral: Negative for agitation, behavioral problems and confusion.      All pertinent negatives and positives are as above. All other systems have been reviewed and are negative unless otherwise stated.     Objective    Temp:  [97.4 °F (36.3 °C)-99.5 °F (37.5 °C)] 98.3 °F (36.8 °C)  Heart Rate:  [] 94  Resp:  [16-18] 18  BP: (105-128)/(55-79) 119/55  Physical Exam  Vitals and nursing note reviewed.   Constitutional:       Comments: Sitting up in bed.  No oxygen in use.  No visitors in room.   HENT:      Head: Normocephalic and atraumatic.      Nose: No congestion.      Mouth/Throat:      Pharynx: Oropharynx is clear. No oropharyngeal exudate or posterior oropharyngeal erythema.   Eyes:      Extraocular Movements: Extraocular movements intact.      Pupils: Pupils are equal, round, and reactive to light.   Cardiovascular:      Rate and Rhythm: Normal rate and regular rhythm.      Heart sounds: No murmur heard.     Comments: Sinus rhythm 90 on telemetry.  Pulmonary:      Breath sounds: No wheezing, rhonchi or rales.      Comments: No oxygen in use.  Abdominal:      Palpations: Abdomen is soft.      Tenderness: There is no abdominal tenderness.   Genitourinary:     Comments: Voiding.  Musculoskeletal:         General: No swelling or tenderness.      Cervical back: Normal range of motion and neck supple.      Comments: SCDs bilateral lower extremities   Skin:     General: Skin is warm and dry.   Neurological:      General: No focal deficit present.      Mental Status: He is alert and oriented to person, place, and time.   Psychiatric:         Mood and Affect: Mood normal.         Behavior:  Behavior normal.         Thought Content: Thought content normal.         Judgment: Judgment normal.       Results Review:  I have reviewed the labs, radiology results, and diagnostic studies.    Laboratory Data:   Results from last 7 days   Lab Units 02/11/23  0449 02/10/23  2348 02/10/23  1820 02/10/23  0445 02/09/23  1624 02/09/23  0732   WBC 10*3/mm3 11.88*  --   --  7.80  --  9.68   HEMOGLOBIN g/dL 12.5* 12.9* 13.1 11.4*   < > 11.8*   HEMATOCRIT % 39.7 41.9 42.1 36.5*   < > 37.9   PLATELETS 10*3/mm3 772*  --   --  667*  --  643*    < > = values in this interval not displayed.     Results from last 7 days   Lab Units 02/11/23  0449 02/10/23  0445 02/09/23  0732 02/08/23  1459 02/08/23  1100   SODIUM mmol/L 143 139 136 134* 135*   POTASSIUM mmol/L 3.7 3.9 3.9 4.1 4.0   CHLORIDE mmol/L 100 101 99 100 98   CO2 mmol/L 28.0 26.0 25.0 25.0 23.0   BUN mg/dL 19 16 14 18 20   CREATININE mg/dL 1.04 0.97 0.87 0.74* 0.70*   CALCIUM mg/dL 9.0 8.6 8.3* 8.3* 8.5*   BILIRUBIN mg/dL  --   --   --  0.4 0.4   ALK PHOS U/L  --   --   --  89 95   ALT (SGPT) U/L  --   --   --  20 19   AST (SGOT) U/L  --   --   --  32 34   GLUCOSE mg/dL 179* 112* 109* 136* 148*     CBC:      Lab 02/11/23  0449 02/10/23  1820 02/10/23  0445 02/09/23  1624 02/09/23  0732 02/09/23  0009 02/08/23 1459 02/08/23  1100   WBC 11.88*  --  7.80  --  9.68  --  9.94 10.85*   HEMOGLOBIN 12.5*   < > 11.4*   < > 11.8*   < > 11.6* 11.9*   HEMATOCRIT 39.7   < > 36.5*   < > 37.9   < > 37.4* 38.3   PLATELETS 772*  --  667*  --  643*  --  608* 582*   NEUTROS ABS 9.68*  --  5.23  --  6.56  --  7.32* 8.76*   IMMATURE GRANS (ABS) 0.14*  --  0.13*  --  0.13*  --  0.14* 0.14*   LYMPHS ABS 1.12  --  1.37  --  1.97  --  1.56 1.06   MONOS ABS 0.87  --  0.76  --  0.83  --  0.69 0.74   EOS ABS 0.03  --  0.24  --  0.12  --  0.17 0.08   MCV 93.4  --  93.4  --  93.6  --  93.3 93.9    < > = values in this interval not displayed.      Microbiology Results (last 10 days)     Procedure  Component Value - Date/Time    MRSA Screen, PCR (Inpatient) - Swab, Nares [[id number]]  (Normal) Collected: 02/10/23 1659    Lab Status: Final result Specimen: Swab from Nares Updated: 02/10/23 1843     MRSA PCR No MRSA Detected    Narrative:      The negative predictive value of this diagnostic test is high and should only be used to consider de-escalating anti-MRSA therapy. A positive result may indicate colonization with MRSA and must be correlated clinically.    S. Pneumo Ag Urine or CSF - Urine, Urine, Clean Catch [[id number]]  (Normal) Collected: 02/10/23 1658    Lab Status: Final result Specimen: Urine, Clean Catch Updated: 02/10/23 1758     Strep Pneumo Ag Negative    Legionella Antigen, Urine - Urine, Urine, Clean Catch [[id number]]  (Normal) Collected: 02/10/23 1658    Lab Status: Final result Specimen: Urine, Clean Catch Updated: 02/10/23 1758     LEGIONELLA ANTIGEN, URINE Negative    Blood Culture - Blood, Arm, Left [[id number]]  (Normal) Collected: 02/08/23 2022    Lab Status: Preliminary result Specimen: Blood from Arm, Left Updated: 02/10/23 2046     Blood Culture No growth at 2 days    Blood Culture - Blood, Arm, Right [[id number]]  (Abnormal) Collected: 02/08/23 2012    Lab Status: Preliminary result Specimen: Blood from Arm, Right Updated: 02/11/23 0522     Blood Culture Growth present, too young to evaluate     Gram Stain Anaerobic Bottle Gram positive cocci in clusters    Blood Culture ID, PCR - Blood, Arm, Right [[id number]]  (Abnormal) Collected: 02/08/23 2012    Lab Status: Final result Specimen: Blood from Arm, Right Updated: 02/10/23 1528     BCID, PCR Staph spp, not aureus or lugdunensis. Identification by BCID2 PCR.     BOTTLE TYPE Anaerobic Bottle    Urine Culture - Urine, Straight Cath [[id number]]  (Abnormal)  (Susceptibility) Collected: 02/08/23 1716    Lab Status: Final result Specimen: Urine from Straight Cath Updated: 02/10/23 1253     Urine Culture >100,000 CFU/mL Escherichia coli     Narrative:      Colonization of the urinary tract without infection is common. Treatment is discouraged unless the patient is symptomatic, pregnant, or undergoing an invasive urologic procedure.    Susceptibility      Escherichia coli      ALISON      Ampicillin Susceptible      Ampicillin + Sulbactam Susceptible      Cefazolin Susceptible      Cefepime Susceptible      Ceftazidime Susceptible      Ceftriaxone Susceptible      Gentamicin Susceptible      Levofloxacin Susceptible      Nitrofurantoin Susceptible      Piperacillin + Tazobactam Susceptible      Trimethoprim + Sulfamethoxazole Susceptible                           COVID-19, FLU A/B, RSV PCR - Swab, Nasopharynx [[id number]]  (Normal) Collected: 02/08/23 1715    Lab Status: Final result Specimen: Swab from Nasopharynx Updated: 02/08/23 1820     COVID19 Not Detected     Influenza A PCR Not Detected     Influenza B PCR Not Detected     RSV, PCR Not Detected    Narrative:      Fact sheet for providers: https://www.fda.gov/media/753408/download    Fact sheet for patients: https://www.fda.gov/media/696629/download    Test performed by PCR.          Imaging Results (All)     Procedure Component Value Units Date/Time    XR Abdomen KUB [[id number]] Collected: 02/10/23 2134     Updated: 02/10/23 2141    Narrative:      HISTORY: Large hiatal hernia with organoaxial rotation of the stomach.  Delayed emptying of the stomach from upper GI exam performed earlier  this afternoon. Follow-up KUB recommended.     KUB: A frontal view the abdomen and pelvis obtained at 7:23 PM on  02/10/2023. A chest x-ray was performed for 445 pm incidentally and  included with this exam. Upper GI study performed this afternoon  completed at  3pm.     On the 4:45 PM chest x-ray, there is retained contrast the distal  esophagus as well as the herniated stomach indicating delayed gastric  emptying. On this 7:23 PM KUB study, there is contrast present  throughout the nondilated small and large bowel  contrast present in the  rectum. IVC filter in place. Right hip prosthesis. Vascular  calcifications.       Impression:      1. Follow-up imaging after upper GI exam is suggested delayed gastric  emptying, however there is no gastric outlet obstruction with contrast  present throughout the small and large bowel to the level of the rectum  on the KUB performed 4 hours following completion of the upper GI exam.  This report was finalized on 02/10/2023 21:38 by Dr. Radhika Ross MD.    FL Upper GI Water Soluble [[id number]] Collected: 02/10/23 1520     Updated: 02/10/23 1534    Narrative:      EXAMINATION: FL UPPER GI WATER SOLUBLE-     2/10/2023 2:38 PM CST     HISTORY: Paraesophageal hiatal hernia; J18.9-Pneumonia, unspecified  organism; R77.8-Other specified abnormalities of plasma proteins;  K57.90-Diverticulosis of intestine, part unspecified, without  perforation or abscess without bleeding; I71.43-Infrarenal abdominal  aortic aneurysm, without rupture; N39.0-Urinary tract infection, site  not specified; K44.9-Diaphragmatic hernia without obstruction or  gangrene     The fluoroscopy is performed and images of the upper GI are obtained  during and after ingestion of water soluble contrast  material/Gastrografin.     There is no previous study for comparison.     There is no difficulty in initiating the swallowing.     There is normal propagation of the bolus through the oropharynx into the  esophagus.     Normal esophagus seen. The esophagus is displaced to the left. No  intrinsic abnormality.     There is a large hiatal hernia. 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 There is gastroesophageal reflux.     The entire stomach is contained in the hiatal hernia in the left chest.  The gastric antrum is at the hiatus with severe narrowing of the antrum.  No contrast is seen passing the antrum/esophageal hiatus into the  abdominal part of the antrum/duodenum after several minutes.       Impression:      1. A  large hiatal hernia containing most of the stomach. Organoaxial  rotation of the stomach.  2. Severe narrowing of the gastric antrum at the level of the esophagus  hiatus. No contrast passed beyond the antrum into the abdominal part of  the GI tract.  3. A delayed image after 4 hours would be obtained to see the  propagation of the contrast bolus.  4. Fluoroscopy time: 2 minutes 36 seconds.  5. The dose: 82.2mGy.  6. Number of images: 25  This report was finalized on 02/10/2023 15:31 by Dr. Mirtha Perez MD.    CT Abdomen Pelvis Without Contrast [[id number]] Collected: 02/08/23 1820     Updated: 02/08/23 1831    Narrative:      CT ABDOMEN PELVIS WO CONTRAST- 2/8/2023 6:08 PM CST     HISTORY: new onset bloody stools, recent diarrhea      COMPARISON: None      DOSE LENGTH PRODUCT: 337 mGy cm. Automated exposure control was also  utilized to decrease patient radiation dose.     TECHNIQUE: Noncontrast enhanced images of the abdomen and pelvis  obtained without oral contrast. Multiplanar reformatted images were  provided for review.      FINDINGS:   LOWER CHEST: Large hiatal hernia. Atelectasis present in the left lower  lobe and there is also a trace layering left pleural effusion. Coronary  atheromatous calcification.      LIVER: No focal liver lesion within limits of a noncontrast study.      BILIARY SYSTEM: Cholelithiasis. Gallbladder is nondistended. Biliary  tree is nondilated.      PANCREAS: No focal pancreatic lesion within limits of a noncontrast  study.      SPLEEN: Unremarkable.      KIDNEYS: Iodinated contrast identified in the upper urinary tracts. No  hydronephrosis. Ureters are nondilated.      ADRENALS: Unremarkable.     RETROPERITONEUM: No mass, lymphadenopathy or hemorrhage.      GI TRACT: Large hiatal hernia. The stomach is nondistended. Small bowel  loops are nondilated. Colonic diverticulosis. The colon is nondistended.  No inflammatory changes are identified along the colon. No bowel  wall  thickening identified. Surrounding fat is clean.     OTHER: There is no mesenteric mass, lymphadenopathy or fluid collection.  No intraperitoneal free fluid or free air. IVC filter is in place.  Infrarenal AAA measuring up to 3.6 cm in diameter. Right hip  arthroplasty. No acute bony abnormality is seen. There appears to be a  high-grade spinal stenosis at the L1-L2 disc level with complete loss of  disc height at this level. There is an old fracture identified across  the inferior endplate of T9.     PELVIS: Bladder wall trabeculation. Air identified in the urinary  bladder. No pelvic adenopathy identified.       Impression:      1. Colonic diverticulosis. Bowel is nondistended. No inflammatory  changes are seen along the bowel.  2. Stomach and small bowel are decompressed. Hiatal hernia is present.  3. Cholelithiasis.  4. Bladder wall trabeculation which may be secondary to chronic partial  outlet obstruction.  5. Infrarenal AAA measuring up to 3.6 cm in diameter.  This report was finalized on 02/08/2023 18:28 by Dr Geremias Martinez, .    CT Angiogram Chest [[id number]] Collected: 02/08/23 1659     Updated: 02/08/23 1704    Narrative:      EXAMINATION: CT ANGIOGRAM CHEST-      2/8/2023 4:43 PM CST     HISTORY: Shortness of breath, chest pain, weakness, recent surgery and  immobilization since.     In order to have a CT radiation dose as low as reasonably achievable  Automated Exposure Control was utilized for adjustment of the mA and/or  KV according to patient size.     DLP in mGycm= 194.     CT angiogram chest with IV contrast.  CT angiography protocol.   CT imaging with bolus IV contrast injection.   Under concurrent supervision axial, sagittal, coronal,  three-dimensional, and MIP data sets were constructed on an independent  work station.     Normal heart size.  Intrathoracic stomach with left lung base consolidation compatible with  pneumonia.     Small left and trace right pleural effusion.     Symmetric  and normally opacified pulmonary arteries.  No pulmonary embolism.     Upper lobes are clear.  No pneumothorax.     Small gallstones incidentally noted.     Summary:  1. Left lower lobe pneumonia.  2. No pulmonary embolism.   This report was finalized on 02/08/2023 17:01 by Dr. Fawad Smith MD.    XR Chest 1 View [[id number]] Collected: 02/08/23 1634     Updated: 02/08/23 1639    Narrative:      XR CHEST 1 VW- 2/8/2023 4:25 PM CST     HISTORY: chest pain, weakness     COMPARISON: Chest exam dated 10/01/2019.     FINDINGS:      No lung consolidation. No pleural effusion or pneumothorax. Heart size  is normal. Pulmonary vasculature are nondilated. Hiatal hernia  superimposed over the cardiac silhouette. The osseous structures and  surrounding soft tissues demonstrate no acute abnormality.       Impression:      1. Stable chest exam without acute process.  2. Hiatal hernia.        This report was finalized on 02/08/2023 16:36 by Dr Geremias Martinez, .              Results for orders placed during the hospital encounter of 02/08/23    Adult Transthoracic Echo Complete W/ Cont if Necessary Per Protocol    Interpretation Summary  •  Left ventricular systolic function is normal. Left ventricular ejection fraction appears to be 51 - 55%.  •  Left ventricular wall thickness is consistent with mild concentric hypertrophy.  •  Left ventricular diastolic function is consistent with (grade II w/high LAP) pseudonormalization.  •  The left atrial cavity is mildly dilated.  •  The aortic valve exhibits sclerosis. There is mild thickening of the aortic valve. Mild to moderate aortic valve regurgitation is present. Mild aortic valve stenosis is present.  •  Mild mitral annular calcification is present.  •  Moderate pulmonary hypertension is present  •  Mild dilation of the aortic root is present    Schedule medications:  busPIRone, 5 mg, Oral, BID  enoxaparin, 40 mg, Subcutaneous, Q24H  furosemide, 40 mg, Intravenous, Q12H  gabapentin,  300 mg, Oral, 4x Daily  ipratropium-albuterol, 3 mL, Nebulization, 4x Daily - RT  lisinopril, 10 mg, Oral, Q24H  NIFEdipine XL, 60 mg, Oral, Daily  pantoprazole, 40 mg, Intravenous, Q12H  piperacillin-tazobactam, 3.375 g, Intravenous, Q8H  sucralfate, 1 g, Oral, 4x Daily AC & at Bedtime        I have reviewed the patient's current medications.     Assessment/Plan   Assessment  Active Hospital Problems    Diagnosis    • **Pneumonia of left lower lobe due to infectious organism    • Paraesophageal hiatal hernia    • Erosive esophagitis    • Large hiatal hernia    • Weight loss    • Gram-negative UTI, acute cystitis without hematuria, present on admission    • Positive fecal occult blood test    • Primary hypertension      Treatment Plan  Mr. Kirkpatrick presented to ER 2/8/2023 from SNF with fatigue, weakness, dysphagia and black stool.  Patient had recent right hip hemiarthroplasty1/28/2023 by Dr. Quintana at [facility] and was discharged to skilled nursing facility on 2/2/2023.  Patient reported constipation that he attributed to pain medication.  In ER, he tested fecal occult blood positive.  Patient reported dysphagia of solid foods for the past several years and reported that food felt as though it was getting hung in his esophagus.  Patient had 3 previous endoscopies between 2014 and 2019.  2019 patient had melena and was found to have gastric ulcer managed conservatively.  Repeat endoscopy showed completely healed ulcer.  Last colonoscopy 2019 revealed small polyps removed.  Patient reported 24 pound weight loss over the last several months but most recently after his wife passed away within the past month.  Hemoglobin 11.6.  WBC normal.  LFTs normal.  CT scan of abdomen noted colonic diverticulosis without any inflammatory changes unremarkable stomach, small bowel, hiatal hernia, cholelithiasis without cholecystitis, bladder wall trabeculation and 3.6 cm infrarenal abdominal aortic aneurysm.  Patient denied chest  pain.  CT scan showed bilateral pleural effusions with concern for parapneumonic effusion.  CTA chest noted no pulmonary emboli.  Left lower lobe pneumonia.    Pneumonia left lower lobe.  Initially received azithromycin and Rocephin.  Antibiotics changed to Zosyn due to recent hospital stay.  Blood cultures no growth at 2 days.  Strep pneumonia negative, Legionella negative, MRSA PCR negative.  Continue duo nebs and incentive spirometry.  CBC in AM.  Suspect paraesophageal hernia contributing to left lower lobe consolidation.  Remains afebrile.  Repeat CBC.    Fecal occult blood positive.  Hemoglobin stable.  Hemoglobin 11.6 on admission, hemoglobin 12.5 today.  Patient attributes dark stools to constipation secondary to narcotics.  Stools are yellow-green per nursing staff.    Dysphagia.  GI consulted.  Dr. Edmond recommended endoscopy for evaluation of melena, dysphagia and odynophagia.  Patient agreeable.  Findings: Hypopharynx and larynx grossly appeared normal.  Because of the proximal esophagus appeared normal.  Severe, grade IV, erosive esophagitis of mid and distal esophagus, manifested by numerous linear and irregular shaped fibrin covering ulcerations and erosions, edema and erythema, but without signs of any active bleeding.  Very large hiatal hernia, with essentially almost entire stomach patient currently chest, with configuration suggestive of, at least, partial volvulus of the stomach.  No ulcerations, but mild diffuse edema and erythema of gastric mucosa.  Gastric biopsy obtained with cold biopsy forceps to rule out H. pylori.  Normal-appearing duodenum.     Recommendations: Protonix 40 mg twice daily. Carafate suspension 10 cc before every meal and at bedtime.  Full liquid diet.  Evaluation by surgery for for possible repair of hiatal hernia/gastropexy.    Paraesophageal hernia.  General surgery consulted and Dr. Funes noted very large hiatal hernia and difficult situation given age and frality  recent surgery and hip surgery.  Upper GI 2/10 noted large hiatal hernia containing most of stomach.  Organoxial rotation of stomach.  Severe narrowing of the gastric antrum at the level of the esophagus hiatus.  No contrast passed beyond the antrum into the abdominal part of GI tract.  Delayed imaging 4 hours obtained.  KUB following upper GI suggest delayed gastric emptying.  No gastric outlet obstruction present throughout the small and large bowel to the level of the rectum.  Discussed with Dr. Funes today and would like to recover from recent hip surgery before surgical intervention.  Patient agrees but also understands may require more urgent attention.  Continue clear liquid diet.    Urine culture E. Coli, present on admission.  Sensitive to Zosyn day 3.  Urinalysis 31-50 WBC, 4+ bacteria.    Recent right hip hemiarthroplasty 1/28/2023 Dr. Quintana.  Discussed with Brendan Petersen PA-C and allowed weightbearing as tolerated. Physical therapy consulted.    Primary hypertension.  Blood pressure 119/55, 121/60.  Continue lisinopril and nifedipine.    Echocardiogram 2/9/2023 EF 51-55%.    SCDs for deep vein thrombosis prophylaxis.  Restart Lovenox.  Discontinue Lasix.    EKG 2/8/2023.  Normal sinus rhythm with 2nd degree AV block (Mobitz I).  Repeat EKG 2/10/2023 sinus tachycardia first-degree AV block.  No longer second-degree AV block.  QT lengthened.    Medical Decision Making  Number and Complexity of problems: 9  Pneumonia left lower lobe, acute, high complexity  Fecal occult blood positive, lower GI bleed, acute high complexity, GI consultation.  Gram-negative UTI, acute high complexity  Large hiatal hernia with stomach and chest suggesting volvulus stomach, acute, high complexity requiring general surgery consultation.  Erosive gastritis, acute, moderate complexity  Primary hypertension, chronic, moderate complexity  Weight loss, chronic, moderate complexity  Status post right hip hemiarthroplasty 1/28/2023,  stable, moderate complexity    Differential Diagnosis: Pulmonary emboli    Conditions and Status        Condition is improving     [facility] Data  External documents reviewed: [facility] admission 1/28/2020 3- 2/2/2023  Cardiac tracing (EKG, telemetry) interpretation: EKG 2/8/2023 normal sinus rhythm with second-degree AV block.  T wave abnormalities.  Radiology interpretation: Radiology CT abdomen, CTA chest, upper GI, KUB  Labs reviewed:   BMP 2/11/23.  Potassium 3.4.  Creatinine 1.04  CBC 2/11/2023.  WBC 11.8.  Hemoglobin 12.5  Urine culture E. Coli  Blood cultures no growth at 2 days.  Reviewed upper GI and KUB from 2/10    Any tests that were considered but not ordered: None     Decision rules/scores evaluated (example [id number], [place], etc): None     Discussed with: Dr. Austin, Dr. Funes, Brendan Petersen PA-C, orthopedics, and patient     Care Planning  Shared decision making: Dr. Austin, Dr. Funes and patient.  Patient agrees for recovery after hip surgery prior to hernia repair.  Physical therapy.   Code status and discussions: Full code  The patient surrogate decision maker is his daughter, Lula Roldan    Disposition  Social Determinants of Health that impact treatment or disposition: Recent right hip hemiarthroplasty admitted to SNF  I expect the patient to be discharged to SNF in 2-4 days.     Electronically signed by HARISH Edwards, 02/11/23, 12:22 CST.

## 2023-02-11 NOTE — THERAPY TREATMENT NOTE
Acute Care - Occupational Therapy Treatment Note  [facility]     Patient Name: Jose Alfredo Kirkpatrick  : 1935  MRN: [id number]  Today's Date: 2023  Onset of Illness/Injury or Date of Surgery: 23  Date of Referral to OT: 02/10/23  Referring Physician: Dr. Austin    Admit Date: 2023       ICD-10-CM ICD-9-CM   1. Pneumonia of left lower lobe due to infectious organism  J18.9 486   2. Elevated troponin  R77.8 790.6   3. Diverticulosis  K57.90 562.10   4. Infrarenal abdominal aortic aneurysm (AAA) without rupture  I71.43 441.4   5. Urinary tract infection without hematuria, site unspecified  N39.0 599.0   6. Hiatal hernia  K44.9 553.3   7. Biliary calculus of other site without obstruction  K80.80 574.20   8. Bloody stools  K92.1 578.1   9. Dysphagia  R13.10 787.20   10. Decreased activities of daily living (ADL)  Z78.9 V49.89   11. Impaired mobility  Z74.09 799.89     Patient Active Problem List   Diagnosis   • Spinal stenosis, lumbar   • Hx of colonic polyps   • Drug-induced constipation   • Primary hypertension   • Acute pulmonary embolism with acute cor pulmonale (HCC)   • Acute bilateral deep vein thrombosis (DVT) of femoral veins (HCC)   • History of DVT of left lower extremity   • Positive fecal occult blood test   • Melena   • Vitamin B12 deficiency   • FARZANA (iron deficiency anemia)   • Cholelithiasis   • Gastric ulcer with hemorrhage   • Chronic bilateral low back pain without sciatica   • Chronic pain of both shoulders   • Kyphosis   • Overweight (BMI 25.0-29.9)   • Non-smoker   • Pneumonia of left lower lobe due to infectious organism   • Erosive esophagitis   • Large hiatal hernia   • Weight loss   • Gram-negative UTI, acute cystitis without hematuria, present on admission   • Paraesophageal hiatal hernia     Past Medical History:   Diagnosis Date   • Arthritis    • Cataract     BILATERAL   • GERD (gastroesophageal reflux disease)    • History of transfusion    • Hx of colonic polyps    •  Hypertension    • Reflux esophagitis    • Streptococcosis     IN SPINE FROM BACK INJECTIONS     Past Surgical History:   Procedure Laterality Date   • BACK SURGERY      cervical   • CATARACT EXTRACTION Bilateral    • COLONOSCOPY  02/11/2013    Hyperplastic polyp at 25 cm, Diverticulosis repeat exam in 5 years   • COLONOSCOPY N/A 10/16/2018    Tubular adenoma ascending colon repet prn   • ENDOSCOPY  01/15/2014    HH   • ENDOSCOPY N/A 10/03/2019    Large HH, non-bleeding gastric ulcer   • ENDOSCOPY N/A 11/25/2019    Procedure: ESOPHAGOGASTRODUODENOSCOPY WITH ANESTHESIA;  Surgeon: Marques Pérez MD;  Location: [facility] ENDOSCOPY;  Service: Gastroenterology   • ENDOSCOPY N/A 2/9/2023    Procedure: ESOPHAGOGASTRODUODENOSCOPY WITH ANESTHESIA;  Surgeon: Sushant Edmond MD;  Location: [facility] ENDOSCOPY;  Service: Gastroenterology;  Laterality: N/A;  pre GI bleed  post hiatal hernia; esophagitis  Otilio Ramos MD   • HERNIA REPAIR Right     INGUINAL    • HIP FRACTURE SURGERY Right 02/04/2023   • REPLACEMENT TOTAL KNEE BILATERAL     • THORACIC LAMINECTOMY DECOMPRESSIVE POSTERIOR N/A 12/14/2016    Procedure: LAMINECTOMY DECOMPRESSION, UNINSTRUMENTED POSTERIOR SPINAL FUSION, T 11-12;  Surgeon: LUZ Adan MD;  Location: [facility] OR;  Service:    • VENA CAVA FILTER INSERTION N/A 10/04/2019    Procedure: VENA CAVA FILTER INSERTION;  Surgeon: Dallin Croonado MD;  Location: [facility] HYBRID OR 12;  Service: Vascular         OT ASSESSMENT FLOWSHEET (last 12 hours)     OT Evaluation and Treatment     Row Name 02/11/23 1178                   OT Time and Intention    Subjective Information complains of;weakness  -LS        Document Type therapy note (daily note)  -LS        Mode of Treatment occupational therapy  -LS        Patient Effort good  -LS           General Information    Patient Profile Reviewed yes  -LS        Barriers to Rehab medically complex  -LS           Pain Assessment    Pretreatment Pain Rating 0/10 -  no pain  -LS        Posttreatment Pain Rating 0/10 - no pain  -LS           Cognition    Orientation Status (Cognition) oriented x 4  -LS           Coping    Observed Emotional State calm;cooperative  -LS           Plan of Care Review    Plan of Care Reviewed With patient  -LS        Progress no change  -LS        Outcome Evaluation OT tx completed on this date. Pt A&O x4. Pt reports no significant pain. Pt fowlers in bed after being cleaned up from BM by staff. Pt reported that he has been having intermittently pain in R shoulder, however was not having any at this time. Pt tolerated AAROM and gentle progressive stretching to R shoulder with support provided to shoulder girdle and pt educated on self ROM exercises for BUES and BLEs at bed level to increase mobility and decrease pain for ADLs with good carryover. During ROM activities, pt reported that he was having another BM. Pt dependent for scooting up in bed and rolling side to side.  OT POC to continue.  -LS           Positioning and Restraints    Pre-Treatment Position in bed  -LS        Post Treatment Position bed  -LS        In Bed fowlers;call light within reach;with nsg  -LS              User Key  (r) = Recorded By, (t) = Taken By, (c) = Cosigned By    Initials Name Effective Dates    LS Nadira Aparicio COTA 09/22/22 -                  Occupational Therapy Education     Title: PT OT SLP Therapies (In Progress)     Topic: Occupational Therapy (Done)     Point: ADL training (Done)     Description:   Instruct learner(s) on proper safety adaptation and remediation techniques during self care or transfers.   Instruct in proper use of assistive devices.              Learning Progress Summary           Patient Acceptance, E, VU by LS at 2/11/2023 1130    Comment: Pt educated on AAROM activities with good carryover.    Acceptance, E, VU,NR by EC at 2/10/2023 1323    Comment: bed mobility techniques, pursed lip breathing, hip precautions, perineal hygiene                    Point: Precautions (Done)     Description:   Instruct learner(s) on prescribed precautions during self-care and functional transfers.              Learning Progress Summary           Patient Acceptance, E, VU,NR by EC at 2/10/2023 1323    Comment: bed mobility techniques, pursed lip breathing, hip precautions, perineal hygiene                   Point: Body mechanics (Done)     Description:   Instruct learner(s) on proper positioning and spine alignment during self-care, functional mobility activities and/or exercises.              Learning Progress Summary           Patient Acceptance, E, VU,NR by EC at 2/10/2023 1323    Comment: bed mobility techniques, pursed lip breathing, hip precautions, perineal hygiene                               User Key     Initials Effective Dates Name Provider Type Discipline     09/22/22 -  Nadira Aparicio COTA Occupational Therapist Assistant THERAPIES     12/12/22 -  Radha Delgado, XIOMARA Student OT Student OT                  OT Recommendation and Plan     Plan of Care Review  Plan of Care Reviewed With: patient  Progress: no change  Outcome Evaluation: OT tx completed on this date. Pt A&O x4. Pt reports no significant pain. Pt fowlers in bed after being cleaned up from BM by staff. Pt reported that he has been having intermittently pain in R shoulder, however was not having any at this time. Pt tolerated AAROM and gentle progressive stretching to R shoulder with support provided to shoulder girdle and pt educated on self ROM exercises for BUES and BLEs at bed level to increase mobility and decrease pain for ADLs with good carryover. During ROM activities, pt reported that he was having another BM. Pt dependent for scooting up in bed and rolling side to side.  OT POC to continue.  Plan of Care Reviewed With: patient  Outcome Evaluation: OT tx completed on this date. Pt A&O x4. Pt reports no significant pain. Pt fowlers in bed after being cleaned up from BM by staff. Pt  reported that he has been having intermittently pain in R shoulder, however was not having any at this time. Pt tolerated AAROM and gentle progressive stretching to R shoulder with support provided to shoulder girdle and pt educated on self ROM exercises for BUES and BLEs at bed level to increase mobility and decrease pain for ADLs with good carryover. During ROM activities, pt reported that he was having another BM. Pt dependent for scooting up in bed and rolling side to side.  OT POC to continue.     Outcome Measures     Row Name 02/11/23 1100 02/10/23 1300          How much help from another is currently needed...    Putting on and taking off regular lower body clothing? 2  -LS 2  -AC (r) EC (t) AC (c)     Bathing (including washing, rinsing, and drying) 2  -LS 2  -AC (r) EC (t) AC (c)     Toileting (which includes using toilet bed pan or urinal) 1  -LS 1  -AC (r) EC (t) AC (c)     Putting on and taking off regular upper body clothing 2  -LS 2  -AC (r) EC (t) AC (c)     Taking care of personal grooming (such as brushing teeth) 3  -LS 3  -AC (r) EC (t) AC (c)     Eating meals 3  -LS 3  -AC (r) EC (t) AC (c)     AM-PAC 6 Clicks Score (OT) 13  -LS 13  -AC (r) EC (t)        Functional Assessment    Outcome Measure Options AM-PAC 6 Clicks Daily Activity (OT)  -LS AM-PAC 6 Clicks Daily Activity (OT)  -AC (r) EC (t) AC (c)           User Key  (r) = Recorded By, (t) = Taken By, (c) = Cosigned By    Initials Name Provider Type    AC Adam Rivera, OTR/L, CNT Occupational Therapist    LS Nadira Aparicio COTA Occupational Therapist Assistant    EC Radha Delgado, OT Student OT Student                Time Calculation:    Time Calculation- OT     Row Name 02/11/23 1139             Time Calculation- OT    OT Start Time 1055  -LS      OT Stop Time 1139  -LS      OT Time Calculation (min) 44 min  -LS      Total Timed Code Minutes- OT 44 minute(s)  -LS      OT Received On 02/11/23  -            User Key  (r) = Recorded By, (t)  = Taken By, (c) = Cosigned By    Initials Name Provider Type     Nadira Aparicio COTA Occupational Therapist Assistant              Therapy Charges for Today     Code Description Service Date Service Provider Modifiers Qty    78793636639 HC OT THER PROC EA 15 MIN 2/11/2023 Nadira Aparicio COTA GO 2    [id number]  OT THERAPEUTIC ACT EA 15 MIN 2/11/2023 Nadria Aparicio COTA GO 1               ALLYSON Mabry  2/11/2023

## 2023-02-12 ENCOUNTER — APPOINTMENT (OUTPATIENT)
Dept: GENERAL RADIOLOGY | Facility: HOSPITAL | Age: 88
DRG: 177 | End: 2023-02-12
Payer: MEDICARE

## 2023-02-12 LAB
ANION GAP SERPL CALCULATED.3IONS-SCNC: 11 MMOL/L (ref 5–15)
BACTERIA SPEC AEROBE CULT: ABNORMAL
BASOPHILS # BLD AUTO: 0.06 10*3/MM3 (ref 0–0.2)
BASOPHILS NFR BLD AUTO: 0.4 % (ref 0–1.5)
BUN SERPL-MCNC: 24 MG/DL (ref 8–23)
BUN/CREAT SERPL: 19.5 (ref 7–25)
CALCIUM SPEC-SCNC: 8.4 MG/DL (ref 8.6–10.5)
CHLORIDE SERPL-SCNC: 99 MMOL/L (ref 98–107)
CO2 SERPL-SCNC: 28 MMOL/L (ref 22–29)
CREAT SERPL-MCNC: 1.23 MG/DL (ref 0.76–1.27)
DEPRECATED RDW RBC AUTO: 52.1 FL (ref 37–54)
EGFRCR SERPLBLD CKD-EPI 2021: 57.2 ML/MIN/1.73
EOSINOPHIL # BLD AUTO: 0.2 10*3/MM3 (ref 0–0.4)
EOSINOPHIL NFR BLD AUTO: 1.5 % (ref 0.3–6.2)
ERYTHROCYTE [DISTWIDTH] IN BLOOD BY AUTOMATED COUNT: 14.9 % (ref 12.3–15.4)
GLUCOSE SERPL-MCNC: 128 MG/DL (ref 65–99)
GRAM STN SPEC: ABNORMAL
HCT VFR BLD AUTO: 34 % (ref 37.5–51)
HCT VFR BLD AUTO: 34.5 % (ref 37.5–51)
HGB BLD-MCNC: 10.4 G/DL (ref 13–17.7)
HGB BLD-MCNC: 11 G/DL (ref 13–17.7)
IMM GRANULOCYTES # BLD AUTO: 0.14 10*3/MM3 (ref 0–0.05)
IMM GRANULOCYTES NFR BLD AUTO: 1 % (ref 0–0.5)
ISOLATED FROM: ABNORMAL
LYMPHOCYTES # BLD AUTO: 1.53 10*3/MM3 (ref 0.7–3.1)
LYMPHOCYTES NFR BLD AUTO: 11.4 % (ref 19.6–45.3)
MCH RBC QN AUTO: 29.1 PG (ref 26.6–33)
MCHC RBC AUTO-ENTMCNC: 30.6 G/DL (ref 31.5–35.7)
MCV RBC AUTO: 95 FL (ref 79–97)
MONOCYTES # BLD AUTO: 0.93 10*3/MM3 (ref 0.1–0.9)
MONOCYTES NFR BLD AUTO: 6.9 % (ref 5–12)
NEUTROPHILS NFR BLD AUTO: 10.62 10*3/MM3 (ref 1.7–7)
NEUTROPHILS NFR BLD AUTO: 78.8 % (ref 42.7–76)
NRBC BLD AUTO-RTO: 0 /100 WBC (ref 0–0.2)
PLATELET # BLD AUTO: 686 10*3/MM3 (ref 140–450)
PMV BLD AUTO: 8.7 FL (ref 6–12)
POTASSIUM SERPL-SCNC: 3.3 MMOL/L (ref 3.5–5.2)
RBC # BLD AUTO: 3.58 10*6/MM3 (ref 4.14–5.8)
SODIUM SERPL-SCNC: 138 MMOL/L (ref 136–145)
WBC NRBC COR # BLD: 13.48 10*3/MM3 (ref 3.4–10.8)

## 2023-02-12 PROCEDURE — 94760 N-INVAS EAR/PLS OXIMETRY 1: CPT

## 2023-02-12 PROCEDURE — 73080 X-RAY EXAM OF ELBOW: CPT

## 2023-02-12 PROCEDURE — 94664 DEMO&/EVAL PT USE INHALER: CPT

## 2023-02-12 PROCEDURE — 94799 UNLISTED PULMONARY SVC/PX: CPT

## 2023-02-12 PROCEDURE — 99232 SBSQ HOSP IP/OBS MODERATE 35: CPT | Performed by: SPECIALIST

## 2023-02-12 PROCEDURE — 25010000002 PIPERACILLIN SOD-TAZOBACTAM PER 1 G: Performed by: NURSE PRACTITIONER

## 2023-02-12 PROCEDURE — 85014 HEMATOCRIT: CPT | Performed by: NURSE PRACTITIONER

## 2023-02-12 PROCEDURE — 25010000002 PIPERACILLIN SOD-TAZOBACTAM PER 1 G: Performed by: INTERNAL MEDICINE

## 2023-02-12 PROCEDURE — 73030 X-RAY EXAM OF SHOULDER: CPT

## 2023-02-12 PROCEDURE — 85018 HEMOGLOBIN: CPT | Performed by: NURSE PRACTITIONER

## 2023-02-12 PROCEDURE — 97530 THERAPEUTIC ACTIVITIES: CPT

## 2023-02-12 PROCEDURE — 85025 COMPLETE CBC W/AUTO DIFF WBC: CPT | Performed by: INTERNAL MEDICINE

## 2023-02-12 PROCEDURE — 87040 BLOOD CULTURE FOR BACTERIA: CPT | Performed by: NURSE PRACTITIONER

## 2023-02-12 PROCEDURE — 25010000002 SODIUM CHLORIDE 0.9 % WITH KCL 20 MEQ 20-0.9 MEQ/L-% SOLUTION: Performed by: NURSE PRACTITIONER

## 2023-02-12 PROCEDURE — 25010000002 ENOXAPARIN PER 10 MG: Performed by: NURSE PRACTITIONER

## 2023-02-12 PROCEDURE — 80048 BASIC METABOLIC PNL TOTAL CA: CPT | Performed by: INTERNAL MEDICINE

## 2023-02-12 RX ORDER — LIDOCAINE 50 MG/G
1 PATCH TOPICAL
Status: DISCONTINUED | OUTPATIENT
Start: 2023-02-12 | End: 2023-02-14 | Stop reason: HOSPADM

## 2023-02-12 RX ORDER — POTASSIUM CHLORIDE 750 MG/1
40 CAPSULE, EXTENDED RELEASE ORAL ONCE
Status: COMPLETED | OUTPATIENT
Start: 2023-02-12 | End: 2023-02-12

## 2023-02-12 RX ORDER — SODIUM CHLORIDE AND POTASSIUM CHLORIDE 150; 900 MG/100ML; MG/100ML
40 INJECTION, SOLUTION INTRAVENOUS CONTINUOUS
Status: DISCONTINUED | OUTPATIENT
Start: 2023-02-12 | End: 2023-02-13

## 2023-02-12 RX ADMIN — GABAPENTIN 300 MG: 300 CAPSULE ORAL at 12:01

## 2023-02-12 RX ADMIN — NYSTATIN 500000 UNITS: 100000 SUSPENSION ORAL at 21:37

## 2023-02-12 RX ADMIN — SUCRALFATE 1 G: 1 SUSPENSION ORAL at 08:10

## 2023-02-12 RX ADMIN — PANTOPRAZOLE SODIUM 40 MG: 40 INJECTION, POWDER, FOR SOLUTION INTRAVENOUS at 08:07

## 2023-02-12 RX ADMIN — NYSTATIN 500000 UNITS: 100000 SUSPENSION ORAL at 17:00

## 2023-02-12 RX ADMIN — IPRATROPIUM BROMIDE AND ALBUTEROL SULFATE 3 ML: .5; 2.5 SOLUTION RESPIRATORY (INHALATION) at 06:29

## 2023-02-12 RX ADMIN — SUCRALFATE 1 G: 1 SUSPENSION ORAL at 16:59

## 2023-02-12 RX ADMIN — ENOXAPARIN SODIUM 40 MG: 100 INJECTION SUBCUTANEOUS at 17:00

## 2023-02-12 RX ADMIN — SUCRALFATE 1 G: 1 SUSPENSION ORAL at 11:57

## 2023-02-12 RX ADMIN — LISINOPRIL 10 MG: 10 TABLET ORAL at 08:10

## 2023-02-12 RX ADMIN — GABAPENTIN 300 MG: 300 CAPSULE ORAL at 08:10

## 2023-02-12 RX ADMIN — BUSPIRONE HYDROCHLORIDE 5 MG: 5 TABLET ORAL at 21:37

## 2023-02-12 RX ADMIN — Medication 10 ML: at 21:37

## 2023-02-12 RX ADMIN — SUCRALFATE 1 G: 1 SUSPENSION ORAL at 21:37

## 2023-02-12 RX ADMIN — TAZOBACTAM SODIUM AND PIPERACILLIN SODIUM 3.38 G: 375; 3 INJECTION, SOLUTION INTRAVENOUS at 21:36

## 2023-02-12 RX ADMIN — IPRATROPIUM BROMIDE AND ALBUTEROL SULFATE 3 ML: .5; 2.5 SOLUTION RESPIRATORY (INHALATION) at 10:19

## 2023-02-12 RX ADMIN — NYSTATIN 500000 UNITS: 100000 SUSPENSION ORAL at 13:28

## 2023-02-12 RX ADMIN — IPRATROPIUM BROMIDE AND ALBUTEROL SULFATE 3 ML: .5; 2.5 SOLUTION RESPIRATORY (INHALATION) at 14:13

## 2023-02-12 RX ADMIN — TAZOBACTAM SODIUM AND PIPERACILLIN SODIUM 3.38 G: 375; 3 INJECTION, SOLUTION INTRAVENOUS at 11:58

## 2023-02-12 RX ADMIN — IPRATROPIUM BROMIDE AND ALBUTEROL SULFATE 3 ML: .5; 2.5 SOLUTION RESPIRATORY (INHALATION) at 20:40

## 2023-02-12 RX ADMIN — TAZOBACTAM SODIUM AND PIPERACILLIN SODIUM 3.38 G: 375; 3 INJECTION, SOLUTION INTRAVENOUS at 05:13

## 2023-02-12 RX ADMIN — BUSPIRONE HYDROCHLORIDE 5 MG: 5 TABLET ORAL at 08:09

## 2023-02-12 RX ADMIN — OXYCODONE AND ACETAMINOPHEN 1 TABLET: 325; 10 TABLET ORAL at 12:17

## 2023-02-12 RX ADMIN — POTASSIUM CHLORIDE 40 MEQ: 750 CAPSULE, EXTENDED RELEASE ORAL at 08:09

## 2023-02-12 RX ADMIN — LIDOCAINE 1 PATCH: 700 PATCH TOPICAL at 12:39

## 2023-02-12 RX ADMIN — GABAPENTIN 300 MG: 300 CAPSULE ORAL at 21:37

## 2023-02-12 RX ADMIN — PANTOPRAZOLE SODIUM 40 MG: 40 INJECTION, POWDER, FOR SOLUTION INTRAVENOUS at 21:37

## 2023-02-12 RX ADMIN — GABAPENTIN 300 MG: 300 CAPSULE ORAL at 17:00

## 2023-02-12 RX ADMIN — POTASSIUM CHLORIDE AND SODIUM CHLORIDE 50 ML/HR: 900; 150 INJECTION, SOLUTION INTRAVENOUS at 08:10

## 2023-02-12 RX ADMIN — NIFEDIPINE 60 MG: 60 TABLET, FILM COATED, EXTENDED RELEASE ORAL at 08:10

## 2023-02-12 NOTE — THERAPY TREATMENT NOTE
Acute Care - Physical Therapy Treatment Note  [facility]     Patient Name: Jose Alfredo Kirkpatrick  : 1935  MRN: [id number]  Today's Date: 2023   Onset of Illness/Injury or Date of Surgery: 23  Visit Dx:     ICD-10-CM ICD-9-CM   1. Pneumonia of left lower lobe due to infectious organism  J18.9 486   2. Elevated troponin  R77.8 790.6   3. Diverticulosis  K57.90 562.10   4. Infrarenal abdominal aortic aneurysm (AAA) without rupture  I71.43 441.4   5. Urinary tract infection without hematuria, site unspecified  N39.0 599.0   6. Hiatal hernia  K44.9 553.3   7. Biliary calculus of other site without obstruction  K80.80 574.20   8. Bloody stools  K92.1 578.1   9. Dysphagia  R13.10 787.20   10. Decreased activities of daily living (ADL)  Z78.9 V49.89   11. Impaired mobility  Z74.09 799.89     Patient Active Problem List   Diagnosis   • Spinal stenosis, lumbar   • Hx of colonic polyps   • Drug-induced constipation   • Primary hypertension   • Acute pulmonary embolism with acute cor pulmonale (HCC)   • Acute bilateral deep vein thrombosis (DVT) of femoral veins (HCC)   • History of DVT of left lower extremity   • Positive fecal occult blood test   • Melena   • Vitamin B12 deficiency   • FARZANA (iron deficiency anemia)   • Cholelithiasis   • Gastric ulcer with hemorrhage   • Chronic bilateral low back pain without sciatica   • Chronic pain of both shoulders   • Kyphosis   • Overweight (BMI 25.0-29.9)   • Non-smoker   • Pneumonia of left lower lobe due to infectious organism   • Erosive esophagitis   • Large hiatal hernia   • Weight loss   • Gram-negative UTI, acute cystitis without hematuria, present on admission   • Paraesophageal hiatal hernia     Past Medical History:   Diagnosis Date   • Arthritis    • Cataract     BILATERAL   • GERD (gastroesophageal reflux disease)    • History of transfusion    • Hx of colonic polyps    • Hypertension    • Reflux esophagitis    • Streptococcosis     IN SPINE FROM BACK  INJECTIONS     Past Surgical History:   Procedure Laterality Date   • BACK SURGERY      cervical   • CATARACT EXTRACTION Bilateral    • COLONOSCOPY  02/11/2013    Hyperplastic polyp at 25 cm, Diverticulosis repeat exam in 5 years   • COLONOSCOPY N/A 10/16/2018    Tubular adenoma ascending colon repet prn   • ENDOSCOPY  01/15/2014    HH   • ENDOSCOPY N/A 10/03/2019    Large HH, non-bleeding gastric ulcer   • ENDOSCOPY N/A 11/25/2019    Procedure: ESOPHAGOGASTRODUODENOSCOPY WITH ANESTHESIA;  Surgeon: Marques Pérez MD;  Location: [facility] ENDOSCOPY;  Service: Gastroenterology   • ENDOSCOPY N/A 2/9/2023    Procedure: ESOPHAGOGASTRODUODENOSCOPY WITH ANESTHESIA;  Surgeon: Sushant Edmond MD;  Location: [facility] ENDOSCOPY;  Service: Gastroenterology;  Laterality: N/A;  pre GI bleed  post hiatal hernia; esophagitis  Otilio Ramos MD   • HERNIA REPAIR Right     INGUINAL    • HIP FRACTURE SURGERY Right 02/04/2023   • REPLACEMENT TOTAL KNEE BILATERAL     • THORACIC LAMINECTOMY DECOMPRESSIVE POSTERIOR N/A 12/14/2016    Procedure: LAMINECTOMY DECOMPRESSION, UNINSTRUMENTED POSTERIOR SPINAL FUSION, T 11-12;  Surgeon: LUZ Adan MD;  Location: [facility] OR;  Service:    • VENA CAVA FILTER INSERTION N/A 10/04/2019    Procedure: VENA CAVA FILTER INSERTION;  Surgeon: Dallin Coronado MD;  Location: [facility] HYBRID OR 12;  Service: Vascular     PT Assessment (last 12 hours)     PT Evaluation and Treatment     Row Name 02/12/23 1256 02/12/23 1113       Physical Therapy Time and Intention    Subjective Information complains of;pain  R elbow, did not rate  -WK --    Document Type therapy note (daily note)  -WK --    Mode of Treatment physical therapy  -WK --    Session Not Performed -- --  -WK    Comment -- --  -WK    Row Name 02/12/23 1256          General Information    Existing Precautions/Restrictions fall;right;hip, posterior  -WK     Row Name 02/12/23 1256          Transfers    Transfers stand pivot/stand step transfer   -WK     Row Name 02/12/23 1256          Sit-Stand Transfer    Sit-Stand [place] (Transfers) verbal cues;moderate assist (50% patient effort)  pt had continous BM while in stand. nsg assist with clean up  -WK     Assistive Device (Sit-Stand Transfers) walker, front-wheeled  -WK     Row Name 02/12/23 1256          Stand-Sit Transfer    Stand-Sit [place] (Transfers) verbal cues;moderate assist (50% patient effort)  -WK     Assistive Device (Stand-Sit Transfers) walker, front-wheeled  -WK     Row Name 02/12/23 1256          Stand Pivot/Stand Step Transfer    Stand Pivot/Stand Step [place] (Transfers) verbal cues;minimum assist (75% patient effort)  chair to transport chair and transport chair to chair.  -WK     Assistive Device (Stand Pivot Stand Step Transfer) walker, front-wheeled  -WK     Row Name 02/12/23 1256          Balance    Comment, Balance static standing Min-mod A with cues to stand upright  -WK     Row Name             Wound Right lateral greater trochanter Incision    Wound - Properties Group Present on Hospital Admission: Y  -AG Side: Right  -AG Orientation: lateral  -AG Location: greater trochanter  -AG Primary Wound Type: Incision  -AG    Retired Wound - Properties Group Present on Hospital Admission: Y  -AG Side: Right  -AG Orientation: lateral  -AG Location: greater trochanter  -AG Primary Wound Type: Incision  -AG    Retired Wound - Properties Group Present on Hospital Admission: Y  -AG Side: Right  -AG Location: greater trochanter  -AG Primary Wound Type: Incision  -AG    Row Name 02/12/23 1256          Plan of Care Review    Plan of Care Reviewed With patient  -WK     Outcome Evaluation Pt performed sit to stand mod A and transferred min A with RW. Pt required cues for safety and posture during standing and transfer.  -WK     Row Name 02/12/23 1256          Positioning and Restraints    Pre-Treatment Position sitting in chair/recliner  -WK     Post Treatment Position chair  -WK      In Chair sitting;with nsg  -WK           User Key  (r) = Recorded By, (t) = Taken By, (c) = Cosigned By    Initials Name Provider Type    WK Nai Kendall PTA Physical Therapist Assistant    Nirali Ling, RN Registered Nurse                Physical Therapy Education     Title: PT OT SLP Therapies (In Progress)     Topic: Physical Therapy (In Progress)     Point: Mobility training (Done)     Learning Progress Summary           Patient Acceptance, E, VU by AA at 2/10/2023 1255    Comment: Pt was educated on POC, posterior hip precautions, and benefits of activity.                   Point: Home exercise program (Not Started)     Learner Progress:  Not documented in this visit.          Point: Body mechanics (Not Started)     Learner Progress:  Not documented in this visit.          Point: Precautions (Done)     Learning Progress Summary           Patient Acceptance, E, VU by AA at 2/10/2023 1255    Comment: Pt was educated on POC, posterior hip precautions, and benefits of activity.                               User Key     Initials Effective Dates Name Provider Type Discipline     01/03/23 -  Mariann Pena, PT Student PT Student PT              PT Recommendation and Plan     Plan of Care Reviewed With: patient  Outcome Evaluation: Pt performed sit to stand mod A and transferred min A with RW. Pt required cues for safety and posture during standing and transfer.   Outcome Measures     Row Name 02/12/23 1256 02/12/23 1000 02/11/23 1100       How much help from another person do you currently need...    Turning from your back to your side while in flat bed without using bedrails? 2  -WK -- --    Moving from lying on back to sitting on the side of a flat bed without bedrails? 2  -WK -- --    Moving to and from a bed to a chair (including a wheelchair)? 2  -WK -- --    Standing up from a chair using your arms (e.g., wheelchair, bedside chair)? 2  -WK -- --    Climbing 3-5 steps with a railing? 1  -WK -- --    To  walk in hospital room? 1  -WK -- --    AM-PAC 6 Clicks Score (PT) 10  -WK -- --       How much help from another is currently needed...    Putting on and taking off regular lower body clothing? -- 2  -LS 2  -LS    Bathing (including washing, rinsing, and drying) -- 2  -LS 2  -LS    Toileting (which includes using toilet bed pan or urinal) -- 1  -LS 1  -LS    Putting on and taking off regular upper body clothing -- 2  -LS 2  -LS    Taking care of personal grooming (such as brushing teeth) -- 3  -LS 3  -LS    Eating meals -- 3  -LS 3  -LS    AM-PAC 6 Clicks Score (OT) -- 13  -LS 13  -LS       Functional Assessment    Outcome Measure Options AM-PAC 6 Clicks Basic Mobility (PT)  -WK AM-PAC 6 Clicks Daily Activity (OT)  -LS AM-PAC 6 Clicks Daily Activity (OT)  -LS    Row Name 02/10/23 1300             How much help from another is currently needed...    Putting on and taking off regular lower body clothing? 2  -AC (r) EC (t) AC (c)      Bathing (including washing, rinsing, and drying) 2  -AC (r) EC (t) AC (c)      Toileting (which includes using toilet bed pan or urinal) 1  -AC (r) EC (t) AC (c)      Putting on and taking off regular upper body clothing 2  -AC (r) EC (t) AC (c)      Taking care of personal grooming (such as brushing teeth) 3  -AC (r) EC (t) AC (c)      Eating meals 3  -AC (r) EC (t) AC (c)      AM-PAC 6 Clicks Score (OT) 13  -AC (r) EC (t)         Functional Assessment    Outcome Measure Options AM-PAC 6 Clicks Daily Activity (OT)  -AC (r) EC (t) AC (c)            User Key  (r) = Recorded By, (t) = Taken By, (c) = Cosigned By    Initials Name Provider Type    Adam Calles, OTR/L, CNT Occupational Therapist    WK Nai Kendall, PTA Physical Therapist Assistant    Nadira Shukla COTA Occupational Therapist Assistant    Radha Pereira, OT Student OT Student                 Time Calculation:    PT Charges     Row Name 02/12/23 1321             Time Calculation    Start Time 1156  -WK      Stop  Time 1206  -WK      Time Calculation (min) 10 min  -WK      PT Received On 02/12/23  -WK         Time Calculation- PT    Total Timed Code Minutes- PT 10 minute(s)  -WK            User Key  (r) = Recorded By, (t) = Taken By, (c) = Cosigned By    Initials Name Provider Type    WK Nai Kendall PTA Physical Therapist Assistant              Therapy Charges for Today     Code Description Service Date Service Provider Modifiers Qty    [id number]  PT THERAPEUTIC ACT EA 15 MIN 2/12/2023 Nai Kendall PTA GP 1          PT G-Codes  Outcome Measure Options: AM-PAC 6 Clicks Basic Mobility (PT)  AM-PAC 6 Clicks Score (PT): 10  AM-PAC 6 Clicks Score (OT): 13    Nai Kendall PTA  2/12/2023

## 2023-02-12 NOTE — PROGRESS NOTES
Vicky Funes MD Progress Note     LOS: 4 days   Patient Care Team:  Otilio Ramos MD as PCP - General  Otilio Ramos MD as PCP - Family Medicine  Marques Pérez MD as Consulting Physician (Gastroenterology)        Subjective     He is currently doing therapy complaining of some right shoulder pain.  Tolerating his diet.  He appears confused today more so than yesterday    Objective     Vital Signs  Temp:  [97.4 °F (36.3 °C)-98.7 °F (37.1 °C)] 98.3 °F (36.8 °C)  Heart Rate:  [71-96] 86  Resp:  [16-18] 16  BP: (101-122)/(49-66) 119/66    Intake & Output (last 3 days)       02/09 0701  02/10 0700 02/10 0701  02/11 0700 02/11 0701  02/12 0700 02/12 0701  02/13 0700    P.O.   180     I.V. (mL/kg) 300 (3.7)       Total Intake(mL/kg) 300 (3.7)  180 (2.3)     Urine (mL/kg/hr) 1800 (0.9) 1250 (0.6) 600 (0.3)     Stool  0 0     Total Output 1800 1250 600     Net -1500 -1250 -420             Urine Unmeasured Occurrence  3 x 2 x     Stool Unmeasured Occurrence  6 x 3 x           Physical Exam:     General Appearance:    Alert, cooperative, in no acute distress   Lungs:     respirations regular, even and unlabored    Heart:    Regular rhythm and normal rate, normal S1 and S2, no            murmur, no gallop, no rub   Chest Wall:    No abnormalities observed   Abdomen:      Soft nontender no distention   Extremities: No edema,    Results Review:     I reviewed the patient's new clinical results.    Lab Results (last 72 hours)     Procedure Component Value Units Date/Time    Blood Culture With DELFINO - Blood, Arm, Right [[id number]] Collected: 02/12/23 0806    Specimen: Blood from Arm, Right Updated: 02/12/23 0811    Blood Culture - Blood, Arm, Right [[id number]]  (Abnormal) Collected: 02/08/23 2012    Specimen: Blood from Arm, Right Updated: 02/12/23 0610     Blood Culture Staphylococcus, coagulase negative     Isolated from Anaerobic Bottle     Gram Stain Anaerobic Bottle Gram positive cocci in clusters    Narrative:       Probable contaminant requires clinical correlation, susceptibility not performed unless requested by physician.      Basic Metabolic Panel [[id number]]  (Abnormal) Collected: 02/12/23 0412    Specimen: Blood Updated: 02/12/23 0505     Glucose 128 mg/dL      BUN 24 mg/dL      Creatinine 1.23 mg/dL      Sodium 138 mmol/L      Potassium 3.3 mmol/L      Chloride 99 mmol/L      CO2 28.0 mmol/L      Calcium 8.4 mg/dL      BUN/Creatinine Ratio 19.5     Anion Gap 11.0 mmol/L      eGFR 57.2 mL/min/1.73     Narrative:      GFR Normal >60  Chronic Kidney Disease <60  Kidney Failure <15    The GFR formula is only valid for adults with stable renal function between ages 18 and 70.    CBC & Differential [[id number]]  (Abnormal) Collected: 02/12/23 0412    Specimen: Blood Updated: 02/12/23 0447    Narrative:      The following orders were created for panel order CBC & Differential.  Procedure                               Abnormality         Status                     ---------                               -----------         ------                     CBC Auto Differential[[id number]]        Abnormal            Final result                 Please view results for these tests on the individual orders.    CBC Auto Differential [[id number]]  (Abnormal) Collected: 02/12/23 0412    Specimen: Blood Updated: 02/12/23 0447     WBC 13.48 10*3/mm3      RBC 3.58 10*6/mm3      Hemoglobin 10.4 g/dL      Hematocrit 34.0 %      MCV 95.0 fL      MCH 29.1 pg      MCHC 30.6 g/dL      RDW 14.9 %      RDW-SD 52.1 fl      MPV 8.7 fL      Platelets 686 10*3/mm3      Neutrophil % 78.8 %      Lymphocyte % 11.4 %      Monocyte % 6.9 %      Eosinophil % 1.5 %      Basophil % 0.4 %      Immature Grans % 1.0 %      Neutrophils, Absolute 10.62 10*3/mm3      Lymphocytes, Absolute 1.53 10*3/mm3      Monocytes, Absolute 0.93 10*3/mm3      Eosinophils, Absolute 0.20 10*3/mm3      Basophils, Absolute 0.06 10*3/mm3      Immature Grans, Absolute 0.14 10*3/mm3       nRBC 0.0 /100 WBC     Hemoglobin & Hematocrit, Blood [[id number]] Updated: 02/12/23 0441    Specimen: Blood     Hemoglobin & Hematocrit, Blood [[id number]]  (Abnormal) Collected: 02/11/23 2338    Specimen: Blood Updated: 02/11/23 2347     Hemoglobin 11.0 g/dL      Hematocrit 37.8 %     Blood Culture - Blood, Arm, Left [[id number]]  (Normal) Collected: 02/08/23 2022    Specimen: Blood from Arm, Left Updated: 02/11/23 2046     Blood Culture No growth at 3 days    Hemoglobin & Hematocrit, Blood [[id number]]  (Abnormal) Collected: 02/11/23 1610    Specimen: Blood Updated: 02/11/23 1618     Hemoglobin 12.4 g/dL      Hematocrit 39.7 %     Basic Metabolic Panel [[id number]]  (Abnormal) Collected: 02/11/23 0449    Specimen: Blood Updated: 02/11/23 0537     Glucose 179 mg/dL      BUN 19 mg/dL      Creatinine 1.04 mg/dL      Sodium 143 mmol/L      Potassium 3.7 mmol/L      Chloride 100 mmol/L      CO2 28.0 mmol/L      Calcium 9.0 mg/dL      BUN/Creatinine Ratio 18.3     Anion Gap 15.0 mmol/L      eGFR 69.9 mL/min/1.73     Narrative:      GFR Normal >60  Chronic Kidney Disease <60  Kidney Failure <15    The GFR formula is only valid for adults with stable renal function between ages 18 and 70.    CBC & Differential [[id number]]  (Abnormal) Collected: 02/11/23 0449    Specimen: Blood Updated: 02/11/23 0520    Narrative:      The following orders were created for panel order CBC & Differential.  Procedure                               Abnormality         Status                     ---------                               -----------         ------                     CBC Auto Differential[[id number]]        Abnormal            Final result                 Please view results for these tests on the individual orders.    CBC Auto Differential [[id number]]  (Abnormal) Collected: 02/11/23 0449    Specimen: Blood Updated: 02/11/23 0520     WBC 11.88 10*3/mm3      RBC 4.25 10*6/mm3      Hemoglobin 12.5 g/dL      Hematocrit 39.7 %      MCV 93.4  fL      MCH 29.4 pg      MCHC 31.5 g/dL      RDW 14.8 %      RDW-SD 51.5 fl      MPV 8.6 fL      Platelets 772 10*3/mm3      Neutrophil % 81.5 %      Lymphocyte % 9.4 %      Monocyte % 7.3 %      Eosinophil % 0.3 %      Basophil % 0.3 %      Immature Grans % 1.2 %      Neutrophils, Absolute 9.68 10*3/mm3      Lymphocytes, Absolute 1.12 10*3/mm3      Monocytes, Absolute 0.87 10*3/mm3      Eosinophils, Absolute 0.03 10*3/mm3      Basophils, Absolute 0.04 10*3/mm3      Immature Grans, Absolute 0.14 10*3/mm3      nRBC 0.0 /100 WBC     Hemoglobin & Hematocrit, Blood [[id number]]  (Abnormal) Collected: 02/10/23 2348    Specimen: Blood Updated: 02/11/23 0009     Hemoglobin 12.9 g/dL      Hematocrit 41.9 %     Hemoglobin & Hematocrit, Blood [[id number]]  (Normal) Collected: 02/10/23 1820    Specimen: Blood Updated: 02/10/23 1853     Hemoglobin 13.1 g/dL      Hematocrit 42.1 %     MRSA Screen, PCR (Inpatient) - Swab, Nares [[id number]]  (Normal) Collected: 02/10/23 1659    Specimen: Swab from Nares Updated: 02/10/23 1843     MRSA PCR No MRSA Detected    Narrative:      The negative predictive value of this diagnostic test is high and should only be used to consider de-escalating anti-MRSA therapy. A positive result may indicate colonization with MRSA and must be correlated clinically.    S. Pneumo Ag Urine or CSF - Urine, Urine, Clean Catch [[id number]]  (Normal) Collected: 02/10/23 1658    Specimen: Urine, Clean Catch Updated: 02/10/23 1758     Strep Pneumo Ag Negative    Legionella Antigen, Urine - Urine, Urine, Clean Catch [[id number]]  (Normal) Collected: 02/10/23 1658    Specimen: Urine, Clean Catch Updated: 02/10/23 1758     LEGIONELLA ANTIGEN, URINE Negative    Tissue Pathology Exam [[id number]] Collected: 02/09/23 1139    Specimen: Tissue from Stomach Updated: 02/10/23 1703     Note to Patients --     This report may contain a detailed description of human tissue sent by a health care provider to the laboratory for  "pathologic evaluation. The content of this report is essential for diagnosis and may provide important critical findings. This information may be unfamiliar to patients to review without a medical professional present. It is advised that the patient review this report in the presence of a health care provider who can answer questions and explain the results.       Case Report --     Surgical Pathology Report                         Case: [id number]                                  Authorizing Provider:  Sushant Edmond MD           Collected:           02/09/2023 11:39 AM          Ordering Location:     [facility]     Received:            02/09/2023 01:14 PM                                 ENDOSCOPY                                                                    Pathologist:           Kassandra Majano MD                                                        Specimen:    Stomach, bx stomach; r/o h. pylori                                                          Final Diagnosis --     Stomach, endoscopic biopsy:  A.  Fragment of benign gastric mucosa demonstrating chronic inflammation, mild.  B.  Immunohistochemical stain for Helicobacter pylori is negative.       Gross Description --     1. Stomach.   Received in a formalin filled container labeled with the patient's name, date of birth, and \"biopsy stomach\".  The specimen consists of 1 yellow-pink soft tissue fragment measuring 0.5 x 0.1 by less than 0.1 cm, totally submitted in block 1A.         Microscopic Description --     Sections of the gastric biopsy reveal a fragment of gastric mucosa.  There is mild chronic inflammation.  Intestinal metaplasia is not identified.  Immunohistochemical stain for Helicobacter pylori is negative.  The control stained appropriately.  There is no evidence of malignancy.      Blood Culture ID, PCR - Blood, Arm, Right [[id number]]  (Abnormal) Collected: 02/08/23 2012    Specimen: Blood from Arm, Right Updated: " 02/10/23 1528     BCID, PCR Staph spp, not aureus or lugdunensis. Identification by BCID2 PCR.     BOTTLE TYPE Anaerobic Bottle    Urine Culture - Urine, Straight Cath [[id number]]  (Abnormal)  (Susceptibility) Collected: 02/08/23 1716    Specimen: Urine from Straight Cath Updated: 02/10/23 1253     Urine Culture >100,000 CFU/mL Escherichia coli    Narrative:      Colonization of the urinary tract without infection is common. Treatment is discouraged unless the patient is symptomatic, pregnant, or undergoing an invasive urologic procedure.    Susceptibility      Escherichia coli      ALISON      Ampicillin Susceptible      Ampicillin + Sulbactam Susceptible      Cefazolin Susceptible      Cefepime Susceptible      Ceftazidime Susceptible      Ceftriaxone Susceptible      Gentamicin Susceptible      Levofloxacin Susceptible      Nitrofurantoin Susceptible      Piperacillin + Tazobactam Susceptible      Trimethoprim + Sulfamethoxazole Susceptible                           Basic Metabolic Panel [[id number]]  (Abnormal) Collected: 02/10/23 0445    Specimen: Blood Updated: 02/10/23 0619     Glucose 112 mg/dL      BUN 16 mg/dL      Creatinine 0.97 mg/dL      Sodium 139 mmol/L      Potassium 3.9 mmol/L      Chloride 101 mmol/L      CO2 26.0 mmol/L      Calcium 8.6 mg/dL      BUN/Creatinine Ratio 16.5     Anion Gap 12.0 mmol/L      eGFR 76.0 mL/min/1.73     Narrative:      GFR Normal >60  Chronic Kidney Disease <60  Kidney Failure <15    The GFR formula is only valid for adults with stable renal function between ages 18 and 70.    CBC & Differential [[id number]]  (Abnormal) Collected: 02/10/23 0445    Specimen: Blood Updated: 02/10/23 0538    Narrative:      The following orders were created for panel order CBC & Differential.  Procedure                               Abnormality         Status                     ---------                               -----------         ------                     CBC Auto  Differential[[id number]]        Abnormal            Final result                 Please view results for these tests on the individual orders.    CBC Auto Differential [[id number]]  (Abnormal) Collected: 02/10/23 0445    Specimen: Blood Updated: 02/10/23 0538     WBC 7.80 10*3/mm3      RBC 3.91 10*6/mm3      Hemoglobin 11.4 g/dL      Hematocrit 36.5 %      MCV 93.4 fL      MCH 29.2 pg      MCHC 31.2 g/dL      RDW 14.6 %      RDW-SD 49.9 fl      MPV 8.8 fL      Platelets 667 10*3/mm3      Neutrophil % 67.0 %      Lymphocyte % 17.6 %      Monocyte % 9.7 %      Eosinophil % 3.1 %      Basophil % 0.9 %      Immature Grans % 1.7 %      Neutrophils, Absolute 5.23 10*3/mm3      Lymphocytes, Absolute 1.37 10*3/mm3      Monocytes, Absolute 0.76 10*3/mm3      Eosinophils, Absolute 0.24 10*3/mm3      Basophils, Absolute 0.07 10*3/mm3      Immature Grans, Absolute 0.13 10*3/mm3      nRBC 0.0 /100 WBC     Hemoglobin & Hematocrit, Blood [[id number]]  (Abnormal) Collected: 02/10/23 0024    Specimen: Blood Updated: 02/10/23 0054     Hemoglobin 11.0 g/dL      Hematocrit 34.2 %     Hemoglobin & Hematocrit, Blood [[id number]]  (Abnormal) Collected: 02/09/23 1624    Specimen: Blood Updated: 02/09/23 1716     Hemoglobin 12.5 g/dL      Hematocrit 39.6 %         Imaging Results (Last 72 Hours)     Procedure Component Value Units Date/Time    XR Abdomen KUB [[id number]] Collected: 02/10/23 2134     Updated: 02/10/23 2141    Narrative:      HISTORY: Large hiatal hernia with organoaxial rotation of the stomach.  Delayed emptying of the stomach from upper GI exam performed earlier  this afternoon. Follow-up KUB recommended.     KUB: A frontal view the abdomen and pelvis obtained at 7:23 PM on  02/10/2023. A chest x-ray was performed for 445 pm incidentally and  included with this exam. Upper GI study performed this afternoon  completed at  3pm.     On the 4:45 PM chest x-ray, there is retained contrast the distal  esophagus as well as the  herniated stomach indicating delayed gastric  emptying. On this 7:23 PM KUB study, there is contrast present  throughout the nondilated small and large bowel contrast present in the  rectum. IVC filter in place. Right hip prosthesis. Vascular  calcifications.       Impression:      1. Follow-up imaging after upper GI exam is suggested delayed gastric  emptying, however there is no gastric outlet obstruction with contrast  present throughout the small and large bowel to the level of the rectum  on the KUB performed 4 hours following completion of the upper GI exam.  This report was finalized on 02/10/2023 21:38 by Dr. Radhika Ross MD.    FL Upper GI Water Soluble [[id number]] Collected: 02/10/23 1520     Updated: 02/10/23 1534    Narrative:      EXAMINATION: FL UPPER GI WATER SOLUBLE-     2/10/2023 2:38 PM CST     HISTORY: Paraesophageal hiatal hernia; J18.9-Pneumonia, unspecified  organism; R77.8-Other specified abnormalities of plasma proteins;  K57.90-Diverticulosis of intestine, part unspecified, without  perforation or abscess without bleeding; I71.43-Infrarenal abdominal  aortic aneurysm, without rupture; N39.0-Urinary tract infection, site  not specified; K44.9-Diaphragmatic hernia without obstruction or  gangrene     The fluoroscopy is performed and images of the upper GI are obtained  during and after ingestion of water soluble contrast  material/Gastrografin.     There is no previous study for comparison.     There is no difficulty in initiating the swallowing.     There is normal propagation of the bolus through the oropharynx into the  esophagus.     Normal esophagus seen. The esophagus is displaced to the left. No  intrinsic abnormality.     There is a large hiatal hernia. 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 There is gastroesophageal reflux.     The entire stomach is contained in the hiatal hernia in the left chest.  The gastric antrum is at the hiatus with severe narrowing of the  antrum.  No contrast is seen passing the antrum/esophageal hiatus into the  abdominal part of the antrum/duodenum after several minutes.       Impression:      1. A large hiatal hernia containing most of the stomach. Organoaxial  rotation of the stomach.  2. Severe narrowing of the gastric antrum at the level of the esophagus  hiatus. No contrast passed beyond the antrum into the abdominal part of  the GI tract.  3. A delayed image after 4 hours would be obtained to see the  propagation of the contrast bolus.  4. Fluoroscopy time: 2 minutes 36 seconds.  5. The dose: 82.2mGy.  6. Number of images: 25  This report was finalized on 02/10/2023 15:31 by Dr. Mirtha Perez MD.              Assessment & Plan       Pneumonia of left lower lobe due to infectious organism    Primary hypertension    Positive fecal occult blood test    Erosive esophagitis    Large hiatal hernia    Weight loss    Gram-negative UTI, acute cystitis without hematuria, present on admission    Paraesophageal hiatal hernia      Plan continue diet as tolerated and observe for signs of gastric outlet obstruction.    Will order x-ray of the right shoulder      Vicky Funes MD  02/12/23  09:53 CST

## 2023-02-12 NOTE — THERAPY TREATMENT NOTE
Acute Care - Physical Therapy Treatment Note  [facility]     Patient Name: Jose Alfredo Kirkpatrick  : 1935  MRN: [id number]  Today's Date: 2023   Onset of Illness/Injury or Date of Surgery: 23  Visit Dx:     ICD-10-CM ICD-9-CM   1. Pneumonia of left lower lobe due to infectious organism  J18.9 486   2. Elevated troponin  R77.8 790.6   3. Diverticulosis  K57.90 562.10   4. Infrarenal abdominal aortic aneurysm (AAA) without rupture  I71.43 441.4   5. Urinary tract infection without hematuria, site unspecified  N39.0 599.0   6. Hiatal hernia  K44.9 553.3   7. Biliary calculus of other site without obstruction  K80.80 574.20   8. Bloody stools  K92.1 578.1   9. Dysphagia  R13.10 787.20   10. Decreased activities of daily living (ADL)  Z78.9 V49.89   11. Impaired mobility  Z74.09 799.89     Patient Active Problem List   Diagnosis   • Spinal stenosis, lumbar   • Hx of colonic polyps   • Drug-induced constipation   • Primary hypertension   • Acute pulmonary embolism with acute cor pulmonale (HCC)   • Acute bilateral deep vein thrombosis (DVT) of femoral veins (HCC)   • History of DVT of left lower extremity   • Positive fecal occult blood test   • Melena   • Vitamin B12 deficiency   • FARZANA (iron deficiency anemia)   • Cholelithiasis   • Gastric ulcer with hemorrhage   • Chronic bilateral low back pain without sciatica   • Chronic pain of both shoulders   • Kyphosis   • Overweight (BMI 25.0-29.9)   • Non-smoker   • Pneumonia of left lower lobe due to infectious organism   • Erosive esophagitis   • Large hiatal hernia   • Weight loss   • Gram-negative UTI, acute cystitis without hematuria, present on admission   • Paraesophageal hiatal hernia     Past Medical History:   Diagnosis Date   • Arthritis    • Cataract     BILATERAL   • GERD (gastroesophageal reflux disease)    • History of transfusion    • Hx of colonic polyps    • Hypertension    • Reflux esophagitis    • Streptococcosis     IN SPINE FROM BACK  INJECTIONS     Past Surgical History:   Procedure Laterality Date   • BACK SURGERY      cervical   • CATARACT EXTRACTION Bilateral    • COLONOSCOPY  02/11/2013    Hyperplastic polyp at 25 cm, Diverticulosis repeat exam in 5 years   • COLONOSCOPY N/A 10/16/2018    Tubular adenoma ascending colon repet prn   • ENDOSCOPY  01/15/2014    HH   • ENDOSCOPY N/A 10/03/2019    Large HH, non-bleeding gastric ulcer   • ENDOSCOPY N/A 11/25/2019    Procedure: ESOPHAGOGASTRODUODENOSCOPY WITH ANESTHESIA;  Surgeon: Marques Pérez MD;  Location: [facility] ENDOSCOPY;  Service: Gastroenterology   • ENDOSCOPY N/A 2/9/2023    Procedure: ESOPHAGOGASTRODUODENOSCOPY WITH ANESTHESIA;  Surgeon: Sushant Edmond MD;  Location: [facility] ENDOSCOPY;  Service: Gastroenterology;  Laterality: N/A;  pre GI bleed  post hiatal hernia; esophagitis  Otilio Ramos MD   • HERNIA REPAIR Right     INGUINAL    • HIP FRACTURE SURGERY Right 02/04/2023   • REPLACEMENT TOTAL KNEE BILATERAL     • THORACIC LAMINECTOMY DECOMPRESSIVE POSTERIOR N/A 12/14/2016    Procedure: LAMINECTOMY DECOMPRESSION, UNINSTRUMENTED POSTERIOR SPINAL FUSION, T 11-12;  Surgeon: LUZ Adan MD;  Location: [facility] OR;  Service:    • VENA CAVA FILTER INSERTION N/A 10/04/2019    Procedure: VENA CAVA FILTER INSERTION;  Surgeon: Dallin Coronado MD;  Location: [facility] HYBRID OR 12;  Service: Vascular     PT Assessment (last 12 hours)     PT Evaluation and Treatment     Row Name 02/12/23 1444 02/12/23 1256       Physical Therapy Time and Intention    Subjective Information complains of;pain  back 4/10  -WK complains of;pain  R elbow, did not rate  -WK    Document Type therapy note (daily note)  -WK therapy note (daily note)  -WK    Mode of Treatment physical therapy  -WK physical therapy  -WK    Row Name 02/12/23 1113          Physical Therapy Time and Intention    Session Not Performed --  -WK     Comment --  -WK     Row Name 02/12/23 1444 02/12/23 1256       General Information     Existing Precautions/Restrictions fall;right;hip, posterior  -WK fall;right;hip, posterior  -WK    Row Name 02/12/23 1444          Bed Mobility    Sit-Supine [place] (Bed Mobility) verbal cues;moderate assist (50% patient effort);2 person assist  -WK     Row Name 02/12/23 1256          Transfers    Transfers stand pivot/stand step transfer  -WK     Row Name 02/12/23 1444 02/12/23 1256       Sit-Stand Transfer    Sit-Stand [place] (Transfers) verbal cues;moderate assist (50% patient effort)  -WK verbal cues;moderate assist (50% patient effort)  pt had continous BM while in stand. nsg assist with clean up  -WK    Assistive Device (Sit-Stand Transfers) walker, front-wheeled  -WK walker, front-wheeled  -WK    Row Name 02/12/23 1444 02/12/23 1256       Stand-Sit Transfer    Stand-Sit [place] (Transfers) verbal cues;moderate assist (50% patient effort)  -WK verbal cues;moderate assist (50% patient effort)  -WK    Assistive Device (Stand-Sit Transfers) walker, front-wheeled  -WK walker, front-wheeled  -WK    Row Name 02/12/23 1444 02/12/23 1256       Stand Pivot/Stand Step Transfer    Stand Pivot/Stand Step [place] (Transfers) verbal cues;minimum assist (75% patient effort)  cue sto progress BLE  -WK verbal cues;minimum assist (75% patient effort)  chair to transport chair and transport chair to chair.  -WK    Assistive Device (Stand Pivot Stand Step Transfer) walker, front-wheeled  -WK walker, front-wheeled  -WK    Row Name 02/12/23 1256          Balance    Comment, Balance static standing Min-mod A with cues to stand upright  -WK     Row Name             Wound Right lateral greater trochanter Incision    Wound - Properties Group Present on Hospital Admission: Y  -AG Side: Right  -AG Orientation: lateral  -AG Location: greater trochanter  -AG Primary Wound Type: Incision  -AG    Retired Wound - Properties Group Present on Hospital Admission: Y  -AG Side: Right  -AG Orientation: lateral  -AG Location:  greater trochanter  -AG Primary Wound Type: Incision  -AG    Retired Wound - Properties Group Present on Hospital Admission: Y  -AG Side: Right  -AG Location: greater trochanter  -AG Primary Wound Type: Incision  -AG    Row Name 02/12/23 1256          Plan of Care Review    Plan of Care Reviewed With patient  -WK     Outcome Evaluation Pt performed sit to stand mod A and transferred min A with RW. Pt required cues for safety and posture during standing and transfer.  -WK     Row Name 02/12/23 1444 02/12/23 1256       Positioning and Restraints    Pre-Treatment Position in bed  -WK sitting in chair/recliner  -WK    Post Treatment Position bed  -WK chair  -WK    In Bed fowlers;call light within reach;encouraged to call for assist;with nsg  -WK --    In Chair -- sitting;with nsg  -WK          User Key  (r) = Recorded By, (t) = Taken By, (c) = Cosigned By    Initials Name Provider Type    WK Nai Kendall PTA Physical Therapist Assistant    Nirali Ling, RN Registered Nurse                Physical Therapy Education     Title: PT OT SLP Therapies (In Progress)     Topic: Physical Therapy (In Progress)     Point: Mobility training (Done)     Learning Progress Summary           Patient Acceptance, E, VU by AA at 2/10/2023 1255    Comment: Pt was educated on POC, posterior hip precautions, and benefits of activity.                   Point: Home exercise program (Not Started)     Learner Progress:  Not documented in this visit.          Point: Body mechanics (Not Started)     Learner Progress:  Not documented in this visit.          Point: Precautions (Done)     Learning Progress Summary           Patient Acceptance, E, VU by AA at 2/10/2023 1255    Comment: Pt was educated on POC, posterior hip precautions, and benefits of activity.                               User Key     Initials Effective Dates Name Provider Type Discipline     01/03/23 -  Mariann Pena, PT Student PT Student PT              PT Recommendation  and Plan     Plan of Care Reviewed With: patient  Outcome Evaluation: Pt performed sit to stand mod A and transferred min A with RW. Pt required cues for safety and posture during standing and transfer.   Outcome Measures     Row Name 02/12/23 1256 02/12/23 1000 02/11/23 1100       How much help from another person do you currently need...    Turning from your back to your side while in flat bed without using bedrails? 2  -WK -- --    Moving from lying on back to sitting on the side of a flat bed without bedrails? 2  -WK -- --    Moving to and from a bed to a chair (including a wheelchair)? 2  -WK -- --    Standing up from a chair using your arms (e.g., wheelchair, bedside chair)? 2  -WK -- --    Climbing 3-5 steps with a railing? 1  -WK -- --    To walk in hospital room? 1  -WK -- --    AM-PAC 6 Clicks Score (PT) 10  -WK -- --       How much help from another is currently needed...    Putting on and taking off regular lower body clothing? -- 2  -LS 2  -LS    Bathing (including washing, rinsing, and drying) -- 2  -LS 2  -LS    Toileting (which includes using toilet bed pan or urinal) -- 1  -LS 1  -LS    Putting on and taking off regular upper body clothing -- 2  -LS 2  -LS    Taking care of personal grooming (such as brushing teeth) -- 3  -LS 3  -LS    Eating meals -- 3  -LS 3  -LS    AM-PAC 6 Clicks Score (OT) -- 13  -LS 13  -LS       Functional Assessment    Outcome Measure Options AM-PAC 6 Clicks Basic Mobility (PT)  -WK AM-PAC 6 Clicks Daily Activity (OT)  -LS AM-PAC 6 Clicks Daily Activity (OT)  -LS    Row Name 02/10/23 1300             How much help from another is currently needed...    Putting on and taking off regular lower body clothing? 2  -AC (r) EC (t) AC (c)      Bathing (including washing, rinsing, and drying) 2  -AC (r) EC (t) AC (c)      Toileting (which includes using toilet bed pan or urinal) 1  -AC (r) EC (t) AC (c)      Putting on and taking off regular upper body clothing 2  -AC (r) EC (t) AC  (c)      Taking care of personal grooming (such as brushing teeth) 3  -AC (r) EC (t) AC (c)      Eating meals 3  -AC (r) EC (t) AC (c)      AM-PAC 6 Clicks Score (OT) 13  -AC (r) EC (t)         Functional Assessment    Outcome Measure Options AM-PAC 6 Clicks Daily Activity (OT)  -AC (r) EC (t) AC (c)            User Key  (r) = Recorded By, (t) = Taken By, (c) = Cosigned By    Initials Name Provider Type    AC Adam Rivera, OTR/L, CNT Occupational Therapist    WK Nai Kendall PTA Physical Therapist Assistant    LS Nadira Aparicio COTA Occupational Therapist Assistant    EC Radha Delgado, OT Student OT Student                 Time Calculation:    PT Charges     Row Name 02/12/23 1456 02/12/23 1321          Time Calculation    Start Time 1444  -WK 1156  -WK     Stop Time 1455  -WK 1206  -WK     Time Calculation (min) 11 min  -WK 10 min  -WK     PT Received On 02/12/23  -WK 02/12/23  -WK        Time Calculation- PT    Total Timed Code Minutes- PT 11 minute(s)  -WK 10 minute(s)  -WK           User Key  (r) = Recorded By, (t) = Taken By, (c) = Cosigned By    Initials Name Provider Type    WK Nai Kendall PTA Physical Therapist Assistant              Therapy Charges for Today     Code Description Service Date Service Provider Modifiers Qty    84258806562 HC PT THERAPEUTIC ACT EA 15 MIN 2/12/2023 Nai Kendall PTA GP 1    73442787469 HC PT THERAPEUTIC ACT EA 15 MIN 2/12/2023 Nai Kendall PTA GP 1          PT G-Codes  Outcome Measure Options: AM-PAC 6 Clicks Basic Mobility (PT)  AM-PAC 6 Clicks Score (PT): 10  AM-PAC 6 Clicks Score (OT): 13    Nai Kendall PTA  2/12/2023

## 2023-02-12 NOTE — THERAPY TREATMENT NOTE
Acute Care - Occupational Therapy Treatment Note  [facility]     Patient Name: Jose Alfredo Kirkpatrick  : 1935  MRN: [id number]  Today's Date: 2023  Onset of Illness/Injury or Date of Surgery: 23  Date of Referral to OT: 02/10/23  Referring Physician: Dr. Austin    Admit Date: 2023       ICD-10-CM ICD-9-CM   1. Pneumonia of left lower lobe due to infectious organism  J18.9 486   2. Elevated troponin  R77.8 790.6   3. Diverticulosis  K57.90 562.10   4. Infrarenal abdominal aortic aneurysm (AAA) without rupture  I71.43 441.4   5. Urinary tract infection without hematuria, site unspecified  N39.0 599.0   6. Hiatal hernia  K44.9 553.3   7. Biliary calculus of other site without obstruction  K80.80 574.20   8. Bloody stools  K92.1 578.1   9. Dysphagia  R13.10 787.20   10. Decreased activities of daily living (ADL)  Z78.9 V49.89   11. Impaired mobility  Z74.09 799.89     Patient Active Problem List   Diagnosis   • Spinal stenosis, lumbar   • Hx of colonic polyps   • Drug-induced constipation   • Primary hypertension   • Acute pulmonary embolism with acute cor pulmonale (HCC)   • Acute bilateral deep vein thrombosis (DVT) of femoral veins (HCC)   • History of DVT of left lower extremity   • Positive fecal occult blood test   • Melena   • Vitamin B12 deficiency   • FARZANA (iron deficiency anemia)   • Cholelithiasis   • Gastric ulcer with hemorrhage   • Chronic bilateral low back pain without sciatica   • Chronic pain of both shoulders   • Kyphosis   • Overweight (BMI 25.0-29.9)   • Non-smoker   • Pneumonia of left lower lobe due to infectious organism   • Erosive esophagitis   • Large hiatal hernia   • Weight loss   • Gram-negative UTI, acute cystitis without hematuria, present on admission   • Paraesophageal hiatal hernia     Past Medical History:   Diagnosis Date   • Arthritis    • Cataract     BILATERAL   • GERD (gastroesophageal reflux disease)    • History of transfusion    • Hx of colonic polyps    •  Hypertension    • Reflux esophagitis    • Streptococcosis     IN SPINE FROM BACK INJECTIONS     Past Surgical History:   Procedure Laterality Date   • BACK SURGERY      cervical   • CATARACT EXTRACTION Bilateral    • COLONOSCOPY  02/11/2013    Hyperplastic polyp at 25 cm, Diverticulosis repeat exam in 5 years   • COLONOSCOPY N/A 10/16/2018    Tubular adenoma ascending colon repet prn   • ENDOSCOPY  01/15/2014    HH   • ENDOSCOPY N/A 10/03/2019    Large HH, non-bleeding gastric ulcer   • ENDOSCOPY N/A 11/25/2019    Procedure: ESOPHAGOGASTRODUODENOSCOPY WITH ANESTHESIA;  Surgeon: Marques Pérez MD;  Location: [facility] ENDOSCOPY;  Service: Gastroenterology   • ENDOSCOPY N/A 2/9/2023    Procedure: ESOPHAGOGASTRODUODENOSCOPY WITH ANESTHESIA;  Surgeon: Sushant Edmond MD;  Location: [facility] ENDOSCOPY;  Service: Gastroenterology;  Laterality: N/A;  pre GI bleed  post hiatal hernia; esophagitis  Otilio Ramos MD   • HERNIA REPAIR Right     INGUINAL    • HIP FRACTURE SURGERY Right 02/04/2023   • REPLACEMENT TOTAL KNEE BILATERAL     • THORACIC LAMINECTOMY DECOMPRESSIVE POSTERIOR N/A 12/14/2016    Procedure: LAMINECTOMY DECOMPRESSION, UNINSTRUMENTED POSTERIOR SPINAL FUSION, T 11-12;  Surgeon: LUZ Adan MD;  Location: [facility] OR;  Service:    • VENA CAVA FILTER INSERTION N/A 10/04/2019    Procedure: VENA CAVA FILTER INSERTION;  Surgeon: Dallin Coronado MD;  Location: [facility] HYBRID OR 12;  Service: Vascular         OT ASSESSMENT FLOWSHEET (last 12 hours)     OT Evaluation and Treatment     Row Name 02/12/23 0858                   OT Time and Intention    Subjective Information complains of;pain  -LS        Document Type therapy note (daily note)  -LS        Mode of Treatment occupational therapy  -LS        Patient Effort good  -LS           General Information    Patient Profile Reviewed yes  -LS        Barriers to Rehab medically complex  -LS           Wound Right lateral greater trochanter Incision    Wound  - Properties Group Present on Hospital Admission: Y  -AG Side: Right  -AG Orientation: lateral  -AG Location: greater trochanter  -AG Primary Wound Type: Incision  -AG    Retired Wound - Properties Group Present on Hospital Admission: Y  -AG Side: Right  -AG Orientation: lateral  -AG Location: greater trochanter  -AG Primary Wound Type: Incision  -AG    Retired Wound - Properties Group Present on Hospital Admission: Y  -AG Side: Right  -AG Location: greater trochanter  -AG Primary Wound Type: Incision  -AG       Coping    Observed Emotional State calm;cooperative  -LS        Verbalized Emotional State acceptance  -LS           Plan of Care Review    Plan of Care Reviewed With patient  -LS        Progress improving  -LS        Outcome Evaluation OT tx completed on this date. Pt supine in bed with caregiver present. Caregiver reports that she fed him, pt has been able to feed himself, caregiver educated to allow pt to feed himself for functional purposes. Pt more confused on this date, however, demonstrates increased functional mobility with all aspects. Pt was min A rolling side to side for ayaan care and LB dressing. Pt continues to have frequent BMs.  Pt completed supine to EOB with min A and glider pad. Pt continues to complain of R shoulder/elbow pain. Dr/RN aware and xray ordered. Pt completed sit to stands two intervals requiring min A and increased time to acclimate to standing due to arthritis in hips, knees and shoulders with fair posture achieved after a minute or so. Pt completed stand/step transfer from EOB to recliner requiring min A x2 with RW and max verbal cuing for hand/foot placement and progression due to fear of falling. Pt left up in recliner.  SNF recommended upon discharge. OT POC to continue.  -LS           Positioning and Restraints    Pre-Treatment Position in bed  -LS        Post Treatment Position chair  -LS        In Chair call light within reach;encouraged to call for  assist;reclined;notified ns  -           Therapy Plan Review/Discharge Plan (OT)    Anticipated Discharge Disposition (OT) [facility]  -              User Key  (r) = Recorded By, (t) = Taken By, (c) = Cosigned By    Initials Name Effective Dates    AG Nirali Stratton RN 06/16/21 -      Nadira Aparicio COTA 09/22/22 -                  Occupational Therapy Education     Title: PT OT SLP Therapies (In Progress)     Topic: Occupational Therapy (Done)     Point: ADL training (Done)     Description:   Instruct learner(s) on proper safety adaptation and remediation techniques during self care or transfers.   Instruct in proper use of assistive devices.              Learning Progress Summary           Patient Acceptance, E,D, VU by  at 2/12/2023 1016    Comment: Pt educated on hand/foot placement during transfer for safety.    Acceptance, E, VU by  at 2/11/2023 1130    Comment: Pt educated on AAROM activities with good carryover.    Acceptance, E, VU,NR by  at 2/10/2023 1323    Comment: bed mobility techniques, pursed lip breathing, hip precautions, perineal hygiene                   Point: Precautions (Done)     Description:   Instruct learner(s) on prescribed precautions during self-care and functional transfers.              Learning Progress Summary           Patient Acceptance, E, VU,NR by EC at 2/10/2023 1323    Comment: bed mobility techniques, pursed lip breathing, hip precautions, perineal hygiene                   Point: Body mechanics (Done)     Description:   Instruct learner(s) on proper positioning and spine alignment during self-care, functional mobility activities and/or exercises.              Learning Progress Summary           Patient Acceptance, E, VU,NR by  at 2/10/2023 1323    Comment: bed mobility techniques, pursed lip breathing, hip precautions, perineal hygiene                               User Key     Initials Effective Dates Name Provider Type Discipline     09/22/22 -   Nadira Aparicio COTA Occupational Therapist Assistant THERAPIES     12/12/22 -  Radha Delgado, OT Student OT Student OT                  OT Recommendation and Plan     Plan of Care Review  Plan of Care Reviewed With: patient  Progress: improving  Outcome Evaluation: OT tx completed on this date. Pt supine in bed with caregiver present. Caregiver reports that she fed him, pt has been able to feed himself, caregiver educated to allow pt to feed himself for functional purposes. Pt more confused on this date, however, demonstrates increased functional mobility with all aspects. Pt was min A rolling side to side for ayaan care and LB dressing. Pt continues to have frequent BMs.  Pt completed supine to EOB with min A and glider pad. Pt continues to complain of R shoulder/elbow pain. Dr/RN aware and xray ordered. Pt completed sit to stands two intervals requiring min A and increased time to acclimate to standing due to arthritis in hips, knees and shoulders with fair posture achieved after a minute or so. Pt completed stand/step transfer from EOB to recliner requiring min A x2 with RW and max verbal cuing for hand/foot placement and progression due to fear of falling. Pt left up in recliner.  SNF recommended upon discharge. OT POC to continue.  Plan of Care Reviewed With: patient  Outcome Evaluation: OT tx completed on this date. Pt supine in bed with caregiver present. Caregiver reports that she fed him, pt has been able to feed himself, caregiver educated to allow pt to feed himself for functional purposes. Pt more confused on this date, however, demonstrates increased functional mobility with all aspects. Pt was min A rolling side to side for ayaan care and LB dressing. Pt continues to have frequent BMs.  Pt completed supine to EOB with min A and glider pad. Pt continues to complain of R shoulder/elbow pain. /RN aware and xray ordered. Pt completed sit to stands two intervals requiring min A and increased time to  acclimate to standing due to arthritis in hips, knees and shoulders with fair posture achieved after a minute or so. Pt completed stand/step transfer from EOB to recliner requiring min A x2 with RW and max verbal cuing for hand/foot placement and progression due to fear of falling. Pt left up in recliner.  SNF recommended upon discharge. OT POC to continue.     Outcome Measures     Row Name 02/12/23 1000 02/11/23 1100 02/10/23 1300       How much help from another is currently needed...    Putting on and taking off regular lower body clothing? 2  -LS 2  -LS 2  -AC (r) EC (t) AC (c)    Bathing (including washing, rinsing, and drying) 2  -LS 2  -LS 2  -AC (r) EC (t) AC (c)    Toileting (which includes using toilet bed pan or urinal) 1  -LS 1  -LS 1  -AC (r) EC (t) AC (c)    Putting on and taking off regular upper body clothing 2  -LS 2  -LS 2  -AC (r) EC (t) AC (c)    Taking care of personal grooming (such as brushing teeth) 3  -LS 3  -LS 3  -AC (r) EC (t) AC (c)    Eating meals 3  -LS 3  -LS 3  -AC (r) EC (t) AC (c)    AM-PAC 6 Clicks Score (OT) 13  -LS 13  -LS 13  -AC (r) EC (t)       Functional Assessment    Outcome Measure Options AM-PAC 6 Clicks Daily Activity (OT)  -LS AM-PAC 6 Clicks Daily Activity (OT)  -LS AM-PAC 6 Clicks Daily Activity (OT)  -AC (r) EC (t) AC (c)          User Key  (r) = Recorded By, (t) = Taken By, (c) = Cosigned By    Initials Name Provider Type    AC Adam Rivera, OTR/L, CNT Occupational Therapist    LS Nadira Aparicio COTA Occupational Therapist Assistant    EC Radha Delgado, OT Student OT Student                Time Calculation:    Time Calculation- OT     Row Name 02/12/23 1018             Time Calculation- OT    OT Start Time 0858  -LS      OT Stop Time 1015  -      OT Time Calculation (min) 77 min  -LS      Total Timed Code Minutes- OT 77 minute(s)  -      OT Received On 02/12/23  -            User Key  (r) = Recorded By, (t) = Taken By, (c) = Cosigned By    Initials Name  Provider Type     Nadira Aparicio COTA Occupational Therapist Assistant              Therapy Charges for Today     Code Description Service Date Service Provider Modifiers Qty    99689984149 HC OT THER PROC EA 15 MIN 2/11/2023 Nadira Aparicio COTA GO 2    86384981448 HC OT THERAPEUTIC ACT EA 15 MIN 2/11/2023 Nadira Aparicio COTA GO 1    85630499948 HC OT THERAPEUTIC ACT EA 15 MIN 2/12/2023 Nadira Aparicio COTA GO 5               ALLYSON Mabry  2/12/2023

## 2023-02-12 NOTE — PAYOR COMM NOTE
"2/11 CLINICAL  [id number]   [id number]    Jose Alfredo Kirkpatrick (87 y.o. Male)     Date of Birth   1935    Social Security Number       Address   [address] [place] [address]    Home Phone   [phone]    MRN   [id number]       Anabaptism   [facility]    Marital Status                               Admission Date   2/8/23    Admission Type   Emergency    Admitting Provider   Carolina Austin    Attending Provider   Carolina Austin    Department, Room/Bed   [facility] 4B, 406/1       Discharge Date       Discharge Disposition       Discharge Destination                               Attending Provider: Carolina Austin    Allergies: No Known Allergies    Isolation: None   Infection: COVID (History) (02/09/23)   Code Status: CPR    Ht: 190.5 cm (75\")   Wt: 78.2 kg (172 lb 8 oz)    Admission Cmt: None   Principal Problem: Pneumonia of left lower lobe due to infectious organism [J18.9]                 Active Insurance as of 2/8/2023     Primary Coverage     Payor Plan Insurance Group Employer/Plan Group    ANTHEM MEDICARE REPLACEMENT ANTHEM MEDICARE ADVANTAGE [id number]     Payor Plan Address Payor Plan Phone Number Payor Plan Fax Number Effective Dates    [address] [phone]  1/1/2023 - None Entered    [facility] [id number]       Subscriber Name Subscriber Birth Date Member ID       JOSE ALFREDO KIRKPATRICK 1935 [id number]                 Emergency Contacts      (Rel.) Home Phone Work Phone Mobile Phone    LOUIS WELLS (Daughter) -- -- [phone]              Current Facility-Administered Medications   Medication Dose Route Frequency Provider Last Rate Last Admin   • busPIRone (BUSPAR) tablet 5 mg  5 mg Oral BID Fallon Norman MD   5 mg at 02/12/23 0809   • cyclobenzaprine (FLEXERIL) tablet 5 mg  5 mg Oral BID PRN Fallon Norman MD   5 mg at 02/10/23 1240   • Enoxaparin Sodium (LOVENOX) syringe 40 mg  40 mg Subcutaneous Q24H " Olena Craft APRN   40 mg at 02/11/23 1706   • gabapentin (NEURONTIN) capsule 300 mg  300 mg Oral 4x Daily Olena Craft APRN   300 mg at 02/12/23 1201   • hydrALAZINE (APRESOLINE) injection 20 mg  20 mg Intravenous Q6H PRN Fallon Norman MD       • ipratropium-albuterol (DUO-NEB) nebulizer solution 3 mL  3 mL Nebulization 4x Daily - RT Fallon Norman MD   3 mL at 02/12/23 1413   • lidocaine (LIDODERM) 5 % 1 patch  1 patch Transdermal Q24H Olena Craft APRN   1 patch at 02/12/23 1239   • lisinopril (PRINIVIL,ZESTRIL) tablet 10 mg  10 mg Oral Q24H Olena Craft APRN   10 mg at 02/12/23 0810   • Morphine sulfate (PF) injection 2 mg  2 mg Intravenous Q4H PRN Fallon Norman MD   2 mg at 02/11/23 0509   • NIFEdipine XL (PROCARDIA XL) 24 hr tablet 60 mg  60 mg Oral Daily Fallon Norman MD   60 mg at 02/12/23 0810   • nystatin (MYCOSTATIN) 100,000 unit/mL suspension 500,000 Units  5 mL Swish & Swallow 4x Daily Olena Craft APRN   500,000 Units at 02/12/23 1328   • oxyCODONE-acetaminophen (PERCOCET)  MG per tablet 1 tablet  1 tablet Oral Q6H PRN Fallon Norman MD   1 tablet at 02/12/23 1217   • pantoprazole (PROTONIX) injection 40 mg  40 mg Intravenous Q12H Radhika Brown APRN   40 mg at 02/12/23 0807   • piperacillin-tazobactam (ZOSYN) 3.375 g in iso-osmotic dextrose 50 ml (premix)  3.375 g Intravenous Q8H Olena Craft APRN   3.375 g at 02/12/23 1158   • simethicone (MYLICON) chewable tablet 80 mg  80 mg Oral Q4H PRN Fallon Norman MD       • sodium chloride 0.9 % flush 10 mL  10 mL Intravenous PRN Kashif Zhong PA-C   10 mL at 02/09/23 2001   • sodium chloride 0.9 % with KCl 20 mEq/L infusion  40 mL/hr Intravenous Continuous Olena Craft APRN 40 mL/hr at 02/12/23 1444 40 mL/hr at 02/12/23 1444   • sucralfate (CARAFATE) 1 GM/10ML suspension 1 g  1 g Oral 4x Daily AC & at Bedtime Sushant Edmond MD   1 g at 02/12/23 1157     Orders (last 24  hrs)      Start     Ordered    02/12/23 1800  Hemoglobin & Hematocrit, Blood  Every 12 Hours       02/12/23 1421    02/12/23 1330  nystatin (MYCOSTATIN) 100,000 unit/mL suspension 500,000 Units  4 Times Daily         02/12/23 1235    02/12/23 1045  lidocaine (LIDODERM) 5 % 1 patch  Every 24 Hours Scheduled         02/12/23 0951    02/12/23 0953  XR Shoulder 2+ View Right  1 Time Imaging         02/12/23 0952    02/12/23 0953  XR Elbow 3+ View Right  1 Time Imaging         02/12/23 0952    02/12/23 0715  potassium chloride (MICRO-K) CR capsule 40 mEq  Once         02/12/23 0628    02/12/23 0715  sodium chloride 0.9 % with KCl 20 mEq/L infusion  Continuous         02/12/23 0628    02/12/23 0628  Blood Culture With DELFINO - Blood, Arm, Right  Once         02/12/23 0628    02/12/23 0600  CBC Auto Differential  PROCEDURE ONCE         02/11/23 2202    02/11/23 1800  Enoxaparin Sodium (LOVENOX) syringe 40 mg  Every 24 Hours         02/11/23 1222    02/10/23 1230  lisinopril (PRINIVIL,ZESTRIL) tablet 10 mg  Every 24 Hours Scheduled         02/10/23 1130    02/10/23 1200  gabapentin (NEURONTIN) capsule 300 mg  4 Times Daily         02/10/23 1111    02/09/23 1800  Dietary Nutrition Supplements Boost Plus (Ensure Enlive, Ensure Plus); chocolate  Daily With Breakfast, Lunch & Dinner       02/09/23 1624    02/09/23 1730  sucralfate (CARAFATE) 1 GM/10ML suspension 1 g  4 Times Daily Before Meals & Nightly         02/09/23 1407    02/09/23 1116  sodium chloride 0.9 % infusion  Continuous,   Status:  Discontinued         02/09/23 1114    02/09/23 0900  NIFEdipine XL (PROCARDIA XL) 24 hr tablet 60 mg  Daily         02/08/23 2240    02/09/23 0900  busPIRone (BUSPAR) tablet 5 mg  2 Times Daily         02/08/23 2240    02/09/23 0600  CBC & Differential  Daily       02/08/23 2206 02/09/23 0600  Basic Metabolic Panel  Daily       02/08/23 2206    02/09/23 0500  piperacillin-tazobactam (ZOSYN) 3.375 g in iso-osmotic dextrose 50 ml  (premix)  Every 8 Hours         02/08/23 2206 02/08/23 2345  pantoprazole (PROTONIX) injection 40 mg  Every 12 Hours Scheduled         02/08/23 2246 02/08/23 2300  ipratropium-albuterol (DUO-NEB) nebulizer solution 3 mL  4 Times Daily - RT         02/08/23 2206 02/08/23 2248  Hemoglobin & Hematocrit, Blood  Every 8 Hours,   Status:  Canceled       02/08/23 2247 02/08/23 2239  hydrALAZINE (APRESOLINE) injection 20 mg  Every 6 Hours PRN         02/08/23 2240 02/08/23 2239  simethicone (MYLICON) chewable tablet 80 mg  Every 4 Hours PRN         02/08/23 2240 02/08/23 2239  oxyCODONE-acetaminophen (PERCOCET)  MG per tablet 1 tablet  Every 6 Hours PRN         02/08/23 2240 02/08/23 2238  cyclobenzaprine (FLEXERIL) tablet 5 mg  2 Times Daily PRN         02/08/23 2240 02/08/23 2226  Morphine sulfate (PF) injection 2 mg  Every 4 Hours PRN         02/08/23 2226 02/08/23 1600  sodium chloride 0.9 % flush 10 mL  As Needed        See Hyperspace for full Linked Orders Report.    02/08/23 1600    --  acetaminophen (TYLENOL) 325 MG tablet  2 Times Daily         02/08/23 2126    --  busPIRone (BUSPAR) 5 MG tablet  2 Times Daily         02/08/23 2126    --  sucralfate (CARAFATE) 1 GM/10ML suspension  3 Times Daily         02/08/23 2126    --  cyclobenzaprine (FLEXERIL) 5 MG tablet  2 Times Daily PRN         02/08/23 2126    --  docusate sodium (COLACE) 100 MG capsule  2 Times Daily         02/08/23 2126    --  Enoxaparin Sodium (LOVENOX) 40 MG/0.4ML solution prefilled syringe syringe  Daily         02/08/23 2126    --  guaiFENesin (MUCINEX) 600 MG 12 hr tablet  2 Times Daily         02/08/23 2126    --  hydrALAZINE (APRESOLINE) 50 MG tablet  Every 8 Hours PRN         02/08/23 2126    --  linaclotide (LINZESS) 145 MCG capsule capsule  Daily         02/08/23 2126    --  omeprazole (priLOSEC) 20 MG capsule  Daily         02/08/23 2126    --  oxyCODONE-acetaminophen (PERCOCET)  MG per tablet  Every  6 Hours PRN         02/08/23 2126    --  NIFEdipine XL (PROCARDIA XL) 60 MG 24 hr tablet  Daily         02/08/23 2126    --  simethicone (MYLICON) 80 MG chewable tablet  Every 4 Hours PRN         02/08/23 2126    --  vitamin D (ERGOCALCIFEROL) 1.25 MG (31536 UT) capsule capsule  3 Times Weekly         02/08/23 2126    --  ondansetron (ZOFRAN) 4 MG tablet  Every 6 Hours PRN         02/08/23 2126    --  levalbuterol (XOPENEX) 0.31 MG/3ML nebulizer solution  Every 8 Hours PRN         02/08/23 2126    --  SCANNED - TELEMETRY           02/08/23 0000    --  vitamin B-12 (CYANOCOBALAMIN) 1000 MCG tablet  Daily         02/09/23 1321    --  bisacodyl (DULCOLAX) 10 MG suppository  Daily PRN         02/09/23 1321    --  SCANNED EKG         02/08/23 0000    --  SCANNED - TELEMETRY           02/08/23 0000    --  SCANNED - TELEMETRY           02/08/23 0000    --  SCANNED - TELEMETRY           02/08/23 0000                   Physician Progress Notes (last 24 hours)      Olena Craft, APRN at 02/12/23 1408              [facility] Medicine Services  INPATIENT PROGRESS NOTE    Patient Name: Jose Alfredo Kirkpatrick  Date of Admission: 2/8/2023  Today's Date: 02/12/23  Length of Stay: 4  Primary Care Physician: Otilio Ramos MD    Subjective   Chief Complaint: Follow-up weakness, dark stools, dysphagia  HPI  Sitting up in bed.  No visitors in room.  No oxygen in use.  Patient was being seen by Occupational Therapy at the time of my assessment.  He had a bowel movement.  I strongly encouraged him to participate with physical therapy today.  He has been able to sit and stand and transfer with rolling walker today.  This is the first time he has been up out of bed.  He does complain of right shoulder pain and x-ray ordered showing no acute osseous findings.  Moderate right AC joint osteoarthritis.  Also osteoarthritis and effusion right elbow.  Continue Zosyn for E. coli UTI and would treat  pneumonia.  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  No sputum production.  Protonix and Carafate continue per recommendations of GI.  Blood cultures remain no growth.  Dr. Funes patient would need to recover from hip surgery prior to proceeding with paraesophageal hernia repair.  Patient tolerating diet.    Review of Systems   Constitutional: Positive for activity change (After recent hip surgery) and fatigue. Negative for chills and fever.   HENT: Negative for congestion and trouble swallowing.    Eyes: Negative for photophobia and visual disturbance.   Respiratory: Negative for cough, shortness of breath and wheezing.    Cardiovascular: Negative for chest pain, palpitations and leg swelling.   Gastrointestinal: Positive for diarrhea. Negative for abdominal pain, anal bleeding, nausea and vomiting.   Endocrine: Negative for cold intolerance, heat intolerance and polyuria.   Genitourinary: Positive for frequency. Negative for dysuria and urgency.   Musculoskeletal: Positive for gait problem.   Skin: Negative for color change, pallor, rash and wound.   Allergic/Immunologic: Negative for immunocompromised state.   Neurological: Positive for weakness. Negative for light-headedness.   Hematological: Negative for adenopathy. Does not bruise/bleed easily.   Psychiatric/Behavioral: Negative for agitation, behavioral problems and confusion.      All pertinent negatives and positives are as above. All other systems have been reviewed and are negative unless otherwise stated.     Objective    Temp:  [97.4 °F (36.3 °C)-98.7 °F (37.1 °C)] 98.1 °F (36.7 °C)  Heart Rate:  [] 107  Resp:  [16-18] 16  BP: (101-122)/(49-66) 111/53  Physical Exam  Vitals and nursing note reviewed.   Constitutional:       Comments: Sitting up in bed.  No oxygen in use.  No visitors in room.   HENT:      Head: Normocephalic and atraumatic.      Nose: No congestion.      Mouth/Throat:      Pharynx: Oropharynx is clear.  No oropharyngeal exudate or posterior oropharyngeal erythema.   Eyes:      Extraocular Movements: Extraocular movements intact.      Pupils: Pupils are equal, round, and reactive to light.   Cardiovascular:      Rate and Rhythm: Normal rate and regular rhythm.      Heart sounds: No murmur heard.     Comments: Sinus rhythm 71-93 on telemetry.  Pulmonary:      Breath sounds: No wheezing, rhonchi or rales.      Comments: No oxygen in use.  Abdominal:      Palpations: Abdomen is soft.      Tenderness: There is no abdominal tenderness.   Genitourinary:     Comments: Voiding.  Musculoskeletal:         General: No swelling or tenderness.      Cervical back: Normal range of motion and neck supple.      Comments: SCDs bilateral lower extremities   Skin:     General: Skin is warm and dry.   Neurological:      General: No focal deficit present.      Mental Status: He is alert and oriented to person, place, and time.   Psychiatric:         Mood and Affect: Mood normal.         Behavior: Behavior normal.         Thought Content: Thought content normal.         Judgment: Judgment normal.       Results Review:  I have reviewed the labs, radiology results, and diagnostic studies.    Laboratory Data:   Results from last 7 days   Lab Units 02/12/23 0412 02/11/23  2338 02/11/23  1610 02/11/23  0449 02/10/23  1820 02/10/23  0445   WBC 10*3/mm3 13.48*  --   --  11.88*  --  7.80   HEMOGLOBIN g/dL 10.4* 11.0* 12.4* 12.5*   < > 11.4*   HEMATOCRIT % 34.0* 37.8 39.7 39.7   < > 36.5*   PLATELETS 10*3/mm3 686*  --   --  772*  --  667*    < > = values in this interval not displayed.     Results from last 7 days   Lab Units 02/12/23  0412 02/11/23  0449 02/10/23  0445 02/09/23  0732 02/08/23  1459 02/08/23  1100   SODIUM mmol/L 138 143 139   < > 134* 135*   POTASSIUM mmol/L 3.3* 3.7 3.9   < > 4.1 4.0   CHLORIDE mmol/L 99 100 101   < > 100 98   CO2 mmol/L 28.0 28.0 26.0   < > 25.0 23.0   BUN mg/dL 24* 19 16   < > 18 20   CREATININE mg/dL 1.23  1.04 0.97   < > 0.74* 0.70*   CALCIUM mg/dL 8.4* 9.0 8.6   < > 8.3* 8.5*   BILIRUBIN mg/dL  --   --   --   --  0.4 0.4   ALK PHOS U/L  --   --   --   --  89 95   ALT (SGPT) U/L  --   --   --   --  20 19   AST (SGOT) U/L  --   --   --   --  32 34   GLUCOSE mg/dL 128* 179* 112*   < > 136* 148*    < > = values in this interval not displayed.     CBC:      Lab 02/12/23  0412 02/11/23  1610 02/11/23  0449 02/10/23  1820 02/10/23  0445 02/09/23  1624 02/09/23  0732 02/09/23  0009 02/08/23  1459   WBC 13.48*  --  11.88*  --  7.80  --  9.68  --  9.94   HEMOGLOBIN 10.4*   < > 12.5*   < > 11.4*   < > 11.8*   < > 11.6*   HEMATOCRIT 34.0*   < > 39.7   < > 36.5*   < > 37.9   < > 37.4*   PLATELETS 686*  --  772*  --  667*  --  643*  --  608*   NEUTROS ABS 10.62*  --  9.68*  --  5.23  --  6.56  --  7.32*   IMMATURE GRANS (ABS) 0.14*  --  0.14*  --  0.13*  --  0.13*  --  0.14*   LYMPHS ABS 1.53  --  1.12  --  1.37  --  1.97  --  1.56   MONOS ABS 0.93*  --  0.87  --  0.76  --  0.83  --  0.69   EOS ABS 0.20  --  0.03  --  0.24  --  0.12  --  0.17   MCV 95.0  --  93.4  --  93.4  --  93.6  --  93.3    < > = values in this interval not displayed.      Microbiology Results (last 10 days)     Procedure Component Value - Date/Time    MRSA Screen, PCR (Inpatient) - Swab, Nares [[id number]]  (Normal) Collected: 02/10/23 1659    Lab Status: Final result Specimen: Swab from Nares Updated: 02/10/23 1843     MRSA PCR No MRSA Detected    Narrative:      The negative predictive value of this diagnostic test is high and should only be used to consider de-escalating anti-MRSA therapy. A positive result may indicate colonization with MRSA and must be correlated clinically.    S. Pneumo Ag Urine or CSF - Urine, Urine, Clean Catch [[id number]]  (Normal) Collected: 02/10/23 1658    Lab Status: Final result Specimen: Urine, Clean Catch Updated: 02/10/23 1758     Strep Pneumo Ag Negative    Legionella Antigen, Urine - Urine, Urine, Clean Catch [[id number]]   (Normal) Collected: 02/10/23 1658    Lab Status: Final result Specimen: Urine, Clean Catch Updated: 02/10/23 1758     LEGIONELLA ANTIGEN, URINE Negative    Blood Culture - Blood, Arm, Left [[id number]]  (Normal) Collected: 02/08/23 2022    Lab Status: Preliminary result Specimen: Blood from Arm, Left Updated: 02/11/23 2046     Blood Culture No growth at 3 days    Blood Culture - Blood, Arm, Right [[id number]]  (Abnormal) Collected: 02/08/23 2012    Lab Status: Final result Specimen: Blood from Arm, Right Updated: 02/12/23 0610     Blood Culture Staphylococcus, coagulase negative     Isolated from Anaerobic Bottle     Gram Stain Anaerobic Bottle Gram positive cocci in clusters    Narrative:      Probable contaminant requires clinical correlation, susceptibility not performed unless requested by physician.      Blood Culture ID, PCR - Blood, Arm, Right [[id number]]  (Abnormal) Collected: 02/08/23 2012    Lab Status: Final result Specimen: Blood from Arm, Right Updated: 02/10/23 1528     BCID, PCR Staph spp, not aureus or lugdunensis. Identification by BCID2 PCR.     BOTTLE TYPE Anaerobic Bottle    Urine Culture - Urine, Straight Cath [[id number]]  (Abnormal)  (Susceptibility) Collected: 02/08/23 1716    Lab Status: Final result Specimen: Urine from Straight Cath Updated: 02/10/23 1253     Urine Culture >100,000 CFU/mL Escherichia coli    Narrative:      Colonization of the urinary tract without infection is common. Treatment is discouraged unless the patient is symptomatic, pregnant, or undergoing an invasive urologic procedure.    Susceptibility      Escherichia coli      ALISON      Ampicillin Susceptible      Ampicillin + Sulbactam Susceptible      Cefazolin Susceptible      Cefepime Susceptible      Ceftazidime Susceptible      Ceftriaxone Susceptible      Gentamicin Susceptible      Levofloxacin Susceptible      Nitrofurantoin Susceptible      Piperacillin + Tazobactam Susceptible      Trimethoprim + Sulfamethoxazole  Susceptible                           COVID-19, FLU A/B, RSV PCR - Swab, Nasopharynx [[id number]]  (Normal) Collected: 02/08/23 1715    Lab Status: Final result Specimen: Swab from Nasopharynx Updated: 02/08/23 1820     COVID19 Not Detected     Influenza A PCR Not Detected     Influenza B PCR Not Detected     RSV, PCR Not Detected    Narrative:      Fact sheet for providers: https://www.fda.gov/media/538510/download    Fact sheet for patients: https://www.fda.gov/media/419676/download    Test performed by PCR.          Imaging Results (All)     Procedure Component Value Units Date/Time    XR Abdomen KUB [[id number]] Collected: 02/10/23 2134     Updated: 02/10/23 2141    Narrative:      HISTORY: Large hiatal hernia with organoaxial rotation of the stomach.  Delayed emptying of the stomach from upper GI exam performed earlier  this afternoon. Follow-up KUB recommended.     KUB: A frontal view the abdomen and pelvis obtained at 7:23 PM on  02/10/2023. A chest x-ray was performed for 445 pm incidentally and  included with this exam. Upper GI study performed this afternoon  completed at  3pm.     On the 4:45 PM chest x-ray, there is retained contrast the distal  esophagus as well as the herniated stomach indicating delayed gastric  emptying. On this 7:23 PM KUB study, there is contrast present  throughout the nondilated small and large bowel contrast present in the  rectum. IVC filter in place. Right hip prosthesis. Vascular  calcifications.       Impression:      1. Follow-up imaging after upper GI exam is suggested delayed gastric  emptying, however there is no gastric outlet obstruction with contrast  present throughout the small and large bowel to the level of the rectum  on the KUB performed 4 hours following completion of the upper GI exam.  This report was finalized on 02/10/2023 21:38 by Dr. Radhika Ross MD.    FL Upper GI Water Soluble [[id number]] Collected: 02/10/23 1520     Updated: 02/10/23 1534     Narrative:      EXAMINATION: FL UPPER GI WATER SOLUBLE-     2/10/2023 2:38 PM CST     HISTORY: Paraesophageal hiatal hernia; J18.9-Pneumonia, unspecified  organism; R77.8-Other specified abnormalities of plasma proteins;  K57.90-Diverticulosis of intestine, part unspecified, without  perforation or abscess without bleeding; I71.43-Infrarenal abdominal  aortic aneurysm, without rupture; N39.0-Urinary tract infection, site  not specified; K44.9-Diaphragmatic hernia without obstruction or  gangrene     The fluoroscopy is performed and images of the upper GI are obtained  during and after ingestion of water soluble contrast  material/Gastrografin.     There is no previous study for comparison.     There is no difficulty in initiating the swallowing.     There is normal propagation of the bolus through the oropharynx into the  esophagus.     Normal esophagus seen. The esophagus is displaced to the left. No  intrinsic abnormality.     There is a large hiatal hernia. 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 There is gastroesophageal reflux.     The entire stomach is contained in the hiatal hernia in the left chest.  The gastric antrum is at the hiatus with severe narrowing of the antrum.  No contrast is seen passing the antrum/esophageal hiatus into the  abdominal part of the antrum/duodenum after several minutes.       Impression:      1. A large hiatal hernia containing most of the stomach. Organoaxial  rotation of the stomach.  2. Severe narrowing of the gastric antrum at the level of the esophagus  hiatus. No contrast passed beyond the antrum into the abdominal part of  the GI tract.  3. A delayed image after 4 hours would be obtained to see the  propagation of the contrast bolus.  4. Fluoroscopy time: 2 minutes 36 seconds.  5. The dose: 82.2mGy.  6. Number of images: 25  This report was finalized on 02/10/2023 15:31 by Dr. Mirtha Perez MD.    CT Abdomen Pelvis Without Contrast [[id number]] Collected: 02/08/23  1820     Updated: 02/08/23 1831    Narrative:      CT ABDOMEN PELVIS WO CONTRAST- 2/8/2023 6:08 PM CST     HISTORY: new onset bloody stools, recent diarrhea      COMPARISON: None      DOSE LENGTH PRODUCT: 337 mGy cm. Automated exposure control was also  utilized to decrease patient radiation dose.     TECHNIQUE: Noncontrast enhanced images of the abdomen and pelvis  obtained without oral contrast. Multiplanar reformatted images were  provided for review.      FINDINGS:   LOWER CHEST: Large hiatal hernia. Atelectasis present in the left lower  lobe and there is also a trace layering left pleural effusion. Coronary  atheromatous calcification.      LIVER: No focal liver lesion within limits of a noncontrast study.      BILIARY SYSTEM: Cholelithiasis. Gallbladder is nondistended. Biliary  tree is nondilated.      PANCREAS: No focal pancreatic lesion within limits of a noncontrast  study.      SPLEEN: Unremarkable.      KIDNEYS: Iodinated contrast identified in the upper urinary tracts. No  hydronephrosis. Ureters are nondilated.      ADRENALS: Unremarkable.     RETROPERITONEUM: No mass, lymphadenopathy or hemorrhage.      GI TRACT: Large hiatal hernia. The stomach is nondistended. Small bowel  loops are nondilated. Colonic diverticulosis. The colon is nondistended.  No inflammatory changes are identified along the colon. No bowel wall  thickening identified. Surrounding fat is clean.     OTHER: There is no mesenteric mass, lymphadenopathy or fluid collection.  No intraperitoneal free fluid or free air. IVC filter is in place.  Infrarenal AAA measuring up to 3.6 cm in diameter. Right hip  arthroplasty. No acute bony abnormality is seen. There appears to be a  high-grade spinal stenosis at the L1-L2 disc level with complete loss of  disc height at this level. There is an old fracture identified across  the inferior endplate of T9.     PELVIS: Bladder wall trabeculation. Air identified in the urinary  bladder. No pelvic  adenopathy identified.       Impression:      1. Colonic diverticulosis. Bowel is nondistended. No inflammatory  changes are seen along the bowel.  2. Stomach and small bowel are decompressed. Hiatal hernia is present.  3. Cholelithiasis.  4. Bladder wall trabeculation which may be secondary to chronic partial  outlet obstruction.  5. Infrarenal AAA measuring up to 3.6 cm in diameter.  This report was finalized on 02/08/2023 18:28 by Dr Geremias Martinez, .    CT Angiogram Chest [[id number]] Collected: 02/08/23 1659     Updated: 02/08/23 1704    Narrative:      EXAMINATION: CT ANGIOGRAM CHEST-      2/8/2023 4:43 PM CST     HISTORY: Shortness of breath, chest pain, weakness, recent surgery and  immobilization since.     In order to have a CT radiation dose as low as reasonably achievable  Automated Exposure Control was utilized for adjustment of the mA and/or  KV according to patient size.     DLP in mGycm= 194.     CT angiogram chest with IV contrast.  CT angiography protocol.   CT imaging with bolus IV contrast injection.   Under concurrent supervision axial, sagittal, coronal,  three-dimensional, and MIP data sets were constructed on an independent  work station.     Normal heart size.  Intrathoracic stomach with left lung base consolidation compatible with  pneumonia.     Small left and trace right pleural effusion.     Symmetric and normally opacified pulmonary arteries.  No pulmonary embolism.     Upper lobes are clear.  No pneumothorax.     Small gallstones incidentally noted.     Summary:  1. Left lower lobe pneumonia.  2. No pulmonary embolism.   This report was finalized on 02/08/2023 17:01 by Dr. Fawad Smith MD.    XR Chest 1 View [[id number]] Collected: 02/08/23 1634     Updated: 02/08/23 1639    Narrative:      XR CHEST 1 VW- 2/8/2023 4:25 PM CST     HISTORY: chest pain, weakness     COMPARISON: Chest exam dated 10/01/2019.     FINDINGS:      No lung consolidation. No pleural effusion or pneumothorax. Heart  size  is normal. Pulmonary vasculature are nondilated. Hiatal hernia  superimposed over the cardiac silhouette. The osseous structures and  surrounding soft tissues demonstrate no acute abnormality.       Impression:      1. Stable chest exam without acute process.  2. Hiatal hernia.        This report was finalized on 02/08/2023 16:36 by Dr Geremias Martinez, .        Results for orders placed during the hospital encounter of 02/08/23    Adult Transthoracic Echo Complete W/ Cont if Necessary Per Protocol    Interpretation Summary  •  Left ventricular systolic function is normal. Left ventricular ejection fraction appears to be 51 - 55%.  •  Left ventricular wall thickness is consistent with mild concentric hypertrophy.  •  Left ventricular diastolic function is consistent with (grade II w/high LAP) pseudonormalization.  •  The left atrial cavity is mildly dilated.  •  The aortic valve exhibits sclerosis. There is mild thickening of the aortic valve. Mild to moderate aortic valve regurgitation is present. Mild aortic valve stenosis is present.  •  Mild mitral annular calcification is present.  •  Moderate pulmonary hypertension is present  •  Mild dilation of the aortic root is present    Schedule medications:  busPIRone, 5 mg, Oral, BID  enoxaparin, 40 mg, Subcutaneous, Q24H  gabapentin, 300 mg, Oral, 4x Daily  ipratropium-albuterol, 3 mL, Nebulization, 4x Daily - RT  lidocaine, 1 patch, Transdermal, Q24H  lisinopril, 10 mg, Oral, Q24H  NIFEdipine XL, 60 mg, Oral, Daily  nystatin, 5 mL, Swish & Swallow, 4x Daily  pantoprazole, 40 mg, Intravenous, Q12H  piperacillin-tazobactam, 3.375 g, Intravenous, Q8H  sucralfate, 1 g, Oral, 4x Daily AC & at Bedtime        I have reviewed the patient's current medications.     Assessment/Plan   Assessment  Active Hospital Problems    Diagnosis    • **Pneumonia of left lower lobe due to infectious organism    • Paraesophageal hiatal hernia    • Erosive esophagitis    • Large hiatal  hernia    • Weight loss    • Gram-negative UTI, acute cystitis without hematuria, present on admission    • Positive fecal occult blood test    • Primary hypertension      Treatment Plan  Mr. Kirkpatrick presented to ER 2/8/2023 from SNF with fatigue, weakness, dysphagia and black stool.  Patient had recent right hip hemiarthroplasty1/28/2023 by Dr. Quintana at [facility] and was discharged to skilled nursing facility on 2/2/2023.  Patient reported constipation that he attributed to pain medication.  In ER, he tested fecal occult blood positive.  Patient reported dysphagia of solid foods for the past several years and reported that food felt as though it was getting hung in his esophagus.  Patient had 3 previous endoscopies between 2014 and 2019.  2019 patient had melena and was found to have gastric ulcer managed conservatively.  Repeat endoscopy showed completely healed ulcer.  Last colonoscopy 2019 revealed small polyps removed.  Patient reported 24 pound weight loss over the last several months but most recently after his wife passed away within the past month.  Hemoglobin 11.6.  WBC normal.  LFTs normal.  CT scan of abdomen noted colonic diverticulosis without any inflammatory changes unremarkable stomach, small bowel, hiatal hernia, cholelithiasis without cholecystitis, bladder wall trabeculation and 3.6 cm infrarenal abdominal aortic aneurysm.  Patient denied chest pain.  CT scan showed bilateral pleural effusions with concern for parapneumonic effusion.  CTA chest noted no pulmonary emboli.  Left lower lobe pneumonia.    Pneumonia left lower lobe.  Initially received azithromycin and Rocephin.  Antibiotics changed to Zosyn due to recent hospital stay.  Blood cultures no growth at32 days.  Strep pneumonia negative, Legionella negative, MRSA PCR negative.  Continue duo nebs and incentive spirometry.  CBC in AM.  Suspect paraesophageal hernia contributing to left lower lobe consolidation.  Remains afebrile.  Repeat  CBC.  DELFINO pending.    Fecal occult blood positive.  Hemoglobin stable.  Hemoglobin 11.6 on admission, hemoglobin 10.4 today.  Patient attributes dark stools to constipation secondary to narcotics.  Stools are brown today.    Dysphagia.  GI consulted.  Dr. Edmond recommended endoscopy for evaluation of melena, dysphagia and odynophagia.  Patient agreeable.  Findings: Hypopharynx and larynx grossly appeared normal.  Because of the proximal esophagus appeared normal.  Severe, grade IV, erosive esophagitis of mid and distal esophagus, manifested by numerous linear and irregular shaped fibrin covering ulcerations and erosions, edema and erythema, but without signs of any active bleeding.  Very large hiatal hernia, with essentially almost entire stomach patient currently chest, with configuration suggestive of, at least, partial volvulus of the stomach.  No ulcerations, but mild diffuse edema and erythema of gastric mucosa.  Gastric biopsy obtained with cold biopsy forceps to rule out H. pylori.  Normal-appearing duodenum.     Recommendations: Protonix 40 mg twice daily. Carafate suspension 10 cc before every meal and at bedtime.  Full liquid diet.  Evaluation by surgery for for possible repair of hiatal hernia/gastropexy.    Paraesophageal hernia.  General surgery consulted and Dr. Funes noted very large hiatal hernia and difficult situation given age and frality recent surgery and hip surgery.  Upper GI 2/10 noted large hiatal hernia containing most of stomach.  Organoxial rotation of stomach.  Severe narrowing of the gastric antrum at the level of the esophagus hiatus.  No contrast passed beyond the antrum into the abdominal part of GI tract.  Delayed imaging 4 hours obtained.  KUB following upper GI suggest delayed gastric emptying.  No gastric outlet obstruction present throughout the small and large bowel to the level of the rectum.  Discussed with Dr. Funes and would like to recover from recent hip surgery before  surgical intervention.  Patient agrees but also understands may require more urgent attention.  Continue full liquid diet.    Urine culture E. Coli, present on admission.  Sensitive to Zosyn day 4.  Urinalysis 31-50 WBC, 4+ bacteria.    Potassium 3.3.  Replaced 40 mEq potassium x1 dose.  Repeat BMP in AM.    Recent right hip hemiarthroplasty 1/28/2023 Dr. Quintana.  Discussed with Brendan Petersen PA-C and allowed weightbearing as tolerated. Physical therapy consulted.  Patient stood today.    Primary hypertension.  Blood pressure 111/53 continue lisinopril and nifedipine.    Echocardiogram 2/9/2023 EF 51-55%.    SCDs and Lovenox for deep vein thrombosis prophylaxis.     Add Mycostatin swish and swallow as patient complains of tongue feeling sore    EKG 2/8/2023.  Normal sinus rhythm with 2nd degree AV block (Mobitz I).  Repeat EKG 2/10/2023 sinus tachycardia first-degree AV block.  No longer second-degree AV block.  QT lengthened.    Medical Decision Making  Number and Complexity of problems: 9  Pneumonia left lower lobe, acute, high complexity  Fecal occult blood positive, lower GI bleed, acute high complexity, GI consultation.  Gram-negative UTI, acute high complexity  Large hiatal hernia with stomach and chest suggesting volvulus stomach, acute, high complexity requiring general surgery consultation.  Erosive gastritis, acute, moderate complexity  Primary hypertension, chronic, moderate complexity  Weight loss, chronic, moderate complexity  Status post right hip hemiarthroplasty 1/28/2023, stable, moderate complexity    Differential Diagnosis: Pulmonary emboli    Conditions and Status        Condition is improving     MDM Data  External documents reviewed: [facility] admission 1/28/2020 3- 2/2/2023  Cardiac tracing (EKG, telemetry) interpretation: EKG 2/8/2023 normal sinus rhythm with second-degree AV block.  T wave abnormalities.  Radiology interpretation: Radiology CT abdomen, CTA chest, upper GI, KUB  Labs reviewed:    BMP 2/12/23.  Potassium 3.3, creatinine 1.23  CBC 2/12/23 WBC 13.48.  Hemoglobin 10.4  Urine culture E. Coli  Blood cultures no growth at 3 days.  DELFINO pending  Reviewed upper GI and KUB from 2/10    Any tests that were considered but not ordered: None     Decision rules/scores evaluated (example [id number], Wells, etc): None     Discussed with: Dr. Austin, Dr. Funes, Brendan Petersen PA-C, orthopedics, and patient     Care Planning  Shared decision making: Dr. Austin, Dr. Funes and patient.  Patient agrees for recovery after hip surgery prior to hernia repair.  Agrees to physical therapy.   Code status and discussions: Full code  The patient surrogate decision maker is his daughter, Lula Cope  Social Determinants of Health that impact treatment or disposition: Recent right hip hemiarthroplasty admitted to SNF  I expect the patient to be discharged to SNF in 2-4 days.     Electronically signed by HARISH Edwards, 02/12/23, 14:21 CST.    Electronically signed by Olena Craft APRN at 02/12/23 1422     Vicky Funes MD at 02/12/23 0953          Vicky Funes MD Progress Note     LOS: 4 days   Patient Care Team:  Otilio Ramos MD as PCP - General  Otilio Ramos MD as PCP - Family Medicine  Marques Pérez MD as Consulting Physician (Gastroenterology)        Subjective     He is currently doing therapy complaining of some right shoulder pain.  Tolerating his diet.  He appears confused today more so than yesterday    Objective     Vital Signs  Temp:  [97.4 °F (36.3 °C)-98.7 °F (37.1 °C)] 98.3 °F (36.8 °C)  Heart Rate:  [71-96] 86  Resp:  [16-18] 16  BP: (101-122)/(49-66) 119/66    Intake & Output (last 3 days)       02/09 0701  02/10 0700 02/10 0701 02/11 0700 02/11 0701  02/12 0700 02/12 0701 02/13 0700    P.O.   180     I.V. (mL/kg) 300 (3.7)       Total Intake(mL/kg) 300 (3.7)  180 (2.3)     Urine (mL/kg/hr) 1800 (0.9) 1250 (0.6) 600 (0.3)     Stool  0 0     Total Output  1800 1250 600     Net -1500 -1250 -420             Urine Unmeasured Occurrence  3 x 2 x     Stool Unmeasured Occurrence  6 x 3 x           Physical Exam:     General Appearance:    Alert, cooperative, in no acute distress   Lungs:     respirations regular, even and unlabored    Heart:    Regular rhythm and normal rate, normal S1 and S2, no            murmur, no gallop, no rub   Chest Wall:    No abnormalities observed   Abdomen:      Soft nontender no distention   Extremities: No edema,    Results Review:     I reviewed the patient's new clinical results.    Lab Results (last 72 hours)     Procedure Component Value Units Date/Time    Blood Culture With DELFINO - Blood, Arm, Right [[id number]] Collected: 02/12/23 0806    Specimen: Blood from Arm, Right Updated: 02/12/23 0811    Blood Culture - Blood, Arm, Right [[id number]]  (Abnormal) Collected: 02/08/23 2012    Specimen: Blood from Arm, Right Updated: 02/12/23 0610     Blood Culture Staphylococcus, coagulase negative     Isolated from Anaerobic Bottle     Gram Stain Anaerobic Bottle Gram positive cocci in clusters    Narrative:      Probable contaminant requires clinical correlation, susceptibility not performed unless requested by physician.      Basic Metabolic Panel [[id number]]  (Abnormal) Collected: 02/12/23 0412    Specimen: Blood Updated: 02/12/23 0505     Glucose 128 mg/dL      BUN 24 mg/dL      Creatinine 1.23 mg/dL      Sodium 138 mmol/L      Potassium 3.3 mmol/L      Chloride 99 mmol/L      CO2 28.0 mmol/L      Calcium 8.4 mg/dL      BUN/Creatinine Ratio 19.5     Anion Gap 11.0 mmol/L      eGFR 57.2 mL/min/1.73     Narrative:      GFR Normal >60  Chronic Kidney Disease <60  Kidney Failure <15    The GFR formula is only valid for adults with stable renal function between ages 18 and 70.    CBC & Differential [[id number]]  (Abnormal) Collected: 02/12/23 0412    Specimen: Blood Updated: 02/12/23 0447    Narrative:      The following orders were created for panel  order CBC & Differential.  Procedure                               Abnormality         Status                     ---------                               -----------         ------                     CBC Auto Differential[[id number]]        Abnormal            Final result                 Please view results for these tests on the individual orders.    CBC Auto Differential [[id number]]  (Abnormal) Collected: 02/12/23 0412    Specimen: Blood Updated: 02/12/23 0447     WBC 13.48 10*3/mm3      RBC 3.58 10*6/mm3      Hemoglobin 10.4 g/dL      Hematocrit 34.0 %      MCV 95.0 fL      MCH 29.1 pg      MCHC 30.6 g/dL      RDW 14.9 %      RDW-SD 52.1 fl      MPV 8.7 fL      Platelets 686 10*3/mm3      Neutrophil % 78.8 %      Lymphocyte % 11.4 %      Monocyte % 6.9 %      Eosinophil % 1.5 %      Basophil % 0.4 %      Immature Grans % 1.0 %      Neutrophils, Absolute 10.62 10*3/mm3      Lymphocytes, Absolute 1.53 10*3/mm3      Monocytes, Absolute 0.93 10*3/mm3      Eosinophils, Absolute 0.20 10*3/mm3      Basophils, Absolute 0.06 10*3/mm3      Immature Grans, Absolute 0.14 10*3/mm3      nRBC 0.0 /100 WBC     Hemoglobin & Hematocrit, Blood [[id number]] Updated: 02/12/23 0441    Specimen: Blood     Hemoglobin & Hematocrit, Blood [[id number]]  (Abnormal) Collected: 02/11/23 2338    Specimen: Blood Updated: 02/11/23 2347     Hemoglobin 11.0 g/dL      Hematocrit 37.8 %     Blood Culture - Blood, Arm, Left [[id number]]  (Normal) Collected: 02/08/23 2022    Specimen: Blood from Arm, Left Updated: 02/11/23 2046     Blood Culture No growth at 3 days    Hemoglobin & Hematocrit, Blood [[id number]]  (Abnormal) Collected: 02/11/23 1610    Specimen: Blood Updated: 02/11/23 1618     Hemoglobin 12.4 g/dL      Hematocrit 39.7 %     Basic Metabolic Panel [[id number]]  (Abnormal) Collected: 02/11/23 0449    Specimen: Blood Updated: 02/11/23 0537     Glucose 179 mg/dL      BUN 19 mg/dL      Creatinine 1.04 mg/dL      Sodium 143 mmol/L      Potassium  3.7 mmol/L      Chloride 100 mmol/L      CO2 28.0 mmol/L      Calcium 9.0 mg/dL      BUN/Creatinine Ratio 18.3     Anion Gap 15.0 mmol/L      eGFR 69.9 mL/min/1.73     Narrative:      GFR Normal >60  Chronic Kidney Disease <60  Kidney Failure <15    The GFR formula is only valid for adults with stable renal function between ages 18 and 70.    CBC & Differential [[id number]]  (Abnormal) Collected: 02/11/23 0449    Specimen: Blood Updated: 02/11/23 0520    Narrative:      The following orders were created for panel order CBC & Differential.  Procedure                               Abnormality         Status                     ---------                               -----------         ------                     CBC Auto Differential[[id number]]        Abnormal            Final result                 Please view results for these tests on the individual orders.    CBC Auto Differential [[id number]]  (Abnormal) Collected: 02/11/23 0449    Specimen: Blood Updated: 02/11/23 0520     WBC 11.88 10*3/mm3      RBC 4.25 10*6/mm3      Hemoglobin 12.5 g/dL      Hematocrit 39.7 %      MCV 93.4 fL      MCH 29.4 pg      MCHC 31.5 g/dL      RDW 14.8 %      RDW-SD 51.5 fl      MPV 8.6 fL      Platelets 772 10*3/mm3      Neutrophil % 81.5 %      Lymphocyte % 9.4 %      Monocyte % 7.3 %      Eosinophil % 0.3 %      Basophil % 0.3 %      Immature Grans % 1.2 %      Neutrophils, Absolute 9.68 10*3/mm3      Lymphocytes, Absolute 1.12 10*3/mm3      Monocytes, Absolute 0.87 10*3/mm3      Eosinophils, Absolute 0.03 10*3/mm3      Basophils, Absolute 0.04 10*3/mm3      Immature Grans, Absolute 0.14 10*3/mm3      nRBC 0.0 /100 WBC     Hemoglobin & Hematocrit, Blood [[id number]]  (Abnormal) Collected: 02/10/23 2348    Specimen: Blood Updated: 02/11/23 0009     Hemoglobin 12.9 g/dL      Hematocrit 41.9 %     Hemoglobin & Hematocrit, Blood [[id number]]  (Normal) Collected: 02/10/23 1820    Specimen: Blood Updated: 02/10/23 1853     Hemoglobin 13.1  g/dL      Hematocrit 42.1 %     MRSA Screen, PCR (Inpatient) - Swab, Nares [[id number]]  (Normal) Collected: 02/10/23 1659    Specimen: Swab from Nares Updated: 02/10/23 1843     MRSA PCR No MRSA Detected    Narrative:      The negative predictive value of this diagnostic test is high and should only be used to consider de-escalating anti-MRSA therapy. A positive result may indicate colonization with MRSA and must be correlated clinically.    S. Pneumo Ag Urine or CSF - Urine, Urine, Clean Catch [[id number]]  (Normal) Collected: 02/10/23 1658    Specimen: Urine, Clean Catch Updated: 02/10/23 1758     Strep Pneumo Ag Negative    Legionella Antigen, Urine - Urine, Urine, Clean Catch [[id number]]  (Normal) Collected: 02/10/23 1658    Specimen: Urine, Clean Catch Updated: 02/10/23 1758     LEGIONELLA ANTIGEN, URINE Negative    Tissue Pathology Exam [[id number]] Collected: 02/09/23 1139    Specimen: Tissue from Stomach Updated: 02/10/23 1703     Note to Patients --     This report may contain a detailed description of human tissue sent by a health care provider to the laboratory for pathologic evaluation. The content of this report is essential for diagnosis and may provide important critical findings. This information may be unfamiliar to patients to review without a medical professional present. It is advised that the patient review this report in the presence of a health care provider who can answer questions and explain the results.       Case Report --     Surgical Pathology Report                         Case: [id number]                                  Authorizing Provider:  Sushant Edmond MD           Collected:           02/09/2023 11:39 AM          Ordering Location:     [facility]     Received:            02/09/2023 01:14 PM                                 ENDOSCOPY                                                                    Pathologist:           Kassandra Majano MD                              "                           Specimen:    Stomach, bx stomach; r/o h. pylori                                                          Final Diagnosis --     Stomach, endoscopic biopsy:  A.  Fragment of benign gastric mucosa demonstrating chronic inflammation, mild.  B.  Immunohistochemical stain for Helicobacter pylori is negative.       Gross Description --     1. Stomach.   Received in a formalin filled container labeled with the patient's name, date of birth, and \"biopsy stomach\".  The specimen consists of 1 yellow-pink soft tissue fragment measuring 0.5 x 0.1 by less than 0.1 cm, totally submitted in block 1A.         Microscopic Description --     Sections of the gastric biopsy reveal a fragment of gastric mucosa.  There is mild chronic inflammation.  Intestinal metaplasia is not identified.  Immunohistochemical stain for Helicobacter pylori is negative.  The control stained appropriately.  There is no evidence of malignancy.      Blood Culture ID, PCR - Blood, Arm, Right [[id number]]  (Abnormal) Collected: 02/08/23 2012    Specimen: Blood from Arm, Right Updated: 02/10/23 1528     BCID, PCR Staph spp, not aureus or lugdunensis. Identification by BCID2 PCR.     BOTTLE TYPE Anaerobic Bottle    Urine Culture - Urine, Straight Cath [[id number]]  (Abnormal)  (Susceptibility) Collected: 02/08/23 1716    Specimen: Urine from Straight Cath Updated: 02/10/23 1253     Urine Culture >100,000 CFU/mL Escherichia coli    Narrative:      Colonization of the urinary tract without infection is common. Treatment is discouraged unless the patient is symptomatic, pregnant, or undergoing an invasive urologic procedure.    Susceptibility      Escherichia coli      ALISON      Ampicillin Susceptible      Ampicillin + Sulbactam Susceptible      Cefazolin Susceptible      Cefepime Susceptible      Ceftazidime Susceptible      Ceftriaxone Susceptible      Gentamicin Susceptible      Levofloxacin Susceptible      Nitrofurantoin Susceptible     "  Piperacillin + Tazobactam Susceptible      Trimethoprim + Sulfamethoxazole Susceptible                           Basic Metabolic Panel [[id number]]  (Abnormal) Collected: 02/10/23 0445    Specimen: Blood Updated: 02/10/23 0619     Glucose 112 mg/dL      BUN 16 mg/dL      Creatinine 0.97 mg/dL      Sodium 139 mmol/L      Potassium 3.9 mmol/L      Chloride 101 mmol/L      CO2 26.0 mmol/L      Calcium 8.6 mg/dL      BUN/Creatinine Ratio 16.5     Anion Gap 12.0 mmol/L      eGFR 76.0 mL/min/1.73     Narrative:      GFR Normal >60  Chronic Kidney Disease <60  Kidney Failure <15    The GFR formula is only valid for adults with stable renal function between ages 18 and 70.    CBC & Differential [[id number]]  (Abnormal) Collected: 02/10/23 0445    Specimen: Blood Updated: 02/10/23 0538    Narrative:      The following orders were created for panel order CBC & Differential.  Procedure                               Abnormality         Status                     ---------                               -----------         ------                     CBC Auto Differential[[id number]]        Abnormal            Final result                 Please view results for these tests on the individual orders.    CBC Auto Differential [[id number]]  (Abnormal) Collected: 02/10/23 0445    Specimen: Blood Updated: 02/10/23 0538     WBC 7.80 10*3/mm3      RBC 3.91 10*6/mm3      Hemoglobin 11.4 g/dL      Hematocrit 36.5 %      MCV 93.4 fL      MCH 29.2 pg      MCHC 31.2 g/dL      RDW 14.6 %      RDW-SD 49.9 fl      MPV 8.8 fL      Platelets 667 10*3/mm3      Neutrophil % 67.0 %      Lymphocyte % 17.6 %      Monocyte % 9.7 %      Eosinophil % 3.1 %      Basophil % 0.9 %      Immature Grans % 1.7 %      Neutrophils, Absolute 5.23 10*3/mm3      Lymphocytes, Absolute 1.37 10*3/mm3      Monocytes, Absolute 0.76 10*3/mm3      Eosinophils, Absolute 0.24 10*3/mm3      Basophils, Absolute 0.07 10*3/mm3      Immature Grans, Absolute 0.13 10*3/mm3      nRBC  0.0 /100 WBC     Hemoglobin & Hematocrit, Blood [[id number]]  (Abnormal) Collected: 02/10/23 0024    Specimen: Blood Updated: 02/10/23 0054     Hemoglobin 11.0 g/dL      Hematocrit 34.2 %     Hemoglobin & Hematocrit, Blood [[id number]]  (Abnormal) Collected: 02/09/23 1624    Specimen: Blood Updated: 02/09/23 1716     Hemoglobin 12.5 g/dL      Hematocrit 39.6 %         Imaging Results (Last 72 Hours)     Procedure Component Value Units Date/Time    XR Abdomen KUB [[id number]] Collected: 02/10/23 2134     Updated: 02/10/23 2141    Narrative:      HISTORY: Large hiatal hernia with organoaxial rotation of the stomach.  Delayed emptying of the stomach from upper GI exam performed earlier  this afternoon. Follow-up KUB recommended.     KUB: A frontal view the abdomen and pelvis obtained at 7:23 PM on  02/10/2023. A chest x-ray was performed for 445 pm incidentally and  included with this exam. Upper GI study performed this afternoon  completed at  3pm.     On the 4:45 PM chest x-ray, there is retained contrast the distal  esophagus as well as the herniated stomach indicating delayed gastric  emptying. On this 7:23 PM KUB study, there is contrast present  throughout the nondilated small and large bowel contrast present in the  rectum. IVC filter in place. Right hip prosthesis. Vascular  calcifications.       Impression:      1. Follow-up imaging after upper GI exam is suggested delayed gastric  emptying, however there is no gastric outlet obstruction with contrast  present throughout the small and large bowel to the level of the rectum  on the KUB performed 4 hours following completion of the upper GI exam.  This report was finalized on 02/10/2023 21:38 by Dr. Radhika Ross MD.    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     There is no previous study for comparison.     There is no difficulty in initiating the swallowing.     There is normal propagation of the bolus through the oropharynx into the  esophagus.     Normal esophagus seen. The esophagus is displaced to the left. No  intrinsic abnormality.     There is a large hiatal hernia. 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 There is gastroesophageal reflux.     The entire stomach is contained in the hiatal hernia in the left chest.  The gastric antrum is at the hiatus with severe narrowing of the antrum.  No contrast is seen passing the antrum/esophageal hiatus into the  abdominal part of the antrum/duodenum after several minutes.       Impression:      1. A large hiatal hernia containing most of the stomach. Organoaxial  rotation of the stomach.  2. Severe narrowing of the gastric antrum at the level of the esophagus  hiatus. No contrast passed beyond the antrum into the abdominal part of  the GI tract.  3. A delayed image after 4 hours would be obtained to see the  propagation of the contrast bolus.  4. Fluoroscopy time: 2 minutes 36 seconds.  5. The dose: 82.2mGy.  6. Number of images: 25  This report was finalized on 02/10/2023 15:31 by Dr. Mirtha Perez MD.              Assessment & Plan       Pneumonia of left lower lobe due to infectious organism    Primary hypertension    Positive fecal occult blood test    Erosive esophagitis    Large hiatal hernia    Weight  loss    Gram-negative UTI, acute cystitis without hematuria, present on admission    Paraesophageal hiatal hernia      Plan continue diet as tolerated and observe for signs of gastric outlet obstruction.    Will order x-ray of the right shoulder      Vicky Funes MD  02/12/23  09:53 CST        Electronically signed by Vicky Funes MD at 02/12/23 0935       Consult Notes (last 24 hours)  Notes from 02/11/23 1526 through 02/12/23 1526   No notes of this type exist for this encounter.

## 2023-02-12 NOTE — PROGRESS NOTES
[facility] Medicine Services  INPATIENT PROGRESS NOTE    Patient Name: Jose Alfreod Kirkpatrick  Date of Admission: 2/8/2023  Today's Date: 02/12/23  Length of Stay: 4  Primary Care Physician: Otilio Ramos MD    Subjective   Chief Complaint: Follow-up weakness, dark stools, dysphagia  HPI  Sitting up in bed.  No visitors in room.  No oxygen in use.  Patient was being seen by Occupational Therapy at the time of my assessment.  He had a bowel movement.  I strongly encouraged him to participate with physical therapy today.  He has been able to sit and stand and transfer with rolling walker today.  This is the first time he has been up out of bed.  He does complain of right shoulder pain and x-ray ordered showing no acute osseous findings.  Moderate right AC joint osteoarthritis.  Also osteoarthritis and effusion right elbow.  Continue Zosyn for E. coli UTI and would treat pneumonia.  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  No sputum production.  Protonix and Carafate continue per recommendations of GI.  Blood cultures remain no growth.  Dr. Funes patient would need to recover from hip surgery prior to proceeding with paraesophageal hernia repair.  Patient tolerating diet.    Review of Systems   Constitutional: Positive for activity change (After recent hip surgery) and fatigue. Negative for chills and fever.   HENT: Negative for congestion and trouble swallowing.    Eyes: Negative for photophobia and visual disturbance.   Respiratory: Negative for cough, shortness of breath and wheezing.    Cardiovascular: Negative for chest pain, palpitations and leg swelling.   Gastrointestinal: Positive for diarrhea. Negative for abdominal pain, anal bleeding, nausea and vomiting.   Endocrine: Negative for cold intolerance, heat intolerance and polyuria.   Genitourinary: Positive for frequency. Negative for dysuria and urgency.   Musculoskeletal: Positive for gait  problem.   Skin: Negative for color change, pallor, rash and wound.   Allergic/Immunologic: Negative for immunocompromised state.   Neurological: Positive for weakness. Negative for light-headedness.   Hematological: Negative for adenopathy. Does not bruise/bleed easily.   Psychiatric/Behavioral: Negative for agitation, behavioral problems and confusion.      All pertinent negatives and positives are as above. All other systems have been reviewed and are negative unless otherwise stated.     Objective    Temp:  [97.4 °F (36.3 °C)-98.7 °F (37.1 °C)] 98.1 °F (36.7 °C)  Heart Rate:  [] 107  Resp:  [16-18] 16  BP: (101-122)/(49-66) 111/53  Physical Exam  Vitals and nursing note reviewed.   Constitutional:       Comments: Sitting up in bed.  No oxygen in use.  No visitors in room.   HENT:      Head: Normocephalic and atraumatic.      Nose: No congestion.      Mouth/Throat:      Pharynx: Oropharynx is clear. No oropharyngeal exudate or posterior oropharyngeal erythema.   Eyes:      Extraocular Movements: Extraocular movements intact.      Pupils: Pupils are equal, round, and reactive to light.   Cardiovascular:      Rate and Rhythm: Normal rate and regular rhythm.      Heart sounds: No murmur heard.     Comments: Sinus rhythm 71-93 on telemetry.  Pulmonary:      Breath sounds: No wheezing, rhonchi or rales.      Comments: No oxygen in use.  Abdominal:      Palpations: Abdomen is soft.      Tenderness: There is no abdominal tenderness.   Genitourinary:     Comments: Voiding.  Musculoskeletal:         General: No swelling or tenderness.      Cervical back: Normal range of motion and neck supple.      Comments: SCDs bilateral lower extremities   Skin:     General: Skin is warm and dry.   Neurological:      General: No focal deficit present.      Mental Status: He is alert and oriented to person, place, and time.   Psychiatric:         Mood and Affect: Mood normal.         Behavior: Behavior normal.         Thought  Content: Thought content normal.         Judgment: Judgment normal.       Results Review:  I have reviewed the labs, radiology results, and diagnostic studies.    Laboratory Data:   Results from last 7 days   Lab Units 02/12/23  0412 02/11/23  2338 02/11/23  1610 02/11/23  0449 02/10/23  1820 02/10/23  0445   WBC 10*3/mm3 13.48*  --   --  11.88*  --  7.80   HEMOGLOBIN g/dL 10.4* 11.0* 12.4* 12.5*   < > 11.4*   HEMATOCRIT % 34.0* 37.8 39.7 39.7   < > 36.5*   PLATELETS 10*3/mm3 686*  --   --  772*  --  667*    < > = values in this interval not displayed.     Results from last 7 days   Lab Units 02/12/23  0412 02/11/23  0449 02/10/23  0445 02/09/23  0732 02/08/23  1459 02/08/23  1100   SODIUM mmol/L 138 143 139   < > 134* 135*   POTASSIUM mmol/L 3.3* 3.7 3.9   < > 4.1 4.0   CHLORIDE mmol/L 99 100 101   < > 100 98   CO2 mmol/L 28.0 28.0 26.0   < > 25.0 23.0   BUN mg/dL 24* 19 16   < > 18 20   CREATININE mg/dL 1.23 1.04 0.97   < > 0.74* 0.70*   CALCIUM mg/dL 8.4* 9.0 8.6   < > 8.3* 8.5*   BILIRUBIN mg/dL  --   --   --   --  0.4 0.4   ALK PHOS U/L  --   --   --   --  89 95   ALT (SGPT) U/L  --   --   --   --  20 19   AST (SGOT) U/L  --   --   --   --  32 34   GLUCOSE mg/dL 128* 179* 112*   < > 136* 148*    < > = values in this interval not displayed.     CBC:      Lab 02/12/23  0412 02/11/23  1610 02/11/23  0449 02/10/23  1820 02/10/23  0445 02/09/23  1624 02/09/23  0732 02/09/23  0009 02/08/23  1459   WBC 13.48*  --  11.88*  --  7.80  --  9.68  --  9.94   HEMOGLOBIN 10.4*   < > 12.5*   < > 11.4*   < > 11.8*   < > 11.6*   HEMATOCRIT 34.0*   < > 39.7   < > 36.5*   < > 37.9   < > 37.4*   PLATELETS 686*  --  772*  --  667*  --  643*  --  608*   NEUTROS ABS 10.62*  --  9.68*  --  5.23  --  6.56  --  7.32*   IMMATURE GRANS (ABS) 0.14*  --  0.14*  --  0.13*  --  0.13*  --  0.14*   LYMPHS ABS 1.53  --  1.12  --  1.37  --  1.97  --  1.56   MONOS ABS 0.93*  --  0.87  --  0.76  --  0.83  --  0.69   EOS ABS 0.20  --  0.03  --  0.24   --  0.12  --  0.17   MCV 95.0  --  93.4  --  93.4  --  93.6  --  93.3    < > = values in this interval not displayed.      Microbiology Results (last 10 days)     Procedure Component Value - Date/Time    MRSA Screen, PCR (Inpatient) - Swab, Nares [[id number]]  (Normal) Collected: 02/10/23 1659    Lab Status: Final result Specimen: Swab from Nares Updated: 02/10/23 1843     MRSA PCR No MRSA Detected    Narrative:      The negative predictive value of this diagnostic test is high and should only be used to consider de-escalating anti-MRSA therapy. A positive result may indicate colonization with MRSA and must be correlated clinically.    S. Pneumo Ag Urine or CSF - Urine, Urine, Clean Catch [[id number]]  (Normal) Collected: 02/10/23 1658    Lab Status: Final result Specimen: Urine, Clean Catch Updated: 02/10/23 1758     Strep Pneumo Ag Negative    Legionella Antigen, Urine - Urine, Urine, Clean Catch [[id number]]  (Normal) Collected: 02/10/23 1658    Lab Status: Final result Specimen: Urine, Clean Catch Updated: 02/10/23 1758     LEGIONELLA ANTIGEN, URINE Negative    Blood Culture - Blood, Arm, Left [[id number]]  (Normal) Collected: 02/08/23 2022    Lab Status: Preliminary result Specimen: Blood from Arm, Left Updated: 02/11/23 2046     Blood Culture No growth at 3 days    Blood Culture - Blood, Arm, Right [[id number]]  (Abnormal) Collected: 02/08/23 2012    Lab Status: Final result Specimen: Blood from Arm, Right Updated: 02/12/23 0610     Blood Culture Staphylococcus, coagulase negative     Isolated from Anaerobic Bottle     Gram Stain Anaerobic Bottle Gram positive cocci in clusters    Narrative:      Probable contaminant requires clinical correlation, susceptibility not performed unless requested by physician.      Blood Culture ID, PCR - Blood, Arm, Right [[id number]]  (Abnormal) Collected: 02/08/23 2012    Lab Status: Final result Specimen: Blood from Arm, Right Updated: 02/10/23 1528     BCID, PCR Staph spp, not  aureus or lugdunensis. Identification by BCID2 PCR.     BOTTLE TYPE Anaerobic Bottle    Urine Culture - Urine, Straight Cath [[id number]]  (Abnormal)  (Susceptibility) Collected: 02/08/23 1716    Lab Status: Final result Specimen: Urine from Straight Cath Updated: 02/10/23 1253     Urine Culture >100,000 CFU/mL Escherichia coli    Narrative:      Colonization of the urinary tract without infection is common. Treatment is discouraged unless the patient is symptomatic, pregnant, or undergoing an invasive urologic procedure.    Susceptibility      Escherichia coli      ALISON      Ampicillin Susceptible      Ampicillin + Sulbactam Susceptible      Cefazolin Susceptible      Cefepime Susceptible      Ceftazidime Susceptible      Ceftriaxone Susceptible      Gentamicin Susceptible      Levofloxacin Susceptible      Nitrofurantoin Susceptible      Piperacillin + Tazobactam Susceptible      Trimethoprim + Sulfamethoxazole Susceptible                           COVID-19, FLU A/B, RSV PCR - Swab, Nasopharynx [[id number]]  (Normal) Collected: 02/08/23 1715    Lab Status: Final result Specimen: Swab from Nasopharynx Updated: 02/08/23 1820     COVID19 Not Detected     Influenza A PCR Not Detected     Influenza B PCR Not Detected     RSV, PCR Not Detected    Narrative:      Fact sheet for providers: https://www.fda.gov/media/799168/download    Fact sheet for patients: https://www.fda.gov/media/560848/download    Test performed by PCR.          Imaging Results (All)     Procedure Component Value Units Date/Time    XR Abdomen KUB [[id number]] Collected: 02/10/23 2134     Updated: 02/10/23 2141    Narrative:      HISTORY: Large hiatal hernia with organoaxial rotation of the stomach.  Delayed emptying of the stomach from upper GI exam performed earlier  this afternoon. Follow-up KUB recommended.     KUB: A frontal view the abdomen and pelvis obtained at 7:23 PM on  02/10/2023. A chest x-ray was performed for 445 pm incidentally  and  included with this exam. Upper GI study performed this afternoon  completed at  3pm.     On the 4:45 PM chest x-ray, there is retained contrast the distal  esophagus as well as the herniated stomach indicating delayed gastric  emptying. On this 7:23 PM KUB study, there is contrast present  throughout the nondilated small and large bowel contrast present in the  rectum. IVC filter in place. Right hip prosthesis. Vascular  calcifications.       Impression:      1. Follow-up imaging after upper GI exam is suggested delayed gastric  emptying, however there is no gastric outlet obstruction with contrast  present throughout the small and large bowel to the level of the rectum  on the KUB performed 4 hours following completion of the upper GI exam.  This report was finalized on 02/10/2023 21:38 by Dr. Radhika Ross MD.    FL Upper GI Water Soluble [[id number]] Collected: 02/10/23 1520     Updated: 02/10/23 1534    Narrative:      EXAMINATION: FL UPPER GI WATER SOLUBLE-     2/10/2023 2:38 PM CST     HISTORY: Paraesophageal hiatal hernia; J18.9-Pneumonia, unspecified  organism; R77.8-Other specified abnormalities of plasma proteins;  K57.90-Diverticulosis of intestine, part unspecified, without  perforation or abscess without bleeding; I71.43-Infrarenal abdominal  aortic aneurysm, without rupture; N39.0-Urinary tract infection, site  not specified; K44.9-Diaphragmatic hernia without obstruction or  gangrene     The fluoroscopy is performed and images of the upper GI are obtained  during and after ingestion of water soluble contrast  material/Gastrografin.     There is no previous study for comparison.     There is no difficulty in initiating the swallowing.     There is normal propagation of the bolus through the oropharynx into the  esophagus.     Normal esophagus seen. The esophagus is displaced to the left. No  intrinsic abnormality.     There is a large hiatal hernia. 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  There is gastroesophageal reflux.     The entire stomach is contained in the hiatal hernia in the left chest.  The gastric antrum is at the hiatus with severe narrowing of the antrum.  No contrast is seen passing the antrum/esophageal hiatus into the  abdominal part of the antrum/duodenum after several minutes.       Impression:      1. A large hiatal hernia containing most of the stomach. Organoaxial  rotation of the stomach.  2. Severe narrowing of the gastric antrum at the level of the esophagus  hiatus. No contrast passed beyond the antrum into the abdominal part of  the GI tract.  3. A delayed image after 4 hours would be obtained to see the  propagation of the contrast bolus.  4. Fluoroscopy time: 2 minutes 36 seconds.  5. The dose: 82.2mGy.  6. Number of images: 25  This report was finalized on 02/10/2023 15:31 by Dr. Mirtha Perez MD.    CT Abdomen Pelvis Without Contrast [[id number]] Collected: 02/08/23 1820     Updated: 02/08/23 1831    Narrative:      CT ABDOMEN PELVIS WO CONTRAST- 2/8/2023 6:08 PM CST     HISTORY: new onset bloody stools, recent diarrhea      COMPARISON: None      DOSE LENGTH PRODUCT: 337 mGy cm. Automated exposure control was also  utilized to decrease patient radiation dose.     TECHNIQUE: Noncontrast enhanced images of the abdomen and pelvis  obtained without oral contrast. Multiplanar reformatted images were  provided for review.      FINDINGS:   LOWER CHEST: Large hiatal hernia. Atelectasis present in the left lower  lobe and there is also a trace layering left pleural effusion. Coronary  atheromatous calcification.      LIVER: No focal liver lesion within limits of a noncontrast study.      BILIARY SYSTEM: Cholelithiasis. Gallbladder is nondistended. Biliary  tree is nondilated.      PANCREAS: No focal pancreatic lesion within limits of a noncontrast  study.      SPLEEN: Unremarkable.      KIDNEYS: Iodinated contrast identified in the upper urinary tracts. No  hydronephrosis.  Ureters are nondilated.      ADRENALS: Unremarkable.     RETROPERITONEUM: No mass, lymphadenopathy or hemorrhage.      GI TRACT: Large hiatal hernia. The stomach is nondistended. Small bowel  loops are nondilated. Colonic diverticulosis. The colon is nondistended.  No inflammatory changes are identified along the colon. No bowel wall  thickening identified. Surrounding fat is clean.     OTHER: There is no mesenteric mass, lymphadenopathy or fluid collection.  No intraperitoneal free fluid or free air. IVC filter is in place.  Infrarenal AAA measuring up to 3.6 cm in diameter. Right hip  arthroplasty. No acute bony abnormality is seen. There appears to be a  high-grade spinal stenosis at the L1-L2 disc level with complete loss of  disc height at this level. There is an old fracture identified across  the inferior endplate of T9.     PELVIS: Bladder wall trabeculation. Air identified in the urinary  bladder. No pelvic adenopathy identified.       Impression:      1. Colonic diverticulosis. Bowel is nondistended. No inflammatory  changes are seen along the bowel.  2. Stomach and small bowel are decompressed. Hiatal hernia is present.  3. Cholelithiasis.  4. Bladder wall trabeculation which may be secondary to chronic partial  outlet obstruction.  5. Infrarenal AAA measuring up to 3.6 cm in diameter.  This report was finalized on 02/08/2023 18:28 by Dr Germeias Martinez, .    CT Angiogram Chest [[id number]] Collected: 02/08/23 1659     Updated: 02/08/23 1704    Narrative:      EXAMINATION: CT ANGIOGRAM CHEST-      2/8/2023 4:43 PM CST     HISTORY: Shortness of breath, chest pain, weakness, recent surgery and  immobilization since.     In order to have a CT radiation dose as low as reasonably achievable  Automated Exposure Control was utilized for adjustment of the mA and/or  KV according to patient size.     DLP in mGycm= 194.     CT angiogram chest with IV contrast.  CT angiography protocol.   CT imaging with bolus IV  contrast injection.   Under concurrent supervision axial, sagittal, coronal,  three-dimensional, and MIP data sets were constructed on an independent  work station.     Normal heart size.  Intrathoracic stomach with left lung base consolidation compatible with  pneumonia.     Small left and trace right pleural effusion.     Symmetric and normally opacified pulmonary arteries.  No pulmonary embolism.     Upper lobes are clear.  No pneumothorax.     Small gallstones incidentally noted.     Summary:  1. Left lower lobe pneumonia.  2. No pulmonary embolism.   This report was finalized on 02/08/2023 17:01 by Dr. Fawad Smith MD.    XR Chest 1 View [[id number]] Collected: 02/08/23 1634     Updated: 02/08/23 1639    Narrative:      XR CHEST 1 VW- 2/8/2023 4:25 PM CST     HISTORY: chest pain, weakness     COMPARISON: Chest exam dated 10/01/2019.     FINDINGS:      No lung consolidation. No pleural effusion or pneumothorax. Heart size  is normal. Pulmonary vasculature are nondilated. Hiatal hernia  superimposed over the cardiac silhouette. The osseous structures and  surrounding soft tissues demonstrate no acute abnormality.       Impression:      1. Stable chest exam without acute process.  2. Hiatal hernia.        This report was finalized on 02/08/2023 16:36 by Dr Geremias Martinez, .        Results for orders placed during the hospital encounter of 02/08/23    Adult Transthoracic Echo Complete W/ Cont if Necessary Per Protocol    Interpretation Summary  •  Left ventricular systolic function is normal. Left ventricular ejection fraction appears to be 51 - 55%.  •  Left ventricular wall thickness is consistent with mild concentric hypertrophy.  •  Left ventricular diastolic function is consistent with (grade II w/high LAP) pseudonormalization.  •  The left atrial cavity is mildly dilated.  •  The aortic valve exhibits sclerosis. There is mild thickening of the aortic valve. Mild to moderate aortic valve regurgitation is  present. Mild aortic valve stenosis is present.  •  Mild mitral annular calcification is present.  •  Moderate pulmonary hypertension is present  •  Mild dilation of the aortic root is present    Schedule medications:  busPIRone, 5 mg, Oral, BID  enoxaparin, 40 mg, Subcutaneous, Q24H  gabapentin, 300 mg, Oral, 4x Daily  ipratropium-albuterol, 3 mL, Nebulization, 4x Daily - RT  lidocaine, 1 patch, Transdermal, Q24H  lisinopril, 10 mg, Oral, Q24H  NIFEdipine XL, 60 mg, Oral, Daily  nystatin, 5 mL, Swish & Swallow, 4x Daily  pantoprazole, 40 mg, Intravenous, Q12H  piperacillin-tazobactam, 3.375 g, Intravenous, Q8H  sucralfate, 1 g, Oral, 4x Daily AC & at Bedtime        I have reviewed the patient's current medications.     Assessment/Plan   Assessment  Active Hospital Problems    Diagnosis    • **Pneumonia of left lower lobe due to infectious organism    • Paraesophageal hiatal hernia    • Erosive esophagitis    • Large hiatal hernia    • Weight loss    • Gram-negative UTI, acute cystitis without hematuria, present on admission    • Positive fecal occult blood test    • Primary hypertension      Treatment Plan  Mr. Kirkpatrick presented to ER 2/8/2023 from SNF with fatigue, weakness, dysphagia and black stool.  Patient had recent right hip hemiarthroplasty1/28/2023 by Dr. Quintana at [facility] and was discharged to skilled nursing facility on 2/2/2023.  Patient reported constipation that he attributed to pain medication.  In ER, he tested fecal occult blood positive.  Patient reported dysphagia of solid foods for the past several years and reported that food felt as though it was getting hung in his esophagus.  Patient had 3 previous endoscopies between 2014 and 2019.  2019 patient had melena and was found to have gastric ulcer managed conservatively.  Repeat endoscopy showed completely healed ulcer.  Last colonoscopy 2019 revealed small polyps removed.  Patient reported 24 pound weight loss over the last several months but  most recently after his wife passed away within the past month.  Hemoglobin 11.6.  WBC normal.  LFTs normal.  CT scan of abdomen noted colonic diverticulosis without any inflammatory changes unremarkable stomach, small bowel, hiatal hernia, cholelithiasis without cholecystitis, bladder wall trabeculation and 3.6 cm infrarenal abdominal aortic aneurysm.  Patient denied chest pain.  CT scan showed bilateral pleural effusions with concern for parapneumonic effusion.  CTA chest noted no pulmonary emboli.  Left lower lobe pneumonia.    Pneumonia left lower lobe.  Initially received azithromycin and Rocephin.  Antibiotics changed to Zosyn due to recent hospital stay.  Blood cultures no growth at32 days.  Strep pneumonia negative, Legionella negative, MRSA PCR negative.  Continue duo nebs and incentive spirometry.  CBC in AM.  Suspect paraesophageal hernia contributing to left lower lobe consolidation.  Remains afebrile.  Repeat CBC.  DELFINO pending.    Fecal occult blood positive.  Hemoglobin stable.  Hemoglobin 11.6 on admission, hemoglobin 10.4 today.  Patient attributes dark stools to constipation secondary to narcotics.  Stools are brown today.    Dysphagia.  GI consulted.  Dr. Edmond recommended endoscopy for evaluation of melena, dysphagia and odynophagia.  Patient agreeable.  Findings: Hypopharynx and larynx grossly appeared normal.  Because of the proximal esophagus appeared normal.  Severe, grade IV, erosive esophagitis of mid and distal esophagus, manifested by numerous linear and irregular shaped fibrin covering ulcerations and erosions, edema and erythema, but without signs of any active bleeding.  Very large hiatal hernia, with essentially almost entire stomach patient currently chest, with configuration suggestive of, at least, partial volvulus of the stomach.  No ulcerations, but mild diffuse edema and erythema of gastric mucosa.  Gastric biopsy obtained with cold biopsy forceps to rule out H. pylori.   Normal-appearing duodenum.     Recommendations: Protonix 40 mg twice daily. Carafate suspension 10 cc before every meal and at bedtime.  Full liquid diet.  Evaluation by surgery for for possible repair of hiatal hernia/gastropexy.    Paraesophageal hernia.  General surgery consulted and Dr. Funes noted very large hiatal hernia and difficult situation given age and frality recent surgery and hip surgery.  Upper GI 2/10 noted large hiatal hernia containing most of stomach.  Organoxial rotation of stomach.  Severe narrowing of the gastric antrum at the level of the esophagus hiatus.  No contrast passed beyond the antrum into the abdominal part of GI tract.  Delayed imaging 4 hours obtained.  KUB following upper GI suggest delayed gastric emptying.  No gastric outlet obstruction present throughout the small and large bowel to the level of the rectum.  Discussed with Dr. Funes and would like to recover from recent hip surgery before surgical intervention.  Patient agrees but also understands may require more urgent attention.  Continue full liquid diet.    Urine culture E. Coli, present on admission.  Sensitive to Zosyn day 4.  Urinalysis 31-50 WBC, 4+ bacteria.    Potassium 3.3.  Replaced 40 mEq potassium x1 dose.  Repeat BMP in AM.    Recent right hip hemiarthroplasty 1/28/2023 Dr. Quintana.  Discussed with Brendan Petersen PA-C and allowed weightbearing as tolerated. Physical therapy consulted.  Patient stood today.    Primary hypertension.  Blood pressure 111/53 continue lisinopril and nifedipine.    Echocardiogram 2/9/2023 EF 51-55%.    SCDs and Lovenox for deep vein thrombosis prophylaxis.     Add Mycostatin swish and swallow as patient complains of tongue feeling sore    EKG 2/8/2023.  Normal sinus rhythm with 2nd degree AV block (Mobitz I).  Repeat EKG 2/10/2023 sinus tachycardia first-degree AV block.  No longer second-degree AV block.  QT lengthened.    Medical Decision Making  Number and Complexity of problems:  9  Pneumonia left lower lobe, acute, high complexity  Fecal occult blood positive, lower GI bleed, acute high complexity, GI consultation.  Gram-negative UTI, acute high complexity  Large hiatal hernia with stomach and chest suggesting volvulus stomach, acute, high complexity requiring general surgery consultation.  Erosive gastritis, acute, moderate complexity  Primary hypertension, chronic, moderate complexity  Weight loss, chronic, moderate complexity  Status post right hip hemiarthroplasty 1/28/2023, stable, moderate complexity    Differential Diagnosis: Pulmonary emboli    Conditions and Status        Condition is improving     [facility] Data  External documents reviewed: [facility] admission 1/28/2020 3- 2/2/2023  Cardiac tracing (EKG, telemetry) interpretation: EKG 2/8/2023 normal sinus rhythm with second-degree AV block.  T wave abnormalities.  Radiology interpretation: Radiology CT abdomen, CTA chest, upper GI, KUB  Labs reviewed:   BMP 2/12/23.  Potassium 3.3, creatinine 1.23  CBC 2/12/23 WBC 13.48.  Hemoglobin 10.4  Urine culture E. Coli  Blood cultures no growth at 3 days.  DELFINO pending  Reviewed upper GI and KUB from 2/10    Any tests that were considered but not ordered: None     Decision rules/scores evaluated (example [id number], Wells, etc): None     Discussed with: Dr. Austin, Dr. Funes, Brendan Petersen PA-C, orthopedics, and patient     Care Planning  Shared decision making: Dr. Austin, Dr. Funes and patient.  Patient agrees for recovery after hip surgery prior to hernia repair.  Agrees to physical therapy.   Code status and discussions: Full code  The patient surrogate decision maker is his daughter, Lula Roldan    Disposition  Social Determinants of Health that impact treatment or disposition: Recent right hip hemiarthroplasty admitted to SNF  I expect the patient to be discharged to SNF in 2-4 days.     Electronically signed by HARISH Edwards, 02/12/23, 14:21 CST.

## 2023-02-13 PROBLEM — E43 SEVERE MALNUTRITION (HCC): Status: ACTIVE | Noted: 2023-02-13

## 2023-02-13 LAB
ANION GAP SERPL CALCULATED.3IONS-SCNC: 9 MMOL/L (ref 5–15)
BACTERIA SPEC AEROBE CULT: NORMAL
BASOPHILS # BLD AUTO: 0.05 10*3/MM3 (ref 0–0.2)
BASOPHILS NFR BLD AUTO: 0.5 % (ref 0–1.5)
BUN SERPL-MCNC: 22 MG/DL (ref 8–23)
BUN/CREAT SERPL: 20.6 (ref 7–25)
CALCIUM SPEC-SCNC: 8.4 MG/DL (ref 8.6–10.5)
CHLORIDE SERPL-SCNC: 103 MMOL/L (ref 98–107)
CO2 SERPL-SCNC: 27 MMOL/L (ref 22–29)
CREAT SERPL-MCNC: 1.07 MG/DL (ref 0.76–1.27)
DEPRECATED RDW RBC AUTO: 51.5 FL (ref 37–54)
EGFRCR SERPLBLD CKD-EPI 2021: 67.6 ML/MIN/1.73
EOSINOPHIL # BLD AUTO: 0.21 10*3/MM3 (ref 0–0.4)
EOSINOPHIL NFR BLD AUTO: 2.1 % (ref 0.3–6.2)
ERYTHROCYTE [DISTWIDTH] IN BLOOD BY AUTOMATED COUNT: 14.8 % (ref 12.3–15.4)
GLUCOSE SERPL-MCNC: 118 MG/DL (ref 65–99)
HCT VFR BLD AUTO: 30.6 % (ref 37.5–51)
HCT VFR BLD AUTO: 30.9 % (ref 37.5–51)
HCT VFR BLD AUTO: 33.1 % (ref 37.5–51)
HGB BLD-MCNC: 10.4 G/DL (ref 13–17.7)
HGB BLD-MCNC: 9.7 G/DL (ref 13–17.7)
HGB BLD-MCNC: 9.7 G/DL (ref 13–17.7)
IMM GRANULOCYTES # BLD AUTO: 0.07 10*3/MM3 (ref 0–0.05)
IMM GRANULOCYTES NFR BLD AUTO: 0.7 % (ref 0–0.5)
LYMPHOCYTES # BLD AUTO: 1.36 10*3/MM3 (ref 0.7–3.1)
LYMPHOCYTES NFR BLD AUTO: 13.6 % (ref 19.6–45.3)
MCH RBC QN AUTO: 29.7 PG (ref 26.6–33)
MCHC RBC AUTO-ENTMCNC: 31.7 G/DL (ref 31.5–35.7)
MCV RBC AUTO: 93.6 FL (ref 79–97)
MONOCYTES # BLD AUTO: 0.7 10*3/MM3 (ref 0.1–0.9)
MONOCYTES NFR BLD AUTO: 7 % (ref 5–12)
NEUTROPHILS NFR BLD AUTO: 7.64 10*3/MM3 (ref 1.7–7)
NEUTROPHILS NFR BLD AUTO: 76.1 % (ref 42.7–76)
NRBC BLD AUTO-RTO: 0 /100 WBC (ref 0–0.2)
PLATELET # BLD AUTO: 653 10*3/MM3 (ref 140–450)
PMV BLD AUTO: 8.4 FL (ref 6–12)
POTASSIUM SERPL-SCNC: 3.4 MMOL/L (ref 3.5–5.2)
RBC # BLD AUTO: 3.27 10*6/MM3 (ref 4.14–5.8)
SODIUM SERPL-SCNC: 139 MMOL/L (ref 136–145)
WBC NRBC COR # BLD: 10.03 10*3/MM3 (ref 3.4–10.8)

## 2023-02-13 PROCEDURE — 97116 GAIT TRAINING THERAPY: CPT

## 2023-02-13 PROCEDURE — 85014 HEMATOCRIT: CPT | Performed by: NURSE PRACTITIONER

## 2023-02-13 PROCEDURE — 94664 DEMO&/EVAL PT USE INHALER: CPT

## 2023-02-13 PROCEDURE — 97535 SELF CARE MNGMENT TRAINING: CPT | Performed by: OCCUPATIONAL THERAPIST

## 2023-02-13 PROCEDURE — 97110 THERAPEUTIC EXERCISES: CPT

## 2023-02-13 PROCEDURE — 25010000002 ENOXAPARIN PER 10 MG: Performed by: NURSE PRACTITIONER

## 2023-02-13 PROCEDURE — 85018 HEMOGLOBIN: CPT | Performed by: NURSE PRACTITIONER

## 2023-02-13 PROCEDURE — 80048 BASIC METABOLIC PNL TOTAL CA: CPT | Performed by: INTERNAL MEDICINE

## 2023-02-13 PROCEDURE — 94799 UNLISTED PULMONARY SVC/PX: CPT

## 2023-02-13 PROCEDURE — 25010000002 SODIUM CHLORIDE 0.9 % WITH KCL 20 MEQ 20-0.9 MEQ/L-% SOLUTION: Performed by: NURSE PRACTITIONER

## 2023-02-13 PROCEDURE — 85025 COMPLETE CBC W/AUTO DIFF WBC: CPT | Performed by: INTERNAL MEDICINE

## 2023-02-13 PROCEDURE — 94760 N-INVAS EAR/PLS OXIMETRY 1: CPT

## 2023-02-13 PROCEDURE — 25010000002 PIPERACILLIN SOD-TAZOBACTAM PER 1 G: Performed by: NURSE PRACTITIONER

## 2023-02-13 RX ORDER — PANTOPRAZOLE SODIUM 40 MG/1
40 TABLET, DELAYED RELEASE ORAL
Status: DISCONTINUED | OUTPATIENT
Start: 2023-02-13 | End: 2023-02-14 | Stop reason: HOSPADM

## 2023-02-13 RX ORDER — POTASSIUM CHLORIDE 750 MG/1
40 CAPSULE, EXTENDED RELEASE ORAL ONCE
Status: COMPLETED | OUTPATIENT
Start: 2023-02-13 | End: 2023-02-13

## 2023-02-13 RX ORDER — AMOXICILLIN AND CLAVULANATE POTASSIUM 875; 125 MG/1; MG/1
1 TABLET, FILM COATED ORAL EVERY 12 HOURS SCHEDULED
Status: DISCONTINUED | OUTPATIENT
Start: 2023-02-13 | End: 2023-02-14 | Stop reason: HOSPADM

## 2023-02-13 RX ADMIN — AMOXICILLIN AND CLAVULANATE POTASSIUM 1 TABLET: 875; 125 TABLET, FILM COATED ORAL at 20:35

## 2023-02-13 RX ADMIN — NYSTATIN 500000 UNITS: 100000 SUSPENSION ORAL at 07:30

## 2023-02-13 RX ADMIN — LISINOPRIL 10 MG: 10 TABLET ORAL at 08:00

## 2023-02-13 RX ADMIN — NYSTATIN 500000 UNITS: 100000 SUSPENSION ORAL at 17:01

## 2023-02-13 RX ADMIN — NYSTATIN 500000 UNITS: 100000 SUSPENSION ORAL at 20:35

## 2023-02-13 RX ADMIN — SUCRALFATE 1 G: 1 SUSPENSION ORAL at 17:01

## 2023-02-13 RX ADMIN — POTASSIUM CHLORIDE 40 MEQ: 10 CAPSULE, COATED, EXTENDED RELEASE ORAL at 07:30

## 2023-02-13 RX ADMIN — SUCRALFATE 1 G: 1 SUSPENSION ORAL at 07:30

## 2023-02-13 RX ADMIN — GABAPENTIN 300 MG: 300 CAPSULE ORAL at 12:09

## 2023-02-13 RX ADMIN — TAZOBACTAM SODIUM AND PIPERACILLIN SODIUM 3.38 G: 375; 3 INJECTION, SOLUTION INTRAVENOUS at 04:14

## 2023-02-13 RX ADMIN — BUSPIRONE HYDROCHLORIDE 5 MG: 5 TABLET ORAL at 08:00

## 2023-02-13 RX ADMIN — POTASSIUM CHLORIDE AND SODIUM CHLORIDE 40 ML/HR: 900; 150 INJECTION, SOLUTION INTRAVENOUS at 08:37

## 2023-02-13 RX ADMIN — LIDOCAINE 1 PATCH: 700 PATCH TOPICAL at 08:00

## 2023-02-13 RX ADMIN — NIFEDIPINE 60 MG: 60 TABLET, FILM COATED, EXTENDED RELEASE ORAL at 08:00

## 2023-02-13 RX ADMIN — IPRATROPIUM BROMIDE AND ALBUTEROL SULFATE 3 ML: .5; 2.5 SOLUTION RESPIRATORY (INHALATION) at 06:49

## 2023-02-13 RX ADMIN — IPRATROPIUM BROMIDE AND ALBUTEROL SULFATE 3 ML: .5; 2.5 SOLUTION RESPIRATORY (INHALATION) at 18:58

## 2023-02-13 RX ADMIN — PANTOPRAZOLE SODIUM 40 MG: 40 TABLET, DELAYED RELEASE ORAL at 17:03

## 2023-02-13 RX ADMIN — SUCRALFATE 1 G: 1 SUSPENSION ORAL at 11:13

## 2023-02-13 RX ADMIN — BUSPIRONE HYDROCHLORIDE 5 MG: 5 TABLET ORAL at 20:35

## 2023-02-13 RX ADMIN — GABAPENTIN 300 MG: 300 CAPSULE ORAL at 17:03

## 2023-02-13 RX ADMIN — IPRATROPIUM BROMIDE AND ALBUTEROL SULFATE 3 ML: .5; 2.5 SOLUTION RESPIRATORY (INHALATION) at 10:37

## 2023-02-13 RX ADMIN — OXYCODONE AND ACETAMINOPHEN 1 TABLET: 325; 10 TABLET ORAL at 12:09

## 2023-02-13 RX ADMIN — NYSTATIN 500000 UNITS: 100000 SUSPENSION ORAL at 11:13

## 2023-02-13 RX ADMIN — PANTOPRAZOLE SODIUM 40 MG: 40 INJECTION, POWDER, FOR SOLUTION INTRAVENOUS at 08:00

## 2023-02-13 RX ADMIN — AMOXICILLIN AND CLAVULANATE POTASSIUM 1 TABLET: 875; 125 TABLET, FILM COATED ORAL at 14:48

## 2023-02-13 RX ADMIN — SUCRALFATE 1 G: 1 SUSPENSION ORAL at 20:35

## 2023-02-13 RX ADMIN — ENOXAPARIN SODIUM 40 MG: 100 INJECTION SUBCUTANEOUS at 17:03

## 2023-02-13 RX ADMIN — GABAPENTIN 300 MG: 300 CAPSULE ORAL at 07:34

## 2023-02-13 RX ADMIN — GABAPENTIN 300 MG: 300 CAPSULE ORAL at 20:35

## 2023-02-13 RX ADMIN — IPRATROPIUM BROMIDE AND ALBUTEROL SULFATE 3 ML: .5; 2.5 SOLUTION RESPIRATORY (INHALATION) at 14:05

## 2023-02-13 NOTE — PROGRESS NOTES
[facility] Medicine Services  INPATIENT PROGRESS NOTE    Patient Name: Jose Alfredo Kirkpatrick  Date of Admission: 2/8/2023  Today's Date: 02/13/23  Length of Stay: 5  Primary Care Physician: Otilio Ramos MD    Subjective   Chief Complaint: Follow-up weakness, dark stools, dysphagia  HPI  Sitting up in chair.  He worked with physical therapy, ambulated 10 feet with rolling walker and is currently sitting in the chair.  He denies nausea or vomiting.  He has not had any diarrhea today.  No complaints of chest pain or palpitations.  He is not requiring oxygen.  Patient reported right shoulder and elbow pain yesterday and imaging studies showed no acute osseous findings.  Moderate right AC joint osteoarthritis.  Also osteoarthritis and effusion right elbow.  Patient has no sputum production.  He remains on Protonix and Carafate per GI recommendations.  Blood cultures 2/8 were likely contaminant as staph not aureus or Lugdunensis  DELFINO no growth.  Urine culture positive for E. coli and patient has been on Zosyn  Which would cover both E. coli UTI as well as pneumonia.  We will switch Zosyn to Augmentin for 3 additional days for total 7-day antibiotic treatment.    Patient remains on full liquid diet.  Will discuss with Dr. Funes diet may be advanced or best plan to leave him on full liquids at this time.  Patient looks much better today. Dr. Funes noted patient would need to recover from hip surgery prior to proceeding with paraesophageal hernia repair but sooner if signs of Oestreich outlet obstruction.    Review of Systems   Constitutional: Positive for activity change (After recent hip surgery) and fatigue. Negative for chills and fever.   HENT: Negative for congestion and trouble swallowing.    Eyes: Negative for photophobia and visual disturbance.   Respiratory: Negative for cough, shortness of breath and wheezing.    Cardiovascular: Negative for chest pain, palpitations and  leg swelling.   Gastrointestinal: Negative for abdominal pain, anal bleeding, diarrhea, nausea and vomiting.   Endocrine: Negative for cold intolerance, heat intolerance and polyuria.   Genitourinary: Positive for frequency. Negative for dysuria and urgency.   Musculoskeletal: Positive for gait problem.   Skin: Negative for color change, pallor, rash and wound.   Allergic/Immunologic: Negative for immunocompromised state.   Neurological: Positive for weakness. Negative for light-headedness.   Hematological: Negative for adenopathy. Does not bruise/bleed easily.   Psychiatric/Behavioral: Negative for agitation, behavioral problems and confusion.      All pertinent negatives and positives are as above. All other systems have been reviewed and are negative unless otherwise stated.     Objective    Temp:  [97.2 °F (36.2 °C)-98.3 °F (36.8 °C)] 98.3 °F (36.8 °C)  Heart Rate:  [] 78  Resp:  [16-18] 16  BP: (109-114)/(49-63) 109/61  Physical Exam  Vitals and nursing note reviewed.   Constitutional:       Comments: Sitting up in chair.  No oxygen in use.  No visitors in room.   HENT:      Head: Normocephalic and atraumatic.      Nose: No congestion.      Mouth/Throat:      Pharynx: Oropharynx is clear. No oropharyngeal exudate or posterior oropharyngeal erythema.   Eyes:      Extraocular Movements: Extraocular movements intact.      Pupils: Pupils are equal, round, and reactive to light.   Cardiovascular:      Rate and Rhythm: Normal rate and regular rhythm.      Heart sounds: No murmur heard.     Comments: Sinus rhythm 75-91 on telemetry.  Pulmonary:      Breath sounds: No wheezing, rhonchi or rales.      Comments: No oxygen in use.  Abdominal:      Palpations: Abdomen is soft.      Tenderness: There is no abdominal tenderness.   Genitourinary:     Comments: Voiding.  Musculoskeletal:         General: No swelling or tenderness.      Cervical back: Normal range of motion and neck supple.      Comments: SCDs bilateral  lower extremities   Skin:     General: Skin is warm and dry.   Neurological:      General: No focal deficit present.      Mental Status: He is alert and oriented to person, place, and time.   Psychiatric:         Mood and Affect: Mood normal.         Behavior: Behavior normal.         Thought Content: Thought content normal.         Judgment: Judgment normal.       Results Review:  I have reviewed the labs, radiology results, and diagnostic studies.    Laboratory Data:   Results from last 7 days   Lab Units 02/13/23  0745 02/13/23  0446 02/12/23  1817 02/12/23  0412 02/11/23  1610 02/11/23 0449   WBC 10*3/mm3  --  10.03  --  13.48*  --  11.88*   HEMOGLOBIN g/dL 10.4* 9.7* 11.0* 10.4*   < > 12.5*   HEMATOCRIT % 33.1* 30.6* 34.5* 34.0*   < > 39.7   PLATELETS 10*3/mm3  --  653*  --  686*  --  772*    < > = values in this interval not displayed.     Results from last 7 days   Lab Units 02/13/23  0446 02/12/23  0412 02/11/23  0449 02/09/23  0732 02/08/23  1459 02/08/23  1100   SODIUM mmol/L 139 138 143   < > 134* 135*   POTASSIUM mmol/L 3.4* 3.3* 3.7   < > 4.1 4.0   CHLORIDE mmol/L 103 99 100   < > 100 98   CO2 mmol/L 27.0 28.0 28.0   < > 25.0 23.0   BUN mg/dL 22 24* 19   < > 18 20   CREATININE mg/dL 1.07 1.23 1.04   < > 0.74* 0.70*   CALCIUM mg/dL 8.4* 8.4* 9.0   < > 8.3* 8.5*   BILIRUBIN mg/dL  --   --   --   --  0.4 0.4   ALK PHOS U/L  --   --   --   --  89 95   ALT (SGPT) U/L  --   --   --   --  20 19   AST (SGOT) U/L  --   --   --   --  32 34   GLUCOSE mg/dL 118* 128* 179*   < > 136* 148*    < > = values in this interval not displayed.     CBC:      Lab 02/13/23  0745 02/13/23  0446 02/12/23  1817 02/12/23  0412 02/11/23  1610 02/11/23  0449 02/10/23  1820 02/10/23  0445 02/09/23  1624 02/09/23  0732   WBC  --  10.03  --  13.48*  --  11.88*  --  7.80  --  9.68   HEMOGLOBIN 10.4* 9.7*   < > 10.4*   < > 12.5*   < > 11.4*   < > 11.8*   HEMATOCRIT 33.1* 30.6*   < > 34.0*   < > 39.7   < > 36.5*   < > 37.9   PLATELETS   --  653*  --  686*  --  772*  --  667*  --  643*   NEUTROS ABS  --  7.64*  --  10.62*  --  9.68*  --  5.23  --  6.56   IMMATURE GRANS (ABS)  --  0.07*  --  0.14*  --  0.14*  --  0.13*  --  0.13*   LYMPHS ABS  --  1.36  --  1.53  --  1.12  --  1.37  --  1.97   MONOS ABS  --  0.70  --  0.93*  --  0.87  --  0.76  --  0.83   EOS ABS  --  0.21  --  0.20  --  0.03  --  0.24  --  0.12   MCV  --  93.6  --  95.0  --  93.4  --  93.4  --  93.6    < > = values in this interval not displayed.      Microbiology Results (last 10 days)     Procedure Component Value - Date/Time    Blood Culture With DELFINO - Blood, Arm, Right [[id number]]  (Normal) Collected: 02/12/23 0806    Lab Status: Preliminary result Specimen: Blood from Arm, Right Updated: 02/13/23 0815     Blood Culture No growth at 24 hours    MRSA Screen, PCR (Inpatient) - Swab, Nares [[id number]]  (Normal) Collected: 02/10/23 1659    Lab Status: Final result Specimen: Swab from Nares Updated: 02/10/23 1843     MRSA PCR No MRSA Detected    Narrative:      The negative predictive value of this diagnostic test is high and should only be used to consider de-escalating anti-MRSA therapy. A positive result may indicate colonization with MRSA and must be correlated clinically.    S. Pneumo Ag Urine or CSF - Urine, Urine, Clean Catch [[id number]]  (Normal) Collected: 02/10/23 1658    Lab Status: Final result Specimen: Urine, Clean Catch Updated: 02/10/23 1758     Strep Pneumo Ag Negative    Legionella Antigen, Urine - Urine, Urine, Clean Catch [[id number]]  (Normal) Collected: 02/10/23 1658    Lab Status: Final result Specimen: Urine, Clean Catch Updated: 02/10/23 1758     LEGIONELLA ANTIGEN, URINE Negative    Blood Culture - Blood, Arm, Left [[id number]]  (Normal) Collected: 02/08/23 2022    Lab Status: Preliminary result Specimen: Blood from Arm, Left Updated: 02/12/23 2046     Blood Culture No growth at 4 days    Blood Culture - Blood, Arm, Right [[id number]]  (Abnormal) Collected:  02/08/23 2012    Lab Status: Final result Specimen: Blood from Arm, Right Updated: 02/12/23 0610     Blood Culture Staphylococcus, coagulase negative     Isolated from Anaerobic Bottle     Gram Stain Anaerobic Bottle Gram positive cocci in clusters    Narrative:      Probable contaminant requires clinical correlation, susceptibility not performed unless requested by physician.      Blood Culture ID, PCR - Blood, Arm, Right [[id number]]  (Abnormal) Collected: 02/08/23 2012    Lab Status: Final result Specimen: Blood from Arm, Right Updated: 02/10/23 1528     BCID, PCR Staph spp, not aureus or lugdunensis. Identification by BCID2 PCR.     BOTTLE TYPE Anaerobic Bottle    Urine Culture - Urine, Straight Cath [[id number]]  (Abnormal)  (Susceptibility) Collected: 02/08/23 1716    Lab Status: Final result Specimen: Urine from Straight Cath Updated: 02/10/23 1253     Urine Culture >100,000 CFU/mL Escherichia coli    Narrative:      Colonization of the urinary tract without infection is common. Treatment is discouraged unless the patient is symptomatic, pregnant, or undergoing an invasive urologic procedure.    Susceptibility      Escherichia coli      ALISON      Ampicillin Susceptible      Ampicillin + Sulbactam Susceptible      Cefazolin Susceptible      Cefepime Susceptible      Ceftazidime Susceptible      Ceftriaxone Susceptible      Gentamicin Susceptible      Levofloxacin Susceptible      Nitrofurantoin Susceptible      Piperacillin + Tazobactam Susceptible      Trimethoprim + Sulfamethoxazole Susceptible                           COVID-19, FLU A/B, RSV PCR - Swab, Nasopharynx [[id number]]  (Normal) Collected: 02/08/23 1715    Lab Status: Final result Specimen: Swab from Nasopharynx Updated: 02/08/23 1820     COVID19 Not Detected     Influenza A PCR Not Detected     Influenza B PCR Not Detected     RSV, PCR Not Detected    Narrative:      Fact sheet for providers: https://www.fda.gov/media/595123/download    Fact sheet for  patients: https://www.eCareer.gov/media/866724/download    Test performed by PCR.          Imaging Results (All)     Procedure Component Value Units Date/Time    XR Abdomen KUB [[id number]] Collected: 02/10/23 2134     Updated: 02/10/23 2141    Narrative:      HISTORY: Large hiatal hernia with organoaxial rotation of the stomach.  Delayed emptying of the stomach from upper GI exam performed earlier  this afternoon. Follow-up KUB recommended.     KUB: A frontal view the abdomen and pelvis obtained at 7:23 PM on  02/10/2023. A chest x-ray was performed for 445 pm incidentally and  included with this exam. Upper GI study performed this afternoon  completed at  3pm.     On the 4:45 PM chest x-ray, there is retained contrast the distal  esophagus as well as the herniated stomach indicating delayed gastric  emptying. On this 7:23 PM KUB study, there is contrast present  throughout the nondilated small and large bowel contrast present in the  rectum. IVC filter in place. Right hip prosthesis. Vascular  calcifications.       Impression:      1. Follow-up imaging after upper GI exam is suggested delayed gastric  emptying, however there is no gastric outlet obstruction with contrast  present throughout the small and large bowel to the level of the rectum  on the KUB performed 4 hours following completion of the upper GI exam.  This report was finalized on 02/10/2023 21:38 by Dr. Radhika Ross MD.    FL Upper GI Water Soluble [[id number]] Collected: 02/10/23 1520     Updated: 02/10/23 1534    Narrative:      EXAMINATION: FL UPPER GI WATER SOLUBLE-     2/10/2023 2:38 PM CST     HISTORY: Paraesophageal hiatal hernia; J18.9-Pneumonia, unspecified  organism; R77.8-Other specified abnormalities of plasma proteins;  K57.90-Diverticulosis of intestine, part unspecified, without  perforation or abscess without bleeding; I71.43-Infrarenal abdominal  aortic aneurysm, without rupture; N39.0-Urinary tract infection, site  not specified;  K44.9-Diaphragmatic hernia without obstruction or  gangrene     The fluoroscopy is performed and images of the upper GI are obtained  during and after ingestion of water soluble contrast  material/Gastrografin.     There is no previous study for comparison.     There is no difficulty in initiating the swallowing.     There is normal propagation of the bolus through the oropharynx into the  esophagus.     Normal esophagus seen. The esophagus is displaced to the left. No  intrinsic abnormality.     There is a large hiatal hernia. 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 There is gastroesophageal reflux.     The entire stomach is contained in the hiatal hernia in the left chest.  The gastric antrum is at the hiatus with severe narrowing of the antrum.  No contrast is seen passing the antrum/esophageal hiatus into the  abdominal part of the antrum/duodenum after several minutes.       Impression:      1. A large hiatal hernia containing most of the stomach. Organoaxial  rotation of the stomach.  2. Severe narrowing of the gastric antrum at the level of the esophagus  hiatus. No contrast passed beyond the antrum into the abdominal part of  the GI tract.  3. A delayed image after 4 hours would be obtained to see the  propagation of the contrast bolus.  4. Fluoroscopy time: 2 minutes 36 seconds.  5. The dose: 82.2mGy.  6. Number of images: 25  This report was finalized on 02/10/2023 15:31 by Dr. Mirtha Perez MD.    CT Abdomen Pelvis Without Contrast [[id number]] Collected: 02/08/23 1820     Updated: 02/08/23 1831    Narrative:      CT ABDOMEN PELVIS WO CONTRAST- 2/8/2023 6:08 PM CST     HISTORY: new onset bloody stools, recent diarrhea      COMPARISON: None      DOSE LENGTH PRODUCT: 337 mGy cm. Automated exposure control was also  utilized to decrease patient radiation dose.     TECHNIQUE: Noncontrast enhanced images of the abdomen and pelvis  obtained without oral contrast. Multiplanar reformatted images  were  provided for review.      FINDINGS:   LOWER CHEST: Large hiatal hernia. Atelectasis present in the left lower  lobe and there is also a trace layering left pleural effusion. Coronary  atheromatous calcification.      LIVER: No focal liver lesion within limits of a noncontrast study.      BILIARY SYSTEM: Cholelithiasis. Gallbladder is nondistended. Biliary  tree is nondilated.      PANCREAS: No focal pancreatic lesion within limits of a noncontrast  study.      SPLEEN: Unremarkable.      KIDNEYS: Iodinated contrast identified in the upper urinary tracts. No  hydronephrosis. Ureters are nondilated.      ADRENALS: Unremarkable.     RETROPERITONEUM: No mass, lymphadenopathy or hemorrhage.      GI TRACT: Large hiatal hernia. The stomach is nondistended. Small bowel  loops are nondilated. Colonic diverticulosis. The colon is nondistended.  No inflammatory changes are identified along the colon. No bowel wall  thickening identified. Surrounding fat is clean.     OTHER: There is no mesenteric mass, lymphadenopathy or fluid collection.  No intraperitoneal free fluid or free air. IVC filter is in place.  Infrarenal AAA measuring up to 3.6 cm in diameter. Right hip  arthroplasty. No acute bony abnormality is seen. There appears to be a  high-grade spinal stenosis at the L1-L2 disc level with complete loss of  disc height at this level. There is an old fracture identified across  the inferior endplate of T9.     PELVIS: Bladder wall trabeculation. Air identified in the urinary  bladder. No pelvic adenopathy identified.       Impression:      1. Colonic diverticulosis. Bowel is nondistended. No inflammatory  changes are seen along the bowel.  2. Stomach and small bowel are decompressed. Hiatal hernia is present.  3. Cholelithiasis.  4. Bladder wall trabeculation which may be secondary to chronic partial  outlet obstruction.  5. Infrarenal AAA measuring up to 3.6 cm in diameter.  This report was finalized on 02/08/2023  18:28 by Dr Geremias Martinez, .    CT Angiogram Chest [[id number]] Collected: 02/08/23 1659     Updated: 02/08/23 1704    Narrative:      EXAMINATION: CT ANGIOGRAM CHEST-      2/8/2023 4:43 PM CST     HISTORY: Shortness of breath, chest pain, weakness, recent surgery and  immobilization since.     In order to have a CT radiation dose as low as reasonably achievable  Automated Exposure Control was utilized for adjustment of the mA and/or  KV according to patient size.     DLP in mGycm= 194.     CT angiogram chest with IV contrast.  CT angiography protocol.   CT imaging with bolus IV contrast injection.   Under concurrent supervision axial, sagittal, coronal,  three-dimensional, and MIP data sets were constructed on an independent  work station.     Normal heart size.  Intrathoracic stomach with left lung base consolidation compatible with  pneumonia.     Small left and trace right pleural effusion.     Symmetric and normally opacified pulmonary arteries.  No pulmonary embolism.     Upper lobes are clear.  No pneumothorax.     Small gallstones incidentally noted.     Summary:  1. Left lower lobe pneumonia.  2. No pulmonary embolism.   This report was finalized on 02/08/2023 17:01 by Dr. Fawad Smith MD.    XR Chest 1 View [[id number]] Collected: 02/08/23 1634     Updated: 02/08/23 1639    Narrative:      XR CHEST 1 VW- 2/8/2023 4:25 PM CST     HISTORY: chest pain, weakness     COMPARISON: Chest exam dated 10/01/2019.     FINDINGS:      No lung consolidation. No pleural effusion or pneumothorax. Heart size  is normal. Pulmonary vasculature are nondilated. Hiatal hernia  superimposed over the cardiac silhouette. The osseous structures and  surrounding soft tissues demonstrate no acute abnormality.       Impression:      1. Stable chest exam without acute process.  2. Hiatal hernia.        This report was finalized on 02/08/2023 16:36 by Dr Geremias Martinez, .        Results for orders placed during the hospital encounter of  02/08/23    Adult Transthoracic Echo Complete W/ Cont if Necessary Per Protocol    Interpretation Summary  •  Left ventricular systolic function is normal. Left ventricular ejection fraction appears to be 51 - 55%.  •  Left ventricular wall thickness is consistent with mild concentric hypertrophy.  •  Left ventricular diastolic function is consistent with (grade II w/high LAP) pseudonormalization.  •  The left atrial cavity is mildly dilated.  •  The aortic valve exhibits sclerosis. There is mild thickening of the aortic valve. Mild to moderate aortic valve regurgitation is present. Mild aortic valve stenosis is present.  •  Mild mitral annular calcification is present.  •  Moderate pulmonary hypertension is present  •  Mild dilation of the aortic root is present    Schedule medications:  amoxicillin-clavulanate, 1 tablet, Oral, Q12H  busPIRone, 5 mg, Oral, BID  enoxaparin, 40 mg, Subcutaneous, Q24H  gabapentin, 300 mg, Oral, 4x Daily  ipratropium-albuterol, 3 mL, Nebulization, 4x Daily - RT  lidocaine, 1 patch, Transdermal, Q24H  lisinopril, 10 mg, Oral, Q24H  NIFEdipine XL, 60 mg, Oral, Daily  nystatin, 5 mL, Swish & Swallow, 4x Daily  pantoprazole, 40 mg, Oral, BID AC  sucralfate, 1 g, Oral, 4x Daily AC & at Bedtime        I have reviewed the patient's current medications.     Assessment/Plan   Assessment  Active Hospital Problems    Diagnosis    • **Pneumonia of left lower lobe due to infectious organism    • Paraesophageal hiatal hernia    • Erosive esophagitis    • Large hiatal hernia    • Weight loss    • Gram-negative UTI, acute cystitis without hematuria, present on admission    • Positive fecal occult blood test    • Primary hypertension      Treatment Plan  Mr. Kirkpatrick presented to ER 2/8/2023 from SNF with fatigue, weakness, dysphagia and black stool.  Patient had recent right hip hemiarthroplasty1/28/2023 by Dr. Quintana at [facility] and was discharged to skilled nursing facility on 2/2/2023.  Patient  reported constipation that he attributed to pain medication.  In ER, he tested fecal occult blood positive.  Patient reported dysphagia of solid foods for the past several years and reported that food felt as though it was getting hung in his esophagus.  Patient had 3 previous endoscopies between 2014 and 2019.  2019 patient had melena and was found to have gastric ulcer managed conservatively.  Repeat endoscopy showed completely healed ulcer.  Last colonoscopy 2019 revealed small polyps removed.  Patient reported 24 pound weight loss over the last several months but most recently after his wife passed away within the past month.  Hemoglobin 11.6.  WBC normal.  LFTs normal.  CT scan of abdomen noted colonic diverticulosis without any inflammatory changes unremarkable stomach, small bowel, hiatal hernia, cholelithiasis without cholecystitis, bladder wall trabeculation and 3.6 cm infrarenal abdominal aortic aneurysm.  Patient denied chest pain.  CT scan showed bilateral pleural effusions with concern for parapneumonic effusion.  CTA chest noted no pulmonary emboli.  Left lower lobe pneumonia.    Pneumonia left lower lobe.  Initially received azithromycin and Rocephin.  Antibiotics changed to Zosyn due to recent hospital stay.  Blood cultures no growth at 4 days.  Strep pneumonia negative, Legionella negative, MRSA PCR negative.  Continue duo nebs and incentive spirometry.  CBC in AM.  Suspect paraesophageal hernia contributing to left lower lobe consolidation.  Remains afebrile.  WBC 10 today.  DELFINO no growth at 24 hours.  Transition Zosyn to Augmentin twice daily for 3 days.    Fecal occult blood positive.  Hemoglobin stable.  Hemoglobin 11.6 on admission, hemoglobin 10.4 today.  Patient attributes dark stools to constipation secondary to narcotics.  Stools are brown now and no diarrhea today.    Dysphagia.  GI consulted.  Dr. Edmond recommended endoscopy for evaluation of melena, dysphagia and odynophagia.  Patient  agreeable.  Findings: Hypopharynx and larynx grossly appeared normal.  Because of the proximal esophagus appeared normal.  Severe, grade IV, erosive esophagitis of mid and distal esophagus, manifested by numerous linear and irregular shaped fibrin covering ulcerations and erosions, edema and erythema, but without signs of any active bleeding.  Very large hiatal hernia, with essentially almost entire stomach patient currently chest, with configuration suggestive of, at least, partial volvulus of the stomach.  No ulcerations, but mild diffuse edema and erythema of gastric mucosa.  Gastric biopsy obtained with cold biopsy forceps to rule out H. pylori.  Normal-appearing duodenum.     Recommendations: Protonix 40 mg twice daily. Carafate suspension 10 cc before every meal and at bedtime.  Full liquid diet.  Evaluation by surgery for for possible repair of hiatal hernia/gastropexy.  Switch Protonix to oral today.    Paraesophageal hernia.  General surgery consulted and Dr. Funes noted very large hiatal hernia and difficult situation given age and frality recent surgery and hip surgery.  Upper GI 2/10 noted large hiatal hernia containing most of stomach.  Organoxial rotation of stomach.  Severe narrowing of the gastric antrum at the level of the esophagus hiatus.  No contrast passed beyond the antrum into the abdominal part of GI tract.  Delayed imaging 4 hours obtained.  KUB following upper GI suggest delayed gastric emptying.  No gastric outlet obstruction present throughout the small and large bowel to the level of the rectum.  Discussed with Dr. Funes and would like to recover from recent hip surgery before surgical intervention.  Patient agrees but also understands may require more urgent attention.  He has been on clear liquid diet.  Discussed with Dr. Funes today and okay to advance to regular diet.    Urine culture E. Coli, present on admission.  Sensitive to Zosyn day 4.  Urinalysis 31-50 WBC, 4+ bacteria.   Change Zosyn to Augmentin    Potassium 3.4.  Replace 40 mEq potassium x1 dose.  Repeat BMP in AM.    Recent right hip hemiarthroplasty 1/28/2023 Dr. Quintana.  Discussed with Brendan Petersen PA-C and allowed weightbearing as tolerated. Physical therapy consulted.  Patient stood and ambulated 10 feet with physical therapy today.  Currently sitting up in chair.    Primary hypertension.  Blood pressure 109/61, 114/63 continue lisinopril and nifedipine.    Echocardiogram 2/9/2023 EF 51-55%.    SCDs and Lovenox for deep vein thrombosis prophylaxis.     Tongue feels better today with Mycostatin swish and swallow    EKG 2/8/2023.  Normal sinus rhythm with 2nd degree AV block (Mobitz I).  Repeat EKG 2/10/2023 sinus tachycardia first-degree AV block.  No longer second-degree AV block.  QT lengthened.    Social service for discharge planning back to skilled nursing facility    Medical Decision Making  Number and Complexity of problems: 9  Pneumonia left lower lobe, acute, high complexity  Fecal occult blood positive, lower GI bleed, acute high complexity, GI consultation.  Gram-negative UTI, acute high complexity  Large hiatal hernia with stomach and chest suggesting volvulus stomach, acute, high complexity requiring general surgery consultation.  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  T wave abnormalities.  Radiology interpretation: Radiology CT abdomen, CTA chest, upper GI, KUB  Labs reviewed:   BMP 2/13/23.  Potassium 3.4, creatinine 1.07  CBC 2/13/23.  WBC  10.  Hemoglobin 10.4  Urine culture E. Coli  Blood cultures no growth at 4 days.  DELFINO no growth  Reviewed upper GI and KUB from 2/10    Any tests that were considered but not ordered: None     Decision rules/scores evaluated (example [id number], Wells, etc): None     Discussed with: Dr. Scales, Dr. Funes, Brendan Petersen PA-C, orthopedics, and patient     Care Planning  Shared decision making: , Dr. Funes and patient.  Patient agrees for recovery after hip surgery prior to hernia repair.  Agrees to physical therapy.   Code status and discussions: Full code  The patient surrogate decision maker is his daughter, Lula Cope  Social Determinants of Health that impact treatment or disposition: Recent right hip hemiarthroplasty admitted to SNF  I expect the patient to be discharged to SNF in 2-4 days.     Electronically signed by HARISH Edwards, 02/13/23, 12:21 CST.

## 2023-02-13 NOTE — THERAPY TREATMENT NOTE
Acute Care - Physical Therapy Treatment Note  [facility]     Patient Name: Jose Alfredo Kirkpatrick  : 1935  MRN: [id number]  Today's Date: 2023   Onset of Illness/Injury or Date of Surgery: 23  Visit Dx:     ICD-10-CM ICD-9-CM   1. Pneumonia of left lower lobe due to infectious organism  J18.9 486   2. Elevated troponin  R77.8 790.6   3. Diverticulosis  K57.90 562.10   4. Infrarenal abdominal aortic aneurysm (AAA) without rupture  I71.43 441.4   5. Urinary tract infection without hematuria, site unspecified  N39.0 599.0   6. Hiatal hernia  K44.9 553.3   7. Biliary calculus of other site without obstruction  K80.80 574.20   8. Bloody stools  K92.1 578.1   9. Dysphagia  R13.10 787.20   10. Decreased activities of daily living (ADL)  Z78.9 V49.89   11. Impaired mobility  Z74.09 799.89     Patient Active Problem List   Diagnosis   • Spinal stenosis, lumbar   • Hx of colonic polyps   • Drug-induced constipation   • Primary hypertension   • Acute pulmonary embolism with acute cor pulmonale (HCC)   • Acute bilateral deep vein thrombosis (DVT) of femoral veins (HCC)   • History of DVT of left lower extremity   • Positive fecal occult blood test   • Melena   • Vitamin B12 deficiency   • FARZANA (iron deficiency anemia)   • Cholelithiasis   • Gastric ulcer with hemorrhage   • Chronic bilateral low back pain without sciatica   • Chronic pain of both shoulders   • Kyphosis   • Overweight (BMI 25.0-29.9)   • Non-smoker   • Pneumonia of left lower lobe due to infectious organism   • Erosive esophagitis   • Large hiatal hernia   • Weight loss   • Gram-negative UTI, acute cystitis without hematuria, present on admission   • Paraesophageal hiatal hernia   • Severe malnutrition (HCC)     Past Medical History:   Diagnosis Date   • Arthritis    • Cataract     BILATERAL   • GERD (gastroesophageal reflux disease)    • History of transfusion    • Hx of colonic polyps    • Hypertension    • Reflux esophagitis    • Streptococcosis      IN SPINE FROM BACK INJECTIONS     Past Surgical History:   Procedure Laterality Date   • BACK SURGERY      cervical   • CATARACT EXTRACTION Bilateral    • COLONOSCOPY  02/11/2013    Hyperplastic polyp at 25 cm, Diverticulosis repeat exam in 5 years   • COLONOSCOPY N/A 10/16/2018    Tubular adenoma ascending colon repet prn   • ENDOSCOPY  01/15/2014    HH   • ENDOSCOPY N/A 10/03/2019    Large HH, non-bleeding gastric ulcer   • ENDOSCOPY N/A 11/25/2019    Procedure: ESOPHAGOGASTRODUODENOSCOPY WITH ANESTHESIA;  Surgeon: Marques Pérez MD;  Location: [facility] ENDOSCOPY;  Service: Gastroenterology   • ENDOSCOPY N/A 2/9/2023    Procedure: ESOPHAGOGASTRODUODENOSCOPY WITH ANESTHESIA;  Surgeon: Sushant Edmond MD;  Location: [facility] ENDOSCOPY;  Service: Gastroenterology;  Laterality: N/A;  pre GI bleed  post hiatal hernia; esophagitis  Otilio Ramos MD   • HERNIA REPAIR Right     INGUINAL    • HIP FRACTURE SURGERY Right 02/04/2023   • REPLACEMENT TOTAL KNEE BILATERAL     • THORACIC LAMINECTOMY DECOMPRESSIVE POSTERIOR N/A 12/14/2016    Procedure: LAMINECTOMY DECOMPRESSION, UNINSTRUMENTED POSTERIOR SPINAL FUSION, T 11-12;  Surgeon: LUZ Adan MD;  Location: [facility] OR;  Service:    • VENA CAVA FILTER INSERTION N/A 10/04/2019    Procedure: VENA CAVA FILTER INSERTION;  Surgeon: Dallin Coronado MD;  Location: [facility] HYBRID OR 12;  Service: Vascular     PT Assessment (last 12 hours)     PT Evaluation and Treatment     Row Name 02/13/23 1414 02/13/23 0942       Physical Therapy Time and Intention    Subjective Information complains of;fatigue  -WK --    Document Type therapy note (daily note)  -WK --    Mode of Treatment physical therapy  -WK --    Comment -- Goals updated this date to reflect patient progress 2/13/23   -SB    Row Name 02/13/23 0900          Physical Therapy Time and Intention    Subjective Information complains of;nausea/vomiting  -WK     Document Type therapy note (daily note)  -WK     Mode  of Treatment physical therapy  -WK     Comment nsg notified that pt needs ABD pillow  -WK     Row Name 02/13/23 1414 02/13/23 0900       General Information    Existing Precautions/Restrictions fall;right;hip, posterior  WBAT  -WK fall;right;hip, posterior  WBAT  -WK    Row Name 02/13/23 0900          Bed Mobility    Supine-Sit Porter (Bed Mobility) verbal cues;minimum assist (75% patient effort);moderate assist (50% patient effort)  -WK     Row Name 02/13/23 0900          Sit-Stand Transfer    Sit-Stand Porter (Transfers) verbal cues;moderate assist (50% patient effort)  slow to come into stand and requires cues to come into full standing.  -WK     Assistive Device (Sit-Stand Transfers) walker, front-wheeled  -WK     Row Name 02/13/23 0900          Stand-Sit Transfer    Stand-Sit Porter (Transfers) verbal cues;minimum assist (75% patient effort);moderate assist (50% patient effort)  -WK     Assistive Device (Stand-Sit Transfers) walker, front-wheeled  -WK     Row Name 02/13/23 0900          Stand Pivot/Stand Step Transfer    Stand Pivot/Stand Step Porter (Transfers) verbal cues;minimum assist (75% patient effort)  -WK     Assistive Device (Stand Pivot Stand Step Transfer) walker, front-wheeled  -WK     Row Name 02/13/23 0900          Gait/Stairs (Locomotion)    Porter Level (Gait) verbal cues;minimum assist (75% patient effort)  -WK     Assistive Device (Gait) walker, front-wheeled  -WK     Distance in Feet (Gait) 10' sitting rest and 5'  -WK     Deviations/Abnormal Patterns (Gait) antalgic  -WK     Bilateral Gait Deviations forward flexed posture  flexed knees that pt can correct with cues.  -WK     Comment, (Gait/Stairs) Pt required initial cues for gait sequence but on the 2nd walk pt able to perform without cues.  -WK     Row Name 02/13/23 1414          Motor Skills    Comments, Therapeutic Exercise AROM BLE 20 reps in bed. Educated on hip precautions and use of abd pillow  -WK      Row Name             Wound Right lateral greater trochanter Incision    Wound - Properties Group Present on Hospital Admission: Y  -AG Side: Right  -AG Orientation: lateral  -AG Location: greater trochanter  -AG Primary Wound Type: Incision  -AG    Retired Wound - Properties Group Present on Hospital Admission: Y  -AG Side: Right  -AG Orientation: lateral  -AG Location: greater trochanter  -AG Primary Wound Type: Incision  -AG    Retired Wound - Properties Group Present on Hospital Admission: Y  -AG Side: Right  -AG Location: greater trochanter  -AG Primary Wound Type: Incision  -AG    Row Name 02/13/23 0900          Plan of Care Review    Plan of Care Reviewed With patient  -WK     Progress improving  -WK     Outcome Evaluation Pt educated to hip precautions. Pt performed sit to stand min-mod A with cues to come into full standing. Pt amb 10' min A RW with cues for gait sequence. Pt performed sitting rest and amb 5' and was able to perform correct gait pattern without cues.  -WK     Row Name 02/13/23 1414 02/13/23 0900       Positioning and Restraints    Pre-Treatment Position in bed  -WK in bed  -WK    Post Treatment Position bed  -WK chair  -WK    In Bed fowlers;call light within reach;encouraged to call for assist;ABD pillow  -WK --    In Chair -- reclined;call light within reach;encouraged to call for assist;with family/caregiver  -WK    Row Name 02/13/23 0942          Gait Training Goal 1 (PT)    Activity/Assistive Device (Gait Training Goal 1, PT) gait (walking locomotion);improve balance and speed;increase endurance/gait distance;walker, rolling  -SB     [place]upe Level (Gait Training Goal 1, PT) contact guard required  -SB     Distance (Gait Training Goal 1, PT) 60  -SB     Time Frame (Gait Training Goal 1, PT) long term goal (LTG)  -SB     Progress/Outcome (Gait Training Goal 1, PT) goal revised this date  -SB           User Key  (r) = Recorded By, (t) = Taken By, (c) = Cosigned By    Initials Name  Provider Type    WK Nai Kendall, PTA Physical Therapist Assistant    Nirali Ling, RN Registered Nurse    Kathleen Putnam, PT DPT Physical Therapist                Physical Therapy Education     Title: PT OT SLP Therapies (In Progress)     Topic: Physical Therapy (In Progress)     Point: Mobility training (Done)     Learning Progress Summary           Patient Acceptance, E, VU by AA at 2/10/2023 1255    Comment: Pt was educated on POC, posterior hip precautions, and benefits of activity.                   Point: Home exercise program (Not Started)     Learner Progress:  Not documented in this visit.          Point: Body mechanics (Not Started)     Learner Progress:  Not documented in this visit.          Point: Precautions (Done)     Learning Progress Summary           Patient Acceptance, E, VU by AA at 2/10/2023 1255    Comment: Pt was educated on POC, posterior hip precautions, and benefits of activity.                               User Key     Initials Effective Dates Name Provider Type Discipline     01/03/23 -  Mariann Pena, PT Student PT Student PT              PT Recommendation and Plan     Plan of Care Reviewed With: patient  Progress: improving  Outcome Evaluation: Pt educated to hip precautions. Pt performed sit to stand min-mod A with cues to come into full standing. Pt amb 10' min A RW with cues for gait sequence. Pt performed sitting rest and amb 5' and was able to perform correct gait pattern without cues.   Outcome Measures     Row Name 02/13/23 0900 02/12/23 1256 02/12/23 1000       How much help from another person do you currently need...    Turning from your back to your side while in flat bed without using bedrails? 3  -WK 2  -WK --    Moving from lying on back to sitting on the side of a flat bed without bedrails? 3  -WK 2  -WK --    Moving to and from a bed to a chair (including a wheelchair)? 3  -WK 2  -WK --    Standing up from a chair using your arms (e.g., wheelchair, bedside  chair)? 2  -WK 2  -WK --    Climbing 3-5 steps with a railing? 1  -WK 1  -WK --    To walk in hospital room? 3  -WK 1  -WK --    AM-PAC 6 Clicks Score (PT) 15  -WK 10  -WK --       How much help from another is currently needed...    Putting on and taking off regular lower body clothing? -- -- 2  -LS    Bathing (including washing, rinsing, and drying) -- -- 2  -LS    Toileting (which includes using toilet bed pan or urinal) -- -- 1  -LS    Putting on and taking off regular upper body clothing -- -- 2  -LS    Taking care of personal grooming (such as brushing teeth) -- -- 3  -LS    Eating meals -- -- 3  -LS    AM-PAC 6 Clicks Score (OT) -- -- 13  -LS       Functional Assessment    Outcome Measure Options AM-PAC 6 Clicks Basic Mobility (PT)  -WK AM-PAC 6 Clicks Basic Mobility (PT)  -WK AM-PAC 6 Clicks Daily Activity (OT)  -LS    Row Name 02/11/23 1100             How much help from another is currently needed...    Putting on and taking off regular lower body clothing? 2  -LS      Bathing (including washing, rinsing, and drying) 2  -LS      Toileting (which includes using toilet bed pan or urinal) 1  -LS      Putting on and taking off regular upper body clothing 2  -LS      Taking care of personal grooming (such as brushing teeth) 3  -LS      Eating meals 3  -LS      AM-PAC 6 Clicks Score (OT) 13  -LS         Functional Assessment    Outcome Measure Options AM-PAC 6 Clicks Daily Activity (OT)  -LS            User Key  (r) = Recorded By, (t) = Taken By, (c) = Cosigned By    Initials Name Provider Type    WK Nai Kendall PTA Physical Therapist Assistant    Nadira Shukla COTA Occupational Therapist Assistant                 Time Calculation:    PT Charges     Row Name 02/13/23 1433 02/13/23 0949          Time Calculation    Start Time 1414  -WK 0900  -WK     Stop Time 1431  -WK 0939  -WK     Time Calculation (min) 17 min  -WK 39 min  -WK     PT Received On 02/13/23  -WK 02/13/23  -WK        Time Calculation- PT     Total Timed Code Minutes- PT 17 minute(s)  -WK 39 minute(s)  -WK           User Key  (r) = Recorded By, (t) = Taken By, (c) = Cosigned By    Initials Name Provider Type    WK Nai Kendall PTA Physical Therapist Assistant              Therapy Charges for Today     Code Description Service Date Service Provider Modifiers Qty    68592091046 HC PT THERAPEUTIC ACT EA 15 MIN 2/12/2023 Nai Kendall, CARLOS GP 1    21319900501 HC PT THERAPEUTIC ACT EA 15 MIN 2/12/2023 Nai Kendall, CARLOS GP 1    17598481795 HC GAIT TRAINING EA 15 MIN 2/13/2023 Nai Kendall PTA GP 3    13824615875 HC PT THER PROC EA 15 MIN 2/13/2023 Nai Kendall, CARLOS GP 1          PT G-Codes  Outcome Measure Options: AM-PAC 6 Clicks Daily Activity (OT)  AM-PAC 6 Clicks Score (PT): 15  AM-PAC 6 Clicks Score (OT): 15    Nai Kendall PTA  2/13/2023

## 2023-02-13 NOTE — THERAPY TREATMENT NOTE
Acute Care - Physical Therapy Treatment Note  [facility]     Patient Name: Jose Alfredo Kirkpatrick  : 1935  MRN: [id number]  Today's Date: 2023   Onset of Illness/Injury or Date of Surgery: 23  Visit Dx:     ICD-10-CM ICD-9-CM   1. Pneumonia of left lower lobe due to infectious organism  J18.9 486   2. Elevated troponin  R77.8 790.6   3. Diverticulosis  K57.90 562.10   4. Infrarenal abdominal aortic aneurysm (AAA) without rupture  I71.43 441.4   5. Urinary tract infection without hematuria, site unspecified  N39.0 599.0   6. Hiatal hernia  K44.9 553.3   7. Biliary calculus of other site without obstruction  K80.80 574.20   8. Bloody stools  K92.1 578.1   9. Dysphagia  R13.10 787.20   10. Decreased activities of daily living (ADL)  Z78.9 V49.89   11. Impaired mobility  Z74.09 799.89     Patient Active Problem List   Diagnosis   • Spinal stenosis, lumbar   • Hx of colonic polyps   • Drug-induced constipation   • Primary hypertension   • Acute pulmonary embolism with acute cor pulmonale (HCC)   • Acute bilateral deep vein thrombosis (DVT) of femoral veins (HCC)   • History of DVT of left lower extremity   • Positive fecal occult blood test   • Melena   • Vitamin B12 deficiency   • FARZANA (iron deficiency anemia)   • Cholelithiasis   • Gastric ulcer with hemorrhage   • Chronic bilateral low back pain without sciatica   • Chronic pain of both shoulders   • Kyphosis   • Overweight (BMI 25.0-29.9)   • Non-smoker   • Pneumonia of left lower lobe due to infectious organism   • Erosive esophagitis   • Large hiatal hernia   • Weight loss   • Gram-negative UTI, acute cystitis without hematuria, present on admission   • Paraesophageal hiatal hernia     Past Medical History:   Diagnosis Date   • Arthritis    • Cataract     BILATERAL   • GERD (gastroesophageal reflux disease)    • History of transfusion    • Hx of colonic polyps    • Hypertension    • Reflux esophagitis    • Streptococcosis     IN SPINE FROM BACK  INJECTIONS     Past Surgical History:   Procedure Laterality Date   • BACK SURGERY      cervical   • CATARACT EXTRACTION Bilateral    • COLONOSCOPY  02/11/2013    Hyperplastic polyp at 25 cm, Diverticulosis repeat exam in 5 years   • COLONOSCOPY N/A 10/16/2018    Tubular adenoma ascending colon repet prn   • ENDOSCOPY  01/15/2014    HH   • ENDOSCOPY N/A 10/03/2019    Large HH, non-bleeding gastric ulcer   • ENDOSCOPY N/A 11/25/2019    Procedure: ESOPHAGOGASTRODUODENOSCOPY WITH ANESTHESIA;  Surgeon: Marques Pérez MD;  Location: [facility] ENDOSCOPY;  Service: Gastroenterology   • ENDOSCOPY N/A 2/9/2023    Procedure: ESOPHAGOGASTRODUODENOSCOPY WITH ANESTHESIA;  Surgeon: Sushant Edmond MD;  Location: [facility] ENDOSCOPY;  Service: Gastroenterology;  Laterality: N/A;  pre GI bleed  post hiatal hernia; esophagitis  Otilio Ramos MD   • HERNIA REPAIR Right     INGUINAL    • HIP FRACTURE SURGERY Right 02/04/2023   • REPLACEMENT TOTAL KNEE BILATERAL     • THORACIC LAMINECTOMY DECOMPRESSIVE POSTERIOR N/A 12/14/2016    Procedure: LAMINECTOMY DECOMPRESSION, UNINSTRUMENTED POSTERIOR SPINAL FUSION, T 11-12;  Surgeon: LUZ Adan MD;  Location: [facility] OR;  Service:    • VENA CAVA FILTER INSERTION N/A 10/04/2019    Procedure: VENA CAVA FILTER INSERTION;  Surgeon: Dallin Coronado MD;  Location: [facility] HYBRID OR 12;  Service: Vascular     PT Assessment (last 12 hours)     PT Evaluation and Treatment     Row Name 02/13/23 0942 02/13/23 0900       Physical Therapy Time and Intention    Subjective Information -- complains of;nausea/vomiting  -WK    Document Type -- therapy note (daily note)  -WK    Mode of Treatment -- physical therapy  -WK    Comment Goals updated this date to reflect patient progress 2/13/23   -SB nsg notified that pt needs ABD pillow  -WK    Row Name 02/13/23 0900          General Information    Existing Precautions/Restrictions fall;right;hip, posterior  WBAT  -WK     Row Name 02/13/23 0900           Bed Mobility    Supine-Sit [place] (Bed Mobility) verbal cues;minimum assist (75% patient effort);moderate assist (50% patient effort)  -WK     Row Name 02/13/23 0900          Sit-Stand Transfer    Sit-Stand [place] (Transfers) verbal cues;moderate assist (50% patient effort)  slow to come into stand and requires cues to come into full standing.  -WK     Assistive Device (Sit-Stand Transfers) walker, front-wheeled  -WK     Row Name 02/13/23 0900          Stand-Sit Transfer    Stand-Sit [place] (Transfers) verbal cues;minimum assist (75% patient effort);moderate assist (50% patient effort)  -WK     Assistive Device (Stand-Sit Transfers) walker, front-wheeled  -WK     Row Name 02/13/23 0900          Stand Pivot/Stand Step Transfer    Stand Pivot/Stand Step [place] (Transfers) verbal cues;minimum assist (75% patient effort)  -WK     Assistive Device (Stand Pivot Stand Step Transfer) walker, front-wheeled  -WK     Row Name 02/13/23 0900          Gait/Stairs (Locomotion)    [place] Level (Gait) verbal cues;minimum assist (75% patient effort)  -WK     Assistive Device (Gait) walker, front-wheeled  -WK     Distance in Feet (Gait) 10' sitting rest and 5'  -WK     Deviations/Abnormal Patterns (Gait) antalgic  -WK     Bilateral Gait Deviations forward flexed posture  flexed knees that pt can correct with cues.  -WK     Comment, (Gait/Stairs) Pt required initial cues for gait sequence but on the 2nd walk pt able to perform without cues.  -WK     Row Name             Wound Right lateral greater trochanter Incision    Wound - Properties Group Present on Hospital Admission: Y  -AG Side: Right  -AG Orientation: lateral  -AG Location: greater trochanter  -AG Primary Wound Type: Incision  -AG    Retired Wound - Properties Group Present on Hospital Admission: Y  -AG Side: Right  -AG Orientation: lateral  -AG Location: greater trochanter  -AG Primary Wound Type: Incision  -AG    Retired Wound - Properties  Group Present on Hospital Admission: Y  -AG Side: Right  -AG Location: greater trochanter  -AG Primary Wound Type: Incision  -AG    Row Name 02/13/23 0900          Plan of Care Review    Plan of Care Reviewed With patient  -WK     Progress improving  -WK     Outcome Evaluation Pt educated to hip precautions. Pt performed sit to stand min-mod A with cues to come into full standing. Pt amb 10' min A RW with cues for gait sequence. Pt performed sitting rest and amb 5' and was able to perform correct gait pattern without cues.  -WK     Row Name 02/13/23 0900          Positioning and Restraints    Pre-Treatment Position in bed  -WK     Post Treatment Position chair  -WK     In Chair reclined;call light within reach;encouraged to call for assist;with family/caregiver  -WK     Row Name 02/13/23 0942          Gait Training Goal 1 (PT)    Activity/Assistive Device (Gait Training Goal 1, PT) gait (walking locomotion);improve balance and speed;increase endurance/gait distance;walker, rolling  -SB     Dyer Level (Gait Training Goal 1, PT) contact guard required  -SB     Distance (Gait Training Goal 1, PT) 60  -SB     Time Frame (Gait Training Goal 1, PT) long term goal (LTG)  -SB     Progress/Outcome (Gait Training Goal 1, PT) goal revised this date  -SB           User Key  (r) = Recorded By, (t) = Taken By, (c) = Cosigned By    Initials Name Provider Type    WK Nai Kendall, PTA Physical Therapist Assistant    AG Nirali Stratton, RN Registered Nurse    Kathleen Putnam, PT DPT Physical Therapist                Physical Therapy Education     Title: PT OT SLP Therapies (In Progress)     Topic: Physical Therapy (In Progress)     Point: Mobility training (Done)     Learning Progress Summary           Patient Acceptance, E, VU by AA at 2/10/2023 4915    Comment: Pt was educated on POC, posterior hip precautions, and benefits of activity.                   Point: Home exercise program (Not Started)     Learner Progress:   Not documented in this visit.          Point: Body mechanics (Not Started)     Learner Progress:  Not documented in this visit.          Point: Precautions (Done)     Learning Progress Summary           Patient Acceptance, E, VU by YRIS at 2/10/2023 1255    Comment: Pt was educated on POC, posterior hip precautions, and benefits of activity.                               User Key     Initials Effective Dates Name Provider Type Discipline    YRIS 01/03/23 -  Mariann Pena, PT Student PT Student PT              PT Recommendation and Plan     Plan of Care Reviewed With: patient  Progress: improving  Outcome Evaluation: Pt educated to hip precautions. Pt performed sit to stand min-mod A with cues to come into full standing. Pt amb 10' min A RW with cues for gait sequence. Pt performed sitting rest and amb 5' and was able to perform correct gait pattern without cues.   Outcome Measures     Row Name 02/13/23 0900 02/12/23 1256 02/12/23 1000       How much help from another person do you currently need...    Turning from your back to your side while in flat bed without using bedrails? 3  -WK 2  -WK --    Moving from lying on back to sitting on the side of a flat bed without bedrails? 3  -WK 2  -WK --    Moving to and from a bed to a chair (including a wheelchair)? 3  -WK 2  -WK --    Standing up from a chair using your arms (e.g., wheelchair, bedside chair)? 2  -WK 2  -WK --    Climbing 3-5 steps with a railing? 1  -WK 1  -WK --    To walk in hospital room? 3  -WK 1  -WK --    AM-PAC 6 Clicks Score (PT) 15  -WK 10  -WK --       How much help from another is currently needed...    Putting on and taking off regular lower body clothing? -- -- 2  -LS    Bathing (including washing, rinsing, and drying) -- -- 2  -LS    Toileting (which includes using toilet bed pan or urinal) -- -- 1  -LS    Putting on and taking off regular upper body clothing -- -- 2  -LS    Taking care of personal grooming (such as brushing teeth) -- -- 3  -LS     Eating meals -- -- 3  -LS    AM-PAC 6 Clicks Score (OT) -- -- 13  -LS       Functional Assessment    Outcome Measure Options AM-PAC 6 Clicks Basic Mobility (PT)  -WK AM-PAC 6 Clicks Basic Mobility (PT)  -WK AM-PAC 6 Clicks Daily Activity (OT)  -LS    Row Name 02/11/23 1100 02/10/23 1300          How much help from another is currently needed...    Putting on and taking off regular lower body clothing? 2  -LS 2  -AC (r) EC (t) AC (c)     Bathing (including washing, rinsing, and drying) 2  -LS 2  -AC (r) EC (t) AC (c)     Toileting (which includes using toilet bed pan or urinal) 1  -LS 1  -AC (r) EC (t) AC (c)     Putting on and taking off regular upper body clothing 2  -LS 2  -AC (r) EC (t) AC (c)     Taking care of personal grooming (such as brushing teeth) 3  -LS 3  -AC (r) EC (t) AC (c)     Eating meals 3  -LS 3  -AC (r) EC (t) AC (c)     AM-PAC 6 Clicks Score (OT) 13  -LS 13  -AC (r) EC (t)        Functional Assessment    Outcome Measure Options AM-PAC 6 Clicks Daily Activity (OT)  -LS AM-PAC 6 Clicks Daily Activity (OT)  -AC (r) EC (t) AC (c)           User Key  (r) = Recorded By, (t) = Taken By, (c) = Cosigned By    Initials Name Provider Type    Adam Calles, OTR/L, CNT Occupational Therapist    WK Nai Kendall PTA Physical Therapist Assistant    Nadira Shukla COTA Occupational Therapist Assistant    EC Radha Delgado, OT Student OT Student                 Time Calculation:    PT Charges     Row Name 02/13/23 0949             Time Calculation    Start Time 0900  -WK      Stop Time 0939  -WK      Time Calculation (min) 39 min  -WK      PT Received On 02/13/23  -WK         Time Calculation- PT    Total Timed Code Minutes- PT 39 minute(s)  -WK            User Key  (r) = Recorded By, (t) = Taken By, (c) = Cosigned By    Initials Name Provider Type    WK Nai Kendall PTA Physical Therapist Assistant              Therapy Charges for Today     Code Description Service Date Service Provider  Modifiers Qty    67791064353 HC PT THERAPEUTIC ACT EA 15 MIN 2/12/2023 Nai Kendall, PTA GP 1    70961079851 HC PT THERAPEUTIC ACT EA 15 MIN 2/12/2023 Nai Kendall, PTA GP 1    59822295193 HC GAIT TRAINING EA 15 MIN 2/13/2023 Nai Kendall, PTA GP 3          PT G-Codes  Outcome Measure Options: AM-PAC 6 Clicks Basic Mobility (PT)  AM-PAC 6 Clicks Score (PT): 15  AM-PAC 6 Clicks Score (OT): 13    Nai Kendall PTA  2/13/2023

## 2023-02-13 NOTE — THERAPY TREATMENT NOTE
Patient Name: Jose Alfredo Kirkpatrick  : 1935    MRN: [id number]                              Today's Date: 2023       Admit Date: 2023    Visit Dx:     ICD-10-CM ICD-9-CM   1. Pneumonia of left lower lobe due to infectious organism  J18.9 486   2. Elevated troponin  R77.8 790.6   3. Diverticulosis  K57.90 562.10   4. Infrarenal abdominal aortic aneurysm (AAA) without rupture  I71.43 441.4   5. Urinary tract infection without hematuria, site unspecified  N39.0 599.0   6. Hiatal hernia  K44.9 553.3   7. Biliary calculus of other site without obstruction  K80.80 574.20   8. Bloody stools  K92.1 578.1   9. Dysphagia  R13.10 787.20   10. Decreased activities of daily living (ADL)  Z78.9 V49.89   11. Impaired mobility  Z74.09 799.89     Patient Active Problem List   Diagnosis   • Spinal stenosis, lumbar   • Hx of colonic polyps   • Drug-induced constipation   • Primary hypertension   • Acute pulmonary embolism with acute cor pulmonale (HCC)   • Acute bilateral deep vein thrombosis (DVT) of femoral veins (HCC)   • History of DVT of left lower extremity   • Positive fecal occult blood test   • Melena   • Vitamin B12 deficiency   • FARZANA (iron deficiency anemia)   • Cholelithiasis   • Gastric ulcer with hemorrhage   • Chronic bilateral low back pain without sciatica   • Chronic pain of both shoulders   • Kyphosis   • Overweight (BMI 25.0-29.9)   • Non-smoker   • Pneumonia of left lower lobe due to infectious organism   • Erosive esophagitis   • Large hiatal hernia   • Weight loss   • Gram-negative UTI, acute cystitis without hematuria, present on admission   • Paraesophageal hiatal hernia   • Severe malnutrition (HCC)     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     Past Surgical  History:   Procedure Laterality Date   • BACK SURGERY      cervical   • CATARACT EXTRACTION Bilateral    • COLONOSCOPY  02/11/2013    Hyperplastic polyp at 25 cm, Diverticulosis repeat exam in 5 years   • COLONOSCOPY N/A 10/16/2018    Tubular adenoma ascending colon repet prn   • ENDOSCOPY  01/15/2014    HH   • ENDOSCOPY N/A 10/03/2019    Large HH, non-bleeding gastric ulcer   • ENDOSCOPY N/A 11/25/2019    Procedure: ESOPHAGOGASTRODUODENOSCOPY WITH ANESTHESIA;  Surgeon: Marques Pérez MD;  Location: [facility] ENDOSCOPY;  Service: Gastroenterology   • ENDOSCOPY N/A 2/9/2023    Procedure: ESOPHAGOGASTRODUODENOSCOPY WITH ANESTHESIA;  Surgeon: Sushant Edmond MD;  Location: [facility] ENDOSCOPY;  Service: Gastroenterology;  Laterality: N/A;  pre GI bleed  post hiatal hernia; esophagitis  Otilio Ramos MD   • HERNIA REPAIR Right     INGUINAL    • HIP FRACTURE SURGERY Right 02/04/2023   • REPLACEMENT TOTAL KNEE BILATERAL     • THORACIC LAMINECTOMY DECOMPRESSIVE POSTERIOR N/A 12/14/2016    Procedure: LAMINECTOMY DECOMPRESSION, UNINSTRUMENTED POSTERIOR SPINAL FUSION, T 11-12;  Surgeon: LUZ Adan MD;  Location: [facility] OR;  Service:    • VENA CAVA FILTER INSERTION N/A 10/04/2019    Procedure: VENA CAVA FILTER INSERTION;  Surgeon: Dallin Coronado MD;  Location: [facility] HYBRID OR 12;  Service: Vascular      General Information     Row Name 02/13/23 1330          OT Time and Intention    Document Type therapy note (daily note)  -     Mode of Treatment occupational therapy  -     Row Name 02/13/23 0822          General Information    Patient Profile Reviewed yes  -     Existing Precautions/Restrictions fall;right;hip, posterior  -     Row Name 02/13/23 9476          Safety Issues, Functional Mobility    Safety Issues Affecting Function (Mobility) friction/shear risk  -     Impairments Affecting Function (Mobility) endurance/activity tolerance;shortness of breath;strength;pain  -           User Key  (r)  = Recorded By, (t) = Taken By, (c) = Cosigned By    Initials Name Provider Type     Thuy Power OTR/L Occupational Therapist                 Mobility/ADL's     Row Name 02/13/23 1330          Bed Mobility    Bed Mobility sit-supine  -     Sit-Supine [place] (Bed Mobility) minimum assist (75% patient effort)  -     Assistive Device (Bed Mobility) bed rails  -     Row Name 02/13/23 1330          Transfers    Transfers sit-stand transfer;stand-sit transfer  -     Row Name 02/13/23 1330          Sit-Stand Transfer    Sit-Stand [place] (Transfers) minimum assist (75% patient effort);moderate assist (50% patient effort)  -     Assistive Device (Sit-Stand Transfers) walker, front-wheeled  -     Row Name 02/13/23 1330          Stand-Sit Transfer    Stand-Sit [place] (Transfers) verbal cues;minimum assist (75% patient effort);moderate assist (50% patient effort)  -     Assistive Device (Stand-Sit Transfers) walker, front-wheeled  -     Row Name 02/13/23 1330          Functional Mobility    Functional Mobility- Ind. Level minimum assist (75% patient effort);moderate assist (50% patient effort)  -     Functional Mobility- Device walker, front-wheeled  -     Functional Mobility- Safety Issues step length decreased;weight-shifting ability decreased  -     Functional Mobility- Comment pt. able to ambulate chair to bed approx 6 steps  -           User Key  (r) = Recorded By, (t) = Taken By, (c) = Cosigned By    Initials Name Provider Type     Thuy Power OTR/L Occupational Therapist               Obj/Interventions     Row Name 02/13/23 1330          Balance    Balance Interventions sitting;standing;sit to stand;supported;static;dynamic;occupation based/functional task  -           User Key  (r) = Recorded By, (t) = Taken By, (c) = Cosigned By    Initials Name Provider Type    Thuy Rey OTR/L Occupational Therapist               Goals/Plan    No documentation.                 Clinical Impression     Row Name 02/13/23 1330          Pain Assessment    Additional Documentation Pain Scale: FACES Pre/Post-Treatment (Group)  -     Row Name 02/13/23 1330          Pain Scale: FACES Pre/Post-Treatment    Pain: FACES Scale, Pretreatment 4-->hurts little more  -     Posttreatment Pain Rating 8-->hurts whole lot  -     Pain Location - Side/Orientation Right  -     Pain Location - hip;back  -     Row Name 02/13/23 1330          Plan of Care Review    Plan of Care Reviewed With patient  -     Progress no change  -     Outcome Evaluation Pt. seated in chair requesting to return to bed.  Mr Kirkpatrick was able to stand Min A/Mod A with cues for THPs during ascent to stand.  Phys assist required to step and weight shift safely and promote calm.  After sitting on bed pt. required Min/Mod A to return to supine and OTR applied ABD pillow.  Re-edu'd in post hip precautions.  Cont OT tx  -     Row Name 02/13/23 1330          Therapy Assessment/Plan (OT)    Rehab Potential (OT) good, to achieve stated therapy goals  -     Criteria for Skilled Therapeutic Interventions Met (OT) yes;meets criteria;skilled treatment is necessary  -     Therapy Frequency (OT) 5 times/wk  -     Row Name 02/13/23 1330          Therapy Plan Review/Discharge Plan (OT)    Anticipated Discharge Disposition (OT) skilled nursing facility  -[facility] Name 02/13/23 1330          Positioning and Restraints    Pre-Treatment Position sitting in chair/recliner  -     Post Treatment Position bed  -     In Bed fowlers;call light within reach;encouraged to call for assist;side rails up x2;ABD pillow  -           User Key  (r) = Recorded By, (t) = Taken By, (c) = Cosigned By    Initials Name Provider Type     Thuy Power, OTR/L Occupational Therapist               Outcome Measures     Row Name 02/13/23 1330          How much help from another is currently needed...    Putting on and taking off regular lower body  clothing? 2  -CH     Bathing (including washing, rinsing, and drying) 2  -CH     Toileting (which includes using toilet bed pan or urinal) 2  -CH     Putting on and taking off regular upper body clothing 2  -CH     Taking care of personal grooming (such as brushing teeth) 3  -CH     Eating meals 4  -CH     AM-PAC 6 Clicks Score (OT) 15  -CH     Row Name 02/13/23 0900 02/13/23 0800       How much help from another person do you currently need...    Turning from your back to your side while in flat bed without using bedrails? 3  -WK 2  -AG    Moving from lying on back to sitting on the side of a flat bed without bedrails? 3  -WK 2  -AG    Moving to and from a bed to a chair (including a wheelchair)? 3  -WK 2  -AG    Standing up from a chair using your arms (e.g., wheelchair, bedside chair)? 2  -WK 2  -AG    Climbing 3-5 steps with a railing? 1  -WK 1  -AG    To walk in hospital room? 3  -WK 1  -AG    AM-PAC 6 Clicks Score (PT) 15  -WK 10  -AG    Highest level of mobility 4 --> Transferred to chair/commode  -WK 4 --> Transferred to chair/commode  -AG    Row Name 02/13/23 1330 02/13/23 0900       Functional Assessment    Outcome Measure Options AM-PAC 6 Clicks Daily Activity (OT)  - AM-PAC 6 Clicks Basic Mobility (PT)  -WK          User Key  (r) = Recorded By, (t) = Taken By, (c) = Cosigned By    Initials Name Provider Type     Thuy Power, OTR/L Occupational Therapist    WK Nai Kendall PTA Physical Therapist Assistant    AG Nirali Stratton, RN Registered Nurse                Occupational Therapy Education     Title: PT OT SLP Therapies (In Progress)     Topic: Occupational Therapy (Done)     Point: ADL training (Done)     Description:   Instruct learner(s) on proper safety adaptation and remediation techniques during self care or transfers.   Instruct in proper use of assistive devices.              Learning Progress Summary           Patient Acceptance, E,D, VU,NR by  at 2/13/2023 3686    Acceptance, E,D,  VU by  at 2/12/2023 1016    Comment: Pt educated on hand/foot placement during transfer for safety.    Acceptance, E, VU by  at 2/11/2023 1130    Comment: Pt educated on AAROM activities with good carryover.    Acceptance, E, VU,NR by  at 2/10/2023 1323    Comment: bed mobility techniques, pursed lip breathing, hip precautions, perineal hygiene                   Point: Precautions (Done)     Description:   Instruct learner(s) on prescribed precautions during self-care and functional transfers.              Learning Progress Summary           Patient Acceptance, E,D, VU,NR by  at 2/13/2023 1419    Acceptance, E, VU,NR by  at 2/10/2023 1323    Comment: bed mobility techniques, pursed lip breathing, hip precautions, perineal hygiene                   Point: Body mechanics (Done)     Description:   Instruct learner(s) on proper positioning and spine alignment during self-care, functional mobility activities and/or exercises.              Learning Progress Summary           Patient Acceptance, E,D, VU,NR by  at 2/13/2023 1419    Acceptance, E, VU,NR by  at 2/10/2023 1323    Comment: bed mobility techniques, pursed lip breathing, hip precautions, perineal hygiene                               User Key     Initials Effective Dates Name Provider Type Discipline     06/16/21 -  Thuy Power, OTR/L Occupational Therapist OT     09/22/22 -  Nadira Aparicio COTA Occupational Therapist Assistant THERAPIES     12/12/22 -  Radha Delgado OT Student OT Student OT              OT Recommendation and Plan  Therapy Frequency (OT): 5 times/wk  Plan of Care Review  Plan of Care Reviewed With: patient  Progress: no change  Outcome Evaluation: Pt. seated in chair requesting to return to bed.  Mr Kirkpatrick was able to stand Min A/Mod A with cues for THPs during ascent to stand.  Phys assist required to step and weight shift safely and promote calm.  After sitting on bed pt. required Min/Mod A to return to supine and OTR  applied ABD pillow.  Re-edu'd in post hip precautions.  Cont OT tx     Time Calculation:    Time Calculation- OT     Row Name 02/13/23 1330             Time Calculation- OT    OT Start Time 1330  -CH      OT Stop Time 1408  -      OT Time Calculation (min) 38 min  -CH      Total Timed Code Minutes- OT 38 minute(s)  -CH      OT Received On 02/13/23  -         Timed Charges    48438 - OT Self Care/Mgmt Minutes 38  -CH         Total Minutes    Timed Charges Total Minutes 38  -CH       Total Minutes 38  -CH            User Key  (r) = Recorded By, (t) = Taken By, (c) = Cosigned By    Initials Name Provider Type     Thuy Power, OTR/L Occupational Therapist              Therapy Charges for Today     Code Description Service Date Service Provider Modifiers Qty    [id number] HC OT SELF CARE/MGMT/TRAIN EA 15 MIN 2/13/2023 Thuy Power OTR/L GO 3               Thuy Power OTR/L  2/13/2023

## 2023-02-13 NOTE — CASE MANAGEMENT/SOCIAL WORK
Continued Stay Note   Mohit     Patient Name: Jose Alfredo Kirkpatrick  MRN: [id number]  Today's Date: 2/13/2023    Admit Date: 2/8/2023    Plan: [facility] - Pending Precert   Discharge Plan     Row Name 02/13/23 0825       Plan    Plan [facility] - Pending Precert    Plan Comments Notified Daniella with [facility] that precert can start for return. Will follow for insurance approval.                Expected Discharge Date and Time     Expected Discharge Date Expected Discharge Time    Feb 11, 2023             JAKOB Sal

## 2023-02-14 VITALS
HEART RATE: 79 BPM | WEIGHT: 183.8 LBS | SYSTOLIC BLOOD PRESSURE: 120 MMHG | OXYGEN SATURATION: 95 % | BODY MASS INDEX: 22.85 KG/M2 | RESPIRATION RATE: 16 BRPM | TEMPERATURE: 98.4 F | HEIGHT: 75 IN | DIASTOLIC BLOOD PRESSURE: 61 MMHG

## 2023-02-14 PROBLEM — B96.20 E. COLI UTI (URINARY TRACT INFECTION): Status: ACTIVE | Noted: 2023-02-14

## 2023-02-14 PROBLEM — N39.0 E. COLI UTI (URINARY TRACT INFECTION): Status: ACTIVE | Noted: 2023-02-14

## 2023-02-14 LAB
ANION GAP SERPL CALCULATED.3IONS-SCNC: 9 MMOL/L (ref 5–15)
BASOPHILS # BLD AUTO: 0.06 10*3/MM3 (ref 0–0.2)
BASOPHILS NFR BLD AUTO: 0.7 % (ref 0–1.5)
BUN SERPL-MCNC: 16 MG/DL (ref 8–23)
BUN/CREAT SERPL: 17 (ref 7–25)
CALCIUM SPEC-SCNC: 8.3 MG/DL (ref 8.6–10.5)
CHLORIDE SERPL-SCNC: 103 MMOL/L (ref 98–107)
CO2 SERPL-SCNC: 26 MMOL/L (ref 22–29)
CREAT SERPL-MCNC: 0.94 MG/DL (ref 0.76–1.27)
DEPRECATED RDW RBC AUTO: 50.4 FL (ref 37–54)
EGFRCR SERPLBLD CKD-EPI 2021: 78.9 ML/MIN/1.73
EOSINOPHIL # BLD AUTO: 0.28 10*3/MM3 (ref 0–0.4)
EOSINOPHIL NFR BLD AUTO: 3.3 % (ref 0.3–6.2)
ERYTHROCYTE [DISTWIDTH] IN BLOOD BY AUTOMATED COUNT: 14.6 % (ref 12.3–15.4)
GLUCOSE SERPL-MCNC: 109 MG/DL (ref 65–99)
HCT VFR BLD AUTO: 31.4 % (ref 37.5–51)
HGB BLD-MCNC: 9.7 G/DL (ref 13–17.7)
IMM GRANULOCYTES # BLD AUTO: 0.06 10*3/MM3 (ref 0–0.05)
IMM GRANULOCYTES NFR BLD AUTO: 0.7 % (ref 0–0.5)
LYMPHOCYTES # BLD AUTO: 1.32 10*3/MM3 (ref 0.7–3.1)
LYMPHOCYTES NFR BLD AUTO: 15.7 % (ref 19.6–45.3)
MCH RBC QN AUTO: 29.2 PG (ref 26.6–33)
MCHC RBC AUTO-ENTMCNC: 30.9 G/DL (ref 31.5–35.7)
MCV RBC AUTO: 94.6 FL (ref 79–97)
MONOCYTES # BLD AUTO: 0.51 10*3/MM3 (ref 0.1–0.9)
MONOCYTES NFR BLD AUTO: 6.1 % (ref 5–12)
NEUTROPHILS NFR BLD AUTO: 6.17 10*3/MM3 (ref 1.7–7)
NEUTROPHILS NFR BLD AUTO: 73.5 % (ref 42.7–76)
NRBC BLD AUTO-RTO: 0 /100 WBC (ref 0–0.2)
PLATELET # BLD AUTO: 680 10*3/MM3 (ref 140–450)
PMV BLD AUTO: 8.3 FL (ref 6–12)
POTASSIUM SERPL-SCNC: 3.7 MMOL/L (ref 3.5–5.2)
RBC # BLD AUTO: 3.32 10*6/MM3 (ref 4.14–5.8)
SARS-COV-2 AG RESP QL IA.RAPID: NORMAL
SODIUM SERPL-SCNC: 138 MMOL/L (ref 136–145)
WBC NRBC COR # BLD: 8.4 10*3/MM3 (ref 3.4–10.8)

## 2023-02-14 PROCEDURE — 87426 SARSCOV CORONAVIRUS AG IA: CPT | Performed by: FAMILY MEDICINE

## 2023-02-14 PROCEDURE — 97116 GAIT TRAINING THERAPY: CPT

## 2023-02-14 PROCEDURE — 97535 SELF CARE MNGMENT TRAINING: CPT | Performed by: OCCUPATIONAL THERAPIST

## 2023-02-14 PROCEDURE — 94664 DEMO&/EVAL PT USE INHALER: CPT

## 2023-02-14 PROCEDURE — 94799 UNLISTED PULMONARY SVC/PX: CPT

## 2023-02-14 PROCEDURE — 97110 THERAPEUTIC EXERCISES: CPT

## 2023-02-14 PROCEDURE — 25010000002 ENOXAPARIN PER 10 MG: Performed by: NURSE PRACTITIONER

## 2023-02-14 PROCEDURE — 80048 BASIC METABOLIC PNL TOTAL CA: CPT | Performed by: INTERNAL MEDICINE

## 2023-02-14 PROCEDURE — 94760 N-INVAS EAR/PLS OXIMETRY 1: CPT

## 2023-02-14 PROCEDURE — 85025 COMPLETE CBC W/AUTO DIFF WBC: CPT | Performed by: INTERNAL MEDICINE

## 2023-02-14 PROCEDURE — 97110 THERAPEUTIC EXERCISES: CPT | Performed by: OCCUPATIONAL THERAPIST

## 2023-02-14 RX ORDER — LIDOCAINE 50 MG/G
1 PATCH TOPICAL
Start: 2023-02-15

## 2023-02-14 RX ORDER — GABAPENTIN 300 MG/1
300 CAPSULE ORAL 4 TIMES DAILY
Qty: 12 CAPSULE | Refills: 0 | Status: SHIPPED | OUTPATIENT
Start: 2023-02-14

## 2023-02-14 RX ORDER — SUCRALFATE ORAL 1 G/10ML
1 SUSPENSION ORAL
Start: 2023-02-14

## 2023-02-14 RX ORDER — OXYCODONE HYDROCHLORIDE AND ACETAMINOPHEN 5; 325 MG/1; MG/1
1 TABLET ORAL EVERY 12 HOURS PRN
Qty: 6 TABLET | Refills: 0 | Status: SHIPPED | OUTPATIENT
Start: 2023-02-14

## 2023-02-14 RX ORDER — AMOXICILLIN AND CLAVULANATE POTASSIUM 875; 125 MG/1; MG/1
1 TABLET, FILM COATED ORAL EVERY 12 HOURS SCHEDULED
Qty: 3 TABLET | Refills: 0
Start: 2023-02-14 | End: 2023-02-16

## 2023-02-14 RX ORDER — PANTOPRAZOLE SODIUM 40 MG/1
40 TABLET, DELAYED RELEASE ORAL
Start: 2023-02-14

## 2023-02-14 RX ADMIN — IPRATROPIUM BROMIDE AND ALBUTEROL SULFATE 3 ML: .5; 2.5 SOLUTION RESPIRATORY (INHALATION) at 15:00

## 2023-02-14 RX ADMIN — PANTOPRAZOLE SODIUM 40 MG: 40 TABLET, DELAYED RELEASE ORAL at 07:45

## 2023-02-14 RX ADMIN — ENOXAPARIN SODIUM 40 MG: 100 INJECTION SUBCUTANEOUS at 17:12

## 2023-02-14 RX ADMIN — PANTOPRAZOLE SODIUM 40 MG: 40 TABLET, DELAYED RELEASE ORAL at 16:45

## 2023-02-14 RX ADMIN — LIDOCAINE 1 PATCH: 700 PATCH TOPICAL at 08:02

## 2023-02-14 RX ADMIN — AMOXICILLIN AND CLAVULANATE POTASSIUM 1 TABLET: 875; 125 TABLET, FILM COATED ORAL at 08:01

## 2023-02-14 RX ADMIN — GABAPENTIN 300 MG: 300 CAPSULE ORAL at 17:12

## 2023-02-14 RX ADMIN — SUCRALFATE 1 G: 1 SUSPENSION ORAL at 07:45

## 2023-02-14 RX ADMIN — NIFEDIPINE 60 MG: 60 TABLET, FILM COATED, EXTENDED RELEASE ORAL at 08:02

## 2023-02-14 RX ADMIN — IPRATROPIUM BROMIDE AND ALBUTEROL SULFATE 3 ML: .5; 2.5 SOLUTION RESPIRATORY (INHALATION) at 06:45

## 2023-02-14 RX ADMIN — LISINOPRIL 10 MG: 10 TABLET ORAL at 08:01

## 2023-02-14 RX ADMIN — GABAPENTIN 300 MG: 300 CAPSULE ORAL at 07:45

## 2023-02-14 RX ADMIN — NYSTATIN 500000 UNITS: 100000 SUSPENSION ORAL at 16:43

## 2023-02-14 RX ADMIN — NYSTATIN 500000 UNITS: 100000 SUSPENSION ORAL at 07:45

## 2023-02-14 RX ADMIN — IPRATROPIUM BROMIDE AND ALBUTEROL SULFATE 3 ML: .5; 2.5 SOLUTION RESPIRATORY (INHALATION) at 11:31

## 2023-02-14 RX ADMIN — GABAPENTIN 300 MG: 300 CAPSULE ORAL at 12:42

## 2023-02-14 RX ADMIN — SUCRALFATE 1 G: 1 SUSPENSION ORAL at 16:43

## 2023-02-14 RX ADMIN — BUSPIRONE HYDROCHLORIDE 5 MG: 5 TABLET ORAL at 08:02

## 2023-02-14 RX ADMIN — SUCRALFATE 1 G: 1 SUSPENSION ORAL at 11:38

## 2023-02-14 RX ADMIN — NYSTATIN 500000 UNITS: 100000 SUSPENSION ORAL at 11:38

## 2023-02-14 NOTE — THERAPY TREATMENT NOTE
Acute Care - Physical Therapy Treatment Note  [facility]     Patient Name: Jose Alfredo Kirkpatrick  : 1935  MRN: [id number]  Today's Date: 2023   Onset of Illness/Injury or Date of Surgery: 23  Visit Dx:     ICD-10-CM ICD-9-CM   1. Pneumonia of left lower lobe due to infectious organism  J18.9 486   2. Elevated troponin  R77.8 790.6   3. Diverticulosis  K57.90 562.10   4. Infrarenal abdominal aortic aneurysm (AAA) without rupture  I71.43 441.4   5. Urinary tract infection without hematuria, site unspecified  N39.0 599.0   6. Hiatal hernia  K44.9 553.3   7. Biliary calculus of other site without obstruction  K80.80 574.20   8. Bloody stools  K92.1 578.1   9. Dysphagia  R13.10 787.20   10. Decreased activities of daily living (ADL)  Z78.9 V49.89   11. Impaired mobility  Z74.09 799.89     Patient Active Problem List   Diagnosis   • Spinal stenosis, lumbar   • Hx of colonic polyps   • Drug-induced constipation   • Primary hypertension   • Acute pulmonary embolism with acute cor pulmonale (HCC)   • Acute bilateral deep vein thrombosis (DVT) of femoral veins (HCC)   • History of DVT of left lower extremity   • Positive fecal occult blood test   • Melena   • Vitamin B12 deficiency   • FARZANA (iron deficiency anemia)   • Cholelithiasis   • Gastric ulcer with hemorrhage   • Chronic bilateral low back pain without sciatica   • Chronic pain of both shoulders   • Kyphosis   • Overweight (BMI 25.0-29.9)   • Non-smoker   • Pneumonia of left lower lobe due to infectious organism   • Erosive esophagitis   • Large hiatal hernia   • Weight loss   • Gram-negative UTI, acute cystitis without hematuria, present on admission   • Paraesophageal hiatal hernia   • Severe malnutrition (HCC)     Past Medical History:   Diagnosis Date   • Arthritis    • Cataract     BILATERAL   • GERD (gastroesophageal reflux disease)    • History of transfusion    • Hx of colonic polyps    • Hypertension    • Reflux esophagitis    • Streptococcosis      IN SPINE FROM BACK INJECTIONS     Past Surgical History:   Procedure Laterality Date   • BACK SURGERY      cervical   • CATARACT EXTRACTION Bilateral    • COLONOSCOPY  02/11/2013    Hyperplastic polyp at 25 cm, Diverticulosis repeat exam in 5 years   • COLONOSCOPY N/A 10/16/2018    Tubular adenoma ascending colon repet prn   • ENDOSCOPY  01/15/2014    HH   • ENDOSCOPY N/A 10/03/2019    Large HH, non-bleeding gastric ulcer   • ENDOSCOPY N/A 11/25/2019    Procedure: ESOPHAGOGASTRODUODENOSCOPY WITH ANESTHESIA;  Surgeon: Marques Pérez MD;  Location: [facility] ENDOSCOPY;  Service: Gastroenterology   • ENDOSCOPY N/A 2/9/2023    Procedure: ESOPHAGOGASTRODUODENOSCOPY WITH ANESTHESIA;  Surgeon: Sushant Edmond MD;  Location: [facility] ENDOSCOPY;  Service: Gastroenterology;  Laterality: N/A;  pre GI bleed  post hiatal hernia; esophagitis  Otilio Ramos MD   • HERNIA REPAIR Right     INGUINAL    • HIP FRACTURE SURGERY Right 02/04/2023   • REPLACEMENT TOTAL KNEE BILATERAL     • THORACIC LAMINECTOMY DECOMPRESSIVE POSTERIOR N/A 12/14/2016    Procedure: LAMINECTOMY DECOMPRESSION, UNINSTRUMENTED POSTERIOR SPINAL FUSION, T 11-12;  Surgeon: LUZ Adan MD;  Location: [facility] OR;  Service:    • VENA CAVA FILTER INSERTION N/A 10/04/2019    Procedure: VENA CAVA FILTER INSERTION;  Surgeon: Dallin Coronado MD;  Location: [facility] HYBRID OR 12;  Service: Vascular     PT Assessment (last 12 hours)     PT Evaluation and Treatment     Row Name 02/14/23 1017          Physical Therapy Time and Intention    Subjective Information complains of;fatigue;weakness;pain  -LY     Document Type therapy note (daily note)  -LY     Mode of Treatment physical therapy  -LY     Row Name 02/14/23 1017          General Information    Existing Precautions/Restrictions fall;right;hip, posterior  WBAT  -LY     Row Name 02/14/23 1017          Pain    Pretreatment Pain Rating 0/10 - no pain  -LY     Posttreatment Pain Rating 4/10  -LY     Pain  Location - Side/Orientation Right  -LY     Pain Location - hip  -LY     Pain Intervention(s) Medication (See MAR);Ambulation/increased activity  -LY     Row Name 02/14/23 1017          Bed Mobility    Supine-Sit Arroyo (Bed Mobility) verbal cues;contact guard  -LY     Sit-Supine [place]royo (Bed Mobility) verbal cues;minimum assist (75% patient effort)  -LY     Bed Mobility, Safety Issues decreased use of legs for bridging/pushing  -LY     Assistive Device (Bed Mobility) bed rails  -LY     Row Name 02/14/23 1017          Transfers    Transfers toilet transfer  -LY     Row Name 02/14/23 1017          Sit-Stand Transfer    Sit-Stand Arroyo (Transfers) verbal cues;minimum assist (75% patient effort)  -LY     Assistive Device (Sit-Stand Transfers) walker, front-wheeled  -LY     Row Name 02/14/23 1017          Stand-Sit Transfer    Stand-Sit Arroyo (Transfers) verbal cues;minimum assist (75% patient effort)  -LY     Assistive Device (Stand-Sit Transfers) walker, front-wheeled  -LY     Row Name 02/14/23 1017          Toilet Transfer    Type (Toilet Transfer) sit-stand;stand-sit  -LY     Arroyo Level (Toilet Transfer) verbal cues;minimum assist (75% patient effort)  -LY     Assistive Device (Toilet Transfer) commode;grab bars/safety frame;walker, front-wheeled  -LY     Row Name 02/14/23 1017          Gait/Stairs (Locomotion)    Ar[place]o Level (Gait) verbal cues;contact guard  -LY     Assistive Device (Gait) walker, front-wheeled  -LY     Distance in Feet (Gait) 20'  -LY     Deviations/Abnormal Patterns (Gait) antalgic;gait speed decreased;stride length decreased  -LY     Bilateral Gait Deviations forward flexed posture  -LY     Left Sided Gait Deviations decreased knee extension  -LY     Right Sided Gait Deviations decreased knee extension  -LY     Comment, (Gait/Stairs) Cues for sequencing and posture, able to correct with cues.  -LY     Row Name 02/14/23 1017          Motor Skills     Comments, Therapeutic Exercise BLE AROM x20 reps seated  -LY     Row Name             Wound Right lateral greater trochanter Incision    Wound - Properties Group Present on Hospital Admission: Y  -AG Side: Right  -AG Orientation: lateral  -AG Location: greater trochanter  -AG Primary Wound Type: Incision  -AG    Retired Wound - Properties Group Present on Hospital Admission: Y  -AG Side: Right  -AG Orientation: lateral  -AG Location: greater trochanter  -AG Primary Wound Type: Incision  -AG    Retired Wound - Properties Group Present on Hospital Admission: Y  -AG Side: Right  -AG Location: greater trochanter  -AG Primary Wound Type: Incision  -AG    Row Name 02/14/23 1017          Plan of Care Review    Plan of Care Reviewed With patient;caregiver  -LY     Progress improving  -LY     Outcome Evaluation PT tx completed. Pt requires CGA for supine to sit with increased time and effort to complete. Performed BLE AROM x20 reps seated. Able to stand with RWX and min x1. Amb 20' with RWX and CGA. Cues for posture and sequencing, able to correct. After toilet t/f pt amb back to bed with RWX and CGA. Required hygiene care post toileting. Returned to bed with Min x1. Will cont to follow.  -LY     Row Name 02/14/23 1017          Positioning and Restraints    Pre-Treatment Position in bed  -LY     Post Treatment Position bed  -LY     In Bed fowlers;call light within reach;encouraged to call for assist;with family/caregiver  -LY           User Key  (r) = Recorded By, (t) = Taken By, (c) = Cosigned By    Initials Name Provider Type    AG Nirali Stratton, RN Registered Nurse    Iraida Schaefer PTA Physical Therapist Assistant                Physical Therapy Education     Title: PT OT SLP Therapies (In Progress)     Topic: Physical Therapy (In Progress)     Point: Mobility training (Done)     Learning Progress Summary           Patient Acceptance, E, VU by AA at 2/10/2023 7248    Comment: Pt was educated on POC, posterior hip  precautions, and benefits of activity.                   Point: Home exercise program (Not Started)     Learner Progress:  Not documented in this visit.          Point: Body mechanics (Not Started)     Learner Progress:  Not documented in this visit.          Point: Precautions (Done)     Learning Progress Summary           Patient John, TIMMY VU by YRIS at 2/10/2023 125    Comment: Pt was educated on POC, posterior hip precautions, and benefits of activity.                               User Key     Initials Effective Dates Name Provider Type Discipline    YRIS 01/03/23 -  Mariann Pena, PT Student PT Student PT              PT Recommendation and Plan     Plan of Care Reviewed With: patient, caregiver  Progress: improving  Outcome Evaluation: PT tx completed. Pt requires CGA for supine to sit with increased time and effort to complete. Performed BLE AROM x20 reps seated. Able to stand with RWX and min x1. Amb 20' with RWX and CGA. Cues for posture and sequencing, able to correct. After toilet t/f pt amb back to bed with RWX and CGA. Required hygiene care post toileting. Returned to bed with Min x1. Will cont to follow.   Outcome Measures     Row Name 02/14/23 1017 02/13/23 0900 02/12/23 1256       How much help from another person do you currently need...    Turning from your back to your side while in flat bed without using bedrails? 3  -LY 3  -WK 2  -WK    Moving from lying on back to sitting on the side of a flat bed without bedrails? 3  -LY 3  -WK 2  -WK    Moving to and from a bed to a chair (including a wheelchair)? 3  -LY 3  -WK 2  -WK    Standing up from a chair using your arms (e.g., wheelchair, bedside chair)? 3  -LY 2  -WK 2  -WK    Climbing 3-5 steps with a railing? 1  -LY 1  -WK 1  -WK    To walk in hospital room? 3  -LY 3  -WK 1  -WK    AM-PAC 6 Clicks Score (PT) 16  -LY 15  -WK 10  -WK       Functional Assessment    Outcome Measure Options AM-PAC 6 Clicks Basic Mobility (PT)  -LY AM-PAC 6 Clicks  Basic Mobility (PT)  -WK AM-PAC 6 Clicks Basic Mobility (PT)  -WK    Row Name 02/12/23 1000             How much help from another is currently needed...    Putting on and taking off regular lower body clothing? 2  -LS      Bathing (including washing, rinsing, and drying) 2  -LS      Toileting (which includes using toilet bed pan or urinal) 1  -LS      Putting on and taking off regular upper body clothing 2  -LS      Taking care of personal grooming (such as brushing teeth) 3  -LS      Eating meals 3  -LS      AM-PAC 6 Clicks Score (OT) 13  -LS         Functional Assessment    Outcome Measure Options AM-PAC 6 Clicks Daily Activity (OT)  -LS            User Key  (r) = Recorded By, (t) = Taken By, (c) = Cosigned By    Initials Name Provider Type    WK Nai Kendall PTA Physical Therapist Assistant    Iraida Schaefer PTA Physical Therapist Assistant    Nadira Shukla COTA Occupational Therapist Assistant                 Time Calculation:    PT Charges     Row Name 02/14/23 1115             Time Calculation    Start Time 1017  -LY      Stop Time 1100  -LY      Time Calculation (min) 43 min  -LY      PT Received On 02/14/23  -LY         Time Calculation- PT    Total Timed Code Minutes- PT 43 minute(s)  -LY         Timed Charges    32848 - PT Therapeutic Exercise Minutes 18  -LY      50247 - Gait Training Minutes  25  -LY         Total Minutes    Timed Charges Total Minutes 43  -LY       Total Minutes 43  -LY            User Key  (r) = Recorded By, (t) = Taken By, (c) = Cosigned By    Initials Name Provider Type    Iraida Schaefer PTA Physical Therapist Assistant              Therapy Charges for Today     Code Description Service Date Service Provider Modifiers Qty    56937125192 HC PT THER PROC EA 15 MIN 2/14/2023 Iraida Bedoya PTA GP 1    09891227680 HC GAIT TRAINING EA 15 MIN 2/14/2023 Iraida Bedoya, PTA GP 2          PT G-Codes  Outcome Measure Options: AM-PAC 6 Clicks Basic Mobility (PT)  AM-PAC 6  Clicks Score (PT): 16  AM-PAC 6 Clicks Score (OT): 15    Iraida Bedoya, PTA  2/14/2023

## 2023-02-14 NOTE — CASE MANAGEMENT/SOCIAL WORK
Continued Stay Note  [facility]     Patient Name: Jose Alfredo Kirkpatrick  MRN: [id number]  Today's Date: 2/14/2023    Admit Date: 2/8/2023    Plan: [facility]   Discharge Plan     Row Name 02/14/23 1358       Plan    Plan [facility]    Final Discharge Disposition Code 03 - skilled nursing facility (SNF)    Final Note Insurance has approved admission to [facility].  notified Olena HARISH Craft and patient's daughter as well. Discharge summary fax to [phone]. RN report [phone].                       Expected Discharge Date and Time     Expected Discharge Date Expected Discharge Time    Feb 15, 2023             JAKOB Sal

## 2023-02-14 NOTE — PLAN OF CARE
Problem: Adult Inpatient Plan of Care  Goal: Plan of Care Review    Progress: improving  Plan of Care Reviewed With: patient  Outcome Evaluation: Pt educated to hip precautions. Pt performed sit to stand min-mod A with cues to come into full standing. Pt amb 10' min A RW with cues for gait sequence. Pt performed sitting rest and amb 5' and was able to perform correct gait pattern without cues.     
  Problem: Adult Inpatient Plan of Care  Goal: Plan of Care Review  Recent Flowsheet Documentation  Taken 2/11/2023 1055 by Nadira Aparicio COTA  Progress: no change  Plan of Care Reviewed With: patient  Outcome Evaluation: OT tx completed on this date. Pt A&O x4. Pt reports no significant pain. Pt fowlers in bed after being cleaned up from BM by staff. Pt reported that he has been having intermittently pain in R shoulder, however was not having any at this time. Pt tolerated AAROM and gentle progressive stretching to R shoulder with support provided to shoulder girdle and pt educated on self ROM exercises for BUES and BLEs at bed level to increase mobility and decrease pain for ADLs with good carryover. During ROM activities, pt reported that he was having another BM. Pt dependent for scooting up in bed and rolling side to side.  OT POC to continue.   Goal Outcome Evaluation:  Plan of Care Reviewed With: patient        Progress: no change  Outcome Evaluation: OT tx completed on this date. Pt A&O x4. Pt reports no significant pain. Pt fowlers in bed after being cleaned up from BM by staff. Pt reported that he has been having intermittently pain in R shoulder, however was not having any at this time. Pt tolerated AAROM and gentle progressive stretching to R shoulder with support provided to shoulder girdle and pt educated on self ROM exercises for BUES and BLEs at bed level to increase mobility and decrease pain for ADLs with good carryover. During ROM activities, pt reported that he was having another BM. Pt dependent for scooting up in bed and rolling side to side.  OT POC to continue.  
"Goal Outcome Evaluation:              Outcome Evaluation: VSS, on RA, HR S-SA/ST  with first degree block, frq PAC/PVC, pt up with PT and sat in chair today, then went down to xray and sat back in chair until mid afternoon, up x 2 assist, rt arm xray shoulder and elbow show arthritis, lidoderm patch now in use, prn pain med given x 1, pt c/o mouth burning, nystatin ss in use, did have liquid bm x2 today dark, worse when pt was getting up from bed and chair, pt moving rt arm some at suppertime and stated mouth feeling better \"after that stuff you gave me \"  "
"Goal Outcome Evaluation:              Outcome Evaluation: VSS, on RA, HR S/SA/ST  FPAC and first degree block, c/o rt shoulder stiff, difficult to lift, PT assisted with ROM,  pt did sit at side of bed with PT, only 2 loose bm this shift, stool yellowish/brown, continues to have difficulty with swallowing, on full liquid diet, no c/o nausea or abd pain, states pressure in chest/hroat when swallowing, c/o mouth sore, states head feels \"out of sorts\", sleep interrupted several times this afternoon, pt currently sleeping, rt heel red intact blanchable, coccyx red intact blanchable, MB cream in urine, ext catheter in place, IV abx continue, prn pain meds for c/o rt hip and rt arm pain,  "
Goal Outcome Evaluation:              Outcome Evaluation: NTN assessment completed. NPO at this time with GI and SLP consult. Per pt, did not receive SLP at [facility] prior to admit. Estimated 23-25lb loss x 18 months. Poor appetite but would like to know when/if soft foods or Boost could be started. NTN following per protocol.  
Goal Outcome Evaluation:              Outcome Evaluation: NTN follow up. Full liquid diet at this time. Sending Boost Plus with all meals; may have more if requested. Defer to MD/GI team to determine appropriateness of diet advancement. Wt today 180lb; UBW 215lb. Limited ability to meet EEN for POC NTN goals at this time given FLD restrictions. RD available for nutrition support recommendations (if appropriate) to help meet EEN and prevent further weight loss. NTN following per protocol.  
Goal Outcome Evaluation:              Outcome Evaluation: VSS, on RA, has had no bm today, external urinary cath in place, rt hip incision site AMMY, healing, abduction pillow in place per PT recommendations, pain med x 1 for c/o generalized pain, reports tonguge still feels thick and burns at times but better since using nystatin ss, now on po abx, up in chair several hours, walked approx 10 ft with therapy, HR S 78-90  
Goal Outcome Evaluation:  Plan of Care Reviewed With: (P) patient        Progress: (P) no change  Outcome Evaluation: (P) PT eval completed. Pt oriented x4 with no complaints of pain. Pt had fecal incontinent episode during questioning that required a new external catheter, bed sheets, chucks, and a brief. Pt had another fecal incontinent episode and urinated twice during changings before the new catheter and brief were applied. Nsg came in when cleaning pt and was notified of multiple incontinent episodes. Pt required mod A to roll to left and right. Pt complained of chest pain during and after rolling. Pt was unable to tolerate further activity due to incontinence and chest pain. He also got nauseous after scooting him up in bed post session and asked for an emesis bag, but he then stated it passed. Nsg notified that pt needs abductor pillow. Skilled PT is necessary to improve balance, strength, transfers, and gait. Recommend d/c to SNF.  
Goal Outcome Evaluation:  Plan of Care Reviewed With: other (see comments) (Nurse, Alba)           Outcome Evaluation: Received order per failed general swallow screening (pt did not drink without stopping), however pt later passed the pneumonia swallow screening. Per documentation pt's primary complaint is odynophagia and sensation of food sticking. Pt has been found to have severe esophagitis and significant hiatal hernia with most of his stomach extending into his chest. He has been started on a full liquid diet and has tolerated the small amounts of liquids he's had as reported by nursing staff. General surgery has been consulted to determine if he is a candidate for surgical correction of his hiatal hernia. SLP swallow eval not warranted as it appears his dysphagia is esophageal in nature rather than oropharyngeal. SLP will sign off, please consult if new s/s of oropharyngeal dysphagia are observed.  
Goal Outcome Evaluation:  Plan of Care Reviewed With: patient           Outcome Evaluation: Pt performed sit to stand mod A and transferred min A with RW. Pt required cues for safety and posture during standing and transfer.  
Goal Outcome Evaluation:  Plan of Care Reviewed With: patient        Progress: improving  Outcome Evaluation: OT tx complete. Pt A&Ox4 c/o burning tongue from acid reflux. Pt required encouragement to participate in therapy but demonstrated good effort, stating he wanted to get stronger to see his grandkids. Pt Ruby for supine>sit due to decreased core and LE strength. Pt required frequent vcs to correct L & posterior lean. Pt completed 10 reps of B shoulder flexion, abduction, and elbow flexion. Pt had to perform shoulder AROM one arm at a time to maintain balance. Pt sit<>stand once Ruby using fw walker, declined any more mobility due to fatigue. OT will continue to treat to increase activity tolerance and strength for ADLs and fxnal mobility. Recommend d/c to SNF.  
Goal Outcome Evaluation:  Plan of Care Reviewed With: patient        Progress: improving  Outcome Evaluation: OT tx completed on this date. Pt supine in bed with caregiver present. Caregiver reports that she fed him, pt has been able to feed himself, caregiver educated to allow pt to feed himself for functional purposes. Pt more confused on this date, however, demonstrates increased functional mobility with all aspects. Pt was min A rolling side to side for ayaan care and LB dressing. Pt continues to have frequent BMs.  Pt completed supine to EOB with min A and glider pad. Pt continues to complain of R shoulder/elbow pain. Dr/RN aware and xray ordered. Pt completed sit to stands two intervals requiring min A and increased time to acclimate to standing due to arthritis in hips, knees and shoulders with fair posture achieved after a minute or so. Pt completed stand/step transfer from EOB to recliner requiring min A x2 with RW and max verbal cuing for hand/foot placement and progression due to fear of falling. Pt left up in recliner.  SNF recommended upon discharge. OT POC to continue.  
Goal Outcome Evaluation:  Plan of Care Reviewed With: patient        Progress: improving  Outcome Evaluation: VSS. No c/o pain. Does continue to c/o swollen and sore tongue, Nystatin mouthwash given per MAR. Rested well through night with abduction pillow in place. Turned q 2hr. Plans to go back to rehab at d/c. NSR on tele.  
Goal Outcome Evaluation:  Plan of Care Reviewed With: patient        Progress: improving  Outcome Evaluation: VSS. No c/o pain. Rested well through night. IVF infusing and IV abx given. Discharge planning ongoing. Safety maintained. Will continue to monitor.  
Goal Outcome Evaluation:  Plan of Care Reviewed With: patient        Progress: no change  Outcome Evaluation: OT eval complete. Pt A&Ox4 w/ caregiver attentive at bedside. Prior to hip sx, pt had a caregiver 7 days/wk to assist w/ home management and other IADLs, reports being independent w/ ADLs. Pt and caregiver report a functional decline since hip sx. Pt was incontinent, had 2 BM, requiring a new external catheter. Pt voided twice during clean-up. Pt dependent for donning brief and completing perineal hygiene. ModA for rolling L & R, maxA x2 for scooting. Due to fatigue and 10/10 chest pain, pt did not perform any further mobility. Pt would benefit from skilled OT to increase activity tolerance, independence w/ ADLs and safety w/ fxnal mobility. Recommend SNF at d/c.  
Goal Outcome Evaluation:  Plan of Care Reviewed With: patient        Progress: no change  Outcome Evaluation: Patient admitted to  for pneumonia. GI consulted for positive occult stool and black stools. NPO due to failed swallow screen and GI consult. Patient has had pain with swallowing since recent hip replacement. States when he swallows he has pain across his chest rating it 10/10, also c/o pain in hip and back. Some relief with PRN morphine. VSS, safety maintained. S 76-89 on tele with first degree block, 5.75 run of VT with HR up to 180s, coup, PVCs, and PACs.  
Goal Outcome Evaluation:  Plan of Care Reviewed With: patient        Progress: no change  Outcome Evaluation: Pt. seated in chair requesting to return to bed.  Mr Kirkpatrick was able to stand Min A/Mod A with cues for THPs during ascent to stand.  Phys assist required to step and weight shift safely and promote calm.  After sitting on bed pt. required Min/Mod A to return to supine and OTR applied ABD pillow.  Re-edu'd in post hip precautions.  Cont OT tx  
Goal Outcome Evaluation:  Plan of Care Reviewed With: patient        Progress: no change  Outcome Evaluation: VSS overnight. Pt refused Full liquid diet last evening, did try some water with meds with no issues and no c/o n/v.  C/O restless leg all night, didn't rest well, would like to discuss this today. Pt currently trying to rest. Will continue to monitor.  
Goal Outcome Evaluation:  Plan of Care Reviewed With: patient        Progress: no change  Outcome Evaluation: VSS. C/o pain in hip,back, R arm, and chest area through night, PRN morphine given per MAR with relief noted. Multiple incontinent BM's through shift. NSR/ST  on tele. Will continue to monitor and notify MD of any changes.  
Goal Outcome Evaluation:  Plan of Care Reviewed With: patient        Progress: no change  Outcome Evaluation: VSS. Pt. rested very well last night compared to night before. No c/o pain. Safety maintained. IVF infusing and IV abx given. NSR on tele. Will continue to monitor.  
Goal Outcome Evaluation:  Plan of Care Reviewed With: patient, caregiver        Progress: improving  Outcome Evaluation: PT tx completed. Pt requires CGA for supine to sit with increased time and effort to complete. Performed BLE AROM x20 reps seated. Able to stand with RWX and min x1. Amb 20' with RWX and CGA. Cues for posture and sequencing, able to correct. After toilet t/f pt amb back to bed with RWX and CGA. Required hygiene care post toileting. Returned to bed with Min x1. Will cont to follow.  
Fall Risk

## 2023-02-14 NOTE — DISCHARGE SUMMARY
[facility] Medicine Services  DISCHARGE SUMMARY     Date of Admission: 2/8/2023  Date of Discharge:  2/14/2023  Primary Care Physician: Otilio Ramos MD    Presenting Problem/History of Present Illness:  Generalized weakness    Final Discharge Diagnoses:  Active Hospital Problems    Diagnosis    • **Pneumonia of left lower lobe due to infectious organism    • E. coli UTI, present on admission (urinary tract infection)    • Severe malnutrition (HCC)    • Paraesophageal hiatal hernia    • Erosive esophagitis    • Large hiatal hernia    • Weight loss    • Positive fecal occult blood test    • Primary hypertension      Consults:   Dr. Edmond, gastroenterology  Dr. Funes, general surgery    Procedures Performed:   Esophagogastroduodenoscopy 2/9/2023  Findings: Hypopharynx and larynx grossly appeared normal.  Because of the proximal esophagus appeared normal.  Severe, grade IV, erosive esophagitis of mid and distal esophagus, manifested by numerous linear and irregular shaped fibrin covering ulcerations and erosions, edema and erythema, but without signs of any active bleeding.  Very large hiatal hernia, with essentially almost entire stomach patient currently chest, with configuration suggestive of, at least, partial volvulus of the stomach.  No ulcerations, but mild diffuse edema and erythema of gastric mucosa.  Gastric biopsy obtained with cold biopsy forceps to rule out H. pylori.  Normal-appearing duodenum.     Complications: No immediate complications.     Recommendations: Protonix 40 mg twice daily. Carafate suspension 10 cc before every meal and at bedtime.  Full liquid diet.  Evaluation by surgery for for possible repair of hiatal hernia/gastropexy.    Pertinent Test Results:   Results for orders placed during the hospital encounter of 02/08/23    Adult Transthoracic Echo Complete W/ Cont if Necessary Per Protocol    Interpretation Summary  •  Left ventricular  systolic function is normal. Left ventricular ejection fraction appears to be 51 - 55%.  •  Left ventricular wall thickness is consistent with mild concentric hypertrophy.  •  Left ventricular diastolic function is consistent with (grade II w/high LAP) pseudonormalization.  •  The left atrial cavity is mildly dilated.  •  The aortic valve exhibits sclerosis. There is mild thickening of the aortic valve. Mild to moderate aortic valve regurgitation is present. Mild aortic valve stenosis is present.  •  Mild mitral annular calcification is present.  •  Moderate pulmonary hypertension is present  •  Mild dilation of the aortic root is present      Imaging Results (All)     Procedure Component Value Units Date/Time    XR Shoulder 2+ View Right [[id number]] Collected: 02/12/23 1143     Updated: 02/12/23 1147    Narrative:      EXAM/TECHNIQUE: XR SHOULDER 2+ VW RIGHT-     INDICATION: RIGHT shoulder pain     COMPARISON: 01/28/2020     FINDINGS:     No acute fracture or dislocation. Moderate degenerative change in the AC  joint. Included portion of the RIGHT chest appears unremarkable. No  acute soft tissue finding.       Impression:         1.  No acute osseous findings.  2.  Moderate RIGHT AC joint osteoarthritis.  This report was finalized on 02/12/2023 11:44 by Dr. Gurmeet Castillo MD.    XR Elbow 3+ View Right [[id number]] Collected: 02/12/23 1140     Updated: 02/12/23 1145    Narrative:      EXAM/TECHNIQUE: XR ELBOW 3+ VW RIGHT-     INDICATION: Right elbow and shoulder pain, difficulty moving;  J18.9-Pneumonia, unspecified organism; R77.8-Other specified  abnormalities of plasma proteins; K57.90-Diverticulosis of intestine,  part unspecified, without perforation or abscess without bleeding;  I71.43-Infrarenal abdominal aortic aneurysm, without rupture;  N39.0-Urinary tract infection, site not specified; K44.9-Diaphragmatic  hernia without obs     COMPARISON: None available.     FINDINGS:     Severe osteoarthritis  throughout the RIGHT elbow with marked joint space  narrowing and osteophyte development. Elbow joint effusion is present.  No definite acute fracture. No dislocation. Heterotopic calcification  the posterior elbow noted. No radiopaque foreign body or soft tissue  gas.       Impression:         1.  No evidence of acute fracture.  2.  Severe osteoarthritis and elbow joint effusion.  This report was finalized on 02/12/2023 11:42 by Dr. Gurmeet Castillo MD.    XR Abdomen KUB [[id number]] Collected: 02/10/23 2134     Updated: 02/10/23 2141    Narrative:      HISTORY: Large hiatal hernia with organoaxial rotation of the stomach.  Delayed emptying of the stomach from upper GI exam performed earlier  this afternoon. Follow-up KUB recommended.     KUB: A frontal view the abdomen and pelvis obtained at 7:23 PM on  02/10/2023. A chest x-ray was performed for 445 pm incidentally and  included with this exam. Upper GI study performed this afternoon  completed at  3pm.     On the 4:45 PM chest x-ray, there is retained contrast the distal  esophagus as well as the herniated stomach indicating delayed gastric  emptying. On this 7:23 PM KUB study, there is contrast present  throughout the nondilated small and large bowel contrast present in the  rectum. IVC filter in place. Right hip prosthesis. Vascular  calcifications.       Impression:      1. Follow-up imaging after upper GI exam is suggested delayed gastric  emptying, however there is no gastric outlet obstruction with contrast  present throughout the small and large bowel to the level of the rectum  on the KUB performed 4 hours following completion of the upper GI exam.  This report was finalized on 02/10/2023 21:38 by Dr. Radhika Ross MD.    FL Upper GI Water Soluble [[id number]] Collected: 02/10/23 1520     Updated: 02/10/23 1534    Narrative:      EXAMINATION: FL UPPER GI WATER SOLUBLE-     2/10/2023 2:38 PM CST     HISTORY: Paraesophageal hiatal hernia; J18.9-Pneumonia,  unspecified  organism; R77.8-Other specified abnormalities of plasma proteins;  K57.90-Diverticulosis of intestine, part unspecified, without  perforation or abscess without bleeding; I71.43-Infrarenal abdominal  aortic aneurysm, without rupture; N39.0-Urinary tract infection, site  not specified; K44.9-Diaphragmatic hernia without obstruction or  gangrene     The fluoroscopy is performed and images of the upper GI are obtained  during and after ingestion of water soluble contrast  material/Gastrografin.     There is no previous study for comparison.     There is no difficulty in initiating the swallowing.     There is normal propagation of the bolus through the oropharynx into the  esophagus.     Normal esophagus seen. The esophagus is displaced to the left. No  intrinsic abnormality.     There is a large hiatal hernia. 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 There is gastroesophageal reflux.     The entire stomach is contained in the hiatal hernia in the left chest.  The gastric antrum is at the hiatus with severe narrowing of the antrum.  No contrast is seen passing the antrum/esophageal hiatus into the  abdominal part of the antrum/duodenum after several minutes.       Impression:      1. A large hiatal hernia containing most of the stomach. Organoaxial  rotation of the stomach.  2. Severe narrowing of the gastric antrum at the level of the esophagus  hiatus. No contrast passed beyond the antrum into the abdominal part of  the GI tract.  3. A delayed image after 4 hours would be obtained to see the  propagation of the contrast bolus.  4. Fluoroscopy time: 2 minutes 36 seconds.  5. The dose: 82.2mGy.  6. Number of images: 25  This report was finalized on 02/10/2023 15:31 by Dr. Mirtha Perez MD.    CT Abdomen Pelvis Without Contrast [[id number]] Collected: 02/08/23 1820     Updated: 02/08/23 1831    Narrative:      CT ABDOMEN PELVIS WO CONTRAST- 2/8/2023 6:08 PM CST     HISTORY: new onset bloody stools,  recent diarrhea      COMPARISON: None      DOSE LENGTH PRODUCT: 337 mGy cm. Automated exposure control was also  utilized to decrease patient radiation dose.     TECHNIQUE: Noncontrast enhanced images of the abdomen and pelvis  obtained without oral contrast. Multiplanar reformatted images were  provided for review.      FINDINGS:   LOWER CHEST: Large hiatal hernia. Atelectasis present in the left lower  lobe and there is also a trace layering left pleural effusion. Coronary  atheromatous calcification.      LIVER: No focal liver lesion within limits of a noncontrast study.      BILIARY SYSTEM: Cholelithiasis. Gallbladder is nondistended. Biliary  tree is nondilated.      PANCREAS: No focal pancreatic lesion within limits of a noncontrast  study.      SPLEEN: Unremarkable.      KIDNEYS: Iodinated contrast identified in the upper urinary tracts. No  hydronephrosis. Ureters are nondilated.      ADRENALS: Unremarkable.     RETROPERITONEUM: No mass, lymphadenopathy or hemorrhage.      GI TRACT: Large hiatal hernia. The stomach is nondistended. Small bowel  loops are nondilated. Colonic diverticulosis. The colon is nondistended.  No inflammatory changes are identified along the colon. No bowel wall  thickening identified. Surrounding fat is clean.     OTHER: There is no mesenteric mass, lymphadenopathy or fluid collection.  No intraperitoneal free fluid or free air. IVC filter is in place.  Infrarenal AAA measuring up to 3.6 cm in diameter. Right hip  arthroplasty. No acute bony abnormality is seen. There appears to be a  high-grade spinal stenosis at the L1-L2 disc level with complete loss of  disc height at this level. There is an old fracture identified across  the inferior endplate of T9.     PELVIS: Bladder wall trabeculation. Air identified in the urinary  bladder. No pelvic adenopathy identified.       Impression:      1. Colonic diverticulosis. Bowel is nondistended. No inflammatory  changes are seen along the  bowel.  2. Stomach and small bowel are decompressed. Hiatal hernia is present.  3. Cholelithiasis.  4. Bladder wall trabeculation which may be secondary to chronic partial  outlet obstruction.  5. Infrarenal AAA measuring up to 3.6 cm in diameter.  This report was finalized on 02/08/2023 18:28 by Dr Geremias Martinez, .    CT Angiogram Chest [[id number]] Collected: 02/08/23 1659     Updated: 02/08/23 1704    Narrative:      EXAMINATION: CT ANGIOGRAM CHEST-      2/8/2023 4:43 PM CST     HISTORY: Shortness of breath, chest pain, weakness, recent surgery and  immobilization since.     In order to have a CT radiation dose as low as reasonably achievable  Automated Exposure Control was utilized for adjustment of the mA and/or  KV according to patient size.     DLP in mGycm= 194.     CT angiogram chest with IV contrast.  CT angiography protocol.   CT imaging with bolus IV contrast injection.   Under concurrent supervision axial, sagittal, coronal,  three-dimensional, and MIP data sets were constructed on an independent  work station.     Normal heart size.  Intrathoracic stomach with left lung base consolidation compatible with  pneumonia.     Small left and trace right pleural effusion.     Symmetric and normally opacified pulmonary arteries.  No pulmonary embolism.     Upper lobes are clear.  No pneumothorax.     Small gallstones incidentally noted.     Summary:  1. Left lower lobe pneumonia.  2. No pulmonary embolism.   This report was finalized on 02/08/2023 17:01 by Dr. Fawad Smith MD.    XR Chest 1 View [[id number]] Collected: 02/08/23 1634     Updated: 02/08/23 1639    Narrative:      XR CHEST 1 VW- 2/8/2023 4:25 PM CST     HISTORY: chest pain, weakness     COMPARISON: Chest exam dated 10/01/2019.     FINDINGS:      No lung consolidation. No pleural effusion or pneumothorax. Heart size  is normal. Pulmonary vasculature are nondilated. Hiatal hernia  superimposed over the cardiac silhouette. The osseous structures  and  surrounding soft tissues demonstrate no acute abnormality.       Impression:      1. Stable chest exam without acute process.  2. Hiatal hernia.        This report was finalized on 02/08/2023 16:36 by Dr Geremias Martinez, .        LAB RESULTS:      Lab 02/14/23  0704 02/13/23 2003 02/13/23  0745 02/13/23  0446 02/12/23  1817 02/12/23  0412 02/11/23  1610 02/11/23  0449 02/10/23  1820 02/10/23  0445 02/09/23  0009 02/08/23  1459   WBC 8.40  --   --  10.03  --  13.48*  --  11.88*  --  7.80   < > 9.94   HEMOGLOBIN 9.7* 9.7* 10.4* 9.7* 11.0* 10.4*   < > 12.5*   < > 11.4*   < > 11.6*   HEMATOCRIT 31.4* 30.9* 33.1* 30.6* 34.5* 34.0*   < > 39.7   < > 36.5*   < > 37.4*   PLATELETS 680*  --   --  653*  --  686*  --  772*  --  667*   < > 608*   NEUTROS ABS 6.17  --   --  7.64*  --  10.62*  --  9.68*  --  5.23   < > 7.32*   IMMATURE GRANS (ABS) 0.06*  --   --  0.07*  --  0.14*  --  0.14*  --  0.13*   < > 0.14*   LYMPHS ABS 1.32  --   --  1.36  --  1.53  --  1.12  --  1.37   < > 1.56   MONOS ABS 0.51  --   --  0.70  --  0.93*  --  0.87  --  0.76   < > 0.69   EOS ABS 0.28  --   --  0.21  --  0.20  --  0.03  --  0.24   < > 0.17   MCV 94.6  --   --  93.6  --  95.0  --  93.4  --  93.4   < > 93.3   PROCALCITONIN  --   --   --   --   --   --   --   --   --   --   --  0.19   LACTATE  --   --   --   --   --   --   --   --   --   --   --  1.2   PROTIME  --   --   --   --   --   --   --   --   --   --   --  15.2*   APTT  --   --   --   --   --   --   --   --   --   --   --  34.7    < > = values in this interval not displayed.         Lab 02/14/23  0704 02/13/23  0446 02/12/23  0412 02/11/23  0449 02/10/23  0445 02/09/23  0732 02/08/23  1459   SODIUM 138 139 138 143 139   < > 134*   POTASSIUM 3.7 3.4* 3.3* 3.7 3.9   < > 4.1   CHLORIDE 103 103 99 100 101   < > 100   CO2 26.0 27.0 28.0 28.0 26.0   < > 25.0   ANION GAP 9.0 9.0 11.0 15.0 12.0   < > 9.0   BUN 16 22 24* 19 16   < > 18   CREATININE 0.94 1.07 1.23 1.04 0.97   < > 0.74*   EGFR  78.9 67.6 57.2* 69.9 76.0   < > 88.2   GLUCOSE 109* 118* 128* 179* 112*   < > 136*   CALCIUM 8.3* 8.4* 8.4* 9.0 8.6   < > 8.3*   MAGNESIUM  --   --   --   --   --   --  2.2    < > = values in this interval not displayed.         Lab 02/08/23  1459 02/08/23  1100   TOTAL PROTEIN 6.4 5.9*   ALBUMIN 3.2* 3.5   GLOBULIN 3.2 2.4   ALT (SGPT) 20 19   AST (SGOT) 32 34   BILIRUBIN 0.4 0.4   ALK PHOS 89 95   AMYLASE  --  32   LIPASE 34 42         Lab 02/09/23  0009 02/08/23  1716 02/08/23  1459 02/08/23  1100   PROBNP  --   --  458.9  --    HSTROP T 44* 30* 36* 36*   PROTIME  --   --  15.2*  --    INR  --   --  1.18*  --                  Brief Urine Lab Results  (Last result in the past 365 days)      Color   Clarity   Blood   Leuk Est   Nitrite   Protein   CREAT   Urine HCG        02/08/23 1716 Yellow   Clear   Negative   Small (1+)   Negative   Negative               Microbiology Results (last 10 days)     Procedure Component Value - Date/Time    Blood Culture With DELFINO - Blood, Arm, Right [[id number]]  (Normal) Collected: 02/12/23 0806    Lab Status: Preliminary result Specimen: Blood from Arm, Right Updated: 02/14/23 0815     Blood Culture No growth at 2 days    MRSA Screen, PCR (Inpatient) - Swab, Nares [[id number]]  (Normal) Collected: 02/10/23 1659    Lab Status: Final result Specimen: Swab from Nares Updated: 02/10/23 1843     MRSA PCR No MRSA Detected    Narrative:      The negative predictive value of this diagnostic test is high and should only be used to consider de-escalating anti-MRSA therapy. A positive result may indicate colonization with MRSA and must be correlated clinically.    S. Pneumo Ag Urine or CSF - Urine, Urine, Clean Catch [[id number]]  (Normal) Collected: 02/10/23 1658    Lab Status: Final result Specimen: Urine, Clean Catch Updated: 02/10/23 1758     Strep Pneumo Ag Negative    Legionella Antigen, Urine - Urine, Urine, Clean Catch [[id number]]  (Normal) Collected: 02/10/23 1840    Lab Status: Final  result Specimen: Urine, Clean Catch Updated: 02/10/23 1758     LEGIONELLA ANTIGEN, URINE Negative    Blood Culture - Blood, Arm, Left [[id number]]  (Normal) Collected: 02/08/23 2022    Lab Status: Final result Specimen: Blood from Arm, Left Updated: 02/13/23 2046     Blood Culture No growth at 5 days    Blood Culture - Blood, Arm, Right [[id number]]  (Abnormal) Collected: 02/08/23 2012    Lab Status: Final result Specimen: Blood from Arm, Right Updated: 02/12/23 0610     Blood Culture Staphylococcus, coagulase negative     Isolated from Anaerobic Bottle     Gram Stain Anaerobic Bottle Gram positive cocci in clusters    Narrative:      Probable contaminant requires clinical correlation, susceptibility not performed unless requested by physician.      Blood Culture ID, PCR - Blood, Arm, Right [[id number]]  (Abnormal) Collected: 02/08/23 2012    Lab Status: Final result Specimen: Blood from Arm, Right Updated: 02/10/23 1528     BCID, PCR Staph spp, not aureus or lugdunensis. Identification by BCID2 PCR.     BOTTLE TYPE Anaerobic Bottle    Urine Culture - Urine, Straight Cath [[id number]]  (Abnormal)  (Susceptibility) Collected: 02/08/23 1716    Lab Status: Final result Specimen: Urine from Straight Cath Updated: 02/10/23 1253     Urine Culture >100,000 CFU/mL Escherichia coli    Narrative:      Colonization of the urinary tract without infection is common. Treatment is discouraged unless the patient is symptomatic, pregnant, or undergoing an invasive urologic procedure.    Susceptibility      Escherichia coli      ALISON      Ampicillin Susceptible      Ampicillin + Sulbactam Susceptible      Cefazolin Susceptible      Cefepime Susceptible      Ceftazidime Susceptible      Ceftriaxone Susceptible      Gentamicin Susceptible      Levofloxacin Susceptible      Nitrofurantoin Susceptible      Piperacillin + Tazobactam Susceptible      Trimethoprim + Sulfamethoxazole Susceptible                           COVID-19, FLU A/B, RSV  PCR - Swab, Nasopharynx [[id number]]  (Normal) Collected: 02/08/23 1715    Lab Status: Final result Specimen: Swab from Nasopharynx Updated: 02/08/23 1820     COVID19 Not Detected     Influenza A PCR Not Detected     Influenza B PCR Not Detected     RSV, PCR Not Detected    Narrative:      Fact sheet for providers: https://www.fda.gov/media/025288/download    Fact sheet for patients: https://www.fda.gov/media/212287/download    Test performed by PCR.        Hospital Course: Mr. Kirkpatrick presented to ER 2/8/2023 from SNF with fatigue, weakness, dysphagia and black stool.  Patient had recent right hip hemiarthroplasty1/28/2023 by Dr. Quintana at [facility] and was discharged to skilled nursing facility on 2/2/2023.  Patient reported constipation that he attributed to pain medication.  In ER, he tested fecal occult blood positive.  Patient reported dysphagia of solid foods for the past several years and reported that food felt as though it was getting hung in his esophagus.  Patient had 3 previous endoscopies between 2014 and 2019.  2019 patient had melena and was found to have gastric ulcer managed conservatively.  Repeat endoscopy showed completely healed ulcer.  Last colonoscopy 2019 revealed small polyps removed.  Patient reported 24 pound weight loss over the last several months but most recently after his wife passed away within the past month.  Hemoglobin 11.6.  WBC normal.  LFTs normal.  CT scan of abdomen noted colonic diverticulosis without any inflammatory changes unremarkable stomach, small bowel, hiatal hernia, cholelithiasis without cholecystitis, bladder wall trabeculation and 3.6 cm infrarenal abdominal aortic aneurysm.  Patient denied chest pain.  CT scan showed bilateral pleural effusions with concern for parapneumonic effusion.  CTA chest noted no pulmonary emboli.  Left lower lobe pneumonia.    He was admitted to the medical floor with pneumonia left lower lobe. Initially, he received azithromycin and  Rocephin.  Antibiotics changed to Zosyn due to recent hospital stay.  Blood cultures no growth at 5 days.  Strep pneumonia negative, Legionella negative, MRSA PCR negative.  Duo nebs and incentive spirometry continued. Suspect that paraesophageal hernia contributing to left lower lobe consolidation. Patient remains afebrile.  WBC 8.4 on date of discharge.  DELFINO no growth at 2 days. Zosyn switched to Augmentin twice daily for 3 days on 2/13/2023.  Discontinue Augmentin after last dose on 2/15/2023.    Fecal occult blood positive but hemoglobin remained stable.  Hemoglobin 11.6 on admission and 9.6 on date of discharge.  Stools at our facility were green and brown with no evidence of bright red bleeding per rectum.  Patient attributed dark stools to constipation secondary to narcotics.  Patient had diarrhea for 2 days and has had no additional diarrhea.    Patient reported dysphagia and GI consulted. Dr. Edmond recommended endoscopy for evaluation of melena, dysphagia and odynophagia.  Patient agreeable.  Findings: Hypopharynx and larynx grossly appeared normal.  Because of the proximal esophagus appeared normal.  Severe, grade IV, erosive esophagitis of mid and distal esophagus, manifested by numerous linear and irregular shaped fibrin covering ulcerations and erosions, edema and erythema, but without signs of any active bleeding.  Very large hiatal hernia, with essentially almost entire stomach patient currently chest, with configuration suggestive of, at least, partial volvulus of the stomach.  No ulcerations, but mild diffuse edema and erythema of gastric mucosa.  Gastric biopsy obtained with cold biopsy forceps to rule out H. pylori.  Normal-appearing duodenum.     Recommendations: Protonix 40 mg twice daily. Carafate suspension 10 cc before every meal and at bedtime.  Full liquid diet.  Evaluation by surgery for for possible repair of hiatal hernia/gastropexy.   Protonix switch to oral prior to discharge and  patient's diet was advanced to regular, ground meat.    Patient was found to have paraesophageal hernia. General surgery consulted and Dr. Funes noted very large hiatal hernia and difficult situation given age and frality with recent surgery and hip surgery.  Upper GI 2/10 noted large hiatal hernia containing most of stomach.  Organoxial rotation of stomach.  Severe narrowing of the gastric antrum at the level of the esophagus hiatus.  No contrast passed beyond the antrum into the abdominal part of GI tract.  Delayed imaging 4 hours obtained.  KUB following upper GI suggest delayed gastric emptying.  No gastric outlet obstruction present throughout the small and large bowel to the level of the rectum.  Discussed with Dr. Funes and would like to recover from recent hip surgery before surgical intervention.  Patient agrees but also understands may require more urgent attention.  He has been on full liquid diet and was advanced to regular diet ground meat and patient tolerated without vomiting. Dr. Funes will follow-up with patient on 3/2/2023.    Urinalysis 31-50 WBC, 4+ bacteria.  Urine culture positive for E. coli, present on admission.  Culture sensitive to Zosyn which was also treating pneumonia.  Zosyn switched to oral Augmentin to complete antibiotic treatment for presumed pneumonia.    Potassium 3.4 and replaced.  Repeat potassium 2.7 on date of discharge.     Recent right hip hemiarthroplasty 1/28/2023 Dr. Quintana.  Discussed with Brendan Petersen PA-C and patient was allowed weightbearing as tolerated. Physical therapy consulted.   Patient was slow to participate with physical therapy.  However, on 2/14/2023 patient stood and ambulated 10 feet to the chair.  On 2/14/2023, patient ambulated 20 feet with rolling walker and contact-guard.  Patient has been encouraged to continue to work aggressively with physical therapy for increased strength and endurance prior to follow-up appointment with   "[facility].    Lisinopril and nifedipine continued for primary hypertension.  Blood pressure 120/61.    Echocardiogram 2/9/2023 noted ejection fraction 51-55%.    Patient reported tongue feeling raw and slightly numb on the right side.  Suspect this is also related to large paraesophageal hernia promoting pressure.  Mycostatin swish and swallow ordered and patient reported improvement of symptoms.    EKG 2/8/2023 noted normal sinus rhythm second-degree AV block (Mobitz type I).  Repeat EKG showed sinus tachycardia first-degree AV block.  No longer second-degree AV block present.  QT lengthened.    Social service consulted for discharge planning to facilitate patient to return to [facility] at discharge.    On 2/14/2023, bed offered and insurance approved.  Patient will be discharged to [facility] for ongoing physical therapy and Occupational Therapy prior to follow-up appointment with Dr. 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  Patient denies nausea or vomiting.  No complaints of chest pain or palpitations.  He is not requiring oxygen.  Patient will continue Protonix 40 mg twice daily and Carafate 1 g before meals and at bedtime as recommended by gastroenterology.  Discontinue Augmentin after last dose on 2/15/2023.  Keep scheduled appointment with Dr. Quintana.     Physical Exam on Discharge:  /61 (BP Location: Left arm, Patient Position: Lying)   Pulse 79   Temp 98.4 °F (36.9 °C) (Oral)   Resp 16   Ht 190.5 cm (75\")   Wt 83.4 kg (183 lb 12.8 oz) Comment: 1 pillow, 1 blanket, patient unable to stand for weight ; Zac BUSH aware  SpO2 95%   BMI 22.97 kg/m²   Physical Exam  Vitals and nursing note reviewed.   Constitutional:       Comments: Sitting up in bed.  No oxygen in use.  No visitors in room   HENT:      Head: Normocephalic and atraumatic.      Nose: No congestion.      Mouth/Throat:      Pharynx: Oropharynx is clear. No oropharyngeal exudate or posterior " oropharyngeal erythema.   Eyes:      Extraocular Movements: Extraocular movements intact.      Pupils: Pupils are equal, round, and reactive to light.   Cardiovascular:      Rate and Rhythm: Normal rate and regular rhythm.      Heart sounds: No murmur heard.     Comments: Normal sinus rhythm 67-78 on telemetry  Pulmonary:      Breath sounds: No wheezing, rhonchi or rales.      Comments: No Oxygen in use.  Abdominal:      Palpations: Abdomen is soft.      Tenderness: There is no abdominal tenderness.   Genitourinary:     Comments: Voiding.  Musculoskeletal:         General: Tenderness (Right shoulder) present. No swelling.      Cervical back: Normal range of motion and neck supple.   Skin:     General: Skin is warm and dry.   Neurological:      General: No focal deficit present.      Mental Status: He is alert and oriented to person, place, and time.   Psychiatric:         Mood and Affect: Mood normal.         Behavior: Behavior normal.         Thought Content: Thought content normal.         Judgment: Judgment normal.       Condition on Discharge: Stable for discharge to skilled facility for ongoing physical therapy and Occupational Therapy    Discharge Disposition: [facility]    Discharge Medications:     Discharge Medications      New Medications      Instructions Start Date   amoxicillin-clavulanate 875-125 MG per tablet  Commonly known as: AUGMENTIN   1 tablet, Oral, Every 12 Hours Scheduled.  Discontinue after last dose 2/15/2023.      lidocaine 5 %  Commonly known as: LIDODERM   1 patch, Transdermal, Every 24 Hours Scheduled, Remove & Discard patch within 12 hours or as directed by MD   Start Date: February 15, 2023     nystatin 100,000 unit/mL suspension  Commonly known as: MYCOSTATIN   500,000 Units, Swish & Swallow, 4 Times Daily      oxyCODONE-acetaminophen 5-325 MG per tablet  Commonly known as: Percocet  Replaces: oxyCODONE-acetaminophen  MG per tablet   1 tablet, Oral, Every 12 Hours PRN       pantoprazole 40 MG EC tablet  Commonly known as: PROTONIX   40 mg, Oral, 2 Times Daily Before Meals         Changes to Medications      Instructions Start Date   sucralfate 1 GM/10ML suspension  Commonly known as: CARAFATE  What changed: when to take this   1 g, Oral, 4 Times Daily Before Meals & Nightly         Continue These Medications      Instructions Start Date   acetaminophen 325 MG tablet  Commonly known as: TYLENOL   650 mg, Oral, 2 Times Daily      bisacodyl 10 MG suppository  Commonly known as: DULCOLAX   10 mg, Rectal, Daily PRN      busPIRone 5 MG tablet  Commonly known as: BUSPAR   5 mg, Oral, 2 Times Daily      cyclobenzaprine 5 MG tablet  Commonly known as: FLEXERIL   5 mg, Oral, 2 Times Daily PRN      docusate sodium 100 MG capsule  Commonly known as: COLACE   100 mg, Oral, 2 Times Daily      Enoxaparin Sodium 40 MG/0.4ML solution prefilled syringe syringe  Commonly known as: LOVENOX   40 mg, Subcutaneous, Daily      gabapentin 300 MG capsule  Commonly known as: NEURONTIN   300 mg, Oral, 4 Times Daily      guaiFENesin 600 MG 12 hr tablet  Commonly known as: MUCINEX   600 mg, Oral, 2 Times Daily      hydrALAZINE 50 MG tablet  Commonly known as: APRESOLINE   50 mg, Oral, Every 8 Hours PRN      levalbuterol 0.31 MG/3ML nebulizer solution  Commonly known as: XOPENEX   1 ampule, Nebulization, Every 8 Hours PRN      linaclotide 145 MCG capsule capsule  Commonly known as: LINZESS   145 mcg, Oral, Daily      lisinopril 10 MG tablet  Commonly known as: PRINIVIL,ZESTRIL   10 mg, Oral, Daily      NIFEdipine XL 60 MG 24 hr tablet  Commonly known as: PROCARDIA XL   60 mg, Oral, Daily      ondansetron 4 MG tablet  Commonly known as: ZOFRAN   4 mg, Oral, Every 6 Hours PRN      polyethylene glycol packet  Commonly known as: MIRALAX   17 g, Oral, Daily PRN      simethicone 80 MG chewable tablet  Commonly known as: MYLICON   80 mg, Oral, Every 4 Hours PRN      vitamin B-12 1000 MCG tablet  Commonly known as:  CYANOCOBALAMIN   1,000 mcg, Oral, Daily      vitamin D 1.25 MG (60128 UT) capsule capsule  Commonly known as: ERGOCALCIFEROL   50,000 Units, Oral, 3 Times Weekly, Give one time a day every Mon, Wed, Fri         Stop These Medications    omeprazole 20 MG capsule  Commonly known as: priLOSEC     oxyCODONE-acetaminophen  MG per tablet  Commonly known as: PERCOCET  Replaced by: oxyCODONE-acetaminophen 5-325 MG per tablet          Discharge Diet:   Diet Instructions     Diet: Regular, Dysphagia, Soft Texture; Pudding Thick Liquids; Ground; Mechanical Soft      Discharge Diet:  Regular  Dysphagia  Soft Texture       Fluid Consistency: Pudding Thick Liquids    Soft Options: Ground    Pureed Options: Mechanical Soft        Activity at Discharge:   Activity Instructions     Other Activity Instructions      Activity Instructions: Weightbearing as tolerated right lower extremity per Dr. Quintana         Discharge instructions:  1.  For worsening weakness seek medical attention.  2.  Weightbearing as tolerated right lower extremity per orthopedics.  3.  Protonix 40 mg orally twice daily per GI  4.  Carafate suspension 10 cc 4 times daily  5.  Augmentin 875/125 1 tablet every 12 hours for 3 days begin 2/13/2023.  Discontinue after last dose on 2/15/2023.  6.  Lovenox 40 mg subcu daily for DVT prophylaxis  7.  Regular diet with ground meat.  8.  Nutritional supplement with boost plus 3 times daily  10.  Sit in upright position for all meals and medications.  11.  Follow-up with Dr. Funes 3/6/2023  12.  Follow up with Dr. Ramos after discharged from [facility]    Follow-up Appointments:   Future Appointments   Date Time Provider Department Center   3/2/2023 10:30 AM Vicky Funes MD [facility] PAD PAD     Test Results Pending at Discharge: None    Electronically signed by HARISH Edwards, 02/14/23, 16:31 CST.    Time: 40 minutes for completion.  Discussed with Dr. Scales, Dr. Funes, and patient.  Discussed with  daughter per telephone conversation.    The above documentation resulted from a face-to-face encounter by jihan MOREIRA ACNP-BC.

## 2023-02-14 NOTE — THERAPY TREATMENT NOTE
Acute Care - Occupational Therapy Treatment Note  [facility]     Patient Name: Jose Alfredo Kirkpatrick  : 1935  MRN: [id number]  Today's Date: 2023  Onset of Illness/Injury or Date of Surgery: 23  Date of Referral to OT: 02/10/23  Referring Physician: Dr. Austin    Admit Date: 2023       ICD-10-CM ICD-9-CM   1. Pneumonia of left lower lobe due to infectious organism  J18.9 486   2. Elevated troponin  R77.8 790.6   3. Diverticulosis  K57.90 562.10   4. Infrarenal abdominal aortic aneurysm (AAA) without rupture  I71.43 441.4   5. Urinary tract infection without hematuria, site unspecified  N39.0 599.0   6. Hiatal hernia  K44.9 553.3   7. Biliary calculus of other site without obstruction  K80.80 574.20   8. Bloody stools  K92.1 578.1   9. Dysphagia  R13.10 787.20   10. Decreased activities of daily living (ADL)  Z78.9 V49.89   11. Impaired mobility  Z74.09 799.89     Patient Active Problem List   Diagnosis   • Spinal stenosis, lumbar   • Hx of colonic polyps   • Drug-induced constipation   • Primary hypertension   • Acute pulmonary embolism with acute cor pulmonale (HCC)   • Acute bilateral deep vein thrombosis (DVT) of femoral veins (HCC)   • History of DVT of left lower extremity   • Positive fecal occult blood test   • Melena   • Vitamin B12 deficiency   • FARZANA (iron deficiency anemia)   • Cholelithiasis   • Gastric ulcer with hemorrhage   • Chronic bilateral low back pain without sciatica   • Chronic pain of both shoulders   • Kyphosis   • Overweight (BMI 25.0-29.9)   • Non-smoker   • Pneumonia of left lower lobe due to infectious organism   • Erosive esophagitis   • Large hiatal hernia   • Weight loss   • Gram-negative UTI, acute cystitis without hematuria, present on admission   • Paraesophageal hiatal hernia   • Severe malnutrition (HCC)     Past Medical History:   Diagnosis Date   • Arthritis    • Cataract     BILATERAL   • GERD (gastroesophageal reflux disease)    • History of transfusion    •  Hx of colonic polyps    • Hypertension    • Reflux esophagitis    • Streptococcosis     IN SPINE FROM BACK INJECTIONS     Past Surgical History:   Procedure Laterality Date   • BACK SURGERY      cervical   • CATARACT EXTRACTION Bilateral    • COLONOSCOPY  02/11/2013    Hyperplastic polyp at 25 cm, Diverticulosis repeat exam in 5 years   • COLONOSCOPY N/A 10/16/2018    Tubular adenoma ascending colon repet prn   • ENDOSCOPY  01/15/2014    HH   • ENDOSCOPY N/A 10/03/2019    Large HH, non-bleeding gastric ulcer   • ENDOSCOPY N/A 11/25/2019    Procedure: ESOPHAGOGASTRODUODENOSCOPY WITH ANESTHESIA;  Surgeon: Marques Pérez MD;  Location: [facility] ENDOSCOPY;  Service: Gastroenterology   • ENDOSCOPY N/A 2/9/2023    Procedure: ESOPHAGOGASTRODUODENOSCOPY WITH ANESTHESIA;  Surgeon: Sushant Edmond MD;  Location: [facility] ENDOSCOPY;  Service: Gastroenterology;  Laterality: N/A;  pre GI bleed  post hiatal hernia; esophagitis  Otilio Ramos MD   • HERNIA REPAIR Right     INGUINAL    • HIP FRACTURE SURGERY Right 02/04/2023   • REPLACEMENT TOTAL KNEE BILATERAL     • THORACIC LAMINECTOMY DECOMPRESSIVE POSTERIOR N/A 12/14/2016    Procedure: LAMINECTOMY DECOMPRESSION, UNINSTRUMENTED POSTERIOR SPINAL FUSION, T 11-12;  Surgeon: LUZ Adan MD;  Location: [facility] OR;  Service:    • VENA CAVA FILTER INSERTION N/A 10/04/2019    Procedure: VENA CAVA FILTER INSERTION;  Surgeon: Dallin Coronado MD;  Location: [facility] HYBRID OR 12;  Service: Vascular         OT ASSESSMENT FLOWSHEET (last 12 hours)     OT Evaluation and Treatment     Row Name 02/14/23 1355 02/14/23 1133                OT Time and Intention    Subjective Information complains of  acid reflux  -JJ (r) EC (t) JJ (c) --       Document Type therapy note (daily note)  -JJ (r) EC (t) JJ (c) therapy note (daily note)  -JJ (r) EC (t) JJ (c)       Mode of Treatment occupational therapy  -JJ (r) EC (t) JJ (c) occupational therapy  -JJ (r) EC (t) JJ (c)       Session Not  Performed -- patient/family declined treatment  - (r) EC (t) JJ (c)       Comment, Session Not Performed -- pt stated he was too fatigued and to check back after lunch  - (r) EC (t) JJ (c)          General Information    Existing Precautions/Restrictions fall;right;hip, posterior  -J (r) EC (t) JJ (c) --       Barriers to Rehab medically complex  - (r) EC (t) JJ (c) --          Pain Assessment    Pretreatment Pain Rating 0/10 - no pain  - (r) EC (t) JJ (c) --       Posttreatment Pain Rating 0/10 - no pain  - (r) EC (t) JJ (c) --          Cognition    Orientation Status (Cognition) oriented x 4  - (r) EC (t) JJ (c) --          Activities of Daily Living    BADL Assessment/Intervention grooming  - (r) EC (t) JJ (c) --          Grooming Assessment/Training    [place] Level (Grooming) wash face, hands;oral care regimen;set up;supervision  - (r) EC (t) JJ (c) --       Position (Grooming) edge of bed sitting  - (r) EC (t) JJ (c) --          Bed Mobility    Bed Mobility sit-supine;supine-sit;scooting/bridging  - (r) EC (t) JJ (c) --       Supine-Sit [place] (Bed Mobility) verbal cues;minimum assist (75% patient effort)  - (r) EC (t) JJ (c) --       Sit-Supine [place] (Bed Mobility) verbal cues;minimum assist (75% patient effort)  - (r) EC (t) JJ (c) --       Bed Mobility, Safety Issues decreased use of legs for bridging/pushing  - (r) EC (t) JJ (c) --       Assistive Device (Bed Mobility) bed rails;head of bed elevated  - (r) EC (t) JJ (c) --          Transfer Assessment/Treatment    Transfers sit-stand transfer;stand-sit transfer  - (r) EC (t) JJ (c) --          Sit-Stand Transfer    Sit-Stand [place] (Transfers) verbal cues;minimum assist (75% patient effort)  -JJ (r) EC (t) JJ (c) --       Assistive Device (Sit-Stand Transfers) walker, front-wheeled  -JJ (r) EC (t) JJ (c) --       Comment, (Sit-Stand Transfer) successful on 2nd attempt  -JJ (r) EC (t) JJ (c) --           Stand-Sit Transfer    Stand-Sit [place] (Transfers) verbal cues;minimum assist (75% patient effort)  -JJ (r) EC (t) JJ (c) --       Assistive Device (Stand-Sit Transfers) walker, front-wheeled  -JJ (r) EC (t) JJ (c) --          Motor Skills    Therapeutic Exercise shoulder;elbow/forearm  -JJ (r) EC (t) JJ (c) --          Shoulder (Therapeutic Exercise)    Shoulder (Therapeutic Exercise) --  2 sets x10 reps B shoulder flexion and abduction  -JJ (r) EC (t) JJ (c) --          Elbow/Forearm (Therapeutic Exercise)    Elbow/Forearm (Therapeutic Exercise) --  10 reps B elbow flexion  -JJ (r) EC (t) JJ (c) --          Wound Right lateral greater trochanter Incision    Wound - Properties Group Present on Hospital Admission: Y  -AG Side: Right  -AG Orientation: lateral  -AG Location: greater trochanter  -AG Primary Wound Type: Incision  -AG    Retired Wound - Properties Group Present on Hospital Admission: Y  -AG Side: Right  -AG Orientation: lateral  -AG Location: greater trochanter  -AG Primary Wound Type: Incision  -AG    Retired Wound - Properties Group Present on Hospital Admission: Y  -AG Side: Right  -AG Location: greater trochanter  -AG Primary Wound Type: Incision  -AG       Plan of Care Review    Plan of Care Reviewed With patient  -JFANNIE (r) CONNIE (t) JJ (c) --       Progress improving  -JFANNIE (r) CONNIE (t) JJ (c) --       Outcome Evaluation OT tx complete. Pt A&Ox4 c/o burning tongue from acid reflux. Pt required encouragement to participate in therapy but demonstrated good effort, stating he wanted to get stronger to see his grandkids. Pt Ruby for supine>sit due to decreased core and LE strength. Pt required frequent vcs to correct L & posterior lean. Pt completed 10 reps of B shoulder flexion, abduction, and elbow flexion. Pt had to perform shoulder AROM one arm at a time to maintain balance. Pt sit<>stand once Ruby using fw walker, declined any more mobility due to fatigue. OT will continue to treat to increase  activity tolerance and strength for ADLs and fxnal mobility. Recommend d/c to SNF.  -JJ (r) EC (t) JJ (c) --          Positioning and Restraints    Pre-Treatment Position in bed  -JJ (r) EC (t) JJ (c) --       Post Treatment Position bed  -JJ (r) EC (t) JJ (c) --       In Bed fowlers;call light within reach;encouraged to call for assist;pillow between legs  -JJ (r) EC (t) JJ (c) --             User Key  (r) = Recorded By, (t) = Taken By, (c) = Cosigned By    Initials Name Effective Dates    AG Nirali Stratton, RN 06/16/21 -     Radhika Barry, OTR/L, CSRS 11/10/21 -     Radha Pereira OT Student 12/12/22 -                  Occupational Therapy Education     Title: PT OT SLP Therapies (In Progress)     Topic: Occupational Therapy (Done)     Point: ADL training (Done)     Description:   Instruct learner(s) on proper safety adaptation and remediation techniques during self care or transfers.   Instruct in proper use of assistive devices.              Learning Progress Summary           Patient Acceptance, E, VU by  at 2/14/2023 1500    Comment: hip precautions, pursed lip breathing, role of OT, BUE exercises, balance    Acceptance, E,D, VU,NR by  at 2/13/2023 1419    Acceptance, E,D, VU by  at 2/12/2023 1016    Comment: Pt educated on hand/foot placement during transfer for safety.    Acceptance, E, VU by  at 2/11/2023 1130    Comment: Pt educated on AAROM activities with good carryover.    Acceptance, E, VU,NR by  at 2/10/2023 1323    Comment: bed mobility techniques, pursed lip breathing, hip precautions, perineal hygiene                   Point: Precautions (Done)     Description:   Instruct learner(s) on prescribed precautions during self-care and functional transfers.              Learning Progress Summary           Patient Acceptance, E, VU by  at 2/14/2023 1500    Comment: hip precautions, pursed lip breathing, role of OT, BUE exercises, balance    Acceptance, E,D, VU,NR by  at 2/13/2023  1419    Acceptance, E, VU,NR by  at 2/10/2023 1323    Comment: bed mobility techniques, pursed lip breathing, hip precautions, perineal hygiene                   Point: Body mechanics (Done)     Description:   Instruct learner(s) on proper positioning and spine alignment during self-care, functional mobility activities and/or exercises.              Learning Progress Summary           Patient Acceptance, E, VU by  at 2/14/2023 1500    Comment: hip precautions, pursed lip breathing, role of OT, BUE exercises, balance    Acceptance, E,D, VU,NR by  at 2/13/2023 1419    Acceptance, E, VU,NR by  at 2/10/2023 1323    Comment: bed mobility techniques, pursed lip breathing, hip precautions, perineal hygiene                               User Key     Initials Effective Dates Name Provider Type Discipline     06/16/21 -  Thuy Power, OTR/L Occupational Therapist OT     09/22/22 -  Nadira Aparicio COTA Occupational Therapist Assistant THERAPIES     12/12/22 -  Radha Delgado OT Student OT Student OT                  OT Recommendation and Plan  Planned Therapy Interventions (OT): activity tolerance training, adaptive equipment training, BADL retraining, functional balance retraining, occupation/activity based interventions, patient/caregiver education/training, strengthening exercise, transfer/mobility retraining  Therapy Frequency (OT): 5 times/wk  Plan of Care Review  Plan of Care Reviewed With: patient  Progress: improving  Outcome Evaluation: OT tx complete. Pt A&Ox4 c/o burning tongue from acid reflux. Pt required encouragement to participate in therapy but demonstrated good effort, stating he wanted to get stronger to see his grandkids. Pt Ruby for supine>sit due to decreased core and LE strength. Pt required frequent vcs to correct L & posterior lean. Pt completed 10 reps of B shoulder flexion, abduction, and elbow flexion. Pt had to perform shoulder AROM one arm at a time to maintain balance. Pt  sit<>stand once Ruby using fw walker, declined any more mobility due to fatigue. OT will continue to treat to increase activity tolerance and strength for ADLs and fxnal mobility. Recommend d/c to SNF.  Plan of Care Reviewed With: patient  Outcome Evaluation: OT tx complete. Pt A&Ox4 c/o burning tongue from acid reflux. Pt required encouragement to participate in therapy but demonstrated good effort, stating he wanted to get stronger to see his grandkids. Pt Ruby for supine>sit due to decreased core and LE strength. Pt required frequent vcs to correct L & posterior lean. Pt completed 10 reps of B shoulder flexion, abduction, and elbow flexion. Pt had to perform shoulder AROM one arm at a time to maintain balance. Pt sit<>stand once Ruby using fw walker, declined any more mobility due to fatigue. OT will continue to treat to increase activity tolerance and strength for ADLs and fxnal mobility. Recommend d/c to SNF.     Outcome Measures     Row Name 02/14/23 1400 02/14/23 1017 02/13/23 0900       How much help from another person do you currently need...    Turning from your back to your side while in flat bed without using bedrails? -- 3  -LY 3  -WK    Moving from lying on back to sitting on the side of a flat bed without bedrails? -- 3  -LY 3  -WK    Moving to and from a bed to a chair (including a wheelchair)? -- 3  -LY 3  -WK    Standing up from a chair using your arms (e.g., wheelchair, bedside chair)? -- 3  -LY 2  -WK    Climbing 3-5 steps with a railing? -- 1  -LY 1  -WK    To walk in hospital room? -- 3  -LY 3  -WK    AM-PAC 6 Clicks Score (PT) -- 16  -LY 15  -WK       How much help from another is currently needed...    Putting on and taking off regular lower body clothing? 2  -JJ (r) EC (t) JJ (c) -- --    Bathing (including washing, rinsing, and drying) 2  -JJ (r) EC (t) JJ (c) -- --    Toileting (which includes using toilet bed pan or urinal) 2  -JJ (r) EC (t) JJ (c) -- --    Putting on and taking off  regular upper body clothing 3  -JJ (r) EC (t) JJ (c) -- --    Taking care of personal grooming (such as brushing teeth) 3  -JJ (r) EC (t) JJ (c) -- --    Eating meals 4  -JJ (r) EC (t) JJ (c) -- --    AM-PAC 6 Clicks Score (OT) 16  -JJ (r) EC (t) -- --       Functional Assessment    Outcome Measure Options AM-PAC 6 Clicks Daily Activity (OT)  -JJ (r) EC (t) JJ (c) AM-PAC 6 Clicks Basic Mobility (PT)  -LY AM-PAC 6 Clicks Basic Mobility (PT)  -WK    Row Name 02/12/23 1256 02/12/23 1000          How much help from another person do you currently need...    Turning from your back to your side while in flat bed without using bedrails? 2  -WK --     Moving from lying on back to sitting on the side of a flat bed without bedrails? 2  -WK --     Moving to and from a bed to a chair (including a wheelchair)? 2  -WK --     Standing up from a chair using your arms (e.g., wheelchair, bedside chair)? 2  -WK --     Climbing 3-5 steps with a railing? 1  -WK --     To walk in hospital room? 1  -WK --     AM-PAC 6 Clicks Score (PT) 10  -WK --        How much help from another is currently needed...    Putting on and taking off regular lower body clothing? -- 2  -LS     Bathing (including washing, rinsing, and drying) -- 2  -LS     Toileting (which includes using toilet bed pan or urinal) -- 1  -LS     Putting on and taking off regular upper body clothing -- 2  -LS     Taking care of personal grooming (such as brushing teeth) -- 3  -LS     Eating meals -- 3  -LS     AM-PAC 6 Clicks Score (OT) -- 13  -LS        Functional Assessment    Outcome Measure Options AM-PAC 6 Clicks Basic Mobility (PT)  -WK AM-PAC 6 Clicks Daily Activity (OT)  -LS           User Key  (r) = Recorded By, (t) = Taken By, (c) = Cosigned By    Initials Name Provider Type    WK Faiza, Nai A, PTA Physical Therapist Assistant    Iraida Schaefer, PTA Physical Therapist Assistant    Radhika Barry, OTR/L, CSRS Occupational Therapist    Nadira Shukla COTA  Occupational Therapist Assistant    Radha Pereira, OT Student OT Student                Time Calculation:    Time Calculation- OT     Row Name 02/14/23 1441 02/14/23 1115          Time Calculation- OT    OT Start Time 1355  -JJ (r) EC (t) JJ (c) --     OT Stop Time 1440  -JJ (r) EC (t) JJ (c) --     OT Time Calculation (min) 45 min  -JJ (r) EC (t) --     Total Timed Code Minutes- OT 45 minute(s)  -JJ (r) EC (t) JJ (c) --     OT Received On 02/14/23  -JJ (r) EC (t) JJ (c) --        Timed Charges    70920 - Gait Training Minutes  -- 25  -LY        Total Minutes    Timed Charges Total Minutes -- 25  -LY      Total Minutes -- 25  -LY           User Key  (r) = Recorded By, (t) = Taken By, (c) = Cosigned By    Initials Name Provider Type    LY Iraida Bedoya, PTA Physical Therapist Assistant    Radhika Barry, OTR/L, CSRS Occupational Therapist    Radha Pereira, OT Student OT Student                       Radha Delgado, OT Student  2/14/2023

## 2023-02-15 NOTE — PAYOR COMM NOTE
"DC TO SNF 2-14-23  [id number]  Jose Alfredo Kirkpatrick (87 y.o. Male)     Date of Birth   1935    Social Security Number       Address   [address] [place] [address]    Home Phone   [phone]    MRN   [id number]       Catholic   [facility]    Marital Status                               Admission Date   2/8/23    Admission Type   Emergency    Admitting Provider   Isaac Scales DO    Attending Provider       Department, Room/Bed   [facility] 4B, 406/1       Discharge Date   2/14/2023    Discharge Disposition   Skilled Nursing Facility (DC - External)    Discharge Destination                               Attending Provider: (none)   Allergies: No Known Allergies    Isolation: None   Infection: COVID (History) (02/09/23), COVID (rule out) (02/14/23)   Code Status: Prior    Ht: 190.5 cm (75\")   Wt: 83.4 kg (183 lb 12.8 oz)    Admission Cmt: None   Principal Problem: Pneumonia of left lower lobe due to infectious organism [J18.9]                 Active Insurance as of 2/8/2023     Primary Coverage     Payor Plan Insurance Group Employer/Plan Group    ANTHEM MEDICARE REPLACEMENT ANTHEM MEDICARE ADVANTAGE [id number]     Payor Plan Address Payor Plan Phone Number Payor Plan Fax Number Effective Dates    [address] [phone]  1/1/2023 - None Entered    [facility] [id number]       Subscriber Name Subscriber Birth Date Member ID       JOSE ALFREDO KIRKPATRICK 1935 [id number]                 Emergency Contacts      (Rel.) Home Phone Work Phone Mobile Phone    LOUIS WELLS (Daughter) -- -- [phone]               Discharge Summary      Olena Craft, APRN at 02/14/23 1543     Attestation signed by Isaac Scales DO at 02/14/23 164    This visit was performed by both a physician and an APC. I personally evaluated and examined the patient. I performed all aspects of the MDM as documented.    Seen and evaluated. Agree with discharge plan. "     Cardiovascular:      Rate and Rhythm: Normal rate and regular rhythm.      Heart sounds: No murmur heard.  Pulmonary:      Breath sounds: No wheezing, rhonchi or rales.   Abdominal:      Palpations: Abdomen is soft.      Tenderness: There is no abdominal tenderness.     Electronically signed by Isaac Scales DO, 2/14/2023, 16:46 CST.                          [facility] Medicine Services  DISCHARGE SUMMARY     Date of Admission: 2/8/2023  Date of Discharge:  2/14/2023  Primary Care Physician: Otilio Ramos MD    Presenting Problem/History of Present Illness:  Generalized weakness    Final Discharge Diagnoses:  Active Hospital Problems    Diagnosis    • **Pneumonia of left lower lobe due to infectious organism    • E. coli UTI, present on admission (urinary tract infection)    • Severe malnutrition (HCC)    • Paraesophageal hiatal hernia    • Erosive esophagitis    • Large hiatal hernia    • Weight loss    • Positive fecal occult blood test    • Primary hypertension      Consults:   Dr. Edmond, gastroenterology  Dr. Funes, general surgery    Procedures Performed:   Esophagogastroduodenoscopy 2/9/2023  Findings: Hypopharynx and larynx grossly appeared normal.  Because of the proximal esophagus appeared normal.  Severe, grade IV, erosive esophagitis of mid and distal esophagus, manifested by numerous linear and irregular shaped fibrin covering ulcerations and erosions, edema and erythema, but without signs of any active bleeding.  Very large hiatal hernia, with essentially almost entire stomach patient currently chest, with configuration suggestive of, at least, partial volvulus of the stomach.  No ulcerations, but mild diffuse edema and erythema of gastric mucosa.  Gastric biopsy obtained with cold biopsy forceps to rule out H. pylori.  Normal-appearing duodenum.     Complications: No immediate complications.     Recommendations: Protonix 40 mg twice daily. Carafate  suspension 10 cc before every meal and at bedtime.  Full liquid diet.  Evaluation by surgery for for possible repair of hiatal hernia/gastropexy.    Pertinent Test Results:   Results for orders placed during the hospital encounter of 02/08/23    Adult Transthoracic Echo Complete W/ Cont if Necessary Per Protocol    Interpretation Summary  •  Left ventricular systolic function is normal. Left ventricular ejection fraction appears to be 51 - 55%.  •  Left ventricular wall thickness is consistent with mild concentric hypertrophy.  •  Left ventricular diastolic function is consistent with (grade II w/high LAP) pseudonormalization.  •  The left atrial cavity is mildly dilated.  •  The aortic valve exhibits sclerosis. There is mild thickening of the aortic valve. Mild to moderate aortic valve regurgitation is present. Mild aortic valve stenosis is present.  •  Mild mitral annular calcification is present.  •  Moderate pulmonary hypertension is present  •  Mild dilation of the aortic root is present      Imaging Results (All)     Procedure Component Value Units Date/Time    XR Shoulder 2+ View Right [[id number]] Collected: 02/12/23 1143     Updated: 02/12/23 1147    Narrative:      EXAM/TECHNIQUE: XR SHOULDER 2+ VW RIGHT-     INDICATION: RIGHT shoulder pain     COMPARISON: 01/28/2020     FINDINGS:     No acute fracture or dislocation. Moderate degenerative change in the AC  joint. Included portion of the RIGHT chest appears unremarkable. No  acute soft tissue finding.       Impression:         1.  No acute osseous findings.  2.  Moderate RIGHT AC joint osteoarthritis.  This report was finalized on 02/12/2023 11:44 by Dr. Gurmeet Castillo MD.    XR Elbow 3+ View Right [[id number]] Collected: 02/12/23 1140     Updated: 02/12/23 1145    Narrative:      EXAM/TECHNIQUE: XR ELBOW 3+ VW RIGHT-     INDICATION: Right elbow and shoulder pain, difficulty moving;  J18.9-Pneumonia, unspecified organism; R77.8-Other specified  abnormalities  of plasma proteins; K57.90-Diverticulosis of intestine,  part unspecified, without perforation or abscess without bleeding;  I71.43-Infrarenal abdominal aortic aneurysm, without rupture;  N39.0-Urinary tract infection, site not specified; K44.9-Diaphragmatic  hernia without obs     COMPARISON: None available.     FINDINGS:     Severe osteoarthritis throughout the RIGHT elbow with marked joint space  narrowing and osteophyte development. Elbow joint effusion is present.  No definite acute fracture. No dislocation. Heterotopic calcification  the posterior elbow noted. No radiopaque foreign body or soft tissue  gas.       Impression:         1.  No evidence of acute fracture.  2.  Severe osteoarthritis and elbow joint effusion.  This report was finalized on 02/12/2023 11:42 by Dr. Gurmeet Castillo MD.    XR Abdomen KUB [[id number]] Collected: 02/10/23 2134     Updated: 02/10/23 2141    Narrative:      HISTORY: Large hiatal hernia with organoaxial rotation of the stomach.  Delayed emptying of the stomach from upper GI exam performed earlier  this afternoon. Follow-up KUB recommended.     KUB: A frontal view the abdomen and pelvis obtained at 7:23 PM on  02/10/2023. A chest x-ray was performed for 445 pm incidentally and  included with this exam. Upper GI study performed this afternoon  completed at  3pm.     On the 4:45 PM chest x-ray, there is retained contrast the distal  esophagus as well as the herniated stomach indicating delayed gastric  emptying. On this 7:23 PM KUB study, there is contrast present  throughout the nondilated small and large bowel contrast present in the  rectum. IVC filter in place. Right hip prosthesis. Vascular  calcifications.       Impression:      1. Follow-up imaging after upper GI exam is suggested delayed gastric  emptying, however there is no gastric outlet obstruction with contrast  present throughout the small and large bowel to the level of the rectum  on the KUB performed 4 hours  following completion of the upper GI exam.  This report was finalized on 02/10/2023 21:38 by Dr. Radhika Ross MD.    FL Upper GI Water Soluble [[id number]] Collected: 02/10/23 1520     Updated: 02/10/23 1534    Narrative:      EXAMINATION: FL UPPER GI WATER SOLUBLE-     2/10/2023 2:38 PM CST     HISTORY: Paraesophageal hiatal hernia; J18.9-Pneumonia, unspecified  organism; R77.8-Other specified abnormalities of plasma proteins;  K57.90-Diverticulosis of intestine, part unspecified, without  perforation or abscess without bleeding; I71.43-Infrarenal abdominal  aortic aneurysm, without rupture; N39.0-Urinary tract infection, site  not specified; K44.9-Diaphragmatic hernia without obstruction or  gangrene     The fluoroscopy is performed and images of the upper GI are obtained  during and after ingestion of water soluble contrast  material/Gastrografin.     There is no previous study for comparison.     There is no difficulty in initiating the swallowing.     There is normal propagation of the bolus through the oropharynx into the  esophagus.     Normal esophagus seen. The esophagus is displaced to the left. No  intrinsic abnormality.     There is a large hiatal hernia. 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 There is gastroesophageal reflux.     The entire stomach is contained in the hiatal hernia in the left chest.  The gastric antrum is at the hiatus with severe narrowing of the antrum.  No contrast is seen passing the antrum/esophageal hiatus into the  abdominal part of the antrum/duodenum after several minutes.       Impression:      1. A large hiatal hernia containing most of the stomach. Organoaxial  rotation of the stomach.  2. Severe narrowing of the gastric antrum at the level of the esophagus  hiatus. No contrast passed beyond the antrum into the abdominal part of  the GI tract.  3. A delayed image after 4 hours would be obtained to see the  propagation of the contrast bolus.  4. Fluoroscopy time: 2  minutes 36 seconds.  5. The dose: 82.2mGy.  6. Number of images: 25  This report was finalized on 02/10/2023 15:31 by Dr. Mirtha Perez MD.    CT Abdomen Pelvis Without Contrast [[id number]] Collected: 02/08/23 1820     Updated: 02/08/23 1831    Narrative:      CT ABDOMEN PELVIS WO CONTRAST- 2/8/2023 6:08 PM CST     HISTORY: new onset bloody stools, recent diarrhea      COMPARISON: None      DOSE LENGTH PRODUCT: 337 mGy cm. Automated exposure control was also  utilized to decrease patient radiation dose.     TECHNIQUE: Noncontrast enhanced images of the abdomen and pelvis  obtained without oral contrast. Multiplanar reformatted images were  provided for review.      FINDINGS:   LOWER CHEST: Large hiatal hernia. Atelectasis present in the left lower  lobe and there is also a trace layering left pleural effusion. Coronary  atheromatous calcification.      LIVER: No focal liver lesion within limits of a noncontrast study.      BILIARY SYSTEM: Cholelithiasis. Gallbladder is nondistended. Biliary  tree is nondilated.      PANCREAS: No focal pancreatic lesion within limits of a noncontrast  study.      SPLEEN: Unremarkable.      KIDNEYS: Iodinated contrast identified in the upper urinary tracts. No  hydronephrosis. Ureters are nondilated.      ADRENALS: Unremarkable.     RETROPERITONEUM: No mass, lymphadenopathy or hemorrhage.      GI TRACT: Large hiatal hernia. The stomach is nondistended. Small bowel  loops are nondilated. Colonic diverticulosis. The colon is nondistended.  No inflammatory changes are identified along the colon. No bowel wall  thickening identified. Surrounding fat is clean.     OTHER: There is no mesenteric mass, lymphadenopathy or fluid collection.  No intraperitoneal free fluid or free air. IVC filter is in place.  Infrarenal AAA measuring up to 3.6 cm in diameter. Right hip  arthroplasty. No acute bony abnormality is seen. There appears to be a  high-grade spinal stenosis at the L1-L2 disc level  with complete loss of  disc height at this level. There is an old fracture identified across  the inferior endplate of T9.     PELVIS: Bladder wall trabeculation. Air identified in the urinary  bladder. No pelvic adenopathy identified.       Impression:      1. Colonic diverticulosis. Bowel is nondistended. No inflammatory  changes are seen along the bowel.  2. Stomach and small bowel are decompressed. Hiatal hernia is present.  3. Cholelithiasis.  4. Bladder wall trabeculation which may be secondary to chronic partial  outlet obstruction.  5. Infrarenal AAA measuring up to 3.6 cm in diameter.  This report was finalized on 02/08/2023 18:28 by Dr Geremias Martinez, .    CT Angiogram Chest [[id number]] Collected: 02/08/23 1659     Updated: 02/08/23 1704    Narrative:      EXAMINATION: CT ANGIOGRAM CHEST-      2/8/2023 4:43 PM CST     HISTORY: Shortness of breath, chest pain, weakness, recent surgery and  immobilization since.     In order to have a CT radiation dose as low as reasonably achievable  Automated Exposure Control was utilized for adjustment of the mA and/or  KV according to patient size.     DLP in mGycm= 194.     CT angiogram chest with IV contrast.  CT angiography protocol.   CT imaging with bolus IV contrast injection.   Under concurrent supervision axial, sagittal, coronal,  three-dimensional, and MIP data sets were constructed on an independent  work station.     Normal heart size.  Intrathoracic stomach with left lung base consolidation compatible with  pneumonia.     Small left and trace right pleural effusion.     Symmetric and normally opacified pulmonary arteries.  No pulmonary embolism.     Upper lobes are clear.  No pneumothorax.     Small gallstones incidentally noted.     Summary:  1. Left lower lobe pneumonia.  2. No pulmonary embolism.   This report was finalized on 02/08/2023 17:01 by Dr. Fawad Smith MD.    XR Chest 1 View [[id number]] Collected: 02/08/23 1634     Updated: 02/08/23 1639     Narrative:      XR CHEST 1 VW- 2/8/2023 4:25 PM CST     HISTORY: chest pain, weakness     COMPARISON: Chest exam dated 10/01/2019.     FINDINGS:      No lung consolidation. No pleural effusion or pneumothorax. Heart size  is normal. Pulmonary vasculature are nondilated. Hiatal hernia  superimposed over the cardiac silhouette. The osseous structures and  surrounding soft tissues demonstrate no acute abnormality.       Impression:      1. Stable chest exam without acute process.  2. Hiatal hernia.        This report was finalized on 02/08/2023 16:36 by Dr Geremias Martinez, .        LAB RESULTS:      Lab 02/14/23  0704 02/13/23 2003 02/13/23  0745 02/13/23  0446 02/12/23  1817 02/12/23  0412 02/11/23  1610 02/11/23  0449 02/10/23  1820 02/10/23  0445 02/09/23  0009 02/08/23  1459   WBC 8.40  --   --  10.03  --  13.48*  --  11.88*  --  7.80   < > 9.94   HEMOGLOBIN 9.7* 9.7* 10.4* 9.7* 11.0* 10.4*   < > 12.5*   < > 11.4*   < > 11.6*   HEMATOCRIT 31.4* 30.9* 33.1* 30.6* 34.5* 34.0*   < > 39.7   < > 36.5*   < > 37.4*   PLATELETS 680*  --   --  653*  --  686*  --  772*  --  667*   < > 608*   NEUTROS ABS 6.17  --   --  7.64*  --  10.62*  --  9.68*  --  5.23   < > 7.32*   IMMATURE GRANS (ABS) 0.06*  --   --  0.07*  --  0.14*  --  0.14*  --  0.13*   < > 0.14*   LYMPHS ABS 1.32  --   --  1.36  --  1.53  --  1.12  --  1.37   < > 1.56   MONOS ABS 0.51  --   --  0.70  --  0.93*  --  0.87  --  0.76   < > 0.69   EOS ABS 0.28  --   --  0.21  --  0.20  --  0.03  --  0.24   < > 0.17   MCV 94.6  --   --  93.6  --  95.0  --  93.4  --  93.4   < > 93.3   PROCALCITONIN  --   --   --   --   --   --   --   --   --   --   --  0.19   LACTATE  --   --   --   --   --   --   --   --   --   --   --  1.2   PROTIME  --   --   --   --   --   --   --   --   --   --   --  15.2*   APTT  --   --   --   --   --   --   --   --   --   --   --  34.7    < > = values in this interval not displayed.         Lab 02/14/23  0704 02/13/23  0446 02/12/23  0412  02/11/23  0449 02/10/23  0445 02/09/23  0732 02/08/23  1459   SODIUM 138 139 138 143 139   < > 134*   POTASSIUM 3.7 3.4* 3.3* 3.7 3.9   < > 4.1   CHLORIDE 103 103 99 100 101   < > 100   CO2 26.0 27.0 28.0 28.0 26.0   < > 25.0   ANION GAP 9.0 9.0 11.0 15.0 12.0   < > 9.0   BUN 16 22 24* 19 16   < > 18   CREATININE 0.94 1.07 1.23 1.04 0.97   < > 0.74*   EGFR 78.9 67.6 57.2* 69.9 76.0   < > 88.2   GLUCOSE 109* 118* 128* 179* 112*   < > 136*   CALCIUM 8.3* 8.4* 8.4* 9.0 8.6   < > 8.3*   MAGNESIUM  --   --   --   --   --   --  2.2    < > = values in this interval not displayed.         Lab 02/08/23  1459 02/08/23  1100   TOTAL PROTEIN 6.4 5.9*   ALBUMIN 3.2* 3.5   GLOBULIN 3.2 2.4   ALT (SGPT) 20 19   AST (SGOT) 32 34   BILIRUBIN 0.4 0.4   ALK PHOS 89 95   AMYLASE  --  32   LIPASE 34 42         Lab 02/09/23  0009 02/08/23  1716 02/08/23  1459 02/08/23  1100   PROBNP  --   --  458.9  --    HSTROP T 44* 30* 36* 36*   PROTIME  --   --  15.2*  --    INR  --   --  1.18*  --                  Brief Urine Lab Results  (Last result in the past 365 days)      Color   Clarity   Blood   Leuk Est   Nitrite   Protein   CREAT   Urine HCG        02/08/23 1716 Yellow   Clear   Negative   Small (1+)   Negative   Negative               Microbiology Results (last 10 days)     Procedure Component Value - Date/Time    Blood Culture With DELFINO - Blood, Arm, Right [[id number]]  (Normal) Collected: 02/12/23 0806    Lab Status: Preliminary result Specimen: Blood from Arm, Right Updated: 02/14/23 0815     Blood Culture No growth at 2 days    MRSA Screen, PCR (Inpatient) - Swab, Nares [[id number]]  (Normal) Collected: 02/10/23 1653    Lab Status: Final result Specimen: Swab from Nares Updated: 02/10/23 1843     MRSA PCR No MRSA Detected    Narrative:      The negative predictive value of this diagnostic test is high and should only be used to consider de-escalating anti-MRSA therapy. A positive result may indicate colonization with MRSA and must be  correlated clinically.    S. Pneumo Ag Urine or CSF - Urine, Urine, Clean Catch [[id number]]  (Normal) Collected: 02/10/23 1658    Lab Status: Final result Specimen: Urine, Clean Catch Updated: 02/10/23 1758     Strep Pneumo Ag Negative    Legionella Antigen, Urine - Urine, Urine, Clean Catch [[id number]]  (Normal) Collected: 02/10/23 1658    Lab Status: Final result Specimen: Urine, Clean Catch Updated: 02/10/23 1758     LEGIONELLA ANTIGEN, URINE Negative    Blood Culture - Blood, Arm, Left [[id number]]  (Normal) Collected: 02/08/23 2022    Lab Status: Final result Specimen: Blood from Arm, Left Updated: 02/13/23 2046     Blood Culture No growth at 5 days    Blood Culture - Blood, Arm, Right [[id number]]  (Abnormal) Collected: 02/08/23 2012    Lab Status: Final result Specimen: Blood from Arm, Right Updated: 02/12/23 0610     Blood Culture Staphylococcus, coagulase negative     Isolated from Anaerobic Bottle     Gram Stain Anaerobic Bottle Gram positive cocci in clusters    Narrative:      Probable contaminant requires clinical correlation, susceptibility not performed unless requested by physician.      Blood Culture ID, PCR - Blood, Arm, Right [[id number]]  (Abnormal) Collected: 02/08/23 2012    Lab Status: Final result Specimen: Blood from Arm, Right Updated: 02/10/23 1528     BCID, PCR Staph spp, not aureus or lugdunensis. Identification by BCID2 PCR.     BOTTLE TYPE Anaerobic Bottle    Urine Culture - Urine, Straight Cath [[id number]]  (Abnormal)  (Susceptibility) Collected: 02/08/23 1716    Lab Status: Final result Specimen: Urine from Straight Cath Updated: 02/10/23 1253     Urine Culture >100,000 CFU/mL Escherichia coli    Narrative:      Colonization of the urinary tract without infection is common. Treatment is discouraged unless the patient is symptomatic, pregnant, or undergoing an invasive urologic procedure.    Susceptibility      Escherichia coli      ALISON      Ampicillin Susceptible      Ampicillin +  Sulbactam Susceptible      Cefazolin Susceptible      Cefepime Susceptible      Ceftazidime Susceptible      Ceftriaxone Susceptible      Gentamicin Susceptible      Levofloxacin Susceptible      Nitrofurantoin Susceptible      Piperacillin + Tazobactam Susceptible      Trimethoprim + Sulfamethoxazole Susceptible                           COVID-19, FLU A/B, RSV PCR - Swab, Nasopharynx [[id number]]  (Normal) Collected: 02/08/23 1715    Lab Status: Final result Specimen: Swab from Nasopharynx Updated: 02/08/23 1820     COVID19 Not Detected     Influenza A PCR Not Detected     Influenza B PCR Not Detected     RSV, PCR Not Detected    Narrative:      Fact sheet for providers: https://www.fda.gov/media/019374/download    Fact sheet for patients: https://www.fda.gov/media/710779/download    Test performed by PCR.        Hospital Course: Mr. Kirkpatrick presented to ER 2/8/2023 from SNF with fatigue, weakness, dysphagia and black stool.  Patient had recent right hip hemiarthroplasty1/28/2023 by Dr. Quintana at [facility] and was discharged to skilled nursing facility on 2/2/2023.  Patient reported constipation that he attributed to pain medication.  In ER, he tested fecal occult blood positive.  Patient reported dysphagia of solid foods for the past several years and reported that food felt as though it was getting hung in his esophagus.  Patient had 3 previous endoscopies between 2014 and 2019.  2019 patient had melena and was found to have gastric ulcer managed conservatively.  Repeat endoscopy showed completely healed ulcer.  Last colonoscopy 2019 revealed small polyps removed.  Patient reported 24 pound weight loss over the last several months but most recently after his wife passed away within the past month.  Hemoglobin 11.6.  WBC normal.  LFTs normal.  CT scan of abdomen noted colonic diverticulosis without any inflammatory changes unremarkable stomach, small bowel, hiatal hernia, cholelithiasis without cholecystitis,  bladder wall trabeculation and 3.6 cm infrarenal abdominal aortic aneurysm.  Patient denied chest pain.  CT scan showed bilateral pleural effusions with concern for parapneumonic effusion.  CTA chest noted no pulmonary emboli.  Left lower lobe pneumonia.    He was admitted to the medical floor with pneumonia left lower lobe. Initially, he received azithromycin and Rocephin.  Antibiotics changed to Zosyn due to recent hospital stay.  Blood cultures no growth at 5 days.  Strep pneumonia negative, Legionella negative, MRSA PCR negative.  Duo nebs and incentive spirometry continued. Suspect that paraesophageal hernia contributing to left lower lobe consolidation. Patient remains afebrile.  WBC 8.4 on date of discharge.  DELFINO no growth at 2 days. Zosyn switched to Augmentin twice daily for 3 days on 2/13/2023.  Discontinue Augmentin after last dose on 2/15/2023.    Fecal occult blood positive but hemoglobin remained stable.  Hemoglobin 11.6 on admission and 9.6 on date of discharge.  Stools at our facility were green and brown with no evidence of bright red bleeding per rectum.  Patient attributed dark stools to constipation secondary to narcotics.  Patient had diarrhea for 2 days and has had no additional diarrhea.    Patient reported dysphagia and GI consulted. Dr. Edmond recommended endoscopy for evaluation of melena, dysphagia and odynophagia.  Patient agreeable.  Findings: Hypopharynx and larynx grossly appeared normal.  Because of the proximal esophagus appeared normal.  Severe, grade IV, erosive esophagitis of mid and distal esophagus, manifested by numerous linear and irregular shaped fibrin covering ulcerations and erosions, edema and erythema, but without signs of any active bleeding.  Very large hiatal hernia, with essentially almost entire stomach patient currently chest, with configuration suggestive of, at least, partial volvulus of the stomach.  No ulcerations, but mild diffuse edema and erythema of gastric  mucosa.  Gastric biopsy obtained with cold biopsy forceps to rule out H. pylori.  Normal-appearing duodenum.     Recommendations: Protonix 40 mg twice daily. Carafate suspension 10 cc before every meal and at bedtime.  Full liquid diet.  Evaluation by surgery for for possible repair of hiatal hernia/gastropexy.   Protonix switch to oral prior to discharge and patient's diet was advanced to regular, ground meat.    Patient was found to have paraesophageal hernia. General surgery consulted and Dr. Funes noted very large hiatal hernia and difficult situation given age and frality with recent surgery and hip surgery.  Upper GI 2/10 noted large hiatal hernia containing most of stomach.  Organoxial rotation of stomach.  Severe narrowing of the gastric antrum at the level of the esophagus hiatus.  No contrast passed beyond the antrum into the abdominal part of GI tract.  Delayed imaging 4 hours obtained.  KUB following upper GI suggest delayed gastric emptying.  No gastric outlet obstruction present throughout the small and large bowel to the level of the rectum.  Discussed with Dr. Funes and would like to recover from recent hip surgery before surgical intervention.  Patient agrees but also understands may require more urgent attention.  He has been on full liquid diet and was advanced to regular diet ground meat and patient tolerated without vomiting. Dr. Funes will follow-up with patient on 3/2/2023.    Urinalysis 31-50 WBC, 4+ bacteria.  Urine culture positive for E. coli, present on admission.  Culture sensitive to Zosyn which was also treating pneumonia.  Zosyn switched to oral Augmentin to complete antibiotic treatment for presumed pneumonia.    Potassium 3.4 and replaced.  Repeat potassium 2.7 on date of discharge.     Recent right hip hemiarthroplasty 1/28/2023 Dr. Quintana.  Discussed with Brendan Petersen PA-C and patient was allowed weightbearing as tolerated. Physical therapy consulted.   Patient was slow to  "participate with physical therapy.  However, on 2/14/2023 patient stood and ambulated 10 feet to the chair.  On 2/14/2023, patient ambulated 20 feet with rolling walker and contact-guard.  Patient has been encouraged to continue to work aggressively with physical therapy for increased strength and endurance prior to follow-up appointment with Dr. Funes.    Lisinopril and nifedipine continued for primary hypertension.  Blood pressure 120/61.    Echocardiogram 2/9/2023 noted ejection fraction 51-55%.    Patient reported tongue feeling raw and slightly numb on the right side.  Suspect this is also related to large paraesophageal hernia promoting pressure.  Mycostatin swish and swallow ordered and patient reported improvement of symptoms.    EKG 2/8/2023 noted normal sinus rhythm second-degree AV block (Mobitz type I).  Repeat EKG showed sinus tachycardia first-degree AV block.  No longer second-degree AV block present.  QT lengthened.    Social service consulted for discharge planning to facilitate patient to return to [facility] skilled nursing facility at discharge.    On 2/14/2023, bed offered and insurance approved.  Patient will be discharged to [facility] for ongoing physical therapy and Occupational Therapy prior to follow-up appointment with Dr. 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  Patient denies nausea or vomiting.  No complaints of chest pain or palpitations.  He is not requiring oxygen.  Patient will continue Protonix 40 mg twice daily and Carafate 1 g before meals and at bedtime as recommended by gastroenterology.  Discontinue Augmentin after last dose on 2/15/2023.  Keep scheduled appointment with Dr. Quintana.     Physical Exam on Discharge:  /61 (BP Location: Left arm, Patient Position: Lying)   Pulse 79   Temp 98.4 °F (36.9 °C) (Oral)   Resp 16   Ht 190.5 cm (75\")   Wt 83.4 kg (183 lb 12.8 oz) Comment: 1 pillow, 1 blanket, patient unable to stand for weight ; Zac BUSH aware  " SpO2 95%   BMI 22.97 kg/m²   Physical Exam  Vitals and nursing note reviewed.   Constitutional:       Comments: Sitting up in bed.  No oxygen in use.  No visitors in room   HENT:      Head: Normocephalic and atraumatic.      Nose: No congestion.      Mouth/Throat:      Pharynx: Oropharynx is clear. No oropharyngeal exudate or posterior oropharyngeal erythema.   Eyes:      Extraocular Movements: Extraocular movements intact.      Pupils: Pupils are equal, round, and reactive to light.   Cardiovascular:      Rate and Rhythm: Normal rate and regular rhythm.      Heart sounds: No murmur heard.     Comments: Normal sinus rhythm 67-78 on telemetry  Pulmonary:      Breath sounds: No wheezing, rhonchi or rales.      Comments: No Oxygen in use.  Abdominal:      Palpations: Abdomen is soft.      Tenderness: There is no abdominal tenderness.   Genitourinary:     Comments: Voiding.  Musculoskeletal:         General: Tenderness (Right shoulder) present. No swelling.      Cervical back: Normal range of motion and neck supple.   Skin:     General: Skin is warm and dry.   Neurological:      General: No focal deficit present.      Mental Status: He is alert and oriented to person, place, and time.   Psychiatric:         Mood and Affect: Mood normal.         Behavior: Behavior normal.         Thought Content: Thought content normal.         Judgment: Judgment normal.       Condition on Discharge: Stable for discharge to skilled facility for ongoing physical therapy and Occupational Therapy    Discharge Disposition: [facility]    Discharge Medications:     Discharge Medications      New Medications      Instructions Start Date   amoxicillin-clavulanate 875-125 MG per tablet  Commonly known as: AUGMENTIN   1 tablet, Oral, Every 12 Hours Scheduled.  Discontinue after last dose 2/15/2023.      lidocaine 5 %  Commonly known as: LIDODERM   1 patch, Transdermal, Every 24 Hours Scheduled, Remove & Discard patch within 12 hours or as directed  by MD   Start Date: February 15, 2023     nystatin 100,000 unit/mL suspension  Commonly known as: MYCOSTATIN   500,000 Units, Swish & Swallow, 4 Times Daily      oxyCODONE-acetaminophen 5-325 MG per tablet  Commonly known as: Percocet  Replaces: oxyCODONE-acetaminophen  MG per tablet   1 tablet, Oral, Every 12 Hours PRN      pantoprazole 40 MG EC tablet  Commonly known as: PROTONIX   40 mg, Oral, 2 Times Daily Before Meals         Changes to Medications      Instructions Start Date   sucralfate 1 GM/10ML suspension  Commonly known as: CARAFATE  What changed: when to take this   1 g, Oral, 4 Times Daily Before Meals & Nightly         Continue These Medications      Instructions Start Date   acetaminophen 325 MG tablet  Commonly known as: TYLENOL   650 mg, Oral, 2 Times Daily      bisacodyl 10 MG suppository  Commonly known as: DULCOLAX   10 mg, Rectal, Daily PRN      busPIRone 5 MG tablet  Commonly known as: BUSPAR   5 mg, Oral, 2 Times Daily      cyclobenzaprine 5 MG tablet  Commonly known as: FLEXERIL   5 mg, Oral, 2 Times Daily PRN      docusate sodium 100 MG capsule  Commonly known as: COLACE   100 mg, Oral, 2 Times Daily      Enoxaparin Sodium 40 MG/0.4ML solution prefilled syringe syringe  Commonly known as: LOVENOX   40 mg, Subcutaneous, Daily      gabapentin 300 MG capsule  Commonly known as: NEURONTIN   300 mg, Oral, 4 Times Daily      guaiFENesin 600 MG 12 hr tablet  Commonly known as: MUCINEX   600 mg, Oral, 2 Times Daily      hydrALAZINE 50 MG tablet  Commonly known as: APRESOLINE   50 mg, Oral, Every 8 Hours PRN      levalbuterol 0.31 MG/3ML nebulizer solution  Commonly known as: XOPENEX   1 ampule, Nebulization, Every 8 Hours PRN      linaclotide 145 MCG capsule capsule  Commonly known as: LINZESS   145 mcg, Oral, Daily      lisinopril 10 MG tablet  Commonly known as: PRINIVIL,ZESTRIL   10 mg, Oral, Daily      NIFEdipine XL 60 MG 24 hr tablet  Commonly known as: PROCARDIA XL   60 mg, Oral, Daily       ondansetron 4 MG tablet  Commonly known as: ZOFRAN   4 mg, Oral, Every 6 Hours PRN      polyethylene glycol packet  Commonly known as: MIRALAX   17 g, Oral, Daily PRN      simethicone 80 MG chewable tablet  Commonly known as: MYLICON   80 mg, Oral, Every 4 Hours PRN      vitamin B-12 1000 MCG tablet  Commonly known as: CYANOCOBALAMIN   1,000 mcg, Oral, Daily      vitamin D 1.25 MG (42641 UT) capsule capsule  Commonly known as: ERGOCALCIFEROL   50,000 Units, Oral, 3 Times Weekly, Give one time a day every Mon, Wed, Fri         Stop These Medications    omeprazole 20 MG capsule  Commonly known as: priLOSEC     oxyCODONE-acetaminophen  MG per tablet  Commonly known as: PERCOCET  Replaced by: oxyCODONE-acetaminophen 5-325 MG per tablet          Discharge Diet:   Diet Instructions     Diet: Regular, Dysphagia, Soft Texture; Pudding Thick Liquids; Ground; Mechanical Soft      Discharge Diet:  Regular  Dysphagia  Soft Texture       Fluid Consistency: Pudding Thick Liquids    Soft Options: Ground    Pureed Options: Mechanical Soft        Activity at Discharge:   Activity Instructions     Other Activity Instructions      Activity Instructions: Weightbearing as tolerated right lower extremity per Dr. Quintana         Discharge instructions:  1.  For worsening weakness seek medical attention.  2.  Weightbearing as tolerated right lower extremity per orthopedics.  3.  Protonix 40 mg orally twice daily per GI  4.  Carafate suspension 10 cc 4 times daily  5.  Augmentin 875/125 1 tablet every 12 hours for 3 days begin 2/13/2023.  Discontinue after last dose on 2/15/2023.  6.  Lovenox 40 mg subcu daily for DVT prophylaxis  7.  Regular diet with ground meat.  8.  Nutritional supplement with boost plus 3 times daily  10.  Sit in upright position for all meals and medications.  11.  Follow-up with Dr. Funes 3/6/2023  12.  Follow up with Dr. Ramos after discharged from [facility]    Follow-up Appointments:   Future Appointments    Date Time Provider Department Center   3/2/2023 10:30 AM Vicky Funes MD [facility]     Test Results Pending at Discharge: None    Electronically signed by HARISH Edwards, 02/14/23, 16:31 CST.    Time: 40 minutes for completion.  Discussed with Dr. Scales, Dr. Funes, and patient.  Discussed with daughter per telephone conversation.    The above documentation resulted from a face-to-face encounter by me Olena MOREIRA, [facility].          Electronically signed by Isaac Scales DO at 02/14/23 6546

## 2023-02-15 NOTE — THERAPY DISCHARGE NOTE
Acute Care - Physical Therapy Discharge Summary  [facility]       Patient Name: Jose Alfredo Kirkpatrick  : 1935  MRN: [id number]    Today's Date: 2/15/2023  Onset of Illness/Injury or Date of Surgery: 23       Referring Physician: Dr. Austin      Admit Date: 2023      PT Recommendation and Plan    Visit Dx:    ICD-10-CM ICD-9-CM   1. Pneumonia of left lower lobe due to infectious organism  J18.9 486   2. Elevated troponin  R77.8 790.6   3. Diverticulosis  K57.90 562.10   4. Infrarenal abdominal aortic aneurysm (AAA) without rupture  I71.43 441.4   5. Urinary tract infection without hematuria, site unspecified  N39.0 599.0   6. Hiatal hernia  K44.9 553.3   7. Biliary calculus of other site without obstruction  K80.80 574.20   8. Bloody stools  K92.1 578.1   9. Dysphagia  R13.10 787.20   10. Decreased activities of daily living (ADL)  Z78.9 V49.89   11. Impaired mobility  Z74.09 799.89        Outcome Measures     Row Name 23 1400 23 1017 23 0900       How much help from another person do you currently need...    Turning from your back to your side while in flat bed without using bedrails? -- 3  -LY 3  -WK    Moving from lying on back to sitting on the side of a flat bed without bedrails? -- 3  -LY 3  -WK    Moving to and from a bed to a chair (including a wheelchair)? -- 3  -LY 3  -WK    Standing up from a chair using your arms (e.g., wheelchair, bedside chair)? -- 3  -LY 2  -WK    Climbing 3-5 steps with a railing? -- 1  -LY 1  -WK    To walk in hospital room? -- 3  -LY 3  -WK    AM-PAC 6 Clicks Score (PT) -- 16  -LY 15  -WK       How much help from another is currently needed...    Putting on and taking off regular lower body clothing? 2  -JJ (r) EC (t) JJ (c) -- --    Bathing (including washing, rinsing, and drying) 2  -JJ (r) EC (t) JJ (c) -- --    Toileting (which includes using toilet bed pan or urinal) 2  -JJ (r) EC (t) JJ (c) -- --    Putting on and taking off regular upper body  clothing 3  -JJ (r) EC (t) JJ (c) -- --    Taking care of personal grooming (such as brushing teeth) 3  -JJ (r) EC (t) JJ (c) -- --    Eating meals 4  -JJ (r) EC (t) JJ (c) -- --    AM-PAC 6 Clicks Score (OT) 16  -JJ (r) EC (t) -- --       Functional Assessment    Outcome Measure Options AM-PAC 6 Clicks Daily Activity (OT)  -JJ (r) EC (t) JJ (c) AM-PAC 6 Clicks Basic Mobility (PT)  -LY AM-PAC 6 Clicks Basic Mobility (PT)  -WK    Row Name 02/12/23 1256             How much help from another person do you currently need...    Turning from your back to your side while in flat bed without using bedrails? 2  -WK      Moving from lying on back to sitting on the side of a flat bed without bedrails? 2  -WK      Moving to and from a bed to a chair (including a wheelchair)? 2  -WK      Standing up from a chair using your arms (e.g., wheelchair, bedside chair)? 2  -WK      Climbing 3-5 steps with a railing? 1  -WK      To walk in hospital room? 1  -WK      AM-PAC 6 Clicks Score (PT) 10  -WK         Functional Assessment    Outcome Measure Options AM-PAC 6 Clicks Basic Mobility (PT)  -WK            User Key  (r) = Recorded By, (t) = Taken By, (c) = Cosigned By    Initials Name Provider Type    WK Nai Kendall, PTA Physical Therapist Assistant    Iraida Schaefer PTA Physical Therapist Assistant    Radhika Barry, OTR/L, CSRS Occupational Therapist    EC Radha Delgado, OT Student OT Student                     PT Rehab Goals     Row Name 02/15/23 1036             Bed Mobility Goal 1 (PT)    Activity/Assistive Device (Bed Mobility Goal 1, PT) rolling to left;rolling to right;sit to supine/supine to sit  -NW      [place] Level/Cues Needed (Bed Mobility Goal 1, PT) minimum assist (75% or more patient effort);verbal cues required  -NW      Time Frame (Bed Mobility Goal 1, PT) long term goal (LTG)  -NW      Progress/Outcomes (Bed Mobility Goal 1, PT) goal met  -NW         Transfer Goal 1 (PT)    Activity/Assistive  Device (Transfer Goal 1, PT) sit-to-stand/stand-to-sit;bed-to-chair/chair-to-bed;walker, rolling  -NW      [place] Level/Cues Needed (Transfer Goal 1, PT) contact guard required  -NW      Time Frame (Transfer Goal 1, PT) long term goal (LTG)  -NW      Progress/Outcome (Transfer Goal 1, PT) goal not met  -NW         Gait Training Goal 1 (PT)    Activity/Assistive Device (Gait Training Goal 1, PT) gait (walking locomotion);improve balance and speed;increase endurance/gait distance;walker, rolling  -NW      [place] Level (Gait Training Goal 1, PT) contact guard required  -NW      Distance (Gait Training Goal 1, PT) 60  -NW      Time Frame (Gait Training Goal 1, PT) long term goal (LTG)  -NW      Progress/Outcome (Gait Training Goal 1, PT) goal not met  -NW            User Key  (r) = Recorded By, (t) = Taken By, (c) = Cosigned By    Initials Name Provider Type Discipline    Iliana Alcantar PTA Physical Therapist Assistant PT                    PT Discharge Summary  Anticipated Discharge Disposition (PT): skilled nursing facility  Reason for Discharge: Discharge from facility  Outcomes Achieved: Refer to plan of care for updates on goals achieved  Discharge Destination: SNF      Iliana Avila PTA   2/15/2023

## 2023-02-17 ENCOUNTER — TELEPHONE (OUTPATIENT)
Dept: PODIATRY | Facility: CLINIC | Age: 88
End: 2023-02-17
Payer: MEDICARE

## 2023-02-17 LAB — BACTERIA SPEC AEROBE CULT: NORMAL

## 2023-02-17 NOTE — THERAPY DISCHARGE NOTE
Acute Care - Occupational Therapy Discharge Summary  [facility]     Patient Name: Jose Alfredo Kirkpatrick  : 1935  MRN: [id number]    Today's Date: 2023  Onset of Illness/Injury or Date of Surgery: 23    Date of Referral to OT: 02/10/23  Referring Physician: Dr. Austin      Admit Date: 2023        OT Recommendation and Plan    Visit Dx:    ICD-10-CM ICD-9-CM   1. Pneumonia of left lower lobe due to infectious organism  J18.9 486   2. Elevated troponin  R77.8 790.6   3. Diverticulosis  K57.90 562.10   4. Infrarenal abdominal aortic aneurysm (AAA) without rupture  I71.43 441.4   5. Urinary tract infection without hematuria, site unspecified  N39.0 599.0   6. Hiatal hernia  K44.9 553.3   7. Biliary calculus of other site without obstruction  K80.80 574.20   8. Bloody stools  K92.1 578.1   9. Dysphagia  R13.10 787.20   10. Decreased activities of daily living (ADL)  Z78.9 V49.89   11. Impaired mobility  Z74.09 799.89                OT Rehab Goals     Row Name 23 0700             Transfer Goal 1 (OT)    Activity/Assistive Device (Transfer Goal 1, OT) sit-to-stand/stand-to-sit;shower chair;toilet;commode, bedside with drop arms  -LR      [place] Level/Cues Needed (Transfer Goal 1, OT) minimum assist (75% or more patient effort)  -LR      Time Frame (Transfer Goal 1, OT) long term goal (LTG)  -LR      Progress/Outcome (Transfer Goal 1, OT) goal not met  -LR         Bathing Goal 1 (OT)    Activity/Device (Bathing Goal 1, OT) upper body bathing;lower body bathing;grab bar, tub/shower;shower chair;long-handled sponge  -LR      [place] Level/Cues Needed (Bathing Goal 1, OT) minimum assist (75% or more patient effort)  -LR      Time Frame (Bathing Goal 1, OT) long term goal (LTG)  -LR      Progress/Outcomes (Bathing Goal 1, OT) goal not met  -LR         Toileting Goal 1 (OT)    Activity/Device (Toileting Goal 1, OT) adjust/manage clothing;perform perineal hygiene;commode;commode, bedside with drop  arms;grab bar/safety frame  -LR      [place] Level/Cues Needed (Toileting Goal 1, OT) minimum assist (75% or more patient effort)  -LR      Time Frame (Toileting Goal 1, OT) long term goal (LTG)  -LR      Progress/Outcome (Toileting Goal 1, OT) goal not met  -LR            User Key  (r) = Recorded By, (t) = Taken By, (c) = Cosigned By    Initials Name Provider Type Discipline    LR Loraine Rodríguez, OT Occupational Therapist OT                 Outcome Measures     Row Name 02/14/23 1400 02/14/23 1017          How much help from another person do you currently need...    Turning from your back to your side while in flat bed without using bedrails? -- 3  -LY     Moving from lying on back to sitting on the side of a flat bed without bedrails? -- 3  -LY     Moving to and from a bed to a chair (including a wheelchair)? -- 3  -LY     Standing up from a chair using your arms (e.g., wheelchair, bedside chair)? -- 3  -LY     Climbing 3-5 steps with a railing? -- 1  -LY     To walk in hospital room? -- 3  -LY     AM-PAC 6 Clicks Score (PT) -- 16  -LY        How much help from another is currently needed...    Putting on and taking off regular lower body clothing? 2  -JJ (r) EC (t) JJ (c) --     Bathing (including washing, rinsing, and drying) 2  -JJ (r) EC (t) JJ (c) --     Toileting (which includes using toilet bed pan or urinal) 2  -JJ (r) EC (t) JJ (c) --     Putting on and taking off regular upper body clothing 3  -JJ (r) EC (t) JJ (c) --     Taking care of personal grooming (such as brushing teeth) 3  -JJ (r) EC (t) JJ (c) --     Eating meals 4  -JJ (r) EC (t) JJ (c) --     AM-PAC 6 Clicks Score (OT) 16  -JJ (r) EC (t) --        Functional Assessment    Outcome Measure Options AM-PAC 6 Clicks Daily Activity (OT)  -JJ (r) EC (t) JJ (c) AM-PAC 6 Clicks Basic Mobility (PT)  -LY           User Key  (r) = Recorded By, (t) = Taken By, (c) = Cosigned By    Initials Name Provider Type    LY Iraida Bedoya, PTA Physical  Therapist Assistant    Radhika Barry, OTR/L, CSRS Occupational Therapist    Radha Pereira, OT Student OT Student                Timed Therapy Charges  Total Units: 3    Suggested Charges  Total Units: 3    Procedure Name Documented Minutes Units Code    HC OT SELF CARE/MGMT/TRAIN EA 15 MIN 38  3    27649 (CPT®)               Documented Minutes  Total Minutes: 38    Therapy Provided Minutes    84369 - OT Self Care/Mgmt Minutes 38                    OT Discharge Summary  Anticipated Discharge Disposition (OT): skilled nursing facility  Reason for Discharge: Discharge from facility  Outcomes Achieved: Refer to plan of care for updates on goals achieved  Discharge Destination: SNF      Loraine Rodríguez OT  2/17/2023

## 2023-03-02 ENCOUNTER — OFFICE VISIT (OUTPATIENT)
Dept: SURGERY | Facility: CLINIC | Age: 88
End: 2023-03-02
Payer: MEDICARE

## 2023-03-02 VITALS
WEIGHT: 183 LBS | HEIGHT: 75 IN | OXYGEN SATURATION: 95 % | SYSTOLIC BLOOD PRESSURE: 122 MMHG | DIASTOLIC BLOOD PRESSURE: 76 MMHG | HEART RATE: 70 BPM | BODY MASS INDEX: 22.75 KG/M2

## 2023-03-02 DIAGNOSIS — K44.9 PARAESOPHAGEAL HERNIA: Primary | ICD-10-CM

## 2023-03-02 PROCEDURE — 99213 OFFICE O/P EST LOW 20 MIN: CPT | Performed by: SPECIALIST

## 2023-03-02 NOTE — PROGRESS NOTES
Patient: Jose Alfredo Kirkpatrick    YOB: 1935    Date: 03/02/2023    Primary Care Provider: Otilio Ramos MD    Chief Complaint   Patient presents with   • Follow-up       Subjective .     History of present illness:   He is doing better.  He is on a liquid diet at the nursing home which she is tolerating well.  He is hungry for more.  He is having no nausea no vomiting no difficulty swallowing no fevers no diarrhea constipation.  He is getting stronger wants to go home.    The following portions of the patient's history were reviewed and updated as appropriate: allergies, current medications, past family history, past medical history, past social history, past surgical history and problem list.      History:  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          Past Surgical History:   Procedure Laterality Date   • BACK SURGERY      cervical   • CATARACT EXTRACTION Bilateral    • COLONOSCOPY  02/11/2013    Hyperplastic polyp at 25 cm, Diverticulosis repeat exam in 5 years   • COLONOSCOPY N/A 10/16/2018    Tubular adenoma ascending colon repet prn   • ENDOSCOPY  01/15/2014    HH   • ENDOSCOPY N/A 10/03/2019    Large HH, non-bleeding gastric ulcer   • ENDOSCOPY N/A 11/25/2019    Procedure: ESOPHAGOGASTRODUODENOSCOPY WITH ANESTHESIA;  Surgeon: Marques Pérez MD;  Location: [facility] ENDOSCOPY;  Service: Gastroenterology   • ENDOSCOPY N/A 2/9/2023    Procedure: ESOPHAGOGASTRODUODENOSCOPY WITH ANESTHESIA;  Surgeon: Sushant Edmond MD;  Location: [facility] ENDOSCOPY;  Service: Gastroenterology;  Laterality: N/A;  pre GI bleed  post hiatal hernia; esophagitis  Otilio Ramos MD   • HERNIA REPAIR Right     INGUINAL    • HIP FRACTURE SURGERY Right 02/04/2023   • REPLACEMENT TOTAL  KNEE BILATERAL     • THORACIC LAMINECTOMY DECOMPRESSIVE POSTERIOR N/A 12/14/2016    Procedure: LAMINECTOMY DECOMPRESSION, UNINSTRUMENTED POSTERIOR SPINAL FUSION, T 11-12;  Surgeon: LUZ Adan MD;  Location:  [facility] OR;  Service:    • VENA CAVA FILTER INSERTION N/A 10/04/2019    Procedure: VENA CAVA FILTER INSERTION;  Surgeon: Dallin Coronado MD;  Location:  [facility] HYBRID OR 12;  Service: Vascular       Family History   Problem Relation Age of Onset   • Colon cancer Father    • Colon polyps Neg Hx        Social History     Tobacco Use   • Smoking status: Never   • Smokeless tobacco: Never   Vaping Use   • Vaping Use: Unknown   Substance Use Topics   • Alcohol use: No   • Drug use: No       Allergies:  No Known Allergies    Medications:     Current Outpatient Medications:   •  acetaminophen (TYLENOL) 325 MG tablet, Take 2 tablets by mouth 2 (Two) Times a Day., Disp: , Rfl:   •  bisacodyl (DULCOLAX) 10 MG suppository, Insert 1 suppository into the rectum Daily As Needed for Constipation., Disp: , Rfl:   •  busPIRone (BUSPAR) 5 MG tablet, Take 1 tablet by mouth 2 (Two) Times a Day., Disp: , Rfl:   •  cyclobenzaprine (FLEXERIL) 5 MG tablet, Take 1 tablet by mouth 2 (Two) Times a Day As Needed for Muscle Spasms., Disp: , Rfl:   •  docusate sodium (COLACE) 100 MG capsule, Take 1 capsule by mouth 2 (Two) Times a Day., Disp: , Rfl:   •  Enoxaparin Sodium (LOVENOX) 40 MG/0.4ML solution prefilled syringe syringe, Inject 40 mg under the skin into the appropriate area as directed Daily., Disp: , Rfl:   •  gabapentin (NEURONTIN) 300 MG capsule, Take 1 capsule by mouth 4 (Four) Times a Day., Disp: 12 capsule, Rfl: 0  •  guaiFENesin (MUCINEX) 600 MG 12 hr tablet, Take 1 tablet by mouth 2 (Two) Times a Day., Disp: , Rfl:   •  hydrALAZINE (APRESOLINE) 50 MG tablet, Take 1 tablet by mouth Every 8 (Eight) Hours As Needed., Disp: , Rfl:   •  levalbuterol (XOPENEX) 0.31 MG/3ML nebulizer solution, Take 1 ampule by  "nebulization Every 8 (Eight) Hours As Needed for Wheezing., Disp: , Rfl:   •  lidocaine (LIDODERM) 5 %, Place 1 patch on the skin as directed by provider Daily. Remove & Discard patch within 12 hours or as directed by MD, Disp: , Rfl:   •  linaclotide (LINZESS) 145 MCG capsule capsule, Take 1 capsule by mouth Daily., Disp: , Rfl:   •  lisinopril (PRINIVIL,ZESTRIL) 10 MG tablet, Take 1 tablet by mouth Daily., Disp: , Rfl:   •  NIFEdipine XL (PROCARDIA XL) 60 MG 24 hr tablet, Take 1 tablet by mouth Daily., Disp: , Rfl:   •  ondansetron (ZOFRAN) 4 MG tablet, Take 1 tablet by mouth Every 6 (Six) Hours As Needed for Nausea or Vomiting., Disp: , Rfl:   •  oxyCODONE-acetaminophen (Percocet) 5-325 MG per tablet, Take 1 tablet by mouth Every 12 (Twelve) Hours As Needed for Moderate Pain., Disp: 6 tablet, Rfl: 0  •  pantoprazole (PROTONIX) 40 MG EC tablet, Take 1 tablet by mouth 2 (Two) Times a Day Before Meals., Disp: , Rfl:   •  polyethylene glycol (MIRALAX) packet, Take 17 g by mouth Daily As Needed., Disp: , Rfl:   •  simethicone (MYLICON) 80 MG chewable tablet, Chew 1 tablet Every 4 (Four) Hours As Needed for Flatulence., Disp: , Rfl:   •  sucralfate (CARAFATE) 1 GM/10ML suspension, Take 10 mL by mouth 4 (Four) Times a Day Before Meals & at Bedtime., Disp: , Rfl:   •  vitamin B-12 (CYANOCOBALAMIN) 1000 MCG tablet, Take 1 tablet by mouth Daily., Disp: , Rfl:   •  vitamin D (ERGOCALCIFEROL) 1.25 MG (03689 UT) capsule capsule, Take 1 capsule by mouth 3 (Three) Times a Week. Give one time a day every Mon, Wed, Fri, Disp: , Rfl:     Objective     Vital Signs:   Vitals:    03/02/23 1040   BP: 122/76   BP Location: Left arm   Patient Position: Sitting   Cuff Size: Adult   Pulse: 70   SpO2: 95%   Weight: 83 kg (183 lb)   Height: 190.5 cm (75\")       Physical Exam:     General Appearance:    Alert, cooperative, in no acute distress looks better   Head:    Normocephalic, without obvious abnormality, atraumatic   Eyes:            " Lids and lashes normal, conjunctivae and sclerae normal, no  icterus, no pallor, corneas clear,   Ears:    Ears appear intact with no abnormalities noted   Breast:     deferred   Lungs:     Clear to auscultation,respirations regular, even and              Unlabored    Heart:    Regular rhythm and normal rate, no murmur, no gallop.   Chest Wall:    No abnormalities observed   Abdomen    Rectal:    Soft nontender nondistended    Deferred   Extremities:   Moves all extremities well, no edema, no cyanosis, no          redness   Pulses:   Pulses palpable and equal bilaterally   Skin:   No bleeding, bruising or rash   Lymph nodes:   No palpable adenopathy   Neurologic:   Cranial nerves 2 - 12 grossly intact.         Results Review:   I reviewed the patient's new clinical results.        Assessment / Plan:    Diagnoses and all orders for this visit:    1. Paraesophageal hernia (Primary)        BMI is within normal parameters. No other follow-up for BMI required.    Plan paraesophageal hiatal hernia- he is still weak from all that he has been through with his broken hip and surgery and rehab and symptomatic paraesophageal hernia.  He is having no symptoms from it now.  I feel like we can advance his diet to regular food and I written that on the order sheet from the nursing home.  I will defer to them when he is ready to go home.  We will follow him closely here.  I am still not entirely convinced that surgery is the best chance of family may be just observation and expectant management.  Signs and symptoms of incarceration were again explained to him.  We also discussed surgery and the inherent risks at his age and the large size of this hernia.  I will see him in 1 month to further follow his progress      Electronically signed by Vicky Funes MD  03/02/23  11:21 CST

## 2023-03-03 ENCOUNTER — HOME HEALTH ADMISSION (OUTPATIENT)
Dept: HOME HEALTH SERVICES | Facility: HOME HEALTHCARE | Age: 88
End: 2023-03-03
Payer: MEDICARE

## 2023-04-04 NOTE — PROGRESS NOTES
[facility] - PODIATRY    Today's Date: 04/24/2023     Patient Name: Jose Alfredo Kirkpatrick  MRN: [id number]  CSN: [id number]  PCP: Otilio Ramos MD  Referring Provider: Referring, Self    SUBJECTIVE     Chief Complaint   Patient presents with   • Establish Care     Otilio Ramos MD 02/24/2023  NON-DIABETIC NAIL CARE INGROWN TOE NAIL BILATERAL-pt states he is here today for nondiabetic nail care and has a callus-pt denies pain     HPI: Jose Alfredo Kirkpatrick, a 87 y.o.male, comes to clinic as a(n) new patient complaining of Thickened, dystrophic, long toenails of both feet and callus to right second toe. Patient has h/o Arthritis, cataract, GERD, hypertension, gait abnormality. Patient presents with complaints of thickened, irregular, dystrophic, elongated toenails of both feet.  Patient states that he was previously seeing Dr. Frye for nail care routinely prior to his correction.  States that he has issues with his hip and back and is unable to reach his feet to care for them himself.  Utilizes wheelchair for mobilization.  States that he is recently noticed some mild peripheral edema around the ankles and lower extremities.  States that he has a callus to the distal tip of the right second toe. Denies pain. Relates previous treatment(s) including Care by Dr. Frye. Denies any constitutional symptoms. No other pedal complaints at this time.    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     Past Surgical History:   Procedure Laterality Date   • BACK SURGERY      cervical   • CATARACT EXTRACTION Bilateral    • COLONOSCOPY  02/11/2013    Hyperplastic polyp at 25 cm, Diverticulosis repeat exam in 5 years   • COLONOSCOPY N/A 10/16/2018    Tubular adenoma ascending  colon repet prn   • ENDOSCOPY  01/15/2014    HH   • ENDOSCOPY N/A 10/03/2019    Large HH, non-bleeding gastric ulcer   • ENDOSCOPY N/A 11/25/2019    Procedure: ESOPHAGOGASTRODUODENOSCOPY WITH ANESTHESIA;  Surgeon: Marques Pérez MD;  Location: [facility] ENDOSCOPY;  Service: Gastroenterology   • ENDOSCOPY N/A 2/9/2023    Procedure: ESOPHAGOGASTRODUODENOSCOPY WITH ANESTHESIA;  Surgeon: Sushant Edmond MD;  Location: [facility] ENDOSCOPY;  Service: Gastroenterology;  Laterality: N/A;  pre GI bleed  post hiatal hernia; esophagitis  Otilio Ramos MD   • HERNIA REPAIR Right     INGUINAL    • HIP FRACTURE SURGERY Right 02/04/2023   • REPLACEMENT TOTAL KNEE BILATERAL     • THORACIC LAMINECTOMY DECOMPRESSIVE POSTERIOR N/A 12/14/2016    Procedure: LAMINECTOMY DECOMPRESSION, UNINSTRUMENTED POSTERIOR SPINAL FUSION, T 11-12;  Surgeon: LUZ Adan MD;  Location: [facility] OR;  Service:    • VENA CAVA FILTER INSERTION N/A 10/04/2019    Procedure: VENA CAVA FILTER INSERTION;  Surgeon: Dallin Coronado MD;  Location: [facility] HYBRID OR 12;  Service: Vascular     Family History   Problem Relation Age of Onset   • Colon cancer Father    • Colon polyps Neg Hx      Social History     Socioeconomic History   • Marital status:    Tobacco Use   • Smoking status: Never     Passive exposure: Never   • Smokeless tobacco: Never   Vaping Use   • Vaping Use: Unknown   Substance and Sexual Activity   • Alcohol use: No   • Drug use: No   • Sexual activity: Defer     No Known Allergies  Current Outpatient Medications   Medication Sig Dispense Refill   • acetaminophen (TYLENOL) 325 MG tablet Take 2 tablets by mouth 2 (Two) Times a Day.     • busPIRone (BUSPAR) 5 MG tablet Take 1 tablet by mouth 2 (Two) Times a Day.     • docusate sodium (COLACE) 100 MG capsule Take 1 capsule by mouth 2 (Two) Times a Day.     • Enoxaparin Sodium (LOVENOX) 40 MG/0.4ML solution prefilled syringe syringe Inject 40 mg under the skin into the appropriate  area as directed Daily.     • gabapentin (NEURONTIN) 300 MG capsule Take 1 capsule by mouth 4 (Four) Times a Day. 12 capsule 0   • guaiFENesin (MUCINEX) 600 MG 12 hr tablet Take 1 tablet by mouth 2 (Two) Times a Day.     • hydrALAZINE (APRESOLINE) 50 MG tablet Take 1 tablet by mouth Every 8 (Eight) Hours As Needed.     • levalbuterol (XOPENEX) 0.31 MG/3ML nebulizer solution Take 1 ampule by nebulization Every 8 (Eight) Hours As Needed for Wheezing.     • lidocaine (LIDODERM) 5 % Place 1 patch on the skin as directed by provider Daily. Remove & Discard patch within 12 hours or as directed by MD     • linaclotide (LINZESS) 145 MCG capsule capsule Take 1 capsule by mouth Daily.     • lisinopril (PRINIVIL,ZESTRIL) 10 MG tablet Take 1 tablet by mouth Daily.     • NIFEdipine XL (PROCARDIA XL) 60 MG 24 hr tablet Take 1 tablet by mouth Daily.     • ondansetron (ZOFRAN) 4 MG tablet Take 1 tablet by mouth Every 6 (Six) Hours As Needed for Nausea or Vomiting.     • oxyCODONE-acetaminophen (Percocet) 5-325 MG per tablet Take 1 tablet by mouth Every 12 (Twelve) Hours As Needed for Moderate Pain. 6 tablet 0   • pantoprazole (PROTONIX) 40 MG EC tablet Take 1 tablet by mouth 2 (Two) Times a Day Before Meals.     • polyethylene glycol (MIRALAX) packet Take 17 g by mouth Daily As Needed.     • simethicone (MYLICON) 80 MG chewable tablet Chew 1 tablet Every 4 (Four) Hours As Needed for Flatulence.     • sucralfate (CARAFATE) 1 GM/10ML suspension Take 10 mL by mouth 4 (Four) Times a Day Before Meals & at Bedtime.     • vitamin B-12 (CYANOCOBALAMIN) 1000 MCG tablet Take 1 tablet by mouth Daily.     • vitamin D (ERGOCALCIFEROL) 1.25 MG (78128 UT) capsule capsule Take 1 capsule by mouth 3 (Three) Times a Week. Give one time a day every Mon, Wed, Fri     • bisacodyl (DULCOLAX) 10 MG suppository Insert 1 suppository into the rectum Daily As Needed for Constipation. (Patient not taking: Reported on 4/24/2023)     • cyclobenzaprine (FLEXERIL)  5 MG tablet Take 1 tablet by mouth 2 (Two) Times a Day As Needed for Muscle Spasms. (Patient not taking: Reported on 2023)       No current facility-administered medications for this visit.     Review of Systems    OBJECTIVE     Vitals:    23 1444   BP: 120/72   Pulse: 65   SpO2: 97%       PHYSICAL EXAM  GEN:   Accompanied by hired caregiver.     Foot/Ankle Exam    GENERAL  Appearance:  elderly  Orientation:  AAOx3  Affect:  appropriate  Gait:  (very unsteady)  Assistance:  wheelchair  Right shoe gear: casual shoe  Left shoe gear: casual shoe    VASCULAR     Right Foot Vascularity   Dorsalis pedis:  2+  Posterior tibial:  2+  Skin temperature:  warm  Edema gradin+ and pitting  CFT:  3  Pedal hair growth:  Present  Varicosities:  spider veins     Left Foot Vascularity   Dorsalis pedis:  2+  Posterior tibial:  2+  Skin temperature:  warm  Edema gradin+ and pitting  CFT:  3  Pedal hair growth:  Present  Varicosities:  spider veins     NEUROLOGIC     Right Foot Neurologic   Normal sensation    Light touch sensation: normal  Vibratory sensation: normal  Hot/Cold sensation: normal  Protective Sensation using [place]-Vineet Monofilament:   Sites intact: 10  Sites tested: 10     Left Foot Neurologic   Normal sensation    Light touch sensation: normal  Vibratory sensation: normal  Hot/Cold sensation:  normal  Protective Sensation using [place]-Vineet Monofilament:   Sites intact: 10  Sites tested: 10    MUSCULOSKELETAL     Right Foot Musculoskeletal   Tenderness:  toenail problem    Arch:  Pes planus  Hammertoe:  Second toe, third toe, fourth toe and fifth toe  Tailor's bunion: Yes       Left Foot Musculoskeletal   Tenderness:  toenail problem  Arch:  Pes planus  Hammertoe:  Second toe, fifth toe, third toe and fourth toe  Tailor's bunion: Yes      MUSCLE STRENGTH     Right Foot Muscle Strength   Foot dorsiflexion:  5  Foot plantar flexion:  5  Foot inversion:  5  Foot eversion:  5     Left Foot  Muscle Strength   Foot dorsiflexion:  5  Foot plantar flexion:  5  Foot inversion:  5  Foot eversion:  5    RANGE OF MOTION     Right Foot Range of Motion   Foot and ankle ROM within normal limits       Left Foot Range of Motion   Foot and ankle ROM within normal limits      DERMATOLOGIC      Right Foot Dermatologic   Skin  Positive for corn and atrophy.   Nails  1.  Positive for onychomycosis, abnormal thickness, subungual debris and dystrophic nail.  2.  Positive for onychomycosis, abnormal thickness, subungual debris and dystrophic nail.  3.  Positive for onychomycosis, abnormal thickness, subungual debris and dystrophic nail.  4.  Positive for onychomycosis, abnormal thickness, subungual debris and dystrophic nail.  5.  Positive for onychomycosis, abnormal thickness, subungual debris and dystrophic nail.     Left Foot Dermatologic   Skin  Positive for atrophy.   Nails  1.  Positive for onychomycosis, abnormal thickness, subungual debris and dystrophic nail.  2.  Positive for onychomycosis, abnormal thickness, subungual debris and dystrophic nail.  3.  Positive for onychomycosis, abnormal thickness, subungual debris and dystrophic nail.  4.  Positive for onychomycosis, abnormally thick, subungual debris and dystrophic nail.  5.  Positive for onychomycosis, abnormally thick, subungual debris and dystrophic nail.    Image:       RADIOLOGY/NUCLEAR:  No results found.    LABORATORY/CULTURE RESULTS:      PATHOLOGY RESULTS:       ASSESSMENT/PLAN     Diagnoses and all orders for this visit:    1. Onychomycosis (Primary)    2. Gait abnormality    3. Advanced age    4. Hammer toes, bilateral    5. Tailor's bunion of both feet    6. Foot callus      Comprehensive lower extremity examination and evaluation was performed.  Discussed findings and treatment plan including risks, benefits, and treatment options with patient in detail. Patient agreed with treatment plan.  After verbal consent obtained, nail(s) x10 debrided of  length and thickness with nail nipper without incidence  Patient may maintain nails and calluses at home utilizing emery board or pumice stone between visits as needed   Elevate lower extremities at rest.  Continue to utilize device for gait support.  Remain conservative with foot deformities.  An After Visit Summary was printed and given to the patient at discharge, including (if requested) any available informative/educational handouts regarding diagnosis, treatment, or medications. All questions were answered to patient/family satisfaction. Should symptoms fail to improve or worsen they agree to call or return to clinic or to go to the Emergency Department. Discussed the importance of following up with any needed screening tests/labs/specialist appointments and any requested follow-up recommended by me today. Importance of maintaining follow-up discussed and patient accepts that missed appointments can delay diagnosis and potentially lead to worsening of conditions.  Return in about 3 months (around 7/24/2023)., or sooner if acute issues arise.    Lab Frequency Next Occurrence       This document has been electronically signed by HARISH Kyle on April 24, 2023 15:28 CDT

## 2023-04-21 ENCOUNTER — TELEPHONE (OUTPATIENT)
Dept: PODIATRY | Facility: CLINIC | Age: 88
End: 2023-04-21
Payer: MEDICARE

## 2023-04-24 ENCOUNTER — OFFICE VISIT (OUTPATIENT)
Dept: PODIATRY | Facility: CLINIC | Age: 88
End: 2023-04-24
Payer: MEDICARE

## 2023-04-24 VITALS
SYSTOLIC BLOOD PRESSURE: 120 MMHG | OXYGEN SATURATION: 97 % | DIASTOLIC BLOOD PRESSURE: 72 MMHG | WEIGHT: 183 LBS | HEART RATE: 65 BPM | BODY MASS INDEX: 22.87 KG/M2

## 2023-04-24 DIAGNOSIS — M20.41 HAMMER TOES, BILATERAL: ICD-10-CM

## 2023-04-24 DIAGNOSIS — M21.621 TAILOR'S BUNION OF BOTH FEET: ICD-10-CM

## 2023-04-24 DIAGNOSIS — R26.9 GAIT ABNORMALITY: ICD-10-CM

## 2023-04-24 DIAGNOSIS — B35.1 ONYCHOMYCOSIS: Primary | ICD-10-CM

## 2023-04-24 DIAGNOSIS — R54 ADVANCED AGE: ICD-10-CM

## 2023-04-24 DIAGNOSIS — M21.622 TAILOR'S BUNION OF BOTH FEET: ICD-10-CM

## 2023-04-24 DIAGNOSIS — M20.42 HAMMER TOES, BILATERAL: ICD-10-CM

## 2023-04-24 DIAGNOSIS — L84 FOOT CALLUS: ICD-10-CM

## 2023-07-24 ENCOUNTER — TELEPHONE (OUTPATIENT)
Dept: PODIATRY | Facility: CLINIC | Age: 88
End: 2023-07-24
Payer: MEDICARE

## 2023-07-24 NOTE — TELEPHONE ENCOUNTER
Called patient regarding appt on 07/25/2023. Left message for patient to return call if any questions or concerns arise.

## 2023-09-26 NOTE — PROGRESS NOTES
[facility]      Progress Note    Patient:  Jesus Robertson  YOB: 1935  Date of Service: 2/1/2023  MRN: [id number]   Acct: [de-identified]   Primary Care Physician: Claudis Meigs, MD  Advance Directive: Full Code  Admit Date: 1/28/2023       Hospital Day: 4    Portions of this note have been copied forward, however, updated to reflect the most current clinical status of this patient. CHIEF COMPLAINT fall with right hip pain    SUBJECTIVE:  No overnight events. CUMULATIVE HOSPITAL COURSE:      Patient is a 81year-old with past medical history of DVT, hypertension, osteoarthritis, history of back surgery. On date of admission patient was getting dressed and fell losing his balance while trying to hang up close. He fell backwards hit the door frame and landed on his right hip patient had immediate pain. Any other injury or loss of consciousness. Was ambulatory to his private vehicle and brought to the ED. Patient did report balance issues for 2 to 3 months. [facility] x-ray of the right pelvis showed a right femoral neck fracture troponins negative and he was COVID-positive. All other lab was within normal limits. She underwent a right hemiarthroplasty by Dr. Leo Heck. He is done well and progressing with physical therapy . Carmela Montgomery He wants to return home he has a previous sitter service hired and ready and he has all durable medical equipment he needs. Repeat COVID PCR was done and is negative. Repeat tonight at 6 p.m. planned. Precert in progress for [facility]. His vitals and labs are stable. His second pcr covid was neg. Review of Systems   Constitutional: Negative. HENT: Negative. Eyes: Negative. Respiratory: Negative. Cardiovascular: Negative. Gastrointestinal: Negative. Endocrine: Negative. Genitourinary: Negative. Musculoskeletal: Negative. RIght hip pain   Skin: Negative. Allergic/Immunologic: Negative. Neurological: Negative. Hematological: Negative. Psychiatric/Behavioral: Negative. Objective:   VITALS:  BP (!) 165/67   Pulse 58   Temp 97.5 °F (36.4 °C) (Temporal)   Resp 20   Ht 5' 11\" (1.803 m)   Wt 191 lb 9 oz (86.9 kg)   SpO2 94%   BMI 26.72 kg/m²   24HR INTAKE/OUTPUT:    Intake/Output Summary (Last 24 hours) at 2/1/2023 1519  Last data filed at 2/1/2023 1000  Gross per 24 hour   Intake 2179.44 ml   Output 2050 ml   Net 129.44 ml             Physical Exam  Vitals and nursing note reviewed. Constitutional:       Appearance: He is ill-appearing. HENT:      Head: Normocephalic and atraumatic. Nose: Nose normal.      Mouth/Throat:      Mouth: Mucous membranes are moist.   Eyes:      Extraocular Movements: Extraocular movements intact. Cardiovascular:      Rate and Rhythm: Normal rate and regular rhythm. Heart sounds: Normal heart sounds. Pulmonary:      Effort: Pulmonary effort is normal.      Breath sounds: Normal breath sounds. Abdominal:      General: Bowel sounds are normal.      Palpations: Abdomen is soft. Musculoskeletal:      Cervical back: Normal range of motion. Comments: Right hip pain   Skin:     General: Skin is warm and dry. Coloration: Skin is pale. Neurological:      General: No focal deficit present. Mental Status: He is alert.    Psychiatric:      Comments: Tearful today          Medications:      sodium chloride Stopped (02/01/23 1047)    sodium chloride Stopped (02/01/23 1047)      vitamin D  50,000 Units Oral Once per day on Mon Wed Fri    sodium chloride flush  5-40 mL IntraVENous 2 times per day    sodium chloride flush  5-40 mL IntraVENous 2 times per day    acetaminophen  1,000 mg Oral 3 times per day    sodium chloride flush  5-40 mL IntraVENous 2 times per day    lisinopril  10 mg Oral Daily    gabapentin  300 mg Oral 4x Daily     potassium chloride, sodium chloride flush, sodium chloride flush, sodium chloride, ondansetron **OR** ondansetron, polyethylene glycol, naloxone, morphine, morphine, morphine, sodium chloride flush, potassium chloride **OR** potassium alternative oral replacement **OR** potassium chloride, magnesium sulfate, oxyCODONE **OR** oxyCODONE, melatonin, calcium carbonate, guaiFENesin-dextromethorphan  ADULT DIET; Regular; Safety Tray; Safety Tray (Disposables)     Lab and other Data:     Recent Labs     01/30/23 0223 01/31/23 0312 02/01/23 0204   WBC 9.6 8.4 8.1   HGB 12.0* 11.0* 11.4*    150 172       Recent Labs     01/30/23 0223 01/31/23 0312 02/01/23 0204   * 139 140   K 4.4 4.3 4.0    107 107   CO2 24 25 24   BUN 13 14 10   CREATININE 0.8 0.8 0.7   GLUCOSE 168* 105 107       No results for input(s): AST, ALT, ALB, BILITOT, ALKPHOS in the last 72 hours. Troponin T:   No results for input(s): TROPONINI in the last 72 hours. Pro-BNP: No results for input(s): BNP in the last 72 hours. INR:   No results for input(s): INR in the last 72 hours. UA:  No results for input(s): NITRITE, COLORU, PHUR, LABCAST, WBCUA, RBCUA, MUCUS, TRICHOMONAS, YEAST, BACTERIA, CLARITYU, SPECGRAV, LEUKOCYTESUR, UROBILINOGEN, BILIRUBINUR, BLOODU, GLUCOSEU, AMORPHOUS in the last 72 hours. Invalid input(s): Alba Befranco    A1C: No results for input(s): LABA1C in the last 72 hours. ABG:No results for input(s): PHART, EQY2OOE, PO2ART, WOW6LZO, BEART, HGBAE, F3CCXMIW, CARBOXHGBART in the last 72 hours. RAD:   CT HEAD WO CONTRAST    Result Date: 1/28/2023  Patient: Geetha Section  Time Out: 23:29Exam(s): CT HEAD Without Contrast EXAM:  CT Head Without Intravenous ContrastCLINICAL HISTORY:   Reason for exam: frequent falls, dizziness. TECHNIQUE:  Axial computed tomography images of the head/brain without intravenous contrast.COMPARISON:  No relevant prior studies available. FINDINGS:No acute intracranial hemorrhage. No midline shift or mass effect. The territorial gray-white matter differentiation is maintained throughout. Age-related cerebral volume loss. Periventricular and subcortical white matter hypoattenuation, consistent with chronic microangiopathy. The visualized orbits appear grossly unremarkable. The calvarium is intact. Opacified maxillary sinuses. No acute intracranial hemorrhage, midline shift, or mass effect. Electronically signed by Courtney Parker MD on 01-28-23 at 2329    XR CHEST PORTABLE    Result Date: 1/28/2023  NO PRIOR REPORT AVAILABLE Exam: X-RAY OF [facility] Clinical data:Fall, preop. Technique:Single view of the chest. Prior studies: No prior studies submitted. Findings: The lungs are grossly clear; noevidence of acute infiltrate or pleural effusion. Mild cardiomegaly. No acute osseous abnormality is detected. No acute pulmonary abnormality. Mild cardiomegaly. Recommendation: Follow up as clinically indicated. Dictated and Electronically Signed by Sosa Marvin at 28-Jan-2023 10:43:58 PM EST             CTA PULMONARY W CONTRAST    Result Date: 1/29/2023  NO PRIOR REPORT AVAILABLE Exam: CTA OF THE CHEST WITH CONTRAST Clinical data: Elevated D-dimer. Technique: Axial CT angiography images through the lungs were acquired with contrast and imaged using soft tissue and lung algorithms. Coronal, sagittal, and 3D volume reconstructions were performed. Reformatted/3D-MPR images were performed. Radiation dose: CTDIvol =38.77 mGy, DLP =736 mGy x cm. Prior studies: Radiographs of the chest dated 01/28/2023. Findings: Lungs: Tiny bilateral pleural effusions, greater on the left, with associated dependent atelectasis, as well as some compressive atelectasis in the left lower lobe abutting the large hiatal hernia. Presumable focal nodular scarring at the left base, image 118. Scattered bilateral subsegmental atelectasis/scarring. No pneumothorax. Soft Tissues: There is a large hiatal hernia, extending to the left, containing most of the stomach. Scattered small to borderline nonspecific lymph nodes.  Vascular: No filling defect within the pulmonary arteries to the segmental branch level. The more distal evaluation is partially suboptimal. Mildly enlarged pulmonary artery could be seen with pulmonary arterial hypertension. 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 No evidence of dissection, although opacification of the descending thoracic aorta is partially suboptimal. Mild cardiomegaly, CAD, atherosclerosis. Bony structures: Significant multilevel spondylosis, DJD, possible osteopenia. There is a nondisplaced to minimally displaced distraction-type fracture involving the T9 vertebral body, which appears to be more likely chronic than acute, partially corticated, although correlate with patient's symptoms. The adjacent posterior elements are intact. Chronic L1-2 ankylosis, with a focal kyphosis a chronic compression fracture deformity, with a moderate chronic retropulsion. Some postoperative changes noted in the lower thoracic spine, with posterior laminectomies. Upper Abdomen:  Cholelithiasis. 1.1 cm left adrenal adenoma. IVC filter. Mildly aneurysmal upper abdominal aorta, 3.6 cm significant atherosclerosis. Significant amount of stool within the visualized portion of the colon. Diverticulosis, without diverticulitis in the field of view. Calcified granulomas in the spleen. 1. No acute pulmonary emboli appreciated, within the limits, as above. 2. Large hiatal hernia, as described. 3. Mild bilateral pleuroparenchymal pathology, as detailed above. 4. Chronic-appearing osseous changes in the visualized spine, as detailed above, correlate clinically. 5. Cholelithiasis. 6. Borderline aneurysmal thoracic aortic arch. Mild cardiomegaly, CAD, atherosclerosis. 7. Other findings, as above. Please see comments above. Recommendation: Follow up as clinically indicated.  All CT scans at this facility utilize dose modulation, iterative reconstruction, and/or weight based dosing when appropriate to reduce radiation dose to as low as reasonably achievable. Dictated and Electronically Signed by Rama Rivrea MD at 29-Jan-2023 02:18:02 PM EST             XR HIP 2-3 VW W PELVIS RIGHT    Result Date: 1/28/2023  NO PRIOR REPORT AVAILABLE Exam: X-RAYS OF THE RIGHT HIP Clinical data: Fall, deformity. Technique: Three views of the right hip with AP view of the pelvis. Prior studies: No prior studies submitted. Findings: Right femoral neck fracture with varus angulation and slight superior migration along shaft component whereas the femoral head remains within the acetabulum. No evidence of superior pubic ramus fracture. Right femoral neck fracture as described above. Recommendation: Follow up as clinically indicated. Dictated and Electronically Signed by Cameron Tabor at 28-Jan-2023 11:09:05 PM EST                       Assessment/Plan     Principal Problem:    Closed right hip fracture, initial encounter ([facility] [address]              Followed by Ortho SP right hip repain              PT/OT    Pain controlprn  Active Problems:    COVID              Asymptomatic              Repeat PCR negative  Repeat at 6 p.m for 2nd reading    Frequent falls              PT/OT                Fall precautions    Hypertension              Cont home meds              Moniter for need to change  Resolved Problems:    * No resolved hospital problems. *      DVT Prophylaxis: SCD      Discharge planning: SNF placement      Further Orders per Clinical course/attending. DVT Prophylaxis: SCD     Discharge planning: SNF       Further Orders per Clinical course/attending. Electronically signed by JOE Sanders CNP on 2/1/2023 at 3:19 PM       EMR Dragon/Transcription disclaimer:   Much of this encounter note is an electronic transcription/translation of spoken language to printed text.  The electronic translation of spoken language may permit erroneous, or at times, nonsensical words or phrases to be inadvertently transcribed; although attempts have made to review the note for such errors, some may still exist. Olanzapine Counseling- I discussed with the patient the common side effects of olanzapine including but are not limited to: lack of energy, dry mouth, increased appetite, sleepiness, tremor, constipation, dizziness, changes in behavior, or restlessness.  Explained that teenagers are more likely to experience headaches, abdominal pain, pain in the arms or legs, tiredness, and sleepiness.  Serious side effects include but are not limited: increased risk of death in elderly patients who are confused, have memory loss, or dementia-related psychosis; hyperglycemia; increased cholesterol and triglycerides; and weight gain.

## 2023-10-30 ENCOUNTER — TELEPHONE (OUTPATIENT)
Dept: SURGERY | Facility: CLINIC | Age: 88
End: 2023-10-30
Payer: MEDICARE

## 2023-10-30 NOTE — TELEPHONE ENCOUNTER
CALLED REF PROVIDER'S OFFICE TO LET THEM KNOW THAT THE PT WILL NEED TO BE SEEN AT A TERTIARY FACILITY, V/U.   10/30/23 NYDIA

## 2023-11-19 ENCOUNTER — APPOINTMENT (OUTPATIENT)
Dept: GENERAL RADIOLOGY | Facility: HOSPITAL | Age: 88
End: 2023-11-19
Payer: MEDICARE

## 2023-11-19 ENCOUNTER — APPOINTMENT (OUTPATIENT)
Dept: CT IMAGING | Facility: HOSPITAL | Age: 88
End: 2023-11-19
Payer: MEDICARE

## 2023-11-19 ENCOUNTER — HOSPITAL ENCOUNTER (EMERGENCY)
Facility: HOSPITAL | Age: 88
Discharge: HOME | End: 2023-11-19
Attending: INTERNAL MEDICINE
Payer: MEDICARE

## 2023-11-19 VITALS
RESPIRATION RATE: 18 BRPM | WEIGHT: 180 LBS | SYSTOLIC BLOOD PRESSURE: 140 MMHG | TEMPERATURE: 97.8 F | DIASTOLIC BLOOD PRESSURE: 85 MMHG | BODY MASS INDEX: 22.38 KG/M2 | OXYGEN SATURATION: 94 % | HEART RATE: 95 BPM | HEIGHT: 75 IN

## 2023-11-19 DIAGNOSIS — M54.50 ACUTE MIDLINE LOW BACK PAIN WITHOUT SCIATICA: ICD-10-CM

## 2023-11-19 DIAGNOSIS — Z74.09 IMPAIRED MOBILITY: ICD-10-CM
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
## 2023-11-19 PROCEDURE — 25010000002 ONDANSETRON PER 1 MG: Performed by: INTERNAL MEDICINE

## 2023-11-19 PROCEDURE — 72128 CT CHEST SPINE W/O DYE: CPT

## 2023-11-19 PROCEDURE — 73030 X-RAY EXAM OF SHOULDER: CPT

## 2023-11-19 PROCEDURE — 0 HYDROMORPHONE 1 MG/ML SOLUTION: Performed by: INTERNAL MEDICINE

## 2023-11-19 PROCEDURE — 73010 X-RAY EXAM OF SHOULDER BLADE: CPT

## 2023-11-19 PROCEDURE — 96375 TX/PRO/DX INJ NEW DRUG ADDON: CPT

## 2023-11-19 PROCEDURE — 72131 CT LUMBAR SPINE W/O DYE: CPT

## 2023-11-19 PROCEDURE — 25010000002 HYDROMORPHONE PER 4 MG: Performed by: INTERNAL MEDICINE

## 2023-11-19 PROCEDURE — 96374 THER/PROPH/DIAG INJ IV PUSH: CPT

## 2023-11-19 PROCEDURE — 72125 CT NECK SPINE W/O DYE: CPT

## 2023-11-19 PROCEDURE — 99284 EMERGENCY DEPT VISIT MOD MDM: CPT

## 2023-11-19 PROCEDURE — 96376 TX/PRO/DX INJ SAME DRUG ADON: CPT

## 2023-11-19 RX ORDER — ONDANSETRON 2 MG/ML
4 INJECTION INTRAMUSCULAR; INTRAVENOUS ONCE
Status: COMPLETED | OUTPATIENT
Start: 2023-11-19 | End: 2023-11-19

## 2023-11-19 RX ORDER — HYDROMORPHONE HYDROCHLORIDE 1 MG/ML
0.5 INJECTION, SOLUTION INTRAMUSCULAR; INTRAVENOUS; SUBCUTANEOUS ONCE
Status: COMPLETED | OUTPATIENT
Start: 2023-11-19 | End: 2023-11-19

## 2023-11-19 RX ORDER — OXYCODONE HYDROCHLORIDE AND ACETAMINOPHEN 5; 325 MG/1; MG/1
1 TABLET ORAL EVERY 6 HOURS PRN
Qty: 12 TABLET | Refills: 0 | Status: SHIPPED | OUTPATIENT
Start: 2023-11-19

## 2023-11-19 RX ADMIN — HYDROMORPHONE HYDROCHLORIDE 1 MG: 1 INJECTION, SOLUTION INTRAMUSCULAR; INTRAVENOUS; SUBCUTANEOUS at 17:56

## 2023-11-19 RX ADMIN — HYDROMORPHONE HYDROCHLORIDE 0.5 MG: 1 INJECTION, SOLUTION INTRAMUSCULAR; INTRAVENOUS; SUBCUTANEOUS at 16:16

## 2023-11-19 RX ADMIN — ONDANSETRON 4 MG: 2 INJECTION INTRAMUSCULAR; INTRAVENOUS at 16:15

## 2023-11-19 NOTE — ED PROVIDER NOTES
Subjective   History of Present Illness  88-year-old male presents emergency department status post fall.  He is in a c-collar.  He states he fell while going to the bathroom.  He has had decreased balance since falling and breaking his hip.  He states that he was in the bathroom, and noticed he started to fall.  He reached for the towel bar and pull it out of the wall.  He states his low back pain is currently hurting, but he has chronic low back pain.  He states when he fell he fell into the walk-in shower.  The step up for the shower hit him in between his shoulder blades.  He is also having pain in this area.  He did not hit his head.  He has not lost consciousness.    Review of Systems   Musculoskeletal:  Positive for arthralgias and back pain.       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       No Known Allergies    Past Surgical History:   Procedure Laterality Date    BACK SURGERY      cervical    CATARACT EXTRACTION Bilateral     COLONOSCOPY  02/11/2013    Hyperplastic polyp at 25 cm, Diverticulosis repeat exam in 5 years    COLONOSCOPY N/A 10/16/2018    Tubular adenoma ascending colon repet prn    ENDOSCOPY  01/15/2014    HH    ENDOSCOPY N/A 10/03/2019    Large HH, non-bleeding gastric ulcer    ENDOSCOPY N/A 11/25/2019    Procedure: ESOPHAGOGASTRODUODENOSCOPY WITH ANESTHESIA;  Surgeon: Marques Pérez MD;  Location: [facility] ENDOSCOPY;  Service: Gastroenterology    ENDOSCOPY N/A 2/9/2023    Procedure: ESOPHAGOGASTRODUODENOSCOPY WITH ANESTHESIA;  Surgeon: Sushant Edmond MD;  Location: [facility] ENDOSCOPY;  Service: Gastroenterology;  Laterality: N/A;  pre GI bleed  post hiatal hernia; esophagitis  Otilio Ramos MD    HERNIA REPAIR Right     INGUINAL     HIP FRACTURE SURGERY Right  02/04/2023    REPLACEMENT TOTAL KNEE BILATERAL      THORACIC LAMINECTOMY DECOMPRESSIVE POSTERIOR N/A 12/14/2016    Procedure: LAMINECTOMY DECOMPRESSION, UNINSTRUMENTED POSTERIOR SPINAL FUSION, T 11-12;  Surgeon: LUZ Adan MD;  Location:  [facility] OR;  Service:     VENA CAVA FILTER INSERTION N/A 10/04/2019    Procedure: VENA CAVA FILTER INSERTION;  Surgeon: Dallin Coronado MD;  Location:  [facility] HYBRID OR 12;  Service: Vascular       Family History   Problem Relation Age of Onset    Colon cancer Father     Colon polyps Neg Hx        Social History     Socioeconomic History    Marital status:    Tobacco Use    Smoking status: Never     Passive exposure: Never    Smokeless tobacco: Never   Vaping Use    Vaping Use: Unknown   Substance and Sexual Activity    Alcohol use: No    Drug use: No    Sexual activity: Defer           Objective   Physical Exam  Vitals reviewed.   Constitutional:       Appearance: Normal appearance.   HENT:      Head: Normocephalic and atraumatic.      Nose: Nose normal.   Eyes:      General: No scleral icterus.     Conjunctiva/sclera: Conjunctivae normal.   Neck:      Comments: C-collar in place.  Cardiovascular:      Rate and Rhythm: Normal rate and regular rhythm.      Heart sounds: Normal heart sounds.   Pulmonary:      Effort: Pulmonary effort is normal.      Breath sounds: Normal breath sounds.   Abdominal:      General: There is no distension.      Palpations: Abdomen is soft.   Musculoskeletal:         General: Tenderness present. No swelling.      Comments: Tenderness in the thoracic and lumbar spine.  The patient is able to move both arms without difficulty.  He is able to move both legs with only minimal difficulty.  He has more difficulty moving the right leg, but notes that this is the area of prior fracture   Skin:     General: Skin is warm and dry.      Comments: The patient does not have any signs of obvious hematoma.   Neurological:      Mental Status: He is  alert and oriented to person, place, and time.      Cranial Nerves: No cranial nerve deficit.   Psychiatric:         Mood and Affect: Mood normal.         Behavior: Behavior normal.         Procedures           ED Course  ED Course as of 11/19/23 1922   Sun Nov 19, 2023 1911 I have discussed the scans with the patient.  Ask nursing to ambulate the patient, to see if he is able to be discharged home. [AJ]   1915 Nursing report, the patient is at his baseline ambulatory status. [AJ]      ED Course User Index  [AJ] Betty Childress,       Pain medication and nausea medication ordered.     XR Scapula Left   Final Result   1. A 1.8 cm ill-defined lucency in the lower pole of the scapula on the   transscapular view. A bone lesion is possible.   2. No definite scapular fracture is seen.   3. Degenerative osteoarthritis involving the left shoulder.       This report was signed and finalized on 11/19/2023 6:32 PM CST by Dr. Mart Staples MD.          XR Shoulder 2+ View Right   Final Result   1. Degenerative osteoarthritis.   2. No acute fracture is seen.           This report was signed and finalized on 11/19/2023 6:28 PM CST by Dr. Mart Staples MD.          XR Shoulder 2+ View Left   Final Result   1. Questionable cortical step-off in the lower body of the scapula on   the transscapular view along the anterior surface. There is also an   ill-defined lucency in the more inferior aspect of the body of the   scapula on the transscapular view.   2. Degenerative changes of the acromioclavicular and glenohumeral joint   spaces.   3. Humeral head is just beneath the acromion suggesting rotator cuff   tear/disease.           This report was signed and finalized on 11/19/2023 6:31 PM CST by Dr. Mart Staples MD.          CT Cervical Spine Without Contrast   Final Result   1. No evidence of acute cervical spine fracture.   2. Degenerative and postoperative changes of the cervical spine, as   described.   3. Carotid  artery calcification in the neck bilaterally.           The full report of this exam was immediately signed and available to the   emergency room. The patient is currently in the emergency room.               This report was signed and finalized on 11/19/2023 5:37 PM CST by Dr. Mart Staples MD.          CT Thoracic Spine Without Contrast   Final Result   1. Chronic appearing fracture at T9 that is incompletely healed. The   fracture line is still barely visible. There is sclerosis along the   fracture line. There is no displacement. No acute thoracic spine   fracture is seen.   2. Degenerative changes of the spine. Ankylosis and syndesmophytes.           The full report of this exam was immediately signed and available to the   emergency room. The patient is currently in the emergency room.           This report was signed and finalized on 11/19/2023 5:32 PM CST by Dr. Mart Staples MD.          CT Lumbar Spine Without Contrast   Final Result   1. No evidence of acute fracture.   2. There may be an old injury at L1-2, as described above. The L1-2 disc   space is fused. The facet joints are fused. Please see the above   discussion at the L1-2 level.   3. Degenerative changes at multiple disc levels with spinal stenosis and   foraminal stenosis.   4. Atheromatous disease of the aortoiliac vessels with aneurysmal   dilatation of the right common iliac artery measuring 2.5 cm. IVC filter   is also noted. Diverticulosis of visualized colon.           The full report of this exam was immediately signed and available to the   emergency room. The patient is currently in the emergency room.       This report was signed and finalized on 11/19/2023 5:44 PM CST by Dr. Mart Staples MD.                                              Medical Decision Making  Problems Addressed:  Acute midline low back pain without sciatica: complicated acute illness or injury  Fall, initial encounter: complicated acute illness or  injury    Amount and/or Complexity of Data Reviewed  Radiology: ordered.     Details: Multiple CT scans ordered.    Risk  Prescription drug management.        Final diagnoses:   Fall, initial encounter   Acute midline low back pain without sciatica       ED Disposition  ED Disposition       ED Disposition   Discharge    Condition   Stable    Comment   --               Otilio Ramos MD  [id number]  [facility] [address]  [place] [address]  [phone]    In 2 days           Medication List        Changed      oxyCODONE-acetaminophen 5-325 MG per tablet  Commonly known as: PERCOCET  Take 1 tablet by mouth Every 6 (Six) Hours As Needed for Moderate Pain.  What changed: when to take this               Where to Get Your Medications        These medications were sent to [id number] - [place], [place] - [address] - [phone]  - [phone] FX  [address], [place] [address]      Phone: [phone]   oxyCODONE-acetaminophen 5-325 MG per tablet            Betty Childress, DO  11/19/23 1613       Betty Childress, DO  11/19/23 1922

## 2023-11-20 NOTE — DISCHARGE INSTRUCTIONS
Follow-up with PCP this coming week if possible.  Fall precautions.  Pain medication was called into your pharmacy this evening.  Return to the emergency department if symptoms worsen or fail to improve.

## 2024-07-15 ENCOUNTER — TRANSCRIBE ORDERS (OUTPATIENT)
Dept: ADMINISTRATIVE | Facility: HOSPITAL | Age: 89
End: 2024-07-15
Payer: MEDICARE

## 2024-07-15 DIAGNOSIS — R07.1 CHEST PAIN ON BREATHING: Primary | ICD-10-CM

## 2024-07-16 ENCOUNTER — HOSPITAL ENCOUNTER (OUTPATIENT)
Dept: GENERAL RADIOLOGY | Facility: HOSPITAL | Age: 89
Discharge: HOME | End: 2024-07-16
Admitting: INTERNAL MEDICINE
Payer: MEDICARE

## 2024-07-16 PROCEDURE — 72070 X-RAY EXAM THORAC SPINE 2VWS: CPT

## 2024-07-16 PROCEDURE — 71046 X-RAY EXAM CHEST 2 VIEWS: CPT

## 2024-12-30 ENCOUNTER — TRANSCRIBE ORDERS (OUTPATIENT)
Dept: ADMINISTRATIVE | Facility: HOSPITAL | Age: 89
End: 2024-12-30
Payer: MEDICARE

## 2024-12-31 ENCOUNTER — TRANSCRIBE ORDERS (OUTPATIENT)
Dept: ADMINISTRATIVE | Facility: HOSPITAL | Age: 89
End: 2024-12-31
Payer: MEDICARE

## 2024-12-31 ENCOUNTER — HOSPITAL ENCOUNTER (OUTPATIENT)
Dept: GENERAL RADIOLOGY | Facility: HOSPITAL | Age: 89
Discharge: HOME | End: 2024-12-31
Admitting: INTERNAL MEDICINE
Payer: MEDICARE

## 2024-12-31 DIAGNOSIS — R29.6 REPEATED FALLS: Primary | ICD-10-CM

## 2024-12-31 DIAGNOSIS — R29.6 REPEATED FALLS: ICD-10-CM

## 2024-12-31 PROCEDURE — 72110 X-RAY EXAM L-2 SPINE 4/>VWS: CPT

## 2025-01-25 ENCOUNTER — HOSPITAL ENCOUNTER (INPATIENT)
Facility: HOSPITAL | Age: 89
LOS: 4 days | Discharge: HOME | End: 2025-01-30
Admitting: FAMILY MEDICINE
Payer: MEDICARE

## 2025-01-25 ENCOUNTER — APPOINTMENT (OUTPATIENT)
Dept: CT IMAGING | Facility: HOSPITAL | Age: 89
End: 2025-01-25
Payer: MEDICARE

## 2025-01-25 ENCOUNTER — APPOINTMENT (OUTPATIENT)
Dept: ULTRASOUND IMAGING | Facility: HOSPITAL | Age: 89
End: 2025-01-25
Payer: MEDICARE

## 2025-01-25 DIAGNOSIS — N39.0 E. COLI UTI (URINARY TRACT INFECTION): ICD-10-CM

## 2025-01-25 DIAGNOSIS — Z74.09 IMPAIRED MOBILITY: ICD-10-CM

## 2025-01-25 DIAGNOSIS — K80.80 BILIARY CALCULUS OF OTHER SITE WITHOUT OBSTRUCTION: ICD-10-CM

## 2025-01-25 DIAGNOSIS — N17.9 ACUTE KIDNEY INJURY: ICD-10-CM

## 2025-01-25 DIAGNOSIS — I82.413 ACUTE BILATERAL DEEP VEIN THROMBOSIS (DVT) OF FEMORAL VEINS: ICD-10-CM

## 2025-01-25 DIAGNOSIS — J18.9 PNEUMONIA OF LEFT LOWER LOBE DUE TO INFECTIOUS ORGANISM: ICD-10-CM

## 2025-01-25 DIAGNOSIS — N39.0 ACUTE UTI (URINARY TRACT INFECTION): ICD-10-CM

## 2025-01-25 DIAGNOSIS — J01.00 ACUTE NON-RECURRENT MAXILLARY SINUSITIS: ICD-10-CM

## 2025-01-25 DIAGNOSIS — G89.29 CHRONIC BILATERAL LOW BACK PAIN WITHOUT SCIATICA: ICD-10-CM

## 2025-01-25 DIAGNOSIS — K44.9 LARGE HIATAL HERNIA: ICD-10-CM

## 2025-01-25 DIAGNOSIS — E80.6 HYPERBILIRUBINEMIA: ICD-10-CM

## 2025-01-25 DIAGNOSIS — R19.5 POSITIVE FECAL OCCULT BLOOD TEST: ICD-10-CM

## 2025-01-25 DIAGNOSIS — I10 PRIMARY HYPERTENSION: ICD-10-CM

## 2025-01-25 DIAGNOSIS — M48.061 SPINAL STENOSIS OF LUMBAR REGION WITHOUT NEUROGENIC CLAUDICATION: ICD-10-CM

## 2025-01-25 DIAGNOSIS — M25.511 CHRONIC PAIN OF BOTH SHOULDERS: ICD-10-CM

## 2025-01-25 DIAGNOSIS — R79.89 ELEVATED TROPONIN LEVEL: ICD-10-CM

## 2025-01-25 DIAGNOSIS — M54.50 CHRONIC BILATERAL LOW BACK PAIN WITHOUT SCIATICA: ICD-10-CM

## 2025-01-25 DIAGNOSIS — E66.3 OVERWEIGHT (BMI 25.0-29.9): ICD-10-CM

## 2025-01-25 DIAGNOSIS — K25.4 GASTRIC ULCER WITH HEMORRHAGE, UNSPECIFIED CHRONICITY: ICD-10-CM

## 2025-01-25 DIAGNOSIS — I26.09 ACUTE PULMONARY EMBOLISM WITH ACUTE COR PULMONALE, UNSPECIFIED PULMONARY EMBOLISM TYPE: ICD-10-CM

## 2025-01-25 DIAGNOSIS — K92.1 MELENA: ICD-10-CM

## 2025-01-25 DIAGNOSIS — Z86.0100 HX OF COLONIC POLYPS: ICD-10-CM

## 2025-01-25 DIAGNOSIS — K44.9 PARAESOPHAGEAL HIATAL HERNIA: ICD-10-CM

## 2025-01-25 DIAGNOSIS — E53.8 VITAMIN B12 DEFICIENCY: ICD-10-CM

## 2025-01-25 DIAGNOSIS — K85.90 ACUTE PANCREATITIS, UNSPECIFIED COMPLICATION STATUS, UNSPECIFIED PANCREATITIS TYPE: Primary | ICD-10-CM

## 2025-01-25 DIAGNOSIS — N17.9 AKI (ACUTE KIDNEY INJURY): ICD-10-CM

## 2025-01-25 DIAGNOSIS — R74.8 ELEVATED LIVER ENZYMES: ICD-10-CM

## 2025-01-25 DIAGNOSIS — Z86.718 HISTORY OF DVT OF LOWER EXTREMITY: ICD-10-CM

## 2025-01-25 DIAGNOSIS — M25.512 CHRONIC PAIN OF BOTH SHOULDERS: ICD-10-CM

## 2025-01-25 DIAGNOSIS — K59.03 DRUG-INDUCED CONSTIPATION: ICD-10-CM

## 2025-01-25 DIAGNOSIS — B96.20 E. COLI UTI (URINARY TRACT INFECTION): ICD-10-CM

## 2025-01-25 DIAGNOSIS — K85.10 GALLSTONE PANCREATITIS: ICD-10-CM

## 2025-01-25 DIAGNOSIS — R63.4 WEIGHT LOSS: ICD-10-CM

## 2025-01-25 DIAGNOSIS — R79.89 ELEVATED LFTS: ICD-10-CM

## 2025-01-25 DIAGNOSIS — G89.29 CHRONIC PAIN OF BOTH SHOULDERS: ICD-10-CM

## 2025-01-25 DIAGNOSIS — M40.14 OTHER SECONDARY KYPHOSIS, THORACIC REGION: ICD-10-CM

## 2025-01-25 DIAGNOSIS — D50.9 IRON DEFICIENCY ANEMIA, UNSPECIFIED IRON DEFICIENCY ANEMIA TYPE: ICD-10-CM

## 2025-01-25 DIAGNOSIS — E43 SEVERE MALNUTRITION: ICD-10-CM

## 2025-01-25 DIAGNOSIS — K22.10 EROSIVE ESOPHAGITIS: ICD-10-CM

## 2025-01-25 DIAGNOSIS — K85.10 ACUTE GALLSTONE PANCREATITIS: ICD-10-CM

## 2025-01-25 DIAGNOSIS — Z78.9 NON-SMOKER: ICD-10-CM

## 2025-01-25 LAB
ALBUMIN SERPL-MCNC: 3.8 G/DL (ref 3.5–5.2)
ALBUMIN/GLOB SERPL: 1.4 G/DL
ALP SERPL-CCNC: 183 U/L (ref 39–117)
ALT SERPL W P-5'-P-CCNC: 486 U/L (ref 1–41)
ANION GAP SERPL CALCULATED.3IONS-SCNC: 13 MMOL/L (ref 5–15)
AST SERPL-CCNC: 506 U/L (ref 1–40)
BACTERIA UR QL AUTO: ABNORMAL /HPF
BASOPHILS # BLD AUTO: 0.05 10*3/MM3 (ref 0–0.2)
BASOPHILS NFR BLD AUTO: 0.3 % (ref 0–1.5)
BILIRUB SERPL-MCNC: 3 MG/DL (ref 0–1.2)
BILIRUB UR QL STRIP: ABNORMAL
BUN SERPL-MCNC: 30 MG/DL (ref 8–23)
BUN/CREAT SERPL: 14.6 (ref 7–25)
CALCIUM SPEC-SCNC: 9.1 MG/DL (ref 8.6–10.5)
CHLORIDE SERPL-SCNC: 103 MMOL/L (ref 98–107)
CLARITY UR: ABNORMAL
CO2 SERPL-SCNC: 24 MMOL/L (ref 22–29)
COLOR UR: ABNORMAL
CREAT SERPL-MCNC: 2.05 MG/DL (ref 0.76–1.27)
D-LACTATE SERPL-SCNC: 1.4 MMOL/L (ref 0.5–2)
D-LACTATE SERPL-SCNC: 2.1 MMOL/L (ref 0.5–2)
DEPRECATED RDW RBC AUTO: 49.9 FL (ref 37–54)
EGFRCR SERPLBLD CKD-EPI 2021: 30.6 ML/MIN/1.73
EOSINOPHIL # BLD AUTO: 0.15 10*3/MM3 (ref 0–0.4)
EOSINOPHIL NFR BLD AUTO: 0.9 % (ref 0.3–6.2)
ERYTHROCYTE [DISTWIDTH] IN BLOOD BY AUTOMATED COUNT: 13.7 % (ref 12.3–15.4)
GEN 5 1HR TROPONIN T REFLEX: 42 NG/L
GLOBULIN UR ELPH-MCNC: 2.7 GM/DL
GLUCOSE SERPL-MCNC: 102 MG/DL (ref 65–99)
GLUCOSE UR STRIP-MCNC: NEGATIVE MG/DL
HCT VFR BLD AUTO: 41.2 % (ref 37.5–51)
HGB BLD-MCNC: 13.2 G/DL (ref 13–17.7)
HGB UR QL STRIP.AUTO: NEGATIVE
HOLD SPECIMEN: NORMAL
HOLD SPECIMEN: NORMAL
HYALINE CASTS UR QL AUTO: ABNORMAL /LPF
IMM GRANULOCYTES # BLD AUTO: 0.11 10*3/MM3 (ref 0–0.05)
IMM GRANULOCYTES NFR BLD AUTO: 0.7 % (ref 0–0.5)
KETONES UR QL STRIP: ABNORMAL
LEUKOCYTE ESTERASE UR QL STRIP.AUTO: ABNORMAL
LIPASE SERPL-CCNC: 2696 U/L (ref 13–60)
LYMPHOCYTES # BLD AUTO: 1.79 10*3/MM3 (ref 0.7–3.1)
LYMPHOCYTES NFR BLD AUTO: 11 % (ref 19.6–45.3)
MAGNESIUM SERPL-MCNC: 1.8 MG/DL (ref 1.6–2.4)
MCH RBC QN AUTO: 31.9 PG (ref 26.6–33)
MCHC RBC AUTO-ENTMCNC: 32 G/DL (ref 31.5–35.7)
MCV RBC AUTO: 99.5 FL (ref 79–97)
MONOCYTES # BLD AUTO: 0.71 10*3/MM3 (ref 0.1–0.9)
MONOCYTES NFR BLD AUTO: 4.4 % (ref 5–12)
NEUTROPHILS NFR BLD AUTO: 13.4 10*3/MM3 (ref 1.7–7)
NEUTROPHILS NFR BLD AUTO: 82.7 % (ref 42.7–76)
NITRITE UR QL STRIP: POSITIVE
NRBC BLD AUTO-RTO: 0 /100 WBC (ref 0–0.2)
PH UR STRIP.AUTO: <=5 [PH] (ref 5–8)
PLATELET # BLD AUTO: 169 10*3/MM3 (ref 140–450)
PMV BLD AUTO: 9.6 FL (ref 6–12)
POTASSIUM SERPL-SCNC: 5.1 MMOL/L (ref 3.5–5.2)
PROCALCITONIN SERPL-MCNC: 41.03 NG/ML (ref 0–0.25)
PROT SERPL-MCNC: 6.5 G/DL (ref 6–8.5)
PROT UR QL STRIP: ABNORMAL
RBC # BLD AUTO: 4.14 10*6/MM3 (ref 4.14–5.8)
RBC # UR STRIP: ABNORMAL /HPF
REF LAB TEST METHOD: ABNORMAL
SODIUM SERPL-SCNC: 140 MMOL/L (ref 136–145)
SP GR UR STRIP: 1.02 (ref 1–1.03)
SQUAMOUS #/AREA URNS HPF: ABNORMAL /HPF
TROPONIN T % DELTA: -7
TROPONIN T NUMERIC DELTA: -3 NG/L
TROPONIN T SERPL HS-MCNC: 45 NG/L
UROBILINOGEN UR QL STRIP: ABNORMAL
WBC # UR STRIP: ABNORMAL /HPF
WBC NRBC COR # BLD AUTO: 16.21 10*3/MM3 (ref 3.4–10.8)
WHOLE BLOOD HOLD COAG: NORMAL
WHOLE BLOOD HOLD SPECIMEN: NORMAL

## 2025-01-25 PROCEDURE — 99285 EMERGENCY DEPT VISIT HI MDM: CPT

## 2025-01-25 PROCEDURE — 83605 ASSAY OF LACTIC ACID: CPT | Performed by: NURSE PRACTITIONER

## 2025-01-25 PROCEDURE — 25810000003 SEPSIS FLUID NS 0.9 % SOLUTION: Performed by: NURSE PRACTITIONER

## 2025-01-25 PROCEDURE — G0378 HOSPITAL OBSERVATION PER HR: HCPCS

## 2025-01-25 PROCEDURE — 25810000003 SODIUM CHLORIDE 0.9 % SOLUTION: Performed by: NURSE PRACTITIONER

## 2025-01-25 PROCEDURE — 25010000002 ONDANSETRON PER 1 MG

## 2025-01-25 PROCEDURE — 25010000002 HYDROMORPHONE 1 MG/ML SOLUTION

## 2025-01-25 PROCEDURE — 83690 ASSAY OF LIPASE: CPT | Performed by: FAMILY MEDICINE

## 2025-01-25 PROCEDURE — 87086 URINE CULTURE/COLONY COUNT: CPT | Performed by: NURSE PRACTITIONER

## 2025-01-25 PROCEDURE — 25810000003 SODIUM CHLORIDE 0.9 % SOLUTION

## 2025-01-25 PROCEDURE — 83735 ASSAY OF MAGNESIUM: CPT | Performed by: NURSE PRACTITIONER

## 2025-01-25 PROCEDURE — 84478 ASSAY OF TRIGLYCERIDES: CPT

## 2025-01-25 PROCEDURE — 36415 COLL VENOUS BLD VENIPUNCTURE: CPT

## 2025-01-25 PROCEDURE — 85025 COMPLETE CBC W/AUTO DIFF WBC: CPT | Performed by: FAMILY MEDICINE

## 2025-01-25 PROCEDURE — P9612 CATHETERIZE FOR URINE SPEC: HCPCS

## 2025-01-25 PROCEDURE — 25010000002 PIPERACILLIN SOD-TAZOBACTAM PER 1 G: Performed by: NURSE PRACTITIONER

## 2025-01-25 PROCEDURE — 87040 BLOOD CULTURE FOR BACTERIA: CPT | Performed by: NURSE PRACTITIONER

## 2025-01-25 PROCEDURE — 93010 ELECTROCARDIOGRAM REPORT: CPT | Performed by: INTERNAL MEDICINE

## 2025-01-25 PROCEDURE — 70450 CT HEAD/BRAIN W/O DYE: CPT

## 2025-01-25 PROCEDURE — 84145 PROCALCITONIN (PCT): CPT | Performed by: NURSE PRACTITIONER

## 2025-01-25 PROCEDURE — 84484 ASSAY OF TROPONIN QUANT: CPT | Performed by: NURSE PRACTITIONER

## 2025-01-25 PROCEDURE — 93005 ELECTROCARDIOGRAM TRACING: CPT | Performed by: NURSE PRACTITIONER

## 2025-01-25 PROCEDURE — 76705 ECHO EXAM OF ABDOMEN: CPT

## 2025-01-25 PROCEDURE — 80053 COMPREHEN METABOLIC PANEL: CPT | Performed by: FAMILY MEDICINE

## 2025-01-25 PROCEDURE — 72125 CT NECK SPINE W/O DYE: CPT

## 2025-01-25 PROCEDURE — 81001 URINALYSIS AUTO W/SCOPE: CPT | Performed by: NURSE PRACTITIONER

## 2025-01-25 RX ORDER — SODIUM CHLORIDE 0.9 % (FLUSH) 0.9 %
10 SYRINGE (ML) INJECTION AS NEEDED
Status: DISCONTINUED | OUTPATIENT
Start: 2025-01-25 | End: 2025-01-26

## 2025-01-25 RX ORDER — SODIUM CHLORIDE 9 MG/ML
100 INJECTION, SOLUTION INTRAVENOUS CONTINUOUS
Status: DISPENSED | OUTPATIENT
Start: 2025-01-25 | End: 2025-01-26

## 2025-01-25 RX ORDER — SODIUM CHLORIDE 0.9 % (FLUSH) 0.9 %
10 SYRINGE (ML) INJECTION AS NEEDED
Status: DISCONTINUED | OUTPATIENT
Start: 2025-01-25 | End: 2025-01-30 | Stop reason: HOSPADM

## 2025-01-25 RX ORDER — ONDANSETRON 2 MG/ML
4 INJECTION INTRAMUSCULAR; INTRAVENOUS EVERY 6 HOURS PRN
Status: DISCONTINUED | OUTPATIENT
Start: 2025-01-25 | End: 2025-01-30 | Stop reason: HOSPADM

## 2025-01-25 RX ORDER — SODIUM CHLORIDE 0.9 % (FLUSH) 0.9 %
10 SYRINGE (ML) INJECTION EVERY 12 HOURS SCHEDULED
Status: DISCONTINUED | OUTPATIENT
Start: 2025-01-25 | End: 2025-01-30 | Stop reason: HOSPADM

## 2025-01-25 RX ORDER — NALOXONE HCL 0.4 MG/ML
0.4 VIAL (ML) INJECTION
Status: DISCONTINUED | OUTPATIENT
Start: 2025-01-25 | End: 2025-01-30 | Stop reason: HOSPADM

## 2025-01-25 RX ORDER — PANTOPRAZOLE SODIUM 40 MG/1
40 TABLET, DELAYED RELEASE ORAL
Status: DISCONTINUED | OUTPATIENT
Start: 2025-01-26 | End: 2025-01-30 | Stop reason: HOSPADM

## 2025-01-25 RX ORDER — NITROGLYCERIN 0.4 MG/1
0.4 TABLET SUBLINGUAL
Status: DISCONTINUED | OUTPATIENT
Start: 2025-01-25 | End: 2025-01-30 | Stop reason: HOSPADM

## 2025-01-25 RX ORDER — ONDANSETRON 4 MG/1
4 TABLET, ORALLY DISINTEGRATING ORAL EVERY 6 HOURS PRN
Status: DISCONTINUED | OUTPATIENT
Start: 2025-01-25 | End: 2025-01-30 | Stop reason: HOSPADM

## 2025-01-25 RX ORDER — SODIUM CHLORIDE 9 MG/ML
40 INJECTION, SOLUTION INTRAVENOUS AS NEEDED
Status: DISCONTINUED | OUTPATIENT
Start: 2025-01-25 | End: 2025-01-30 | Stop reason: HOSPADM

## 2025-01-25 RX ORDER — HEPARIN SODIUM 5000 [USP'U]/ML
5000 INJECTION, SOLUTION INTRAVENOUS; SUBCUTANEOUS EVERY 12 HOURS SCHEDULED
Status: DISCONTINUED | OUTPATIENT
Start: 2025-01-26 | End: 2025-01-27

## 2025-01-25 RX ADMIN — Medication 10 ML: at 23:33

## 2025-01-25 RX ADMIN — SODIUM CHLORIDE 1500 ML: 9 INJECTION, SOLUTION INTRAVENOUS at 20:44

## 2025-01-25 RX ADMIN — SODIUM CHLORIDE 1000 ML: 9 INJECTION, SOLUTION INTRAVENOUS at 18:43

## 2025-01-25 RX ADMIN — SODIUM CHLORIDE 100 ML/HR: 9 INJECTION, SOLUTION INTRAVENOUS at 23:47

## 2025-01-25 RX ADMIN — ONDANSETRON 4 MG: 2 INJECTION INTRAMUSCULAR; INTRAVENOUS at 23:46

## 2025-01-25 RX ADMIN — PIPERACILLIN AND TAZOBACTAM 3.38 G: 3; .375 INJECTION, POWDER, FOR SOLUTION INTRAVENOUS at 20:45

## 2025-01-25 RX ADMIN — HYDROMORPHONE HYDROCHLORIDE 1 MG: 1 INJECTION, SOLUTION INTRAMUSCULAR; INTRAVENOUS; SUBCUTANEOUS at 23:46

## 2025-01-25 NOTE — ED PROVIDER NOTES
"Subjective   History of Present Illness  89-year-old male patient presents to the ED via EMS accompanied by his daughter from [facility].  Triage note indicates the patient is having abdominal cramps. He corrected me and stated that he \"had\" abdominal cramps 2 days ago, but none now.  He states that the abdominal cramping resolved after having a large, solid, formed stool yesterday, \"so big I had to plunge the toilet\". He denies any black or bloody stools.  Also denies diarrhea, nausea, and vomiting.       He reports dizziness and generalized weakness since having this bowel movement.  The dizziness does not appear to be associated with specific triggers, such as changes in position or activity. He denies headache and vision changes.  No neck pain. No fever or chills.  He denies a cough, chest pain, and shortness of air.     Patient also reporting fatigue. He \"stayed in the bed all day\", which is not the patient's baseline. He is reportedly very active and performs all of his ADLs independently. He states that he \"always\" ambulates with assistance of a walker, but he doesn't require additional support. Family is concerned because staff had to help assist the patient with walking in his room today.    Family reports \"confusion\" yesterday, by expressing anomia. Denies falls, trauma, and head injury. He is currently AAO x 4. He does take Percocet 10mg QID and Gabapentin 300mg QID for chronic pain. The patient is also reporting \"slurred speech\" at present. His speech is not slurred. His mouth and tongue are very dry which is minimally affecting his speech. Neurological exam is normal. No facial droop. No pronator drift. Normal finger-to-nose.      He has a history of chronic low back and right lower extremity pain. He denies recent trauma, injury, or falls. Denies any wounds or break in skin integrity. No numbness or tingling. No dysuria or CVA tenderness. No unexplained weight loss, fever, or " chills. Denies a recent illness outside of the presenting complaints.  Denies a history of malignancy. No chronic steroid use or spinal injections. His weakness is generalized, but does not only or primarily affect his lower extremities. He denies saddle anesthesia. No changes in bowel or bladder function such as urinary or fecal incontinence or urinary retention.           ED Triage Vitals [01/25/25 1721]   Temp Heart Rate Resp BP SpO2   98.2 °F (36.8 °C) 77 16 124/79 94 %      Temp src Heart Rate Source Patient Position BP Location FiO2 (%)   Oral Monitor Lying Right arm --         Review of Systems   Constitutional:  Positive for fatigue. Negative for chills and fever.   Eyes:  Negative for visual disturbance.   Respiratory:  Negative for cough and shortness of breath.    Cardiovascular:  Negative for chest pain.   Gastrointestinal:  Positive for abdominal pain (resolved). Negative for abdominal distention, blood in stool, constipation, diarrhea, nausea and vomiting.   Genitourinary:  Negative for difficulty urinating, dysuria, enuresis and flank pain.   Musculoskeletal:  Positive for back pain (chronic). Negative for neck pain.   Neurological:  Positive for dizziness, speech difficulty and weakness. Negative for numbness and headaches.   Psychiatric/Behavioral:  Positive for confusion (resolved).        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       Allergies   Allergen Reactions    Meloxicam Unknown - Low Severity       Past Surgical History:   Procedure Laterality Date    BACK SURGERY      cervical    CATARACT EXTRACTION Bilateral     COLONOSCOPY  02/11/2013    Hyperplastic polyp at 25 cm, Diverticulosis repeat exam in 5 years    COLONOSCOPY N/A 10/16/2018    Tubular adenoma ascending  colon repet prn    ENDOSCOPY  01/15/2014    HH    ENDOSCOPY N/A 10/03/2019    Large HH, non-bleeding gastric ulcer    ENDOSCOPY N/A 11/25/2019    Procedure: ESOPHAGOGASTRODUODENOSCOPY WITH ANESTHESIA;  Surgeon: Marques Pérez MD;  Location: [facility] ENDOSCOPY;  Service: Gastroenterology    ENDOSCOPY N/A 2/9/2023    Procedure: ESOPHAGOGASTRODUODENOSCOPY WITH ANESTHESIA;  Surgeon: Sushant Edmond MD;  Location: [facility] ENDOSCOPY;  Service: Gastroenterology;  Laterality: N/A;  pre GI bleed  post hiatal hernia; esophagitis  Otilio Ramos MD    HERNIA REPAIR Right     INGUINAL     HIP FRACTURE SURGERY Right 02/04/2023    REPLACEMENT TOTAL KNEE BILATERAL      THORACIC LAMINECTOMY DECOMPRESSIVE POSTERIOR N/A 12/14/2016    Procedure: LAMINECTOMY DECOMPRESSION, UNINSTRUMENTED POSTERIOR SPINAL FUSION, T 11-12;  Surgeon: LUZ Adan MD;  Location: [facility] OR;  Service:     VENA CAVA FILTER INSERTION N/A 10/04/2019    Procedure: VENA CAVA FILTER INSERTION;  Surgeon: Dallin Coronado MD;  Location: [facility] HYBRID OR 12;  Service: Vascular       Family History   Problem Relation Age of Onset    Colon cancer Father     Colon polyps Neg Hx        Social History     Socioeconomic History    Marital status:    Tobacco Use    Smoking status: Never     Passive exposure: Never    Smokeless tobacco: Never   Vaping Use    Vaping status: Unknown   Substance and Sexual Activity    Alcohol use: No    Drug use: No    Sexual activity: Defer           Objective   Physical Exam  Vitals and nursing note reviewed.   Constitutional:       General: He is awake. He is not in acute distress.     Appearance: He is not ill-appearing, toxic-appearing or diaphoretic.   HENT:      Head: Normocephalic and atraumatic.      Jaw: There is normal jaw occlusion.      Right Ear: Tympanic membrane, ear canal and external ear normal.      Left Ear: Tympanic membrane, ear canal and external ear normal.      Nose: Nose normal.      Mouth/Throat:       Lips: Pink.      Mouth: Mucous membranes are dry. No oral lesions.      Tongue: No lesions.      Palate: No lesions.      Pharynx: Oropharynx is clear. Uvula midline. No oropharyngeal exudate.   Eyes:      Extraocular Movements: Extraocular movements intact.      Conjunctiva/sclera: Conjunctivae normal.      Pupils: Pupils are equal, round, and reactive to light.   Cardiovascular:      Rate and Rhythm: Normal rate and regular rhythm.      Pulses: Normal pulses.      Heart sounds: Normal heart sounds. No murmur heard.  Pulmonary:      Effort: Pulmonary effort is normal. No respiratory distress.      Breath sounds: Normal breath sounds. No stridor.   Chest:      Chest wall: No tenderness.   Abdominal:      General: Bowel sounds are normal.      Palpations: Abdomen is soft.      Tenderness: There is no abdominal tenderness. There is no right CVA tenderness or left CVA tenderness.   Musculoskeletal:      Cervical back: Neck supple. No rigidity, tenderness or bony tenderness.      Thoracic back: No bony tenderness.      Lumbar back: No bony tenderness. Negative right straight leg raise test and negative left straight leg raise test.      Comments: Inspection reveals no visible surface trauma, soft tissue swelling, or bruising to the back. Skin intact without erythema or warmth. No apparent muscle atrophy. Palpation of the midline spine reveals bony prominences of the vertebrae without any midline or paraspinous tenderness. No CVA tenderness bilaterally. No lower extremity edema. He reports chronic pain in his right lower extremity following a surgical procedure many years ago. He is able to perform left straight leg raise testing without any hesitation, which is negative for pain. However, he struggles to raise the right leg off the bed. He states that this is baseline. Right SLR testing is also negative for pain. He is able to flex and dorsiflex bilateral feet without exacerbation of lumbar pain.       Lymphadenopathy:      Cervical: No cervical adenopathy.   Skin:     General: Skin is warm and dry.      Capillary Refill: Capillary refill takes less than 2 seconds.   Neurological:      General: No focal deficit present.      Mental Status: He is alert and oriented to person, place, and time.      GCS: GCS eye subscore is 4. GCS verbal subscore is 5. GCS motor subscore is 6.      Cranial Nerves: Cranial nerves 2-12 are intact. No dysarthria or facial asymmetry.      Sensory: Sensation is intact.      Motor: No pronator drift.      Coordination: Coordination is intact. Finger-Nose-Finger Test normal.   Psychiatric:         Mood and Affect: Mood normal.         Behavior: Behavior normal. Behavior is cooperative.         Procedures       Lab Results (last 24 hours)       Procedure Component Value Units Date/Time    CBC & Differential [[id number]]  (Abnormal) Collected: 01/25/25 1729    Specimen: Blood Updated: 01/25/25 1737    Narrative:      The following orders were created for panel order CBC & Differential.  Procedure                               Abnormality         Status                     ---------                               -----------         ------                     CBC Auto Differential[[id number]]        Abnormal            Final result                 Please view results for these tests on the individual orders.    CBC Auto Differential [[id number]]  (Abnormal) Collected: 01/25/25 1729    Specimen: Blood Updated: 01/25/25 1737     WBC 16.21 10*3/mm3      RBC 4.14 10*6/mm3      Hemoglobin 13.2 g/dL      Hematocrit 41.2 %      MCV 99.5 fL      MCH 31.9 pg      MCHC 32.0 g/dL      RDW 13.7 %      RDW-SD 49.9 fl      MPV 9.6 fL      Platelets 169 10*3/mm3      Neutrophil % 82.7 %      Lymphocyte % 11.0 %      Monocyte % 4.4 %      Eosinophil % 0.9 %      Basophil % 0.3 %      Immature Grans % 0.7 %      Neutrophils, Absolute 13.40 10*3/mm3      Lymphocytes, Absolute 1.79 10*3/mm3      Monocytes,  Absolute 0.71 10*3/mm3      Eosinophils, Absolute 0.15 10*3/mm3      Basophils, Absolute 0.05 10*3/mm3      Immature Grans, Absolute 0.11 10*3/mm3      nRBC 0.0 /100 WBC     Comprehensive Metabolic Panel [[id number]]  (Abnormal) Collected: 01/25/25 1835    Specimen: Blood Updated: 01/25/25 1915     Glucose 102 mg/dL      BUN 30 mg/dL      Creatinine 2.05 mg/dL      Sodium 140 mmol/L      Potassium 5.1 mmol/L      Comment: Specimen hemolyzed.  Result may be falsely elevated.        Chloride 103 mmol/L      CO2 24.0 mmol/L      Calcium 9.1 mg/dL      Total Protein 6.5 g/dL      Albumin 3.8 g/dL      ALT (SGPT) 486 U/L      Comment: Specimen hemolyzed.  Result may  be falsely elevated.        AST (SGOT) 506 U/L      Comment: Specimen hemolyzed.  Result may be falsely elevated.        Alkaline Phosphatase 183 U/L      Total Bilirubin 3.0 mg/dL      Globulin 2.7 gm/dL      A/G Ratio 1.4 g/dL      BUN/Creatinine Ratio 14.6     Anion Gap 13.0 mmol/L      eGFR 30.6 mL/min/1.73     Narrative:      GFR Categories in Chronic Kidney Disease (CKD)      GFR Category          GFR (mL/min/1.73)    Interpretation  G1                     90 or greater         Normal or high (1)  G2                      60-89                Mild decrease (1)  G3a                   45-59                Mild to moderate decrease  G3b                   30-44                Moderate to severe decrease  G4                    15-29                Severe decrease  G5                    14 or less           Kidney failure          (1)In the absence of evidence of kidney disease, neither GFR category G1 or G2 fulfill the criteria for CKD.    eGFR calculation 2021 CKD-EPI creatinine equation, which does not include race as a factor    Lipase [[id number]]  (Abnormal) Collected: 01/25/25 1835    Specimen: Blood Updated: 01/25/25 1926     Lipase 2,696 U/L      Comment: Specimen hemolyzed.  Results may be falsely decreased.       Procalcitonin [[id number]]  (Abnormal)  "Collected: 01/25/25 1835    Specimen: Blood Updated: 01/25/25 1918     Procalcitonin 41.03 ng/mL     Narrative:      As a Marker for Sepsis (Non-Neonates):    1. <0.5 ng/mL represents a low risk of severe sepsis and/or septic shock.  2. >2 ng/mL represents a high risk of severe sepsis and/or septic shock.    As a Marker for Lower Respiratory Tract Infections that require antibiotic therapy:    PCT on Admission    Antibiotic Therapy       6-12 Hrs later    >0.5                Strongly Recommended  >0.25 - <0.5        Recommended   0.1 - 0.25          Discouraged              Remeasure/reassess PCT  <0.1                Strongly Discouraged     Remeasure/reassess PCT    As 28 day mortality risk marker: \"Change in Procalcitonin Result\" (>80% or <=80%) if Day 0 (or Day 1) and Day 4 values are available. Refer to http://www.[employer]-pct-calculator.com    Change in PCT <=80%  A decrease of PCT levels below or equal to 80% defines a positive change in PCT test result representing a higher risk for 28-day all-cause mortality of patients diagnosed with severe sepsis for septic shock.    Change in PCT >80%  A decrease of PCT levels of more than 80% defines a negative change in PCT result representing a lower risk for 28-day all-cause mortality of patients diagnosed with severe sepsis or septic shock.       High Sensitivity Troponin T [[id number]]  (Abnormal) Collected: 01/25/25 1835    Specimen: Blood Updated: 01/25/25 1911     HS Troponin T 45 ng/L      Comment: Specimen hemolyzed.  Results may be falsely decreased.       Narrative:      High Sensitive Troponin T Reference Range:  <14.0 ng/L- Negative Female for AMI  <22.0 ng/L- Negative Male for AMI  >=14 - Abnormal Female indicating possible myocardial injury.  >=22 - Abnormal Male indicating possible myocardial injury.   Clinicians would have to utilize clinical acumen, EKG, Troponin, and serial changes to determine if it is an Acute Myocardial Infarction or myocardial injury " due to an underlying chronic condition.         Magnesium [[id number]]  (Normal) Collected: 01/25/25 1835    Specimen: Blood Updated: 01/25/25 1915     Magnesium 1.8 mg/dL     Lactic Acid, Plasma [[id number]]  (Abnormal) Collected: 01/25/25 1835    Specimen: Blood Updated: 01/25/25 1910     Lactate 2.1 mmol/L     Urinalysis With Culture If Indicated - Straight Cath [[id number]]  (Abnormal) Collected: 01/25/25 1942    Specimen: Urine from Straight Cath Updated: 01/25/25 2017     Color, UA Dark Yellow     Appearance, UA Cloudy     pH, UA <=5.0     Specific Gravity, UA 1.019     Glucose, UA Negative     Ketones, UA Trace     Bilirubin, UA Small (1+)     Blood, UA Negative     Protein, UA 30 mg/dL (1+)     Leuk Esterase, UA Trace     Nitrite, UA Positive     Urobilinogen, UA 1.0 E.U./dL    Narrative:      In absence of clinical symptoms, the presence of pyuria, bacteria, and/or nitrites on the urinalysis result does not correlate with infection.    Urinalysis, Microscopic Only - Straight Cath [[id number]]  (Abnormal) Collected: 01/25/25 1942    Specimen: Urine from Straight Cath Updated: 01/25/25 2017     RBC, UA None Seen /HPF      WBC, UA 6-10 /HPF      Bacteria, UA Trace /HPF      Squamous Epithelial Cells, UA 0-2 /HPF      Hyaline Casts, UA 3-6 /LPF      Methodology Manual Light Microscopy    Urine Culture - Urine, Straight Cath [[id number]] Collected: 01/25/25 1942    Specimen: Urine from Straight Cath Updated: 01/25/25 2017    Blood Culture - Blood, Arm, Left [[id number]] Collected: 01/25/25 2039    Specimen: Blood from Arm, Left Updated: 01/25/25 2101    High Sensitivity Troponin T 1Hr [[id number]]  (Abnormal) Collected: 01/25/25 2040    Specimen: Blood from Arm, Right Updated: 01/25/25 2119     HS Troponin T 42 ng/L      Troponin T Numeric Delta -3 ng/L      Troponin T % Delta -7    Narrative:      High Sensitive Troponin T Reference Range:  <14.0 ng/L- Negative Female for AMI  <22.0 ng/L- Negative Male for AMI  >=14 -  Abnormal Female indicating possible myocardial injury.  >=22 - Abnormal Male indicating possible myocardial injury.   Clinicians would have to utilize clinical acumen, EKG, Troponin, and serial changes to determine if it is an Acute Myocardial Infarction or myocardial injury due to an underlying chronic condition.         Blood Culture - Blood, Arm, Right [[id number]] Collected: 01/25/25 2040    Specimen: Blood from Arm, Right Updated: 01/25/25 2059                  ED Course  ED Course as of 01/27/25 1028   Sat Jan 25, 2025 1810 ECG 12 Lead Other; Dizziness  Sinus rhythm with 1st degree AV block 77bpm  Nonspecific T wave abnormality  When compared with ECG of 10-Feb-2023 13:10,  QT has shortened   [TD]   1845 CT Head Without Contrast  IMPRESSION:     1.  Moderate ventricular dilation again seen with low-attenuation in the  subcortical and periventricular white matter may be related to chronic  microvascular change or could be related to normal pressure  hydrocephalus. Overall, this is very similar to the exam from 2/1/2022.     2.  Opacification of the bilateral maxillary sinuses, raising concern  for maxillary sinusitis.      [TD]   1845 CT Cervical Spine Without Contrast  IMPRESSION:     1.  Multilevel degenerative changes and fusion changes as discussed  above. No definite acute osseous abnormality on this exam.     2.  Atherosclerotic vascular disease in the carotid bifurcations  bilaterally.    [TD]   1930 ED attending (Julio) consulted. Plan: Consult Hospitalist for admission. ED  notified.  [TD]   2105 Check-in with ED  regarding status of US Gallbladder. US (non-vascular) tech pending arrival for another patient in the ED as well.  [TD]   2130 US Gallbladder     IMPRESSION:     1.  Exam limited by overlying bowel gas. Pancreas and aorta are poorly  visualized.     2.  There is a 1.2 cm stone in the lumen of the gallbladder. The common  bile duct is normal in caliber. No intrahepatic ductal  dilation  identified.   [TD]   2149 Pending consult with Hospitalist regarding US results.  [TD]   2209 Consult with Dr. White. [place] to admit.  [TD]   2220 Updated patient and daughter via patient's telephone with his permission on results and admission. They are in agreement.  [TD]      ED Course User Index  [TD] Joselin Alejandre APRN                                                       Medical Decision Making  An 89-year-old male presents with complaints of dizziness and fatigue. He reports feeling increasingly tired over the past few days and describes intermittent dizziness, particularly when standing. He denies any significant headaches, chest pain, shortness of breath, or recent falls. The dizziness does not appear to be associated with specific triggers, such as changes in position or activity. 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 He is currently taking Percocet for pain management, along with Gabapentin for nerve-related discomfort. The patient denies any recent changes in his medication or symptoms related to nausea, vomiting, or fever.      Based on the patient's symptoms and examination findings, potential differential diagnoses include but are not limited to:  dehydration, orthostatic hypotension, musculoskeletal dysfunction, medication side effects (e.g., percocet, gabapentin), age-related changes in balance, BPPV, CVA or TIA (TIA possible, CVA less likely given a normal neurological exam), electrolyte imbalance.       Course of treatment in the ED:  Labs Reviewed  URINALYSIS W/ CULTURE IF INDICATED - Abnormal; Notable for the following components:     Color, UA                     Dark Yellow (*)               Appearance, UA                Cloudy (*)               Ketones, UA                   Trace (*)               Bilirubin, UA                 Small (1+) (*)               Protein, UA                     (*)                   Leuk Esterase, UA             Trace (*)               Nitrite, UA                   Positive (*)            All other components within normal limits         Narrative: In absence of clinical symptoms, the presence of pyuria, bacteria, and/or nitrites on the urinalysis result does not correlate with infection.  COMPREHENSIVE METABOLIC PANEL - Abnormal; Notable for the following components:     Glucose                       102 (*)                BUN                           30 (*)                 Creatinine                    2.05 (*)               ALT (SGPT)                    486 (*)                AST (SGOT)                    506 (*)                Alkaline Phosphatase          183 (*)                Total Bilirubin               3.0 (*)                eGFR                          30.6 (*)            All other components within normal limits         Narrative: GFR Categories in Chronic Kidney Disease (CKD)                                      GFR Category          GFR (mL/min/1.73)    Interpretation                  G1                     90 or greater         Normal or high (1)                  G2                      60-89                Mild decrease (1)                  G3a                   45-59                Mild to moderate decrease                  G3b                   30-44                Moderate to severe decrease                  G4                    15-29                Severe decrease                  G5                    14 or less           Kidney failure                                          (1)In the absence of evidence of kidney disease, neither GFR category G1 or G2 fulfill the criteria for CKD.                                    eGFR calculation 2021 CKD-EPI creatinine equation, which does not include race as a factor  LIPASE - Abnormal; Notable for the following components:     Lipase                        2,696 (*)            All other components within normal  "limits  CBC WITH AUTO DIFFERENTIAL - Abnormal; Notable for the following components:     WBC                           16.21 (*)               MCV                           99.5 (*)               Neutrophil %                  82.7 (*)               Lymphocyte %                  11.0 (*)               Monocyte %                    4.4 (*)                Immature Grans %              0.7 (*)                Neutrophils, Absolute         13.40 (*)               Immature Grans, Absolute      0.11 (*)            All other components within normal limits  PROCALCITONIN - Abnormal; Notable for the following components:     Procalcitonin                 41.03 (*)            All other components within normal limits         Narrative: As a Marker for Sepsis (Non-Neonates):                                    1. <0.5 ng/mL represents a low risk of severe sepsis and/or septic shock.                  2. >2 ng/mL represents a high risk of severe sepsis and/or septic shock.                                    As a Marker for Lower Respiratory Tract Infections that require antibiotic therapy:                                    PCT on Admission    Antibiotic Therapy       6-12 Hrs later                                    >0.5                Strongly Recommended                  >0.25 - <0.5        Recommended                   0.1 - 0.25          Discouraged              Remeasure/reassess PCT                  <0.1                Strongly Discouraged     Remeasure/reassess PCT                                    As 28 day mortality risk marker: \"Change in Procalcitonin Result\" (>80% or <=80%) if Day 0 (or Day 1) and Day 4 values are available. Refer to http://www.[employer]-pct-calculator.com                                    Change in PCT <=80%                  A decrease of PCT levels below or equal to 80% defines a positive change in PCT test result representing a higher risk for 28-day all-cause mortality of patients diagnosed with " severe sepsis for septic shock.                                    Change in PCT >80%                  A decrease of PCT levels of more than 80% defines a negative change in PCT result representing a lower risk for 28-day all-cause mortality of patients diagnosed with severe sepsis or septic shock.                     TROPONIN - Abnormal; Notable for the following components:     HS Troponin T                 45 (*)              All other components within normal limits         Narrative: High Sensitive Troponin T Reference Range:                  <14.0 ng/L- Negative Female for AMI                  <22.0 ng/L- Negative Male for AMI                  >=14 - Abnormal Female indicating possible myocardial injury.                  >=22 - Abnormal Male indicating possible myocardial injury.                   Clinicians would have to utilize clinical acumen, EKG, Troponin, and serial changes to determine if it is an Acute Myocardial Infarction or myocardial injury due to an underlying chronic condition.                                       LACTIC ACID, PLASMA - Abnormal; Notable for the following components:     Lactate                       2.1 (*)             All other components within normal limits  HIGH SENSITIVITIY TROPONIN T 1HR - Abnormal; Notable for the following components:     HS Troponin T                 42 (*)              All other components within normal limits         Narrative: High Sensitive Troponin T Reference Range:                  <14.0 ng/L- Negative Female for AMI                  <22.0 ng/L- Negative Male for AMI                  >=14 - Abnormal Female indicating possible myocardial injury.                  >=22 - Abnormal Male indicating possible myocardial injury.                   Clinicians would have to utilize clinical acumen, EKG, Troponin, and serial changes to determine if it is an Acute Myocardial Infarction or myocardial injury due to an underlying chronic condition.                                        URINALYSIS, MICROSCOPIC ONLY - Abnormal; Notable for the following components:     WBC, UA                       6-10 (*)               Bacteria, UA                  Trace (*)            All other components within normal limits  MAGNESIUM - Normal  BLOOD CULTURE  BLOOD CULTURE  URINE CULTURE  RAINBOW DRAW         Narrative: The following orders were created for panel order [facility] Draw.                  Procedure                               Abnormality         Status                                     ---------                               -----------         ------                                     Green Top (Gel)[[id number]]                                  Final result                               Lavender Top[[id number]]                                     Final result                               Red Top[[id number]]                                          Final result                               Light Blue Top[[id number]]                                   Final result                                                 Please view results for these tests on the individual orders.  LACTIC ACID, REFLEX  GREEN TOP  LAVENDER TOP  RED TOP  LIGHT BLUE TOP  CBC AND DIFFERENTIAL      Medications  sodium chloride 0.9 % flush 10 mL (has no administration in time range)  sodium chloride 0.9 % bolus 1,000 mL (0 mL Intravenous Stopped 1/25/25 2010)  piperacillin-tazobactam (ZOSYN) 3.375 g IVPB in 100 mL NS MBP (CD) (3.375 g Intravenous New Bag 1/25/25 2045)  sepsis fluid NS 0.9 % bolus 2,430 mL (1,500 mL Intravenous New Bag 1/25/25 2044)      US Gallbladder   Final Result         1.  Exam limited by overlying bowel gas. Pancreas and aorta are poorly    visualized.         2.  There is a 1.2 cm stone in the lumen of the gallbladder. The common    bile duct is normal in caliber. No intrahepatic ductal dilation    identified.         This report was signed and finalized on 1/25/2025 9:15 PM by   Jacky Hall MD.          CT Head Without Contrast   Final Result         1.  Moderate ventricular dilation again seen with low-attenuation in the    subcortical and periventricular white matter may be related to chronic    microvascular change or could be related to normal pressure    hydrocephalus. Overall, this is very similar to the exam from 2/1/2022.         2.  Opacification of the bilateral maxillary sinuses, raising concern    for maxillary sinusitis.         This report was signed and finalized on 1/25/2025 6:17 PM by Dr. Jacky Hall MD.          CT Cervical Spine Without Contrast   Final Result         1.  Multilevel degenerative changes and fusion changes as discussed    above. No definite acute osseous abnormality on this exam.         2.  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          This report was signed and finalized on 1/25/2025 6:24 PM by Dr. Jacky Hall MD.            The patient is an 89-year-old male presenting with generalized weakness, dizziness, and fatigue, which developed after a recent large bowel movement. He reports an episode of confusion and anomia yesterday, which has since resolved, and denies recent trauma, falls, or head injury. His family is concerned as he required assistance with ambulation today, though he typically walks independently with a walker. Neurological exam is normal, and there is no evidence of acute distress at this time. His current symptoms, including dry mouth affecting his speech, suggest dehydration or medication side effects.    His lab results show an elevated white blood cell count (16.21) indicating possible infection, along with significantly elevated liver enzymes (, ) and lipase (2,696), raising concern for pancreatic involvement. His procalcitonin is notably high at 41.03, indicating a high risk for sepsis or septic shock. His renal function is concerning with a BUN of 30,  creatinine of 2.05, and a GFR of 30.6, suggesting moderate kidney impairment. Additionally, his troponin level of 45, could point to myocardial injury, although no acute myocardial infarction is confirmed.    Imaging results show moderate ventricular dilation and possible chronic microvascular changes in the brain, opacification of the maxillary sinuses suggestive of sinusitis, and a gallstone seen on ultrasound with no signs of biliary obstruction. A positive urinalysis with nitrites and leukocyte esterase raises suspicion for a urinary tract infection, though further cultures are pending.    Given these findings, this patient requires admission for stabilization, further diagnostic workup, and close monitoring for possible sepsis, acute kidney injury, as well as neurological and cardiovascular concerns. The patient's clinical status, along with the abnormal labs and imaging results, necessitate inpatient care for ongoing observation and management.    Problems Addressed:  Acute non-recurrent maxillary sinusitis: complicated acute illness or injury  Acute pancreatitis, unspecified complication status, unspecified pancreatitis type: complicated acute illness or injury  Acute UTI (urinary tract infection): complicated acute illness or injury  ISHA (acute kidney injury): complicated acute illness or injury  Elevated liver enzymes: complicated acute illness or injury  Elevated troponin level: complicated acute illness or injury    Amount and/or Complexity of Data Reviewed  Independent Historian: caregiver     Details: Daughter  External Data Reviewed: labs and notes.  Labs: ordered. Decision-making details documented in ED Course.  Radiology: ordered. Decision-making details documented in ED Course.  ECG/medicine tests: ordered and independent interpretation performed. Decision-making details documented in ED Course.    Risk  Prescription drug management.  Decision regarding hospitalization.        Final diagnoses:    Acute non-recurrent maxillary sinusitis   Acute pancreatitis, unspecified complication status, unspecified pancreatitis type   ISHA (acute kidney injury)   Acute UTI (urinary tract infection)   Elevated liver enzymes   Elevated troponin level       ED Disposition  ED Disposition       ED Disposition   Decision to Admit    Condition   --    Comment   Level of Care: Telemetry [5]   Diagnosis: Pancreatitis, acute [822831]   Admitting Physician: MISBAH LEYVA [[id number]]   Attending Physician: MISBAH LEYVA [[id number]]                  Joselin Alejandre, HARISH  01/27/25 1028

## 2025-01-26 PROBLEM — N17.9 ACUTE KIDNEY INJURY: Status: ACTIVE | Noted: 2025-01-26

## 2025-01-26 PROBLEM — E80.6 HYPERBILIRUBINEMIA: Status: ACTIVE | Noted: 2025-01-26

## 2025-01-26 PROBLEM — N39.0 ACUTE UTI (URINARY TRACT INFECTION): Status: ACTIVE | Noted: 2025-01-26

## 2025-01-26 PROBLEM — K85.10 GALLSTONE PANCREATITIS: Status: ACTIVE | Noted: 2025-01-26

## 2025-01-26 PROBLEM — R79.89 ELEVATED LFTS: Status: ACTIVE | Noted: 2025-01-26

## 2025-01-26 PROBLEM — K85.10 GALLSTONE PANCREATITIS: Status: ACTIVE | Noted: 2025-01-25

## 2025-01-26 LAB
ALBUMIN SERPL-MCNC: 2.9 G/DL (ref 3.5–5.2)
ALBUMIN SERPL-MCNC: 3 G/DL (ref 3.5–5.2)
ALBUMIN/GLOB SERPL: 1.3 G/DL
ALBUMIN/GLOB SERPL: 1.4 G/DL
ALP SERPL-CCNC: 139 U/L (ref 39–117)
ALP SERPL-CCNC: 150 U/L (ref 39–117)
ALT SERPL W P-5'-P-CCNC: 228 U/L (ref 1–41)
ALT SERPL W P-5'-P-CCNC: 311 U/L (ref 1–41)
ANION GAP SERPL CALCULATED.3IONS-SCNC: 7 MMOL/L (ref 5–15)
ANION GAP SERPL CALCULATED.3IONS-SCNC: 9 MMOL/L (ref 5–15)
AST SERPL-CCNC: 139 U/L (ref 1–40)
AST SERPL-CCNC: 239 U/L (ref 1–40)
BILIRUB SERPL-MCNC: 0.8 MG/DL (ref 0–1.2)
BILIRUB SERPL-MCNC: 1.8 MG/DL (ref 0–1.2)
BUN SERPL-MCNC: 24 MG/DL (ref 8–23)
BUN SERPL-MCNC: 29 MG/DL (ref 8–23)
BUN/CREAT SERPL: 18.5 (ref 7–25)
BUN/CREAT SERPL: 21.1 (ref 7–25)
CALCIUM SPEC-SCNC: 8.3 MG/DL (ref 8.6–10.5)
CALCIUM SPEC-SCNC: 8.6 MG/DL (ref 8.6–10.5)
CHLORIDE SERPL-SCNC: 108 MMOL/L (ref 98–107)
CHLORIDE SERPL-SCNC: 109 MMOL/L (ref 98–107)
CO2 SERPL-SCNC: 22 MMOL/L (ref 22–29)
CO2 SERPL-SCNC: 26 MMOL/L (ref 22–29)
CREAT SERPL-MCNC: 1.14 MG/DL (ref 0.76–1.27)
CREAT SERPL-MCNC: 1.57 MG/DL (ref 0.76–1.27)
DEPRECATED RDW RBC AUTO: 50.7 FL (ref 37–54)
EGFRCR SERPLBLD CKD-EPI 2021: 42.1 ML/MIN/1.73
EGFRCR SERPLBLD CKD-EPI 2021: 61.9 ML/MIN/1.73
ERYTHROCYTE [DISTWIDTH] IN BLOOD BY AUTOMATED COUNT: 13.7 % (ref 12.3–15.4)
GLOBULIN UR ELPH-MCNC: 2.2 GM/DL
GLOBULIN UR ELPH-MCNC: 2.2 GM/DL
GLUCOSE SERPL-MCNC: 119 MG/DL (ref 65–99)
GLUCOSE SERPL-MCNC: 88 MG/DL (ref 65–99)
HCT VFR BLD AUTO: 36 % (ref 37.5–51)
HGB BLD-MCNC: 11.2 G/DL (ref 13–17.7)
LIPASE SERPL-CCNC: 1574 U/L (ref 13–60)
MCH RBC QN AUTO: 31.4 PG (ref 26.6–33)
MCHC RBC AUTO-ENTMCNC: 31.1 G/DL (ref 31.5–35.7)
MCV RBC AUTO: 100.8 FL (ref 79–97)
PLATELET # BLD AUTO: 139 10*3/MM3 (ref 140–450)
PMV BLD AUTO: 9 FL (ref 6–12)
POTASSIUM SERPL-SCNC: 4.5 MMOL/L (ref 3.5–5.2)
POTASSIUM SERPL-SCNC: 4.6 MMOL/L (ref 3.5–5.2)
PROT SERPL-MCNC: 5.1 G/DL (ref 6–8.5)
PROT SERPL-MCNC: 5.2 G/DL (ref 6–8.5)
QT INTERVAL: 362 MS
QTC INTERVAL: 409 MS
RBC # BLD AUTO: 3.57 10*6/MM3 (ref 4.14–5.8)
SODIUM SERPL-SCNC: 139 MMOL/L (ref 136–145)
SODIUM SERPL-SCNC: 142 MMOL/L (ref 136–145)
TRIGL SERPL-MCNC: 64 MG/DL (ref 0–150)
WBC NRBC COR # BLD AUTO: 11.04 10*3/MM3 (ref 3.4–10.8)

## 2025-01-26 PROCEDURE — 25810000003 SODIUM CHLORIDE 0.9 % SOLUTION

## 2025-01-26 PROCEDURE — 36415 COLL VENOUS BLD VENIPUNCTURE: CPT

## 2025-01-26 PROCEDURE — 99222 1ST HOSP IP/OBS MODERATE 55: CPT | Performed by: STUDENT IN AN ORGANIZED HEALTH CARE EDUCATION/TRAINING PROGRAM

## 2025-01-26 PROCEDURE — 25010000002 PIPERACILLIN SOD-TAZOBACTAM PER 1 G

## 2025-01-26 PROCEDURE — 83690 ASSAY OF LIPASE: CPT

## 2025-01-26 PROCEDURE — 25810000003 SODIUM CHLORIDE 0.9 % SOLUTION: Performed by: STUDENT IN AN ORGANIZED HEALTH CARE EDUCATION/TRAINING PROGRAM

## 2025-01-26 PROCEDURE — 85027 COMPLETE CBC AUTOMATED: CPT

## 2025-01-26 PROCEDURE — 25010000002 HEPARIN (PORCINE) PER 1000 UNITS

## 2025-01-26 PROCEDURE — 80053 COMPREHEN METABOLIC PANEL: CPT

## 2025-01-26 PROCEDURE — 80053 COMPREHEN METABOLIC PANEL: CPT | Performed by: STUDENT IN AN ORGANIZED HEALTH CARE EDUCATION/TRAINING PROGRAM

## 2025-01-26 PROCEDURE — 99222 1ST HOSP IP/OBS MODERATE 55: CPT | Performed by: NURSE PRACTITIONER

## 2025-01-26 RX ORDER — GABAPENTIN 300 MG/1
300 CAPSULE ORAL NIGHTLY
Status: DISCONTINUED | OUTPATIENT
Start: 2025-01-26 | End: 2025-01-30 | Stop reason: HOSPADM

## 2025-01-26 RX ORDER — OXYCODONE AND ACETAMINOPHEN 10; 325 MG/1; MG/1
1 TABLET ORAL EVERY 8 HOURS PRN
Status: DISCONTINUED | OUTPATIENT
Start: 2025-01-26 | End: 2025-01-27

## 2025-01-26 RX ORDER — LISINOPRIL 20 MG/1
40 TABLET ORAL DAILY
Status: CANCELLED | OUTPATIENT
Start: 2025-01-27

## 2025-01-26 RX ORDER — OXYCODONE AND ACETAMINOPHEN 10; 325 MG/1; MG/1
1 TABLET ORAL EVERY 6 HOURS PRN
Status: ON HOLD | COMMUNITY
End: 2025-01-30

## 2025-01-26 RX ORDER — NIFEDIPINE 60 MG/1
60 TABLET, EXTENDED RELEASE ORAL DAILY
Status: DISCONTINUED | OUTPATIENT
Start: 2025-01-26 | End: 2025-01-29

## 2025-01-26 RX ORDER — LISINOPRIL 40 MG/1
40 TABLET ORAL DAILY
Status: ON HOLD | COMMUNITY
End: 2025-01-30

## 2025-01-26 RX ORDER — BUSPIRONE HYDROCHLORIDE 5 MG/1
5 TABLET ORAL 2 TIMES DAILY WITH MEALS
Status: DISCONTINUED | OUTPATIENT
Start: 2025-01-26 | End: 2025-01-30 | Stop reason: HOSPADM

## 2025-01-26 RX ORDER — CYCLOBENZAPRINE HCL 10 MG
5 TABLET ORAL 2 TIMES DAILY PRN
Status: DISCONTINUED | OUTPATIENT
Start: 2025-01-26 | End: 2025-01-30 | Stop reason: HOSPADM

## 2025-01-26 RX ORDER — LISINOPRIL 10 MG/1
10 TABLET ORAL DAILY
Status: DISCONTINUED | OUTPATIENT
Start: 2025-01-26 | End: 2025-01-30 | Stop reason: HOSPADM

## 2025-01-26 RX ORDER — SODIUM CHLORIDE 9 MG/ML
100 INJECTION, SOLUTION INTRAVENOUS CONTINUOUS
Status: DISCONTINUED | OUTPATIENT
Start: 2025-01-26 | End: 2025-01-27

## 2025-01-26 RX ADMIN — PIPERACILLIN SODIUM AND TAZOBACTAM SODIUM 3.38 G: 3; .375 INJECTION, POWDER, LYOPHILIZED, FOR SOLUTION INTRAVENOUS at 11:18

## 2025-01-26 RX ADMIN — SODIUM CHLORIDE 100 ML/HR: 9 INJECTION, SOLUTION INTRAVENOUS at 20:08

## 2025-01-26 RX ADMIN — HEPARIN SODIUM 5000 UNITS: 5000 INJECTION, SOLUTION INTRAVENOUS; SUBCUTANEOUS at 20:09

## 2025-01-26 RX ADMIN — Medication 10 ML: at 20:19

## 2025-01-26 RX ADMIN — PIPERACILLIN SODIUM AND TAZOBACTAM SODIUM 3.38 G: 3; .375 INJECTION, POWDER, LYOPHILIZED, FOR SOLUTION INTRAVENOUS at 03:37

## 2025-01-26 RX ADMIN — PIPERACILLIN SODIUM AND TAZOBACTAM SODIUM 3.38 G: 3; .375 INJECTION, POWDER, LYOPHILIZED, FOR SOLUTION INTRAVENOUS at 18:59

## 2025-01-26 RX ADMIN — LISINOPRIL 10 MG: 10 TABLET ORAL at 17:14

## 2025-01-26 RX ADMIN — SODIUM CHLORIDE 100 ML/HR: 9 INJECTION, SOLUTION INTRAVENOUS at 09:53

## 2025-01-26 RX ADMIN — OXYCODONE AND ACETAMINOPHEN 1 TABLET: 325; 10 TABLET ORAL at 21:49

## 2025-01-26 RX ADMIN — GABAPENTIN 300 MG: 300 CAPSULE ORAL at 20:09

## 2025-01-26 RX ADMIN — HEPARIN SODIUM 5000 UNITS: 5000 INJECTION, SOLUTION INTRAVENOUS; SUBCUTANEOUS at 09:45

## 2025-01-26 RX ADMIN — SODIUM CHLORIDE 100 ML/HR: 9 INJECTION, SOLUTION INTRAVENOUS at 20:19

## 2025-01-26 RX ADMIN — PANTOPRAZOLE SODIUM 40 MG: 40 TABLET, DELAYED RELEASE ORAL at 06:42

## 2025-01-26 RX ADMIN — NIFEDIPINE 60 MG: 60 TABLET, FILM COATED, EXTENDED RELEASE ORAL at 17:14

## 2025-01-26 RX ADMIN — BUSPIRONE HYDROCHLORIDE 5 MG: 5 TABLET ORAL at 17:14

## 2025-01-26 NOTE — CASE MANAGEMENT/SOCIAL WORK
Discharge Planning Assessment   Mohit     Patient Name: Jose Alfredo Kirkpatrick  MRN: [id number]  Today's Date: 1/26/2025    Admit Date: 1/25/2025    Plan: Home   Discharge Needs Assessment       Row Name 01/26/25 1158       Living Environment    People in Home facility resident    Name(s) of People in Home Lives at [facility] in apartment    Current Living Arrangements apartment    Potentially Unsafe Housing Conditions none    In the past 12 months has the electric, gas, oil, or water company threatened to shut off services in your home? No    Primary Care Provided by self    Provides Primary Care For no one    Family Caregiver if Needed other (see comments)    Quality of Family Relationships supportive;involved;helpful    Able to Return to Prior Arrangements yes       Resource/Environmental Concerns    Resource/Environmental Concerns none    Transportation Concerns none       Transportation Needs    In the past 12 months, has lack of transportation kept you from medical appointments or from getting medications? no    In the past 12 months, has lack of transportation kept you from meetings, work, or from getting things needed for daily living? No       Food Insecurity    Within the past 12 months, you worried that your food would run out before you got the money to buy more. Never true    Within the past 12 months, the food you bought just didn't last and you didn't have money to get more. Never true       Transition Planning    Patient/Family Anticipates Transition to home    Patient/Family Anticipated Services at Transition none    Transportation Anticipated family or friend will provide       Discharge Needs Assessment    Readmission Within the Last 30 Days no previous admission in last 30 days    Equipment Currently Used at Home walker, rolling;wheelchair    Concerns to be Addressed denies needs/concerns at this time    Anticipated Changes Related to Illness none                   Discharge Plan        Row Name 01/26/25 1159       Plan    Plan Home    Patient/Family in Agreement with Plan yes    Plan Comments Spoke with pt to assess for d/c planning. Pt lives at [facility] and plans same, has apartment there. Pt has RX coverage/PCP. Pt anticipates being able to return there upon d/c and no concerns at present. Will follow.                  Continued Care and Services - Admitted Since 1/25/2025    No active coordination exists for this encounter.          Demographic Summary    No documentation.                  Functional Status    No documentation.                  Psychosocial    No documentation.                  Abuse/Neglect    No documentation.                  Legal    No documentation.                  Substance Abuse    No documentation.                  Patient Forms    No documentation.                     MYCHAL ValverdeW

## 2025-01-26 NOTE — H&P
[facility] Medicine Services  HISTORY AND PHYSICAL    Date of Admission: 1/25/2025  Primary Care Physician: Otilio Ramos MD    Subjective   Primary Historian: Patient    Chief Complaint: Generalized weakness and abdominal pain    History of Present Illness  Patient is an 89-year-old male patient with a medical history of hypertension, GERD, brought in by EMS from [facility] for the evaluation of his abdominal pain and generalized weakness.  As reported, for the last 2 days he has been feeling weak and dizzy and this has been associated with abdominal cramps.  He denies any nausea vomiting or diarrhea.  He denies any fever or chills.  He denies any chest pain or shortness of breath.  He usually ambulates with a walker, but he felt very weak and stayed in bed all day.        Review of Systems   Otherwise complete ROS reviewed and negative except as mentioned in the HPI.    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     Past Surgical History:  Past Surgical History:   Procedure Laterality Date    BACK SURGERY      cervical    CATARACT EXTRACTION Bilateral     COLONOSCOPY  02/11/2013    Hyperplastic polyp at 25 cm, Diverticulosis repeat exam in 5 years    COLONOSCOPY N/A 10/16/2018    Tubular adenoma ascending colon repet prn    ENDOSCOPY  01/15/2014    HH    ENDOSCOPY N/A 10/03/2019    Large HH, non-bleeding gastric ulcer    ENDOSCOPY N/A 11/25/2019    Procedure: ESOPHAGOGASTRODUODENOSCOPY WITH ANESTHESIA;  Surgeon: Marques Pérez MD;  Location: [facility] ENDOSCOPY;  Service: Gastroenterology    ENDOSCOPY N/A 2/9/2023    Procedure: ESOPHAGOGASTRODUODENOSCOPY WITH ANESTHESIA;  Surgeon: Sushant Edmond MD;   Location:  [facility] ENDOSCOPY;  Service: Gastroenterology;  Laterality: N/A;  pre GI bleed  post hiatal hernia; esophagitis  Otilio Ramos MD    HERNIA REPAIR Right     INGUINAL     HIP FRACTURE SURGERY Right 02/04/2023    REPLACEMENT TOTAL KNEE BILATERAL      THORACIC LAMINECTOMY DECOMPRESSIVE POSTERIOR N/A 12/14/2016    Procedure: LAMINECTOMY DECOMPRESSION, UNINSTRUMENTED POSTERIOR SPINAL FUSION, T 11-12;  Surgeon: LUZ Adan MD;  Location:  [facility] OR;  Service:     VENA CAVA FILTER INSERTION N/A 10/04/2019    Procedure: VENA CAVA FILTER INSERTION;  Surgeon: Dallin Coronado MD;  Location: [facility] HYBRID OR 12;  Service: Vascular     Social History:  reports that he has never smoked. He has never been exposed to tobacco smoke. He has never used smokeless tobacco. He reports that he does not drink alcohol and does not use drugs.    Family History: family history includes Colon cancer in his father.       Allergies:  Allergies   Allergen Reactions    Meloxicam Unknown - Low Severity       Medications:  Prior to Admission medications    Medication Sig Start Date End Date Taking? Authorizing Provider   acetaminophen (TYLENOL) 325 MG tablet Take 2 tablets by mouth 2 (Two) Times a Day.    Joi Alvarez MD   bisacodyl (DULCOLAX) 10 MG suppository Insert 1 suppository into the rectum Daily As Needed for Constipation.  Patient not taking: Reported on 4/24/2023    Joi Alvarez MD   busPIRone (BUSPAR) 5 MG tablet Take 1 tablet by mouth 2 (Two) Times a Day.    Joi Alvarez MD   cyclobenzaprine (FLEXERIL) 5 MG tablet Take 1 tablet by mouth 2 (Two) Times a Day As Needed for Muscle Spasms.  Patient not taking: Reported on 4/24/2023    Joi Alvarez MD   docusate sodium (COLACE) 100 MG capsule Take 1 capsule by mouth 2 (Two) Times a Day.    Joi Alvarez MD   Enoxaparin Sodium (LOVENOX) 40 MG/0.4ML solution prefilled syringe syringe Inject 40 mg under the skin into the  appropriate area as directed Daily.    Joi Alvarez MD   gabapentin (NEURONTIN) 300 MG capsule Take 1 capsule by mouth 4 (Four) Times a Day. 2/14/23   Olena Craft APRN   guaiFENesin (MUCINEX) 600 MG 12 hr tablet Take 1 tablet by mouth 2 (Two) Times a Day.    Joi Alvarez MD   hydrALAZINE (APRESOLINE) 50 MG tablet Take 1 tablet by mouth Every 8 (Eight) Hours As Needed.    Joi Alvarez MD   levalbuterol (XOPENEX) 0.31 MG/3ML nebulizer solution Take 1 ampule by nebulization Every 8 (Eight) Hours As Needed for Wheezing.    Joi Alvarez MD   lidocaine (LIDODERM) 5 % Place 1 patch on the skin as directed by provider Daily. Remove & Discard patch within 12 hours or as directed by MD 2/15/23   Olena Craft APRN   linaclotide (LINZESS) 145 MCG capsule capsule Take 1 capsule by mouth Daily.    Joi Alvarez MD   lisinopril (PRINIVIL,ZESTRIL) 10 MG tablet Take 1 tablet by mouth Daily.    Joi Alvarez MD   NIFEdipine XL (PROCARDIA XL) 60 MG 24 hr tablet Take 1 tablet by mouth Daily.    Joi Alvarez MD   ondansetron (ZOFRAN) 4 MG tablet Take 1 tablet by mouth Every 6 (Six) Hours As Needed for Nausea or Vomiting.    Joi Alvarez MD   oxyCODONE-acetaminophen (PERCOCET) 5-325 MG per tablet Take 1 tablet by mouth Every 6 (Six) Hours As Needed for Moderate Pain. 11/19/23   Betty Childress,    pantoprazole (PROTONIX) 40 MG EC tablet Take 1 tablet by mouth 2 (Two) Times a Day Before Meals. 2/14/23   Olena Craft APRN   polyethylene glycol (MIRALAX) packet Take 17 g by mouth Daily As Needed.    Joi Alvarez MD   simethicone (MYLICON) 80 MG chewable tablet Chew 1 tablet Every 4 (Four) Hours As Needed for Flatulence.    Joi Alvarez MD   sucralfate (CARAFATE) 1 GM/10ML suspension Take 10 mL by mouth 4 (Four) Times a Day Before Meals & at Bedtime. 2/14/23   Craft, Olena J, APRN   vitamin B-12 (CYANOCOBALAMIN) 1000 MCG tablet  "Take 1 tablet by mouth Daily.    Provider, MD Joi   vitamin D (ERGOCALCIFEROL) 1.25 MG (77186 UT) capsule capsule Take 1 capsule by mouth 3 (Three) Times a Week. Give one time a day every Mon, Wed, Fri    ProviderJoi MD     I have utilized all available immediate resources to obtain, update, or review the patient's current medications (including all prescriptions, over-the-counter products, herbals, cannabis/cannabidiol products, and vitamin/mineral/dietary (nutritional) supplements).    Objective     Vital Signs: /64 (BP Location: Right arm, Patient Position: Sitting)   Pulse 87   Temp 97.7 °F (36.5 °C) (Oral)   Resp 18   Ht 188 cm (74\")   Wt 85.6 kg (188 lb 12.8 oz)   SpO2 97%   BMI 24.24 kg/m²   Physical Exam  Constitutional:       Appearance: He is ill-appearing.   Cardiovascular:      Rate and Rhythm: Normal rate and regular rhythm.      Pulses: Normal pulses.      Heart sounds: Normal heart sounds. No murmur heard.  Pulmonary:      Effort: Pulmonary effort is normal. No respiratory distress.      Breath sounds: Normal breath sounds. No wheezing or rales.   Abdominal:      General: Bowel sounds are normal. There is no distension.      Palpations: Abdomen is soft.      Tenderness: There is no abdominal tenderness. There is no guarding.   Musculoskeletal:      Right lower leg: No edema.      Left lower leg: No edema.   Skin:     General: Skin is warm.   Neurological:      Mental Status: He is alert and oriented to person, place, and time. Mental status is at baseline.              Results Reviewed:  Lab Results (last 24 hours)       Procedure Component Value Units Date/Time    STAT Lactic Acid, Reflex [[id number]]  (Normal) Collected: 01/25/25 2215    Specimen: Blood from Hand, Left Updated: 01/25/25 2239     Lactate 1.4 mmol/L     High Sensitivity Troponin T 1Hr [[id number]]  (Abnormal) Collected: 01/25/25 2040    Specimen: Blood from Arm, Right Updated: 01/25/25 2119     HS Troponin " T 42 ng/L      Troponin T Numeric Delta -3 ng/L      Troponin T % Delta -7    Narrative:      High Sensitive Troponin T Reference Range:  <14.0 ng/L- Negative Female for AMI  <22.0 ng/L- Negative Male for AMI  >=14 - Abnormal Female indicating possible myocardial injury.  >=22 - Abnormal Male indicating possible myocardial injury.   Clinicians would have to utilize clinical acumen, EKG, Troponin, and serial changes to determine if it is an Acute Myocardial Infarction or myocardial injury due to an underlying chronic condition.         Blood Culture - Blood, Arm, Left [[id number]] Collected: 01/25/25 2039    Specimen: Blood from Arm, Left Updated: 01/25/25 2101    Blood Culture - Blood, Arm, Right [[id number]] Collected: 01/25/25 2040    Specimen: Blood from Arm, Right Updated: 01/25/25 2059    Urinalysis With Culture If Indicated - Straight Cath [[id number]]  (Abnormal) Collected: 01/25/25 1942    Specimen: Urine from Straight Cath Updated: 01/25/25 2017     Color, UA Dark Yellow     Appearance, UA Cloudy     pH, UA <=5.0     Specific Gravity, UA 1.019     Glucose, UA Negative     Ketones, UA Trace     Bilirubin, UA Small (1+)     Blood, UA Negative     Protein, UA 30 mg/dL (1+)     Leuk Esterase, UA Trace     Nitrite, UA Positive     Urobilinogen, UA 1.0 E.U./dL    Narrative:      In absence of clinical symptoms, the presence of pyuria, bacteria, and/or nitrites on the urinalysis result does not correlate with infection.    Urinalysis, Microscopic Only - Straight Cath [[id number]]  (Abnormal) Collected: 01/25/25 1942    Specimen: Urine from Straight Cath Updated: 01/25/25 2017     RBC, UA None Seen /HPF      WBC, UA 6-10 /HPF      Bacteria, UA Trace /HPF      Squamous Epithelial Cells, UA 0-2 /HPF      Hyaline Casts, UA 3-6 /LPF      Methodology Manual Light Microscopy    Urine Culture - Urine, Straight Cath [[id number]] Collected: 01/25/25 1942    Specimen: Urine from Straight Cath Updated: 01/25/25 2017    Lipase  "[[id number]]  (Abnormal) Collected: 01/25/25 1835    Specimen: Blood Updated: 01/25/25 1926     Lipase 2,696 U/L      Comment: Specimen hemolyzed.  Results may be falsely decreased.       Procalcitonin [[id number]]  (Abnormal) Collected: 01/25/25 1835    Specimen: Blood Updated: 01/25/25 1918     Procalcitonin 41.03 ng/mL     Narrative:      As a Marker for Sepsis (Non-Neonates):    1. <0.5 ng/mL represents a low risk of severe sepsis and/or septic shock.  2. >2 ng/mL represents a high risk of severe sepsis and/or septic shock.    As a Marker for Lower Respiratory Tract Infections that require antibiotic therapy:    PCT on Admission    Antibiotic Therapy       6-12 Hrs later    >0.5                Strongly Recommended  >0.25 - <0.5        Recommended   0.1 - 0.25          Discouraged              Remeasure/reassess PCT  <0.1                Strongly Discouraged     Remeasure/reassess PCT    As 28 day mortality risk marker: \"Change in Procalcitonin Result\" (>80% or <=80%) if Day 0 (or Day 1) and Day 4 values are available. Refer to http://[email or web address]-pct-[email or web address]    Change in PCT <=80%  A decrease of PCT levels below or equal to 80% defines a positive change in PCT test result representing a higher risk for 28-day all-cause mortality of patients diagnosed with severe sepsis for septic shock.    Change in PCT >80%  A decrease of PCT levels of more than 80% defines a negative change in PCT result representing a lower risk for 28-day all-cause mortality of patients diagnosed with severe sepsis or septic shock.       Comprehensive Metabolic Panel [[id number]]  (Abnormal) Collected: 01/25/25 1835    Specimen: Blood Updated: 01/25/25 1915     Glucose 102 mg/dL      BUN 30 mg/dL      Creatinine 2.05 mg/dL      Sodium 140 mmol/L      Potassium 5.1 mmol/L      Comment: Specimen hemolyzed.  Result may be falsely elevated.        Chloride 103 mmol/L      CO2 24.0 mmol/L      Calcium 9.1 mg/dL      Total Protein 6.5 g/dL      " Albumin 3.8 g/dL      ALT (SGPT) 486 U/L      Comment: Specimen hemolyzed.  Result may  be falsely elevated.        AST (SGOT) 506 U/L      Comment: Specimen hemolyzed.  Result may be falsely elevated.        Alkaline Phosphatase 183 U/L      Total Bilirubin 3.0 mg/dL      Globulin 2.7 gm/dL      A/G Ratio 1.4 g/dL      BUN/Creatinine Ratio 14.6     Anion Gap 13.0 mmol/L      eGFR 30.6 mL/min/1.73     Narrative:      GFR Categories in Chronic Kidney Disease (CKD)      GFR Category          GFR (mL/min/1.73)    Interpretation  G1                     90 or greater         Normal or high (1)  G2                      60-89                Mild decrease (1)  G3a                   45-59                Mild to moderate decrease  G3b                   30-44                Moderate to severe decrease  G4                    15-29                Severe decrease  G5                    14 or less           Kidney failure          (1)In the absence of evidence of kidney disease, neither GFR category G1 or G2 fulfill the criteria for CKD.    eGFR calculation 2021 CKD-EPI creatinine equation, which does not include race as a factor    Magnesium [[id number]]  (Normal) Collected: 01/25/25 1835    Specimen: Blood Updated: 01/25/25 1915     Magnesium 1.8 mg/dL     High Sensitivity Troponin T [[id number]]  (Abnormal) Collected: 01/25/25 1835    Specimen: Blood Updated: 01/25/25 1911     HS Troponin T 45 ng/L      Comment: Specimen hemolyzed.  Results may be falsely decreased.       Narrative:      High Sensitive Troponin T Reference Range:  <14.0 ng/L- Negative Female for AMI  <22.0 ng/L- Negative Male for AMI  >=14 - Abnormal Female indicating possible myocardial injury.  >=22 - Abnormal Male indicating possible myocardial injury.   Clinicians would have to utilize clinical acumen, EKG, Troponin, and serial changes to determine if it is an Acute Myocardial Infarction or myocardial injury due to an underlying chronic condition.          Lactic Acid, Plasma [[id number]]  (Abnormal) Collected: 01/25/25 1835    Specimen: Blood Updated: 01/25/25 1910     Lactate 2.1 mmol/L     [place] Draw [[id number]] Collected: 01/25/25 1729    Specimen: Blood Updated: 01/25/25 1745    Narrative:      The following orders were created for panel order [place] Draw.  Procedure                               Abnormality         Status                     ---------                               -----------         ------                     Green Top (Gel)[[id number]]                                  Final result               Lavender Top[[id number]]                                     Final result               Red Top[[id number]]                                          Final result               Light Blue Top[[id number]]                                   Final result                 Please view results for these tests on the individual orders.    Green Top (Gel) [[id number]] Collected: 01/25/25 1729    Specimen: Blood Updated: 01/25/25 1745     Extra Tube Hold for add-ons.     Comment: Auto resulted.       Lavender Top [[id number]] Collected: 01/25/25 1729    Specimen: Blood Updated: 01/25/25 1745     Extra Tube hold for add-on     Comment: Auto resulted       Red Top [[id number]] Collected: 01/25/25 1729    Specimen: Blood Updated: 01/25/25 1745     Extra Tube Hold for add-ons.     Comment: Auto resulted.       Light Blue Top [[id number]] Collected: 01/25/25 1729    Specimen: Blood Updated: 01/25/25 1745     Extra Tube Hold for add-ons.     Comment: Auto resulted       CBC & Differential [[id number]]  (Abnormal) Collected: 01/25/25 1729    Specimen: Blood Updated: 01/25/25 1737    Narrative:      The following orders were created for panel order CBC & Differential.  Procedure                               Abnormality         Status                     ---------                               -----------         ------                     CBC Auto Differential[[id number]]         Abnormal            Final result                 Please view results for these tests on the individual orders.    CBC Auto Differential [[id number]]  (Abnormal) Collected: 01/25/25 1729    Specimen: Blood Updated: 01/25/25 1737     WBC 16.21 10*3/mm3      RBC 4.14 10*6/mm3      Hemoglobin 13.2 g/dL      Hematocrit 41.2 %      MCV 99.5 fL      MCH 31.9 pg      MCHC 32.0 g/dL      RDW 13.7 %      RDW-SD 49.9 fl      MPV 9.6 fL      Platelets 169 10*3/mm3      Neutrophil % 82.7 %      Lymphocyte % 11.0 %      Monocyte % 4.4 %      Eosinophil % 0.9 %      Basophil % 0.3 %      Immature Grans % 0.7 %      Neutrophils, Absolute 13.40 10*3/mm3      Lymphocytes, Absolute 1.79 10*3/mm3      Monocytes, Absolute 0.71 10*3/mm3      Eosinophils, Absolute 0.15 10*3/mm3      Basophils, Absolute 0.05 10*3/mm3      Immature Grans, Absolute 0.11 10*3/mm3      nRBC 0.0 /100 WBC           Imaging Results (Last 24 Hours)       Procedure Component Value Units Date/Time    US Gallbladder [[id number]] Collected: 01/25/25 2113     Updated: 01/25/25 2118    Narrative:      EXAMINATION: US GALLBLADDER-  1/25/2025 9:13 PM     REASON FOR EXAM: elevated liver enzymes, elevated bilirubin, r/o biliary  obstruction; J01.00-Acute maxillary sinusitis, unspecified; K85.90-Acute  pancreatitis without necrosis or infection, unspecified; N17.9-Acute  kidney failure, unspecified; N39.0-Urinary tract infection, site not  specified     COMPARISON: 10/19/2020     TECHNIQUE: Multiple longitudinal and transverse real time sonographic  images of the right upper quadrant of the abdomen are obtained. Doppler  and grayscale images were provided. Images and report are stored per  institutional and state regulations.     FINDINGS:    PANCREAS: Obscured by bowel gas.     LIVER: Normal in size, echogenicity and echotexture. No focal lesion. No  definite intrahepatic ductal dilation.     GALLBLADDER: There is a 1.2 cm shadowing stone in the lumen of the  gallbladder. No  pericholecystic fluid or gallbladder wall thickening.     BILE DUCTS: The CBD measures 0.3 cm in diameter. There is no  intrahepatic or extrahepatic ductal dilation.     OTHER: The visualized portion of the RIGHT kidney has a normal  appearance. The aorta is obscured by bowel gas.          Impression:         1.  Exam limited by overlying bowel gas. Pancreas and aorta are poorly  visualized.     2.  There is a 1.2 cm stone in the lumen of the gallbladder. The common  bile duct is normal in caliber. No intrahepatic ductal dilation  identified.     This report was signed and finalized on 1/25/2025 9:15 PM by Dr. Jacky Hall MD.       CT Cervical Spine Without Contrast [[id number]] Collected: 01/25/25 1819     Updated: 01/25/25 1827    Narrative:      EXAMINATION: CT CERVICAL SPINE WO CONTRAST-  1/25/2025 6:19 PM     HISTORY: posterior neck pain     COMPARISON: 11/19/2023     DLP: 1031.66 mGy.cm     TECHNIQUE: Serial helical tomographic images of the cervical spine were  obtained without the use of intravenous contrast. Additionally, sagittal  and coronal reformatted images were also provided for review.  In order  to have a CT radiation dose as low as reasonably achievable, Automated  Exposure Control was utilized for adjustment of the mA and/or KV  according to patient size.     FINDINGS:     OSSEOUS: No definite acute fracture. There is evidence of previous  cervical fusion posteriorly at C4-C6. Posterior decompressive changes at  C4-C6 as well. No perihardware lucency in the posterior fusion screws.  No destructive bone lesion. I suspect partial bony fusion of the C3 and  C4 vertebral bodies and the posterior elements.     SKULL BASE: Arthritis at the C1-C2 articulation with the odontoid  process. No acute fracture in the visualized portion of the skull base.     ALIGNMENT: Straightening of the normal lordosis.     SURROUNDING SOFT TISSUES: Evaluation of the surrounding soft tissues  demonstrates calcified  atherosclerotic vascular disease in the bilateral  carotid bifurcations.     UPPER THORAX: The visualized thorax demonstrates no acute abnormality.     LEVELS:  C2-C3: Facet arthropathy noted in the RIGHT C2-C3 facet. There is  moderate RIGHT foraminal narrowing. No high-grade thecal sac narrowing.     C3-C4: Partial fusion of the vertebral bodies and the posterior elements  at C3-C4 with posterior disc osteophyte complex noted indenting the  ventral aspect of the CSF space, likely contributing to at least a mild  thecal sac narrowing. At the C4 level there is posterior decompression.  There is moderate LEFT and RIGHT foraminal narrowing.     C4-C5: Fusion changes at this level. Posterior disc osteophyte complex  indents the ventral aspect of the thecal sac. Posterior decompressive  changes are noted. This is similar to the exam from 11/19/2023. Moderate  bilateral foraminal narrowing.     C5-C6: Fusion changes at this level with marginal endplate osteophytes.  Moderate RIGHT and severe LEFT foraminal narrowing. Posterior  decompressive changes.     C6-C7: Posterior decompressive changes at C6. Diffuse disc space height  loss with vacuum disc phenomenon and marginal endplate osteophytes.  Suspect at moderate thecal sac stenosis at the caudal aspect of this  disc space. There is moderate bilateral foraminal narrowing, LEFT  greater than RIGHT.     C7-T1: Anterior end plate osteophyte formation. At least mild LEFT  foraminal narrowing. No high-grade thecal sac narrowing.          Impression:         1.  Multilevel degenerative changes and fusion changes as discussed  above. No definite acute osseous abnormality on this exam.     2.  Atherosclerotic vascular disease in the carotid bifurcations  bilaterally.      This report was signed and finalized on 1/25/2025 6:24 PM by Dr. Jacky Hall MD.       CT Head Without Contrast [[id number]] Collected: 01/25/25 1814     Updated: 01/25/25 1820    Narrative:       EXAMINATION: CT HEAD WO CONTRAST-  1/25/2025 6:14 PM     HISTORY: dizziness     neck pain     COMPARISON: 2/1/2022     DLP: 1031.66 mGy.cm     In order to have a CT radiation dose as low as reasonably achievable,  Automated Exposure Control was utilized for adjustment of the mA and/or  KV according to patient size.     TECHNIQUE: Serial axial tomographic images of the brain were obtained  without the use of intravenous contrast.  Coronal and sagittal  reformatted images were obtained reviewed as well.     FINDINGS:  Moderate ventricular dilation. Low density in the subcortical and  periventricular white matter, similar to the exam from 2/1/2022.  Vascular calcifications in the intracranial vasculature. No acute  hemorrhage. No mass effect or midline shift. Cavum septum pellucidum et  vergae. The structures of the posterior fossa are unremarkable. 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     The included orbits and their contents are unremarkable. Opacification  of the bilateral maxillary sinuses. The visualized osseous structures  and overlying soft tissues of the skull and face are unremarkable.          Impression:         1.  Moderate ventricular dilation again seen with low-attenuation in the  subcortical and periventricular white matter may be related to chronic  microvascular change or could be related to normal pressure  hydrocephalus. Overall, this is very similar to the exam from 2/1/2022.     2.  Opacification of the bilateral maxillary sinuses, raising concern  for maxillary sinusitis.     This report was signed and finalized on 1/25/2025 6:17 PM by Dr. Jacky Hall MD.             I have personally reviewed and interpreted the radiology studies and ECG obtained at time of admission.     Assessment / Plan   Assessment:   Active Hospital Problems    Diagnosis     **Pancreatitis, acute        Treatment Plan  The patient will be admitted to my service here at [facility].     Assessment and plan:  #Acute pancreatitis.  #ISHA.  #Lactic acidosis.  #Elevated LFTs.  #UTI    -Admitting to floor.  -ED gave him IV boluses and will continue with IV continuous infusions.  -Pain medication ordered.  -Keeping patient n.p.o. for now.  -Placing a.m. consult with GI.  Patient had an ultrasound gallbladder and it showed a 1.2 cm stone in the gallbladder but the common bile duct was normal.  -Adding triglycerides to the workup.  -Will recheck creatinine in the morning.  -ED could not perform CT with IV contrast given his ISHA, however if creatinine improves day team may need to consider that if still appropriate.  -Continue with Zosyn started in the ED empirically for urine infection.  -DVT prophylaxis with heparin subcu.    Medical Decision Making  Number and Complexity of problems: 5 and moderate  Differential Diagnosis: As above    Conditions and Status        Condition is worsening.     MDM Data  External documents reviewed: Yes  Cardiac tracing (EKG, telemetry) interpretation: EKG interpretation reviewed  Radiology interpretation: Scans and ultrasound gallbladder reviewed  Labs reviewed: Yes  Any tests that were considered but not ordered: Triglycerides ordered     Decision rules/scores evaluated (example [id number], Wells, etc): None     Discussed with: Patient, daughter over the phone, ED provider     Care Planning  Shared decision making: I explained the treatment plan and patient is agreeable  Code status and discussions: I discussed CODE STATUS with the patient and he wants to be DNR DNI    Disposition  Social Determinants of Health that impact treatment or disposition: None at the moment  Estimated length of stay is 2 to 3 days    I confirmed that the patient's advanced care plan is present, code status is documented, and a surrogate decision maker is listed in the patient's medical record.     The patient was seen and examined by me on 1/25 at 10 PM    Electronically signed by  Winter White MD, 01/25/25, 23:39 CST.

## 2025-01-26 NOTE — PLAN OF CARE
Goal Outcome Evaluation:  Plan of Care Reviewed With: patient, child        Progress: improving  Outcome Evaluation: VSS. Denies pain. Surgery consulted and planning for intervention when labs improve. Pt allowed clear liquid diet until midnight tonight. Malewick in place with good urinary output. IVF infusing. Daughter present much of the day and supportive of patient. Safety maintained.

## 2025-01-26 NOTE — CONSULTS
"Pharmacy Dosing Service  Antimicrobial Dosing  Zosyn    Assessment/Action/Plan:  Based on indication and renal function, initiated extended-infusion Zosyn 3.375 gm IV every 8 hours. Pharmacy will continue to monitor daily and make further adjustment(s) accordingly.     Subjective:  Jose Alfredo Kirkpatrick is a 89 y.o. male with a  \"Pharmacy to Dose Zosyn\" consult for the treatment of Intra-abdominal Infection/UTI , day 1 of 7 of treatment.    Objective:  Ht: 188 cm (74\"); Wt: 85.6 kg (188 lb 12.8 oz); BMI: Body mass index is 24.24 kg/m².  Estimated Creatinine Clearance: 30.2 mL/min (A) (by C-G formula based on SCr of 2.05 mg/dL (H)).   Creatinine   Date Value Ref Range Status   01/25/2025 2.05 (H) 0.76 - 1.27 mg/dL Final      Lab Results   Component Value Date    WBC 16.21 (H) 01/25/2025      Baseline culture results:  Microbiology Results (last 10 days)       ** No results found for the last 240 hours. **            Jean Pierre Cooper, PharmD  01/25/25 23:43 CST    "

## 2025-01-26 NOTE — CONSULTS
[facility]enterology  Initial Inpatient Consult Note    Referring Provider: Winter White MD    Date of Admission: 1/25/2025  Date of Service:  01/26/25    Reason for Consultation: Pancreatitis    Subjective     History of present illness:      This is an 89-year-old male presented to [facility] ER with complaints of abdominal pain as well as generalized weakness.  Patient states for the past couple of days he has not been feeling well.  He did have some lower abdominal pain 2 days ago followed by diarrhea with relief in abdominal pain.  Patient states he has a history of constipation attributing this to caffeine consumption as well as use of pain medication over the years.  Upon my discussion with him today he denies abdominal pain, no nausea vomiting.  He does not drink alcohol.  No new medications.  He denies difficulty with heartburn or indigestion.    Colonoscopy in 2018 and endoscopy 2019 both by Dr. Marques Pérez.      Past Medical History:  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       Past Surgical History:  Past Surgical History:   Procedure Laterality Date    BACK SURGERY      cervical    CATARACT EXTRACTION Bilateral     COLONOSCOPY  02/11/2013    Hyperplastic polyp at 25 cm, Diverticulosis repeat exam in 5 years    COLONOSCOPY N/A 10/16/2018    Tubular adenoma ascending colon repet prn    ENDOSCOPY  01/15/2014        ENDOSCOPY N/A 10/03/2019    Large HH, non-bleeding gastric ulcer    ENDOSCOPY N/A 11/25/2019    Procedure: ESOPHAGOGASTRODUODENOSCOPY WITH ANESTHESIA;  Surgeon: Marques Pérez MD;  Location: [facility] ENDOSCOPY;  Service: Gastroenterology    ENDOSCOPY N/A 2/9/2023    Procedure: ESOPHAGOGASTRODUODENOSCOPY WITH ANESTHESIA;  Surgeon: Sushant Edmond,  MD;  Location: [facility] ENDOSCOPY;  Service: Gastroenterology;  Laterality: N/A;  pre GI bleed  post hiatal hernia; esophagitis  Otilio Ramos MD    HERNIA REPAIR Right     INGUINAL     HIP FRACTURE SURGERY Right 02/04/2023    REPLACEMENT TOTAL KNEE BILATERAL      THORACIC LAMINECTOMY DECOMPRESSIVE POSTERIOR N/A 12/14/2016    Procedure: LAMINECTOMY DECOMPRESSION, UNINSTRUMENTED POSTERIOR SPINAL FUSION, T 11-12;  Surgeon: LUZ Adan MD;  Location: [facility] OR;  Service:     VENA CAVA FILTER INSERTION N/A 10/04/2019    Procedure: VENA CAVA FILTER INSERTION;  Surgeon: Dallin Coronado MD;  Location: [facility] HYBRID OR 12;  Service: Vascular        Social History:   Social History     Tobacco Use    Smoking status: Never     Passive exposure: Never    Smokeless tobacco: Never   Substance Use Topics    Alcohol use: No        Family History:  Family History   Problem Relation Age of Onset    Colon cancer Father     Colon polyps Neg Hx        Home Meds:  Medications Prior to Admission   Medication Sig Dispense Refill Last Dose/Taking    acetaminophen (TYLENOL) 325 MG tablet Take 2 tablets by mouth 2 (Two) Times a Day.       bisacodyl (DULCOLAX) 10 MG suppository Insert 1 suppository into the rectum Daily As Needed for Constipation. (Patient not taking: Reported on 4/24/2023)       busPIRone (BUSPAR) 5 MG tablet Take 1 tablet by mouth 2 (Two) Times a Day.       cyclobenzaprine (FLEXERIL) 5 MG tablet Take 1 tablet by mouth 2 (Two) Times a Day As Needed for Muscle Spasms. (Patient not taking: Reported on 4/24/2023)       docusate sodium (COLACE) 100 MG capsule Take 1 capsule by mouth 2 (Two) Times a Day.       Enoxaparin Sodium (LOVENOX) 40 MG/0.4ML solution prefilled syringe syringe Inject 40 mg under the skin into the appropriate area as directed Daily.       gabapentin (NEURONTIN) 300 MG capsule Take 1 capsule by mouth 4 (Four) Times a Day. 12 capsule 0     guaiFENesin (MUCINEX) 600 MG 12 hr tablet Take 1  tablet by mouth 2 (Two) Times a Day.       hydrALAZINE (APRESOLINE) 50 MG tablet Take 1 tablet by mouth Every 8 (Eight) Hours As Needed.       levalbuterol (XOPENEX) 0.31 MG/3ML nebulizer solution Take 1 ampule by nebulization Every 8 (Eight) Hours As Needed for Wheezing.       lidocaine (LIDODERM) 5 % Place 1 patch on the skin as directed by provider Daily. Remove & Discard patch within 12 hours or as directed by MD       linaclotide (LINZESS) 145 MCG capsule capsule Take 1 capsule by mouth Daily.       lisinopril (PRINIVIL,ZESTRIL) 10 MG tablet Take 1 tablet by mouth Daily.       NIFEdipine XL (PROCARDIA XL) 60 MG 24 hr tablet Take 1 tablet by mouth Daily.       ondansetron (ZOFRAN) 4 MG tablet Take 1 tablet by mouth Every 6 (Six) Hours As Needed for Nausea or Vomiting.       oxyCODONE-acetaminophen (PERCOCET) 5-325 MG per tablet Take 1 tablet by mouth Every 6 (Six) Hours As Needed for Moderate Pain. 12 tablet 0     pantoprazole (PROTONIX) 40 MG EC tablet Take 1 tablet by mouth 2 (Two) Times a Day Before Meals.       polyethylene glycol (MIRALAX) packet Take 17 g by mouth Daily As Needed.       simethicone (MYLICON) 80 MG chewable tablet Chew 1 tablet Every 4 (Four) Hours As Needed for Flatulence.       sucralfate (CARAFATE) 1 GM/10ML suspension Take 10 mL by mouth 4 (Four) Times a Day Before Meals & at Bedtime.       vitamin B-12 (CYANOCOBALAMIN) 1000 MCG tablet Take 1 tablet by mouth Daily.       vitamin D (ERGOCALCIFEROL) 1.25 MG (09756 UT) capsule capsule Take 1 capsule by mouth 3 (Three) Times a Week. Give one time a day every Mon, Wed, Fri          Current Meds:     Current Facility-Administered Medications:     heparin (porcine) 5000 UNIT/ML injection 5,000 Units, 5,000 Units, Subcutaneous, Q12H, Winter White MD, 5,000 Units at 01/26/25 0945    HYDROmorphone (DILAUDID) injection 1 mg, 1 mg, Intravenous, Q4H PRN, 1 mg at 01/25/25 2346 **AND** naloxone (NARCAN) injection 0.4 mg, 0.4 mg, Intravenous, Q5 Min  PRN, Winter White MD    nitroglycerin (NITROSTAT) SL tablet 0.4 mg, 0.4 mg, Sublingual, Q5 Min PRN, Winter White MD    ondansetron ODT (ZOFRAN-ODT) disintegrating tablet 4 mg, 4 mg, Oral, Q6H PRN **OR** ondansetron (ZOFRAN) injection 4 mg, 4 mg, Intravenous, Q6H PRN, Winter White MD, 4 mg at 01/25/25 2346    pantoprazole (PROTONIX) EC tablet 40 mg, 40 mg, Oral, Q AM, Winter White MD, 40 mg at 01/26/25 0642    Pharmacy to Dose Zosyn, , Not Applicable, Continuous PRN, Winter White MD    piperacillin-tazobactam (ZOSYN) 3.375 g IVPB in 100 mL NS MBP (CD), 3.375 g, Intravenous, Q8H, Winter White MD, 3.375 g at 01/26/25 0337    [COMPLETED] Insert Peripheral IV, , , Once **AND** sodium chloride 0.9 % flush 10 mL, 10 mL, Intravenous, PRN, Joselin Alejandre, HARISH    sodium chloride 0.9 % flush 10 mL, 10 mL, Intravenous, Q12H, Winter White MD, 10 mL at 01/25/25 2333    sodium chloride 0.9 % flush 10 mL, 10 mL, Intravenous, PRNCindy Rami H, MD    sodium chloride 0.9 % infusion 40 mL, 40 mL, Intravenous, PRN, Winter White MD    sodium chloride 0.9 % infusion, 100 mL/hr, Intravenous, Continuous, Winter White MD, Last Rate: 100 mL/hr at 01/26/25 0953, 100 mL/hr at 01/26/25 0953    Allergies:  Allergies   Allergen Reactions    Meloxicam Unknown - Low Severity       Vital Signs  Temp:  [97.6 °F (36.4 °C)-98.2 °F (36.8 °C)] 97.6 °F (36.4 °C)  Heart Rate:  [60-87] 60  Resp:  [16-18] 18  BP: (112-148)/(57-91) 131/57  Body mass index is 24.24 kg/m².    Intake/Output Summary (Last 24 hours) at 1/26/2025 1018  Last data filed at 1/26/2025 0655  Gross per 24 hour   Intake 1700 ml   Output 750 ml   Net 950 ml     No intake/output data recorded.    Review of Systems     Constitution:  negative for chills, fatigue and fevers  ENT:   negative for sore throat and voice change  Respiratory: negative for  cough and shortness of air  Cardiovascular:  Negative for chest pain or palpitations  Gastrointestinal:  negative  for  See HPI  Endocrine: negative for   weight loss, unintended        Objective      Physical Exam:    General Appearance: Alert, cooperative, in no acute distress  Eyes:            No icterus  Lungs:         Clear to auscultation, respiration regular, even, and unlabored  Heart::          No murmur  Abdomen:    Normal bowel sounds, no masses, soft, non tender, non distended, no guarding  Extremities: no edema  Psychiatric: Judgement and insight: normal                       Orientation to person, place, and time: normal                       Mood and affect: normal      Results Review:    I have reviewed all of the patients current test results  Results from last 7 days   Lab Units 01/26/25  0437 01/25/25  1729   WBC 10*3/mm3 11.04* 16.21*   HEMOGLOBIN g/dL 11.2* 13.2   HEMATOCRIT % 36.0* 41.2   PLATELETS 10*3/mm3 139* 169       Results from last 7 days   Lab Units 01/26/25  0437 01/25/25  1835   SODIUM mmol/L 142 140   POTASSIUM mmol/L 4.6 5.1   CHLORIDE mmol/L 109* 103   CO2 mmol/L 26.0 24.0   BUN mg/dL 29* 30*   CREATININE mg/dL 1.57* 2.05*   CALCIUM mg/dL 8.6 9.1   BILIRUBIN mg/dL 1.8* 3.0*   ALK PHOS U/L 139* 183*   ALT (SGPT) U/L 311* 486*   AST (SGOT) U/L 239* 506*   GLUCOSE mg/dL 88 102*             Lab Results   Lab Value Date/Time    LIPASE 1,574 (H) 01/26/2025 0437    LIPASE 2,696 (H) 01/25/2025 1835    LIPASE 34 02/08/2023 1459    LIPASE 42 02/08/2023 1100       Radiology:    Imaging Results (Last 24 Hours)       Procedure Component Value Units Date/Time    US Gallbladder [[id number]] Collected: 01/25/25 2113     Updated: 01/25/25 2118    Narrative:      EXAMINATION: US GALLBLADDER-  1/25/2025 9:13 PM     REASON FOR EXAM: elevated liver enzymes, elevated bilirubin, r/o biliary  obstruction; J01.00-Acute maxillary sinusitis, unspecified; K85.90-Acute  pancreatitis without necrosis or infection, unspecified; N17.9-Acute  kidney failure, unspecified; N39.0-Urinary tract infection, site not  specified      COMPARISON: 10/19/2020     TECHNIQUE: Multiple longitudinal and transverse real time sonographic  images of the right upper quadrant of the abdomen are obtained. Doppler  and grayscale images were provided. Images and report are stored per  institutional and state regulations.     FINDINGS:    PANCREAS: Obscured by bowel gas.     LIVER: Normal in size, echogenicity and echotexture. No focal lesion. No  definite intrahepatic ductal dilation.     GALLBLADDER: There is a 1.2 cm shadowing stone in the lumen of the  gallbladder. No pericholecystic fluid or gallbladder wall thickening.     BILE DUCTS: The CBD measures 0.3 cm in diameter. There is no  intrahepatic or extrahepatic ductal dilation.     OTHER: The visualized portion of the RIGHT kidney has a normal  appearance. The aorta is obscured by bowel gas.          Impression:         1.  Exam limited by overlying bowel gas. Pancreas and aorta are poorly  visualized.     2.  There is a 1.2 cm stone in the lumen of the gallbladder. The common  bile duct is normal in caliber. No intrahepatic ductal dilation  identified.     This report was signed and finalized on 1/25/2025 9:15 PM by Dr. Jacky Hall MD.       CT Cervical Spine Without Contrast [[id number]] Collected: 01/25/25 1819     Updated: 01/25/25 1827    Narrative:      EXAMINATION: CT CERVICAL SPINE WO CONTRAST-  1/25/2025 6:19 PM     HISTORY: posterior neck pain     COMPARISON: 11/19/2023     DLP: 1031.66 mGy.cm     TECHNIQUE: Serial helical tomographic images of the cervical spine were  obtained without the use of intravenous contrast. Additionally, sagittal  and coronal reformatted images were also provided for review.  In order  to have a CT radiation dose as low as reasonably achievable, Automated  Exposure Control was utilized for adjustment of the mA and/or KV  according to patient size.     FINDINGS:     OSSEOUS: No definite acute fracture. There is evidence of previous  cervical fusion posteriorly  at C4-C6. Posterior decompressive changes at  C4-C6 as well. No perihardware lucency in the posterior fusion screws.  No destructive bone lesion. I suspect partial bony fusion of the C3 and  C4 vertebral bodies and the posterior elements.     SKULL BASE: Arthritis at the C1-C2 articulation with the odontoid  process. No acute fracture in the visualized portion of the skull base.     ALIGNMENT: Straightening of the normal lordosis.     SURROUNDING SOFT TISSUES: Evaluation of the surrounding soft tissues  demonstrates calcified atherosclerotic vascular disease in the bilateral  carotid bifurcations.     UPPER THORAX: The visualized thorax demonstrates no acute abnormality.     LEVELS:  C2-C3: Facet arthropathy noted in the RIGHT C2-C3 facet. There is  moderate RIGHT foraminal narrowing. No high-grade thecal sac narrowing.     C3-C4: Partial fusion of the vertebral bodies and the posterior elements  at C3-C4 with posterior disc osteophyte complex noted indenting the  ventral aspect of the CSF space, likely contributing to at least a mild  thecal sac narrowing. At the C4 level there is posterior decompression.  There is moderate LEFT and RIGHT foraminal narrowing.     C4-C5: Fusion changes at this level. Posterior disc osteophyte complex  indents the ventral aspect of the thecal sac. Posterior decompressive  changes are noted. This is similar to the exam from 11/19/2023. Moderate  bilateral foraminal narrowing.     C5-C6: Fusion changes at this level with marginal endplate osteophytes.  Moderate RIGHT and severe LEFT foraminal narrowing. Posterior  decompressive changes.     C6-C7: Posterior decompressive changes at C6. Diffuse disc space height  loss with vacuum disc phenomenon and marginal endplate osteophytes.  Suspect at moderate thecal sac stenosis at the caudal aspect of this  disc space. There is moderate bilateral foraminal narrowing, LEFT  greater than RIGHT.     C7-T1: Anterior end plate osteophyte formation.  At least mild LEFT  foraminal narrowing. No high-grade thecal sac narrowing.          Impression:         1.  Multilevel degenerative changes and fusion changes as discussed  above. No definite acute osseous abnormality on this exam.     2.  Atherosclerotic vascular disease in the carotid bifurcations  bilaterally.      This report was signed and finalized on 1/25/2025 6:24 PM by Dr. Jacky Hall MD.       CT Head Without Contrast [[id number]] Collected: 01/25/25 1814     Updated: 01/25/25 1820    Narrative:      EXAMINATION: CT HEAD WO CONTRAST-  1/25/2025 6:14 PM     HISTORY: dizziness     neck pain     COMPARISON: 2/1/2022     DLP: 1031.66 mGy.cm     In order to have a CT radiation dose as low as reasonably achievable,  Automated Exposure Control was utilized for adjustment of the mA and/or  KV according to patient size.     TECHNIQUE: Serial axial tomographic images of the brain were obtained  without the use of intravenous contrast.  Coronal and sagittal  reformatted images were obtained reviewed as well.     FINDINGS:  Moderate ventricular dilation. Low density in the subcortical and  periventricular white matter, similar to the exam from 2/1/2022.  Vascular calcifications in the intracranial vasculature. No acute  hemorrhage. No mass effect or midline shift. Cavum septum pellucidum et  vergae. The structures of the posterior fossa are unremarkable. 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     The included orbits and their contents are unremarkable. Opacification  of the bilateral maxillary sinuses. The visualized osseous structures  and overlying soft tissues of the skull and face are unremarkable.          Impression:         1.  Moderate ventricular dilation again seen with low-attenuation in the  subcortical and periventricular white matter may be related to chronic  microvascular change or could be related to normal pressure  hydrocephalus. Overall, this is very similar to  the exam from 2/1/2022.     2.  Opacification of the bilateral maxillary sinuses, raising concern  for maxillary sinusitis.     This report was signed and finalized on 1/25/2025 6:17 PM by Dr. Jacky Hall MD.                 Assessment & Plan       Pancreatitis, acute      Impression/Plan    Elevated liver enzymes  Hyperbilirubinemia  Elevated lipase    Lipase yesterday was 2696, this has trended down today to 1574.  Ultrasound performed with poorly visualized pancreas.  There was a 1.2 cm stone in the lumen of the gallbladder per ultrasound.  Upon my exam I pressed firmly on upper abdomen which did not result in any pain or discomfort from patient.  I have entered a referral for surgery consult for their input.  Bilirubin has trended down unsure if he was passing a stone.  I recommend patient remain n.p.o. for now until after surgery evaluates.  Could consider advancing diet after their assessment/recommendation.    Dr. Kirkpatrick takes over GI services as of 7 AM Monday morning.    Electronically signed by HARISH Gallardo, 01/26/25, 10:18 AM CST.         HARISH Gallardo  01/26/25  10:18 CST

## 2025-01-26 NOTE — ED NOTES
Nursing report ED to floor  Jose Alfredo Kirkpatrick  89 y.o.  male    HPI:   Chief Complaint   Patient presents with    Abdominal Pain       Admitting doctor:   Winter White MD    Consulting provider(s):  Consults       No orders found from 12/27/2024 to 1/26/2025.             Admitting diagnosis:   The primary encounter diagnosis was Acute pancreatitis, unspecified complication status, unspecified pancreatitis type. Diagnoses of Acute non-recurrent maxillary sinusitis, ISHA (acute kidney injury), Acute UTI (urinary tract infection), Elevated liver enzymes, and Elevated troponin level were also pertinent to this visit.    Code status:   Current Code Status       Date Active Code Status Order ID Comments User Context       Prior            Allergies:   Meloxicam    Intake and Output    Intake/Output Summary (Last 24 hours) at 1/25/2025 2231  Last data filed at 1/25/2025 2115  Gross per 24 hour   Intake 1100 ml   Output 350 ml   Net 750 ml       Weight:       01/25/25 1721   Weight: 81 kg (178 lb 9.2 oz)       Most recent vitals:   Vitals:    01/25/25 1931 01/25/25 1946 01/25/25 2001 01/25/25 2003   BP: 120/72 127/58 112/91    BP Location:       Patient Position:       Pulse: 79 79  76   Resp:       Temp:       TempSrc:       SpO2: 92% 93%  92%   Weight:       Height:         Oxygen Therapy: .    Active LDAs/IV Access:   Lines, Drains & Airways       Active LDAs       Name Placement date Placement time Site Days    Peripheral IV 01/25/25 1725 Left;Posterior Hand 01/25/25 1725  Hand  less than 1                    Labs (abnormal labs have a star):   Labs Reviewed   URINALYSIS W/ CULTURE IF INDICATED - Abnormal; Notable for the following components:       Result Value    Color, UA Dark Yellow (*)     Appearance, UA Cloudy (*)     Ketones, UA Trace (*)     Bilirubin, UA Small (1+) (*)     Protein, UA 30 mg/dL (1+) (*)     Leuk Esterase, UA Trace (*)     Nitrite, UA Positive (*)     All other components within normal limits     Narrative:     In absence of clinical symptoms, the presence of pyuria, bacteria, and/or nitrites on the urinalysis result does not correlate with infection.   COMPREHENSIVE METABOLIC PANEL - Abnormal; Notable for the following components:    Glucose 102 (*)     BUN 30 (*)     Creatinine 2.05 (*)     ALT (SGPT) 486 (*)     AST (SGOT) 506 (*)     Alkaline Phosphatase 183 (*)     Total Bilirubin 3.0 (*)     eGFR 30.6 (*)     All other components within normal limits    Narrative:     GFR Categories in Chronic Kidney Disease (CKD)      GFR Category          GFR (mL/min/1.73)    Interpretation  G1                     90 or greater         Normal or high (1)  G2                      60-89                Mild decrease (1)  G3a                   45-59                Mild to moderate decrease  G3b                   30-44                Moderate to severe decrease  G4                    15-29                Severe decrease  G5                    14 or less           Kidney failure          (1)In the absence of evidence of kidney disease, neither GFR category G1 or G2 fulfill the criteria for CKD.    eGFR calculation 2021 CKD-EPI creatinine equation, which does not include race as a factor   LIPASE - Abnormal; Notable for the following components:    Lipase 2,696 (*)     All other components within normal limits   CBC WITH AUTO DIFFERENTIAL - Abnormal; Notable for the following components:    WBC 16.21 (*)     MCV 99.5 (*)     Neutrophil % 82.7 (*)     Lymphocyte % 11.0 (*)     Monocyte % 4.4 (*)     Immature Grans % 0.7 (*)     Neutrophils, Absolute 13.40 (*)     Immature Grans, Absolute 0.11 (*)     All other components within normal limits   PROCALCITONIN - Abnormal; Notable for the following components:    Procalcitonin 41.03 (*)     All other components within normal limits    Narrative:     As a Marker for Sepsis (Non-Neonates):    1. <0.5 ng/mL represents a low risk of severe sepsis and/or septic shock.  2. >2 ng/mL  "represents a high risk of severe sepsis and/or septic shock.    As a Marker for Lower Respiratory Tract Infections that require antibiotic therapy:    PCT on Admission    Antibiotic Therapy       6-12 Hrs later    >0.5                Strongly Recommended  >0.25 - <0.5        Recommended   0.1 - 0.25          Discouraged              Remeasure/reassess PCT  <0.1                Strongly Discouraged     Remeasure/reassess PCT    As 28 day mortality risk marker: \"Change in Procalcitonin Result\" (>80% or <=80%) if Day 0 (or Day 1) and Day 4 values are available. Refer to http://www.[facility]-pct-calculator.com    Change in PCT <=80%  A decrease of PCT levels below or equal to 80% defines a positive change in PCT test result representing a higher risk for 28-day all-cause mortality of patients diagnosed with severe sepsis for septic shock.    Change in PCT >80%  A decrease of PCT levels of more than 80% defines a negative change in PCT result representing a lower risk for 28-day all-cause mortality of patients diagnosed with severe sepsis or septic shock.      TROPONIN - Abnormal; Notable for the following components:    HS Troponin T 45 (*)     All other components within normal limits    Narrative:     High Sensitive Troponin T Reference Range:  <14.0 ng/L- Negative Female for AMI  <22.0 ng/L- Negative Male for AMI  >=14 - Abnormal Female indicating possible myocardial injury.  >=22 - Abnormal Male indicating possible myocardial injury.   Clinicians would have to utilize clinical acumen, EKG, Troponin, and serial changes to determine if it is an Acute Myocardial Infarction or myocardial injury due to an underlying chronic condition.        LACTIC ACID, PLASMA - Abnormal; Notable for the following components:    Lactate 2.1 (*)     All other components within normal limits   HIGH SENSITIVITIY TROPONIN T 1HR - Abnormal; Notable for the following components:    HS Troponin T 42 (*)     All other components within normal " limits    Narrative:     High Sensitive Troponin T Reference Range:  <14.0 ng/L- Negative Female for AMI  <22.0 ng/L- Negative Male for AMI  >=14 - Abnormal Female indicating possible myocardial injury.  >=22 - Abnormal Male indicating possible myocardial injury.   Clinicians would have to utilize clinical acumen, EKG, Troponin, and serial changes to determine if it is an Acute Myocardial Infarction or myocardial injury due to an underlying chronic condition.        URINALYSIS, MICROSCOPIC ONLY - Abnormal; Notable for the following components:    WBC, UA 6-10 (*)     Bacteria, UA Trace (*)     All other components within normal limits   MAGNESIUM - Normal   BLOOD CULTURE   BLOOD CULTURE   URINE CULTURE   RAINBOW DRAW    Narrative:     The following orders were created for panel order [facility] Draw.  Procedure                               Abnormality         Status                     ---------                               -----------         ------                     Green Top (Gel)[[id number]]                                  Final result               Lavender Top[[id number]]                                     Final result               Red Top[[id number]]                                          Final result               Light Blue Top[[id number]]                                   Final result                 Please view results for these tests on the individual orders.   LACTIC ACID, REFLEX   GREEN TOP   LAVENDER TOP   RED TOP   LIGHT BLUE TOP   CBC AND DIFFERENTIAL    Narrative:     The following orders were created for panel order CBC & Differential.  Procedure                               Abnormality         Status                     ---------                               -----------         ------                     CBC Auto Differential[[id number]]        Abnormal            Final result                 Please view results for these tests on the individual orders.       Meds given in ED:   Medications    sodium chloride 0.9 % flush 10 mL (has no administration in time range)   sodium chloride 0.9 % bolus 1,000 mL (0 mL Intravenous Stopped 1/25/25 2010)   piperacillin-tazobactam (ZOSYN) 3.375 g IVPB in 100 mL NS MBP (CD) (0 g Intravenous Stopped 1/25/25 2115)   sepsis fluid NS 0.9 % bolus 2,430 mL (1,500 mL Intravenous New Bag 1/25/25 2044)           NIH Stroke Scale:  Interval: baseline    Isolation/Infection(s):  No active isolations   COVID (History)     COVID Testing  Collected .  Resulted .    Nursing report ED to floor:  Mental status: A/O x 4  Ambulatory status: Weak, at least one standby  Precautions: Fall, skin,     ED nurse phone extentsion- [phone]

## 2025-01-26 NOTE — PROGRESS NOTES
[facility] Medicine Services  INPATIENT PROGRESS NOTE    Patient Name: Jose Alfredo Kirkpatrick  Date of Admission: 1/25/2025  Today's Date: 01/26/25  Length of Stay: 0  Primary Care Physician: Otilio Ramos MD    Subjective   Chief Complaint: None  HPI   No acute events overnight.  When seen this morning patient is feeling well.  Not complaining of pain at this time.    Review of Systems   All pertinent negatives and positives are as above. All other systems have been reviewed and are negative unless otherwise stated.     Objective    Temp:  [97.4 °F (36.3 °C)-98.1 °F (36.7 °C)] 98.1 °F (36.7 °C)  Heart Rate:  [60-87] 61  Resp:  [16-18] 18  BP: (112-157)/(57-91) 157/71  Physical Exam  GEN: Awake, alert, interactive, in NAD on room air  HEENT: Atraumatic, EOMI, Anicteric  Lungs: CTAB, no wheezing/rales/rhonchi  Heart: RRR, +S1/s2, no rub  ABD: soft, nt/nd, +BS, no guarding/rebound  Extremities: atraumatic, no cyanosis, no edema  Skin: no rashes or lesions  Neuro: AAOx3, no focal deficits  Psych: normal mood & affect      Results Review:  I have reviewed the labs, radiology results, and diagnostic studies.    Laboratory Data:   Results from last 7 days   Lab Units 01/26/25  0437 01/25/25  1729   WBC 10*3/mm3 11.04* 16.21*   HEMOGLOBIN g/dL 11.2* 13.2   HEMATOCRIT % 36.0* 41.2   PLATELETS 10*3/mm3 139* 169        Results from last 7 days   Lab Units 01/26/25  0437 01/25/25  1835   SODIUM mmol/L 142 140   POTASSIUM mmol/L 4.6 5.1   CHLORIDE mmol/L 109* 103   CO2 mmol/L 26.0 24.0   BUN mg/dL 29* 30*   CREATININE mg/dL 1.57* 2.05*   CALCIUM mg/dL 8.6 9.1   BILIRUBIN mg/dL 1.8* 3.0*   ALK PHOS U/L 139* 183*   ALT (SGPT) U/L 311* 486*   AST (SGOT) U/L 239* 506*   GLUCOSE mg/dL 88 102*       Culture Data:   @JUAN J@    Radiology Data:   Imaging Results (Last 24 Hours)       Procedure Component Value Units Date/Time    US Gallbladder [[id number]] Collected: 01/25/25 2113     Updated:  01/25/25 2118    Narrative:      EXAMINATION: US GALLBLADDER-  1/25/2025 9:13 PM     REASON FOR EXAM: elevated liver enzymes, elevated bilirubin, r/o biliary  obstruction; J01.00-Acute maxillary sinusitis, unspecified; K85.90-Acute  pancreatitis without necrosis or infection, unspecified; N17.9-Acute  kidney failure, unspecified; N39.0-Urinary tract infection, site not  specified     COMPARISON: 10/19/2020     TECHNIQUE: Multiple longitudinal and transverse real time sonographic  images of the right upper quadrant of the abdomen are obtained. Doppler  and grayscale images were provided. Images and report are stored per  institutional and state regulations.     FINDINGS:    PANCREAS: Obscured by bowel gas.     LIVER: Normal in size, echogenicity and echotexture. No focal lesion. No  definite intrahepatic ductal dilation.     GALLBLADDER: There is a 1.2 cm shadowing stone in the lumen of the  gallbladder. No pericholecystic fluid or gallbladder wall thickening.     BILE DUCTS: The CBD measures 0.3 cm in diameter. There is no  intrahepatic or extrahepatic ductal dilation.     OTHER: The visualized portion of the RIGHT kidney has a normal  appearance. The aorta is obscured by bowel gas.          Impression:         1.  Exam limited by overlying bowel gas. Pancreas and aorta are poorly  visualized.     2.  There is a 1.2 cm stone in the lumen of the gallbladder. The common  bile duct is normal in caliber. No intrahepatic ductal dilation  identified.     This report was signed and finalized on 1/25/2025 9:15 PM by Dr. Jacky Hall MD.               I have reviewed the patient's current medications.     Assessment/Plan   Assessment  Active Hospital Problems    Diagnosis     **Gallstone pancreatitis     Acute kidney injury     Elevated LFTs     Hyperbilirubinemia     Acute UTI (urinary tract infection)        Treatment Plan      - continue IVFs  - evaluated by GI who consulted general surgery  - surgery recommending  cholecystectomy after resolution of elevated bilirubin and lipase  - Cleared by surgery for clear liquid diet  - Continue Zosyn  - Continue home lisinopril, nifedipine, BuSpar  - GI prophylaxis with Protonix  - DVT prophylaxis with heparin        Medical Decision Making  Number and Complexity of problems: 1 acute moderate complexity others chronic  Differential Diagnosis: As above    Conditions and Status        Improving.     MDM Data  External documents reviewed: Reviewed  Cardiac tracing (EKG, telemetry) interpretation: Reviewed  Radiology interpretation: Reviewed  Labs reviewed: As above  Any tests that were considered but not ordered: None     Decision rules/scores evaluated (example [id number], Wells, etc): None     Discussed with: Patient and his daughter and general surgery     Care Planning  Shared decision making: Patient apprised of current labs, vitals, imaging and treatment plan.  They are agreeable with proceeding with plans as discussed.   Code status and discussions: No CPR    Disposition  Social Determinants of Health that impact treatment or disposition: None  I expect the patient to be discharged to [facility] in TBD days.         Electronically signed by Irma Lorenzo MD, 01/26/25, 19:18 CST.

## 2025-01-26 NOTE — CONSULTS
[facility] General Surgery Consult History and Physical  Jose Alfredo Kirkpatrick  MRN: [id number]  Age: 89 y.o. male   YOB: 1935  Admit Date: 1/25/2025   Admitting Physician: Irma Lorenzo MD      Reason for Consult   - c/f gallstone pancreatitis    SUBJECTIVE  History of Presenting Illness   Patient is a 89 y.o. male who was brought in from [facility] on 1/25/2025 with concerns for abdominal pain, generalized weakness, dizziness clarified as lightheadedness.  Had no evidence of nausea, vomiting, diarrhea, no fever or chills.  Workup concerning for elevated lipase (2696) consistent with pancreatitis T. bili of 3.0 with elevated LFTs, lactate of 2.1, and a leukocytosis of 16.2 with 82.7% PMNs.  UA consistent with UTI and he was started on antibiotics.  Ultrasound demonstrated a gall stone and wound of the gallbladder, but CBD appears normal in diameter and there appears to be no intrahepatic biliary ductal dilation.    This morning, he feels much better, has no abdominal pain, labs are improved, WBC is now 11.0, lipase is 1574, and T. bili is 1.8; was seen by gastroenterology, who believe he likely passed a choledocholith, resulting in improved symptoms and labs.  I was consulted for potential surgical management of the gallbladder.    Past Medical History     Past Medical History:  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       PCP: Otilio Ramos MD    Past Surgical History:  Past Surgical History:   Procedure Laterality Date    BACK SURGERY      cervical    CATARACT EXTRACTION Bilateral     COLONOSCOPY  02/11/2013    Hyperplastic polyp at 25 cm, Diverticulosis repeat exam in 5 years    COLONOSCOPY N/A 10/16/2018    Tubular adenoma ascending  colon repet prn    ENDOSCOPY  01/15/2014    HH    ENDOSCOPY N/A 10/03/2019    Large HH, non-bleeding gastric ulcer    ENDOSCOPY N/A 11/25/2019    Procedure: ESOPHAGOGASTRODUODENOSCOPY WITH ANESTHESIA;  Surgeon: Marques Pérez MD;  Location: [facility] ENDOSCOPY;  Service: Gastroenterology    ENDOSCOPY N/A 2/9/2023    Procedure: ESOPHAGOGASTRODUODENOSCOPY WITH ANESTHESIA;  Surgeon: Sushant Edmond MD;  Location: [facility] ENDOSCOPY;  Service: Gastroenterology;  Laterality: N/A;  pre GI bleed  post hiatal hernia; esophagitis  Otilio Ramos MD    HERNIA REPAIR Right     INGUINAL     HIP FRACTURE SURGERY Right 02/04/2023    REPLACEMENT TOTAL KNEE BILATERAL      THORACIC LAMINECTOMY DECOMPRESSIVE POSTERIOR N/A 12/14/2016    Procedure: LAMINECTOMY DECOMPRESSION, UNINSTRUMENTED POSTERIOR SPINAL FUSION, T 11-12;  Surgeon: LUZ Adan MD;  Location: [facility] OR;  Service:     VENA CAVA FILTER INSERTION N/A 10/04/2019    Procedure: VENA CAVA FILTER INSERTION;  Surgeon: Dallin Coronado MD;  Location: [facility] HYBRID OR 12;  Service: Vascular       Family History:  Family History   Problem Relation Age of Onset    Colon cancer Father     Colon polyps Neg Hx        Social History:  Social History     Tobacco Use    Smoking status: Never     Passive exposure: Never    Smokeless tobacco: Never   Substance Use Topics    Alcohol use: No       Allergies:  Allergies   Allergen Reactions    Meloxicam Unknown - Low Severity       Medications:  Medications Prior to Admission   Medication Sig Dispense Refill Last Dose/Taking    acetaminophen (TYLENOL) 325 MG tablet Take 2 tablets by mouth 2 (Two) Times a Day.       bisacodyl (DULCOLAX) 10 MG suppository Insert 1 suppository into the rectum Daily As Needed for Constipation. (Patient not taking: Reported on 4/24/2023)       busPIRone (BUSPAR) 5 MG tablet Take 1 tablet by mouth 2 (Two) Times a Day.       cyclobenzaprine (FLEXERIL) 5 MG tablet Take 1 tablet by mouth 2 (Two) Times a  Day As Needed for Muscle Spasms. (Patient not taking: Reported on 4/24/2023)       docusate sodium (COLACE) 100 MG capsule Take 1 capsule by mouth 2 (Two) Times a Day.       Enoxaparin Sodium (LOVENOX) 40 MG/0.4ML solution prefilled syringe syringe Inject 40 mg under the skin into the appropriate area as directed Daily.       gabapentin (NEURONTIN) 300 MG capsule Take 1 capsule by mouth 4 (Four) Times a Day. 12 capsule 0     guaiFENesin (MUCINEX) 600 MG 12 hr tablet Take 1 tablet by mouth 2 (Two) Times a Day.       hydrALAZINE (APRESOLINE) 50 MG tablet Take 1 tablet by mouth Every 8 (Eight) Hours As Needed.       levalbuterol (XOPENEX) 0.31 MG/3ML nebulizer solution Take 1 ampule by nebulization Every 8 (Eight) Hours As Needed for Wheezing.       lidocaine (LIDODERM) 5 % Place 1 patch on the skin as directed by provider Daily. Remove & Discard patch within 12 hours or as directed by MD       linaclotide (LINZESS) 145 MCG capsule capsule Take 1 capsule by mouth Daily.       lisinopril (PRINIVIL,ZESTRIL) 10 MG tablet Take 1 tablet by mouth Daily.       NIFEdipine XL (PROCARDIA XL) 60 MG 24 hr tablet Take 1 tablet by mouth Daily.       ondansetron (ZOFRAN) 4 MG tablet Take 1 tablet by mouth Every 6 (Six) Hours As Needed for Nausea or Vomiting.       oxyCODONE-acetaminophen (PERCOCET) 5-325 MG per tablet Take 1 tablet by mouth Every 6 (Six) Hours As Needed for Moderate Pain. 12 tablet 0     pantoprazole (PROTONIX) 40 MG EC tablet Take 1 tablet by mouth 2 (Two) Times a Day Before Meals.       polyethylene glycol (MIRALAX) packet Take 17 g by mouth Daily As Needed.       simethicone (MYLICON) 80 MG chewable tablet Chew 1 tablet Every 4 (Four) Hours As Needed for Flatulence.       sucralfate (CARAFATE) 1 GM/10ML suspension Take 10 mL by mouth 4 (Four) Times a Day Before Meals & at Bedtime.       vitamin B-12 (CYANOCOBALAMIN) 1000 MCG tablet Take 1 tablet by mouth Daily.       vitamin D (ERGOCALCIFEROL) 1.25 MG (62357 UT)  "capsule capsule Take 1 capsule by mouth 3 (Three) Times a Week. Give one time a day every Mon, Wed, Fri            Review of Systems   Per HPI.      Physical Examination     Vitals:    25 0415 25 0745 25 1125 25 1509   BP: 136/75 131/57 142/59    BP Location: Right arm Left arm Left arm    Patient Position: Lying Lying Lying    Pulse: 65 60 63 61   Resp: 16 18 18 18   Temp: 97.6 °F (36.4 °C) 97.6 °F (36.4 °C) 97.4 °F (36.3 °C) 98.1 °F (36.7 °C)   TempSrc: Oral Oral Oral Oral   SpO2: 97% 96% 95% 91%   Weight:       Height:         Temp (24hrs), Av.8 °F (36.6 °C), Min:97.4 °F (36.3 °C), Max:98.2 °F (36.8 °C)    Systolic (36hrs), Av , Min:112 , Max:148    Diastolic (36hrs), Av, Min:57, Max:91    Estimated body mass index is 24.24 kg/m² as calculated from the following:    Height as of this encounter: 188 cm (74\").    Weight as of this encounter: 85.6 kg (188 lb 12.8 oz).  PREVIOUS WEIGHTS:   Wt Readings from Last 5 Encounters:   25 85.6 kg (188 lb 12.8 oz)   23 81.6 kg (180 lb)   23 83 kg (183 lb)   23 83 kg (183 lb)   23 83.4 kg (183 lb 12.8 oz)       Physical Examination:  Gen: awake, alert, sitting up in chair in no acute distress  HEENT: NCAT, EOMI, anicteric sclerae  CV: RRR, normotensive  Resp: nonlabored on room air, sats appropriate  Abd: soft, nontender, nondistended without surgical scar   MSK: moves all four, no c/c/e  Neuro: no focal deficits, cranial nerves grossly intact  Psy:  appropriate, cooperative      Data Review   Labs:  CBC  Results from last 7 days   Lab Units 25  0437 25  1729   WBC 10*3/mm3 11.04* 16.21*   HEMOGLOBIN g/dL 11.2* 13.2   HEMATOCRIT % 36.0* 41.2     BMP  Results from last 7 days   Lab Units 25  0437 25  1835   SODIUM mmol/L 142 140   CHLORIDE mmol/L 109* 103   CO2 mmol/L 26.0 24.0   BUN mg/dL 29* 30*   CREATININE mg/dL 1.57* 2.05*   GLUCOSE mg/dL 88 102*   CALCIUM mg/dL 8.6 9.1 " "    LFTs  Results from last 7 days   Lab Units 01/26/25  0437 01/25/25 1835   ALT (SGPT) U/L 311* 486*   AST (SGOT) U/L 239* 506*   ALK PHOS U/L 139* 183*     Urine:  UA  Results from last 7 days   Lab Units 01/26/25  0437 01/25/25 1942 01/25/25 1835   COLOR UA   --  Dark Yellow*  --    CLARITY UA   --  Cloudy*  --    PH, URINE   --  <=5.0  --    GLUCOSE UA   --  Negative  --    BILIRUBIN UA   --  Small (1+)*  --    BLOOD UA   --  Negative  --    EGFR mL/min/1.73 42.1*  --  30.6*   UROBILINOGEN UA   --  1.0 E.U./dL  --        Urine Culture   Date Value Ref Range Status   01/25/2025 Culture in progress  Preliminary     No results found for: \"VIRALCULTU\", \"WOUNDCX\"    Radiology Impressions:  US Gallbladder    Result Date: 1/25/2025   1.  Exam limited by overlying bowel gas. Pancreas and aorta are poorly visualized.  2.  There is a 1.2 cm stone in the lumen of the gallbladder. The common bile duct is normal in caliber. No intrahepatic ductal dilation identified.  This report was signed and finalized on 1/25/2025 9:15 PM by Dr. Jacky Hall MD.      CT Cervical Spine Without Contrast    Result Date: 1/25/2025   1.  Multilevel degenerative changes and fusion changes as discussed above. No definite acute osseous abnormality on this exam.  2.  Atherosclerotic vascular disease in the carotid bifurcations bilaterally.  This report was signed and finalized on 1/25/2025 6:24 PM by Dr. Jacky Hall MD.      CT Head Without Contrast    Result Date: 1/25/2025   1.  Moderate ventricular dilation again seen with low-attenuation in the subcortical and periventricular white matter may be related to chronic microvascular change or could be related to normal pressure hydrocephalus. Overall, this is very similar to the exam from 2/1/2022.  2.  Opacification of the bilateral maxillary sinuses, raising concern for maxillary sinusitis.  This report was signed and finalized on 1/25/2025 6:17 PM by Dr. Jacky Hall MD.      XR " Spine Lumbar Complete 4+VW    Result Date: 12/31/2024  1. Stable appearance of the lumbar spine compared to CT 11/19/2023. Stable bony fusion at the L1/2 level with loss of the L1/2 disc space. Chronic loss of height of L1 and L2. Advanced degenerative changes as described above with no acute radiographic abnormality.     This report was signed and finalized on 12/31/2024 4:24 PM by Dr. Radhika Ross MD.         Hospital Problem List     Active Hospital Problems    Diagnosis     **Pancreatitis, acute          Assessment/Plan   Jose Alfredo Kirkpatrick is a 89 y.o. male with likely gallstone pancreatitis, who also presented with concerns for UTI.  At this point, with his numbers are improving today, I do not feel there is a reason for MRCP or further workup-would tentatively plan on robotic cholecystectomy during this admission, as soon as his lipase and T. bili normalized.  If they fail to do so, or were to rise again, would order an MRCP-as this would be an indication the patient would likely need preoperative ERCP, then normalization of his numbers, then cholecystectomy.  I discussed the plan for surgery in great detail with the patient, and his daughter Lula who is at bedside.     -Daily labs-please obtain fractionated bilirubin with a.m. labs  -If lipase and T. bili normalize, will plan for robotic cholecystectomy prior to discharge from this admission.  -Continue antibiotics for UTI-at this point, I do not think the patient needs any further antibiotics for pancreatitis, as a new believe he has an acutely infected pancreatitis    Smoking cessation: Never smoker-no counseling indicated  BMI is within normal parameters. No other follow-up for BMI required.  Med rec complete: yes  VTE: VTE PPX is not indicated.    Time Spent: I spent 45 minutes caring for Jose Alfredo Kirkpatrick on this date of service. This time includes time, independent of any procedures, spent by me in the following activities: preparing for the clinic  visit, reviewing tests, obtaining and/or reviewing a separately obtained history, performing a medically appropriate examination and/or evaluation, counseling and educating the patient/family/caregiver, ordering medications, tests, or procedures, and documenting information in the medical record.     Vinicio Cain MD  1/26/2025   15:16 CST

## 2025-01-26 NOTE — PLAN OF CARE
Goal Outcome Evaluation:  Plan of Care Reviewed With: patient      Pt appears well and has only complaints of chronic pain relieved with IV pain medication. SCD's on , malewick in place, sepsis fluids completed NS at 100ml an hour continuous. NPO x ice chips. Afebrile, CTM

## 2025-01-27 ENCOUNTER — APPOINTMENT (OUTPATIENT)
Dept: GENERAL RADIOLOGY | Facility: HOSPITAL | Age: 89
End: 2025-01-27
Payer: MEDICARE

## 2025-01-27 ENCOUNTER — APPOINTMENT (OUTPATIENT)
Dept: CT IMAGING | Facility: HOSPITAL | Age: 89
End: 2025-01-27
Payer: MEDICARE

## 2025-01-27 ENCOUNTER — ANESTHESIA (OUTPATIENT)
Dept: PERIOP | Facility: HOSPITAL | Age: 89
End: 2025-01-27
Payer: MEDICARE

## 2025-01-27 ENCOUNTER — ANESTHESIA EVENT (OUTPATIENT)
Dept: PERIOP | Facility: HOSPITAL | Age: 89
End: 2025-01-27
Payer: MEDICARE

## 2025-01-27 LAB
ALBUMIN SERPL-MCNC: 2.9 G/DL (ref 3.5–5.2)
ALBUMIN/GLOB SERPL: 1.3 G/DL
ALP SERPL-CCNC: 148 U/L (ref 39–117)
ALT SERPL W P-5'-P-CCNC: 209 U/L (ref 1–41)
ANION GAP SERPL CALCULATED.3IONS-SCNC: 11 MMOL/L (ref 5–15)
AST SERPL-CCNC: 96 U/L (ref 1–40)
BACTERIA SPEC AEROBE CULT: NO GROWTH
BASOPHILS # BLD AUTO: 0.05 10*3/MM3 (ref 0–0.2)
BASOPHILS NFR BLD AUTO: 0.5 % (ref 0–1.5)
BILIRUB SERPL-MCNC: 0.9 MG/DL (ref 0–1.2)
BUN SERPL-MCNC: 19 MG/DL (ref 8–23)
BUN/CREAT SERPL: 22.9 (ref 7–25)
CALCIUM SPEC-SCNC: 8.4 MG/DL (ref 8.6–10.5)
CHLORIDE SERPL-SCNC: 107 MMOL/L (ref 98–107)
CO2 SERPL-SCNC: 21 MMOL/L (ref 22–29)
CREAT SERPL-MCNC: 0.83 MG/DL (ref 0.76–1.27)
DEPRECATED RDW RBC AUTO: 47.8 FL (ref 37–54)
EGFRCR SERPLBLD CKD-EPI 2021: 84.2 ML/MIN/1.73
EOSINOPHIL # BLD AUTO: 0.42 10*3/MM3 (ref 0–0.4)
EOSINOPHIL NFR BLD AUTO: 4.4 % (ref 0.3–6.2)
ERYTHROCYTE [DISTWIDTH] IN BLOOD BY AUTOMATED COUNT: 13.2 % (ref 12.3–15.4)
GLOBULIN UR ELPH-MCNC: 2.3 GM/DL
GLUCOSE SERPL-MCNC: 86 MG/DL (ref 65–99)
HCT VFR BLD AUTO: 37.6 % (ref 37.5–51)
HGB BLD-MCNC: 12 G/DL (ref 13–17.7)
IMM GRANULOCYTES # BLD AUTO: 0.04 10*3/MM3 (ref 0–0.05)
IMM GRANULOCYTES NFR BLD AUTO: 0.4 % (ref 0–0.5)
LIPASE SERPL-CCNC: 399 U/L (ref 13–60)
LYMPHOCYTES # BLD AUTO: 1.31 10*3/MM3 (ref 0.7–3.1)
LYMPHOCYTES NFR BLD AUTO: 13.6 % (ref 19.6–45.3)
MCH RBC QN AUTO: 31.3 PG (ref 26.6–33)
MCHC RBC AUTO-ENTMCNC: 31.9 G/DL (ref 31.5–35.7)
MCV RBC AUTO: 97.9 FL (ref 79–97)
MONOCYTES # BLD AUTO: 0.37 10*3/MM3 (ref 0.1–0.9)
MONOCYTES NFR BLD AUTO: 3.8 % (ref 5–12)
NEUTROPHILS NFR BLD AUTO: 7.46 10*3/MM3 (ref 1.7–7)
NEUTROPHILS NFR BLD AUTO: 77.3 % (ref 42.7–76)
NRBC BLD AUTO-RTO: 0 /100 WBC (ref 0–0.2)
PLATELET # BLD AUTO: 146 10*3/MM3 (ref 140–450)
PMV BLD AUTO: 8.8 FL (ref 6–12)
POTASSIUM SERPL-SCNC: 4.3 MMOL/L (ref 3.5–5.2)
PROT SERPL-MCNC: 5.2 G/DL (ref 6–8.5)
RBC # BLD AUTO: 3.84 10*6/MM3 (ref 4.14–5.8)
SODIUM SERPL-SCNC: 139 MMOL/L (ref 136–145)
WBC NRBC COR # BLD AUTO: 9.65 10*3/MM3 (ref 3.4–10.8)

## 2025-01-27 PROCEDURE — 25010000002 ENOXAPARIN PER 10 MG: Performed by: SURGERY

## 2025-01-27 PROCEDURE — 74300 X-RAY BILE DUCTS/PANCREAS: CPT

## 2025-01-27 PROCEDURE — 25010000002 INDOCYANINE GREEN 25 MG RECONSTITUTED SOLUTION: Performed by: SURGERY

## 2025-01-27 PROCEDURE — 25010000002 SUGAMMADEX 200 MG/2ML SOLUTION: Performed by: NURSE ANESTHETIST, CERTIFIED REGISTERED

## 2025-01-27 PROCEDURE — 88304 TISSUE EXAM BY PATHOLOGIST: CPT | Performed by: SURGERY

## 2025-01-27 PROCEDURE — 25810000003 SODIUM CHLORIDE 0.9 % SOLUTION: Performed by: STUDENT IN AN ORGANIZED HEALTH CARE EDUCATION/TRAINING PROGRAM

## 2025-01-27 PROCEDURE — 25010000002 CEFAZOLIN PER 500 MG: Performed by: NURSE ANESTHETIST, CERTIFIED REGISTERED

## 2025-01-27 PROCEDURE — 80053 COMPREHEN METABOLIC PANEL: CPT | Performed by: STUDENT IN AN ORGANIZED HEALTH CARE EDUCATION/TRAINING PROGRAM

## 2025-01-27 PROCEDURE — 74177 CT ABD & PELVIS W/CONTRAST: CPT

## 2025-01-27 PROCEDURE — 25010000002 FENTANYL CITRATE (PF) 50 MCG/ML SOLUTION: Performed by: ANESTHESIOLOGY

## 2025-01-27 PROCEDURE — 25010000002 ONDANSETRON PER 1 MG: Performed by: NURSE ANESTHETIST, CERTIFIED REGISTERED

## 2025-01-27 PROCEDURE — 25810000003 LACTATED RINGERS PER 1000 ML: Performed by: SURGERY

## 2025-01-27 PROCEDURE — 25010000002 FENTANYL CITRATE (PF) 50 MCG/ML SOLUTION: Performed by: NURSE ANESTHETIST, CERTIFIED REGISTERED

## 2025-01-27 PROCEDURE — 25010000002 HYDROMORPHONE PER 4 MG: Performed by: ANESTHESIOLOGY

## 2025-01-27 PROCEDURE — 25510000001 IOPAMIDOL 61 % SOLUTION: Performed by: SURGERY

## 2025-01-27 PROCEDURE — 0FT44ZZ RESECTION OF GALLBLADDER, PERCUTANEOUS ENDOSCOPIC APPROACH: ICD-10-PCS | Performed by: SURGERY

## 2025-01-27 PROCEDURE — 97162 PT EVAL MOD COMPLEX 30 MIN: CPT

## 2025-01-27 PROCEDURE — 25510000001 IOPAMIDOL 61 % SOLUTION: Performed by: FAMILY MEDICINE

## 2025-01-27 PROCEDURE — 83690 ASSAY OF LIPASE: CPT | Performed by: STUDENT IN AN ORGANIZED HEALTH CARE EDUCATION/TRAINING PROGRAM

## 2025-01-27 PROCEDURE — 47563 LAPARO CHOLECYSTECTOMY/GRAPH: CPT | Performed by: SURGERY

## 2025-01-27 PROCEDURE — 76000 FLUOROSCOPY <1 HR PHYS/QHP: CPT

## 2025-01-27 PROCEDURE — 25010000002 LIDOCAINE PF 2% 2 % SOLUTION: Performed by: NURSE ANESTHETIST, CERTIFIED REGISTERED

## 2025-01-27 PROCEDURE — BF131ZZ FLUOROSCOPY OF GALLBLADDER AND BILE DUCTS USING LOW OSMOLAR CONTRAST: ICD-10-PCS | Performed by: SURGERY

## 2025-01-27 PROCEDURE — 25010000002 HYDROMORPHONE PER 4 MG: Performed by: SURGERY

## 2025-01-27 PROCEDURE — 8E0W4CZ ROBOTIC ASSISTED PROCEDURE OF TRUNK REGION, PERCUTANEOUS ENDOSCOPIC APPROACH: ICD-10-PCS | Performed by: SURGERY

## 2025-01-27 PROCEDURE — C1894 INTRO/SHEATH, NON-LASER: HCPCS | Performed by: SURGERY

## 2025-01-27 PROCEDURE — 25010000002 PROPOFOL 10 MG/ML EMULSION: Performed by: NURSE ANESTHETIST, CERTIFIED REGISTERED

## 2025-01-27 PROCEDURE — 97165 OT EVAL LOW COMPLEX 30 MIN: CPT | Performed by: OCCUPATIONAL THERAPIST

## 2025-01-27 PROCEDURE — 25010000002 ROPIVACAINE PER 1 MG: Performed by: SURGERY

## 2025-01-27 PROCEDURE — 99233 SBSQ HOSP IP/OBS HIGH 50: CPT | Performed by: SURGERY

## 2025-01-27 PROCEDURE — 25010000002 PIPERACILLIN SOD-TAZOBACTAM PER 1 G: Performed by: SURGERY

## 2025-01-27 PROCEDURE — 85025 COMPLETE CBC W/AUTO DIFF WBC: CPT | Performed by: STUDENT IN AN ORGANIZED HEALTH CARE EDUCATION/TRAINING PROGRAM

## 2025-01-27 PROCEDURE — 25010000002 PIPERACILLIN SOD-TAZOBACTAM PER 1 G

## 2025-01-27 DEVICE — LIGACLIP 10-M/L, 10MM ENDOSCOPIC ROTATING MULTIPLE CLIP APPLIERS
Type: IMPLANTABLE DEVICE | Site: ABDOMEN | Status: FUNCTIONAL
Brand: LIGACLIP

## 2025-01-27 DEVICE — LARGE LIGATION CLIPS 6 CLIPS/CART
Type: IMPLANTABLE DEVICE | Site: ABDOMEN | Status: FUNCTIONAL
Brand: VAS-Q-CLIP

## 2025-01-27 RX ORDER — DEXTROSE MONOHYDRATE 25 G/50ML
12.5 INJECTION, SOLUTION INTRAVENOUS AS NEEDED
Status: DISCONTINUED | OUTPATIENT
Start: 2025-01-27 | End: 2025-01-27 | Stop reason: HOSPADM

## 2025-01-27 RX ORDER — SODIUM CHLORIDE, SODIUM LACTATE, POTASSIUM CHLORIDE, CALCIUM CHLORIDE 600; 310; 30; 20 MG/100ML; MG/100ML; MG/100ML; MG/100ML
1000 INJECTION, SOLUTION INTRAVENOUS CONTINUOUS
Status: DISCONTINUED | OUTPATIENT
Start: 2025-01-27 | End: 2025-01-27

## 2025-01-27 RX ORDER — ROCURONIUM BROMIDE 10 MG/ML
INJECTION, SOLUTION INTRAVENOUS AS NEEDED
Status: DISCONTINUED | OUTPATIENT
Start: 2025-01-27 | End: 2025-01-27 | Stop reason: SURG

## 2025-01-27 RX ORDER — ONDANSETRON 2 MG/ML
INJECTION INTRAMUSCULAR; INTRAVENOUS AS NEEDED
Status: DISCONTINUED | OUTPATIENT
Start: 2025-01-27 | End: 2025-01-27 | Stop reason: SURG

## 2025-01-27 RX ORDER — LIDOCAINE HYDROCHLORIDE 10 MG/ML
0.5 INJECTION, SOLUTION EPIDURAL; INFILTRATION; INTRACAUDAL; PERINEURAL ONCE AS NEEDED
Status: DISCONTINUED | OUTPATIENT
Start: 2025-01-27 | End: 2025-01-27 | Stop reason: HOSPADM

## 2025-01-27 RX ORDER — ENOXAPARIN SODIUM 100 MG/ML
40 INJECTION SUBCUTANEOUS NIGHTLY
Status: DISCONTINUED | OUTPATIENT
Start: 2025-01-27 | End: 2025-01-30 | Stop reason: HOSPADM

## 2025-01-27 RX ORDER — LIDOCAINE HYDROCHLORIDE 20 MG/ML
INJECTION, SOLUTION EPIDURAL; INFILTRATION; INTRACAUDAL; PERINEURAL AS NEEDED
Status: DISCONTINUED | OUTPATIENT
Start: 2025-01-27 | End: 2025-01-27 | Stop reason: SURG

## 2025-01-27 RX ORDER — SODIUM CHLORIDE 0.9 % (FLUSH) 0.9 %
10 SYRINGE (ML) INJECTION EVERY 12 HOURS SCHEDULED
Status: DISCONTINUED | OUTPATIENT
Start: 2025-01-27 | End: 2025-01-27 | Stop reason: HOSPADM

## 2025-01-27 RX ORDER — OXYCODONE HYDROCHLORIDE 5 MG/1
10 TABLET ORAL EVERY 4 HOURS PRN
Status: DISCONTINUED | OUTPATIENT
Start: 2025-01-27 | End: 2025-01-30 | Stop reason: HOSPADM

## 2025-01-27 RX ORDER — HYDROMORPHONE HYDROCHLORIDE 1 MG/ML
0.5 INJECTION, SOLUTION INTRAMUSCULAR; INTRAVENOUS; SUBCUTANEOUS
Status: DISCONTINUED | OUTPATIENT
Start: 2025-01-27 | End: 2025-01-27 | Stop reason: HOSPADM

## 2025-01-27 RX ORDER — IOPAMIDOL 612 MG/ML
INJECTION, SOLUTION INTRAVASCULAR AS NEEDED
Status: DISCONTINUED | OUTPATIENT
Start: 2025-01-27 | End: 2025-01-27 | Stop reason: HOSPADM

## 2025-01-27 RX ORDER — SODIUM CHLORIDE 0.9 % (FLUSH) 0.9 %
10 SYRINGE (ML) INJECTION AS NEEDED
Status: DISCONTINUED | OUTPATIENT
Start: 2025-01-27 | End: 2025-01-27 | Stop reason: HOSPADM

## 2025-01-27 RX ORDER — CEFAZOLIN SODIUM 1 G/3ML
INJECTION, POWDER, FOR SOLUTION INTRAMUSCULAR; INTRAVENOUS AS NEEDED
Status: DISCONTINUED | OUTPATIENT
Start: 2025-01-27 | End: 2025-01-27 | Stop reason: SURG

## 2025-01-27 RX ORDER — INDOCYANINE GREEN AND WATER 25 MG
7.5 KIT INJECTION ONCE
Status: COMPLETED | OUTPATIENT
Start: 2025-01-27 | End: 2025-01-27

## 2025-01-27 RX ORDER — IOPAMIDOL 612 MG/ML
100 INJECTION, SOLUTION INTRAVASCULAR
Status: COMPLETED | OUTPATIENT
Start: 2025-01-27 | End: 2025-01-27

## 2025-01-27 RX ORDER — FENTANYL CITRATE 50 UG/ML
50 INJECTION, SOLUTION INTRAMUSCULAR; INTRAVENOUS
Status: DISCONTINUED | OUTPATIENT
Start: 2025-01-27 | End: 2025-01-27 | Stop reason: HOSPADM

## 2025-01-27 RX ORDER — MAGNESIUM HYDROXIDE 1200 MG/15ML
LIQUID ORAL AS NEEDED
Status: DISCONTINUED | OUTPATIENT
Start: 2025-01-27 | End: 2025-01-27 | Stop reason: HOSPADM

## 2025-01-27 RX ORDER — ONDANSETRON 2 MG/ML
4 INJECTION INTRAMUSCULAR; INTRAVENOUS
Status: DISCONTINUED | OUTPATIENT
Start: 2025-01-27 | End: 2025-01-27 | Stop reason: HOSPADM

## 2025-01-27 RX ORDER — FENTANYL CITRATE 50 UG/ML
25 INJECTION, SOLUTION INTRAMUSCULAR; INTRAVENOUS
Status: DISCONTINUED | OUTPATIENT
Start: 2025-01-27 | End: 2025-01-27 | Stop reason: HOSPADM

## 2025-01-27 RX ORDER — FENTANYL CITRATE 50 UG/ML
INJECTION, SOLUTION INTRAMUSCULAR; INTRAVENOUS AS NEEDED
Status: DISCONTINUED | OUTPATIENT
Start: 2025-01-27 | End: 2025-01-27 | Stop reason: SURG

## 2025-01-27 RX ORDER — SODIUM CHLORIDE 0.9 % (FLUSH) 0.9 %
3 SYRINGE (ML) INJECTION AS NEEDED
Status: DISCONTINUED | OUTPATIENT
Start: 2025-01-27 | End: 2025-01-27 | Stop reason: HOSPADM

## 2025-01-27 RX ORDER — CYCLOBENZAPRINE HCL 5 MG
5 TABLET ORAL EVERY 8 HOURS PRN
Qty: 30 TABLET | Refills: 0 | Status: SHIPPED | OUTPATIENT
Start: 2025-01-27 | End: 2025-02-06

## 2025-01-27 RX ORDER — HYDROMORPHONE HYDROCHLORIDE 1 MG/ML
0.5 INJECTION, SOLUTION INTRAMUSCULAR; INTRAVENOUS; SUBCUTANEOUS EVERY 4 HOURS PRN
Status: DISCONTINUED | OUTPATIENT
Start: 2025-01-27 | End: 2025-01-28 | Stop reason: ALTCHOICE

## 2025-01-27 RX ORDER — OXYCODONE HYDROCHLORIDE 5 MG/1
5 TABLET ORAL EVERY 4 HOURS PRN
Status: DISCONTINUED | OUTPATIENT
Start: 2025-01-27 | End: 2025-01-30 | Stop reason: HOSPADM

## 2025-01-27 RX ORDER — ACETAMINOPHEN 500 MG
1000 TABLET ORAL EVERY 8 HOURS
Status: COMPLETED | OUTPATIENT
Start: 2025-01-27 | End: 2025-01-28

## 2025-01-27 RX ORDER — TRAMADOL HYDROCHLORIDE 50 MG/1
50 TABLET ORAL EVERY 6 HOURS PRN
Qty: 12 TABLET | Refills: 0 | Status: SHIPPED | OUTPATIENT
Start: 2025-01-27 | End: 2025-01-30

## 2025-01-27 RX ORDER — PROPOFOL 10 MG/ML
VIAL (ML) INTRAVENOUS AS NEEDED
Status: DISCONTINUED | OUTPATIENT
Start: 2025-01-27 | End: 2025-01-27 | Stop reason: SURG

## 2025-01-27 RX ORDER — NALOXONE HCL 0.4 MG/ML
0.04 VIAL (ML) INJECTION AS NEEDED
Status: DISCONTINUED | OUTPATIENT
Start: 2025-01-27 | End: 2025-01-27 | Stop reason: HOSPADM

## 2025-01-27 RX ORDER — IBUPROFEN 600 MG/1
600 TABLET, FILM COATED ORAL EVERY 6 HOURS PRN
Status: DISCONTINUED | OUTPATIENT
Start: 2025-01-27 | End: 2025-01-27 | Stop reason: HOSPADM

## 2025-01-27 RX ORDER — FLUMAZENIL 0.1 MG/ML
0.2 INJECTION INTRAVENOUS AS NEEDED
Status: DISCONTINUED | OUTPATIENT
Start: 2025-01-27 | End: 2025-01-27 | Stop reason: HOSPADM

## 2025-01-27 RX ORDER — OXYCODONE AND ACETAMINOPHEN 10; 325 MG/1; MG/1
1 TABLET ORAL EVERY 4 HOURS PRN
Status: DISCONTINUED | OUTPATIENT
Start: 2025-01-27 | End: 2025-01-27 | Stop reason: HOSPADM

## 2025-01-27 RX ORDER — LABETALOL HYDROCHLORIDE 5 MG/ML
5 INJECTION, SOLUTION INTRAVENOUS
Status: DISCONTINUED | OUTPATIENT
Start: 2025-01-27 | End: 2025-01-27 | Stop reason: HOSPADM

## 2025-01-27 RX ADMIN — INDOCYANINE GREEN AND WATER 7.5 MG: KIT at 12:06

## 2025-01-27 RX ADMIN — PROPOFOL 80 MG: 10 INJECTION, EMULSION INTRAVENOUS at 12:40

## 2025-01-27 RX ADMIN — PANTOPRAZOLE SODIUM 40 MG: 40 TABLET, DELAYED RELEASE ORAL at 05:58

## 2025-01-27 RX ADMIN — SODIUM CHLORIDE 100 ML/HR: 9 INJECTION, SOLUTION INTRAVENOUS at 06:06

## 2025-01-27 RX ADMIN — OXYCODONE AND ACETAMINOPHEN 1 TABLET: 325; 10 TABLET ORAL at 06:06

## 2025-01-27 RX ADMIN — HYDROMORPHONE HYDROCHLORIDE 0.5 MG: 1 INJECTION, SOLUTION INTRAMUSCULAR; INTRAVENOUS; SUBCUTANEOUS at 23:24

## 2025-01-27 RX ADMIN — PIPERACILLIN SODIUM AND TAZOBACTAM SODIUM 3.38 G: 3; .375 INJECTION, POWDER, LYOPHILIZED, FOR SOLUTION INTRAVENOUS at 02:52

## 2025-01-27 RX ADMIN — CYCLOBENZAPRINE HYDROCHLORIDE 5 MG: 10 TABLET, FILM COATED ORAL at 19:08

## 2025-01-27 RX ADMIN — LIDOCAINE HYDROCHLORIDE 100 MG: 20 INJECTION, SOLUTION EPIDURAL; INFILTRATION; INTRACAUDAL; PERINEURAL at 12:38

## 2025-01-27 RX ADMIN — PIPERACILLIN SODIUM AND TAZOBACTAM SODIUM 3.38 G: 3; .375 INJECTION, POWDER, LYOPHILIZED, FOR SOLUTION INTRAVENOUS at 20:45

## 2025-01-27 RX ADMIN — PROPOFOL 40 MG: 10 INJECTION, EMULSION INTRAVENOUS at 12:59

## 2025-01-27 RX ADMIN — Medication 10 ML: at 20:49

## 2025-01-27 RX ADMIN — ENOXAPARIN SODIUM 40 MG: 100 INJECTION SUBCUTANEOUS at 20:44

## 2025-01-27 RX ADMIN — HYDROMORPHONE HYDROCHLORIDE 0.5 MG: 1 INJECTION, SOLUTION INTRAMUSCULAR; INTRAVENOUS; SUBCUTANEOUS at 15:07

## 2025-01-27 RX ADMIN — ONDANSETRON 4 MG: 2 INJECTION INTRAMUSCULAR; INTRAVENOUS at 13:26

## 2025-01-27 RX ADMIN — FENTANYL CITRATE 100 MCG: 50 INJECTION, SOLUTION INTRAMUSCULAR; INTRAVENOUS at 12:37

## 2025-01-27 RX ADMIN — IOPAMIDOL 100 ML: 612 INJECTION, SOLUTION INTRAVENOUS at 11:40

## 2025-01-27 RX ADMIN — CYCLOBENZAPRINE HYDROCHLORIDE 5 MG: 10 TABLET, FILM COATED ORAL at 06:06

## 2025-01-27 RX ADMIN — SUGAMMADEX 200 MG: 100 INJECTION, SOLUTION INTRAVENOUS at 13:39

## 2025-01-27 RX ADMIN — OXYCODONE HYDROCHLORIDE 5 MG: 5 TABLET ORAL at 20:44

## 2025-01-27 RX ADMIN — GABAPENTIN 300 MG: 300 CAPSULE ORAL at 20:44

## 2025-01-27 RX ADMIN — CEFAZOLIN 2 G: 330 INJECTION, POWDER, FOR SOLUTION INTRAMUSCULAR; INTRAVENOUS at 12:49

## 2025-01-27 RX ADMIN — OXYCODONE AND ACETAMINOPHEN 1 TABLET: 325; 10 TABLET ORAL at 14:24

## 2025-01-27 RX ADMIN — FENTANYL CITRATE 50 MCG: 50 INJECTION, SOLUTION INTRAMUSCULAR; INTRAVENOUS at 14:24

## 2025-01-27 RX ADMIN — ACETAMINOPHEN 1000 MG: 500 TABLET, FILM COATED ORAL at 20:54

## 2025-01-27 RX ADMIN — ROCURONIUM BROMIDE 10 MG: 10 INJECTION, SOLUTION INTRAVENOUS at 13:03

## 2025-01-27 RX ADMIN — SODIUM CHLORIDE, POTASSIUM CHLORIDE, SODIUM LACTATE AND CALCIUM CHLORIDE 1000 ML: 600; 310; 30; 20 INJECTION, SOLUTION INTRAVENOUS at 12:04

## 2025-01-27 RX ADMIN — ROCURONIUM BROMIDE 30 MG: 10 INJECTION, SOLUTION INTRAVENOUS at 12:40

## 2025-01-27 NOTE — OP NOTE
PREOPERATIVE DIAGNOSIS: Gallstone pancreatitis     POSTOPERATIVE DIAGNOSIS: Acute gallstone pancreatitis, cholecystitis      PROCEDURE PERFORMED: Robotic cholecystectomy with intraoperative cholangiogram     SURGEON: Ronak Roche MD     ASSISTANT:  Mini BUSH, Radha CST, Reyna CST     SPECIMENS: Gallbladder for permanent     ANESTHESIA: General tracheal anesthesia with bilateral TAP block     BLOOD LOSS:  2 mL    FLUIDS: 1000 mL     WOUND CLASSIFICATION: Class 3, contaminated     FINDINGS:   Fairly distended and inflamed gallbladder with a stone in the neck.  Critical view of safety obtained with a solitary cystic duct and a solitary cystic artery and a clear space the entire way up the gallbladder fossa.  Normal biliary anatomy.  Cystic duct and common bile duct clearly visualized with ICG under firefly.  Cholangiogram was unremarkable.  Excellent hemostasis.      INDICATIONS:   Jose Alfredo Kirkpatrick is a 89 y.o. male with acute gallstone pancreatitis     DESCRIPTION OF PROCEDURE:      Informed consent was obtained. The patient was taken to the operating room and placed on the operating table in the supine position. Bilateral SCD were placed. General anesthesia was administered and the patient was intubated without difficulty. Ancef 2 g was administered for preoperative antibiotics. The abdomen was prepped and draped in the usual fashion.  A full surgical timeout was performed verifying the correct patient, correct procedure and correct site for surgery.     Local anesthesia was infiltrated into the left upper quadrant at Rudolph's point.  A small incision was made and a Veress needle was inserted with 2 satisfactory clicks.  An excellent saline drop test confirmed correct intra-abdominal placement.  Pneumoperitoneum was initiated to 12 mmHg.  Patient was positioned in reverse Trendelenburg.  Local anesthesia was injected in the left lower hypogastrium.  An 8 mm robotic trocar was inserted under direct visualization in  Optiview fashion.  The abdomen was entered safely.  A bilateral TAP block was performed using 10 mL of 1% ropivacaine and 100 mcg of Precedex and 100 mL of injectable saline.  Local was injected at the usual 3 additional trocar sites transversely across the abdomen.  Incisions were made and 3 more robotic trocars were inserted under direct visualization.     The robot was docked and instruments were placed.  The gallbladder was minimally inflamed and mildly distended. The gallbladder was retracted cephalad and the peritoneum was incised using hook cautery. A top down dissection was performed.  Careful dissection was undertaken in the cystohepatic triangle. The dissection was of moderate difficulty. There was normal biliary anatomy.  A top down dissection was performed and a critical view of safety was obtained of a solitary cystic duct and a solitary cystic artery was obtained with a clear space posteriorly. Clips were placed on the cystic artery which was divided with electrocautery. A clip was placed high up on the cystic duct up to the neck of the gallbladder and a ductotomy was made.  The cholangiogram catheter was assembled and inserted into the cystic duct.  Saline was flushed without extravasation.  Under fluoroscopy, contrast was injected with filling of the cystic duct. There was retrograde filling of the common hepatic ducts and the left and right hepatic duct branches.  There was appropriate filling of the secondary hepatic radicles.  Contrast migrated anterograde through the common bile duct without any obvious filling defects and entered the duodenum with appropriate transit time.  The catheter was flushed with saline and removed.  The cystic duct stump was ligated using clips.  The cystic duct was divided and the gallbladder was removed through the supraumbilical trocar site using an Endo Catch bag. The fascia of the extraction site was closed with a 0 Vicryl suture in a figure-of-eight fashion using a  transfascial suture passer. There was meticulous hemostasis.  The trocars were removed under direct visualization and pneumoperitoneum was released.  The incisions were irrigated using saline and closed with 4-0 Monocryl suture.  The incisions were dressed with Mastisol and Steri-Strips.     At the conclusion of the case all needle, sharp and sponge counts were correct x 2. The patient was awoken from anesthesia, extubated in the operating room and was taken to the post anesthesia care unit in stable condition without any immediate complications.

## 2025-01-27 NOTE — PAYOR COMM NOTE
"1/27/25 [facility] [phone]  [phone]    ER ADMIT TO INPATIENT ON 1/25/25. [facility] FOR INPATIENT REVIEW. [id number]            Jose Alfredo Kirkpatrick (89 y.o. Male)       Date of Birth   1935    Social Security Number       Address   [address] [place] [address]    Home Phone   [phone]    MRN   [id number]       Sikhism   [employer]    Marital Status                               Admission Date   1/25/25    Admission Type   Emergency    Admitting Provider       Attending Provider   Parth Byrd MD    Department, Room/Bed   [address], 488/1       Discharge Date       Discharge Disposition       Discharge Destination                                 Attending Provider: Parth Byrd MD    Allergies: Meloxicam    Isolation: None   Infection: COVID (History) (02/09/23)   Code Status: No CPR    Ht: 188 cm (74\")   Wt: 85.6 kg (188 lb 12.8 oz)    Admission Cmt: None   Principal Problem: Gallstone pancreatitis [K85.10]                   Active Insurance as of 1/25/2025       Primary Coverage       Payor Plan Insurance Group Employer/Plan Group    ANTHEM MEDICARE REPLACEMENT ANTHEM MED ADV HMO [id number]       Payor Plan Address Payor Plan Phone Number Payor Plan Fax Number Effective Dates    [address] [phone]  1/1/2024 - None Entered    [facility] [id number]         Subscriber Name Subscriber Birth Date Member ID       JOSE ALFREDO KIRKPATRICK 1935 [id number]                     Emergency Contacts        (Rel.) Home Phone Work Phone Mobile Phone    LOUIS WELLS (Daughter) -- -- [phone]             [facility] Encounter Date/Time: 1/25/2025 [facility]   Hospital Account: [id number]    MRN: [id number]   Patient:  Jose Alfredo Kirkpatrick   Contact Serial #: [id number]   SSN:          ENCOUNTER             Patient Class: Inpatient   [address]: [address] Service: Medicine     Bed: 488/1 "   Admitting Provider:     Referring Physician: Winter White   Attending Provider: Parth Byrd MD   Adm Diagnosis: Pancreatitis, acute [K85*               PATIENT             Name: Jose Alfredo Kirkpatrick : 1935 (89 yrs)   Address: [address] [place] [address] Sex: Male   City: [place] [address]   County: [facility]   Marital Status:  Ethnicity: NOT                                                                         Race: WHITE   Primary Care Provider: Otilio Ramos MD Patients Phone: Home Phone: [phone]     Mobile Phone: [phone]     EMERGENCY CONTACT   Contact Name Legal Guardian? Relationship to Patient Home Phone Work Phone Mobile Phone   1. LOUIS WELLS  2. *No Contact Specified* No    Daughter              [phone]      GUARANTOR             Guarantor: Jose Alfredo Kirkpatrick     : 1935   Address: [address] [place] [address] Sex: [place] [address]     Relation to Patient: Self       Home Phone: [phone]   Guarantor ID: [id number]       Work Phone:     GUARANTOR EMPLOYER   Employer:           Status: RETIRED   COVERAGE          PRIMARY INSURANCE   Payor: [employer] MEDICARE REPLACEMENT Plan: [employer] MED ADV HMO   Group Number: [id number] Insurance Type: INDEMNITY   Subscriber Name: JOSE ALFREDO KIRKPATRICK Subscriber : 1935   Subscriber ID: [id number] Coverage Address: [facility] [address]  [place] [address]   Pat. Rel. to Subscriber: Self Coverage Phone: [phone]   SECONDARY INSURANCE   Payor: N/A Plan: N/A   Group Number:   Insurance Type:     Subscriber Name:   Subscriber :     Subscriber ID:   Coverage Address:     Pat. Rel. to Subscriber:   Coverage Phone:        Contact Serial # ([id number])         2025    Chart ID ([id number])         Winter White MD   Physician  Specialty: Hospitalist     H&P     Signed     Date of Service: 25  Creation Time: 25     Signed       Expand All [facility]  [facility] Medicine Services  HISTORY AND PHYSICAL     Date of Admission: 1/25/2025  Primary Care Physician: Otilio Ramos MD     Subjective   Primary Historian: Patient     Chief Complaint: Generalized weakness and abdominal pain     History of Present Illness  Patient is an 89-year-old male patient with a medical history of hypertension, GERD, brought in by EMS from [facility] for the evaluation of his abdominal pain and generalized weakness.  As reported, for the last 2 days he has been feeling weak and dizzy and this has been associated with abdominal cramps.  He denies any nausea vomiting or diarrhea.  He denies any fever or chills.  He denies any chest pain or shortness of breath.  He usually ambulates with a walker, but he felt very weak and stayed in bed all day.           Review of Systems   Otherwise complete ROS reviewed and negative except as mentioned in the HPI.     Past Medical History:   Medical History[]Expand by Default        Past Medical History:   Diagnosis Date    Arthritis      Cataract       BILATERAL    GERD (gastroesophageal reflux disease)      History of transfusion      Hx of colonic polyps      Hypertension      Reflux esophagitis      Streptococcosis       IN SPINE FROM BACK INJECTIONS         Past Surgical History:  Surgical History         Past Surgical History:   Procedure Laterality Date    BACK SURGERY         cervical    CATARACT EXTRACTION Bilateral      COLONOSCOPY   02/11/2013     Hyperplastic polyp at 25 cm, Diverticulosis repeat exam in 5 years    COLONOSCOPY N/A 10/16/2018     Tubular adenoma ascending colon repet prn    ENDOSCOPY   01/15/2014         ENDOSCOPY N/A 10/03/2019     Large HH, non-bleeding gastric ulcer    ENDOSCOPY N/A 11/25/2019     Procedure: ESOPHAGOGASTRODUODENOSCOPY WITH ANESTHESIA;  Surgeon: Marques Pérez MD;  Location: [facility] ENDOSCOPY;  Service: Gastroenterology    ENDOSCOPY N/A 2/9/2023      Procedure: ESOPHAGOGASTRODUODENOSCOPY WITH ANESTHESIA;  Surgeon: Sushant Edmond MD;  Location: [facility] ENDOSCOPY;  Service: Gastroenterology;  Laterality: N/A;  pre GI bleed  post hiatal hernia; esophagitis  Otilio Ramos MD    HERNIA REPAIR Right       INGUINAL     HIP FRACTURE SURGERY Right 02/04/2023    REPLACEMENT TOTAL KNEE BILATERAL        THORACIC LAMINECTOMY DECOMPRESSIVE POSTERIOR N/A 12/14/2016     Procedure: LAMINECTOMY DECOMPRESSION, UNINSTRUMENTED POSTERIOR SPINAL FUSION, T 11-12;  Surgeon: LUZ Adan MD;  Location: [facility] OR;  Service:     VENA CAVA FILTER INSERTION N/A 10/04/2019     Procedure: VENA CAVA FILTER INSERTION;  Surgeon: Dallin Coronado MD;  Location: [facility] HYBRID OR 12;  Service: Vascular         Social History:  reports that he has never smoked. He has never been exposed to tobacco smoke. He has never used smokeless tobacco. He reports that he does not drink alcohol and does not use drugs.     Family History: family history includes Colon cancer in his father.        Allergies:  Allergies        Allergies   Allergen Reactions    Meloxicam Unknown - Low Severity            Medications:          Prior to Admission medications    Medication Sig Start Date End Date Taking? Authorizing Provider   acetaminophen (TYLENOL) 325 MG tablet Take 2 tablets by mouth 2 (Two) Times a Day.       ProviderJoi MD   bisacodyl (DULCOLAX) 10 MG suppository Insert 1 suppository into the rectum Daily As Needed for Constipation.  Patient not taking: Reported on 4/24/2023       Joi Alvarez MD   busPIRone (BUSPAR) 5 MG tablet Take 1 tablet by mouth 2 (Two) Times a Day.       Joi Alvarez MD   cyclobenzaprine (FLEXERIL) 5 MG tablet Take 1 tablet by mouth 2 (Two) Times a Day As Needed for Muscle Spasms.  Patient not taking: Reported on 4/24/2023       Joi Alvarez MD   docusate sodium (COLACE) 100 MG capsule Take 1 capsule by mouth 2 (Two) Times a Day.        Joi Alvarez MD   Enoxaparin Sodium (LOVENOX) 40 MG/0.4ML solution prefilled syringe syringe Inject 40 mg under the skin into the appropriate area as directed Daily.       Joi Alvarez MD   gabapentin (NEURONTIN) 300 MG capsule Take 1 capsule by mouth 4 (Four) Times a Day. 2/14/23     Olena Craft APRN   guaiFENesin (MUCINEX) 600 MG 12 hr tablet Take 1 tablet by mouth 2 (Two) Times a Day.       Joi Alvarez MD   hydrALAZINE (APRESOLINE) 50 MG tablet Take 1 tablet by mouth Every 8 (Eight) Hours As Needed.       Joi Alvarez MD   levalbuterol (XOPENEX) 0.31 MG/3ML nebulizer solution Take 1 ampule by nebulization Every 8 (Eight) Hours As Needed for Wheezing.       Joi Alvarez MD   lidocaine (LIDODERM) 5 % Place 1 patch on the skin as directed by provider Daily. Remove & Discard patch within 12 hours or as directed by MD 2/15/23     Olena Craft APRN   linaclotide (LINZESS) 145 MCG capsule capsule Take 1 capsule by mouth Daily.       Joi Alvarez MD   lisinopril (PRINIVIL,ZESTRIL) 10 MG tablet Take 1 tablet by mouth Daily.       Joi Alvarez MD   NIFEdipine XL (PROCARDIA XL) 60 MG 24 hr tablet Take 1 tablet by mouth Daily.       Joi Alvarez MD   ondansetron (ZOFRAN) 4 MG tablet Take 1 tablet by mouth Every 6 (Six) Hours As Needed for Nausea or Vomiting.       Joi Alvarez MD   oxyCODONE-acetaminophen (PERCOCET) 5-325 MG per tablet Take 1 tablet by mouth Every 6 (Six) Hours As Needed for Moderate Pain. 11/19/23     Betty Childress,    pantoprazole (PROTONIX) 40 MG EC tablet Take 1 tablet by mouth 2 (Two) Times a Day Before Meals. 2/14/23     Olena Craft APRN   polyethylene glycol (MIRALAX) packet Take 17 g by mouth Daily As Needed.       Joi Alvarez MD   simethicone (MYLICON) 80 MG chewable tablet Chew 1 tablet Every 4 (Four) Hours As Needed for Flatulence.       Joi Alvarez MD   sucralfate  "(CARAFATE) 1 GM/10ML suspension Take 10 mL by mouth 4 (Four) Times a Day Before Meals & at Bedtime. 2/14/23     Olena Craft APRN   vitamin B-12 (CYANOCOBALAMIN) 1000 MCG tablet Take 1 tablet by mouth Daily.       Provider, MD Joi   vitamin D (ERGOCALCIFEROL) 1.25 MG (30286 UT) capsule capsule Take 1 capsule by mouth 3 (Three) Times a Week. Give one time a day every Mon, Wed, Fri       Provider, MD Joi      I have utilized all available immediate resources to obtain, update, or review the patient's current medications (including all prescriptions, over-the-counter products, herbals, cannabis/cannabidiol products, and vitamin/mineral/dietary (nutritional) supplements).     Objective      Vital Signs: /64 (BP Location: Right arm, Patient Position: Sitting)   Pulse 87   Temp 97.7 °F (36.5 °C) (Oral)   Resp 18   Ht 188 cm (74\")   Wt 85.6 kg (188 lb 12.8 oz)   SpO2 97%   BMI 24.24 kg/m²   Physical Exam  Constitutional:       Appearance: He is ill-appearing.   Cardiovascular:      Rate and Rhythm: Normal rate and regular rhythm.      Pulses: Normal pulses.      Heart sounds: Normal heart sounds. No murmur heard.  Pulmonary:      Effort: Pulmonary effort is normal. No respiratory distress.      Breath sounds: Normal breath sounds. No wheezing or rales.   Abdominal:      General: Bowel sounds are normal. There is no distension.      Palpations: Abdomen is soft.      Tenderness: There is no abdominal tenderness. There is no guarding.   Musculoskeletal:      Right lower leg: No edema.      Left lower leg: No edema.   Skin:     General: Skin is warm.   Neurological:      Mental Status: He is alert and oriented to person, place, and time. Mental status is at baseline.                  Results Reviewed:  Lab Results (last 24 hours)         Procedure Component Value Units Date/Time     STAT Lactic Acid, Reflex [[id number]]  (Normal) Collected: 01/25/25 4199     Specimen: Blood from Hand, Left " Updated: 01/25/25 2239       Lactate 1.4 mmol/L       High Sensitivity Troponin T 1Hr [[id number]]  (Abnormal) Collected: 01/25/25 2040     Specimen: Blood from Arm, Right Updated: 01/25/25 2119       HS Troponin T 42 ng/L         Troponin T Numeric Delta -3 ng/L         Troponin T % Delta -7     Narrative:       High Sensitive Troponin T Reference Range:  <14.0 ng/L- Negative Female for AMI  <22.0 ng/L- Negative Male for AMI  >=14 - Abnormal Female indicating possible myocardial injury.  >=22 - Abnormal Male indicating possible myocardial injury.   Clinicians would have to utilize clinical acumen, EKG, Troponin, and serial changes to determine if it is an Acute Myocardial Infarction or myocardial injury due to an underlying chronic condition.           Blood Culture - Blood, Arm, Left [[id number]] Collected: 01/25/25 2039     Specimen: Blood from Arm, Left Updated: 01/25/25 2101     Blood Culture - Blood, Arm, Right [[id number]] Collected: 01/25/25 2040     Specimen: Blood from Arm, Right Updated: 01/25/25 2059     Urinalysis With Culture If Indicated - Straight Cath [[id number]]  (Abnormal) Collected: 01/25/25 1942     Specimen: Urine from Straight Cath Updated: 01/25/25 2017       Color, UA Dark Yellow       Appearance, UA Cloudy       pH, UA <=5.0       Specific Gravity, UA 1.019       Glucose, UA Negative       Ketones, UA Trace       Bilirubin, UA Small (1+)       Blood, UA Negative       Protein, UA 30 mg/dL (1+)       Leuk Esterase, UA Trace       Nitrite, UA Positive       Urobilinogen, UA 1.0 E.U./dL     Narrative:       In absence of clinical symptoms, the presence of pyuria, bacteria, and/or nitrites on the urinalysis result does not correlate with infection.     Urinalysis, Microscopic Only - Straight Cath [[id number]]  (Abnormal) Collected: 01/25/25 1942     Specimen: Urine from Straight Cath Updated: 01/25/25 2017       RBC, UA None Seen /HPF         WBC, UA 6-10 /HPF         Bacteria, UA Trace /HPF         " Squamous Epithelial Cells, UA 0-2 /HPF         Hyaline Casts, UA 3-6 /LPF         Methodology Manual Light Microscopy     Urine Culture - Urine, Straight Cath [[id number]] Collected: 01/25/25 1942     Specimen: Urine from Straight Cath Updated: 01/25/25 2017     Lipase [[id number]]  (Abnormal) Collected: 01/25/25 1835     Specimen: Blood Updated: 01/25/25 1926       Lipase 2,696 U/L         Comment: Specimen hemolyzed.  Results may be falsely decreased.        Procalcitonin [[id number]]  (Abnormal) Collected: 01/25/25 1835     Specimen: Blood Updated: 01/25/25 1918       Procalcitonin 41.03 ng/mL       Narrative:       As a Marker for Sepsis (Non-Neonates):     1. <0.5 ng/mL represents a low risk of severe sepsis and/or septic shock.  2. >2 ng/mL represents a high risk of severe sepsis and/or septic shock.     As a Marker for Lower Respiratory Tract Infections that require antibiotic therapy:     PCT on Admission    Antibiotic Therapy       6-12 Hrs later     >0.5                Strongly Recommended  >0.25 - <0.5        Recommended   0.1 - 0.25          Discouraged              Remeasure/reassess PCT  <0.1                Strongly Discouraged     Remeasure/reassess PCT     As 28 day mortality risk marker: \"Change in Procalcitonin Result\" (>80% or <=80%) if Day 0 (or Day 1) and Day 4 values are available. Refer to http://www.[employer] [facility]-pct-calculator.com     Change in PCT <=80%  A decrease of PCT levels below or equal to 80% defines a positive change in PCT test result representing a higher risk for 28-day all-cause mortality of patients diagnosed with severe sepsis for septic shock.     Change in PCT >80%  A decrease of PCT levels of more than 80% defines a negative change in PCT result representing a lower risk for 28-day all-cause mortality of patients diagnosed with severe sepsis or septic shock.         Comprehensive Metabolic Panel [[id number]]  (Abnormal) Collected: 01/25/25 1835     Specimen: Blood Updated: 01/25/25 " 1915       Glucose 102 mg/dL         BUN 30 mg/dL         Creatinine 2.05 mg/dL         Sodium 140 mmol/L         Potassium 5.1 mmol/L         Comment: Specimen hemolyzed.  Result may be falsely elevated.          Chloride 103 mmol/L         CO2 24.0 mmol/L         Calcium 9.1 mg/dL         Total Protein 6.5 g/dL         Albumin 3.8 g/dL         ALT (SGPT) 486 U/L         Comment: Specimen hemolyzed.  Result may  be falsely elevated.          AST (SGOT) 506 U/L         Comment: Specimen hemolyzed.  Result may be falsely elevated.          Alkaline Phosphatase 183 U/L         Total Bilirubin 3.0 mg/dL         Globulin 2.7 gm/dL         A/G Ratio 1.4 g/dL         BUN/Creatinine Ratio 14.6       Anion Gap 13.0 mmol/L         eGFR 30.6 mL/min/1.73       Narrative:       GFR Categories in Chronic Kidney Disease (CKD)                         GFR Category          GFR (mL/min/1.73)    Interpretation  G1                       90 or greater              Normal or high (1)  G2                               60-89                Mild decrease (1)  G3a                   45-59                Mild to moderate decrease  G3b                   30-44                Moderate to severe decrease  G4                    15-29                Severe decrease  G5                    14 or less           Kidney failure                                                 (1)In the absence of evidence of kidney disease, neither GFR category G1 or G2 fulfill the criteria for CKD.     eGFR calculation 2021 CKD-EPI creatinine equation, which does not include race as a factor     Magnesium [[id number]]  (Normal) Collected: 01/25/25 1835     Specimen: Blood Updated: 01/25/25 1915       Magnesium 1.8 mg/dL       High Sensitivity Troponin T [[id number]]  (Abnormal) Collected: 01/25/25 1835     Specimen: Blood Updated: 01/25/25 1911       HS Troponin T 45 ng/L         Comment: Specimen hemolyzed.  Results may be falsely decreased.        Narrative:       High  Sensitive Troponin T Reference Range:  <14.0 ng/L- Negative Female for AMI  <22.0 ng/L- Negative Male for AMI  >=14 - Abnormal Female indicating possible myocardial injury.  >=22 - Abnormal Male indicating possible myocardial injury.   Clinicians would have to utilize clinical acumen, EKG, Troponin, and serial changes to determine if it is an Acute Myocardial Infarction or myocardial injury due to an underlying chronic condition.           Lactic Acid, Plasma [[id number]]  (Abnormal) Collected: 01/25/25 1835     Specimen: Blood Updated: 01/25/25 1910       Lactate 2.1 mmol/L       [place] Draw [[id number]] Collected: 01/25/25 1729     Specimen: Blood Updated: 01/25/25 1745     Narrative:       The following orders were created for panel order [place] Draw.  Procedure                               Abnormality         Status                     ---------                               -----------         ------                     Green Top (Gel)[[id number]]                                  Final result               Lavender Top[[id number]]                                     Final result               Red Top[[id number]]                                          Final result               Light Blue Top[[id number]]                                   Final result                  Please view results for these tests on the individual orders.     Green Top (Gel) [[id number]] Collected: 01/25/25 1729     Specimen: Blood Updated: 01/25/25 1745       Extra Tube Hold for add-ons.       Comment: Auto resulted.        Lavender Top [[id number]] Collected: 01/25/25 1729     Specimen: Blood Updated: 01/25/25 1745       Extra Tube hold for add-on       Comment: Auto resulted        Red Top [[id number]] Collected: 01/25/25 1729     Specimen: Blood Updated: 01/25/25 1745       Extra Tube Hold for add-ons.       Comment: Auto resulted.        Light Blue Top [[id number]] Collected: 01/25/25 1729     Specimen: Blood Updated: 01/25/25 1745        Extra Tube Hold for add-ons.       Comment: Auto resulted        CBC & Differential [[id number]]  (Abnormal) Collected: 01/25/25 1729     Specimen: Blood Updated: 01/25/25 1737     Narrative:       The following orders were created for panel order CBC & Differential.  Procedure                               Abnormality         Status                     ---------                               -----------         ------                     CBC Auto Differential[[id number]]        Abnormal            Final result                  Please view results for these tests on the individual orders.     CBC Auto Differential [[id number]]  (Abnormal) Collected: 01/25/25 1729     Specimen: Blood Updated: 01/25/25 1737       WBC 16.21 10*3/mm3         RBC 4.14 10*6/mm3         Hemoglobin 13.2 g/dL         Hematocrit 41.2 %         MCV 99.5 fL         MCH 31.9 pg         MCHC 32.0 g/dL         RDW 13.7 %         RDW-SD 49.9 fl         MPV 9.6 fL         Platelets 169 10*3/mm3         Neutrophil % 82.7 %         Lymphocyte % 11.0 %         Monocyte % 4.4 %         Eosinophil % 0.9 %         Basophil % 0.3 %         Immature Grans % 0.7 %         Neutrophils, Absolute 13.40 10*3/mm3         Lymphocytes, Absolute 1.79 10*3/mm3         Monocytes, Absolute 0.71 10*3/mm3         Eosinophils, Absolute 0.15 10*3/mm3         Basophils, Absolute 0.05 10*3/mm3         Immature Grans, Absolute 0.11 10*3/mm3         nRBC 0.0 /100 WBC               Imaging Results (Last 24 Hours)         Procedure Component Value Units Date/Time     US Gallbladder [[id number]] Collected: 01/25/25 2113       Updated: 01/25/25 2118     Narrative:       EXAMINATION: US GALLBLADDER-  1/25/2025 9:13 PM     REASON FOR EXAM: elevated liver enzymes, elevated bilirubin, r/o biliary  obstruction; J01.00-Acute maxillary sinusitis, unspecified; K85.90-Acute  pancreatitis without necrosis or infection, unspecified; N17.9-Acute  kidney failure, unspecified; N39.0-Urinary tract  infection, site not  specified     COMPARISON: 10/19/2020     TECHNIQUE: Multiple longitudinal and transverse real time sonographic  images of the right upper quadrant of the abdomen are obtained. Doppler  and grayscale images were provided. Images and report are stored per  institutional and state regulations.     FINDINGS:    PANCREAS: Obscured by bowel gas.     LIVER: Normal in size, echogenicity and echotexture. No focal lesion. No  definite intrahepatic ductal dilation.     GALLBLADDER: There is a 1.2 cm shadowing stone in the lumen of the  gallbladder. No pericholecystic fluid or gallbladder wall thickening.     BILE DUCTS: The CBD measures 0.3 cm in diameter. There is no  intrahepatic or extrahepatic ductal dilation.     OTHER: The visualized portion of the RIGHT kidney has a normal  appearance. The aorta is obscured by bowel gas.           Impression:          1.  Exam limited by overlying bowel gas. Pancreas and aorta are poorly  visualized.     2.  There is a 1.2 cm stone in the lumen of the gallbladder. The common  bile duct is normal in caliber. No intrahepatic ductal dilation  identified.     This report was signed and finalized on 1/25/2025 9:15 PM by Dr. Jacky Hall MD.        CT Cervical Spine Without Contrast [[id number]] Collected: 01/25/25 1819       Updated: 01/25/25 1827     Narrative:       EXAMINATION: CT CERVICAL SPINE WO CONTRAST-  1/25/2025 6:19 PM     HISTORY: posterior neck pain     COMPARISON: 11/19/2023     DLP: 1031.66 mGy.cm     TECHNIQUE: Serial helical tomographic images of the cervical spine were  obtained without the use of intravenous contrast. Additionally, sagittal  and coronal reformatted images were also provided for review.  In order  to have a CT radiation dose as low as reasonably achievable, Automated  Exposure Control was utilized for adjustment of the mA and/or KV  according to patient size.     FINDINGS:     OSSEOUS: No definite acute fracture. There is evidence of  previous  cervical fusion posteriorly at C4-C6. Posterior decompressive changes at  C4-C6 as well. No perihardware lucency in the posterior fusion screws.  No destructive bone lesion. I suspect partial bony fusion of the C3 and  C4 vertebral bodies and the posterior elements.     SKULL BASE: Arthritis at the C1-C2 articulation with the odontoid  process. No acute fracture in the visualized portion of the skull base.     ALIGNMENT: Straightening of the normal lordosis.     SURROUNDING SOFT TISSUES: Evaluation of the surrounding soft tissues  demonstrates calcified atherosclerotic vascular disease in the bilateral  carotid bifurcations.     UPPER THORAX: The visualized thorax demonstrates no acute abnormality.     LEVELS:  C2-C3: Facet arthropathy noted in the RIGHT C2-C3 facet. There is  moderate RIGHT foraminal narrowing. No high-grade thecal sac narrowing.     C3-C4: Partial fusion of the vertebral bodies and the posterior elements  at C3-C4 with posterior disc osteophyte complex noted indenting the  ventral aspect of the CSF space, likely contributing to at least a mild  thecal sac narrowing. At the C4 level there is posterior decompression.  There is moderate LEFT and RIGHT foraminal narrowing.     C4-C5: Fusion changes at this level. Posterior disc osteophyte complex  indents the ventral aspect of the thecal sac. Posterior decompressive  changes are noted. This is similar to the exam from 11/19/2023. Moderate  bilateral foraminal narrowing.     C5-C6: Fusion changes at this level with marginal endplate osteophytes.  Moderate RIGHT and severe LEFT foraminal narrowing. Posterior  decompressive changes.     C6-C7: Posterior decompressive changes at C6. Diffuse disc space height  loss with vacuum disc phenomenon and marginal endplate osteophytes.  Suspect at moderate thecal sac stenosis at the caudal aspect of this  disc space. There is moderate bilateral foraminal narrowing, LEFT  greater than RIGHT.     C7-T1:  Anterior end plate osteophyte formation. At least mild LEFT  foraminal narrowing. No high-grade thecal sac narrowing.           Impression:          1.  Multilevel degenerative changes and fusion changes as discussed  above. No definite acute osseous abnormality on this exam.     2.  Atherosclerotic vascular disease in the carotid bifurcations  bilaterally.      This report was signed and finalized on 1/25/2025 6:24 PM by Dr. Jacky Hall MD.        CT Head Without Contrast [[id number]] Collected: 01/25/25 1814       Updated: 01/25/25 1820     Narrative:       EXAMINATION: CT HEAD WO CONTRAST-  1/25/2025 6:14 PM     HISTORY: dizziness     neck pain     COMPARISON: 2/1/2022     DLP: 1031.66 mGy.cm     In order to have a CT radiation dose as low as reasonably achievable,  Automated Exposure Control was utilized for adjustment of the mA and/or  KV according to patient size.     TECHNIQUE: Serial axial tomographic images of the brain were obtained  without the use of intravenous contrast.  Coronal and sagittal  reformatted images were obtained reviewed as well.     FINDINGS:  Moderate ventricular dilation. Low density in the subcortical and  periventricular white matter, similar to the exam from 2/1/2022.  Vascular calcifications in the intracranial vasculature. No acute  hemorrhage. No mass effect or midline shift. Cavum septum pellucidum et  vergae. The structures of the posterior fossa are unremarkable. 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     The included orbits and their contents are unremarkable. Opacification  of the bilateral maxillary sinuses. The visualized osseous structures  and overlying soft tissues of the skull and face are unremarkable.           Impression:          1.  Moderate ventricular dilation again seen with low-attenuation in the  subcortical and periventricular white matter may be related to chronic  microvascular change or could be related to normal  pressure  hydrocephalus. Overall, this is very similar to the exam from 2/1/2022.     2.  Opacification of the bilateral maxillary sinuses, raising concern  for maxillary sinusitis.     This report was signed and finalized on 1/25/2025 6:17 PM by Dr. Jacky Hall MD.                I have personally reviewed and interpreted the radiology studies and ECG obtained at time of admission.      Assessment / Plan   Assessment:        Active Hospital Problems     Diagnosis      **Pancreatitis, acute           Treatment Plan  The patient will be admitted to my service here at [facility].      Assessment and plan:  #Acute pancreatitis.  #ISHA.  #Lactic acidosis.  #Elevated LFTs.  #UTI     -Admitting to floor.  -ED gave him IV boluses and will continue with IV continuous infusions.  -Pain medication ordered.  -Keeping patient n.p.o. for now.  -Placing a.m. consult with GI.  Patient had an ultrasound gallbladder and it showed a 1.2 cm stone in the gallbladder but the common bile duct was normal.  -Adding triglycerides to the workup.  -Will recheck creatinine in the morning.  -ED could not perform CT with IV contrast given his ISHA, however if creatinine improves day team may need to consider that if still appropriate.  -Continue with Zosyn started in the ED empirically for urine infection.  -DVT prophylaxis with heparin subcu.     Medical Decision Making  Number and Complexity of problems: 5 and moderate  Differential Diagnosis: As above     Conditions and Status        Condition is worsening.     MDM Data  External documents reviewed: Yes  Cardiac tracing (EKG, telemetry) interpretation: EKG interpretation reviewed  Radiology interpretation: Scans and ultrasound gallbladder reviewed  Labs reviewed: Yes  Any tests that were considered but not ordered: Triglycerides ordered     Decision rules/scores evaluated (example [id number], Wells, etc): None     Discussed with: Patient, daughter over the phone, ED  provider     Care Planning  Shared decision making: I explained the treatment plan and patient is agreeable  Code status and discussions: I discussed CODE STATUS with the patient and he wants to be DNR DNI     Disposition  Social Determinants of Health that impact treatment or disposition: None at the moment  Estimated length of stay is 2 to 3 days     I confirmed that the patient's advanced care plan is present, code status is documented, and a surrogate decision maker is listed in the patient's medical record.      The patient was seen and examined by me on 1/25 at 10 PM     Electronically signed by Winter White MD, 01/25/25, 23:39 CST.                      Arie Becerra APRN   Nurse Practitioner  Gastroenterology     Consults     Attested     Date of Service: 01/26/25 1018  Creation Time: 01/26/25 1018  Consult Orders   Inpatient Gastroenterology Consult [[id number]] ordered by Winter White MD at 01/25/25 2317          Attested           Attestation signed by Chris Payne DO at 01/26/25 1058     I have reviewed this documentation and agree.               Expand All Collapse All    [facility] Gastroenterology  Initial Inpatient Consult Note     Referring Provider: Winter White MD     Date of Admission: 1/25/2025  Date of Service:  01/26/25     Reason for Consultation: Pancreatitis        Subjective[]Expand by Default  History of present illness:       This is an 89-year-old male presented to [facility] ER with complaints of abdominal pain as well as generalized weakness.  Patient states for the past couple of days he has not been feeling well.  He did have some lower abdominal pain 2 days ago followed by diarrhea with relief in abdominal pain.  Patient states he has a history of constipation attributing this to caffeine consumption as well as use of pain medication over the years.  Upon my discussion with him today he denies abdominal pain, no nausea vomiting.  He does not drink  alcohol.  No new medications.  He denies difficulty with heartburn or indigestion.     Colonoscopy in 2018 and endoscopy 2019 both by Dr. Marques Pérez.        Past Medical History:  Medical History        Past Medical History:   Diagnosis Date    Arthritis      Cataract       BILATERAL    GERD (gastroesophageal reflux disease)      History of transfusion      Hx of colonic polyps      Hypertension      Reflux esophagitis      Streptococcosis       IN SPINE FROM BACK INJECTIONS            Past Surgical History:  Surgical History         Past Surgical History:   Procedure Laterality Date    BACK SURGERY         cervical    CATARACT EXTRACTION Bilateral      COLONOSCOPY   02/11/2013     Hyperplastic polyp at 25 cm, Diverticulosis repeat exam in 5 years    COLONOSCOPY N/A 10/16/2018     Tubular adenoma ascending colon repet prn    ENDOSCOPY   01/15/2014     HH    ENDOSCOPY N/A 10/03/2019     Large HH, non-bleeding gastric ulcer    ENDOSCOPY N/A 11/25/2019     Procedure: ESOPHAGOGASTRODUODENOSCOPY WITH ANESTHESIA;  Surgeon: Marques Pérez MD;  Location: [facility] ENDOSCOPY;  Service: Gastroenterology    ENDOSCOPY N/A 2/9/2023     Procedure: ESOPHAGOGASTRODUODENOSCOPY WITH ANESTHESIA;  Surgeon: Sushant Edmond MD;  Location: [facility] ENDOSCOPY;  Service: Gastroenterology;  Laterality: N/A;  pre GI bleed  post hiatal hernia; esophagitis  Otilio Ramos MD    HERNIA REPAIR Right       INGUINAL     HIP FRACTURE SURGERY Right 02/04/2023    REPLACEMENT TOTAL KNEE BILATERAL        THORACIC LAMINECTOMY DECOMPRESSIVE POSTERIOR N/A 12/14/2016     Procedure: LAMINECTOMY DECOMPRESSION, UNINSTRUMENTED POSTERIOR SPINAL FUSION, T 11-12;  Surgeon: LUZ Adan MD;  Location: [facility] OR;  Service:     VENA CAVA FILTER INSERTION N/A 10/04/2019     Procedure: VENA CAVA FILTER INSERTION;  Surgeon: Dallin Coronado MD;  Location: [facility] HYBRID OR 12;  Service: Vascular            Social History:   Social History             Tobacco Use    Smoking status: Never       Passive exposure: Never    Smokeless tobacco: Never   Substance Use Topics    Alcohol use: No         Family History:        Family History   Problem Relation Age of Onset    Colon cancer Father      Colon polyps Neg Hx           Home Meds:  Prescriptions Prior to Admission           Medications Prior to Admission   Medication Sig Dispense Refill Last Dose/Taking    acetaminophen (TYLENOL) 325 MG tablet Take 2 tablets by mouth 2 (Two) Times a Day.          bisacodyl (DULCOLAX) 10 MG suppository Insert 1 suppository into the rectum Daily As Needed for Constipation. (Patient not taking: Reported on 4/24/2023)          busPIRone (BUSPAR) 5 MG tablet Take 1 tablet by mouth 2 (Two) Times a Day.          cyclobenzaprine (FLEXERIL) 5 MG tablet Take 1 tablet by mouth 2 (Two) Times a Day As Needed for Muscle Spasms. (Patient not taking: Reported on 4/24/2023)          docusate sodium (COLACE) 100 MG capsule Take 1 capsule by mouth 2 (Two) Times a Day.          Enoxaparin Sodium (LOVENOX) 40 MG/0.4ML solution prefilled syringe syringe Inject 40 mg under the skin into the appropriate area as directed Daily.          gabapentin (NEURONTIN) 300 MG capsule Take 1 capsule by mouth 4 (Four) Times a Day. 12 capsule 0      guaiFENesin (MUCINEX) 600 MG 12 hr tablet Take 1 tablet by mouth 2 (Two) Times a Day.          hydrALAZINE (APRESOLINE) 50 MG tablet Take 1 tablet by mouth Every 8 (Eight) Hours As Needed.          levalbuterol (XOPENEX) 0.31 MG/3ML nebulizer solution Take 1 ampule by nebulization Every 8 (Eight) Hours As Needed for Wheezing.          lidocaine (LIDODERM) 5 % Place 1 patch on the skin as directed by provider Daily. Remove & Discard patch within 12 hours or as directed by MD          linaclotide (LINZESS) 145 MCG capsule capsule Take 1 capsule by mouth Daily.          lisinopril (PRINIVIL,ZESTRIL) 10 MG tablet Take 1 tablet by mouth Daily.          NIFEdipine XL  (PROCARDIA XL) 60 MG 24 hr tablet Take 1 tablet by mouth Daily.          ondansetron (ZOFRAN) 4 MG tablet Take 1 tablet by mouth Every 6 (Six) Hours As Needed for Nausea or Vomiting.          oxyCODONE-acetaminophen (PERCOCET) 5-325 MG per tablet Take 1 tablet by mouth Every 6 (Six) Hours As Needed for Moderate Pain. 12 tablet 0      pantoprazole (PROTONIX) 40 MG EC tablet Take 1 tablet by mouth 2 (Two) Times a Day Before Meals.          polyethylene glycol (MIRALAX) packet Take 17 g by mouth Daily As Needed.          simethicone (MYLICON) 80 MG chewable tablet Chew 1 tablet Every 4 (Four) Hours As Needed for Flatulence.          sucralfate (CARAFATE) 1 GM/10ML suspension Take 10 mL by mouth 4 (Four) Times a Day Before Meals & at Bedtime.          vitamin B-12 (CYANOCOBALAMIN) 1000 MCG tablet Take 1 tablet by mouth Daily.          vitamin D (ERGOCALCIFEROL) 1.25 MG (71187 UT) capsule capsule Take 1 capsule by mouth 3 (Three) Times a Week. Give one time a day every Mon, Wed, Fri                  Current Meds:     Current Medications      Current Facility-Administered Medications:     heparin (porcine) 5000 UNIT/ML injection 5,000 Units, 5,000 Units, Subcutaneous, Q12H, Witner White MD, 5,000 Units at 01/26/25 0945    HYDROmorphone (DILAUDID) injection 1 mg, 1 mg, Intravenous, Q4H PRN, 1 mg at 01/25/25 2346 **AND** naloxone (NARCAN) injection 0.4 mg, 0.4 mg, Intravenous, Q5 Min PRN, Winter White MD    nitroglycerin (NITROSTAT) SL tablet 0.4 mg, 0.4 mg, Sublingual, Q5 Min PRN, Winter White MD    ondansetron ODT (ZOFRAN-ODT) disintegrating tablet 4 mg, 4 mg, Oral, Q6H PRN **OR** ondansetron (ZOFRAN) injection 4 mg, 4 mg, Intravenous, Q6H PRN, Winter White MD, 4 mg at 01/25/25 2346    pantoprazole (PROTONIX) EC tablet 40 mg, 40 mg, Oral, Q AM, Winter White MD, 40 mg at 01/26/25 0642    Pharmacy to Dose Zosyn, , Not Applicable, Continuous PRN, Winter White MD    piperacillin-tazobactam (ZOSYN) 3.375 g  IVPB in 100 mL NS MBP (CD), 3.375 g, Intravenous, Q8H, Winter White MD, 3.375 g at 01/26/25 0337    [COMPLETED] Insert Peripheral IV, , , Once **AND** sodium chloride 0.9 % flush 10 mL, 10 mL, Intravenous, PRN, Pili, Joselin, APRN    sodium chloride 0.9 % flush 10 mL, 10 mL, Intravenous, Q12H, Winter White MD, 10 mL at 01/25/25 2333    sodium chloride 0.9 % flush 10 mL, 10 mL, Intravenous, PRN, Winter White MD    sodium chloride 0.9 % infusion 40 mL, 40 mL, Intravenous, PRN, Winter White MD    sodium chloride 0.9 % infusion, 100 mL/hr, Intravenous, Continuous, Winter White MD, Last Rate: 100 mL/hr at 01/26/25 0953, 100 mL/hr at 01/26/25 0953        Allergies:  Allergies        Allergies   Allergen Reactions    Meloxicam Unknown - Low Severity            Vital Signs  Temp:  [97.6 °F (36.4 °C)-98.2 °F (36.8 °C)] 97.6 °F (36.4 °C)  Heart Rate:  [60-87] 60  Resp:  [16-18] 18  BP: (112-148)/(57-91) 131/57  Body mass index is 24.24 kg/m².     Intake/Output Summary (Last 24 hours) at 1/26/2025 1018  Last data filed at 1/26/2025 0655      Gross per 24 hour   Intake 1700 ml   Output 750 ml   Net 950 ml      No intake/output data recorded.     Review of Systems      Constitution:  negative for chills, fatigue and fevers  ENT:   negative for sore throat and voice change  Respiratory: negative for  cough and shortness of air  Cardiovascular:  Negative for chest pain or palpitations  Gastrointestinal:  negative for  See HPI  Endocrine: negative for   weight loss, unintended              Objective  Physical Exam:     General Appearance: Alert, cooperative, in no acute distress  Eyes:            No icterus  Lungs:         Clear to auscultation, respiration regular, even, and unlabored  Heart::          No murmur  Abdomen:    Normal bowel sounds, no masses, soft, non tender, non distended, no guarding  Extremities: no edema  Psychiatric: Judgement and insight: normal                       Orientation to person,  place, and time: normal                       Mood and affect: normal                 Results Review:               I have reviewed all of the patients current test results        Results from last 7 days   Lab Units 01/26/25  0437 01/25/25  1729   WBC 10*3/mm3 11.04* 16.21*   HEMOGLOBIN g/dL 11.2* 13.2   HEMATOCRIT % 36.0* 41.2   PLATELETS 10*3/mm3 139* 169               Results from last 7 days   Lab Units 01/26/25  0437 01/25/25  1835   SODIUM mmol/L 142 140   POTASSIUM mmol/L 4.6 5.1   CHLORIDE mmol/L 109* 103   CO2 mmol/L 26.0 24.0   BUN mg/dL 29* 30*   CREATININE mg/dL 1.57* 2.05*   CALCIUM mg/dL 8.6 9.1   BILIRUBIN mg/dL 1.8* 3.0*   ALK PHOS U/L 139* 183*   ALT (SGPT) U/L 311* 486*   AST (SGOT) U/L 239* 506*   GLUCOSE mg/dL 88 102*                      Lab Results   Lab Value Date/Time     LIPASE 1,574 (H) 01/26/2025 0437     LIPASE 2,696 (H) 01/25/2025 1835     LIPASE 34 02/08/2023 1459     LIPASE 42 02/08/2023 1100         Radiology:     Imaging Results (Last 24 Hours)         Procedure Component Value Units Date/Time     US Gallbladder [[id number]] Collected: 01/25/25 2113       Updated: 01/25/25 2118     Narrative:       EXAMINATION: US GALLBLADDER-  1/25/2025 9:13 PM     REASON FOR EXAM: elevated liver enzymes, elevated bilirubin, r/o biliary  obstruction; J01.00-Acute maxillary sinusitis, unspecified; K85.90-Acute  pancreatitis without necrosis or infection, unspecified; N17.9-Acute  kidney failure, unspecified; N39.0-Urinary tract infection, site not  specified     COMPARISON: 10/19/2020     TECHNIQUE: Multiple longitudinal and transverse real time sonographic  images of the right upper quadrant of the abdomen are obtained. Doppler  and grayscale images were provided. Images and report are stored per  institutional and state regulations.     FINDINGS:    PANCREAS: Obscured by bowel gas.     LIVER: Normal in size, echogenicity and echotexture. No focal lesion. No  definite intrahepatic ductal dilation.      GALLBLADDER: There is a 1.2 cm shadowing stone in the lumen of the  gallbladder. No pericholecystic fluid or gallbladder wall thickening.     BILE DUCTS: The CBD measures 0.3 cm in diameter. There is no  intrahepatic or extrahepatic ductal dilation.     OTHER: The visualized portion of the RIGHT kidney has a normal  appearance. The aorta is obscured by bowel gas.           Impression:          1.  Exam limited by overlying bowel gas. Pancreas and aorta are poorly  visualized.     2.  There is a 1.2 cm stone in the lumen of the gallbladder. The common  bile duct is normal in caliber. No intrahepatic ductal dilation  identified.     This report was signed and finalized on 1/25/2025 9:15 PM by Dr. Jacky Hall MD.        CT Cervical Spine Without Contrast [[id number]] Collected: 01/25/25 1819       Updated: 01/25/25 1827     Narrative:       EXAMINATION: CT CERVICAL SPINE WO CONTRAST-  1/25/2025 6:19 PM     HISTORY: posterior neck pain     COMPARISON: 11/19/2023     DLP: 1031.66 mGy.cm     TECHNIQUE: Serial helical tomographic images of the cervical spine were  obtained without the use of intravenous contrast. Additionally, sagittal  and coronal reformatted images were also provided for review.  In order  to have a CT radiation dose as low as reasonably achievable, Automated  Exposure Control was utilized for adjustment of the mA and/or KV  according to patient size.     FINDINGS:     OSSEOUS: No definite acute fracture. There is evidence of previous  cervical fusion posteriorly at C4-C6. Posterior decompressive changes at  C4-C6 as well. No perihardware lucency in the posterior fusion screws.  No destructive bone lesion. I suspect partial bony fusion of the C3 and  C4 vertebral bodies and the posterior elements.     SKULL BASE: Arthritis at the C1-C2 articulation with the odontoid  process. No acute fracture in the visualized portion of the skull base.     ALIGNMENT: Straightening of the normal lordosis.      SURROUNDING SOFT TISSUES: Evaluation of the surrounding soft tissues  demonstrates calcified atherosclerotic vascular disease in the bilateral  carotid bifurcations.     UPPER THORAX: The visualized thorax demonstrates no acute abnormality.     LEVELS:  C2-C3: Facet arthropathy noted in the RIGHT C2-C3 facet. There is  moderate RIGHT foraminal narrowing. No high-grade thecal sac narrowing.     C3-C4: Partial fusion of the vertebral bodies and the posterior elements  at C3-C4 with posterior disc osteophyte complex noted indenting the  ventral aspect of the CSF space, likely contributing to at least a mild  thecal sac narrowing. At the C4 level there is posterior decompression.  There is moderate LEFT and RIGHT foraminal narrowing.     C4-C5: Fusion changes at this level. Posterior disc osteophyte complex  indents the ventral aspect of the thecal sac. Posterior decompressive  changes are noted. This is similar to the exam from 11/19/2023. Moderate  bilateral foraminal narrowing.     C5-C6: Fusion changes at this level with marginal endplate osteophytes.  Moderate RIGHT and severe LEFT foraminal narrowing. Posterior  decompressive changes.     C6-C7: Posterior decompressive changes at C6. Diffuse disc space height  loss with vacuum disc phenomenon and marginal endplate osteophytes.  Suspect at moderate thecal sac stenosis at the caudal aspect of this  disc space. There is moderate bilateral foraminal narrowing, LEFT  greater than RIGHT.     C7-T1: Anterior end plate osteophyte formation. At least mild LEFT  foraminal narrowing. No high-grade thecal sac narrowing.           Impression:          1.  Multilevel degenerative changes and fusion changes as discussed  above. No definite acute osseous abnormality on this exam.     2.  Atherosclerotic vascular disease in the carotid bifurcations  bilaterally.      This report was signed and finalized on 1/25/2025 6:24 PM by Dr. Jacky Hall MD.        CT Head Without  Contrast [[id number]] Collected: 01/25/25 1814       Updated: 01/25/25 1820     Narrative:       EXAMINATION: CT HEAD WO CONTRAST-  1/25/2025 6:14 PM     HISTORY: dizziness     neck pain     COMPARISON: 2/1/2022     DLP: 1031.66 mGy.cm     In order to have a CT radiation dose as low as reasonably achievable,  Automated Exposure Control was utilized for adjustment of the mA and/or  KV according to patient size.     TECHNIQUE: Serial axial tomographic images of the brain were obtained  without the use of intravenous contrast.  Coronal and sagittal  reformatted images were obtained reviewed as well.     FINDINGS:  Moderate ventricular dilation. Low density in the subcortical and  periventricular white matter, similar to the exam from 2/1/2022.  Vascular calcifications in the intracranial vasculature. No acute  hemorrhage. No mass effect or midline shift. Cavum septum pellucidum et  vergae. The structures of the posterior fossa are unremarkable. 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     The included orbits and their contents are unremarkable. Opacification  of the bilateral maxillary sinuses. The visualized osseous structures  and overlying soft tissues of the skull and face are unremarkable.           Impression:          1.  Moderate ventricular dilation again seen with low-attenuation in the  subcortical and periventricular white matter may be related to chronic  microvascular change or could be related to normal pressure  hydrocephalus. Overall, this is very similar to the exam from 2/1/2022.     2.  Opacification of the bilateral maxillary sinuses, raising concern  for maxillary sinusitis.     This report was signed and finalized on 1/25/2025 6:17 PM by Dr. Jacky Hall MD.                         Assessment & Plan    Pancreatitis, acute        Impression/Plan     Elevated liver enzymes  Hyperbilirubinemia  Elevated lipase     Lipase yesterday was 2696, this has trended down today  to 1574.  Ultrasound performed with poorly visualized pancreas.  There was a 1.2 cm stone in the lumen of the gallbladder per ultrasound.  Upon my exam I pressed firmly on upper abdomen which did not result in any pain or discomfort from patient.  I have entered a referral for surgery consult for their input.  Bilirubin has trended down unsure if he was passing a stone.  I recommend patient remain n.p.o. for now until after surgery evaluates.  Could consider advancing diet after their assessment/recommendation.     Dr. Kirkpatrick takes over GI services as of 7 AM Monday morning.     Electronically signed by HARISH Gallardo, 01/26/25, 10:18 AM CST.            HARISH Gallardo  01/26/25  10:18 CST                        Cosigned by: Chris Payne DO at 01/26/25 10              Vinicio Cain MD   Physician  Surgery     Consults     Signed     Date of Service: 01/26/25 1516  Creation Time: 01/26/25 1516  Consult Orders   Inpatient General Surgery Consult [[id number]] ordered by Arie Becerra APRN at 01/26/25 0953          Signed       Expand All Collapse [facility] General Surgery Consult History and Physical  Jose Alfredo Kirkpatrick  MRN: [id number]  Age: 89 y.o. male   YOB: 1935  Admit Date: 1/25/2025   Admitting Physician: Irma Lorenzo MD        Reason for Consult   - c/f gallstone pancreatitis     SUBJECTIVE  History of Presenting Illness   Patient is a 89 y.o. male who was brought in from [facility] on 1/25/2025 with concerns for abdominal pain, generalized weakness, dizziness clarified as lightheadedness.  Had no evidence of nausea, vomiting, diarrhea, no fever or chills.  Workup concerning for elevated lipase (2696) consistent with pancreatitis T. bili of 3.0 with elevated LFTs, lactate of 2.1, and a leukocytosis of 16.2 with 82.7% PMNs.  UA consistent with UTI and he was started on antibiotics.  Ultrasound demonstrated a gall  stone and wound of the gallbladder, but CBD appears normal in diameter and there appears to be no intrahepatic biliary ductal dilation.     This morning, he feels much better, has no abdominal pain, labs are improved, WBC is now 11.0, lipase is 1574, and T. bili is 1.8; was seen by gastroenterology, who believe he likely passed a choledocholith, resulting in improved symptoms and labs.  I was consulted for potential surgical management of the gallbladder.     Past Medical History      Past Medical History:  Medical History[]Expand by Default        Past Medical History:   Diagnosis Date    Arthritis      Cataract       BILATERAL    GERD (gastroesophageal reflux disease)      History of transfusion      Hx of colonic polyps      Hypertension      Reflux esophagitis      Streptococcosis       IN SPINE FROM BACK INJECTIONS            PCP: Otilio Ramos MD     Past Surgical History:  Surgical History         Past Surgical History:   Procedure Laterality Date    BACK SURGERY         cervical    CATARACT EXTRACTION Bilateral      COLONOSCOPY   02/11/2013     Hyperplastic polyp at 25 cm, Diverticulosis repeat exam in 5 years    COLONOSCOPY N/A 10/16/2018     Tubular adenoma ascending colon repet prn    ENDOSCOPY   01/15/2014     HH    ENDOSCOPY N/A 10/03/2019     Large HH, non-bleeding gastric ulcer    ENDOSCOPY N/A 11/25/2019     Procedure: ESOPHAGOGASTRODUODENOSCOPY WITH ANESTHESIA;  Surgeon: Marques Pérez MD;  Location: [facility] ENDOSCOPY;  Service: Gastroenterology    ENDOSCOPY N/A 2/9/2023     Procedure: ESOPHAGOGASTRODUODENOSCOPY WITH ANESTHESIA;  Surgeon: Sushant Edmond MD;  Location: [facility] ENDOSCOPY;  Service: Gastroenterology;  Laterality: N/A;  pre GI bleed  post hiatal hernia; esophagitis  Otilio Ramos MD    HERNIA REPAIR Right       INGUINAL     HIP FRACTURE SURGERY Right 02/04/2023    REPLACEMENT TOTAL KNEE BILATERAL        THORACIC LAMINECTOMY DECOMPRESSIVE POSTERIOR N/A 12/14/2016      Procedure: LAMINECTOMY DECOMPRESSION, UNINSTRUMENTED POSTERIOR SPINAL FUSION, T 11-12;  Surgeon: LUZ Adan MD;  Location: [facility] OR;  Service:     VENA CAVA FILTER INSERTION N/A 10/04/2019     Procedure: VENA CAVA FILTER INSERTION;  Surgeon: Dallin Coronado MD;  Location:  [facility] 12;  Service: Vascular            Family History:        Family History   Problem Relation Age of Onset    Colon cancer Father      Colon polyps Neg Hx           Social History:  Social History            Tobacco Use    Smoking status: Never       Passive exposure: Never    Smokeless tobacco: Never   Substance Use Topics    Alcohol use: No         Allergies:  Allergies        Allergies   Allergen Reactions    Meloxicam Unknown - Low Severity            Medications:  Prescriptions Prior to Admission           Medications Prior to Admission   Medication Sig Dispense Refill Last Dose/Taking    acetaminophen (TYLENOL) 325 MG tablet Take 2 tablets by mouth 2 (Two) Times a Day.          bisacodyl (DULCOLAX) 10 MG suppository Insert 1 suppository into the rectum Daily As Needed for Constipation. (Patient not taking: Reported on 4/24/2023)          busPIRone (BUSPAR) 5 MG tablet Take 1 tablet by mouth 2 (Two) Times a Day.          cyclobenzaprine (FLEXERIL) 5 MG tablet Take 1 tablet by mouth 2 (Two) Times a Day As Needed for Muscle Spasms. (Patient not taking: Reported on 4/24/2023)          docusate sodium (COLACE) 100 MG capsule Take 1 capsule by mouth 2 (Two) Times a Day.          Enoxaparin Sodium (LOVENOX) 40 MG/0.4ML solution prefilled syringe syringe Inject 40 mg under the skin into the appropriate area as directed Daily.          gabapentin (NEURONTIN) 300 MG capsule Take 1 capsule by mouth 4 (Four) Times a Day. 12 capsule 0      guaiFENesin (MUCINEX) 600 MG 12 hr tablet Take 1 tablet by mouth 2 (Two) Times a Day.          hydrALAZINE (APRESOLINE) 50 MG tablet Take 1 tablet by mouth Every 8 (Eight) Hours As Needed.           levalbuterol (XOPENEX) 0.31 MG/3ML nebulizer solution Take 1 ampule by nebulization Every 8 (Eight) Hours As Needed for Wheezing.          lidocaine (LIDODERM) 5 % Place 1 patch on the skin as directed by provider Daily. Remove & Discard patch within 12 hours or as directed by MD          linaclotide (LINZESS) 145 MCG capsule capsule Take 1 capsule by mouth Daily.          lisinopril (PRINIVIL,ZESTRIL) 10 MG tablet Take 1 tablet by mouth Daily.          NIFEdipine XL (PROCARDIA XL) 60 MG 24 hr tablet Take 1 tablet by mouth Daily.          ondansetron (ZOFRAN) 4 MG tablet Take 1 tablet by mouth Every 6 (Six) Hours As Needed for Nausea or Vomiting.          oxyCODONE-acetaminophen (PERCOCET) 5-325 MG per tablet Take 1 tablet by mouth Every 6 (Six) Hours As Needed for Moderate Pain. 12 tablet 0      pantoprazole (PROTONIX) 40 MG EC tablet Take 1 tablet by mouth 2 (Two) Times a Day Before Meals.          polyethylene glycol (MIRALAX) packet Take 17 g by mouth Daily As Needed.          simethicone (MYLICON) 80 MG chewable tablet Chew 1 tablet Every 4 (Four) Hours As Needed for Flatulence.          sucralfate (CARAFATE) 1 GM/10ML suspension Take 10 mL by mouth 4 (Four) Times a Day Before Meals & at Bedtime.          vitamin B-12 (CYANOCOBALAMIN) 1000 MCG tablet Take 1 tablet by mouth Daily.          vitamin D (ERGOCALCIFEROL) 1.25 MG (81054 UT) capsule capsule Take 1 capsule by mouth 3 (Three) Times a Week. Give one time a day every Mon, Wed, Fri                     Review of Systems   Per HPI.        Physical Examination      Vitals          Vitals:     01/26/25 0415 01/26/25 0745 01/26/25 1125 01/26/25 1509   BP: 136/75 131/57 142/59     BP Location: Right arm Left arm Left arm     Patient Position: Lying Lying Lying     Pulse: 65 60 63 61   Resp: 16 18 18 18   Temp: 97.6 °F (36.4 °C) 97.6 °F (36.4 °C) 97.4 °F (36.3 °C) 98.1 °F (36.7 °C)   TempSrc: Oral Oral Oral Oral   SpO2: 97% 96% 95% 91%   Weight:          "  Height:                 Temp (24hrs), Av.8 °F (36.6 °C), Min:97.4 °F (36.3 °C), Max:98.2 °F (36.8 °C)     Systolic (36hrs), Av , Min:112 , Max:148    Diastolic (36hrs), Av, Min:57, Max:91     Estimated body mass index is 24.24 kg/m² as calculated from the following:    Height as of this encounter: 188 cm (74\").    Weight as of this encounter: 85.6 kg (188 lb 12.8 oz).  PREVIOUS WEIGHTS:       Wt Readings from Last 5 Encounters:   25 85.6 kg (188 lb 12.8 oz)   23 81.6 kg (180 lb)   23 83 kg (183 lb)   23 83 kg (183 lb)   23 83.4 kg (183 lb 12.8 oz)         Physical Examination:  Gen: awake, alert, sitting up in chair in no acute distress  HEENT: NCAT, EOMI, anicteric sclerae  CV: RRR, normotensive  Resp: nonlabored on room air, sats appropriate  Abd: soft, nontender, nondistended without surgical scar   MSK: moves all four, no c/c/e  Neuro: no focal deficits, cranial nerves grossly intact  Psy:  appropriate, cooperative        Data Review   Labs:  CBC        Results from last 7 days   Lab Units 25  0437 25  1729   WBC 10*3/mm3 11.04* 16.21*   HEMOGLOBIN g/dL 11.2* 13.2   HEMATOCRIT % 36.0* 41.2      BMP        Results from last 7 days   Lab Units 25  0437 25  1835   SODIUM mmol/L 142 140   CHLORIDE mmol/L 109* 103   CO2 mmol/L 26.0 24.0   BUN mg/dL 29* 30*   CREATININE mg/dL 1.57* 2.05*   GLUCOSE mg/dL 88 102*   CALCIUM mg/dL 8.6 9.1      LFTs        Results from last 7 days   Lab Units 25  0437 25  1835   ALT (SGPT) U/L 311* 486*   AST (SGOT) U/L 239* 506*   ALK PHOS U/L 139* 183*      Urine:  UA         Results from last 7 days   Lab Units 25  0437 25  1835   COLOR UA    --  Dark Yellow*  --    CLARITY UA    --  Cloudy*  --    PH, URINE    --  <=5.0  --    GLUCOSE UA    --  Negative  --    BILIRUBIN UA    --  Small (1+)*  --    BLOOD UA    --  Negative  --    EGFR mL/min/1.73 42.1*  --  30.6*   UROBILINOGEN " "UA    --  1.0 E.U./dL  --                Urine Culture   Date Value Ref Range Status   01/25/2025 Culture in progress   Preliminary      No results found for: \"VIRALCULTU\", \"WOUNDCX\"     Radiology Impressions:  US Gallbladder     Result Date: 1/25/2025   1.  Exam limited by overlying bowel gas. Pancreas and aorta are poorly visualized.  2.  There is a 1.2 cm stone in the lumen of the gallbladder. The common bile duct is normal in caliber. No intrahepatic ductal dilation identified.  This report was signed and finalized on 1/25/2025 9:15 PM by Dr. Jacky Hall MD.       CT Cervical Spine Without Contrast     Result Date: 1/25/2025   1.  Multilevel degenerative changes and fusion changes as discussed above. No definite acute osseous abnormality on this exam.  2.  Atherosclerotic vascular disease in the carotid bifurcations bilaterally.  This report was signed and finalized on 1/25/2025 6:24 PM by Dr. Jacky Hall MD.       CT Head Without Contrast     Result Date: 1/25/2025   1.  Moderate ventricular dilation again seen with low-attenuation in the subcortical and periventricular white matter may be related to chronic microvascular change or could be related to normal pressure hydrocephalus. Overall, this is very similar to the exam from 2/1/2022.  2.  Opacification of the bilateral maxillary sinuses, raising concern for maxillary sinusitis.  This report was signed and finalized on 1/25/2025 6:17 PM by Dr. Jacky Hall MD.       XR Spine Lumbar Complete 4+VW     Result Date: 12/31/2024  1. Stable appearance of the lumbar spine compared to CT 11/19/2023. Stable bony fusion at the L1/2 level with loss of the L1/2 disc space. Chronic loss of height of L1 and L2. Advanced degenerative changes as described above with no acute radiographic abnormality.     This report was signed and finalized on 12/31/2024 4:24 PM by Dr. Radhika Ross MD.           Hospital Problem List           Active Hospital Problems     " Diagnosis      **Pancreatitis, acute              Assessment/Plan   Jose Alfredo Kirkpatrick is a 89 y.o. male with likely gallstone pancreatitis, who also presented with concerns for UTI.  At this point, with his numbers are improving today, I do not feel there is a reason for MRCP or further workup-would tentatively plan on robotic cholecystectomy during this admission, as soon as his lipase and T. bili normalized.  If they fail to do so, or were to rise again, would order an MRCP-as this would be an indication the patient would likely need preoperative ERCP, then normalization of his numbers, then cholecystectomy.  I discussed the plan for surgery in great detail with the patient, and his daughter Lula who is at bedside.      -Daily labs-please obtain fractionated bilirubin with a.m. labs  -If lipase and T. bili normalize, will plan for robotic cholecystectomy prior to discharge from this admission.  -Continue antibiotics for UTI-at this point, I do not think the patient needs any further antibiotics for pancreatitis, as a new believe he has an acutely infected pancreatitis     Smoking cessation: Never smoker-no counseling indicated  BMI is within normal parameters. No other follow-up for BMI required.  Med rec complete: yes  VTE: VTE PPX is not indicated.     Time Spent: I spent 45 minutes caring for Jose Alfredo Kirkpatrick on this date of service. This time includes time, independent of any procedures, spent by me in the following activities: preparing for the clinic visit, reviewing tests, obtaining and/or reviewing a separately obtained history, performing a medically appropriate examination and/or evaluation, counseling and educating the patient/family/caregiver, ordering medications, tests, or procedures, and documenting information in the medical record.      Vinicio Cain MD  1/26/2025   15:16 Ronak Campbell MD   Physician  Surgery     Progress Notes     Signed     Date of Service: 01/27/25  "0857  Creation Time: 01/27/25 0857     Signed       Expand All Collapse All            GENERAL SURGERY INPATIENT PROGRESS NOTE     Patient Name:  Jose Alfredo Kirkpatrick  YOB: 1935  MRN: [id number]     SUBJECTIVE     No unexpected events overnight.  The patient reports that he is feeling a little bit better today.  His abdominal pain is improving.     Allergies:  Allergies        Allergies   Allergen Reactions    Meloxicam Unknown - Low Severity            Medications:    Scheduled Medication   busPIRone, 5 mg, Oral, BID With Meals  gabapentin, 300 mg, Oral, Nightly  heparin (porcine), 5,000 Units, Subcutaneous, Q12H  lisinopril, 10 mg, Oral, Daily  NIFEdipine XL, 60 mg, Oral, Daily  pantoprazole, 40 mg, Oral, Q AM  piperacillin-tazobactam, 3.375 g, Intravenous, Q8H  sodium chloride, 10 mL, Intravenous, Q12H      Pharmacy to Dose Zosyn,   sodium chloride, 100 mL/hr, Last Rate: 100 mL/hr (01/27/25 0606)           OBJECTIVE     VITAL SIGNS  /55 (BP Location: Right arm, Patient Position: Lying)   Pulse 62   Temp 97.8 °F (36.6 °C) (Oral)   Resp 16   Ht 188 cm (74\")   Wt 85.6 kg (188 lb 12.8 oz)   SpO2 90%   BMI 24.24 kg/m²      Intake/Output Summary (Last 24 hours) at 1/27/2025 0857  Last data filed at 1/27/2025 0737      Gross per 24 hour   Intake 600 ml   Output 2600 ml   Net -2000 ml         PHYSICAL EXAM     General: Pleasant elderly male, sitting up in bed, in no acute distress.  HEENT:  NCAT, PERRL, EOM intact bilaterally, hearing intact, nares patent, nasal cannula in place  Heart:  Regular rate, normotensive, no JVD bilaterally  Lungs:  Normal respiratory effort, regular respiratory rate, no wheezing  Abdomen: Protuberant  Extremities:  No cyanosis, clubbing or edema.   Musculoskeletal:  Normal development of bilateral upper extremities. Normal development of bilateral lower extremities.  Range of motion intact.  No evidence of trauma.  Skin:  No rashes, ecchymosis or jaundice. Dry and " non-diaphoretic. Skin color is consistent with ethnicity.     RESULTS     Labs:  I personally reviewed all pertinent labs.   Imaging:  I personally reviewed all pertinent imaging studies.   Pathology:          ASSESSMENT AND PLAN          Active Hospital Problems     Diagnosis      **Gallstone pancreatitis      Acute kidney injury      Elevated LFTs      Hyperbilirubinemia      Acute UTI (urinary tract infection)              DAILY CARE PLAN     Lipase, transaminases and total bilirubin are improving as are his symptoms.  The recommendations are for him to undergo cholecystectomy during this admission to prevent any future recurrences of gallstone pancreatitis.  His therapeutic Lovenox has been held this morning.  I will obtain a CT of the abdomen and pelvis for preoperative planning.  Should be able to resume his Coumadin tomorrow and bridge with therapeutic Lovenox.     I discussed the patient's findings and my recommendations with the patient/family, as well as the primary care team.     Ronak Roche MD, [facility]  General Surgery  1/27/2025  08:57 CST     Please note that portions of this note were completed with a voice recognition program.                  e/Time Temp Pulse Resp BP Patient Position Device (Oxygen Therapy) Flow (L/min) (Oxygen Therapy) SpO2   01/27/25 0737 97.8 (36.6) 62 16 119/55 Lying nasal cannula 1 90   01/27/25 0100 97.8 (36.6) 68 16 131/60 Lying room air -- 90   01/26/25 2013 98.4 (36.9) 63 18 161/65 Lying room air -- 92   01/26/25 1509 98.1 (36.7) 61 18 157/71 Lying room air -- 91   01/26/25 1125 97.4 (36.3) 63 18 142/59 Lying nasal cannula 1.5 95   01/26/25 0745 97.6 (36.4) 60 18 131/57 Lying nasal cannula 1.5 96   01/26/25 0415 97.6 (36.4) 65 16 136/75 Lying nasal cannula 2 97   01/25/25 2314 97.7 (36.5) 87 18 148/64 Sitting nasal cannula 2 97   01/25/25 2201 -- -- -- 148/64 -- -- -- --   01/25/25 21:26:57 --                Current Facility-Administered Medications   Medication Dose Route  Frequency Provider Last Rate Last Admin    busPIRone (BUSPAR) tablet 5 mg  5 mg Oral BID With Meals Irma Lorenzo MD   5 mg at 01/26/25 1714    cyclobenzaprine (FLEXERIL) tablet 5 mg  5 mg Oral BID PRN Irma Lorenzo MD   5 mg at 01/27/25 0606    gabapentin (NEURONTIN) capsule 300 mg  300 mg Oral Nightly Irma Lorenzo MD   300 mg at 01/26/25 2009    heparin (porcine) 5000 UNIT/ML injection 5,000 Units  5,000 Units Subcutaneous Q12H Winter White MD   5,000 Units at 01/26/25 2009    lisinopril (PRINIVIL,ZESTRIL) tablet 10 mg  10 mg Oral Daily Irma Lorenzo MD   10 mg at 01/26/25 1714    naloxone (NARCAN) injection 0.4 mg  0.4 mg Intravenous Q5 Min PRN Winter White MD        NIFEdipine XL (PROCARDIA XL) 24 hr tablet 60 mg  60 mg Oral Daily Irma Lorenzo MD   60 mg at 01/26/25 1714    nitroglycerin (NITROSTAT) SL tablet 0.4 mg  0.4 mg Sublingual Q5 Min PRN Winter White MD        ondansetron ODT (ZOFRAN-ODT) disintegrating tablet 4 mg  4 mg Oral Q6H PRN Winter White MD        Or    ondansetron (ZOFRAN) injection 4 mg  4 mg Intravenous Q6H PRN Winter White MD   4 mg at 01/25/25 2346    pantoprazole (PROTONIX) EC tablet 40 mg  40 mg Oral Q AM Winter White MD   40 mg at 01/27/25 0558    Pharmacy to Dose Zosyn   Not Applicable Continuous PRN Winter White MD        piperacillin-tazobactam (ZOSYN) 3.375 g IVPB in 100 mL NS MBP (CD)  3.375 g Intravenous Q8H Winter White MD   3.375 g at 01/27/25 0252    sodium chloride 0.9 % flush 10 mL  10 mL Intravenous PRN Irma Lorenzo MD        sodium chloride 0.9 % flush 10 mL  10 mL Intravenous Q12H Winter White MD   10 mL at 01/26/25 2019    sodium chloride 0.9 % infusion 40 mL  40 mL Intravenous PRN Winter White MD        sodium chloride 0.9 % infusion  100 mL/hr Intravenous Continuous Irma Lorenzo  mL/hr at 01/27/25 0606 100 mL/hr at 01/27/25 0606

## 2025-01-27 NOTE — ANESTHESIA PROCEDURE NOTES
Airway  Urgency: elective    Date/Time: 1/27/2025 12:43 PM  Airway not difficult    General Information and Staff    Patient location during procedure: OR  CRNA/CAA: Johnnie Coles CRNA    Indications and Patient Condition  Indications for airway management: airway protection    Preoxygenated: yes  Mask difficulty assessment: 0 - not attempted    Final Airway Details  Final airway type: endotracheal airway      Successful airway: ETT  Cuffed: yes   Successful intubation technique: direct laryngoscopy  Facilitating devices/methods: cricoid pressure  Blade: Kyler  Blade size: 4  ETT size (mm): 7.5  Cormack-Lehane Classification: grade I - full view of glottis  Placement verified by: chest auscultation and capnometry   Cuff volume (mL): 8  Measured from: teeth  ETT/EBT  to teeth (cm): 22  Number of attempts at approach: 1  Assessment: lips, teeth, and gum same as pre-op and atraumatic intubation

## 2025-01-27 NOTE — THERAPY EVALUATION
Patient Name: Jose Alfredo Kirkpatrick  : 1935    MRN: [id number]                              Today's Date: 2025       Admit Date: 2025    Visit Dx:     ICD-10-CM ICD-9-CM   1. Acute pancreatitis, unspecified complication status, unspecified pancreatitis type  K85.90 577.0   2. Acute non-recurrent maxillary sinusitis  J01.00 461.0   3. ISHA (acute kidney injury)  N17.9 584.9   4. Acute UTI (urinary tract infection)  N39.0 599.0   5. Elevated liver enzymes  R74.8 790.5   6. Elevated troponin level  R79.89 790.6   7. Gallstone pancreatitis  K85.10 577.0     574.20   8. Impaired mobility [Z74.09]  Z74.09 799.89     Patient Active Problem List   Diagnosis    Spinal stenosis, lumbar    Hx of colonic polyps    Drug-induced constipation    Primary hypertension    Acute pulmonary embolism with acute cor pulmonale    Acute bilateral deep vein thrombosis (DVT) of femoral veins    History of DVT of left lower extremity    Positive fecal occult blood test    Melena    Vitamin B12 deficiency    FARZANA (iron deficiency anemia)    Cholelithiasis    Gastric ulcer with hemorrhage    Chronic bilateral low back pain without sciatica    Chronic pain of both shoulders    Kyphosis    Overweight (BMI 25.0-29.9)    Non-smoker    Pneumonia of left lower lobe due to infectious organism    Erosive esophagitis    Large hiatal hernia    Weight loss    Paraesophageal hiatal hernia    Severe malnutrition    E. coli UTI, present on admission (urinary tract infection)    Gallstone pancreatitis    Acute kidney injury    Elevated LFTs    Hyperbilirubinemia    Acute UTI (urinary tract infection)     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     Past Surgical History:   Procedure Laterality Date    BACK SURGERY       cervical    CATARACT EXTRACTION Bilateral     COLONOSCOPY  02/11/2013    Hyperplastic polyp at 25 cm, Diverticulosis repeat exam in 5 years    COLONOSCOPY N/A 10/16/2018    Tubular adenoma ascending colon repet prn    ENDOSCOPY  01/15/2014    HH    ENDOSCOPY N/A 10/03/2019    Large HH, non-bleeding gastric ulcer    ENDOSCOPY N/A 11/25/2019    Procedure: ESOPHAGOGASTRODUODENOSCOPY WITH ANESTHESIA;  Surgeon: Marques Pérez MD;  Location: [facility] ENDOSCOPY;  Service: Gastroenterology    ENDOSCOPY N/A 2/9/2023    Procedure: ESOPHAGOGASTRODUODENOSCOPY WITH ANESTHESIA;  Surgeon: Sushant Edmond MD;  Location: [facility] ENDOSCOPY;  Service: Gastroenterology;  Laterality: N/A;  pre GI bleed  post hiatal hernia; esophagitis  Otilio Ramos MD    HERNIA REPAIR Right     INGUINAL     HIP FRACTURE SURGERY Right 02/04/2023    REPLACEMENT TOTAL KNEE BILATERAL      THORACIC LAMINECTOMY DECOMPRESSIVE POSTERIOR N/A 12/14/2016    Procedure: LAMINECTOMY DECOMPRESSION, UNINSTRUMENTED POSTERIOR SPINAL FUSION, T 11-12;  Surgeon: LUZ Adan MD;  Location: [facility] OR;  Service:     VENA CAVA FILTER INSERTION N/A 10/04/2019    Procedure: VENA CAVA FILTER INSERTION;  Surgeon: Dallin Coronado MD;  Location:  [facility] OR 12;  Service: Vascular      General Information       Row Name 01/27/25 0811          Physical Therapy Time and Intention    Document Type evaluation  presents with abdominal pain and weakness Dx; pancreatitis and ISHA, H/o htn, GERD  -AJ     Mode of Treatment physical therapy  -       Row Name 01/27/25 0811          General Information    Patient Profile Reviewed yes  -AJ     Prior Level of Function independent:;transfer;gait;community mobility;max assist:;dependent:;home management;all household mobility;ADL's  assist from [facility] staff  -AJ     Existing Precautions/Restrictions fall;oxygen therapy device and L/min;other (see comments)  1L donned upon arrival but does not wear O2 at baseline  -AJ      Barriers to Rehab medically complex;previous functional deficit  -       Row Name 01/27/25 0811          Living Environment    People in Home facility resident  -       Row Name 01/27/25 0811          Home Main Entrance    Number of Stairs, Main Entrance none  -       Row Name 01/27/25 0811          Cognition    Orientation Status (Cognition) oriented to;person;place;situation;disoriented to;time;verbal cues/prompts needed for orientation  -       Row Name 01/27/25 0811          Safety Issues/Impairments Affecting Functional Mobility    Impairments Affecting Function (Mobility) balance;strength;pain;endurance/activity tolerance;range of motion (ROM)  -               User Key  (r) = Recorded By, (t) = Taken By, (c) = Cosigned By      Initials Name Provider Type    Elias Estrella PT DPT Physical Therapist                   Mobility       Row Name 01/27/25 0811          Bed Mobility    Bed Mobility rolling left;supine-sit  -     Supine-Sit [place] (Bed Mobility) minimum assist (75% patient effort)  -     Assistive Device (Bed Mobility) head of bed elevated;bed rails  -       Row Name 01/27/25 0811          Bed-Chair Transfer    Bed-Chair [place] (Transfers) minimum assist (75% patient effort);1 person assist  -     Assistive Device (Bed-Chair Transfers) walker, front-wheeled  -       Row Name 01/27/25 0811          Sit-Stand Transfer    Sit-Stand [place] (Transfers) minimum assist (75% patient effort);1 person assist  -     Assistive Device (Sit-Stand Transfers) walker, front-wheeled  -       Row Name 01/27/25 0811          Gait/Stairs (Locomotion)    [place] Level (Gait) minimum assist (75% patient effort);1 person assist;1 person to manage equipment  -     Assistive Device (Gait) walker, front-wheeled  -ROLAND     Distance in Feet (Gait) 12  -AJ     Deviations/Abnormal Patterns (Gait) bilateral deviations;base of support, narrow;gait speed decreased  -     Bilateral Gait  Deviations forward flexed posture  -               User Key  (r) = Recorded By, (t) = Taken By, (c) = Cosigned By      Initials Name Provider Type    Elias Estrella PT DPMARC Physical Therapist                   Obj/Interventions       Row Name 01/27/25 0811          Range of Motion Comprehensive    General Range of Motion bilateral lower extremity ROM WFL  -       Row Name 01/27/25 0811          Strength Comprehensive (MMT)    General Manual Muscle Testing (MMT) Assessment lower extremity strength deficits identified  -     Comment, General Manual Muscle Testing (MMT) Assessment L hip flexion 4-/5, R hip flexion 4/5, B knee ext 4/5, R knee flexion 4/5, L knee flex 4-/5  -       Row Name 01/27/25 0811          Balance    Balance Assessment sitting static balance;sitting dynamic balance;standing static balance;standing dynamic balance  -     Static Sitting Balance minimal assist  -     Dynamic Sitting Balance minimal assist;moderate assist  -     Position, Sitting Balance supported;sitting edge of bed  -     Static Standing Balance minimal assist  -     Dynamic Standing Balance minimal assist  -     Position/Device Used, Standing Balance supported;walker, rolling  -     Balance Interventions sitting;standing;sit to stand;supported;static;dynamic  -     Comment, Balance pt demo mod posterior lean while EOB  -       Row Name 01/27/25 0811          Sensory Assessment (Somatosensory)    Sensory Assessment (Somatosensory) sensation intact  -               User Key  (r) = Recorded By, (t) = Taken By, (c) = Cosigned By      Initials Name Provider Type    Elias Estrella PT DPT Physical Therapist                   Goals/Plan       Row Name 01/27/25 0811          Bed Mobility Goal 1 (PT)    Activity/Assistive Device (Bed Mobility Goal 1, PT) bed mobility activities, all;rolling to left;rolling to right;supine to sit;sit to supine  -     [place] Level/Cues Needed (Bed Mobility Goal 1, PT)  supervision required  -AJ     Time Frame (Bed Mobility Goal 1, PT) long term goal (LTG);10 days  -AJ     Progress/Outcomes (Bed Mobility Goal 1, PT) new goal  -       Row Name 01/27/25 0811          Transfer Goal 1 (PT)    Activity/Assistive Device (Transfer Goal 1, PT) sit-to-stand/stand-to-sit;bed-to-chair/chair-to-bed;toilet;walker, rolling  -AJ     [place] Level/Cues Needed (Transfer Goal 1, PT) standby assist  -AJ     Time Frame (Transfer Goal 1, PT) long term goal (LTG);10 days  -AJ     Progress/Outcome (Transfer Goal 1, PT) new goal  -       Row Name 01/27/25 0811          Gait Training Goal 1 (PT)    Activity/Assistive Device (Gait Training Goal 1, PT) gait (walking locomotion);decrease asymmetrical patterns;decrease fall risk;diminish gait deviation;forward stepping;improve balance and speed;increase endurance/gait distance;increase energy conservation;assistive device use;walker, rolling  -AJ     [place] Level (Gait Training Goal 1, PT) standby assist  -AJ     Distance (Gait Training Goal 1, PT) 50' with pain 2/10 or less  -AJ     Time Frame (Gait Training Goal 1, PT) long term goal (LTG);10 days  -AJ     Progress/Outcome (Gait Training Goal 1, PT) new goal  -       Row Name 01/27/25 0811          Balance Goal 1 (PT)    Activity/Assistive Device (Balance Goal) sitting static balance;sitting dynamic balance;sit to stand dynamic balance;standing static balance;standing dynamic balance;unsupported;with functional activities/occupations;with functional mobility activities;with functional reaching activities  -AJ     [place] Level/Cues Needed (Balance Goal 1, PT) supervision required  -AJ     Time Frame (Balance Goal 1, PT) long-term goal (LTG);by discharge  -AJ     Progress/Outcomes (Balance Goal 1, PT) other (see comments)  new goal  -       Row Name 01/27/25 0811          Therapy Assessment/Plan (PT)    Planned Therapy Interventions (PT) balance training;bed mobility training;home  exercise program;gait training;postural re-education;patient/family education;transfer training;ROM (range of motion);strengthening;stretching  -AJ               User Key  (r) = Recorded By, (t) = Taken By, (c) = Cosigned By      Initials Name Provider Type    Elias Estrella, PT DPT Physical Therapist                   Clinical Impression       Row Name 01/27/25 0811          Pain    Pretreatment Pain Rating 4/10  -AJ     Posttreatment Pain Rating 4/10  -AJ     Pain Location abdomen;back  -AJ     Pain Side/Orientation generalized  -AJ     Pain Management Interventions nursing notified;exercise or physical activity utilized  -AJ     Response to Pain Interventions no change per patient report  -AJ       Row Name 01/27/25 0811          Plan of Care Review    Plan of Care Reviewed With patient  -AJ       Row Name 01/27/25 0811          Therapy Assessment/Plan (PT)    Rehab Potential (PT) fair  -AJ     Criteria for Skilled Interventions Met (PT) yes;meets criteria;skilled treatment is necessary  -AJ     Therapy Frequency (PT) 2 times/day  -AJ     Predicted Duration of Therapy Intervention (PT) until d/c  -AJ       Row Name 01/27/25 0811          Vital Signs    Pre Patient Position Supine  -AJ     Intra Patient Position Standing  -AJ     Post Patient Position Sitting  -AJ       Row Name 01/27/25 0811          Positioning and Restraints    Pre-Treatment Position in bed  -AJ     Post Treatment Position chair  -AJ     In Chair notified nsg;reclined;call light within reach;encouraged to call for assist  -AJ               User Key  (r) = Recorded By, (t) = Taken By, (c) = Cosigned By      Initials Name Provider Type    Elias Estrella, PT DPT Physical Therapist                   Outcome Measures       Row Name 01/27/25 0811          How much help from another person do you currently need...    Turning from your back to your side while in flat bed without using bedrails? 3  -AJ     Moving from lying on back to sitting on the  side of a flat bed without bedrails? 3  -AJ     Moving to and from a bed to a chair (including a wheelchair)? 3  -AJ     Standing up from a chair using your arms (e.g., wheelchair, bedside chair)? 3  -AJ     Climbing 3-5 steps with a railing? 2  -AJ     To walk in hospital room? 3  -AJ     AM-PAC 6 Clicks Score (PT) 17  -AJ     Highest Level of Mobility Goal 5 --> Static standing  -       Row Name 01/27/25 0834 01/27/25 0811       Functional Assessment    Outcome Measure Options AM-PAC 6 Clicks Daily Activity (OT) (P)   -CB AM-PAC 6 Clicks Basic Mobility (PT)  -              User Key  (r) = Recorded By, (t) = Taken By, (c) = Cosigned By      Initials Name Provider Type    Elias Estrella, PT DPT Physical Therapist    Salvador Venegas, OT Student OT Student                                 Physical Therapy Education       Title: PT OT SLP Therapies (In Progress)       Topic: Physical Therapy (Done)       Point: Mobility training (Done)       Learning Progress Summary            Patient Acceptance, E, VU by  at 1/27/2025 0945    Comment: role of PT, safety with AD, fall risk, d/c plans                      Point: Home exercise program (Done)       Learning Progress Summary            Patient Acceptance, E, VU by  at 1/27/2025 0945    Comment: role of PT, safety with AD, fall risk, d/c plans                      Point: Body mechanics (Done)       Learning Progress Summary            Patient Acceptance, E, VU by  at 1/27/2025 0945    Comment: role of PT, safety with AD, fall risk, d/c plans                      Point: Precautions (Done)       Learning Progress Summary            Patient Acceptance, E, VU by  at 1/27/2025 0945    Comment: role of PT, safety with AD, fall risk, d/c plans                                      User Key       Initials Effective Dates Name Provider Type Discipline    ROLAND 08/15/24 -  Elias Zaidi, PT DPT Physical Therapist PT                  PT Recommendation and Plan  Planned  "Therapy Interventions (PT): balance training, bed mobility training, home exercise program, gait training, postural re-education, patient/family education, transfer training, ROM (range of motion), strengthening, stretching  Outcome Evaluation: PT eval complete. Pt in fowlers position, AOx4, disoriented to time but corrected with verbal cues for month and verbalized understanding once prompted. Pt reports indep with t/f and ambulating short distance at intermediate he resides with use of upright FWW but required assist from staff for other household chores. pt brought self to EOB with Min A and demo posterior lean and required Min/Mod A to correct. Pt demo functional AROM in B LE and demo strength: L hip flexion 4-/5, R hip flexion 4/5, B knee ext 4/5, R knee flexion 4/5, L knee flex 4-/5. Pt performed sit<>stand with Min A and took 5 side steps EOB initially and around bed to recliner after sitting rest break, totaling 12' while utilizing FWW. Pt ambulated with narrow base of support and fwd flexed posture but pt attributed to \"hospital walked in unstable\" and felt he was not supported. Pt left in recliner with needs in reach and educated to call for assist. pt will benefit from skilled PT services to improve activity tolerance, decrease fall risk, gait safety to return to PLOF. Recommend SNF vs intermediate with HH when medically stable pending progress towards goals. PT will continue to follow and progress.     Time Calculation:         PT Charges       Row Name 01/27/25 0811             Time Calculation    Start Time 0827  -AJ      Stop Time 0900  +8 for chart review  -AJ      Time Calculation (min) 33 min  -AJ      PT Received On 01/27/25  -AJ      PT Goal Re-Cert Due Date 02/06/25  -AJ         Untimed Charges    PT Eval/Re-eval Minutes 41  -AJ         Total Minutes    Untimed Charges Total Minutes 41  -AJ       Total Minutes 41  -AJ                User Key  (r) = Recorded By, (t) = Taken By, (c) = Cosigned By      Initials Name " Provider Type    AJ Elias Zaidi, PT DPT Physical Therapist                  Therapy Charges for Today       Code Description Service Date Service Provider Modifiers Qty    [id number] HC PT EVAL MOD COMPLEXITY 3 1/27/2025 Elias Zaidi PT DPT GP 1            PT G-Codes  Outcome Measure Options: (P) AM-PAC 6 Clicks Daily Activity (OT)  AM-PAC 6 Clicks Score (PT): 17  AM-PAC 6 Clicks Score (OT): (P) 17  PT Discharge Summary  Anticipated Discharge Disposition (PT): skilled nursing facility, assisted living, other (see comments) (HH)    Elias Zaidi PT DPT  1/27/2025

## 2025-01-27 NOTE — THERAPY EVALUATION
Patient Name: Jose Alfredo Kirkpatrick  : 1935    MRN: [id number]                              Today's Date: 2025       Admit Date: 2025    Visit Dx:     ICD-10-CM ICD-9-CM   1. Acute pancreatitis, unspecified complication status, unspecified pancreatitis type  K85.90 577.0   2. Acute non-recurrent maxillary sinusitis  J01.00 461.0   3. ISHA (acute kidney injury)  N17.9 584.9   4. Acute UTI (urinary tract infection)  N39.0 599.0   5. Elevated liver enzymes  R74.8 790.5   6. Elevated troponin level  R79.89 790.6   7. Gallstone pancreatitis  K85.10 577.0     574.20   8. Impaired mobility [Z74.09]  Z74.09 799.89     Patient Active Problem List   Diagnosis    Spinal stenosis, lumbar    Hx of colonic polyps    Drug-induced constipation    Primary hypertension    Acute pulmonary embolism with acute cor pulmonale    Acute bilateral deep vein thrombosis (DVT) of femoral veins    History of DVT of left lower extremity    Positive fecal occult blood test    Melena    Vitamin B12 deficiency    FARZANA (iron deficiency anemia)    Cholelithiasis    Gastric ulcer with hemorrhage    Chronic bilateral low back pain without sciatica    Chronic pain of both shoulders    Kyphosis    Overweight (BMI 25.0-29.9)    Non-smoker    Pneumonia of left lower lobe due to infectious organism    Erosive esophagitis    Large hiatal hernia    Weight loss    Paraesophageal hiatal hernia    Severe malnutrition    E. coli UTI, present on admission (urinary tract infection)    Gallstone pancreatitis    Acute kidney injury    Elevated LFTs    Hyperbilirubinemia    Acute UTI (urinary tract infection)     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     Past Surgical History:   Procedure Laterality Date    BACK SURGERY       cervical    CATARACT EXTRACTION Bilateral     COLONOSCOPY  02/11/2013    Hyperplastic polyp at 25 cm, Diverticulosis repeat exam in 5 years    COLONOSCOPY N/A 10/16/2018    Tubular adenoma ascending colon repet prn    ENDOSCOPY  01/15/2014    HH    ENDOSCOPY N/A 10/03/2019    Large HH, non-bleeding gastric ulcer    ENDOSCOPY N/A 11/25/2019    Procedure: ESOPHAGOGASTRODUODENOSCOPY WITH ANESTHESIA;  Surgeon: Marques Pérez MD;  Location:  [facility];  Service: Gastroenterology    ENDOSCOPY N/A 2/9/2023    Procedure: ESOPHAGOGASTRODUODENOSCOPY WITH ANESTHESIA;  Surgeon: Sushant Edmond MD;  Location:  [facility];  Service: Gastroenterology;  Laterality: N/A;  pre GI bleed  post hiatal hernia; esophagitis  Otilio Ramos MD    HERNIA REPAIR Right     INGUINAL     HIP FRACTURE SURGERY Right 02/04/2023    REPLACEMENT TOTAL KNEE BILATERAL      THORACIC LAMINECTOMY DECOMPRESSIVE POSTERIOR N/A 12/14/2016    Procedure: LAMINECTOMY DECOMPRESSION, UNINSTRUMENTED POSTERIOR SPINAL FUSION, T 11-12;  Surgeon: LZU Adan MD;  Location: [facility] OR;  Service:     VENA CAVA FILTER INSERTION N/A 10/04/2019    Procedure: VENA CAVA FILTER INSERTION;  Surgeon: Dallin Coronado MD;  Location:  [facility];  Service: Vascular      General Information       Row Name 01/27/25 0834          OT Time and Intention    Subjective Information complains of;pain;dizziness  -DHARMESH (r) NORMAN (t) DHARMESH (c)     Document Type evaluation  cc: Generalized weakness and abdominal pain; dx: Gallstone pancreatitis; h/o: HTN, GERD, Arthritis  -DHARMESH (r) NORMAN (t) DHARMESH (c)     Mode of Treatment occupational therapy;co-treatment  -DHARMESH (r) NORMAN (t) DHARMESH (c)     Patient Effort good  -DHARMESH (r) NORMAN (t) DHARMESH (c)       Row Name 01/27/25 0834          General Information    Patient Profile Reviewed yes  -DHARMESH (r) NORMAN (t) DHARMESH (c)     Prior Level of Function ADL's;independent:;all household mobility;community mobility;transfer;bed mobility  -JW     Existing  Precautions/Restrictions fall;oxygen therapy device and L/min  -     Barriers to Rehab medically complex;previous functional deficit  -       Row Name 01/27/25 0834          Occupational Profile    Environmental Supports and Barriers (Occupational Profile) rwx 4 wheels, walk in shower, grab bars, shower chair,  -JW (r) CB (t) JW (c)       Row Name 01/27/25 0834          Living Environment    People in Home facility resident  -JW (r) CB (t) JW (c)       Row Name 01/27/25 34          Home Main Entrance    Number of Stairs, Main Entrance none  -JW (r) CB (t) JW (c)     Stair Railings, Main Entrance none  -JW (r) CB (t) JW (c)       Row Name 01/27/25 0834          Stairs Within Home, Primary    Number of Stairs, Within Home, Primary none  -JW (r) CB (t) JW (c)     Stair Railings, Within Home, Primary none  -JW (r) CB (t) JW (c)       Row Name 01/27/25 0834          Cognition    Orientation Status (Cognition) oriented x 4;verbal cues/prompts needed for orientation  -JW (r) CB (t) JW (c)       Row Name 01/27/25 0834          Safety Issues/Impairments Affecting Functional Mobility    Safety Issues Affecting Function (Mobility) safety precaution awareness;safety precautions follow-through/compliance;awareness of need for assistance;insight into deficits/self-awareness  -JW (r) CB (t) JW (c)     Impairments Affecting Function (Mobility) strength;balance;range of motion (ROM)  -JW (r) CB (t) JW (c)               User Key  (r) = Recorded By, (t) = Taken By, (c) = Cosigned By      Initials Name Provider Type     Radhika Cisneros, OTR/L, CSRS Occupational Therapist    Salvador Venegas, OT Student OT Student                     Mobility/ADL's       Row Name 01/27/25 0834          Bed Mobility    Bed Mobility rolling left;sidelying-sit  -JW (r) CB (t) JW (c)     Rolling Left [place] (Bed Mobility) modified independence;verbal cues  -JW (r) CB (t) JW (c)     Sidelying-Sit [place] (Bed Mobility) minimum assist  (75% patient effort);verbal cues  -JW (r) CB (t) JW (c)     Bed Mobility, Safety Issues decreased use of arms for pushing/pulling  -JW (r) CB (t) JW (c)       Row Name 01/27/25 0834          Transfers    Transfers sit-stand transfer;stand-sit transfer  -JW (r) CB (t) JW (c)       Row Name 01/27/25 0834          Sit-Stand Transfer    Sit-Stand [place] (Transfers) minimum assist (75% patient effort);verbal cues  -JW (r) CB (t) JW (c)     Assistive Device (Sit-Stand Transfers) walker, front-wheeled  -JW (r) CB (t) JW (c)       Row Name 01/27/25 0834          Stand-Sit Transfer    Stand-Sit [place] (Transfers) contact guard;verbal cues  -JW (r) CB (t) JW (c)     Assistive Device (Stand-Sit Transfers) walker, front-wheeled  -JW (r) CB (t) JW (c)       Row Name 01/27/25 08          Functional Mobility    Functional Mobility- Ind. Level contact guard assist;verbal cues required  -JW (r) CB (t) JW (c)     Functional Mobility- Safety Issues balance decreased during turns  -JW (r) CB (t) JW (c)       Row Name 01/27/25 0834          Activities of Daily Living    BADL Assessment/Intervention lower body dressing  -JW (r) CB (t) JW (c)       Row Name 01/27/25 0834          Lower Body Dressing Assessment/Training    [place] Level (Lower Body Dressing) don;socks;moderate assist (50% patient effort);verbal cues  -JW (r) CB (t) JW (c)     Position (Lower Body Dressing) edge of bed sitting  -JW (r) CB (t) JW (c)               User Key  (r) = Recorded By, (t) = Taken By, (c) = Cosigned By      Initials Name Provider Type    Radhika Dye, OTR/L, CSRS Occupational Therapist    Salvador Venegas, OT Student OT Student                   Obj/Interventions       Row Name 01/27/25 0834          Sensory Assessment (Somatosensory)    Sensory Assessment (Somatosensory) UE sensation intact  -JW (r) CB (t) JW (c)       Row Name 01/27/25 0834          Range of Motion Comprehensive    General Range of Motion upper  extremity range of motion deficits identified  -JW (r) CB (t) JW (c)     Comment, General Range of Motion BL shoulder flex 75%, R shoulder aBd 50%, all other BUE WFL  -JW (r) CB (t) JW (c)       Row Name 01/27/25 0834          Strength Comprehensive (MMT)    General Manual Muscle Testing (MMT) Assessment upper extremity strength deficits identified  -JW (r) CB (t) JW (c)     Comment, General Manual Muscle Testing (MMT) Assessment R shoulder flex 3/5, L shoulder flex 3+/5, BL biceps and triceps 4/5, BL  strength 3+/5  -JW (r) CB (t) JW (c)       Row Name 01/27/25 0834          Balance    Balance Assessment sitting static balance;sitting dynamic balance;sit to stand dynamic balance;standing static balance;standing dynamic balance  -JW (r) CB (t) JW (c)     Static Sitting Balance contact guard;minimal assist;verbal cues  -JW (r) CB (t) JW (c)     Dynamic Sitting Balance contact guard;minimal assist  -JW (r) CB (t) JW (c)     Position, Sitting Balance sitting edge of bed  -JW (r) CB (t) JW (c)     Sit to Stand Dynamic Balance minimal assist;verbal cues  -JW (r) CB (t) JW (c)     Static Standing Balance contact guard;verbal cues  -JW (r) CB (t) JW (c)     Dynamic Standing Balance contact guard;verbal cues  -JW (r) CB (t) JW (c)     Position/Device Used, Standing Balance walker, front-wheeled  -JW (r) CB (t) JW (c)               User Key  (r) = Recorded By, (t) = Taken By, (c) = Cosigned By      Initials Name Provider Type    Radhika Dye, OTR/L, CSRS Occupational Therapist    Salvador Venegas, OT Student OT Student                   Goals/Plan       Row Name 01/27/25 0834          Bed Mobility Goal 1 (OT)    Activity/Assistive Device (Bed Mobility Goal 1, OT) supine to sit  -JW (r) CB (t) JW (c)     [place] Level/Cues Needed (Bed Mobility Goal 1, OT) modified independence  -JW (r) NORMAN (t) DHARMESH (c)     Time Frame (Bed Mobility Goal 1, OT) long term goal (LTG);10 days  -DHARMESH (r) NORMAN (t) DHARMESH (c)      Progress/Outcomes (Bed Mobility Goal 1, OT) new goal  -JW (r) CB (t) JW (c)       Row Name 01/27/25 0834          Transfer Goal 1 (OT)    Activity/Assistive Device (Transfer Goal 1, OT) sit-to-stand/stand-to-sit;bed-to-chair/chair-to-bed;toilet;shower chair;walker, rolling  -JW (r) CB (t) JW (c)     [place] Level/Cues Needed (Transfer Goal 1, OT) modified independence  -JW (r) CB (t) JW (c)     Time Frame (Transfer Goal 1, OT) long term goal (LTG);10 days  -JW (r) CB (t) JW (c)     Progress/Outcome (Transfer Goal 1, OT) new goal  -JW (r) CB (t) JW (c)       Row Name 01/27/25 0834          Dressing Goal 1 (OT)    Activity/Device (Dressing Goal 1, OT) upper body dressing;lower body dressing  -JW (r) CB (t) JW (c)     [place]/Cues Needed (Dressing Goal 1, OT) modified independence  -JW (r) CB (t) JW (c)     Time Frame (Dressing Goal 1, OT) long term goal (LTG);10 days  -JW (r) CB (t) JW (c)     Progress/Outcome (Dressing Goal 1, OT) new goal  -JW (r) CB (t) JW (c)       Row Name 01/27/25 0834          Strength Goal 1 (OT)    Strength Goal 1 (OT) Pt will have 4/5 strength in all BUE to improve BADL performance.  -JW (r) CB (t) JW (c)     Time Frame (Strength Goal 1, OT) long term goal (LTG);10 days  -JW (r) CB (t) JW (c)     Progress/Outcome (Strength Goal 1, OT) new goal  -JW (r) CB (t) JW (c)               User Key  (r) = Recorded By, (t) = Taken By, (c) = Cosigned By      Initials Name Provider Type    Radhika Dye, OTR/L, CSRS Occupational Therapist    Salvador Venegas, OT Student OT Student                   Clinical Impression       Row Name 01/27/25 0834          Pain Assessment    Pain Location head  -JW (r) CB (t) JW (c)     Pain Side/Orientation generalized  -JW (r) CB (t) JW (c)     Pain Management Interventions activity modification encouraged;exercise or physical activity utilized;positioning techniques utilized  -JW (r) CB (t) JW (c)     Response to Pain Interventions activity  participation with tolerable pain  -DHARMESH (r) NORMAN (t) DHARMESH (c)       Row Name 01/27/25 0834          Plan of Care Review    Plan of Care Reviewed With patient  -DHARMESH (r) NORMAN (dom) DHARMESH trevino)     Progress no change  -DHARMESH (r) NORMAN (t) DHARMESH (c)     Outcome Evaluation OT eval complete. A&Ox4 with verbal cues. C/O headache. Pt in fowlers upon arrival. Rolling left was mod I using bed rails and HOB elevated with verbal cues. Sidelying-sit was min A with verbal cues. Static sitting balance EOB was CGA-min A with verbal cues to corrext backwards leaning. ROM test revealed deficits in BL shoulder flex 75% and R shoulder aBd 50%. MMT showed significant deficits in BL shoulder flex and  strength. BUE sensation intact. LBD donning socks was mod A with verbal cues while EOB. Sit>stand was min A with verbal cues using rwx.  Pt ambulated bed>chair with rwx with CGA and verbal cues to assess activity tolerance. Pt would benefit from OT to improve BADL performance, balance, strength, and activity tolerance. Recommend assisted living at d/c.  -DHARMESH (r) NORMAN (t) DHARMESH (c)       Row Name 01/27/25 0834          Therapy Assessment/Plan (OT)    Rehab Potential (OT) good  -DHARMESH (r) NORMAN (t) DHARMESH (c)     Criteria for Skilled Therapeutic Interventions Met (OT) yes;skilled treatment is necessary  -DHARMESH (r) NORMAN (t) DHARMESH (c)     Therapy Frequency (OT) 5 times/wk  -DHARMESH (r) NORMAN (t) DHARMESH (c)       Row Name 01/27/25 0834          Therapy Plan Review/Discharge Plan (OT)    Anticipated Discharge Disposition (OT) assisted living  -DHARMESH (r) NORMAN (t) DHARMESH (c)       Row Name 01/27/25 0834          Positioning and Restraints    Pre-Treatment Position in bed  -DHARMESH (r) NORMAN (t) DHARMESH (c)     Post Treatment Position chair  -DHARMESH (r) NORMAN (dom) DHARMESH (c)     In Chair reclined;call light within reach;encouraged to call for assist  -DHAMRESH (r) NORMAN (t) DHARMESH (c)               User Key  (r) = Recorded By, (t) = Taken By, (c) = Cosigned By      Initials Name Provider Type    Radhika Dye, OTR/L, CSRS Occupational  Therapist    Salvador Venegas, OT Student OT Student                   Outcome Measures       Row Name 01/27/25 0834          How much help from another is currently needed...    Putting on and taking off regular lower body clothing? 2  -JW (r) CB (t) JW (c)     Bathing (including washing, rinsing, and drying) 2  -JW (r) CB (t) JW (c)     Toileting (which includes using toilet bed pan or urinal) 3  -JW (r) CB (t) JW (c)     Putting on and taking off regular upper body clothing 3  -JW (r) CB (t) JW (c)     Taking care of personal grooming (such as brushing teeth) 3  -JW (r) CB (t) JW (c)     Eating meals 4  -JW (r) CB (t) JW (c)     AM-PAC 6 Clicks Score (OT) 17  -JW (r) CB (t)       Row Name 01/27/25 0811          How much help from another person do you currently need...    Turning from your back to your side while in flat bed without using bedrails? 3  -AJ     Moving from lying on back to sitting on the side of a flat bed without bedrails? 3  -AJ     Moving to and from a bed to a chair (including a wheelchair)? 3  -AJ     Standing up from a chair using your arms (e.g., wheelchair, bedside chair)? 3  -AJ     Climbing 3-5 steps with a railing? 2  -AJ     To walk in hospital room? 3  -AJ     AM-PAC 6 Clicks Score (PT) 17  -AJ     Highest Level of Mobility Goal 5 --> Static standing  -AJ       Row Name 01/27/25 0834 01/27/25 0811       Functional Assessment    Outcome Measure Options AM-PAC 6 Clicks Daily Activity (OT)  -JW (r) CB (t) JW (c) AM-PAC 6 Clicks Basic Mobility (PT)  -AJ              User Key  (r) = Recorded By, (t) = Taken By, (c) = Cosigned By      Initials Name Provider Type    Radhika Dye, OTR/L, CSRS Occupational Therapist    Elias Estrella, PT DPT Physical Therapist    Salvador Venegas, OT Student OT Student                    Occupational Therapy Education       Title: PT OT SLP Therapies (In Progress)       Topic: Occupational Therapy (In Progress)       Point: ADL training (Done)        Description:   Instruct learner(s) on proper safety adaptation and remediation techniques during self care or transfers.   Instruct in proper use of assistive devices.                  Learning Progress Summary            Patient Acceptance, E, VU by CB at 1/27/2025 0935    Comment: Role of OT                      Point: Home exercise program (Not Started)       Description:   Instruct learner(s) on appropriate technique for monitoring, assisting and/or progressing therapeutic exercises/activities.                  Learner Progress:  Not documented in this visit.              Point: Precautions (Done)       Description:   Instruct learner(s) on prescribed precautions during self-care and functional transfers.                  Learning Progress Summary            Patient Acceptance, E, VU by CB at 1/27/2025 0935    Comment: Role of OT                      Point: Body mechanics (Done)       Description:   Instruct learner(s) on proper positioning and spine alignment during self-care, functional mobility activities and/or exercises.                  Learning Progress Summary            Patient Acceptance, E, VU by CB at 1/27/2025 0935    Comment: Role of OT                                      User Key       Initials Effective Dates Name Provider Type Discipline     12/17/24 -  Salvador Lang, OT Student OT Student OT                  OT Recommendation and Plan  Therapy Frequency (OT): 5 times/wk  Plan of Care Review  Plan of Care Reviewed With: patient  Progress: no change  Outcome Evaluation: OT eval complete. A&Ox4 with verbal cues. C/O headache. Pt in fowlers upon arrival. Rolling left was mod I using bed rails and HOB elevated with verbal cues. Sidelying-sit was min A with verbal cues. Static sitting balance EOB was CGA-min A with verbal cues to corrext backwards leaning. ROM test revealed deficits in BL shoulder flex 75% and R shoulder aBd 50%. MMT showed significant deficits in BL shoulder flex and   strength. BUE sensation intact. LBD donning socks was mod A with verbal cues while EOB. Sit>stand was min A with verbal cues using rwx.  Pt ambulated bed>chair with rwx with CGA and verbal cues to assess activity tolerance. Pt would benefit from OT to improve BADL performance, balance, strength, and activity tolerance. Recommend assisted living at d/c.     Time Calculation:         Time Calculation- OT       Row Name 01/27/25 0902             Time Calculation- OT    OT Start Time 0834  -JW (r) CB (t) JW (c)      OT Stop Time 0900  -JW (r) CB (t) JW (c)      OT Time Calculation (min) 26 min  -JW (r) CB (t)      OT Received On 01/27/25  -JW (r) CB (t) JW (c)      OT Goal Re-Cert Due Date 02/06/25  -JW (r) CB (t) JW (c)         Untimed Charges    OT Eval/Re-eval Minutes 26  -JW (r) CB (t) JW (c)         Total Minutes    Untimed Charges Total Minutes 26  -JW (r) CB (t)       Total Minutes 26  -JW (r) CB (t)                User Key  (r) = Recorded By, (t) = Taken By, (c) = Cosigned By      Initials Name Provider Type    Radhika Dye OTR/L, CSRS Occupational Therapist    Salvador Venegas, OT Student OT Student                           Salvador Lang OT Student  1/27/2025

## 2025-01-27 NOTE — PLAN OF CARE
Goal Outcome Evaluation:  Plan of Care Reviewed With: patient        Progress: no change  Outcome Evaluation: OT eval complete. A&Ox4 with verbal cues. C/O headache. Pt in fowlers upon arrival. Rolling left was mod I using bed rails and HOB elevated with verbal cues. Sidelying-sit was min A with verbal cues. Static sitting balance EOB was CGA-min A with verbal cues to corrext backwards leaning. ROM test revealed deficits in BL shoulder flex 75% and R shoulder aBd 50%. MMT showed significant deficits in BL shoulder flex and  strength. BUE sensation intact. LBD donning socks was mod A with verbal cues while EOB. Sit>stand was min A with verbal cues using rwx.  Pt ambulated bed>chair with rwx with CGA and verbal cues to assess activity tolerance. Pt would benefit from OT to improve BADL performance, balance, strength, and activity tolerance. Recommend assisted living at d/c.    Anticipated Discharge Disposition (OT): assisted living

## 2025-01-27 NOTE — PLAN OF CARE
"Goal Outcome Evaluation:  Plan of Care Reviewed With: patient           Outcome Evaluation: PT eval complete. Pt in fowlers position, AOx4, disoriented to time but corrected with verbal cues for month and verbalized understanding once prompted. Pt reports indep with t/f and ambulating short distance at [facility] he resides with use of upright FWW but required assist from staff for other household chores. pt brought self to EOB with Min A and demo posterior lean and required Min/Mod A to correct. Pt demo functional AROM in B LE and demo strength: L hip flexion 4-/5, R hip flexion 4/5, B knee ext 4/5, R knee flexion 4/5, L knee flex 4-/5. Pt performed sit<>stand with Min A and took 5 side steps EOB initially and around bed to recliner after sitting rest break, totaling 12' while utilizing FWW. Pt ambulated with narrow base of support and fwd flexed posture but pt attributed to \"hospital walked in unstable\" and felt he was not supported. Pt left in recliner with needs in reach and educated to call for assist. pt will benefit from skilled PT services to improve activity tolerance, decrease fall risk, gait safety to return to PLOF. Recommend SNF vs [facility] with HH when medically stable pending progress towards goals. PT will continue to follow and progress.    Anticipated Discharge Disposition (PT): skilled nursing facility, assisted living, other (see comments) (HH)                        "

## 2025-01-27 NOTE — PROGRESS NOTES
[facility] Medicine Services  INPATIENT PROGRESS NOTE    Patient Name: Jose Alfredo Kirkpatrick  Date of Admission: 1/25/2025  Today's Date: 01/27/25  Length of Stay: 1  Primary Care Physician: Otilio Ramos MD    Subjective   Chief Complaint: Gallbladder pancreatitis  HPI   Status post surgery.  Blood pressure slow, hold blood pressure medication discussed with nursing.  Patient is on 4 L of oxygen.  Liver enzymes improving.  Lipase improving.  White blood cells normal.  Hemoglobin stable.    Review of Systems   Constitutional:  Positive for activity change, appetite change and fatigue. Negative for chills and fever.   HENT:  Negative for hearing loss, nosebleeds, tinnitus and trouble swallowing.    Eyes:  Negative for visual disturbance.   Respiratory:  Positive for shortness of breath. Negative for cough, chest tightness and wheezing.    Cardiovascular:  Negative for chest pain, palpitations and leg swelling.   Gastrointestinal:  Negative for abdominal distention, abdominal pain, blood in stool, constipation, diarrhea, nausea and vomiting.   Endocrine: Negative for cold intolerance, heat intolerance, polydipsia, polyphagia and polyuria.   Genitourinary:  Negative for decreased urine volume, difficulty urinating, dysuria, flank pain, frequency and hematuria.   Musculoskeletal:  Positive for arthralgias, gait problem and myalgias. Negative for joint swelling.   Skin:  Negative for rash.   Allergic/Immunologic: Negative for immunocompromised state.   Neurological:  Positive for weakness. Negative for dizziness, syncope, light-headedness and headaches.   Hematological:  Negative for adenopathy. Does not bruise/bleed easily.   Psychiatric/Behavioral:  Positive for confusion. Negative for sleep disturbance. The patient is not nervous/anxious.         All pertinent negatives and positives are as above. All other systems have been reviewed and are negative unless otherwise stated.      Objective    Temp:  [97.1 °F (36.2 °C)-98.4 °F (36.9 °C)] 97.8 °F (36.6 °C)  Heart Rate:  [48-68] 58  Resp:  [16-18] 18  BP: (103-161)/(46-68) 106/48  Physical Exam  Vitals and nursing note reviewed.   Constitutional:       Appearance: He is well-developed.      Comments: Advanced age.  Cachectic   HENT:      Head: Normocephalic.   Eyes:      General: No scleral icterus.     Pupils: Pupils are equal, round, and reactive to light.   Neck:      Thyroid: No thyromegaly.      Vascular: No carotid bruit or JVD.      Trachea: No tracheal deviation.   Cardiovascular:      Rate and Rhythm: Regular rhythm. Bradycardia present.      Heart sounds: No murmur heard.     No friction rub. No gallop.   Pulmonary:      Effort: No respiratory distress.      Breath sounds: No wheezing or rales.      Comments: Diminished breath semilateral, clear, on 5 L.  Chest:      Chest wall: No tenderness.   Abdominal:      General: Bowel sounds are normal. There is no distension.      Palpations: Abdomen is soft.      Tenderness: There is no abdominal tenderness.   Musculoskeletal:      Cervical back: Normal range of motion and neck supple.   Skin:     General: Skin is warm and dry.      Capillary Refill: Capillary refill takes 2 to 3 seconds.      Findings: No rash.   Neurological:      Cranial Nerves: No cranial nerve deficit.      Motor: Weakness present.      Coordination: Coordination abnormal.      Gait: Gait abnormal.   Psychiatric:         Mood and Affect: Mood normal.         Behavior: Behavior normal.             Results Review:  I have reviewed the labs, radiology results, and diagnostic studies.    Laboratory Data:   Results from last 7 days   Lab Units 01/27/25  0511 01/26/25  0437 01/25/25  1729   WBC 10*3/mm3 9.65 11.04* 16.21*   HEMOGLOBIN g/dL 12.0* 11.2* 13.2   HEMATOCRIT % 37.6 36.0* 41.2   PLATELETS 10*3/mm3 146 139* 169        Results from last 7 days   Lab Units 01/27/25  0511 01/26/25  1938 01/26/25  0437   SODIUM  mmol/L 139 139 142   POTASSIUM mmol/L 4.3 4.5 4.6   CHLORIDE mmol/L 107 108* 109*   CO2 mmol/L 21.0* 22.0 26.0   BUN mg/dL 19 24* 29*   CREATININE mg/dL 0.83 1.14 1.57*   CALCIUM mg/dL 8.4* 8.3* 8.6   BILIRUBIN mg/dL 0.9 0.8 1.8*   ALK PHOS U/L 148* 150* 139*   ALT (SGPT) U/L 209* 228* 311*   AST (SGOT) U/L 96* 139* 239*   GLUCOSE mg/dL 86 119* 88       Culture Data:   Blood Culture   Date Value Ref Range Status   01/25/2025 No growth at 24 hours  Preliminary   01/25/2025 No growth at 24 hours  Preliminary     Urine Culture   Date Value Ref Range Status   01/25/2025 Culture in progress  Preliminary       Radiology Data:   Imaging Results (Last 24 Hours)       Procedure Component Value Units Date/Time    FL Cholangiogram Operative [[id number]] Collected: 01/27/25 1457     Updated: 01/27/25 1501    Narrative:      FL CHOLANGIOGRAM OPERATIVE- 1/27/2025 12:10 PM     HISTORY: stones; K85.90-Acute pancreatitis without necrosis or  infection, unspecified; J01.00-Acute maxillary sinusitis, unspecified;  N17.9-Acute kidney failure, unspecified; N39.0-Urinary tract infection,  site not specified; R74.8-Abnormal levels of other serum enzymes;  R79.89-Other specified abnormal findings of blood chemistry;  K85.10-Biliary acute pancreatitis without necrosis or infection;  Z74.09-Oth     COMPARISON: None     FLUOROSCOPY TIME: 0.1-minute     FLUOROSCOPY DOSE: 1.6 mGy     NUMBER OF IMAGES: 5       Impression:      Contrast was injected through the cystic duct stump, and multiple  fluoroscopic images of the RIGHT upper quadrant were obtained  intraoperatively. The opacified intrahepatic and extrahepatic ducts  demonstrate no filling defects or obstruction. Contrast is noted in the  small bowel. Surgical instruments and clips are noted in the RIGHT upper  quadrant.     Please refer to the operative note for more details.     This report was signed and finalized on 1/27/2025 2:58 PM by Dr Geremias Martinez.       FL C Arm During Surgery  [[id number]] Resulted: 01/27/25 1336     Updated: 01/27/25 1336    Narrative:      This procedure was auto-finalized with no dictation required.    CT Abdomen Pelvis With Contrast [[id number]] Collected: 01/27/25 1148     Updated: 01/27/25 1201    Narrative:      CT ABDOMEN PELVIS W CONTRAST- 1/27/2025 10:31 AM     HISTORY: Acute pancreatitis; K85.90-Acute pancreatitis without necrosis  or infection, unspecified; J01.00-Acute maxillary sinusitis,  unspecified; N17.9-Acute kidney failure, unspecified; N39.0-Urinary  tract infection, site not specified; R74.8-Abnormal levels of other  serum enzymes; R79.89-Other specified abnormal findings of blood  chemistry; K85.10-Biliary acute pancreatitis without necrosis or  infection       COMPARISON: CT scan dated 2/8/2023.     DOSE LENGTH PRODUCT: 662.62 mGy.cm. Automated exposure control was also  utilized to decrease patient radiation dose.     TECHNIQUE: Following the intravenous administration of contrast, helical  CT tomographic images of the abdomen and pelvis were acquired.  Multiplanar reformatted images were provided for review.     FINDINGS:     Bilateral trace pleural effusions. There is a very large hiatal hernia  which mostly opacifies the left lung base. Coronary atheromatous  calcification.     LIVER: No suspicious liver lesion. The portal veins are patent.     BILIARY SYSTEM: Cholelithiasis present. Gallbladder is mildly dilated.  No obvious wall thickening or para cholecystic inflammatory change.  Common bile duct is nondilated for age measuring up to 7 mm along its  pancreatic head segment.     PANCREAS: There is a mild peripancreatic stranding. Pancreatic  parenchymal enhancement is fairly homogeneous. The main pancreatic duct  is nondilated. No obvious focal pancreatic mass.     SPLEEN: Normal size.     KIDNEYS AND ADRENALS: Adrenal glands are unremarkable. Kidneys are  unremarkable. The ureters are decompressed and normal in appearance.     RETROPERITONEUM:  No mass, lymphadenopathy or hemorrhage.     GI TRACT: There is a large hiatal hernia containing essentially the  entire stomach. The stomach is nondistended. Small bowel loops are  nondistended. Colonic diverticulosis. Moderate colonic stool.     OTHER: Suprarenal abdominal aorta measures up to 3.4 cm in diameter.  Splenic vein is patent. IVC filter is present. No mesenteric adenopathy.  No intraperitoneal free fluid. Right hip arthroplasty. Advanced lumbar  spine osteoarthritis changes, especially at the L1-L2 level. No acute  bony abnormality.     PELVIS: No mass lesion, fluid collection or significant lymphadenopathy  is seen in the pelvis. The urinary bladder is normal in appearance.       Impression:         1.  Provided clinical history of acute pancreatitis. CT reveals a fairly  minimal peripancreatic stranding, with the pancreatic parenchymal  enhancement appearing fairly homogeneous. No obvious pancreatic mass and  the main pancreatic duct is nondilated. Cholelithiasis is present and  the biliary tree is nondilated.  2.  There is a very large hiatal hernia which includes the entirety of  the stomach.  3.  Bilateral trace pleural effusions.  4.  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           This report was signed and finalized on 1/27/2025 11:58 AM by Dr Geremias Martinez.               I have reviewed the patient's current medications.     Assessment/Plan   Assessment  Active Hospital Problems    Diagnosis     **Gallstone pancreatitis     Acute kidney injury     Elevated LFTs     Hyperbilirubinemia     Acute UTI (urinary tract infection)      History of Present Illness  Patient is an 89-year-old male patient with a medical history of hypertension, GERD, brought in by EMS from [facility] for the evaluation of his abdominal pain and generalized weakness.  As reported, for the last 2 days he has been feeling weak and dizzy and this  has been associated with abdominal cramps.  He denies any nausea vomiting or diarrhea.  He denies any fever or chills.  He denies any chest pain or shortness of breath.  He usually ambulates with a walker, but he felt very weak and stayed in bed all day.    Treatment Plan    Gallbladder pancreatitis.  Consult GI.  Consult general surgery.  Zosyn antibiotics.  Leukocytosis resolved.  Lipase improving.  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  Status post 1/27/2025-Robotic cholecystectomy with intraoperative cholangiogram     Hypoxia.  Patient is clear on 4 L of oxygen.    Acute kidney injury.  Creatinine normalized.    Hypertension.  Hold lisinopril.  Hold Procardia, due to hypotension. Nitro as needed.    Anemia.  Hemoglobin stable.    Reflux.  Protonix.  Zofran as needed.    Anxiety/depression . BuSpar twice daily.    Lovenox prophylaxis.    Chronic pain/neuropathy.  Neurontin at night.  Flexeril as needed.    Advanced age.  89 years old.    Nutrition.  GI soft diet.    Deconditioning.  PT and OT consult    Blood culture-no growth in 24 hours.  Urine culture no growth.  Pathology is pending.    Patient is from [facility].     Medical Decision Making  Number and Complexity of problems: 1 acute moderate complexity others chronic  Differential Diagnosis: As above     Conditions and Status        Improving.     [facility] Data  External documents reviewed: Reviewed  Cardiac tracing (EKG, telemetry) interpretation:  Reviewed  Radiology interpretation: Reviewed  Labs reviewed: As above  Any tests that were considered but not ordered: None     Decision rules/scores evaluated (example [id number], Wells, etc): None     Discussed with: Patient and his daughter and general surgery     Care Planning  Shared decision making: Patient apprised of current labs, vitals, imaging and treatment plan.  They are agreeable with proceeding with plans as discussed.   Code status and discussions: No CPR     Disposition  Social Determinants of Health that impact treatment or disposition: None  I expect the patient to be discharged to [facility] in TBD days.         Medical Decision Making  Number and Complexity of problems: Gallbladder pancreatitis  Differential Diagnosis: None    Conditions and Status        Condition is unchanged.     [facility] Data  External documents reviewed: Previous note  Cardiac tracing (EKG, telemetry) interpretation: Sinus bradycardia   Radiology interpretation: CT scan  Labs reviewed: Laboratory  Any tests that were considered but not ordered: Lab in a.m.     Decision rules/scores evaluated (example [id number], Wells, etc): None     Discussed with: Patient and nursing     Care Planning  Shared decision making: Patient and nursing  Code status and discussions: DNR . DNI.    Disposition  Social Determinants of Health that impact treatment or disposition: From assisted living  1 to 3 days.        Electronically signed by Parth Byrd MD, 01/27/25, 17:20 CST.

## 2025-01-27 NOTE — PLAN OF CARE
Goal Outcome Evaluation:              Outcome Evaluation: recieved pt from PACU to room 394 s/p lap mane, x6 lap sites to abd, AMMY with steri strips. bowel sounds hypoactive, DTV post-op. 4L NC, , tele sinus tayo, vitals otherwise stable. safety maintained.

## 2025-01-27 NOTE — ANESTHESIA POSTPROCEDURE EVALUATION
"Patient: Jose Alfredo Kirkpatrick    Procedure Summary       Date: 01/27/25 Room / Location:  [facility] OR 06 /  [facility] OR    Anesthesia Start: 1234 Anesthesia Stop: 1357    Procedure: LAPAROSCOPIC CHOLECYSTECTOMY INTRAOPERATIVE CHOLANGIOGRAM WITH APSINCI ROBOT (Abdomen) Diagnosis:       Gallstone pancreatitis      (Gallstone pancreatitis [K85.10])    Surgeons: Ronak Roche MD Provider: Johnnie Coles CRNA    Anesthesia Type: general ASA Status: 3 - Emergent            Anesthesia Type: general    Vitals  Vitals Value Taken Time   /46 01/27/25 1619   Temp 97.7 °F (36.5 °C) 01/27/25 1619   Pulse 54 01/27/25 1619   Resp 18 01/27/25 1619   SpO2 93 % 01/27/25 1619           Post Anesthesia Care and Evaluation    Patient location during evaluation: PHASE II  Patient participation: complete - patient participated  Level of consciousness: awake and awake and alert  Pain score: 0  Pain management: adequate    Airway patency: patent  Anesthetic complications: No anesthetic complications  PONV Status: none  Cardiovascular status: acceptable  Respiratory status: acceptable  Hydration status: acceptable    Comments: Patient discharged according to acceptable Sophy score per RN assessment. See nursing records for further information.     Blood pressure 108/46, pulse 54, temperature 97.7 °F (36.5 °C), resp. rate 18, height 188 cm (74\"), weight 85.6 kg (188 lb 12.8 oz), SpO2 93%.      "

## 2025-01-27 NOTE — PLAN OF CARE
Goal Outcome Evaluation:  Plan of Care Reviewed With: patient     NPO since midnight. Good UOP. Home pain regime added to med list and helpful with pts chronic right shoulder pain. Turned q2. VSS, CTM     Progress: improving

## 2025-01-27 NOTE — ANESTHESIA PREPROCEDURE EVALUATION
Anesthesia Evaluation     Patient summary reviewed   no history of anesthetic complications:   NPO Solid Status: > 8 hours             Airway   Mallampati: II  Dental      Pulmonary    (-) COPD, asthma, sleep apnea, not a smoker  Cardiovascular     (+) hypertension  (-) pacemaker, past MI, angina, cardiac stents      Neuro/Psych  (-) seizures, TIA, CVA  GI/Hepatic/Renal/Endo    (+) GERD, renal disease- ARF  (-) liver disease, diabetes    Musculoskeletal     Abdominal    Substance History      OB/GYN          Other                      Anesthesia Plan    ASA 3 - emergent     general     (Pt agrees to suspend dnr/dni periop)  intravenous induction     Anesthetic plan, risks, benefits, and alternatives have been provided, discussed and informed consent has been obtained with: patient.    CODE STATUS:    Medical Intervention Limits: No intubation (DNI)  Code Status (Patient has no pulse and is not breathing): No CPR (Do Not Attempt to Resuscitate)  Medical Interventions (Patient has pulse or is breathing): Limited Support

## 2025-01-27 NOTE — DISCHARGE INSTRUCTIONS
Okay to shower 48 hours after surgery.  No soaking under water for 2 weeks.  May perform light activities after surgery.  Avoid lifting more than 20 pounds or excessive straining for 4 weeks after surgery.  Alternate ibuprofen and tylenol scheduled around-the-clock. Take prescription pain medication exactly as directed.  Take a stool softener while on prescription pain medication.  Okay to drive once off of pain medication. Continue on a regular diet. Okay to return to work on light duty 2 weeks after surgery or return to work on full duty 4 weeks after surgery.  Clear for full activity after 4 weeks.  Deep sutures will dissolve on their own and Steri-Strips should peel off in 2 to 4 weeks.  Staples and/or skin sutures will be removed 14 days from surgery.

## 2025-01-27 NOTE — PROGRESS NOTES
"       GENERAL SURGERY INPATIENT PROGRESS NOTE    Patient Name:  Jose Alfredo Kirkpatrick  YOB: 1935  MRN: [id number]    SUBJECTIVE    No unexpected events overnight.  The patient reports that he is feeling a little bit better today.  His abdominal pain is improving.    Allergies:  Allergies   Allergen Reactions    Meloxicam Unknown - Low Severity       Medications:  busPIRone, 5 mg, Oral, BID With Meals  gabapentin, 300 mg, Oral, Nightly  heparin (porcine), 5,000 Units, Subcutaneous, Q12H  lisinopril, 10 mg, Oral, Daily  NIFEdipine XL, 60 mg, Oral, Daily  pantoprazole, 40 mg, Oral, Q AM  piperacillin-tazobactam, 3.375 g, Intravenous, Q8H  sodium chloride, 10 mL, Intravenous, Q12H    Pharmacy to Dose Zosyn,   sodium chloride, 100 mL/hr, Last Rate: 100 mL/hr (01/27/25 0606)        OBJECTIVE    VITAL SIGNS  /55 (BP Location: Right arm, Patient Position: Lying)   Pulse 62   Temp 97.8 °F (36.6 °C) (Oral)   Resp 16   Ht 188 cm (74\")   Wt 85.6 kg (188 lb 12.8 oz)   SpO2 90%   BMI 24.24 kg/m²     Intake/Output Summary (Last 24 hours) at 1/27/2025 0857  Last data filed at 1/27/2025 0737  Gross per 24 hour   Intake 600 ml   Output 2600 ml   Net -2000 ml       PHYSICAL EXAM    General: Pleasant elderly male, sitting up in bed, in no acute distress.  HEENT:  NCAT, PERRL, EOM intact bilaterally, hearing intact, nares patent, nasal cannula in place  Heart:  Regular rate, normotensive, no JVD bilaterally  Lungs:  Normal respiratory effort, regular respiratory rate, no wheezing  Abdomen: Protuberant  Extremities:  No cyanosis, clubbing or edema.   Musculoskeletal:  Normal development of bilateral upper extremities. Normal development of bilateral lower extremities.  Range of motion intact.  No evidence of trauma.  Skin:  No rashes, ecchymosis or jaundice. Dry and non-diaphoretic. Skin color is consistent with ethnicity.    RESULTS    Labs:  I personally reviewed all pertinent labs.   Imaging:  I personally " reviewed all pertinent imaging studies.   Pathology:        ASSESSMENT AND PLAN    Active Hospital Problems    Diagnosis     **Gallstone pancreatitis     Acute kidney injury     Elevated LFTs     Hyperbilirubinemia     Acute UTI (urinary tract infection)          DAILY CARE PLAN    Lipase, transaminases and total bilirubin are improving as are his symptoms.  The recommendations are for him to undergo cholecystectomy during this admission to prevent any future recurrences of gallstone pancreatitis.  His therapeutic Lovenox has been held this morning.  I will obtain a CT of the abdomen and pelvis for preoperative planning.  Should be able to resume his Coumadin tomorrow and bridge with therapeutic Lovenox.    Plan for robotic cholecystectomy with intraoperative cholangiogram.    I discussed the risks, benefits and alternatives to cholecystectomy including risk of injury to surrounding structures specifically the bile duct, postoperative bile leaks, deep organ space and superficial skin infection, uncontrolled bleeding, the need for blood transfusion, conversion to open surgery, incisional hernias, ongoing pain, bowel habit changes, long term drain and wound care, and the possibility of requiring further operations or procedures including endoscopy. Additionally, I counseled the patient on the risk of postoperative cardiopulmonary complications. I gave the patient a chance to ask any questions and answered them thoroughly. The patient understood the risks and was agreeable to proceeding to surgery. Consent was obtained from the patient.    I discussed the patient's findings and my recommendations with the patient/family, as well as the primary care team.    Ronak Roche MD, [facility]  General Surgery  1/27/2025  08:57 CST    Please note that portions of this note were completed with a voice recognition program.

## 2025-01-28 ENCOUNTER — APPOINTMENT (OUTPATIENT)
Dept: CARDIOLOGY | Facility: HOSPITAL | Age: 89
End: 2025-01-28
Payer: MEDICARE

## 2025-01-28 LAB
ALBUMIN SERPL-MCNC: 3.6 G/DL (ref 3.5–5.2)
ALBUMIN/GLOB SERPL: 1.1 G/DL
ALP SERPL-CCNC: 167 U/L (ref 39–117)
ALT SERPL W P-5'-P-CCNC: 160 U/L (ref 1–41)
ANION GAP SERPL CALCULATED.3IONS-SCNC: 16 MMOL/L (ref 5–15)
AST SERPL-CCNC: 59 U/L (ref 1–40)
AV MEAN PRESS GRAD SYS DOP V1V2: 8 MMHG
AV VMAX SYS DOP: 194 CM/SEC
BASOPHILS # BLD AUTO: 0.04 10*3/MM3 (ref 0–0.2)
BASOPHILS NFR BLD AUTO: 0.5 % (ref 0–1.5)
BH CV ECHO MEAS - AO MAX PG: 15.1 MMHG
BH CV ECHO MEAS - AO ROOT DIAM: 3.7 CM
BH CV ECHO MEAS - AO V2 VTI: 39.2 CM
BH CV ECHO MEAS - AVA(I,D): 2.19 CM2
BH CV ECHO MEAS - EDV(CUBED): 143.9 ML
BH CV ECHO MEAS - EDV(MOD-SP4): 70.3 ML
BH CV ECHO MEAS - EF(MOD-SP4): 51.4 %
BH CV ECHO MEAS - ESV(CUBED): 41.4 ML
BH CV ECHO MEAS - ESV(MOD-SP4): 34.2 ML
BH CV ECHO MEAS - FS: 34 %
BH CV ECHO MEAS - IVS/LVPW: 1.24 CM
BH CV ECHO MEAS - IVSD: 1.16 CM
BH CV ECHO MEAS - LA DIMENSION: 3 CM
BH CV ECHO MEAS - LAT PEAK E' VEL: 10.1 CM/SEC
BH CV ECHO MEAS - LV DIASTOLIC VOL/BSA (35-75): 33.2 CM2
BH CV ECHO MEAS - LV MASS(C)D: 209.1 GRAMS
BH CV ECHO MEAS - LV MAX PG: 5.5 MMHG
BH CV ECHO MEAS - LV MEAN PG: 3 MMHG
BH CV ECHO MEAS - LV SYSTOLIC VOL/BSA (12-30): 16.2 CM2
BH CV ECHO MEAS - LV V1 MAX: 117 CM/SEC
BH CV ECHO MEAS - LV V1 VTI: 22.6 CM
BH CV ECHO MEAS - LVIDD: 5.2 CM
BH CV ECHO MEAS - LVIDS: 3.5 CM
BH CV ECHO MEAS - LVOT AREA: 3.8 CM2
BH CV ECHO MEAS - LVOT DIAM: 2.2 CM
BH CV ECHO MEAS - LVPWD: 0.93 CM
BH CV ECHO MEAS - MED PEAK E' VEL: 9.1 CM/SEC
BH CV ECHO MEAS - MV A MAX VEL: 105 CM/SEC
BH CV ECHO MEAS - MV DEC TIME: 0.25 SEC
BH CV ECHO MEAS - MV E MAX VEL: 63.4 CM/SEC
BH CV ECHO MEAS - MV E/A: 0.6
BH CV ECHO MEAS - RAP SYSTOLE: 3 MMHG
BH CV ECHO MEAS - RVSP: 16.4 MMHG
BH CV ECHO MEAS - SV(LVOT): 85.9 ML
BH CV ECHO MEAS - SV(MOD-SP4): 36.1 ML
BH CV ECHO MEAS - SVI(LVOT): 40.6 ML/M2
BH CV ECHO MEAS - SVI(MOD-SP4): 17.1 ML/M2
BH CV ECHO MEAS - TR MAX PG: 13.4 MMHG
BH CV ECHO MEAS - TR MAX VEL: 183 CM/SEC
BH CV ECHO MEASUREMENTS AVERAGE E/E' RATIO: 6.6
BILIRUB SERPL-MCNC: 0.9 MG/DL (ref 0–1.2)
BUN SERPL-MCNC: 11 MG/DL (ref 8–23)
BUN/CREAT SERPL: 14.9 (ref 7–25)
CALCIUM SPEC-SCNC: 9.4 MG/DL (ref 8.6–10.5)
CHLORIDE SERPL-SCNC: 99 MMOL/L (ref 98–107)
CHOLEST SERPL-MCNC: 128 MG/DL (ref 0–200)
CO2 SERPL-SCNC: 22 MMOL/L (ref 22–29)
CREAT SERPL-MCNC: 0.74 MG/DL (ref 0.76–1.27)
DEPRECATED RDW RBC AUTO: 46.5 FL (ref 37–54)
EGFRCR SERPLBLD CKD-EPI 2021: 87.2 ML/MIN/1.73
EOSINOPHIL # BLD AUTO: 0.11 10*3/MM3 (ref 0–0.4)
EOSINOPHIL NFR BLD AUTO: 1.3 % (ref 0.3–6.2)
ERYTHROCYTE [DISTWIDTH] IN BLOOD BY AUTOMATED COUNT: 13 % (ref 12.3–15.4)
GLOBULIN UR ELPH-MCNC: 3.2 GM/DL
GLUCOSE SERPL-MCNC: 92 MG/DL (ref 65–99)
HBA1C MFR BLD: 5.8 % (ref 4.8–5.6)
HCT VFR BLD AUTO: 45.1 % (ref 37.5–51)
HDLC SERPL-MCNC: 30 MG/DL (ref 40–60)
HGB BLD-MCNC: 14.5 G/DL (ref 13–17.7)
IMM GRANULOCYTES # BLD AUTO: 0.04 10*3/MM3 (ref 0–0.05)
IMM GRANULOCYTES NFR BLD AUTO: 0.5 % (ref 0–0.5)
LDLC SERPL CALC-MCNC: 74 MG/DL (ref 0–100)
LDLC/HDLC SERPL: 2.39 {RATIO}
LEFT ATRIUM VOLUME INDEX: 25.5 ML/M2
LEFT ATRIUM VOLUME: 54.1 ML
LIPASE SERPL-CCNC: 131 U/L (ref 13–60)
LYMPHOCYTES # BLD AUTO: 1.05 10*3/MM3 (ref 0.7–3.1)
LYMPHOCYTES NFR BLD AUTO: 12 % (ref 19.6–45.3)
MCH RBC QN AUTO: 31.2 PG (ref 26.6–33)
MCHC RBC AUTO-ENTMCNC: 32.2 G/DL (ref 31.5–35.7)
MCV RBC AUTO: 97 FL (ref 79–97)
MONOCYTES # BLD AUTO: 0.44 10*3/MM3 (ref 0.1–0.9)
MONOCYTES NFR BLD AUTO: 5 % (ref 5–12)
NEUTROPHILS NFR BLD AUTO: 7.05 10*3/MM3 (ref 1.7–7)
NEUTROPHILS NFR BLD AUTO: 80.7 % (ref 42.7–76)
NRBC BLD AUTO-RTO: 0 /100 WBC (ref 0–0.2)
PLATELET # BLD AUTO: 190 10*3/MM3 (ref 140–450)
PMV BLD AUTO: 9.2 FL (ref 6–12)
POTASSIUM SERPL-SCNC: 3.9 MMOL/L (ref 3.5–5.2)
PROT SERPL-MCNC: 6.8 G/DL (ref 6–8.5)
RBC # BLD AUTO: 4.65 10*6/MM3 (ref 4.14–5.8)
SODIUM SERPL-SCNC: 137 MMOL/L (ref 136–145)
TRIGL SERPL-MCNC: 131 MG/DL (ref 0–150)
TSH SERPL DL<=0.05 MIU/L-ACNC: 2.99 UIU/ML (ref 0.27–4.2)
VLDLC SERPL-MCNC: 24 MG/DL (ref 5–40)
WBC NRBC COR # BLD AUTO: 8.73 10*3/MM3 (ref 3.4–10.8)

## 2025-01-28 PROCEDURE — 83690 ASSAY OF LIPASE: CPT | Performed by: SURGERY

## 2025-01-28 PROCEDURE — 85025 COMPLETE CBC W/AUTO DIFF WBC: CPT | Performed by: SURGERY

## 2025-01-28 PROCEDURE — 80061 LIPID PANEL: CPT | Performed by: FAMILY MEDICINE

## 2025-01-28 PROCEDURE — 83036 HEMOGLOBIN GLYCOSYLATED A1C: CPT | Performed by: FAMILY MEDICINE

## 2025-01-28 PROCEDURE — 80053 COMPREHEN METABOLIC PANEL: CPT | Performed by: SURGERY

## 2025-01-28 PROCEDURE — 25010000002 ENOXAPARIN PER 10 MG: Performed by: SURGERY

## 2025-01-28 PROCEDURE — 93005 ELECTROCARDIOGRAM TRACING: CPT | Performed by: FAMILY MEDICINE

## 2025-01-28 PROCEDURE — 97110 THERAPEUTIC EXERCISES: CPT

## 2025-01-28 PROCEDURE — 93306 TTE W/DOPPLER COMPLETE: CPT

## 2025-01-28 PROCEDURE — 93306 TTE W/DOPPLER COMPLETE: CPT | Performed by: INTERNAL MEDICINE

## 2025-01-28 PROCEDURE — 97116 GAIT TRAINING THERAPY: CPT

## 2025-01-28 PROCEDURE — 25010000002 PIPERACILLIN SOD-TAZOBACTAM PER 1 G: Performed by: SURGERY

## 2025-01-28 PROCEDURE — 84443 ASSAY THYROID STIM HORMONE: CPT | Performed by: FAMILY MEDICINE

## 2025-01-28 PROCEDURE — 93010 ELECTROCARDIOGRAM REPORT: CPT | Performed by: EMERGENCY MEDICINE

## 2025-01-28 RX ORDER — METOPROLOL TARTRATE 1 MG/ML
2.5 INJECTION, SOLUTION INTRAVENOUS ONCE
Status: DISCONTINUED | OUTPATIENT
Start: 2025-01-28 | End: 2025-01-28

## 2025-01-28 RX ORDER — METOPROLOL TARTRATE 25 MG/1
25 TABLET, FILM COATED ORAL EVERY 12 HOURS SCHEDULED
Status: DISCONTINUED | OUTPATIENT
Start: 2025-01-28 | End: 2025-01-29

## 2025-01-28 RX ORDER — METOPROLOL TARTRATE 1 MG/ML
2.5 INJECTION, SOLUTION INTRAVENOUS EVERY 6 HOURS PRN
Status: DISCONTINUED | OUTPATIENT
Start: 2025-01-28 | End: 2025-01-29

## 2025-01-28 RX ADMIN — ACETAMINOPHEN 1000 MG: 500 TABLET, FILM COATED ORAL at 11:59

## 2025-01-28 RX ADMIN — ACETAMINOPHEN 1000 MG: 500 TABLET, FILM COATED ORAL at 20:17

## 2025-01-28 RX ADMIN — ENOXAPARIN SODIUM 40 MG: 100 INJECTION SUBCUTANEOUS at 20:18

## 2025-01-28 RX ADMIN — OXYCODONE HYDROCHLORIDE 10 MG: 5 TABLET ORAL at 06:03

## 2025-01-28 RX ADMIN — PANTOPRAZOLE SODIUM 40 MG: 40 TABLET, DELAYED RELEASE ORAL at 05:54

## 2025-01-28 RX ADMIN — Medication 10 ML: at 09:17

## 2025-01-28 RX ADMIN — PIPERACILLIN SODIUM AND TAZOBACTAM SODIUM 3.38 G: 3; .375 INJECTION, POWDER, LYOPHILIZED, FOR SOLUTION INTRAVENOUS at 11:53

## 2025-01-28 RX ADMIN — Medication 10 ML: at 20:18

## 2025-01-28 RX ADMIN — PIPERACILLIN SODIUM AND TAZOBACTAM SODIUM 3.38 G: 3; .375 INJECTION, POWDER, LYOPHILIZED, FOR SOLUTION INTRAVENOUS at 04:08

## 2025-01-28 RX ADMIN — METOPROLOL TARTRATE 25 MG: 25 TABLET, FILM COATED ORAL at 20:17

## 2025-01-28 RX ADMIN — ACETAMINOPHEN 1000 MG: 500 TABLET, FILM COATED ORAL at 04:07

## 2025-01-28 RX ADMIN — OXYCODONE HYDROCHLORIDE 5 MG: 5 TABLET ORAL at 11:59

## 2025-01-28 RX ADMIN — GABAPENTIN 300 MG: 300 CAPSULE ORAL at 20:17

## 2025-01-28 RX ADMIN — OXYCODONE HYDROCHLORIDE 10 MG: 5 TABLET ORAL at 21:59

## 2025-01-28 NOTE — PAYOR COMM NOTE
"ADMIT INPT 1-25-25  [id number]    [phone]    Jose Alfredo Kirkpatrick (89 y.o. Male)       Date of Birth   1935    Social Security Number       Address   [address] [place], [place] [address]    Home Phone   [phone]    MRN   [id number]       Gn[employer]ist   [facility]    Marital Status                               Admission Date   1/25/25    Admission Type   Emergency    Admitting Provider   Parth Bryd MD    Attending Provider   Parth Byrd MD    Department, Room/Bed   [facility] 3C, 394/1       Discharge Date       Discharge Disposition       Discharge Destination                                 Attending Provider: Parth Byrd MD    Allergies: Meloxicam    Isolation: None   Infection: COVID (History) (02/09/23)   Code Status: No CPR    Ht: 188 cm (74\")   Wt: 85.6 kg (188 lb 12.8 oz)    Admission Cmt: None   Principal Problem: Gallstone pancreatitis [K85.10]                   Active Insurance as of 1/25/2025       Primary Coverage       Payor Plan Insurance Group Employer/Plan Group    ANTHEM MEDICARE REPLACEMENT ANTHEM MED ADV HMO [id number]       Payor Plan Address Payor Plan Phone Number Payor Plan Fax Number Effective Dates    [address] [phone]  1/1/2024 - None Entered    [facility] [id number]         Subscriber Name Subscriber Birth Date Member ID       JOSE ALFREDO KIRKPATRICK 1935 [id number]                     Emergency Contacts        (Rel.) Home Phone Work Phone Mobile Phone    LOUIS WELLS (Daughter) -- -- [phone]                 History & Physical        Winter White MD at 01/25/25 2209              [facility] Medicine Services  HISTORY AND PHYSICAL    Date of Admission: 1/25/2025  Primary Care Physician: Otilio Ramos MD    Subjective   Primary Historian: Patient    Chief Complaint: Generalized weakness and abdominal pain    History of Present Illness  Patient is an 89-year-old male " patient with a medical history of hypertension, GERD, brought in by EMS from [facility] for the evaluation of his abdominal pain and generalized weakness.  As reported, for the last 2 days he has been feeling weak and dizzy and this has been associated with abdominal cramps.  He denies any nausea vomiting or diarrhea.  He denies any fever or chills.  He denies any chest pain or shortness of breath.  He usually ambulates with a walker, but he felt very weak and stayed in bed all day.        Review of Systems   Otherwise complete ROS reviewed and negative except as mentioned in the HPI.    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     Past Surgical History:  Past Surgical History:   Procedure Laterality Date    BACK SURGERY      cervical    CATARACT EXTRACTION Bilateral     COLONOSCOPY  02/11/2013    Hyperplastic polyp at 25 cm, Diverticulosis repeat exam in 5 years    COLONOSCOPY N/A 10/16/2018    Tubular adenoma ascending colon repet prn    ENDOSCOPY  01/15/2014    HH    ENDOSCOPY N/A 10/03/2019    Large HH, non-bleeding gastric ulcer    ENDOSCOPY N/A 11/25/2019    Procedure: ESOPHAGOGASTRODUODENOSCOPY WITH ANESTHESIA;  Surgeon: Marques Pérez MD;  Location: [facility] ENDOSCOPY;  Service: Gastroenterology    ENDOSCOPY N/A 2/9/2023    Procedure: ESOPHAGOGASTRODUODENOSCOPY WITH ANESTHESIA;  Surgeon: Sushant Edmond MD;  Location: [facility] ENDOSCOPY;  Service: Gastroenterology;  Laterality: N/A;  pre GI bleed  post hiatal hernia; esophagitis  Otilio Ramos MD    HERNIA REPAIR Right     INGUINAL     HIP FRACTURE SURGERY Right 02/04/2023    REPLACEMENT TOTAL KNEE BILATERAL      THORACIC LAMINECTOMY DECOMPRESSIVE POSTERIOR N/A 12/14/2016    Procedure:  LAMINECTOMY DECOMPRESSION, UNINSTRUMENTED POSTERIOR SPINAL FUSION, T 11-12;  Surgeon: LUZ Adan MD;  Location: [facility] OR;  Service:     VENA CAVA FILTER INSERTION N/A 10/04/2019    Procedure: VENA CAVA FILTER INSERTION;  Surgeon: Dallin Coronado MD;  Location: [facility] HYBRID OR 12;  Service: Vascular     Social History:  reports that he has never smoked. He has never been exposed to tobacco smoke. He has never used smokeless tobacco. He reports that he does not drink alcohol and does not use drugs.    Family History: family history includes Colon cancer in his father.       Allergies:  Allergies   Allergen Reactions    Meloxicam Unknown - Low Severity       Medications:  Prior to Admission medications    Medication Sig Start Date End Date Taking? Authorizing Provider   acetaminophen (TYLENOL) 325 MG tablet Take 2 tablets by mouth 2 (Two) Times a Day.    Joi Alvarez MD   bisacodyl (DULCOLAX) 10 MG suppository Insert 1 suppository into the rectum Daily As Needed for Constipation.  Patient not taking: Reported on 4/24/2023    Joi Alvarez MD   busPIRone (BUSPAR) 5 MG tablet Take 1 tablet by mouth 2 (Two) Times a Day.    Joi Alvarez MD   cyclobenzaprine (FLEXERIL) 5 MG tablet Take 1 tablet by mouth 2 (Two) Times a Day As Needed for Muscle Spasms.  Patient not taking: Reported on 4/24/2023    Joi Alvarez MD   docusate sodium (COLACE) 100 MG capsule Take 1 capsule by mouth 2 (Two) Times a Day.    Joi Alvarez MD   Enoxaparin Sodium (LOVENOX) 40 MG/0.4ML solution prefilled syringe syringe Inject 40 mg under the skin into the appropriate area as directed Daily.    Joi Alvarez MD   gabapentin (NEURONTIN) 300 MG capsule Take 1 capsule by mouth 4 (Four) Times a Day. 2/14/23   Olena Craft APRN   guaiFENesin (MUCINEX) 600 MG 12 hr tablet Take 1 tablet by mouth 2 (Two) Times a Day.    Joi Alvarez MD   hydrALAZINE (APRESOLINE) 50 MG tablet  Take 1 tablet by mouth Every 8 (Eight) Hours As Needed.    Joi Alvarez MD   levalbuterol (XOPENEX) 0.31 MG/3ML nebulizer solution Take 1 ampule by nebulization Every 8 (Eight) Hours As Needed for Wheezing.    Joi Alvarez MD   lidocaine (LIDODERM) 5 % Place 1 patch on the skin as directed by provider Daily. Remove & Discard patch within 12 hours or as directed by MD 2/15/23   Olena Craft APRN   linaclotide (LINZESS) 145 MCG capsule capsule Take 1 capsule by mouth Daily.    Joi Alvarez MD   lisinopril (PRINIVIL,ZESTRIL) 10 MG tablet Take 1 tablet by mouth Daily.    Joi Alvarez MD   NIFEdipine XL (PROCARDIA XL) 60 MG 24 hr tablet Take 1 tablet by mouth Daily.    Joi Alvarez MD   ondansetron (ZOFRAN) 4 MG tablet Take 1 tablet by mouth Every 6 (Six) Hours As Needed for Nausea or Vomiting.    Joi Alvarez MD   oxyCODONE-acetaminophen (PERCOCET) 5-325 MG per tablet Take 1 tablet by mouth Every 6 (Six) Hours As Needed for Moderate Pain. 11/19/23   Betty Childress,    pantoprazole (PROTONIX) 40 MG EC tablet Take 1 tablet by mouth 2 (Two) Times a Day Before Meals. 2/14/23   Olena Craft APRN   polyethylene glycol (MIRALAX) packet Take 17 g by mouth Daily As Needed.    Joi Alvarez MD   simethicone (MYLICON) 80 MG chewable tablet Chew 1 tablet Every 4 (Four) Hours As Needed for Flatulence.    Joi Alvarez MD   sucralfate (CARAFATE) 1 GM/10ML suspension Take 10 mL by mouth 4 (Four) Times a Day Before Meals & at Bedtime. 2/14/23   Olena Craft APRN   vitamin B-12 (CYANOCOBALAMIN) 1000 MCG tablet Take 1 tablet by mouth Daily.    Joi Alvarez MD   vitamin D (ERGOCALCIFEROL) 1.25 MG (44952 UT) capsule capsule Take 1 capsule by mouth 3 (Three) Times a Week. Give one time a day every Mon, Wed, Fri    Joi Alvarez MD     I have utilized all available immediate resources to obtain, update, or review the patient's  "current medications (including all prescriptions, over-the-counter products, herbals, cannabis/cannabidiol products, and vitamin/mineral/dietary (nutritional) supplements).    Objective     Vital Signs: /64 (BP Location: Right arm, Patient Position: Sitting)   Pulse 87   Temp 97.7 °F (36.5 °C) (Oral)   Resp 18   Ht 188 cm (74\")   Wt 85.6 kg (188 lb 12.8 oz)   SpO2 97%   BMI 24.24 kg/m²   Physical Exam  Constitutional:       Appearance: He is ill-appearing.   Cardiovascular:      Rate and Rhythm: Normal rate and regular rhythm.      Pulses: Normal pulses.      Heart sounds: Normal heart sounds. No murmur heard.  Pulmonary:      Effort: Pulmonary effort is normal. No respiratory distress.      Breath sounds: Normal breath sounds. No wheezing or rales.   Abdominal:      General: Bowel sounds are normal. There is no distension.      Palpations: Abdomen is soft.      Tenderness: There is no abdominal tenderness. There is no guarding.   Musculoskeletal:      Right lower leg: No edema.      Left lower leg: No edema.   Skin:     General: Skin is warm.   Neurological:      Mental Status: He is alert and oriented to person, place, and time. Mental status is at baseline.              Results Reviewed:  Lab Results (last 24 hours)       Procedure Component Value Units Date/Time    STAT Lactic Acid, Reflex [[id number]]  (Normal) Collected: 01/25/25 2215    Specimen: Blood from Hand, Left Updated: 01/25/25 2239     Lactate 1.4 mmol/L     High Sensitivity Troponin T 1Hr [[id number]]  (Abnormal) Collected: 01/25/25 2040    Specimen: Blood from Arm, Right Updated: 01/25/25 2119     HS Troponin T 42 ng/L      Troponin T Numeric Delta -3 ng/L      Troponin T % Delta -7    Narrative:      High Sensitive Troponin T Reference Range:  <14.0 ng/L- Negative Female for AMI  <22.0 ng/L- Negative Male for AMI  >=14 - Abnormal Female indicating possible myocardial injury.  >=22 - Abnormal Male indicating possible myocardial injury. "   Clinicians would have to utilize clinical acumen, EKG, Troponin, and serial changes to determine if it is an Acute Myocardial Infarction or myocardial injury due to an underlying chronic condition.         Blood Culture - Blood, Arm, Left [[id number]] Collected: 01/25/25 2039    Specimen: Blood from Arm, Left Updated: 01/25/25 2101    Blood Culture - Blood, Arm, Right [[id number]] Collected: 01/25/25 2040    Specimen: Blood from Arm, Right Updated: 01/25/25 2059    Urinalysis With Culture If Indicated - Straight Cath [[id number]]  (Abnormal) Collected: 01/25/25 1942    Specimen: Urine from Straight Cath Updated: 01/25/25 2017     Color, UA Dark Yellow     Appearance, UA Cloudy     pH, UA <=5.0     Specific Gravity, UA 1.019     Glucose, UA Negative     Ketones, UA Trace     Bilirubin, UA Small (1+)     Blood, UA Negative     Protein, UA 30 mg/dL (1+)     Leuk Esterase, UA Trace     Nitrite, UA Positive     Urobilinogen, UA 1.0 E.U./dL    Narrative:      In absence of clinical symptoms, the presence of pyuria, bacteria, and/or nitrites on the urinalysis result does not correlate with infection.    Urinalysis, Microscopic Only - Straight Cath [[id number]]  (Abnormal) Collected: 01/25/25 1942    Specimen: Urine from Straight Cath Updated: 01/25/25 2017     RBC, UA None Seen /HPF      WBC, UA 6-10 /HPF      Bacteria, UA Trace /HPF      Squamous Epithelial Cells, UA 0-2 /HPF      Hyaline Casts, UA 3-6 /LPF      Methodology Manual Light Microscopy    Urine Culture - Urine, Straight Cath [[id number]] Collected: 01/25/25 1942    Specimen: Urine from Straight Cath Updated: 01/25/25 2017    Lipase [[id number]]  (Abnormal) Collected: 01/25/25 1835    Specimen: Blood Updated: 01/25/25 1926     Lipase 2,696 U/L      Comment: Specimen hemolyzed.  Results may be falsely decreased.       Procalcitonin [[id number]]  (Abnormal) Collected: 01/25/25 1835    Specimen: Blood Updated: 01/25/25 1918     Procalcitonin 41.03 ng/mL     Narrative:   "    As a Marker for Sepsis (Non-Neonates):    1. <0.5 ng/mL represents a low risk of severe sepsis and/or septic shock.  2. >2 ng/mL represents a high risk of severe sepsis and/or septic shock.    As a Marker for Lower Respiratory Tract Infections that require antibiotic therapy:    PCT on Admission    Antibiotic Therapy       6-12 Hrs later    >0.5                Strongly Recommended  >0.25 - <0.5        Recommended   0.1 - 0.25          Discouraged              Remeasure/reassess PCT  <0.1                Strongly Discouraged     Remeasure/reassess PCT    As 28 day mortality risk marker: \"Change in Procalcitonin Result\" (>80% or <=80%) if Day 0 (or Day 1) and Day 4 values are available. Refer to http://www.[facility] Easy[employer] Mediapct-calculator.com    Change in PCT <=80%  A decrease of PCT levels below or equal to 80% defines a positive change in PCT test result representing a higher risk for 28-day all-cause mortality of patients diagnosed with severe sepsis for septic shock.    Change in PCT >80%  A decrease of PCT levels of more than 80% defines a negative change in PCT result representing a lower risk for 28-day all-cause mortality of patients diagnosed with severe sepsis or septic shock.       Comprehensive Metabolic Panel [[id number]]  (Abnormal) Collected: 01/25/25 1835    Specimen: Blood Updated: 01/25/25 1915     Glucose 102 mg/dL      BUN 30 mg/dL      Creatinine 2.05 mg/dL      Sodium 140 mmol/L      Potassium 5.1 mmol/L      Comment: Specimen hemolyzed.  Result may be falsely elevated.        Chloride 103 mmol/L      CO2 24.0 mmol/L      Calcium 9.1 mg/dL      Total Protein 6.5 g/dL      Albumin 3.8 g/dL      ALT (SGPT) 486 U/L      Comment: Specimen hemolyzed.  Result may  be falsely elevated.        AST (SGOT) 506 U/L      Comment: Specimen hemolyzed.  Result may be falsely elevated.        Alkaline Phosphatase 183 U/L      Total Bilirubin 3.0 mg/dL      Globulin 2.7 gm/dL      A/G Ratio 1.4 g/dL      BUN/Creatinine " Ratio 14.6     Anion Gap 13.0 mmol/L      eGFR 30.6 mL/min/1.73     Narrative:      GFR Categories in Chronic Kidney Disease (CKD)      GFR Category          GFR (mL/min/1.73)    Interpretation  G1                     90 or greater         Normal or high (1)  G2                      60-89                Mild decrease (1)  G3a                   45-59                Mild to moderate decrease  G3b                   30-44                Moderate to severe decrease  G4                    15-29                Severe decrease  G5                    14 or less           Kidney failure          (1)In the absence of evidence of kidney disease, neither GFR category G1 or G2 fulfill the criteria for CKD.    eGFR calculation 2021 CKD-EPI creatinine equation, which does not include race as a factor    Magnesium [[id number]]  (Normal) Collected: 01/25/25 1835    Specimen: Blood Updated: 01/25/25 1915     Magnesium 1.8 mg/dL     High Sensitivity Troponin T [[id number]]  (Abnormal) Collected: 01/25/25 1835    Specimen: Blood Updated: 01/25/25 1911     HS Troponin T 45 ng/L      Comment: Specimen hemolyzed.  Results may be falsely decreased.       Narrative:      High Sensitive Troponin T Reference Range:  <14.0 ng/L- Negative Female for AMI  <22.0 ng/L- Negative Male for AMI  >=14 - Abnormal Female indicating possible myocardial injury.  >=22 - Abnormal Male indicating possible myocardial injury.   Clinicians would have to utilize clinical acumen, EKG, Troponin, and serial changes to determine if it is an Acute Myocardial Infarction or myocardial injury due to an underlying chronic condition.         Lactic Acid, Plasma [[id number]]  (Abnormal) Collected: 01/25/25 1835    Specimen: Blood Updated: 01/25/25 1910     Lactate 2.1 mmol/L     Rogers Draw [[id number]] Collected: 01/25/25 1729    Specimen: Blood Updated: 01/25/25 1745    Narrative:      The following orders were created for panel order Rogers Draw.  Procedure                                Abnormality         Status                     ---------                               -----------         ------                     Green Top (Gel)[[id number]]                                  Final result               Lavender Top[[id number]]                                     Final result               Red Top[[id number]]                                          Final result               Light Blue Top[[id number]]                                   Final result                 Please view results for these tests on the individual orders.    Green Top (Gel) [[id number]] Collected: 01/25/25 1729    Specimen: Blood Updated: 01/25/25 1745     Extra Tube Hold for add-ons.     Comment: Auto resulted.       Lavender Top [[id number]] Collected: 01/25/25 1729    Specimen: Blood Updated: 01/25/25 1745     Extra Tube hold for add-on     Comment: Auto resulted       Red Top [[id number]] Collected: 01/25/25 1729    Specimen: Blood Updated: 01/25/25 1745     Extra Tube Hold for add-ons.     Comment: Auto resulted.       Light Blue Top [[id number]] Collected: 01/25/25 1729    Specimen: Blood Updated: 01/25/25 1745     Extra Tube Hold for add-ons.     Comment: Auto resulted       CBC & Differential [[id number]]  (Abnormal) Collected: 01/25/25 1729    Specimen: Blood Updated: 01/25/25 1737    Narrative:      The following orders were created for panel order CBC & Differential.  Procedure                               Abnormality         Status                     ---------                               -----------         ------                     CBC Auto Differential[[id number]]        Abnormal            Final result                 Please view results for these tests on the individual orders.    CBC Auto Differential [[id number]]  (Abnormal) Collected: 01/25/25 1729    Specimen: Blood Updated: 01/25/25 1737     WBC 16.21 10*3/mm3      RBC 4.14 10*6/mm3      Hemoglobin 13.2 g/dL      Hematocrit 41.2 %      MCV 99.5 fL       MCH 31.9 pg      MCHC 32.0 g/dL      RDW 13.7 %      RDW-SD 49.9 fl      MPV 9.6 fL      Platelets 169 10*3/mm3      Neutrophil % 82.7 %      Lymphocyte % 11.0 %      Monocyte % 4.4 %      Eosinophil % 0.9 %      Basophil % 0.3 %      Immature Grans % 0.7 %      Neutrophils, Absolute 13.40 10*3/mm3      Lymphocytes, Absolute 1.79 10*3/mm3      Monocytes, Absolute 0.71 10*3/mm3      Eosinophils, Absolute 0.15 10*3/mm3      Basophils, Absolute 0.05 10*3/mm3      Immature Grans, Absolute 0.11 10*3/mm3      nRBC 0.0 /100 WBC           Imaging Results (Last 24 Hours)       Procedure Component Value Units Date/Time    US Gallbladder [[id number]] Collected: 01/25/25 2113     Updated: 01/25/25 2118    Narrative:      EXAMINATION: US GALLBLADDER-  1/25/2025 9:13 PM     REASON FOR EXAM: elevated liver enzymes, elevated bilirubin, r/o biliary  obstruction; J01.00-Acute maxillary sinusitis, unspecified; K85.90-Acute  pancreatitis without necrosis or infection, unspecified; N17.9-Acute  kidney failure, unspecified; N39.0-Urinary tract infection, site not  specified     COMPARISON: 10/19/2020     TECHNIQUE: Multiple longitudinal and transverse real time sonographic  images of the right upper quadrant of the abdomen are obtained. Doppler  and grayscale images were provided. Images and report are stored per  institutional and state regulations.     FINDINGS:    PANCREAS: Obscured by bowel gas.     LIVER: Normal in size, echogenicity and echotexture. No focal lesion. No  definite intrahepatic ductal dilation.     GALLBLADDER: There is a 1.2 cm shadowing stone in the lumen of the  gallbladder. No pericholecystic fluid or gallbladder wall thickening.     BILE DUCTS: The CBD measures 0.3 cm in diameter. There is no  intrahepatic or extrahepatic ductal dilation.     OTHER: The visualized portion of the RIGHT kidney has a normal  appearance. The aorta is obscured by bowel gas.          Impression:         1.  Exam limited by overlying  bowel gas. Pancreas and aorta are poorly  visualized.     2.  There is a 1.2 cm stone in the lumen of the gallbladder. The common  bile duct is normal in caliber. No intrahepatic ductal dilation  identified.     This report was signed and finalized on 1/25/2025 9:15 PM by Dr. Jacky Hall MD.       CT Cervical Spine Without Contrast [[id number]] Collected: 01/25/25 1819     Updated: 01/25/25 1827    Narrative:      EXAMINATION: CT CERVICAL SPINE WO CONTRAST-  1/25/2025 6:19 PM     HISTORY: posterior neck pain     COMPARISON: 11/19/2023     DLP: 1031.66 mGy.cm     TECHNIQUE: Serial helical tomographic images of the cervical spine were  obtained without the use of intravenous contrast. Additionally, sagittal  and coronal reformatted images were also provided for review.  In order  to have a CT radiation dose as low as reasonably achievable, Automated  Exposure Control was utilized for adjustment of the mA and/or KV  according to patient size.     FINDINGS:     OSSEOUS: No definite acute fracture. There is evidence of previous  cervical fusion posteriorly at C4-C6. Posterior decompressive changes at  C4-C6 as well. No perihardware lucency in the posterior fusion screws.  No destructive bone lesion. I suspect partial bony fusion of the C3 and  C4 vertebral bodies and the posterior elements.     SKULL BASE: Arthritis at the C1-C2 articulation with the odontoid  process. No acute fracture in the visualized portion of the skull base.     ALIGNMENT: Straightening of the normal lordosis.     SURROUNDING SOFT TISSUES: Evaluation of the surrounding soft tissues  demonstrates calcified atherosclerotic vascular disease in the bilateral  carotid bifurcations.     UPPER THORAX: The visualized thorax demonstrates no acute abnormality.     LEVELS:  C2-C3: Facet arthropathy noted in the RIGHT C2-C3 facet. There is  moderate RIGHT foraminal narrowing. No high-grade thecal sac narrowing.     C3-C4: Partial fusion of the vertebral  bodies and the posterior elements  at C3-C4 with posterior disc osteophyte complex noted indenting the  ventral aspect of the CSF space, likely contributing to at least a mild  thecal sac narrowing. At the C4 level there is posterior decompression.  There is moderate LEFT and RIGHT foraminal narrowing.     C4-C5: Fusion changes at this level. Posterior disc osteophyte complex  indents the ventral aspect of the thecal sac. Posterior decompressive  changes are noted. This is similar to the exam from 11/19/2023. Moderate  bilateral foraminal narrowing.     C5-C6: Fusion changes at this level with marginal endplate osteophytes.  Moderate RIGHT and severe LEFT foraminal narrowing. Posterior  decompressive changes.     C6-C7: Posterior decompressive changes at C6. Diffuse disc space height  loss with vacuum disc phenomenon and marginal endplate osteophytes.  Suspect at moderate thecal sac stenosis at the caudal aspect of this  disc space. There is moderate bilateral foraminal narrowing, LEFT  greater than RIGHT.     C7-T1: Anterior end plate osteophyte formation. At least mild LEFT  foraminal narrowing. No high-grade thecal sac narrowing.          Impression:         1.  Multilevel degenerative changes and fusion changes as discussed  above. No definite acute osseous abnormality on this exam.     2.  Atherosclerotic vascular disease in the carotid bifurcations  bilaterally.      This report was signed and finalized on 1/25/2025 6:24 PM by Dr. Jacky Hall MD.       CT Head Without Contrast [[id number]] Collected: 01/25/25 1814     Updated: 01/25/25 1820    Narrative:      EXAMINATION: CT HEAD WO CONTRAST-  1/25/2025 6:14 PM     HISTORY: dizziness     neck pain     COMPARISON: 2/1/2022     DLP: 1031.66 mGy.cm     In order to have a CT radiation dose as low as reasonably achievable,  Automated Exposure Control was utilized for adjustment of the mA and/or  KV according to patient size.     TECHNIQUE: Serial axial  tomographic images of the brain were obtained  without the use of intravenous contrast.  Coronal and sagittal  reformatted images were obtained reviewed as well.     FINDINGS:  Moderate ventricular dilation. Low density in the subcortical and  periventricular white matter, similar to the exam from 2/1/2022.  Vascular calcifications in the intracranial vasculature. No acute  hemorrhage. No mass effect or midline shift. Cavum septum pellucidum et  vergae. The structures of the posterior fossa are unremarkable. 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     The included orbits and their contents are unremarkable. Opacification  of the bilateral maxillary sinuses. The visualized osseous structures  and overlying soft tissues of the skull and face are unremarkable.          Impression:         1.  Moderate ventricular dilation again seen with low-attenuation in the  subcortical and periventricular white matter may be related to chronic  microvascular change or could be related to normal pressure  hydrocephalus. Overall, this is very similar to the exam from 2/1/2022.     2.  Opacification of the bilateral maxillary sinuses, raising concern  for maxillary sinusitis.     This report was signed and finalized on 1/25/2025 6:17 PM by Dr. Jacky Hall MD.             I have personally reviewed and interpreted the radiology studies and ECG obtained at time of admission.     Assessment / Plan   Assessment:   Active Hospital Problems    Diagnosis     **Pancreatitis, acute        Treatment Plan  The patient will be admitted to my service here at [facility].     Assessment and plan:  #Acute pancreatitis.  #ISHA.  #Lactic acidosis.  #Elevated LFTs.  #UTI    -Admitting to floor.  -ED gave him IV boluses and will continue with IV continuous infusions.  -Pain medication ordered.  -Keeping patient n.p.o. for now.  -Placing a.m. consult with GI.  Patient had an ultrasound gallbladder and it  showed a 1.2 cm stone in the gallbladder but the common bile duct was normal.  -Adding triglycerides to the workup.  -Will recheck creatinine in the morning.  -ED could not perform CT with IV contrast given his ISHA, however if creatinine improves day team may need to consider that if still appropriate.  -Continue with Zosyn started in the ED empirically for urine infection.  -DVT prophylaxis with heparin subcu.    Medical Decision Making  Number and Complexity of problems: 5 and moderate  Differential Diagnosis: As above    Conditions and Status        Condition is worsening.     MDM Data  External documents reviewed: Yes  Cardiac tracing (EKG, telemetry) interpretation: EKG interpretation reviewed  Radiology interpretation: Scans and ultrasound gallbladder reviewed  Labs reviewed: Yes  Any tests that were considered but not ordered: Triglycerides ordered     Decision rules/scores evaluated (example [id number], [place], etc): None     Discussed with: Patient, daughter over the phone, ED provider     Care Planning  Shared decision making: I explained the treatment plan and patient is agreeable  Code status and discussions: I discussed CODE STATUS with the patient and he wants to be DNR DNI    Disposition  Social Determinants of Health that impact treatment or disposition: None at the moment  Estimated length of stay is 2 to 3 days    I confirmed that the patient's advanced care plan is present, code status is documented, and a surrogate decision maker is listed in the patient's medical record.     The patient was seen and examined by me on 1/25 at 10 PM    Electronically signed by Winter White MD, 01/25/25, 23:39 CST.              Electronically signed by Winter White MD at 01/25/25 2334          Emergency Department Notes        Taylor Patterson, RN at 01/25/25 2231          Nursing report ED to floor  Jose Alfredo Kirkpatrick  89 y.o.  male    HPI:   Chief Complaint   Patient presents with    Abdominal Pain        Admitting doctor:   Winter White MD    Consulting provider(s):  Consults       No orders found from 12/27/2024 to 1/26/2025.             Admitting diagnosis:   The primary encounter diagnosis was Acute pancreatitis, unspecified complication status, unspecified pancreatitis type. Diagnoses of Acute non-recurrent maxillary sinusitis, ISHA (acute kidney injury), Acute UTI (urinary tract infection), Elevated liver enzymes, and Elevated troponin level were also pertinent to this visit.    Code status:   Current Code Status       Date Active Code Status Order ID Comments User Context       Prior            Allergies:   Meloxicam    Intake and Output    Intake/Output Summary (Last 24 hours) at 1/25/2025 2231  Last data filed at 1/25/2025 2115  Gross per 24 hour   Intake 1100 ml   Output 350 ml   Net 750 ml       Weight:       01/25/25  1721   Weight: 81 kg (178 lb 9.2 oz)       Most recent vitals:   Vitals:    01/25/25 1931 01/25/25 1946 01/25/25 2001 01/25/25 2003   BP: 120/72 127/58 112/91    BP Location:       Patient Position:       Pulse: 79 79  76   Resp:       Temp:       TempSrc:       SpO2: 92% 93%  92%   Weight:       Height:         Oxygen Therapy: .    Active LDAs/IV Access:   Lines, Drains & Airways       Active LDAs       Name Placement date Placement time Site Days    Peripheral IV 01/25/25 1725 Left;Posterior Hand 01/25/25 1725  Hand  less than 1                    Labs (abnormal labs have a star):   Labs Reviewed   URINALYSIS W/ CULTURE IF INDICATED - Abnormal; Notable for the following components:       Result Value    Color, UA Dark Yellow (*)     Appearance, UA Cloudy (*)     Ketones, UA Trace (*)     Bilirubin, UA Small (1+) (*)     Protein, UA 30 mg/dL (1+) (*)     Leuk Esterase, UA Trace (*)     Nitrite, UA Positive (*)     All other components within normal limits    Narrative:     In absence of clinical symptoms, the presence of pyuria, bacteria, and/or nitrites on the urinalysis result  does not correlate with infection.   COMPREHENSIVE METABOLIC PANEL - Abnormal; Notable for the following components:    Glucose 102 (*)     BUN 30 (*)     Creatinine 2.05 (*)     ALT (SGPT) 486 (*)     AST (SGOT) 506 (*)     Alkaline Phosphatase 183 (*)     Total Bilirubin 3.0 (*)     eGFR 30.6 (*)     All other components within normal limits    Narrative:     GFR Categories in Chronic Kidney Disease (CKD)      GFR Category          GFR (mL/min/1.73)    Interpretation  G1                     90 or greater         Normal or high (1)  G2                      60-89                Mild decrease (1)  G3a                   45-59                Mild to moderate decrease  G3b                   30-44                Moderate to severe decrease  G4                    15-29                Severe decrease  G5                    14 or less           Kidney failure          (1)In the absence of evidence of kidney disease, neither GFR category G1 or G2 fulfill the criteria for CKD.    eGFR calculation 2021 CKD-EPI creatinine equation, which does not include race as a factor   LIPASE - Abnormal; Notable for the following components:    Lipase 2,696 (*)     All other components within normal limits   CBC WITH AUTO DIFFERENTIAL - Abnormal; Notable for the following components:    WBC 16.21 (*)     MCV 99.5 (*)     Neutrophil % 82.7 (*)     Lymphocyte % 11.0 (*)     Monocyte % 4.4 (*)     Immature Grans % 0.7 (*)     Neutrophils, Absolute 13.40 (*)     Immature Grans, Absolute 0.11 (*)     All other components within normal limits   PROCALCITONIN - Abnormal; Notable for the following components:    Procalcitonin 41.03 (*)     All other components within normal limits    Narrative:     As a Marker for Sepsis (Non-Neonates):    1. <0.5 ng/mL represents a low risk of severe sepsis and/or septic shock.  2. >2 ng/mL represents a high risk of severe sepsis and/or septic shock.    As a Marker for Lower Respiratory Tract Infections that  "require antibiotic therapy:    PCT on Admission    Antibiotic Therapy       6-12 Hrs later    >0.5                Strongly Recommended  >0.25 - <0.5        Recommended   0.1 - 0.25          Discouraged              Remeasure/reassess PCT  <0.1                Strongly Discouraged     Remeasure/reassess PCT    As 28 day mortality risk marker: \"Change in Procalcitonin Result\" (>80% or <=80%) if Day 0 (or Day 1) and Day 4 values are available. Refer to http://[email or web address]-pct-[email or web address]    Change in PCT <=80%  A decrease of PCT levels below or equal to 80% defines a positive change in PCT test result representing a higher risk for 28-day all-cause mortality of patients diagnosed with severe sepsis for septic shock.    Change in PCT >80%  A decrease of PCT levels of more than 80% defines a negative change in PCT result representing a lower risk for 28-day all-cause mortality of patients diagnosed with severe sepsis or septic shock.      TROPONIN - Abnormal; Notable for the following components:    HS Troponin T 45 (*)     All other components within normal limits    Narrative:     High Sensitive Troponin T Reference Range:  <14.0 ng/L- Negative Female for AMI  <22.0 ng/L- Negative Male for AMI  >=14 - Abnormal Female indicating possible myocardial injury.  >=22 - Abnormal Male indicating possible myocardial injury.   Clinicians would have to utilize clinical acumen, EKG, Troponin, and serial changes to determine if it is an Acute Myocardial Infarction or myocardial injury due to an underlying chronic condition.        LACTIC ACID, PLASMA - Abnormal; Notable for the following components:    Lactate 2.1 (*)     All other components within normal limits   HIGH SENSITIVITIY TROPONIN T 1HR - Abnormal; Notable for the following components:    HS Troponin T 42 (*)     All other components within normal limits    Narrative:     High Sensitive Troponin T Reference Range:  <14.0 ng/L- Negative Female for AMI  <22.0 ng/L- " Negative Male for AMI  >=14 - Abnormal Female indicating possible myocardial injury.  >=22 - Abnormal Male indicating possible myocardial injury.   Clinicians would have to utilize clinical acumen, EKG, Troponin, and serial changes to determine if it is an Acute Myocardial Infarction or myocardial injury due to an underlying chronic condition.        URINALYSIS, MICROSCOPIC ONLY - Abnormal; Notable for the following components:    WBC, UA 6-10 (*)     Bacteria, UA Trace (*)     All other components within normal limits   MAGNESIUM - Normal   BLOOD CULTURE   BLOOD CULTURE   URINE CULTURE   RAINBOW DRAW    Narrative:     The following orders were created for panel order [place] Draw.  Procedure                               Abnormality         Status                     ---------                               -----------         ------                     Green Top (Gel)[[id number]]                                  Final result               Lavender Top[[id number]]                                     Final result               Red Top[[id number]]                                          Final result               Light Blue Top[[id number]]                                   Final result                 Please view results for these tests on the individual orders.   LACTIC ACID, REFLEX   GREEN TOP   LAVENDER TOP   RED TOP   LIGHT BLUE TOP   CBC AND DIFFERENTIAL    Narrative:     The following orders were created for panel order CBC & Differential.  Procedure                               Abnormality         Status                     ---------                               -----------         ------                     CBC Auto Differential[[id number]]        Abnormal            Final result                 Please view results for these tests on the individual orders.       Meds given in ED:   Medications   sodium chloride 0.9 % flush 10 mL (has no administration in time range)   sodium chloride 0.9 % bolus 1,000 mL (0 mL  "Intravenous Stopped 1/25/25 2010)   piperacillin-tazobactam (ZOSYN) 3.375 g IVPB in 100 mL NS MBP (CD) (0 g Intravenous Stopped 1/25/25 2115)   sepsis fluid NS 0.9 % bolus 2,430 mL (1,500 mL Intravenous New Bag 1/25/25 2044)           NIH Stroke Scale:  Interval: baseline    Isolation/Infection(s):  No active isolations   COVID (History)     COVID Testing  Collected .  Resulted .    Nursing report ED to floor:  Mental status: A/O x 4  Ambulatory status: Weak, at least one standby  Precautions: Fall, skin,     ED nurse phone extentsion- [phone]      Electronically signed by Taylor Patterson RN at 01/25/25 2231       Joselin Alejandre APRN at 01/25/25 1721          Subjective   History of Present Illness  89-year-old male patient presents to the ED via EMS accompanied by his daughter from [facility].  Triage note indicates the patient is having abdominal cramps. He corrected me and stated that he \"had\" abdominal cramps 2 days ago, but none now.  He states that the abdominal cramping resolved after having a large, solid, formed stool yesterday, \"so big I had to plunge the toilet\". He denies any black or bloody stools.  Also denies diarrhea, nausea, and vomiting.       He reports dizziness and generalized weakness since having this bowel movement.  The dizziness does not appear to be associated with specific triggers, such as changes in position or activity. He denies headache and vision changes.  No neck pain. No fever or chills.  He denies a cough, chest pain, and shortness of air.     Patient also reporting fatigue. He \"stayed in the bed all day\", which is not the patient's baseline. He is reportedly very active and performs all of his ADLs independently. He states that he \"always\" ambulates with assistance of a walker, but he doesn't require additional support. Family is concerned because staff had to help assist the patient with walking in his room today.    Family reports \"confusion\" yesterday, by " "expressing anomia. Denies falls, trauma, and head injury. He is currently AAO x 4. He does take Percocet 10mg QID and Gabapentin 300mg QID for chronic pain. The patient is also reporting \"slurred speech\" at present. His speech is not slurred. His mouth and tongue are very dry which is minimally affecting his speech. Neurological exam is normal. No facial droop. No pronator drift. Normal finger-to-nose.      He has a history of chronic low back and right lower extremity pain. He denies recent trauma, injury, or falls. Denies any wounds or break in skin integrity. No numbness or tingling. No dysuria or CVA tenderness. No unexplained weight loss, fever, or chills. Denies a recent illness outside of the presenting complaints.  Denies a history of malignancy. No chronic steroid use or spinal injections. His weakness is generalized, but does not only or primarily affect his lower extremities. He denies saddle anesthesia. No changes in bowel or bladder function such as urinary or fecal incontinence or urinary retention.           ED Triage Vitals [01/25/25 1721]   Temp Heart Rate Resp BP SpO2   98.2 °F (36.8 °C) 77 16 124/79 94 %      Temp src Heart Rate Source Patient Position BP Location FiO2 (%)   Oral Monitor Lying Right arm --         Review of Systems   Constitutional:  Positive for fatigue. Negative for chills and fever.   Eyes:  Negative for visual disturbance.   Respiratory:  Negative for cough and shortness of breath.    Cardiovascular:  Negative for chest pain.   Gastrointestinal:  Positive for abdominal pain (resolved). Negative for abdominal distention, blood in stool, constipation, diarrhea, nausea and vomiting.   Genitourinary:  Negative for difficulty urinating, dysuria, enuresis and flank pain.   Musculoskeletal:  Positive for back pain (chronic). Negative for neck pain.   Neurological:  Positive for dizziness, speech difficulty and weakness. Negative for numbness and headaches.   Psychiatric/Behavioral:  " Positive for confusion (resolved).        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       Allergies   Allergen Reactions    Meloxicam Unknown - Low Severity       Past Surgical History:   Procedure Laterality Date    BACK SURGERY      cervical    CATARACT EXTRACTION Bilateral     COLONOSCOPY  02/11/2013    Hyperplastic polyp at 25 cm, Diverticulosis repeat exam in 5 years    COLONOSCOPY N/A 10/16/2018    Tubular adenoma ascending colon repet prn    ENDOSCOPY  01/15/2014    HH    ENDOSCOPY N/A 10/03/2019    Large HH, non-bleeding gastric ulcer    ENDOSCOPY N/A 11/25/2019    Procedure: ESOPHAGOGASTRODUODENOSCOPY WITH ANESTHESIA;  Surgeon: Marques Pérez MD;  Location:  [facility] ENDOSCOPY;  Service: Gastroenterology    ENDOSCOPY N/A 2/9/2023    Procedure: ESOPHAGOGASTRODUODENOSCOPY WITH ANESTHESIA;  Surgeon: Sushant Edmond MD;  Location: [facility] ENDOSCOPY;  Service: Gastroenterology;  Laterality: N/A;  pre GI bleed  post hiatal hernia; esophagitis  Otilio Ramos MD    HERNIA REPAIR Right     INGUINAL     HIP FRACTURE SURGERY Right 02/04/2023    REPLACEMENT TOTAL KNEE BILATERAL      THORACIC LAMINECTOMY DECOMPRESSIVE POSTERIOR N/A 12/14/2016    Procedure: LAMINECTOMY DECOMPRESSION, UNINSTRUMENTED POSTERIOR SPINAL FUSION, T 11-12;  Surgeon: LUZ Adan MD;  Location: [facility] OR;  Service:     VENA CAVA FILTER INSERTION N/A 10/04/2019    Procedure: VENA CAVA FILTER INSERTION;  Surgeon: Dallin Coronado MD;  Location:  [facility] HYBRID OR 12;  Service: Vascular       Family History   Problem Relation Age of Onset    Colon cancer Father     Colon polyps Neg Hx        Social History     Socioeconomic History    Marital status:    Tobacco Use    Smoking status: Never     Passive  exposure: Never    Smokeless tobacco: Never   Vaping Use    Vaping status: Unknown   Substance and Sexual Activity    Alcohol use: No    Drug use: No    Sexual activity: Defer           Objective   Physical Exam  Vitals and nursing note reviewed.   Constitutional:       General: He is awake. He is not in acute distress.     Appearance: He is not ill-appearing, toxic-appearing or diaphoretic.   HENT:      Head: Normocephalic and atraumatic.      Jaw: There is normal jaw occlusion.      Right Ear: Tympanic membrane, ear canal and external ear normal.      Left Ear: Tympanic membrane, ear canal and external ear normal.      Nose: Nose normal.      Mouth/Throat:      Lips: Pink.      Mouth: Mucous membranes are dry. No oral lesions.      Tongue: No lesions.      Palate: No lesions.      Pharynx: Oropharynx is clear. Uvula midline. No oropharyngeal exudate.   Eyes:      Extraocular Movements: Extraocular movements intact.      Conjunctiva/sclera: Conjunctivae normal.      Pupils: Pupils are equal, round, and reactive to light.   Cardiovascular:      Rate and Rhythm: Normal rate and regular rhythm.      Pulses: Normal pulses.      Heart sounds: Normal heart sounds. No murmur heard.  Pulmonary:      Effort: Pulmonary effort is normal. No respiratory distress.      Breath sounds: Normal breath sounds. No stridor.   Chest:      Chest wall: No tenderness.   Abdominal:      General: Bowel sounds are normal.      Palpations: Abdomen is soft.      Tenderness: There is no abdominal tenderness. There is no right CVA tenderness or left CVA tenderness.   Musculoskeletal:      Cervical back: Neck supple. No rigidity, tenderness or bony tenderness.      Thoracic back: No bony tenderness.      Lumbar back: No bony tenderness. Negative right straight leg raise test and negative left straight leg raise test.      Comments: Inspection reveals no visible surface trauma, soft tissue swelling, or bruising to the back. Skin intact without  erythema or warmth. No apparent muscle atrophy. Palpation of the midline spine reveals bony prominences of the vertebrae without any midline or paraspinous tenderness. No CVA tenderness bilaterally. No lower extremity edema. He reports chronic pain in his right lower extremity following a surgical procedure many years ago. He is able to perform left straight leg raise testing without any hesitation, which is negative for pain. However, he struggles to raise the right leg off the bed. He states that this is baseline. Right SLR testing is also negative for pain. He is able to flex and dorsiflex bilateral feet without exacerbation of lumbar pain.      Lymphadenopathy:      Cervical: No cervical adenopathy.   Skin:     General: Skin is warm and dry.      Capillary Refill: Capillary refill takes less than 2 seconds.   Neurological:      General: No focal deficit present.      Mental Status: He is alert and oriented to person, place, and time.      GCS: GCS eye subscore is 4. GCS verbal subscore is 5. GCS motor subscore is 6.      Cranial Nerves: Cranial nerves 2-12 are intact. No dysarthria or facial asymmetry.      Sensory: Sensation is intact.      Motor: No pronator drift.      Coordination: Coordination is intact. Finger-Nose-Finger Test normal.   Psychiatric:         Mood and Affect: Mood normal.         Behavior: Behavior normal. Behavior is cooperative.         Procedures      Lab Results (last 24 hours)       Procedure Component Value Units Date/Time    CBC & Differential [[id number]]  (Abnormal) Collected: 01/25/25 1729    Specimen: Blood Updated: 01/25/25 1737    Narrative:      The following orders were created for panel order CBC & Differential.  Procedure                               Abnormality         Status                     ---------                               -----------         ------                     CBC Auto Differential[[id number]]        Abnormal            Final result                  Please view results for these tests on the individual orders.    CBC Auto Differential [[id number]]  (Abnormal) Collected: 01/25/25 1729    Specimen: Blood Updated: 01/25/25 1737     WBC 16.21 10*3/mm3      RBC 4.14 10*6/mm3      Hemoglobin 13.2 g/dL      Hematocrit 41.2 %      MCV 99.5 fL      MCH 31.9 pg      MCHC 32.0 g/dL      RDW 13.7 %      RDW-SD 49.9 fl      MPV 9.6 fL      Platelets 169 10*3/mm3      Neutrophil % 82.7 %      Lymphocyte % 11.0 %      Monocyte % 4.4 %      Eosinophil % 0.9 %      Basophil % 0.3 %      Immature Grans % 0.7 %      Neutrophils, Absolute 13.40 10*3/mm3      Lymphocytes, Absolute 1.79 10*3/mm3      Monocytes, Absolute 0.71 10*3/mm3      Eosinophils, Absolute 0.15 10*3/mm3      Basophils, Absolute 0.05 10*3/mm3      Immature Grans, Absolute 0.11 10*3/mm3      nRBC 0.0 /100 WBC     Comprehensive Metabolic Panel [[id number]]  (Abnormal) Collected: 01/25/25 1835    Specimen: Blood Updated: 01/25/25 1915     Glucose 102 mg/dL      BUN 30 mg/dL      Creatinine 2.05 mg/dL      Sodium 140 mmol/L      Potassium 5.1 mmol/L      Comment: Specimen hemolyzed.  Result may be falsely elevated.        Chloride 103 mmol/L      CO2 24.0 mmol/L      Calcium 9.1 mg/dL      Total Protein 6.5 g/dL      Albumin 3.8 g/dL      ALT (SGPT) 486 U/L      Comment: Specimen hemolyzed.  Result may  be falsely elevated.        AST (SGOT) 506 U/L      Comment: Specimen hemolyzed.  Result may be falsely elevated.        Alkaline Phosphatase 183 U/L      Total Bilirubin 3.0 mg/dL      Globulin 2.7 gm/dL      A/G Ratio 1.4 g/dL      BUN/Creatinine Ratio 14.6     Anion Gap 13.0 mmol/L      eGFR 30.6 mL/min/1.73     Narrative:      GFR Categories in Chronic Kidney Disease (CKD)      GFR Category          GFR (mL/min/1.73)    Interpretation  G1                     90 or greater         Normal or high (1)  G2                      60-89                Mild decrease (1)  G3a                   45-59                Mild to  "moderate decrease  G3b                   30-44                Moderate to severe decrease  G4                    15-29                Severe decrease  G5                    14 or less           Kidney failure          (1)In the absence of evidence of kidney disease, neither GFR category G1 or G2 fulfill the criteria for CKD.    eGFR calculation 2021 CKD-EPI creatinine equation, which does not include race as a factor    Lipase [[id number]]  (Abnormal) Collected: 01/25/25 1835    Specimen: Blood Updated: 01/25/25 1926     Lipase 2,696 U/L      Comment: Specimen hemolyzed.  Results may be falsely decreased.       Procalcitonin [[id number]]  (Abnormal) Collected: 01/25/25 1835    Specimen: Blood Updated: 01/25/25 1918     Procalcitonin 41.03 ng/mL     Narrative:      As a Marker for Sepsis (Non-Neonates):    1. <0.5 ng/mL represents a low risk of severe sepsis and/or septic shock.  2. >2 ng/mL represents a high risk of severe sepsis and/or septic shock.    As a Marker for Lower Respiratory Tract Infections that require antibiotic therapy:    PCT on Admission    Antibiotic Therapy       6-12 Hrs later    >0.5                Strongly Recommended  >0.25 - <0.5        Recommended   0.1 - 0.25          Discouraged              Remeasure/reassess PCT  <0.1                Strongly Discouraged     Remeasure/reassess PCT    As 28 day mortality risk marker: \"Change in Procalcitonin Result\" (>80% or <=80%) if Day 0 (or Day 1) and Day 4 values are available. Refer to [email or web address]    Change in PCT <=80%  A decrease of PCT levels below or equal to 80% defines a positive change in PCT test result representing a higher risk for 28-day all-cause mortality of patients diagnosed with severe sepsis for septic shock.    Change in PCT >80%  A decrease of PCT levels of more than 80% defines a negative change in PCT result representing a lower risk for 28-day all-cause mortality of patients diagnosed with severe sepsis " or septic shock.       High Sensitivity Troponin T [[id number]]  (Abnormal) Collected: 01/25/25 1835    Specimen: Blood Updated: 01/25/25 1911     HS Troponin T 45 ng/L      Comment: Specimen hemolyzed.  Results may be falsely decreased.       Narrative:      High Sensitive Troponin T Reference Range:  <14.0 ng/L- Negative Female for AMI  <22.0 ng/L- Negative Male for AMI  >=14 - Abnormal Female indicating possible myocardial injury.  >=22 - Abnormal Male indicating possible myocardial injury.   Clinicians would have to utilize clinical acumen, EKG, Troponin, and serial changes to determine if it is an Acute Myocardial Infarction or myocardial injury due to an underlying chronic condition.         Magnesium [[id number]]  (Normal) Collected: 01/25/25 1835    Specimen: Blood Updated: 01/25/25 1915     Magnesium 1.8 mg/dL     Lactic Acid, Plasma [[id number]]  (Abnormal) Collected: 01/25/25 1835    Specimen: Blood Updated: 01/25/25 1910     Lactate 2.1 mmol/L     Urinalysis With Culture If Indicated - Straight Cath [[id number]]  (Abnormal) Collected: 01/25/25 1942    Specimen: Urine from Straight Cath Updated: 01/25/25 2017     Color, UA Dark Yellow     Appearance, UA Cloudy     pH, UA <=5.0     Specific Gravity, UA 1.019     Glucose, UA Negative     Ketones, UA Trace     Bilirubin, UA Small (1+)     Blood, UA Negative     Protein, UA 30 mg/dL (1+)     Leuk Esterase, UA Trace     Nitrite, UA Positive     Urobilinogen, UA 1.0 E.U./dL    Narrative:      In absence of clinical symptoms, the presence of pyuria, bacteria, and/or nitrites on the urinalysis result does not correlate with infection.    Urinalysis, Microscopic Only - Straight Cath [[id number]]  (Abnormal) Collected: 01/25/25 1942    Specimen: Urine from Straight Cath Updated: 01/25/25 2017     RBC, UA None Seen /HPF      WBC, UA 6-10 /HPF      Bacteria, UA Trace /HPF      Squamous Epithelial Cells, UA 0-2 /HPF      Hyaline Casts, UA 3-6 /LPF      Methodology Manual  Light Microscopy    Urine Culture - Urine, Straight Cath [[id number]] Collected: 01/25/25 1942    Specimen: Urine from Straight Cath Updated: 01/25/25 2017    Blood Culture - Blood, Arm, Left [[id number]] Collected: 01/25/25 2039    Specimen: Blood from Arm, Left Updated: 01/25/25 2101    High Sensitivity Troponin T 1Hr [[id number]]  (Abnormal) Collected: 01/25/25 2040    Specimen: Blood from Arm, Right Updated: 01/25/25 2119     HS Troponin T 42 ng/L      Troponin T Numeric Delta -3 ng/L      Troponin T % Delta -7    Narrative:      High Sensitive Troponin T Reference Range:  <14.0 ng/L- Negative Female for AMI  <22.0 ng/L- Negative Male for AMI  >=14 - Abnormal Female indicating possible myocardial injury.  >=22 - Abnormal Male indicating possible myocardial injury.   Clinicians would have to utilize clinical acumen, EKG, Troponin, and serial changes to determine if it is an Acute Myocardial Infarction or myocardial injury due to an underlying chronic condition.         Blood Culture - Blood, Arm, Right [[id number]] Collected: 01/25/25 2040    Specimen: Blood from Arm, Right Updated: 01/25/25 2059                  ED Course  ED Course as of 01/27/25 1028   Sat Jan 25, 2025 1810 ECG 12 Lead Other; Dizziness  Sinus rhythm with 1st degree AV block 77bpm  Nonspecific T wave abnormality  When compared with ECG of 10-Feb-2023 13:10,  QT has shortened   [TD]   1845 CT Head Without Contrast  IMPRESSION:     1.  Moderate ventricular dilation again seen with low-attenuation in the  subcortical and periventricular white matter may be related to chronic  microvascular change or could be related to normal pressure  hydrocephalus. Overall, this is very similar to the exam from 2/1/2022.     2.  Opacification of the bilateral maxillary sinuses, raising concern  for maxillary sinusitis.      [TD]   1845 CT Cervical Spine Without Contrast  IMPRESSION:     1.  Multilevel degenerative changes and fusion changes as discussed  above. No  definite acute osseous abnormality on this exam.     2.  Atherosclerotic vascular disease in the carotid bifurcations  bilaterally.    [TD]   1930 ED attending (Julio) consulted. Plan: Consult Hospitalist for admission. ED  notified.  [TD]   2105 Check-in with ED  regarding status of US Gallbladder. US (non-vascular) tech pending arrival for another patient in the ED as well.  [TD]   2130 US Gallbladder     IMPRESSION:     1.  Exam limited by overlying bowel gas. Pancreas and aorta are poorly  visualized.     2.  There is a 1.2 cm stone in the lumen of the gallbladder. The common  bile duct is normal in caliber. No intrahepatic ductal dilation  identified.   [TD]   2149 Pending consult with Hospitalist regarding US results.  [TD]   2209 Consult with Dr. White. [place] to admit.  [TD]   2220 Updated patient and daughter via patient's telephone with his permission on results and admission. They are in agreement.  [TD]      ED Course User Index  [TD] Joselin Alejandre, HARISH                                                       Medical Decision Making  An 89-year-old male presents with complaints of dizziness and fatigue. He reports feeling increasingly tired over the past few days and describes intermittent dizziness, particularly when standing. He denies any significant headaches, chest pain, shortness of breath, or recent falls. The dizziness does not appear to be associated with specific triggers, such as changes in position or activity. 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 He is currently taking Percocet for pain management, along with Gabapentin for nerve-related discomfort. The patient denies any recent changes in his medication or symptoms related to nausea, vomiting, or fever.      Based on the patient's symptoms and examination findings, potential differential diagnoses include but are not limited to:   dehydration, orthostatic hypotension, musculoskeletal dysfunction, medication side effects (e.g., percocet, gabapentin), age-related changes in balance, BPPV, CVA or TIA (TIA possible, CVA less likely given a normal neurological exam), electrolyte imbalance.       Course of treatment in the ED:  Labs Reviewed  URINALYSIS W/ CULTURE IF INDICATED - Abnormal; Notable for the following components:     Color, UA                     Dark Yellow (*)               Appearance, UA                Cloudy (*)               Ketones, UA                   Trace (*)               Bilirubin, UA                 Small (1+) (*)               Protein, UA                     (*)                  Leuk Esterase, UA             Trace (*)               Nitrite, UA                   Positive (*)            All other components within normal limits         Narrative: In absence of clinical symptoms, the presence of pyuria, bacteria, and/or nitrites on the urinalysis result does not correlate with infection.  COMPREHENSIVE METABOLIC PANEL - Abnormal; Notable for the following components:     Glucose                       102 (*)                BUN                           30 (*)                 Creatinine                    2.05 (*)               ALT (SGPT)                    486 (*)                AST (SGOT)                    506 (*)                Alkaline Phosphatase          183 (*)                Total Bilirubin               3.0 (*)                eGFR                          30.6 (*)            All other components within normal limits         Narrative: GFR Categories in Chronic Kidney Disease (CKD)                                      GFR Category          GFR (mL/min/1.73)    Interpretation                  G1                     90 or greater         Normal or high (1)                  G2                      60-89                Mild decrease (1)                  G3a                   45-59                Mild to moderate  decrease                  G3b                   30-44                Moderate to severe decrease                  G4                    15-29                Severe decrease                  G5                    14 or less           Kidney failure                                          (1)In the absence of evidence of kidney disease, neither GFR category G1 or G2 fulfill the criteria for CKD.                                    eGFR calculation 2021 CKD-EPI creatinine equation, which does not include race as a factor  LIPASE - Abnormal; Notable for the following components:     Lipase                        2,696 (*)            All other components within normal limits  CBC WITH AUTO DIFFERENTIAL - Abnormal; Notable for the following components:     WBC                           16.21 (*)               MCV                           99.5 (*)               Neutrophil %                  82.7 (*)               Lymphocyte %                  11.0 (*)               Monocyte %                    4.4 (*)                Immature Grans %              0.7 (*)                Neutrophils, Absolute         13.40 (*)               Immature Grans, Absolute      0.11 (*)            All other components within normal limits  PROCALCITONIN - Abnormal; Notable for the following components:     Procalcitonin                 41.03 (*)            All other components within normal limits         Narrative: As a Marker for Sepsis (Non-Neonates):                                    1. <0.5 ng/mL represents a low risk of severe sepsis and/or septic shock.                  2. >2 ng/mL represents a high risk of severe sepsis and/or septic shock.                                    As a Marker for Lower Respiratory Tract Infections that require antibiotic therapy:                                    PCT on Admission    Antibiotic Therapy       6-12 Hrs later                                    >0.5                Strongly Recommended                 "  >0.25 - <0.5        Recommended                   0.1 - 0.25          Discouraged              Remeasure/reassess PCT                  <0.1                Strongly Discouraged     Remeasure/reassess PCT                                    As 28 day mortality risk marker: \"Change in Procalcitonin Result\" (>80% or <=80%) if Day 0 (or Day 1) and Day 4 values are available. Refer to [email or web address]                                    Change in PCT <=80%                  A decrease of PCT levels below or equal to 80% defines a positive change in PCT test result representing a higher risk for 28-day all-cause mortality of patients diagnosed with severe sepsis for septic shock.                                    Change in PCT >80%                  A decrease of PCT levels of more than 80% defines a negative change in PCT result representing a lower risk for 28-day all-cause mortality of patients diagnosed with severe sepsis or septic shock.                     TROPONIN - Abnormal; Notable for the following components:     HS Troponin T                 45 (*)              All other components within normal limits         Narrative: High Sensitive Troponin T Reference Range:                  <14.0 ng/L- Negative Female for AMI                  <22.0 ng/L- Negative Male for AMI                  >=14 - Abnormal Female indicating possible myocardial injury.                  >=22 - Abnormal Male indicating possible myocardial injury.                   Clinicians would have to utilize clinical acumen, EKG, Troponin, and serial changes to determine if it is an Acute Myocardial Infarction or myocardial injury due to an underlying chronic condition.                                       LACTIC ACID, PLASMA - Abnormal; Notable for the following components:     Lactate                       2.1 (*)             All other components within normal limits  HIGH SENSITIVITIY TROPONIN T 1HR - Abnormal; Notable for the " following components:     HS Troponin T                 42 (*)              All other components within normal limits         Narrative: High Sensitive Troponin T Reference Range:                  <14.0 ng/L- Negative Female for AMI                  <22.0 ng/L- Negative Male for AMI                  >=14 - Abnormal Female indicating possible myocardial injury.                  >=22 - Abnormal Male indicating possible myocardial injury.                   Clinicians would have to utilize clinical acumen, EKG, Troponin, and serial changes to determine if it is an Acute Myocardial Infarction or myocardial injury due to an underlying chronic condition.                                       URINALYSIS, MICROSCOPIC ONLY - Abnormal; Notable for the following components:     WBC, UA                       6-10 (*)               Bacteria, UA                  Trace (*)            All other components within normal limits  MAGNESIUM - Normal  BLOOD CULTURE  BLOOD CULTURE  URINE CULTURE  RAINBOW DRAW         Narrative: The following orders were created for panel order [place] Draw.                  Procedure                               Abnormality         Status                                     ---------                               -----------         ------                                     Green Top (Gel)[[id number]]                                  Final result                               Lavender Top[[id number]]                                     Final result                               Red Top[[id number]]                                          Final result                               Light Blue Top[[id number]]                                   Final result                                                 Please view results for these tests on the individual orders.  LACTIC ACID, REFLEX  GREEN TOP  LAVENDER TOP  RED TOP  LIGHT BLUE TOP  CBC AND DIFFERENTIAL      Medications  sodium chloride 0.9 % flush 10 mL (has  no administration in time range)  sodium chloride 0.9 % bolus 1,000 mL (0 mL Intravenous Stopped 1/25/25 2010)  piperacillin-tazobactam (ZOSYN) 3.375 g IVPB in 100 mL NS MBP (CD) (3.375 g Intravenous New Bag 1/25/25 2045)  sepsis fluid NS 0.9 % bolus 2,430 mL (1,500 mL Intravenous New Bag 1/25/25 2044)      US Gallbladder   Final Result         1.  Exam limited by overlying bowel gas. Pancreas and aorta are poorly    visualized.         2.  There is a 1.2 cm stone in the lumen of the gallbladder. The common    bile duct is normal in caliber. No intrahepatic ductal dilation    identified.         This report was signed and finalized on 1/25/2025 9:15 PM by Dr. Jacky Hall MD.          CT Head Without Contrast   Final Result         1.  Moderate ventricular dilation again seen with low-attenuation in the    subcortical and periventricular white matter may be related to chronic    microvascular change or could be related to normal pressure    hydrocephalus. Overall, this is very similar to the exam from 2/1/2022.         2.  Opacification of the bilateral maxillary sinuses, raising concern    for maxillary sinusitis.         This report was signed and finalized on 1/25/2025 6:17 PM by Dr. Jacky Hall MD.          CT Cervical Spine Without Contrast   Final Result         1.  Multilevel degenerative changes and fusion changes as discussed    above. No definite acute osseous abnormality on this exam.         2.  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          This report was signed and finalized on 1/25/2025 6:24 PM by Dr. Jacky Hall MD.            The patient is an 89-year-old male presenting with generalized weakness, dizziness, and fatigue, which developed after a recent large bowel movement. He reports an episode of confusion and anomia yesterday, which has since resolved, and denies recent trauma, falls, or head injury. His family is concerned as he required  assistance with ambulation today, though he typically walks independently with a walker. Neurological exam is normal, and there is no evidence of acute distress at this time. His current symptoms, including dry mouth affecting his speech, suggest dehydration or medication side effects.    His lab results show an elevated white blood cell count (16.21) indicating possible infection, along with significantly elevated liver enzymes (, ) and lipase (2,696), raising concern for pancreatic involvement. His procalcitonin is notably high at 41.03, indicating a high risk for sepsis or septic shock. His renal function is concerning with a BUN of 30, creatinine of 2.05, and a GFR of 30.6, suggesting moderate kidney impairment. Additionally, his troponin level of 45, could point to myocardial injury, although no acute myocardial infarction is confirmed.    Imaging results show moderate ventricular dilation and possible chronic microvascular changes in the brain, opacification of the maxillary sinuses suggestive of sinusitis, and a gallstone seen on ultrasound with no signs of biliary obstruction. A positive urinalysis with nitrites and leukocyte esterase raises suspicion for a urinary tract infection, though further cultures are pending.    Given these findings, this patient requires admission for stabilization, further diagnostic workup, and close monitoring for possible sepsis, acute kidney injury, as well as neurological and cardiovascular concerns. The patient's clinical status, along with the abnormal labs and imaging results, necessitate inpatient care for ongoing observation and management.    Problems Addressed:  Acute non-recurrent maxillary sinusitis: complicated acute illness or injury  Acute pancreatitis, unspecified complication status, unspecified pancreatitis type: complicated acute illness or injury  Acute UTI (urinary tract infection): complicated acute illness or injury  ISHA (acute kidney  injury): complicated acute illness or injury  Elevated liver enzymes: complicated acute illness or injury  Elevated troponin level: complicated acute illness or injury    Amount and/or Complexity of Data Reviewed  Independent Historian: caregiver     Details: Daughter  External Data Reviewed: labs and notes.  Labs: ordered. Decision-making details documented in ED Course.  Radiology: ordered. Decision-making details documented in ED Course.  ECG/medicine tests: ordered and independent interpretation performed. Decision-making details documented in ED Course.    Risk  Prescription drug management.  Decision regarding hospitalization.        Final diagnoses:   Acute non-recurrent maxillary sinusitis   Acute pancreatitis, unspecified complication status, unspecified pancreatitis type   ISHA (acute kidney injury)   Acute UTI (urinary tract infection)   Elevated liver enzymes   Elevated troponin level       ED Disposition  ED Disposition       ED Disposition   Decision to Admit    Condition   --    Comment   Level of Care: Telemetry [5]   Diagnosis: Pancreatitis, acute [536434]   Admitting Physician: MISBAH LEYVA [[id number]]   Attending Physician: MISBAH LEYVA [[id number]]                  Joselin Alejandre APRN  01/27/25 1028      Electronically signed by Joselin Alejandre APRN at 01/27/25 1028       Facility-Administered Medications as of 1/28/2025   Medication Dose Route Frequency Provider Last Rate Last Admin    acetaminophen (TYLENOL) tablet 1,000 mg  1,000 mg Oral Q8H Ronak Roche MD   1,000 mg at 01/28/25 0407    [Held by provider] busPIRone (BUSPAR) tablet 5 mg  5 mg Oral BID With Meals Ronak Roche MD   5 mg at 01/26/25 1714    cyclobenzaprine (FLEXERIL) tablet 5 mg  5 mg Oral BID PRN Ronak Roche MD   5 mg at 01/27/25 1908    Enoxaparin Sodium (LOVENOX) syringe 40 mg  40 mg Subcutaneous Nightly Ronak Roche MD   40 mg at 01/27/25 2044    gabapentin (NEURONTIN) capsule 300 mg  300 mg Oral Nightly Ronak Roche MD    300 mg at 25 2044    HYDROmorphone (DILAUDID) injection 0.5 mg  0.5 mg Intravenous Q4H PRN Ronak Roche MD   0.5 mg at 25 2324    [] HYDROmorphone (DILAUDID) injection 1 mg  1 mg Intravenous Q4H PRN Winter White MD   1 mg at 25 2346    And    naloxone (NARCAN) injection 0.4 mg  0.4 mg Intravenous Q5 Min PRN Ronak Roche MD        [COMPLETED] indocyanine green (IC-GREEN) injection 7.5 mg  7.5 mg Intravenous Once Ronak Roche MD   7.5 mg at 25 1206    [COMPLETED] iopamidol (ISOVUE-300) 61 % injection 100 mL  100 mL Intravenous Once in imaging Parth Byrd MD   100 mL at 25 1140    [Held by provider] lisinopril (PRINIVIL,ZESTRIL) tablet 10 mg  10 mg Oral Daily Ronak Roche MD   10 mg at 25 1714    [Held by provider] NIFEdipine XL (PROCARDIA XL) 24 hr tablet 60 mg  60 mg Oral Daily Ronak Roche MD   60 mg at 25 1714    nitroglycerin (NITROSTAT) SL tablet 0.4 mg  0.4 mg Sublingual Q5 Min PRN Ronak Roche MD        ondansetron ODT (ZOFRAN-ODT) disintegrating tablet 4 mg  4 mg Oral Q6H PRN Ronak Roche MD        Or    ondansetron (ZOFRAN) injection 4 mg  4 mg Intravenous Q6H PRN Ronak Roche MD   4 mg at 25 2346    oxyCODONE (ROXICODONE) immediate release tablet 10 mg  10 mg Oral Q4H PRN Ronak Roche MD   10 mg at 25 0603    oxyCODONE (ROXICODONE) immediate release tablet 5 mg  5 mg Oral Q4H PRN Ronak Roche MD   5 mg at 25 2044    pantoprazole (PROTONIX) EC tablet 40 mg  40 mg Oral Q AM Ronak Roche MD   40 mg at 25 0554    Pharmacy to Dose Zosyn   Not Applicable Continuous PRN Ronak Roche MD        [COMPLETED] piperacillin-tazobactam (ZOSYN) 3.375 g IVPB in 100 mL NS MBP (CD)  3.375 g Intravenous Once Joselin Alejandre APRN   Stopped at 25 2115    piperacillin-tazobactam (ZOSYN) 3.375 g IVPB in 100 mL NS MBP (CD)  3.375 g Intravenous Q8H Ronak Roche MD   3.375 g at 25 0408    [COMPLETED] sepsis fluid NS 0.9 % bolus  2,430 mL  30 mL/kg Intravenous Once Joselin Alejandre APRN   1,500 mL at 25    [COMPLETED] sodium chloride 0.9 % bolus 1,000 mL  1,000 mL Intravenous Once Joselin Alejandre APRN   Stopped at 25    sodium chloride 0.9 % flush 10 mL  10 mL Intravenous PRN Ronak Roche MD        sodium chloride 0.9 % flush 10 mL  10 mL Intravenous Q12H Ronak Roche MD   10 mL at 25    sodium chloride 0.9 % infusion 40 mL  40 mL Intravenous PRN Ronak Roche MD        [] sodium chloride 0.9 % infusion  100 mL/hr Intravenous Continuous Winter White  mL/hr at 25 100 mL/hr at 25     Orders (all)        Start     Ordered    25 0600  TSH  Morning Draw         25 1720    25 0600  Lipid Panel  Morning Draw         25 1720    25 0600  Hemoglobin A1c  Morning Draw         25 1720    25 0600  CBC Auto Differential  PROCEDURE ONCE         25 2201    25 2100  Enoxaparin Sodium (LOVENOX) syringe 40 mg  Nightly         25 1620    25  acetaminophen (TYLENOL) tablet 1,000 mg  Every 8 Hours         25  Oxygen Therapy- Nasal Cannula; Titrate 1-6 LPM Per SpO2; 90 - 95%  Continuous         25  Continuous Pulse Oximetry  Continuous         25  oxyCODONE (ROXICODONE) immediate release tablet 10 mg  Every 4 Hours PRN         25  HYDROmorphone (DILAUDID) injection 0.5 mg  Every 4 Hours PRN         25  oxyCODONE (ROXICODONE) immediate release tablet 5 mg  Every 4 Hours PRN         253    25 162  Diet: Gastrointestinal; Fat-Restricted; Fluid Consistency: Thin (IDDSI 0)  Diet Effective Now         25 1620    25 1528  Transfer Patient  Once         25 1528    25 1415  OT Plan of Care Cert / Re-Cert  Once        Comments: Occupational Therapy Plan of  Care  Initial Certification  Certification Period: 2025 - 2025    Patient Name: Jose Alfredo Kirkpatrick  : 1935    (K85.90) Acute pancreatitis, unspecified complication status, unspecified pancreatitis type  (primary encounter diagnosis)  (J01.00) Acute non-recurrent maxillary sinusitis  (N17.9) ISHA (acute kidney injury)  (N39.0) Acute UTI (urinary tract infection)  (R74.8) Elevated liver enzymes  (R79.89) Elevated troponin level  (K85.10) Gallstone pancreatitis  (Z74.09) Impaired mobility [Z74.09]                Assessment  OT Assessment  Rehab Potential (OT): good  Criteria for Skilled Therapeutic Interventions Met (OT): yes, skilled treatment is necessary         OT Rehab Goals     Row Name 25 0834             Bed Mobility Goal 1 (OT)    Activity/Assistive Device (Bed Mobility Goal 1, OT) supine to sit  -JW   (r) CB (t) JW (c)      [place] Level/Cues Needed (Bed Mobility Goal 1, OT) modified   independence  -JW (r) CB (t) JW (c)      Time Frame (Bed Mobility Goal 1, OT) long term goal (LTG);10 days  -JW   (r) CB (t) JW (c)      Progress/Outcomes (Bed Mobility Goal 1, OT) new goal  -JW (r) CB (t) JW   (c)         Transfer Goal 1 (OT)    Activity/Assistive Device (Transfer Goal 1, OT)   sit-to-stand/stand-to-sit;bed-to-chair/chair-to-bed;toilet;shower   chair;walker, rolling  -JW (r) CB (t) JW (c)      [place] Level/Cues Needed (Transfer Goal 1, OT) modified   independence  -JW (r) CB (t) JW (c)      Time Frame (Transfer Goal 1, OT) long term goal (LTG);10 days  -JW (r) CB   (t) JW (c)      Progress/Outcome (Transfer Goal 1, OT) new goal  -JW (r) CB (t) JW (c)         Dressing Goal 1 (OT)    Activity/Device (Dressing Goal 1, OT) upper body dressing;lower body   dressing  -JW (r) CB (t) JW (c)      [place]/Cues Needed (Dressing Goal 1, OT) modified independence  -JW   (r) CB (t) JW (jas)      Time Frame (Dressing Goal 1, OT) long term goal (LTG);10 days  -DHARMESH ji) NORMAN   (dom) DHARMESH trevino)       Progress/Outcome (Dressing Goal 1, OT) new goal  -JW (r) CB (t) JW (c)         Strength Goal 1 (OT)    Strength Goal 1 (OT) Pt will have 4/5 strength in all BUE to improve BADL   performance.  -JW (r) CB (t) JW (c)      Time Frame (Strength Goal 1, OT) long term goal (LTG);10 days  -JW (r) CB   (t) JW (c)      Progress/Outcome (Strength Goal 1, OT) new goal  -JW (r) CB (t) JW (c)            User Key  (r) = Recorded By, (t) = Taken By, (c) = Cosigned By    Initials Name Provider Type Discipline    Radhika Dye OTSAROJ/L, MALLORY Occupational Therapist OT    Salvador Venegas, OT Student OT Student OT               OT Goals     Row Name 01/27/25 0902          Time Calculation    OT Goal Re-Cert Due Date 02/06/25  -JW (r) CB (t) JW (c)           User Key  (r) = Recorded By, (t) = Taken By, (c) = Cosigned By    Initials Name Provider Type    Radhika Dye OTSAROJ/L, MALLORY Occupational Therapist    Salvador Venegas, OT Student OT Student                Plan    OT Plan  Therapy Frequency (OT): 5 times/wk  Outcome Evaluation: OT eval complete. A&Ox4 with verbal cues. C/O headache. Pt in fowlers upon arrival. Rolling left was mod I using bed rails and HOB elevated with verbal cues. Sidelying-sit was min A with verbal cues. Static sitting balance EOB was CGA-min A with verbal cues to corrext backwards leaning. ROM test revealed deficits in BL shoulder flex 75% and R shoulder aBd 50%. MMT showed significant deficits in BL shoulder flex and  strength. BUE sensation intact. LBD donning socks was mod A with verbal cues while EOB. Sit>stand was min A with verbal cues using rwx.  Pt ambulated bed>chair with rwx with CGA and verbal cues to assess activity tolerance. Pt would benefit from OT to improve BADL performance, balance, strength, and activity tolerance. Recommend assisted living at d/c.      Radhika Blayne, OTR/L, CSRS  1/27/2025        By cosigning this order, either electronically or physically, I  certify that the therapy services are furnished while this patient is under my care, the services outline above are required by this patient, and will be reviewed every 90 days.        M.D.:__________________________________________ Date: ______________    01/27/25 1414    01/27/25 1354  POC Glucose STAT  STAT,   Status:  Canceled        Comments: Post op Glucose Check on All Diabetic Patients, Notify Anesthesia if Blood Sugar is Less Than 80 mg/dL or Greater Than 250mg/dL      01/27/25 1353    01/27/25 1354  Vital signs every 5 minutes for 15 minutes, every 15 minutes thereafter.  Once,   Status:  Canceled         01/27/25 1353    01/27/25 1354  Call Anesthesiologist for additional IV Fluid bolus for Hypotension/Tachycardia  Continuous,   Status:  Canceled         01/27/25 1353    01/27/25 1354  Notify Anesthesia of Any Acute Changes in Patient Condition  Until Discontinued,   Status:  Canceled         01/27/25 1353    01/27/25 1354  Notify Anesthesia for Unrelieved Pain  Until Discontinued,   Status:  Canceled         01/27/25 1353    01/27/25 1354  Once DC criteria to floor met, follow surgeon's orders.  Until Discontinued,   Status:  Canceled         01/27/25 1353    01/27/25 1354  Discharge patient from PACU when discharge criteria is met.  Until Discontinued,   Status:  Canceled         01/27/25 1353    01/27/25 1353  Apply warming blanket  As Needed,   Status:  Canceled      Comments: For a recorded temp of <36.9 C    01/27/25 1353    01/27/25 1353  ibuprofen (ADVIL,MOTRIN) tablet 600 mg  Every 6 Hours PRN,   Status:  Discontinued         01/27/25 1353    01/27/25 1353  oxyCODONE-acetaminophen (PERCOCET)  MG per tablet 1 tablet  Every 4 Hours PRN,   Status:  Discontinued         01/27/25 1353    01/27/25 1353  HYDROmorphone (DILAUDID) injection 0.5 mg  Every 10 Minutes PRN,   Status:  Discontinued         01/27/25 1353    01/27/25 1353  fentaNYL citrate (PF) (SUBLIMAZE) injection 50 mcg  Every 10  Minutes PRN,   Status:  Discontinued         01/27/25 1353    01/27/25 1353  naloxone (NARCAN) injection 0.04 mg  As Needed,   Status:  Discontinued         01/27/25 1353    01/27/25 1353  flumazenil (ROMAZICON) injection 0.2 mg  As Needed,   Status:  Discontinued         01/27/25 1353    01/27/25 1353  ondansetron (ZOFRAN) injection 4 mg  Every 15 Minutes PRN,   Status:  Discontinued         01/27/25 1353    01/27/25 1353  labetalol (NORMODYNE,TRANDATE) injection 5 mg  Every 5 Minutes PRN,   Status:  Discontinued         01/27/25 1353    01/27/25 1353  atropine sulfate injection 0.5 mg  Once As Needed,   Status:  Discontinued         01/27/25 1353    01/27/25 1318  iopamidol (ISOVUE-300) 61 % injection  As Needed,   Status:  Discontinued         01/27/25 1318    01/27/25 1302  Tissue Pathology Exam  RELEASE UPON ORDERING         01/27/25 1302    01/27/25 1301  ropivacaine (NAROPIN) 0.5 % 20 mL, dexmedetomidine HCl (PRECEDEX) 1 mL, sodium chloride 0.9 % 29 mL  As Needed,   Status:  Discontinued         01/27/25 1301    01/27/25 1301  sodium chloride (NS) irrigation solution  As Needed,   Status:  Discontinued         01/27/25 1301    01/27/25 1230  iopamidol (ISOVUE-300) 61 % injection 100 mL  Once in Imaging         01/27/25 1139    01/27/25 1205  indocyanine green (IC-GREEN) injection 7.5 mg  Once         01/27/25 1203    01/27/25 1205  lactated ringers infusion 1,000 mL  Continuous,   Status:  Discontinued         01/27/25 1203    01/27/25 1204  Follow Anesthesia Guidelines / Protocol  Once,   Status:  Canceled         01/27/25 1203    01/27/25 1204  Insert Peripheral IV  Once,   Status:  Canceled         01/27/25 1203    01/27/25 1204  Maintain IV Access  Continuous,   Status:  Canceled         01/27/25 1203    01/27/25 1203  lidocaine PF 1% (XYLOCAINE) injection 0.5 mL  Once As Needed,   Status:  Discontinued         01/27/25 1203    01/27/25 1203  sodium chloride 0.9 % flush 3 mL  As Needed,   Status:   Discontinued         25 1203    25 1201  sodium chloride 0.9 % flush 10 mL  Every 12 Hours Scheduled,   Status:  Discontinued         25 1159    25 1200  Oxygen Therapy- Nasal Cannula; Titrate 1-6 LPM Per SpO2; 90 - 95%  Continuous,   Status:  Canceled         25 1159    25 1200  Continuous Pulse Oximetry  Continuous,   Status:  Canceled         25 1159    25 1200  Insert Peripheral IV  Once,   Status:  Canceled         25 1159    25 1200  Saline Lock & Maintain IV Access  Continuous,   Status:  Canceled         25 1159    25 1200  CPR - Full Support in OR  Once,   Status:  Canceled         25 1159    25 1159  Vital Signs - Per Anesthesia Protocol  As Needed,   Status:  Canceled       25 1159    25 1159  sodium chloride 0.9 % flush 10 mL  As Needed,   Status:  Discontinued         25 1159    25 1159  fentaNYL citrate (PF) (SUBLIMAZE) injection 25 mcg  Every 5 Minutes PRN,   Status:  Discontinued         25 1159    25 1159  dextrose (D50W) (25 g/50 mL) IV injection 12.5 g  As Needed,   Status:  Discontinued         25 1159    25 1117  FL Cholangiogram Operative  1 Time Imaging         25 1116    25 1117  FL C Arm During Surgery  1 Time Imaging         25 1117    25 1055  CT Abdomen Pelvis With Contrast  1 Time Imaging         25 1055    25 0948  PT Plan of Care Cert / Re-Cert  Once        Comments: Physical Therapy Plan of Care  Initial Certification  Certification Period: 2025 - 2025    Patient Name: Jose Alfredo iKrkpatrick  : 1935    (K85.90) Acute pancreatitis, unspecified complication status, unspecified pancreatitis type  (primary encounter diagnosis)  (J01.00) Acute non-recurrent maxillary sinusitis  (N17.9) ISHA (acute kidney injury)  (N39.0) Acute UTI (urinary tract infection)  (R74.8) Elevated liver enzymes  (R79.89) Elevated  troponin level  (K85.10) Gallstone pancreatitis  (Z74.09) Impaired mobility [Z74.09]                  Assessment  PT Assessment  Rehab Potential (PT): fair  Criteria for Skilled Interventions Met (PT): yes, meets criteria, skilled treatment is necessary         PT Rehab Goals     Row Name 01/27/25 0811             Bed Mobility Goal 1 (PT)    Activity/Assistive Device (Bed Mobility Goal 1, PT) bed mobility   activities, all;rolling to left;rolling to right;supine to sit;sit to   supine  -AJ      [place] Level/Cues Needed (Bed Mobility Goal 1, PT) supervision   required  -AJ      Time Frame (Bed Mobility Goal 1, PT) long term goal (LTG);10 days  -AJ      Progress/Outcomes (Bed Mobility Goal 1, PT) new goal  -AJ         Transfer Goal 1 (PT)    Activity/Assistive Device (Transfer Goal 1, PT)   sit-to-stand/stand-to-sit;bed-to-chair/chair-to-bed;toilet;walker, rolling    -AJ      [place] Level/Cues Needed (Transfer Goal 1, PT) standby assist  -AJ        Time Frame (Transfer Goal 1, PT) long term goal (LTG);10 days  -AJ      Progress/Outcome (Transfer Goal 1, PT) new goal  -AJ         Gait Training Goal 1 (PT)    Activity/Assistive Device (Gait Training Goal 1, PT) gait (walking   locomotion);decrease asymmetrical patterns;decrease fall risk;diminish   gait deviation;forward stepping;improve balance and speed;increase   endurance/gait distance;increase energy conservation;assistive device   use;walker, rolling  -AJ      [place] Level (Gait Training Goal 1, PT) standby assist  -AJ      Distance (Gait Training Goal 1, PT) 50' with pain 2/10 or less  -AJ      Time Frame (Gait Training Goal 1, PT) long term goal (LTG);10 days  -AJ        Progress/Outcome (Gait Training Goal 1, PT) new goal  -AJ         Balance Goal 1 (PT)    Activity/Assistive Device (Balance Goal) sitting static balance;sitting   dynamic balance;sit to stand dynamic balance;standing static   balance;standing dynamic balance;unsupported;with  functional   activities/occupations;with functional mobility activities;with functional   reaching activities  -AJ      [place] Level/Cues Needed (Balance Goal 1, PT) supervision required    -AJ      Time Frame (Balance Goal 1, PT) long-term goal (LTG);by discharge  -AJ      Progress/Outcomes (Balance Goal 1, PT) other (see comments)  new goal    -AJ            User Key  (r) = Recorded By, (t) = Taken By, (c) = Cosigned By    Initials Name Provider Type Discipline    Elias Estrella PT DPT Physical Therapist PT             PT IRF GOALS     Row Name 01/27/25 0811             Balance Goal 1 (PT)    Activity/Assistive Device (Balance Goal) sitting static balance;sitting   dynamic balance;sit to stand dynamic balance;standing static   balance;standing dynamic balance;unsupported;with functional   activities/occupations;with functional mobility activities;with functional   reaching activities  -AJ      [place] Level/Cues Needed (Balance Goal 1, PT) supervision required    -AJ      Time Frame (Balance Goal 1, PT) long-term goal (LTG);by discharge  -AJ      Progress/Outcomes (Balance Goal 1, PT) other (see comments)  new goal    -AJ            User Key  (r) = Recorded By, (t) = Taken By, (c) = Cosigned By    Initials Name Provider Type    Elias Estrella PT DPT Physical Therapist             PT OP Goals     Row Name 01/27/25 0811          Time Calculation    PT Goal Re-Cert Due Date 02/06/25  -AJ           User Key  (r) = Recorded By, (t) = Taken By, (c) = Cosigned By    Initials Name Provider Type    Elias Estrella PT DPT Physical Therapist                  Plan  PT Plan  Therapy Frequency (PT): 2 times/day  Predicted Duration of Therapy Intervention (PT): until d/c  Planned Therapy Interventions (PT): balance training, bed mobility training, home exercise program, gait training, postural re-education, patient/family education, transfer training, ROM (range of motion), strengthening, stretching  Outcome  "Evaluation: PT eval complete. Pt in fowlers position, AOx4, disoriented to time but corrected with verbal cues for month and verbalized understanding once prompted. Pt reports indep with t/f and ambulating short distance at MATT he resides with use of upright FWW but required assist from staff for other household chores. pt brought self to EOB with Min A and demo posterior lean and required Min/Mod A to correct. Pt demo functional AROM in B LE and demo strength: L hip flexion 4-/5, R hip flexion 4/5, B knee ext 4/5, R knee flexion 4/5, L knee flex 4-/5. Pt performed sit<>stand with Min A and took 5 side steps EOB initially and around bed to recliner after sitting rest break, totaling 12' while utilizing FWW. Pt ambulated with narrow base of support and fwd flexed posture but pt attributed to \"hospital walked in unstable\" and felt he was not supported. Pt left in recliner with needs in reach and educated to call for assist. pt will benefit from skilled PT services to improve activity tolerance, decrease fall risk, gait safety to return to PLOF. Recommend SNF vs [facility] with HH when medically stable pending progress towards goals. PT will continue to follow and progress.        Elias Zaidi, PT DPT  1/27/2025            By cosigning this order, either electronically or physically, I certify that the therapy services are furnished while this patient is under my care, the services outline above are required by this patient, and will be reviewed every 90 days.        M.D.:__________________________________________ Date: ______________    01/27/25 0947    01/27/25 0716  Case Request  Once         01/27/25 0715    01/27/25 0600  CBC & Differential  Daily       01/26/25 1928    01/27/25 0600  Lipase  Daily       01/26/25 1928    01/27/25 0600  CBC Auto Differential  PROCEDURE ONCE         01/26/25 2201    01/27/25 0001  NPO Diet NPO Type: Strict NPO  Diet Effective Midnight,   Status:  Canceled         01/26/25 1625    01/27/25 " 0000  cyclobenzaprine (FLEXERIL) 5 MG tablet  Every 8 Hours PRN         01/27/25 1612 01/27/25 0000  traMADol (Ultram) 50 MG tablet  Every 6 Hours PRN         01/27/25 1612 01/26/25 2138  oxyCODONE-acetaminophen (PERCOCET)  MG per tablet 1 tablet  Every 8 Hours PRN,   Status:  Discontinued         01/26/25 2138 01/26/25 2100  gabapentin (NEURONTIN) capsule 300 mg  Nightly         01/26/25 1542 01/26/25 2015  sodium chloride 0.9 % infusion  Continuous,   Status:  Discontinued         01/26/25 1924 01/26/25 1929  Comprehensive Metabolic Panel  Daily       01/26/25 1928 01/26/25 1800  [Held by provider]  busPIRone (BUSPAR) tablet 5 mg  2 Times Daily With Meals        (On hold since yesterday at 1713 until manually unheld; held by Parth Byrd MDHold Reason: Other (Comment Required))    01/26/25 1542    01/26/25 1630  [Held by provider]  lisinopril (PRINIVIL,ZESTRIL) tablet 10 mg  Daily        (On hold since yesterday at 1713 until manually unheld; held by Parth Byrd MDHold Reason: Other (Comment Required))    01/26/25 1542    01/26/25 1630  [Held by provider]  NIFEdipine XL (PROCARDIA XL) 24 hr tablet 60 mg  Daily        (On hold since yesterday at 1713 until manually unheld; held by Parth Byrd MDHold Reason: Other (Comment Required))    01/26/25 1542    01/26/25 1540  cyclobenzaprine (FLEXERIL) tablet 5 mg  2 Times Daily PRN         01/26/25 1542    01/26/25 1539  Diet: Liquid; Clear Liquid; Fluid Consistency: Thin (IDDSI 0)  Diet Effective Now,   Status:  Canceled         01/26/25 1538    01/26/25 1059  Inpatient Case Management  Consult  Once        Provider:  (Not yet assigned)    01/26/25 1059    01/26/25 1059  Inpatient Spiritual Care Consult  Once        Provider:  (Not yet assigned)    01/26/25 1059    01/26/25 1009  Inpatient Admission  Once         01/26/25 1009    01/26/25 0953  Inpatient General Surgery Consult  Once        Specialty:  General Surgery   Provider:  Vinicio Cain MD    01/26/25 0953    01/26/25 0900  heparin (porcine) 5000 UNIT/ML injection 5,000 Units  Every 12 Hours Scheduled,   Status:  Discontinued         01/25/25 2317 01/26/25 0800  Oral Care  2 Times Daily       01/25/25 2332 01/26/25 0702  Inpatient Gastroenterology Consult  IN AM        Specialty:  Gastroenterology  Provider:  Chris Payne DO    01/25/25 2332 01/26/25 0600  pantoprazole (PROTONIX) EC tablet 40 mg  Every Early Morning         01/25/25 2332 01/26/25 0600  CBC (No Diff)  Morning Draw         01/25/25 2332 01/26/25 0600  Comprehensive Metabolic Panel  Morning Draw         01/25/25 2332 01/26/25 0600  Lipase  Morning Draw         01/25/25 2332 01/26/25 0600  Incentive Spirometry  Every 4 Hours While Awake       01/25/25 2332 01/26/25 0300  piperacillin-tazobactam (ZOSYN) 3.375 g IVPB in 100 mL NS MBP (CD)  Every 8 Hours         01/25/25 2342 01/26/25 0000  Vital Signs  Every 4 Hours       01/25/25 2332 01/25/25 2352  Initiate & Follow Hypercapnic Monitoring Guideline for Opioid Administration via EtCO2 and / or SpO2  Continuous,   Status:  Canceled        Comments: Follow Hypercapnic Monitoring Guideline As Outlined in Process Instructions (Open Order Report to View Full Instructions)    01/25/25 2351 01/25/25 2352  Opioid Administration - Document EtCO2 and / or SpO2 With Each Set of Vitals & Any Change in Patient Status  Per Order Details        Comments: With Each Set of Vitals & Any Change in Patient Status    01/25/25 2351 01/25/25 2352  Opioid Administration - Notify Provider Hypercapnic Monitoring  Continuous        Comments: Open Order Report to View Parameters Requiring Provider Notification    01/25/25 2351 01/25/25 2352  Opioid Administration - Continuous Pulse Oximetry (SpO2)  Continuous,   Status:  Canceled         01/25/25 2351 01/25/25 2352  Initiate & Follow Hypercapnic Monitoring Guideline for Opioid  Administration via EtCO2 and / or SpO2  Continuous        Comments: Follow Hypercapnic Monitoring Guideline As Outlined in Process Instructions (Open Order Report to View Full Instructions)    01/25/25 2351 01/25/25 2352  Opioid Administration - Document EtCO2 and / or SpO2 With Each Set of Vitals & Any Change in Patient Status  Per Order Details        Comments: With Each Set of Vitals & Any Change in Patient Status    01/25/25 2351 01/25/25 2352  Opioid Administration - Notify Provider Hypercapnic Monitoring  Continuous        Comments: Open Order Report to View Parameters Requiring Provider Notification    01/25/25 2351 01/25/25 2352  Opioid Administration - Capnography  Continuous        Comments: If Patient Bed Does Not Have Capnography Monitoring Ability - Contact Provider for SpO2 Monitoring Order    01/25/25 2351 01/25/25 2348  sodium chloride 0.9 % infusion  Continuous         01/25/25 2332 01/25/25 2348  sodium chloride 0.9 % flush 10 mL  Every 12 Hours Scheduled         01/25/25 2332    01/25/25 2338  Triglycerides  Once         01/25/25 2337    01/25/25 2333  Intake & Output  Every Shift       01/25/25 2332    01/25/25 2333  Weigh Patient  Once         01/25/25 2332    01/25/25 2333  Insert Peripheral IV  Once         01/25/25 2332    01/25/25 2333  Saline Lock & Maintain IV Access  Continuous,   Status:  Canceled         01/25/25 2332    01/25/25 2333  Place Sequential Compression Device  Once         01/25/25 2332    01/25/25 2333  Maintain Sequential Compression Device  Continuous         01/25/25 2332    01/25/25 2333  Continuous Cardiac Monitoring  Continuous        Comments: Follow Standing Orders As Outlined in Process Instructions (Open Order Report to View Full Instructions)    01/25/25 2332    01/25/25 2333  Maintain IV Access  Continuous,   Status:  Canceled         01/25/25 2332    01/25/25 2333  Telemetry - Place Orders & Notify Provider of Results When Patient Experiences  "Acute Chest Pain, Dysrhythmia or Respiratory Distress  Continuous        Comments: Open Order Report to View Parameters Requiring Provider Notification    01/25/25 2332 01/25/25 2333  May Be Off Telemetry for Tests  Continuous         01/25/25 2332 01/25/25 2333  Notify Provider (With Default Parameters)  Continuous        Comments: Open Order Report to View Parameters Requiring Provider Notification    01/25/25 2332 01/25/25 2333  NPO Diet NPO Type: Sips with Meds, Ice Chips  Diet Effective Now,   Status:  Canceled         01/25/25 2332 01/25/25 2333  PT Consult: Eval & Treat Functional Mobility Below Baseline  Once         01/25/25 2332    01/25/25 2333  OT Consult: Eval & Treat ADL Performance Below Baseline  Once         01/25/25 2332 01/25/25 2333  Bowel Regimen Not Indicated  Once         01/25/25 2332 01/25/25 2332  Pharmacy to Dose Zosyn  Continuous PRN         01/25/25 2332 01/25/25 2332  sodium chloride 0.9 % flush 10 mL  As Needed,   Status:  Discontinued         01/25/25 2332 01/25/25 2332  sodium chloride 0.9 % infusion 40 mL  As Needed         01/25/25 2332 01/25/25 2332  nitroglycerin (NITROSTAT) SL tablet 0.4 mg  Every 5 Minutes PRN         01/25/25 2332 01/25/25 2332  HYDROmorphone (DILAUDID) injection 1 mg  Every 4 Hours PRN        Placed in \"And\" Linked Group    01/25/25 2332 01/25/25 2332  naloxone (NARCAN) injection 0.4 mg  Every 5 Minutes PRN        Placed in \"And\" Linked Group    01/25/25 2332 01/25/25 2332  ondansetron ODT (ZOFRAN-ODT) disintegrating tablet 4 mg  Every 6 Hours PRN        Placed in \"Or\" Linked Group    01/25/25 2332 01/25/25 2332  ondansetron (ZOFRAN) injection 4 mg  Every 6 Hours PRN        Placed in \"Or\" Linked Group    01/25/25 2332 01/25/25 2315  Code Status and Medical Interventions: No CPR (Do Not Attempt to Resuscitate); Limited Support; No intubation (DNI)  Continuous         01/25/25 2317    01/25/25 2211  Cardiac Monitoring  " "Continuous,   Status:  Canceled        Comments: Follow Standing Orders As Outlined in Process Instructions (Open Order Report to View Full Instructions)    01/25/25 2210 01/25/25 2210  Initiate Observation Status  Once         01/25/25 2210 01/25/25 2135  STAT Lactic Acid, Reflex  PROCEDURE ONCE         01/25/25 1910 01/25/25 2043  sepsis fluid NS 0.9 % bolus 2,430 mL  Once        Note to Pharmacy: Will adjust for fluids already given in the ED and by EMS    01/25/25 2027 01/25/25 2027  piperacillin-tazobactam (ZOSYN) 3.375 g IVPB in 100 mL NS MBP (CD)  Once        Note to Pharmacy: Please adjust based on current renal function    01/25/25 2011 01/25/25 2026  Subtract the fluid bolus given in the ED in addition to the fluid bolus given by EMS from the Sepsis bolus, then infuse at 100 ml/hr  Nursing Communication  Once        Comments: Subtract the fluid bolus given in the ED in addition to the fluid bolus given by EMS from the Sepsis bolus, then infuse at 100 ml/hr    01/25/25 2027 01/25/25 2018  Urine Culture - Urine, Straight Cath  Once         01/25/25 2017 01/25/25 2013  High Sensitivity Troponin T 1Hr  PROCEDURE ONCE         01/25/25 1911 01/25/25 2011  Blood Culture - Blood, Arm, Right  Once        Placed in \"And\" Linked Group    01/25/25 2011 01/25/25 2011  Blood Culture - Blood, Arm, Left  Once        Comments: From a different site than #1.     Placed in \"And\" Linked Group    01/25/25 2011 01/25/25 2002  Urinalysis, Microscopic Only - Straight Cath  Once         01/25/25 2001 01/25/25 2001  US Gallbladder  1 Time Imaging         01/25/25 2003 01/25/25 1810  sodium chloride 0.9 % bolus 1,000 mL  Once         01/25/25 1754    01/25/25 1756  Lactic Acid, Plasma  STAT         01/25/25 1755    01/25/25 1751  CT Cervical Spine Without Contrast  1 Time Imaging         01/25/25 1750    01/25/25 1750  CT Head Without Contrast  1 Time Imaging         01/25/25 1750    01/25/25 1749 " " Comprehensive Metabolic Panel  STAT         01/25/25 1730    01/25/25 1749  Lipase  STAT         01/25/25 1730    01/25/25 1749  Procalcitonin  STAT         01/25/25 1749    01/25/25 1749  High Sensitivity Troponin T  STAT         01/25/25 1749    01/25/25 1749  Magnesium  STAT         01/25/25 1749    01/25/25 1736  CBC Auto Differential  Once         01/25/25 1735    01/25/25 1731  CBC & Differential  STAT         01/25/25 1730    01/25/25 1730  CBC & Differential  STAT,   Status:  Canceled         01/25/25 1730    01/25/25 1730  Comprehensive Metabolic Panel  STAT,   Status:  Canceled         01/25/25 1730    01/25/25 1730  Lipase  STAT,   Status:  Canceled         01/25/25 1730    01/25/25 1730  Urinalysis With Culture If Indicated - Straight Cath  Once         01/25/25 1730    01/25/25 1730  CBC Auto Differential  PROCEDURE ONCE,   Status:  Canceled         01/25/25 1730    01/25/25 1729  ECG 12 Lead Other; Dizziness  STAT         01/25/25 1729    01/25/25 1725  Insert Peripheral IV  Once        Placed in \"And\" Linked Group    01/25/25 1724    01/25/25 1725  [place] Draw  STAT         01/25/25 1724    01/25/25 1725  Green Top (Gel)  PROCEDURE ONCE         01/25/25 1724    01/25/25 1725  Lavender Top  PROCEDURE ONCE         01/25/25 1724    01/25/25 1725  Red Top  PROCEDURE ONCE         01/25/25 1724    01/25/25 1725  Light Blue Top  PROCEDURE ONCE         01/25/25 1724    01/25/25 1724  sodium chloride 0.9 % flush 10 mL  As Needed        Placed in \"And\" Linked Group    01/25/25 1724    01/25/25 0000  Telemetry Scan  Once         01/25/25 0000    01/25/25 0000  Telemetry Scan  Once         01/25/25 0000    --  lisinopril (PRINIVIL,ZESTRIL) 40 MG tablet  Daily         01/26/25 1627    --  oxyCODONE-acetaminophen (PERCOCET)  MG per tablet  Every 6 Hours PRN         01/26/25 1631    Pending  lisinopril (PRINIVIL,ZESTRIL) tablet 40 mg  Daily         Pending                  Ventilator/Non-Invasive Ventilation " Settings (From admission, onward)      None             Operative/Procedure Notes (all)        Ronak Roche MD at 01/27/25 1256  Version 1 of 1         PREOPERATIVE DIAGNOSIS: Gallstone pancreatitis     POSTOPERATIVE DIAGNOSIS: Acute gallstone pancreatitis, cholecystitis      PROCEDURE PERFORMED: Robotic cholecystectomy with intraoperative cholangiogram     SURGEON: Ronak Roche MD     ASSISTANT:  Mini RN, Radha CST, Reyna CST     SPECIMENS: Gallbladder for permanent     ANESTHESIA: General tracheal anesthesia with bilateral TAP block     BLOOD LOSS:  2 mL    FLUIDS: 1000 mL     WOUND CLASSIFICATION: Class 3, contaminated     FINDINGS:   Fairly distended and inflamed gallbladder with a stone in the neck.  Critical view of safety obtained with a solitary cystic duct and a solitary cystic artery and a clear space the entire way up the gallbladder fossa.  Normal biliary anatomy.  Cystic duct and common bile duct clearly visualized with ICG under firefly.  Cholangiogram was unremarkable.  Excellent hemostasis.      INDICATIONS:   Jose Alfredo Kirkpatrick is a 89 y.o. male with acute gallstone pancreatitis     DESCRIPTION OF PROCEDURE:      Informed consent was obtained. The patient was taken to the operating room and placed on the operating table in the supine position. Bilateral SCD were placed. General anesthesia was administered and the patient was intubated without difficulty. Ancef 2 g was administered for preoperative antibiotics. The abdomen was prepped and draped in the usual fashion.  A full surgical timeout was performed verifying the correct patient, correct procedure and correct site for surgery.     Local anesthesia was infiltrated into the left upper quadrant at Rudolph's point.  A small incision was made and a Veress needle was inserted with 2 satisfactory clicks.  An excellent saline drop test confirmed correct intra-abdominal placement.  Pneumoperitoneum was initiated to 12 mmHg.  Patient was positioned in  reverse Trendelenburg.  Local anesthesia was injected in the left lower hypogastrium.  An 8 mm robotic trocar was inserted under direct visualization in Optiview fashion.  The abdomen was entered safely.  A bilateral TAP block was performed using 10 mL of 1% ropivacaine and 100 mcg of Precedex and 100 mL of injectable saline.  Local was injected at the usual 3 additional trocar sites transversely across the abdomen.  Incisions were made and 3 more robotic trocars were inserted under direct visualization.     The robot was docked and instruments were placed.  The gallbladder was minimally inflamed and mildly distended. The gallbladder was retracted cephalad and the peritoneum was incised using hook cautery. A top down dissection was performed.  Careful dissection was undertaken in the cystohepatic triangle. The dissection was of moderate difficulty. There was normal biliary anatomy.  A top down dissection was performed and a critical view of safety was obtained of a solitary cystic duct and a solitary cystic artery was obtained with a clear space posteriorly. Clips were placed on the cystic artery which was divided with electrocautery. A clip was placed high up on the cystic duct up to the neck of the gallbladder and a ductotomy was made.  The cholangiogram catheter was assembled and inserted into the cystic duct.  Saline was flushed without extravasation.  Under fluoroscopy, contrast was injected with filling of the cystic duct. There was retrograde filling of the common hepatic ducts and the left and right hepatic duct branches.  There was appropriate filling of the secondary hepatic radicles.  Contrast migrated anterograde through the common bile duct without any obvious filling defects and entered the duodenum with appropriate transit time.  The catheter was flushed with saline and removed.  The cystic duct stump was ligated using clips.  The cystic duct was divided and the gallbladder was removed through the  supraumbilical trocar site using an Endo Catch bag. The fascia of the extraction site was closed with a 0 Vicryl suture in a figure-of-eight fashion using a transfascial suture passer. There was meticulous hemostasis.  The trocars were removed under direct visualization and pneumoperitoneum was released.  The incisions were irrigated using saline and closed with 4-0 Monocryl suture.  The incisions were dressed with Mastisol and Steri-Strips.     At the conclusion of the case all needle, sharp and sponge counts were correct x 2. The patient was awoken from anesthesia, extubated in the operating room and was taken to the post anesthesia care unit in stable condition without any immediate complications.      Electronically signed by Ronak Roche MD at 01/27/25 1405          Physician Progress Notes (last 72 hours)        Parth Byrd MD at 01/27/25 0901              [facility] Medicine Services  INPATIENT PROGRESS NOTE    Patient Name: Jose Alfredo Kirkpatrick  Date of Admission: 1/25/2025  Today's Date: 01/27/25  Length of Stay: 1  Primary Care Physician: Otilio Ramos MD    Subjective   Chief Complaint: Gallbladder pancreatitis  HPI   Status post surgery.  Blood pressure slow, hold blood pressure medication discussed with nursing.  Patient is on 4 L of oxygen.  Liver enzymes improving.  Lipase improving.  White blood cells normal.  Hemoglobin stable.    Review of Systems   Constitutional:  Positive for activity change, appetite change and fatigue. Negative for chills and fever.   HENT:  Negative for hearing loss, nosebleeds, tinnitus and trouble swallowing.    Eyes:  Negative for visual disturbance.   Respiratory:  Positive for shortness of breath. Negative for cough, chest tightness and wheezing.    Cardiovascular:  Negative for chest pain, palpitations and leg swelling.   Gastrointestinal:  Negative for abdominal distention, abdominal pain, blood in stool, constipation,  diarrhea, nausea and vomiting.   Endocrine: Negative for cold intolerance, heat intolerance, polydipsia, polyphagia and polyuria.   Genitourinary:  Negative for decreased urine volume, difficulty urinating, dysuria, flank pain, frequency and hematuria.   Musculoskeletal:  Positive for arthralgias, gait problem and myalgias. Negative for joint swelling.   Skin:  Negative for rash.   Allergic/Immunologic: Negative for immunocompromised state.   Neurological:  Positive for weakness. Negative for dizziness, syncope, light-headedness and headaches.   Hematological:  Negative for adenopathy. Does not bruise/bleed easily.   Psychiatric/Behavioral:  Positive for confusion. Negative for sleep disturbance. The patient is not nervous/anxious.         All pertinent negatives and positives are as above. All other systems have been reviewed and are negative unless otherwise stated.     Objective    Temp:  [97.1 °F (36.2 °C)-98.4 °F (36.9 °C)] 97.8 °F (36.6 °C)  Heart Rate:  [48-68] 58  Resp:  [16-18] 18  BP: (103-161)/(46-68) 106/48  Physical Exam  Vitals and nursing note reviewed.   Constitutional:       Appearance: He is well-developed.      Comments: Advanced age.  Cachectic   HENT:      Head: Normocephalic.   Eyes:      General: No scleral icterus.     Pupils: Pupils are equal, round, and reactive to light.   Neck:      Thyroid: No thyromegaly.      Vascular: No carotid bruit or JVD.      Trachea: No tracheal deviation.   Cardiovascular:      Rate and Rhythm: Regular rhythm. Bradycardia present.      Heart sounds: No murmur heard.     No friction rub. No gallop.   Pulmonary:      Effort: No respiratory distress.      Breath sounds: No wheezing or rales.      Comments: Diminished breath semilateral, clear, on 5 L.  Chest:      Chest wall: No tenderness.   Abdominal:      General: Bowel sounds are normal. There is no distension.      Palpations: Abdomen is soft.      Tenderness: There is no abdominal tenderness.    Musculoskeletal:      Cervical back: Normal range of motion and neck supple.   Skin:     General: Skin is warm and dry.      Capillary Refill: Capillary refill takes 2 to 3 seconds.      Findings: No rash.   Neurological:      Cranial Nerves: No cranial nerve deficit.      Motor: Weakness present.      Coordination: Coordination abnormal.      Gait: Gait abnormal.   Psychiatric:         Mood and Affect: Mood normal.         Behavior: Behavior normal.             Results Review:  I have reviewed the labs, radiology results, and diagnostic studies.    Laboratory Data:   Results from last 7 days   Lab Units 01/27/25  0511 01/26/25  0437 01/25/25  1729   WBC 10*3/mm3 9.65 11.04* 16.21*   HEMOGLOBIN g/dL 12.0* 11.2* 13.2   HEMATOCRIT % 37.6 36.0* 41.2   PLATELETS 10*3/mm3 146 139* 169        Results from last 7 days   Lab Units 01/27/25  0511 01/26/25  1938 01/26/25  0437   SODIUM mmol/L 139 139 142   POTASSIUM mmol/L 4.3 4.5 4.6   CHLORIDE mmol/L 107 108* 109*   CO2 mmol/L 21.0* 22.0 26.0   BUN mg/dL 19 24* 29*   CREATININE mg/dL 0.83 1.14 1.57*   CALCIUM mg/dL 8.4* 8.3* 8.6   BILIRUBIN mg/dL 0.9 0.8 1.8*   ALK PHOS U/L 148* 150* 139*   ALT (SGPT) U/L 209* 228* 311*   AST (SGOT) U/L 96* 139* 239*   GLUCOSE mg/dL 86 119* 88       Culture Data:   Blood Culture   Date Value Ref Range Status   01/25/2025 No growth at 24 hours  Preliminary   01/25/2025 No growth at 24 hours  Preliminary     Urine Culture   Date Value Ref Range Status   01/25/2025 Culture in progress  Preliminary       Radiology Data:   Imaging Results (Last 24 Hours)       Procedure Component Value Units Date/Time    FL Cholangiogram Operative [[id number]] Collected: 01/27/25 1457     Updated: 01/27/25 1501    Narrative:      FL CHOLANGIOGRAM OPERATIVE- 1/27/2025 12:10 PM     HISTORY: stones; K85.90-Acute pancreatitis without necrosis or  infection, unspecified; J01.00-Acute maxillary sinusitis, unspecified;  N17.9-Acute kidney failure, unspecified;  N39.0-Urinary tract infection,  site not specified; R74.8-Abnormal levels of other serum enzymes;  R79.89-Other specified abnormal findings of blood chemistry;  K85.10-Biliary acute pancreatitis without necrosis or infection;  Z74.09-Oth     COMPARISON: None     FLUOROSCOPY TIME: 0.1-minute     FLUOROSCOPY DOSE: 1.6 mGy     NUMBER OF IMAGES: 5       Impression:      Contrast was injected through the cystic duct stump, and multiple  fluoroscopic images of the RIGHT upper quadrant were obtained  intraoperatively. The opacified intrahepatic and extrahepatic ducts  demonstrate no filling defects or obstruction. Contrast is noted in the  small bowel. Surgical instruments and clips are noted in the RIGHT upper  quadrant.     Please refer to the operative note for more details.     This report was signed and finalized on 1/27/2025 2:58 PM by Dr Geremias Martinez.       FL C Arm During Surgery [[id number]] Resulted: 01/27/25 1336     Updated: 01/27/25 1336    Narrative:      This procedure was auto-finalized with no dictation required.    CT Abdomen Pelvis With Contrast [[id number]] Collected: 01/27/25 1148     Updated: 01/27/25 1201    Narrative:      CT ABDOMEN PELVIS W CONTRAST- 1/27/2025 10:31 AM     HISTORY: Acute pancreatitis; K85.90-Acute pancreatitis without necrosis  or infection, unspecified; J01.00-Acute maxillary sinusitis,  unspecified; N17.9-Acute kidney failure, unspecified; N39.0-Urinary  tract infection, site not specified; R74.8-Abnormal levels of other  serum enzymes; R79.89-Other specified abnormal findings of blood  chemistry; K85.10-Biliary acute pancreatitis without necrosis or  infection       COMPARISON: CT scan dated 2/8/2023.     DOSE LENGTH PRODUCT: 662.62 mGy.cm. Automated exposure control was also  utilized to decrease patient radiation dose.     TECHNIQUE: Following the intravenous administration of contrast, helical  CT tomographic images of the abdomen and pelvis were acquired.  Multiplanar  reformatted images were provided for review.     FINDINGS:     Bilateral trace pleural effusions. There is a very large hiatal hernia  which mostly opacifies the left lung base. Coronary atheromatous  calcification.     LIVER: No suspicious liver lesion. The portal veins are patent.     BILIARY SYSTEM: Cholelithiasis present. Gallbladder is mildly dilated.  No obvious wall thickening or para cholecystic inflammatory change.  Common bile duct is nondilated for age measuring up to 7 mm along its  pancreatic head segment.     PANCREAS: There is a mild peripancreatic stranding. Pancreatic  parenchymal enhancement is fairly homogeneous. The main pancreatic duct  is nondilated. No obvious focal pancreatic mass.     SPLEEN: Normal size.     KIDNEYS AND ADRENALS: Adrenal glands are unremarkable. Kidneys are  unremarkable. The ureters are decompressed and normal in appearance.     RETROPERITONEUM: No mass, lymphadenopathy or hemorrhage.     GI TRACT: There is a large hiatal hernia containing essentially the  entire stomach. The stomach is nondistended. Small bowel loops are  nondistended. Colonic diverticulosis. Moderate colonic stool.     OTHER: Suprarenal abdominal aorta measures up to 3.4 cm in diameter.  Splenic vein is patent. IVC filter is present. No mesenteric adenopathy.  No intraperitoneal free fluid. Right hip arthroplasty. Advanced lumbar  spine osteoarthritis changes, especially at the L1-L2 level. No acute  bony abnormality.     PELVIS: No mass lesion, fluid collection or significant lymphadenopathy  is seen in the pelvis. The urinary bladder is normal in appearance.       Impression:         1.  Provided clinical history of acute pancreatitis. CT reveals a fairly  minimal peripancreatic stranding, with the pancreatic parenchymal  enhancement appearing fairly homogeneous. No obvious pancreatic mass and  the main pancreatic duct is nondilated. Cholelithiasis is present and  the biliary tree is nondilated.  2.   There is a very large hiatal hernia which includes the entirety of  the stomach.  3.  Bilateral trace pleural effusions.  4.  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           This report was signed and finalized on 1/27/2025 11:58 AM by Dr Geremias Martinez.               I have reviewed the patient's current medications.     Assessment/Plan   Assessment  Active Hospital Problems    Diagnosis     **Gallstone pancreatitis     Acute kidney injury     Elevated LFTs     Hyperbilirubinemia     Acute UTI (urinary tract infection)      History of Present Illness  Patient is an 89-year-old male patient with a medical history of hypertension, GERD, brought in by EMS from [facility] for the evaluation of his abdominal pain and generalized weakness.  As reported, for the last 2 days he has been feeling weak and dizzy and this has been associated with abdominal cramps.  He denies any nausea vomiting or diarrhea.  He denies any fever or chills.  He denies any chest pain or shortness of breath.  He usually ambulates with a walker, but he felt very weak and stayed in bed all day.    Treatment Plan    Gallbladder pancreatitis.  Consult GI.  Consult general surgery.  Zosyn antibiotics.  Leukocytosis resolved.  Lipase improving.  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  Status post 1/27/2025-Robotic cholecystectomy with intraoperative cholangiogram     Hypoxia.  Patient is clear  on 4 L of oxygen.    Acute kidney injury.  Creatinine normalized.    Hypertension.  Hold lisinopril.  Hold Procardia, due to hypotension. Nitro as needed.    Anemia.  Hemoglobin stable.    Reflux.  Protonix.  Zofran as needed.    Anxiety/depression . BuSpar twice daily.    Lovenox prophylaxis.    Chronic pain/neuropathy.  Neurontin at night.  Flexeril as needed.    Advanced age.  89 years old.    Nutrition.  GI soft diet.    Deconditioning.  PT and OT consult    Blood culture-no growth in 24 hours.  Urine culture no growth.  Pathology is pending.    Patient is from [facility].     Medical Decision Making  Number and Complexity of problems: 1 acute moderate complexity others chronic  Differential Diagnosis: As above     Conditions and Status        Improving.     [facility] Data  External documents reviewed: Reviewed  Cardiac tracing (EKG, telemetry) interpretation: Reviewed  Radiology interpretation: Reviewed  Labs reviewed: As above  Any tests that were considered but not ordered: None     Decision rules/scores evaluated (example [id number], Wells, etc): None     Discussed with: Patient and his daughter and general surgery     Care Planning  Shared decision making: Patient apprised of current labs, vitals, imaging and treatment plan.  They are agreeable with proceeding with plans as discussed.   Code status and discussions: No CPR     Disposition  Social Determinants of Health that impact treatment or disposition: None  I expect the patient to be discharged to [facility] in TBD days.         Medical Decision Making  Number and Complexity of problems: Gallbladder pancreatitis  Differential Diagnosis: None    Conditions and Status        Condition is unchanged.     [facility] Data  External documents reviewed: Previous note  Cardiac tracing (EKG, telemetry) interpretation: Sinus bradycardia   Radiology interpretation: CT scan  Labs reviewed: Laboratory  Any tests that were considered but not ordered: Lab in a.m.    "  Decision rules/scores evaluated (example [id number], Wells, etc): None     Discussed with: Patient and nursing     Care Planning  Shared decision making: Patient and nursing  Code status and discussions: DNR . DNI.    Disposition  Social Determinants of Health that impact treatment or disposition: From assisted living  1 to 3 days.        Electronically signed by Parth FRANCIS. MD Carson, 01/27/25, 17:20 CST.    Electronically signed by Parth Byrd MD at 01/27/25 1720       Ronak Roche MD at 01/27/25 0857                 GENERAL SURGERY INPATIENT PROGRESS NOTE    Patient Name:  Jose Alfredo Kirkpatrick  YOB: 1935  MRN: [id number]    SUBJECTIVE    No unexpected events overnight.  The patient reports that he is feeling a little bit better today.  His abdominal pain is improving.    Allergies:  Allergies   Allergen Reactions    Meloxicam Unknown - Low Severity       Medications:  busPIRone, 5 mg, Oral, BID With Meals  gabapentin, 300 mg, Oral, Nightly  heparin (porcine), 5,000 Units, Subcutaneous, Q12H  lisinopril, 10 mg, Oral, Daily  NIFEdipine XL, 60 mg, Oral, Daily  pantoprazole, 40 mg, Oral, Q AM  piperacillin-tazobactam, 3.375 g, Intravenous, Q8H  sodium chloride, 10 mL, Intravenous, Q12H    Pharmacy to Dose Zosyn,   sodium chloride, 100 mL/hr, Last Rate: 100 mL/hr (01/27/25 0606)        OBJECTIVE    VITAL SIGNS  /55 (BP Location: Right arm, Patient Position: Lying)   Pulse 62   Temp 97.8 °F (36.6 °C) (Oral)   Resp 16   Ht 188 cm (74\")   Wt 85.6 kg (188 lb 12.8 oz)   SpO2 90%   BMI 24.24 kg/m²     Intake/Output Summary (Last 24 hours) at 1/27/2025 0857  Last data filed at 1/27/2025 0737  Gross per 24 hour   Intake 600 ml   Output 2600 ml   Net -2000 ml       PHYSICAL EXAM    General: Pleasant elderly male, sitting up in bed, in no acute distress.  HEENT:  NCAT, PERRL, EOM intact bilaterally, hearing intact, nares patent, nasal cannula in place  Heart:  Regular rate, normotensive, no JVD " bilaterally  Lungs:  Normal respiratory effort, regular respiratory rate, no wheezing  Abdomen: Protuberant  Extremities:  No cyanosis, clubbing or edema.   Musculoskeletal:  Normal development of bilateral upper extremities. Normal development of bilateral lower extremities.  Range of motion intact.  No evidence of trauma.  Skin:  No rashes, ecchymosis or jaundice. Dry and non-diaphoretic. Skin color is consistent with ethnicity.    RESULTS    Labs:  I personally reviewed all pertinent labs.   Imaging:  I personally reviewed all pertinent imaging studies.   Pathology:        ASSESSMENT AND PLAN    Active Hospital Problems    Diagnosis     **Gallstone pancreatitis     Acute kidney injury     Elevated LFTs     Hyperbilirubinemia     Acute UTI (urinary tract infection)          DAILY CARE PLAN    Lipase, transaminases and total bilirubin are improving as are his symptoms.  The recommendations are for him to undergo cholecystectomy during this admission to prevent any future recurrences of gallstone pancreatitis.  His therapeutic Lovenox has been held this morning.  I will obtain a CT of the abdomen and pelvis for preoperative planning.  Should be able to resume his Coumadin tomorrow and bridge with therapeutic Lovenox.    Plan for robotic cholecystectomy with intraoperative cholangiogram.    I discussed the risks, benefits and alternatives to cholecystectomy including risk of injury to surrounding structures specifically the bile duct, postoperative bile leaks, deep organ space and superficial skin infection, uncontrolled bleeding, the need for blood transfusion, conversion to open surgery, incisional hernias, ongoing pain, bowel habit changes, long term drain and wound care, and the possibility of requiring further operations or procedures including endoscopy. Additionally, I counseled the patient on the risk of postoperative cardiopulmonary complications. I gave the patient a chance to ask any questions and answered  them thoroughly. The patient understood the risks and was agreeable to proceeding to surgery. Consent was obtained from the patient.    I discussed the patient's findings and my recommendations with the patient/family, as well as the primary care team.    Ronak Roche MD, [facility]  General Surgery  1/27/2025  08:57 CST    Please note that portions of this note were completed with a voice recognition program.      Electronically signed by Ronak Roche MD at 01/27/25 1051       Irma Lorenzo MD at 01/26/25 1327              [facility] Medicine Services  INPATIENT PROGRESS NOTE    Patient Name: Jose Alfredo Kirkpatrick  Date of Admission: 1/25/2025  Today's Date: 01/26/25  Length of Stay: 0  Primary Care Physician: Otilio Ramos MD    Subjective   Chief Complaint: None  HPI   No acute events overnight.  When seen this morning patient is feeling well.  Not complaining of pain at this time.    Review of Systems   All pertinent negatives and positives are as above. All other systems have been reviewed and are negative unless otherwise stated.     Objective    Temp:  [97.4 °F (36.3 °C)-98.1 °F (36.7 °C)] 98.1 °F (36.7 °C)  Heart Rate:  [60-87] 61  Resp:  [16-18] 18  BP: (112-157)/(57-91) 157/71  Physical Exam  GEN: Awake, alert, interactive, in NAD on room air  HEENT: Atraumatic, EOMI, Anicteric  Lungs: CTAB, no wheezing/rales/rhonchi  Heart: RRR, +S1/s2, no rub  ABD: soft, nt/nd, +BS, no guarding/rebound  Extremities: atraumatic, no cyanosis, no edema  Skin: no rashes or lesions  Neuro: AAOx3, no focal deficits  Psych: normal mood & affect      Results Review:  I have reviewed the labs, radiology results, and diagnostic studies.    Laboratory Data:   Results from last 7 days   Lab Units 01/26/25 0437 01/25/25  1729   WBC 10*3/mm3 11.04* 16.21*   HEMOGLOBIN g/dL 11.2* 13.2   HEMATOCRIT % 36.0* 41.2   PLATELETS 10*3/mm3 139* 169        Results from last 7 days   Lab Units 01/26/25 0437  01/25/25  1835   SODIUM mmol/L 142 140   POTASSIUM mmol/L 4.6 5.1   CHLORIDE mmol/L 109* 103   CO2 mmol/L 26.0 24.0   BUN mg/dL 29* 30*   CREATININE mg/dL 1.57* 2.05*   CALCIUM mg/dL 8.6 9.1   BILIRUBIN mg/dL 1.8* 3.0*   ALK PHOS U/L 139* 183*   ALT (SGPT) U/L 311* 486*   AST (SGOT) U/L 239* 506*   GLUCOSE mg/dL 88 102*       Culture Data:   [unfilled]    Radiology Data:   Imaging Results (Last 24 Hours)       Procedure Component Value Units Date/Time    US Gallbladder [[id number]] Collected: 01/25/25 2113     Updated: 01/25/25 2118    Narrative:      EXAMINATION: US GALLBLADDER-  1/25/2025 9:13 PM     REASON FOR EXAM: elevated liver enzymes, elevated bilirubin, r/o biliary  obstruction; J01.00-Acute maxillary sinusitis, unspecified; K85.90-Acute  pancreatitis without necrosis or infection, unspecified; N17.9-Acute  kidney failure, unspecified; N39.0-Urinary tract infection, site not  specified     COMPARISON: 10/19/2020     TECHNIQUE: Multiple longitudinal and transverse real time sonographic  images of the right upper quadrant of the abdomen are obtained. Doppler  and grayscale images were provided. Images and report are stored per  institutional and state regulations.     FINDINGS:    PANCREAS: Obscured by bowel gas.     LIVER: Normal in size, echogenicity and echotexture. No focal lesion. No  definite intrahepatic ductal dilation.     GALLBLADDER: There is a 1.2 cm shadowing stone in the lumen of the  gallbladder. No pericholecystic fluid or gallbladder wall thickening.     BILE DUCTS: The CBD measures 0.3 cm in diameter. There is no  intrahepatic or extrahepatic ductal dilation.     OTHER: The visualized portion of the RIGHT kidney has a normal  appearance. The aorta is obscured by bowel gas.          Impression:         1.  Exam limited by overlying bowel gas. Pancreas and aorta are poorly  visualized.     2.  There is a 1.2 cm stone in the lumen of the gallbladder. The common  bile duct is normal in caliber.  No intrahepatic ductal dilation  identified.     This report was signed and finalized on 1/25/2025 9:15 PM by Dr. Jacky Hall MD.               I have reviewed the patient's current medications.     Assessment/Plan   Assessment  Active Hospital Problems    Diagnosis     **Gallstone pancreatitis     Acute kidney injury     Elevated LFTs     Hyperbilirubinemia     Acute UTI (urinary tract infection)        Treatment Plan      - continue IVFs  - evaluated by GI who consulted general surgery  - surgery recommending cholecystectomy after resolution of elevated bilirubin and lipase  - Cleared by surgery for clear liquid diet  - Continue Zosyn  - Continue home lisinopril, nifedipine, BuSpar  - GI prophylaxis with Protonix  - DVT prophylaxis with heparin        Medical Decision Making  Number and Complexity of problems: 1 acute moderate complexity others chronic  Differential Diagnosis: As above    Conditions and Status        Improving.     MDM Data  External documents reviewed: Reviewed  Cardiac tracing (EKG, telemetry) interpretation: Reviewed  Radiology interpretation: Reviewed  Labs reviewed: As above  Any tests that were considered but not ordered: None     Decision rules/scores evaluated (example [id number], Wells, etc): None     Discussed with: Patient and his daughter and general surgery     Care Planning  Shared decision making: Patient apprised of current labs, vitals, imaging and treatment plan.  They are agreeable with proceeding with plans as discussed.   Code status and discussions: No CPR    Disposition  Social Determinants of Health that impact treatment or disposition: None  I expect the patient to be discharged to care home in TBD days.         Electronically signed by Irma Lorenzo MD, 01/26/25, 19:18 CST.      Electronically signed by Irma Lorenzo MD at 01/26/25 1925          Consult Notes (last 72 hours)        Vinicio Cain MD at 01/26/25 1516        Consult Orders    1. Inpatient  General Surgery Consult [[id number]] ordered by Arie Becerra APRN at 01/26/25 0953                   [facility] General Surgery Consult History and Physical  Jose Alfredo Kirkpatrick  MRN: [id number]  Age: 89 y.o. male   YOB: 1935  Admit Date: 1/25/2025   Admitting Physician: Irma Lorenzo MD      Reason for Consult   - c/f gallstone pancreatitis    SUBJECTIVE  History of Presenting Illness   Patient is a 89 y.o. male who was brought in from [facility] on 1/25/2025 with concerns for abdominal pain, generalized weakness, dizziness clarified as lightheadedness.  Had no evidence of nausea, vomiting, diarrhea, no fever or chills.  Workup concerning for elevated lipase (2696) consistent with pancreatitis T. bili of 3.0 with elevated LFTs, lactate of 2.1, and a leukocytosis of 16.2 with 82.7% PMNs.  UA consistent with UTI and he was started on antibiotics.  Ultrasound demonstrated a gall stone and wound of the gallbladder, but CBD appears normal in diameter and there appears to be no intrahepatic biliary ductal dilation.    This morning, he feels much better, has no abdominal pain, labs are improved, WBC is now 11.0, lipase is 1574, and T. bili is 1.8; was seen by gastroenterology, who believe he likely passed a choledocholith, resulting in improved symptoms and labs.  I was consulted for potential surgical management of the gallbladder.    Past Medical History     Past Medical History:  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       PCP: Otilio Ramos MD    Past Surgical History:  Past Surgical History:   Procedure Laterality Date    BACK SURGERY      cervical    CATARACT EXTRACTION Bilateral     COLONOSCOPY  02/11/2013    Hyperplastic polyp at  25 cm, Diverticulosis repeat exam in 5 years    COLONOSCOPY N/A 10/16/2018    Tubular adenoma ascending colon repet prn    ENDOSCOPY  01/15/2014    HH    ENDOSCOPY N/A 10/03/2019    Large HH, non-bleeding gastric ulcer    ENDOSCOPY N/A 11/25/2019    Procedure: ESOPHAGOGASTRODUODENOSCOPY WITH ANESTHESIA;  Surgeon: Marques Pérez MD;  Location: [facility] ENDOSCOPY;  Service: Gastroenterology    ENDOSCOPY N/A 2/9/2023    Procedure: ESOPHAGOGASTRODUODENOSCOPY WITH ANESTHESIA;  Surgeon: Sushant Edmond MD;  Location: [facility] ENDOSCOPY;  Service: Gastroenterology;  Laterality: N/A;  pre GI bleed  post hiatal hernia; esophagitis  Otilio Ramos MD    HERNIA REPAIR Right     INGUINAL     HIP FRACTURE SURGERY Right 02/04/2023    REPLACEMENT TOTAL KNEE BILATERAL      THORACIC LAMINECTOMY DECOMPRESSIVE POSTERIOR N/A 12/14/2016    Procedure: LAMINECTOMY DECOMPRESSION, UNINSTRUMENTED POSTERIOR SPINAL FUSION, T 11-12;  Surgeon: LUZ Adan MD;  Location: [facility] OR;  Service:     VENA CAVA FILTER INSERTION N/A 10/04/2019    Procedure: VENA CAVA FILTER INSERTION;  Surgeon: Dallin Coronado MD;  Location: [facility] HYBRID OR 12;  Service: Vascular       Family History:  Family History   Problem Relation Age of Onset    Colon cancer Father     Colon polyps Neg Hx        Social History:  Social History     Tobacco Use    Smoking status: Never     Passive exposure: Never    Smokeless tobacco: Never   Substance Use Topics    Alcohol use: No       Allergies:  Allergies   Allergen Reactions    Meloxicam Unknown - Low Severity       Medications:  Medications Prior to Admission   Medication Sig Dispense Refill Last Dose/Taking    acetaminophen (TYLENOL) 325 MG tablet Take 2 tablets by mouth 2 (Two) Times a Day.       bisacodyl (DULCOLAX) 10 MG suppository Insert 1 suppository into the rectum Daily As Needed for Constipation. (Patient not taking: Reported on 4/24/2023)       busPIRone (BUSPAR) 5 MG tablet Take 1 tablet by mouth 2  (Two) Times a Day.       cyclobenzaprine (FLEXERIL) 5 MG tablet Take 1 tablet by mouth 2 (Two) Times a Day As Needed for Muscle Spasms. (Patient not taking: Reported on 4/24/2023)       docusate sodium (COLACE) 100 MG capsule Take 1 capsule by mouth 2 (Two) Times a Day.       Enoxaparin Sodium (LOVENOX) 40 MG/0.4ML solution prefilled syringe syringe Inject 40 mg under the skin into the appropriate area as directed Daily.       gabapentin (NEURONTIN) 300 MG capsule Take 1 capsule by mouth 4 (Four) Times a Day. 12 capsule 0     guaiFENesin (MUCINEX) 600 MG 12 hr tablet Take 1 tablet by mouth 2 (Two) Times a Day.       hydrALAZINE (APRESOLINE) 50 MG tablet Take 1 tablet by mouth Every 8 (Eight) Hours As Needed.       levalbuterol (XOPENEX) 0.31 MG/3ML nebulizer solution Take 1 ampule by nebulization Every 8 (Eight) Hours As Needed for Wheezing.       lidocaine (LIDODERM) 5 % Place 1 patch on the skin as directed by provider Daily. Remove & Discard patch within 12 hours or as directed by MD       linaclotide (LINZESS) 145 MCG capsule capsule Take 1 capsule by mouth Daily.       lisinopril (PRINIVIL,ZESTRIL) 10 MG tablet Take 1 tablet by mouth Daily.       NIFEdipine XL (PROCARDIA XL) 60 MG 24 hr tablet Take 1 tablet by mouth Daily.       ondansetron (ZOFRAN) 4 MG tablet Take 1 tablet by mouth Every 6 (Six) Hours As Needed for Nausea or Vomiting.       oxyCODONE-acetaminophen (PERCOCET) 5-325 MG per tablet Take 1 tablet by mouth Every 6 (Six) Hours As Needed for Moderate Pain. 12 tablet 0     pantoprazole (PROTONIX) 40 MG EC tablet Take 1 tablet by mouth 2 (Two) Times a Day Before Meals.       polyethylene glycol (MIRALAX) packet Take 17 g by mouth Daily As Needed.       simethicone (MYLICON) 80 MG chewable tablet Chew 1 tablet Every 4 (Four) Hours As Needed for Flatulence.       sucralfate (CARAFATE) 1 GM/10ML suspension Take 10 mL by mouth 4 (Four) Times a Day Before Meals & at Bedtime.       vitamin B-12  "(CYANOCOBALAMIN) 1000 MCG tablet Take 1 tablet by mouth Daily.       vitamin D (ERGOCALCIFEROL) 1.25 MG (50243 UT) capsule capsule Take 1 capsule by mouth 3 (Three) Times a Week. Give one time a day every Mon, Wed, Fri            Review of Systems   Per HPI.      Physical Examination     Vitals:    25 0415 25 0745 25 1125 25 1509   BP: 136/75 131/57 142/59    BP Location: Right arm Left arm Left arm    Patient Position: Lying Lying Lying    Pulse: 65 60 63 61   Resp: 16 18 18 18   Temp: 97.6 °F (36.4 °C) 97.6 °F (36.4 °C) 97.4 °F (36.3 °C) 98.1 °F (36.7 °C)   TempSrc: Oral Oral Oral Oral   SpO2: 97% 96% 95% 91%   Weight:       Height:         Temp (24hrs), Av.8 °F (36.6 °C), Min:97.4 °F (36.3 °C), Max:98.2 °F (36.8 °C)    Systolic (36hrs), Av , Min:112 , Max:148    Diastolic (36hrs), Av, Min:57, Max:91    Estimated body mass index is 24.24 kg/m² as calculated from the following:    Height as of this encounter: 188 cm (74\").    Weight as of this encounter: 85.6 kg (188 lb 12.8 oz).  PREVIOUS WEIGHTS:   Wt Readings from Last 5 Encounters:   25 85.6 kg (188 lb 12.8 oz)   23 81.6 kg (180 lb)   23 83 kg (183 lb)   23 83 kg (183 lb)   23 83.4 kg (183 lb 12.8 oz)       Physical Examination:  Gen: awake, alert, sitting up in chair in no acute distress  HEENT: NCAT, EOMI, anicteric sclerae  CV: RRR, normotensive  Resp: nonlabored on room air, sats appropriate  Abd: soft, nontender, nondistended without surgical scar   MSK: moves all four, no c/c/e  Neuro: no focal deficits, cranial nerves grossly intact  Psy:  appropriate, cooperative      Data Review   Labs:  CBC  Results from last 7 days   Lab Units 25  0437 25  1729   WBC 10*3/mm3 11.04* 16.21*   HEMOGLOBIN g/dL 11.2* 13.2   HEMATOCRIT % 36.0* 41.2     BMP  Results from last 7 days   Lab Units 25  0437 25  1835   SODIUM mmol/L 142 140   CHLORIDE mmol/L 109* 103   CO2 mmol/L 26.0 " "24.0   BUN mg/dL 29* 30*   CREATININE mg/dL 1.57* 2.05*   GLUCOSE mg/dL 88 102*   CALCIUM mg/dL 8.6 9.1     LFTs  Results from last 7 days   Lab Units 01/26/25 0437 01/25/25 1835   ALT (SGPT) U/L 311* 486*   AST (SGOT) U/L 239* 506*   ALK PHOS U/L 139* 183*     Urine:  UA  Results from last 7 days   Lab Units 01/26/25 0437 01/25/25 1942 01/25/25 1835   COLOR UA   --  Dark Yellow*  --    CLARITY UA   --  Cloudy*  --    PH, URINE   --  <=5.0  --    GLUCOSE UA   --  Negative  --    BILIRUBIN UA   --  Small (1+)*  --    BLOOD UA   --  Negative  --    EGFR mL/min/1.73 42.1*  --  30.6*   UROBILINOGEN UA   --  1.0 E.U./dL  --        Urine Culture   Date Value Ref Range Status   01/25/2025 Culture in progress  Preliminary     No results found for: \"VIRALCULTU\", \"WOUNDCX\"    Radiology Impressions:  US Gallbladder    Result Date: 1/25/2025   1.  Exam limited by overlying bowel gas. Pancreas and aorta are poorly visualized.  2.  There is a 1.2 cm stone in the lumen of the gallbladder. The common bile duct is normal in caliber. No intrahepatic ductal dilation identified.  This report was signed and finalized on 1/25/2025 9:15 PM by Dr. Jacky Hall MD.      CT Cervical Spine Without Contrast    Result Date: 1/25/2025   1.  Multilevel degenerative changes and fusion changes as discussed above. No definite acute osseous abnormality on this exam.  2.  Atherosclerotic vascular disease in the carotid bifurcations bilaterally.  This report was signed and finalized on 1/25/2025 6:24 PM by Dr. Jacky Hall MD.      CT Head Without Contrast    Result Date: 1/25/2025   1.  Moderate ventricular dilation again seen with low-attenuation in the subcortical and periventricular white matter may be related to chronic microvascular change or could be related to normal pressure hydrocephalus. Overall, this is very similar to the exam from 2/1/2022.  2.  Opacification of the bilateral maxillary sinuses, raising concern for maxillary " sinusitis.  This report was signed and finalized on 1/25/2025 6:17 PM by Dr. Jacky Hall MD.      XR Spine Lumbar Complete 4+VW    Result Date: 12/31/2024  1. Stable appearance of the lumbar spine compared to CT 11/19/2023. Stable bony fusion at the L1/2 level with loss of the L1/2 disc space. Chronic loss of height of L1 and L2. Advanced degenerative changes as described above with no acute radiographic abnormality.     This report was signed and finalized on 12/31/2024 4:24 PM by Dr. Radhika Ross MD.         Hospital Problem List     Active Hospital Problems    Diagnosis     **Pancreatitis, acute          Assessment/Plan   Jose Alfredo Kirkpatrick is a 89 y.o. male with likely gallstone pancreatitis, who also presented with concerns for UTI.  At this point, with his numbers are improving today, I do not feel there is a reason for MRCP or further workup-would tentatively plan on robotic cholecystectomy during this admission, as soon as his lipase and T. bili normalized.  If they fail to do so, or were to rise again, would order an MRCP-as this would be an indication the patient would likely need preoperative ERCP, then normalization of his numbers, then cholecystectomy.  I discussed the plan for surgery in great detail with the patient, and his daughter Lula who is at bedside.     -Daily labs-please obtain fractionated bilirubin with a.m. labs  -If lipase and T. bili normalize, will plan for robotic cholecystectomy prior to discharge from this admission.  -Continue antibiotics for UTI-at this point, I do not think the patient needs any further antibiotics for pancreatitis, as a new believe he has an acutely infected pancreatitis    Smoking cessation: Never smoker-no counseling indicated  BMI is within normal parameters. No other follow-up for BMI required.  Med rec complete: yes  VTE: VTE PPX is not indicated.    Time Spent: I spent 45 minutes caring for Jose Alfredo Kirkpatrick on this date of service. This time includes  time, independent of any procedures, spent by me in the following activities: preparing for the clinic visit, reviewing tests, obtaining and/or reviewing a separately obtained history, performing a medically appropriate examination and/or evaluation, counseling and educating the patient/family/caregiver, ordering medications, tests, or procedures, and documenting information in the medical record.     Vinicio Cain MD  1/26/2025   15:16 CST      Electronically signed by Vinicio Cain MD at 01/26/25 1524       Arie Becerra APRN at 01/26/25 1018        Consult Orders    1. Inpatient Gastroenterology Consult [[id number]] ordered by Winter White MD at 01/25/25 2317              Attestation signed by Chris Payne DO at 01/26/25 1058    I have reviewed this documentation and agree.                  [facility] Gastroenterology  Initial Inpatient Consult Note    Referring Provider: Winter White MD    Date of Admission: 1/25/2025  Date of Service:  01/26/25    Reason for Consultation: Pancreatitis    Subjective     History of present illness:      This is an 89-year-old male presented to [facility] ER with complaints of abdominal pain as well as generalized weakness.  Patient states for the past couple of days he has not been feeling well.  He did have some lower abdominal pain 2 days ago followed by diarrhea with relief in abdominal pain.  Patient states he has a history of constipation attributing this to caffeine consumption as well as use of pain medication over the years.  Upon my discussion with him today he denies abdominal pain, no nausea vomiting.  He does not drink alcohol.  No new medications.  He denies difficulty with heartburn or indigestion.    Colonoscopy in 2018 and endoscopy 2019 both by Dr. Marques Pérez.      Past Medical History:  Past Medical History:   Diagnosis Date    Arthritis     Cataract     BILATERAL    GERD (gastroesophageal reflux disease)      History of transfusion     Hx of colonic polyps     Hypertension     Reflux esophagitis     Streptococcosis     IN SPINE FROM BACK INJECTIONS       Past Surgical History:  Past Surgical History:   Procedure Laterality Date    BACK SURGERY      cervical    CATARACT EXTRACTION Bilateral     COLONOSCOPY  02/11/2013    Hyperplastic polyp at 25 cm, Diverticulosis repeat exam in 5 years    COLONOSCOPY N/A 10/16/2018    Tubular adenoma ascending colon repet prn    ENDOSCOPY  01/15/2014    HH    ENDOSCOPY N/A 10/03/2019    Large HH, non-bleeding gastric ulcer    ENDOSCOPY N/A 11/25/2019    Procedure: ESOPHAGOGASTRODUODENOSCOPY WITH ANESTHESIA;  Surgeon: Marques Pérez MD;  Location: [facility] ENDOSCOPY;  Service: Gastroenterology    ENDOSCOPY N/A 2/9/2023    Procedure: ESOPHAGOGASTRODUODENOSCOPY WITH ANESTHESIA;  Surgeon: Sushant Edmond MD;  Location: [facility] ENDOSCOPY;  Service: Gastroenterology;  Laterality: N/A;  pre GI bleed  post hiatal hernia; esophagitis  Otilio Ramos MD    HERNIA REPAIR Right     INGUINAL     HIP FRACTURE SURGERY Right 02/04/2023    REPLACEMENT TOTAL KNEE BILATERAL      THORACIC LAMINECTOMY DECOMPRESSIVE POSTERIOR N/A 12/14/2016    Procedure: LAMINECTOMY DECOMPRESSION, UNINSTRUMENTED POSTERIOR SPINAL FUSION, T 11-12;  Surgeon: LUZ Adan MD;  Location: [facility] OR;  Service:     VENA CAVA FILTER INSERTION N/A 10/04/2019    Procedure: VENA CAVA FILTER INSERTION;  Surgeon: Dallin Coronado MD;  Location: [facility] HYBRID OR 12;  Service: Vascular        Social History:   Social History     Tobacco Use    Smoking status: Never     Passive exposure: Never    Smokeless tobacco: Never   Substance Use Topics    Alcohol use: No        Family History:  Family History   Problem Relation Age of Onset    Colon cancer Father     Colon polyps Neg Hx        Home Meds:  Medications Prior to Admission   Medication Sig Dispense Refill Last Dose/Taking    acetaminophen (TYLENOL) 325 MG tablet Take 2  tablets by mouth 2 (Two) Times a Day.       bisacodyl (DULCOLAX) 10 MG suppository Insert 1 suppository into the rectum Daily As Needed for Constipation. (Patient not taking: Reported on 4/24/2023)       busPIRone (BUSPAR) 5 MG tablet Take 1 tablet by mouth 2 (Two) Times a Day.       cyclobenzaprine (FLEXERIL) 5 MG tablet Take 1 tablet by mouth 2 (Two) Times a Day As Needed for Muscle Spasms. (Patient not taking: Reported on 4/24/2023)       docusate sodium (COLACE) 100 MG capsule Take 1 capsule by mouth 2 (Two) Times a Day.       Enoxaparin Sodium (LOVENOX) 40 MG/0.4ML solution prefilled syringe syringe Inject 40 mg under the skin into the appropriate area as directed Daily.       gabapentin (NEURONTIN) 300 MG capsule Take 1 capsule by mouth 4 (Four) Times a Day. 12 capsule 0     guaiFENesin (MUCINEX) 600 MG 12 hr tablet Take 1 tablet by mouth 2 (Two) Times a Day.       hydrALAZINE (APRESOLINE) 50 MG tablet Take 1 tablet by mouth Every 8 (Eight) Hours As Needed.       levalbuterol (XOPENEX) 0.31 MG/3ML nebulizer solution Take 1 ampule by nebulization Every 8 (Eight) Hours As Needed for Wheezing.       lidocaine (LIDODERM) 5 % Place 1 patch on the skin as directed by provider Daily. Remove & Discard patch within 12 hours or as directed by MD       linaclotide (LINZESS) 145 MCG capsule capsule Take 1 capsule by mouth Daily.       lisinopril (PRINIVIL,ZESTRIL) 10 MG tablet Take 1 tablet by mouth Daily.       NIFEdipine XL (PROCARDIA XL) 60 MG 24 hr tablet Take 1 tablet by mouth Daily.       ondansetron (ZOFRAN) 4 MG tablet Take 1 tablet by mouth Every 6 (Six) Hours As Needed for Nausea or Vomiting.       oxyCODONE-acetaminophen (PERCOCET) 5-325 MG per tablet Take 1 tablet by mouth Every 6 (Six) Hours As Needed for Moderate Pain. 12 tablet 0     pantoprazole (PROTONIX) 40 MG EC tablet Take 1 tablet by mouth 2 (Two) Times a Day Before Meals.       polyethylene glycol (MIRALAX) packet Take 17 g by mouth Daily As Needed.        simethicone (MYLICON) 80 MG chewable tablet Chew 1 tablet Every 4 (Four) Hours As Needed for Flatulence.       sucralfate (CARAFATE) 1 GM/10ML suspension Take 10 mL by mouth 4 (Four) Times a Day Before Meals & at Bedtime.       vitamin B-12 (CYANOCOBALAMIN) 1000 MCG tablet Take 1 tablet by mouth Daily.       vitamin D (ERGOCALCIFEROL) 1.25 MG (91652 UT) capsule capsule Take 1 capsule by mouth 3 (Three) Times a Week. Give one time a day every Mon, Wed, Fri          Current Meds:     Current Facility-Administered Medications:     heparin (porcine) 5000 UNIT/ML injection 5,000 Units, 5,000 Units, Subcutaneous, Q12H, Winter White MD, 5,000 Units at 01/26/25 0945    HYDROmorphone (DILAUDID) injection 1 mg, 1 mg, Intravenous, Q4H PRN, 1 mg at 01/25/25 2346 **AND** naloxone (NARCAN) injection 0.4 mg, 0.4 mg, Intravenous, Q5 Min PRN, Winter White MD    nitroglycerin (NITROSTAT) SL tablet 0.4 mg, 0.4 mg, Sublingual, Q5 Min PRN, Winter Wihte MD    ondansetron ODT (ZOFRAN-ODT) disintegrating tablet 4 mg, 4 mg, Oral, Q6H PRN **OR** ondansetron (ZOFRAN) injection 4 mg, 4 mg, Intravenous, Q6H PRN, Winter White MD, 4 mg at 01/25/25 2346    pantoprazole (PROTONIX) EC tablet 40 mg, 40 mg, Oral, Q AM, Winter White MD, 40 mg at 01/26/25 0642    Pharmacy to Dose Zosyn, , Not Applicable, Continuous PRN, Winter White MD    piperacillin-tazobactam (ZOSYN) 3.375 g IVPB in 100 mL NS MBP (CD), 3.375 g, Intravenous, Q8H, Winter White MD, 3.375 g at 01/26/25 0337    [COMPLETED] Insert Peripheral IV, , , Once **AND** sodium chloride 0.9 % flush 10 mL, 10 mL, Intravenous, PRN, Joselin Alejandre APRN    sodium chloride 0.9 % flush 10 mL, 10 mL, Intravenous, Q12H, Winter White MD, 10 mL at 01/25/25 2333    sodium chloride 0.9 % flush 10 mL, 10 mL, Intravenous, PRNCindy Rami H, MD    sodium chloride 0.9 % infusion 40 mL, 40 mL, Intravenous, PRNCindy Rami H, MD    sodium chloride 0.9 % infusion, 100 mL/hr,  Intravenous, Continuous, Winter White MD, Last Rate: 100 mL/hr at 01/26/25 0953, 100 mL/hr at 01/26/25 0953    Allergies:  Allergies   Allergen Reactions    Meloxicam Unknown - Low Severity       Vital Signs  Temp:  [97.6 °F (36.4 °C)-98.2 °F (36.8 °C)] 97.6 °F (36.4 °C)  Heart Rate:  [60-87] 60  Resp:  [16-18] 18  BP: (112-148)/(57-91) 131/57  Body mass index is 24.24 kg/m².    Intake/Output Summary (Last 24 hours) at 1/26/2025 1018  Last data filed at 1/26/2025 0655  Gross per 24 hour   Intake 1700 ml   Output 750 ml   Net 950 ml     No intake/output data recorded.    Review of Systems     Constitution:  negative for chills, fatigue and fevers  ENT:   negative for sore throat and voice change  Respiratory: negative for  cough and shortness of air  Cardiovascular:  Negative for chest pain or palpitations  Gastrointestinal:  negative for  See HPI  Endocrine: negative for   weight loss, unintended        Objective      Physical Exam:    General Appearance: Alert, cooperative, in no acute distress  Eyes:            No icterus  Lungs:         Clear to auscultation, respiration regular, even, and unlabored  Heart::          No murmur  Abdomen:    Normal bowel sounds, no masses, soft, non tender, non distended, no guarding  Extremities: no edema  Psychiatric: Judgement and insight: normal                       Orientation to person, place, and time: normal                       Mood and affect: normal      Results Review:    I have reviewed all of the patients current test results  Results from last 7 days   Lab Units 01/26/25 0437 01/25/25  1729   WBC 10*3/mm3 11.04* 16.21*   HEMOGLOBIN g/dL 11.2* 13.2   HEMATOCRIT % 36.0* 41.2   PLATELETS 10*3/mm3 139* 169       Results from last 7 days   Lab Units 01/26/25  0437 01/25/25  1835   SODIUM mmol/L 142 140   POTASSIUM mmol/L 4.6 5.1   CHLORIDE mmol/L 109* 103   CO2 mmol/L 26.0 24.0   BUN mg/dL 29* 30*   CREATININE mg/dL 1.57* 2.05*   CALCIUM mg/dL 8.6 9.1   BILIRUBIN  mg/dL 1.8* 3.0*   ALK PHOS U/L 139* 183*   ALT (SGPT) U/L 311* 486*   AST (SGOT) U/L 239* 506*   GLUCOSE mg/dL 88 102*             Lab Results   Lab Value Date/Time    LIPASE 1,574 (H) 01/26/2025 0437    LIPASE 2,696 (H) 01/25/2025 1835    LIPASE 34 02/08/2023 1459    LIPASE 42 02/08/2023 1100       Radiology:    Imaging Results (Last 24 Hours)       Procedure Component Value Units Date/Time    US Gallbladder [[id number]] Collected: 01/25/25 2113     Updated: 01/25/25 2118    Narrative:      EXAMINATION: US GALLBLADDER-  1/25/2025 9:13 PM     REASON FOR EXAM: elevated liver enzymes, elevated bilirubin, r/o biliary  obstruction; J01.00-Acute maxillary sinusitis, unspecified; K85.90-Acute  pancreatitis without necrosis or infection, unspecified; N17.9-Acute  kidney failure, unspecified; N39.0-Urinary tract infection, site not  specified     COMPARISON: 10/19/2020     TECHNIQUE: Multiple longitudinal and transverse real time sonographic  images of the right upper quadrant of the abdomen are obtained. Doppler  and grayscale images were provided. Images and report are stored per  institutional and state regulations.     FINDINGS:    PANCREAS: Obscured by bowel gas.     LIVER: Normal in size, echogenicity and echotexture. No focal lesion. No  definite intrahepatic ductal dilation.     GALLBLADDER: There is a 1.2 cm shadowing stone in the lumen of the  gallbladder. No pericholecystic fluid or gallbladder wall thickening.     BILE DUCTS: The CBD measures 0.3 cm in diameter. There is no  intrahepatic or extrahepatic ductal dilation.     OTHER: The visualized portion of the RIGHT kidney has a normal  appearance. The aorta is obscured by bowel gas.          Impression:         1.  Exam limited by overlying bowel gas. Pancreas and aorta are poorly  visualized.     2.  There is a 1.2 cm stone in the lumen of the gallbladder. The common  bile duct is normal in caliber. No intrahepatic ductal dilation  identified.     This report  was signed and finalized on 1/25/2025 9:15 PM by Dr. Jacky Hall MD.       CT Cervical Spine Without Contrast [[id number]] Collected: 01/25/25 1819     Updated: 01/25/25 1827    Narrative:      EXAMINATION: CT CERVICAL SPINE WO CONTRAST-  1/25/2025 6:19 PM     HISTORY: posterior neck pain     COMPARISON: 11/19/2023     DLP: 1031.66 mGy.cm     TECHNIQUE: Serial helical tomographic images of the cervical spine were  obtained without the use of intravenous contrast. Additionally, sagittal  and coronal reformatted images were also provided for review.  In order  to have a CT radiation dose as low as reasonably achievable, Automated  Exposure Control was utilized for adjustment of the mA and/or KV  according to patient size.     FINDINGS:     OSSEOUS: No definite acute fracture. There is evidence of previous  cervical fusion posteriorly at C4-C6. Posterior decompressive changes at  C4-C6 as well. No perihardware lucency in the posterior fusion screws.  No destructive bone lesion. I suspect partial bony fusion of the C3 and  C4 vertebral bodies and the posterior elements.     SKULL BASE: Arthritis at the C1-C2 articulation with the odontoid  process. No acute fracture in the visualized portion of the skull base.     ALIGNMENT: Straightening of the normal lordosis.     SURROUNDING SOFT TISSUES: Evaluation of the surrounding soft tissues  demonstrates calcified atherosclerotic vascular disease in the bilateral  carotid bifurcations.     UPPER THORAX: The visualized thorax demonstrates no acute abnormality.     LEVELS:  C2-C3: Facet arthropathy noted in the RIGHT C2-C3 facet. There is  moderate RIGHT foraminal narrowing. No high-grade thecal sac narrowing.     C3-C4: Partial fusion of the vertebral bodies and the posterior elements  at C3-C4 with posterior disc osteophyte complex noted indenting the  ventral aspect of the CSF space, likely contributing to at least a mild  thecal sac narrowing. At the C4 level there is  posterior decompression.  There is moderate LEFT and RIGHT foraminal narrowing.     C4-C5: Fusion changes at this level. Posterior disc osteophyte complex  indents the ventral aspect of the thecal sac. Posterior decompressive  changes are noted. This is similar to the exam from 11/19/2023. Moderate  bilateral foraminal narrowing.     C5-C6: Fusion changes at this level with marginal endplate osteophytes.  Moderate RIGHT and severe LEFT foraminal narrowing. Posterior  decompressive changes.     C6-C7: Posterior decompressive changes at C6. Diffuse disc space height  loss with vacuum disc phenomenon and marginal endplate osteophytes.  Suspect at moderate thecal sac stenosis at the caudal aspect of this  disc space. There is moderate bilateral foraminal narrowing, LEFT  greater than RIGHT.     C7-T1: Anterior end plate osteophyte formation. At least mild LEFT  foraminal narrowing. No high-grade thecal sac narrowing.          Impression:         1.  Multilevel degenerative changes and fusion changes as discussed  above. No definite acute osseous abnormality on this exam.     2.  Atherosclerotic vascular disease in the carotid bifurcations  bilaterally.      This report was signed and finalized on 1/25/2025 6:24 PM by Dr. Jacky Hall MD.       CT Head Without Contrast [[id number]] Collected: 01/25/25 1814     Updated: 01/25/25 1820    Narrative:      EXAMINATION: CT HEAD WO CONTRAST-  1/25/2025 6:14 PM     HISTORY: dizziness     neck pain     COMPARISON: 2/1/2022     DLP: 1031.66 mGy.cm     In order to have a CT radiation dose as low as reasonably achievable,  Automated Exposure Control was utilized for adjustment of the mA and/or  KV according to patient size.     TECHNIQUE: Serial axial tomographic images of the brain were obtained  without the use of intravenous contrast.  Coronal and sagittal  reformatted images were obtained reviewed as well.     FINDINGS:  Moderate ventricular dilation. Low density in the  subcortical and  periventricular white matter, similar to the exam from 2/1/2022.  Vascular calcifications in the intracranial vasculature. No acute  hemorrhage. No mass effect or midline shift. Cavum septum pellucidum et  vergae. The structures of the posterior fossa are unremarkable. 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     The included orbits and their contents are unremarkable. Opacification  of the bilateral maxillary sinuses. The visualized osseous structures  and overlying soft tissues of the skull and face are unremarkable.          Impression:         1.  Moderate ventricular dilation again seen with low-attenuation in the  subcortical and periventricular white matter may be related to chronic  microvascular change or could be related to normal pressure  hydrocephalus. Overall, this is very similar to the exam from 2/1/2022.     2.  Opacification of the bilateral maxillary sinuses, raising concern  for maxillary sinusitis.     This report was signed and finalized on 1/25/2025 6:17 PM by Dr. Jacky Hall MD.                 Assessment & Plan       Pancreatitis, acute      Impression/Plan    Elevated liver enzymes  Hyperbilirubinemia  Elevated lipase    Lipase yesterday was 2696, this has trended down today to 1574.  Ultrasound performed with poorly visualized pancreas.  There was a 1.2 cm stone in the lumen of the gallbladder per ultrasound.  Upon my exam I pressed firmly on upper abdomen which did not result in any pain or discomfort from patient.  I have entered a referral for surgery consult for their input.  Bilirubin has trended down unsure if he was passing a stone.  I recommend patient remain n.p.o. for now until after surgery evaluates.  Could consider advancing diet after their assessment/recommendation.    Dr. Kirkpatrick takes over GI services as of 7 AM Monday morning.    Electronically signed by HARISH Gallardo, 01/26/25, 10:18 AM ESTEFANI.         Arie  "Sherry Becerra, APRN  01/26/25  10:18 CST          Electronically signed by Chris Payne DO at 01/26/25 1058       Jean Pierre Cooper, PharmD at 01/25/25 7859          Pharmacy Dosing Service  Antimicrobial Dosing  Zosyn    Assessment/Action/Plan:  Based on indication and renal function, initiated extended-infusion Zosyn 3.375 gm IV every 8 hours. Pharmacy will continue to monitor daily and make further adjustment(s) accordingly.     Subjective:  Jose Alfredo Kirkpatrick is a 89 y.o. male with a  \"Pharmacy to Dose Zosyn\" consult for the treatment of Intra-abdominal Infection/UTI , day 1 of 7 of treatment.    Objective:  Ht: 188 cm (74\"); Wt: 85.6 kg (188 lb 12.8 oz); BMI: Body mass index is 24.24 kg/m².  Estimated Creatinine Clearance: 30.2 mL/min (A) (by C-G formula based on SCr of 2.05 mg/dL (H)).   Creatinine   Date Value Ref Range Status   01/25/2025 2.05 (H) 0.76 - 1.27 mg/dL Final      Lab Results   Component Value Date    WBC 16.21 (H) 01/25/2025      Baseline culture results:  Microbiology Results (last 10 days)       ** No results found for the last 240 hours. **            Jean Pierre Cooper PharmD  01/25/25 23:43 CST      Electronically signed by Jean Pierre Cooper, PharmD at 01/25/25 5075       "

## 2025-01-28 NOTE — PLAN OF CARE
Goal Outcome Evaluation:  Plan of Care Reviewed With: patient      Patient c/o abdominal pain, PRN pain med given. No c/o nausea. Patient on 2.5 L/min O2, continuous pulse ox in use. A&O x4. Turn Q2. External catheter in use, incontinent. Preventative mepilex on bilateral heels. Incentive spirometer encouraged. Lap sites c/d/I.

## 2025-01-28 NOTE — PROGRESS NOTES
[facility] Medicine Services  INPATIENT PROGRESS NOTE    Patient Name: Jose Alfredo Kirkpatrick  Date of Admission: 1/25/2025  Today's Date: 01/28/25  Length of Stay: 2  Primary Care Physician: Otilio Ramos MD    Subjective   Chief Complaint: Gallbladder pancreatitis   HPI   Blood pressure slightly high, put back patient on lisinopril.  Afebrile.  General surgery recommended stop IV antibiotics.  UA cultures was negative.  Liver enzymes improving.  White blood cells normal.  Hemoglobin normalized.    Review of Systems   Constitutional:  Positive for activity change, appetite change and fatigue. Negative for chills and fever.   HENT:  Negative for hearing loss, nosebleeds, tinnitus and trouble swallowing.    Eyes:  Negative for visual disturbance.   Respiratory:  Positive for shortness of breath. Negative for cough, chest tightness and wheezing.    Cardiovascular:  Negative for chest pain, palpitations and leg swelling.   Gastrointestinal:  Negative for abdominal distention, abdominal pain, blood in stool, constipation, diarrhea, nausea and vomiting.   Endocrine: Negative for cold intolerance, heat intolerance, polydipsia, polyphagia and polyuria.   Genitourinary:  Negative for decreased urine volume, difficulty urinating, dysuria, flank pain, frequency and hematuria.   Musculoskeletal:  Positive for arthralgias, gait problem and myalgias. Negative for joint swelling.   Skin:  Negative for rash.   Allergic/Immunologic: Negative for immunocompromised state.   Neurological:  Positive for weakness. Negative for dizziness, syncope, light-headedness and headaches.   Hematological:  Negative for adenopathy. Does not bruise/bleed easily.   Psychiatric/Behavioral: Confusion resolved.. Negative for sleep disturbance. The patient is not nervous/anxious.    All pertinent negatives and positives are as above. All other systems have been reviewed and are negative unless otherwise stated.      Objective    Temp:  [97.1 °F (36.2 °C)-98.1 °F (36.7 °C)] 98 °F (36.7 °C)  Heart Rate:  [48-88] 75  Resp:  [16-18] 16  BP: (103-157)/(46-75) 147/75  Physical Exam  Vitals and nursing note reviewed.   Constitutional:       Appearance: He is well-developed.      Comments: Advanced age.  Cachectic   HENT:      Head: Normocephalic.   Eyes:      General: No scleral icterus.     Pupils: Pupils are equal, round, and reactive to light.   Neck:      Thyroid: No thyromegaly.      Vascular: No carotid bruit or JVD.      Trachea: No tracheal deviation.   Cardiovascular:      Rate and Rhythm: Regular rhythm.  Heart rates in the 70s.     Heart sounds: No murmur heard.     No friction rub. No gallop.   Pulmonary:      Effort: No respiratory distress.      Breath sounds: No wheezing or rales.      Comments: Diminished breath semilateral, clear, on 2.5 L.  Chest:      Chest wall: No tenderness.   Abdominal:      General: Hypoactive bowel sound. There is no distension.      Palpations: Abdomen is soft.      Tenderness: Status post abdomen surgery.  Musculoskeletal:      Cervical back: Normal range of motion and neck supple.   Skin:     General: Skin is warm and dry.      Capillary Refill: Capillary refill takes 2 to 3 seconds.      Findings: No rash.   Neurological:      Cranial Nerves: No cranial nerve deficit.      Motor: Weakness present.      Coordination: Coordination abnormal.      Gait: Gait abnormal.   Psychiatric:         Mood and Affect: Mood normal.         Behavior: Behavior normal.       Results Review:  I have reviewed the labs, radiology results, and diagnostic studies.    Laboratory Data:   Results from last 7 days   Lab Units 01/28/25  0400 01/27/25  0511 01/26/25  0437   WBC 10*3/mm3 8.73 9.65 11.04*   HEMOGLOBIN g/dL 14.5 12.0* 11.2*   HEMATOCRIT % 45.1 37.6 36.0*   PLATELETS 10*3/mm3 190 146 139*        Results from last 7 days   Lab Units 01/28/25  0400 01/27/25  0511 01/26/25  1938   SODIUM mmol/L 137 139  139   POTASSIUM mmol/L 3.9 4.3 4.5   CHLORIDE mmol/L 99 107 108*   CO2 mmol/L 22.0 21.0* 22.0   BUN mg/dL 11 19 24*   CREATININE mg/dL 0.74* 0.83 1.14   CALCIUM mg/dL 9.4 8.4* 8.3*   BILIRUBIN mg/dL 0.9 0.9 0.8   ALK PHOS U/L 167* 148* 150*   ALT (SGPT) U/L 160* 209* 228*   AST (SGOT) U/L 59* 96* 139*   GLUCOSE mg/dL 92 86 119*       Culture Data:   Blood Culture   Date Value Ref Range Status   01/25/2025 No growth at 2 days  Preliminary   01/25/2025 No growth at 2 days  Preliminary     Urine Culture   Date Value Ref Range Status   01/25/2025 No growth  Final       Radiology Data:   Imaging Results (Last 24 Hours)       Procedure Component Value Units Date/Time    FL Cholangiogram Operative [[id number]] Collected: 01/27/25 1457     Updated: 01/27/25 1501    Narrative:      FL CHOLANGIOGRAM OPERATIVE- 1/27/2025 12:10 PM     HISTORY: stones; K85.90-Acute pancreatitis without necrosis or  infection, unspecified; J01.00-Acute maxillary sinusitis, unspecified;  N17.9-Acute kidney failure, unspecified; N39.0-Urinary tract infection,  site not specified; R74.8-Abnormal levels of other serum enzymes;  R79.89-Other specified abnormal findings of blood chemistry;  K85.10-Biliary acute pancreatitis without necrosis or infection;  Z74.09-Oth     COMPARISON: None     FLUOROSCOPY TIME: 0.1-minute     FLUOROSCOPY DOSE: 1.6 mGy     NUMBER OF IMAGES: 5       Impression:      Contrast was injected through the cystic duct stump, and multiple  fluoroscopic images of the RIGHT upper quadrant were obtained  intraoperatively. The opacified intrahepatic and extrahepatic ducts  demonstrate no filling defects or obstruction. Contrast is noted in the  small bowel. Surgical instruments and clips are noted in the RIGHT upper  quadrant.     Please refer to the operative note for more details.     This report was signed and finalized on 1/27/2025 2:58 PM by Dr Geremias Martinez.       FL C Arm During Surgery [[id number]] Resulted: 01/27/25 133      Updated: 01/27/25 5742    Narrative:      This procedure was auto-finalized with no dictation required.            I have reviewed the patient's current medications.     Assessment/Plan   Assessment  Active Hospital Problems    Diagnosis     **Gallstone pancreatitis     Acute kidney injury     Elevated LFTs     Hyperbilirubinemia     Acute UTI (urinary tract infection)        History of Present Illness  Patient is an 89-year-old male patient with a medical history of hypertension, GERD, brought in by EMS from [facility] for the evaluation of his abdominal pain and generalized weakness.  As reported, for the last 2 days he has been feeling weak and dizzy and this has been associated with abdominal cramps.  He denies any nausea vomiting or diarrhea.  He denies any fever or chills.  He denies any chest pain or shortness of breath.  He usually ambulates with a walker, but he felt very weak and stayed in bed all day.     Treatment Plan     Gallbladder pancreatitis/elevated liver enzyme.  Consult GI.  Consult general surgery.  Discontinue Zosyn antibiotics 1/20/2025, recommendation from general surgery.  Leukocytosis resolved.  Lipase improving.  Liver enzymes improving.    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  Status post 1/27/2025-Robotic cholecystectomy with intraoperative cholangiogram.  Tissue pathology is pending.     Hypoxia.  Patient is clear on 2.5 L of oxygen.     Acute kidney injury.  Creatinine  normalized.     Hypertension.  Restart lisinopril.  Hold Procardia, due to hypotension. Nitro as needed.     Anemia.  Hemoglobin stable.     Reflux.  Protonix.  Zofran as needed.     Anxiety/depression .  Hold BuSpar twice daily.     Lovenox prophylaxis.     Chronic pain/neuropathy.  Neurontin at night.  Flexeril as needed.  Dilaudid as needed.  Percocet as needed.     Advanced age.  89 years old.     Nutrition.  GI soft diet.     Deconditioning.  PT and OT consult     Blood culture-no growth in 24 hours.  Urine culture no growth.  Pathology is pending.     Patient is from [facility].     Medical Decision Making  Number and Complexity of problems: 1 acute moderate complexity others chronic  Differential Diagnosis: As above     Conditions and Status        Improving.     [facility] Data  External documents reviewed: Reviewed  Cardiac tracing (EKG, telemetry) interpretation: Reviewed  Radiology interpretation: Reviewed  Labs reviewed: As above  Any tests that were considered but not ordered: None     Decision rules/scores evaluated (example [id number], Wells, etc): None     Discussed with: Patient and his daughter and general surgery     Care Planning  Shared decision making: Patient apprised of current labs, vitals, imaging and treatment plan.  They are agreeable with proceeding with plans as discussed.   Code status and discussions: No CPR     Disposition  Social Determinants of Health that impact treatment or disposition: None  I expect the patient to be discharged to half-way in TBD days.            Medical Decision Making  Number and Complexity of problems: Gallbladder pancreatitis  Differential Diagnosis: None     Conditions and Status        Condition is unchanged.     [facility] Data  External documents reviewed: Previous note  Cardiac tracing (EKG, telemetry) interpretation: Sinus bradycardia   Radiology interpretation: CT scan  Labs reviewed: Laboratory  Any tests that were considered but not ordered:  Lab in a.m.     Decision rules/scores evaluated (example [id number], Wells, etc): None     Discussed with: Patient and nursing     Care Planning  Shared decision making: Patient and nursing  Code status and discussions: DNR . DNI.     Disposition  Social Determinants of Health that impact treatment or disposition: From assisted living  1 to 3 days.         Electronically signed by Parth Byrd MD, 01/28/25, 12:17 CST.

## 2025-01-28 NOTE — THERAPY TREATMENT NOTE
Acute Care - Physical Therapy Treatment Note  [facility]     Patient Name: Jose Alfredo Kirkpatrick  : 1935  MRN: [id number]  Today's Date: 2025      Visit Dx:     ICD-10-CM ICD-9-CM   1. Acute pancreatitis, unspecified complication status, unspecified pancreatitis type  K85.90 577.0   2. Acute non-recurrent maxillary sinusitis  J01.00 461.0   3. ISHA (acute kidney injury)  N17.9 584.9   4. Acute UTI (urinary tract infection)  N39.0 599.0   5. Elevated liver enzymes  R74.8 790.5   6. Elevated troponin level  R79.89 790.6   7. Gallstone pancreatitis  K85.10 577.0     574.20   8. Impaired mobility [Z74.09]  Z74.09 799.89   9. Acute gallstone pancreatitis  K85.10 577.0     574.20     Patient Active Problem List   Diagnosis    Spinal stenosis, lumbar    Hx of colonic polyps    Drug-induced constipation    Primary hypertension    Acute pulmonary embolism with acute cor pulmonale    Acute bilateral deep vein thrombosis (DVT) of femoral veins    History of DVT of left lower extremity    Positive fecal occult blood test    Melena    Vitamin B12 deficiency    FARZANA (iron deficiency anemia)    Cholelithiasis    Gastric ulcer with hemorrhage    Chronic bilateral low back pain without sciatica    Chronic pain of both shoulders    Kyphosis    Overweight (BMI 25.0-29.9)    Non-smoker    Pneumonia of left lower lobe due to infectious organism    Erosive esophagitis    Large hiatal hernia    Weight loss    Paraesophageal hiatal hernia    Severe malnutrition    E. coli UTI, present on admission (urinary tract infection)    Gallstone pancreatitis    Acute kidney injury    Elevated LFTs    Hyperbilirubinemia    Acute UTI (urinary tract infection)     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     Past Surgical  History:   Procedure Laterality Date    BACK SURGERY      cervical    CATARACT EXTRACTION Bilateral     COLONOSCOPY  02/11/2013    Hyperplastic polyp at 25 cm, Diverticulosis repeat exam in 5 years    COLONOSCOPY N/A 10/16/2018    Tubular adenoma ascending colon repet prn    ENDOSCOPY  01/15/2014    HH    ENDOSCOPY N/A 10/03/2019    Large HH, non-bleeding gastric ulcer    ENDOSCOPY N/A 11/25/2019    Procedure: ESOPHAGOGASTRODUODENOSCOPY WITH ANESTHESIA;  Surgeon: Marques Pérez MD;  Location: [facility] ENDOSCOPY;  Service: Gastroenterology    ENDOSCOPY N/A 2/9/2023    Procedure: ESOPHAGOGASTRODUODENOSCOPY WITH ANESTHESIA;  Surgeon: Sushant Edmond MD;  Location: [facility] ENDOSCOPY;  Service: Gastroenterology;  Laterality: N/A;  pre GI bleed  post hiatal hernia; esophagitis  Otilio Ramos MD    HERNIA REPAIR Right     INGUINAL     HIP FRACTURE SURGERY Right 02/04/2023    REPLACEMENT TOTAL KNEE BILATERAL      THORACIC LAMINECTOMY DECOMPRESSIVE POSTERIOR N/A 12/14/2016    Procedure: LAMINECTOMY DECOMPRESSION, UNINSTRUMENTED POSTERIOR SPINAL FUSION, T 11-12;  Surgeon: LUZ Adan MD;  Location: [facility] OR;  Service:     VENA CAVA FILTER INSERTION N/A 10/04/2019    Procedure: VENA CAVA FILTER INSERTION;  Surgeon: Dallin Coronado MD;  Location: [facility] HYBRID OR 12;  Service: Vascular     PT Assessment (Last 12 Hours)       PT Evaluation and Treatment       Row Name 01/28/25 1006          Physical Therapy Time and Intention    Subjective Information complains of;pain;weakness  -NW     Document Type therapy note (daily note)  -NW     Mode of Treatment physical therapy  -NW     Comment incontient  -NW       Row Name 01/28/25 1006          General Information    Existing Precautions/Restrictions fall  -NW       Row Name 01/28/25 1006          Pain    Posttreatment Pain Rating 5/10  -NW     Pain Location abdomen  from sx  -NW       Row Name 01/28/25 1006          Bed Mobility    Supine-Sit Williamson (Bed  Mobility) verbal cues;minimum assist (75% patient effort)  -NW     Sidelying-Sit Miner (Bed Mobility) --  chair  -NW       Row Name 01/28/25 1006          Sit-Stand Transfer    Sit-Stand Miner (Transfers) verbal cues;minimum assist (75% patient effort);moderate assist (50% patient effort)  -NW       Row Name 01/28/25 1006          Stand-Sit Transfer    Stand-Sit Miner (Transfers) verbal cues;contact guard;minimum assist (75% patient effort)  -NW       Row Name 01/28/25 1006          Gait/Stairs (Locomotion)    Miner Level (Gait) minimum assist (75% patient effort)  -NW     Assistive Device (Gait) walker, front-wheeled  -NW     Distance in Feet (Gait) 15  -NW     Comment, (Gait/Stairs) small steps and help w/ AD and amb around bed to chair.  -NW       Row Name 01/28/25 1006          Safety Issues/Impairments Affecting Functional Mobility    Impairments Affecting Function (Mobility) balance;strength;pain  -       Row Name 01/28/25 1006          Motor Skills    Therapeutic Exercise aerobic  -NW       Row Name 01/28/25 1006          Aerobic Exercise    Time Performed (Aerobic Exercise) AROM BLEs sitting  -NW       Row Name             Wound 01/27/25 1256 abdomen    Wound - Properties Group Placement Date: 01/27/25  -HW Placement Time: 1256  -HW Location: abdomen  -HW Primary Wound Type: Incision  -HW    Retired Wound - Properties Group Placement Date: 01/27/25  -HW Placement Time: 1256  -HW Location: abdomen  -HW Primary Wound Type: Incision  -HW    Retired Wound - Properties Group Placement Date: 01/27/25  -HW Placement Time: 1256  -HW Location: abdomen  -HW Primary Wound Type: Incision  -HW    Retired Wound - Properties Group Date first assessed: 01/27/25  -HW Time first assessed: 1256  -HW Location: abdomen  -HW Primary Wound Type: Incision  -HW      Row Name 01/28/25 1006          Positioning and Restraints    Pre-Treatment Position in bed  -NW     Post Treatment Position chair  -NW      In Chair reclined;call light within reach;encouraged to call for assist  -NW               User Key  (r) = Recorded By, (t) = Taken By, (c) = Cosigned By      Initials Name Provider Type    NW Iliana Avila PTA Physical Therapist Assistant    Eduar Wei, RN Registered Nurse                    Physical Therapy Education       Title: PT OT SLP Therapies (In Progress)       Topic: Physical Therapy (Done)       Point: Mobility training (Done)       Learning Progress Summary            Patient Acceptance, E, VU by  at 1/27/2025 0945    Comment: role of PT, safety with AD, fall risk, d/c plans                      Point: Home exercise program (Done)       Learning Progress Summary            Patient Acceptance, E, VU by  at 1/27/2025 0945    Comment: role of PT, safety with AD, fall risk, d/c plans                      Point: Body mechanics (Done)       Learning Progress Summary            Patient Acceptance, E, VU by  at 1/27/2025 0945    Comment: role of PT, safety with AD, fall risk, d/c plans                      Point: Precautions (Done)       Learning Progress Summary            Patient Acceptance, E, VU by  at 1/27/2025 0945    Comment: role of PT, safety with AD, fall risk, d/c plans                                      User Key       Initials Effective Dates Name Provider Type Discipline     08/15/24 -  Elias Zaidi, VITALY DPT Physical Therapist PT                  PT Recommendation and Plan     Progress: improving  Outcome Evaluation: Bed mobiltiy min x1 w/ cues and extra time. Tolerated AROM BLEs sitting EOB. sit-stand w/ bed elevated min x1. pt able to amb small steps around bed to chair min x1 and help w/ AD. pt reports only amb short distances at the [facility], mainly stays in W/c. Feel pt will need few days of strengthening and then may be able to transition back to facility w/ HH.       Time Calculation:    PT Charges       Row Name 01/28/25 5565             Time Calculation    Start Time  1006  -NW      Stop Time 1035  -NW      Time Calculation (min) 29 min  -NW      PT Received On 01/28/25  -NW      PT Goal Re-Cert Due Date 02/06/25  -NW         Time Calculation- PT    Total Timed Code Minutes- PT 29 minute(s)  -NW         Timed Charges    76308 - PT Therapeutic Exercise Minutes 11  -NW      52144 - Gait Training Minutes  18  -NW         Total Minutes    Timed Charges Total Minutes 29  -NW       Total Minutes 29  -NW                User Key  (r) = Recorded By, (t) = Taken By, (c) = Cosigned By      Initials Name Provider Type    NW Iliana Avila PTA Physical Therapist Assistant                  Therapy Charges for Today       Code Description Service Date Service Provider Modifiers Qty    08393698600 HC PT THER PROC EA 15 MIN 1/28/2025 Iliana Avila PTA GP 1    93196571833 HC GAIT TRAINING EA 15 MIN 1/28/2025 Iliana Avila PTA GP 1            PT G-Codes  Outcome Measure Options: AM-PAC 6 Clicks Daily Activity (OT)  AM-PAC 6 Clicks Score (PT): 16  AM-PAC 6 Clicks Score (OT): 17    Iliana Avila PTA  1/28/2025

## 2025-01-28 NOTE — PLAN OF CARE
Goal Outcome Evaluation:              Outcome Evaluation: Pt A/Ox4, forgetful at times. vitals stable.  x6 lap sites to abd, AMMY with steri strips. bowel sounds remain hypoactive, tolerating diet no c/o nausea. c/o pain see MAR. incontinent urine, external cath in place. converted to afib for several hours, MD aware EKG, echo and PRN lopressor. Pt converted back to NSR, MD aware. up x 1-2 chair this shift. q2 turn when pt allows. safety maintained.

## 2025-01-28 NOTE — PLAN OF CARE
Goal Outcome Evaluation:  Plan of Care Reviewed With: patient        Progress: improving  Outcome Evaluation: Bed mobiltiy min x1 w/ cues and extra time. Tolerated AROM BLEs sitting EOB. sit-stand w/ bed elevated min x1. pt able to amb small steps around bed to chair min x1 and help w/ AD. pt reports only amb short distances at the [facility], mainly stays in W/c. Feel pt will need few days of strengthening and then may be able to transition back to facility w/ HH.

## 2025-01-28 NOTE — CASE MANAGEMENT/SOCIAL WORK
Continued Stay Note  [facility]     Patient Name: Jose Alfredo Kirkpatrick  MRN: [id number]  Today's Date: 1/28/2025    Admit Date: 1/25/2025    Plan: [facility]   Discharge Plan       Row Name 01/28/25 1517       Plan    Plan [facility]    Patient/Family in Agreement with Plan yes    Plan Comments Pt will return to [facility] at d/c. Will follow in case d/c needs arise.                   Discharge Codes    No documentation.                       AMAN Blanchard

## 2025-01-28 NOTE — PROGRESS NOTES
"       GENERAL SURGERY INPATIENT PROGRESS NOTE    Patient Name:  Jose Alfredo Kirkpatrick  YOB: 1935  MRN: [id number]    SUBJECTIVE    No unexpected events overnight.  The patient reports that his incisional pain is tolerable.  He is tolerating a low-fat diet.  The previous pain he experienced with pancreatitis has nearly resolved.    Allergies:  Allergies   Allergen Reactions    Meloxicam Unknown - Low Severity       Medications:  acetaminophen, 1,000 mg, Oral, Q8H  [Held by provider] busPIRone, 5 mg, Oral, BID With Meals  enoxaparin, 40 mg, Subcutaneous, Nightly  gabapentin, 300 mg, Oral, Nightly  [Held by provider] lisinopril, 10 mg, Oral, Daily  [Held by provider] NIFEdipine XL, 60 mg, Oral, Daily  pantoprazole, 40 mg, Oral, Q AM  piperacillin-tazobactam, 3.375 g, Intravenous, Q8H  sodium chloride, 10 mL, Intravenous, Q12H    Pharmacy to Dose Zosyn,         OBJECTIVE    VITAL SIGNS  /74 (BP Location: Right arm, Patient Position: Lying)   Pulse 75   Temp 98.1 °F (36.7 °C)   Resp 16   Ht 188 cm (74\")   Wt 85.6 kg (188 lb 12.8 oz)   SpO2 94%   BMI 24.24 kg/m²     Intake/Output Summary (Last 24 hours) at 1/28/2025 1049  Last data filed at 1/28/2025 0836  Gross per 24 hour   Intake --   Output 3400 ml   Net -3400 ml       PHYSICAL EXAM    General: Pleasant elderly male, sitting up in bed, in no acute distress.  HEENT:  NCAT, PERRL, EOM intact bilaterally, hearing intact, nares patent  Heart:  Regular rate, normotensive, no JVD bilaterally  Lungs:  Normal respiratory effort, regular respiratory rate, no wheezing  Abdomen: Flat, soft, probably tender at his incisions which are clean dry and intact  Extremities:  No cyanosis, clubbing or edema.   Musculoskeletal:  Normal development of bilateral upper extremities. Normal development of bilateral lower extremities.  Range of motion intact.  No evidence of trauma.  Skin:  No rashes, ecchymosis or jaundice. Dry and non-diaphoretic. Skin color is " consistent with ethnicity.    RESULTS    Labs:  I personally reviewed all pertinent labs.   Imaging:  I personally reviewed all pertinent imaging studies.   Pathology:        ASSESSMENT AND PLAN    Active Hospital Problems    Diagnosis     **Gallstone pancreatitis     Acute kidney injury     Elevated LFTs     Hyperbilirubinemia     Acute UTI (urinary tract infection)          DAILY CARE PLAN    Labs are trending back down towards normal.  Clinically he is improving.  Okay to discontinue antibiotics.  I have counseled him on staying on a low-fat diet until his pancreatitis resolves.  He is appropriate to be discharged home from a surgical standpoint, however he is deconditioned therefore he would benefit from ongoing physical therapy to be discharged back to his nursing home.    I discussed the patient's findings and my recommendations with the patient/family, as well as the primary care team.    Ronak Roche MD, [facility]  General Surgery  1/28/2025  10:49 CST    Please note that portions of this note were completed with a voice recognition program.

## 2025-01-29 LAB
ALBUMIN SERPL-MCNC: 3.4 G/DL (ref 3.5–5.2)
ALBUMIN/GLOB SERPL: 1.1 G/DL
ALP SERPL-CCNC: 151 U/L (ref 39–117)
ALT SERPL W P-5'-P-CCNC: 103 U/L (ref 1–41)
ANION GAP SERPL CALCULATED.3IONS-SCNC: 15 MMOL/L (ref 5–15)
AST SERPL-CCNC: 32 U/L (ref 1–40)
BILIRUB SERPL-MCNC: 0.8 MG/DL (ref 0–1.2)
BUN SERPL-MCNC: 9 MG/DL (ref 8–23)
BUN/CREAT SERPL: 16.1 (ref 7–25)
CALCIUM SPEC-SCNC: 9.1 MG/DL (ref 8.6–10.5)
CHLORIDE SERPL-SCNC: 98 MMOL/L (ref 98–107)
CO2 SERPL-SCNC: 22 MMOL/L (ref 22–29)
CREAT SERPL-MCNC: 0.56 MG/DL (ref 0.76–1.27)
DEPRECATED RDW RBC AUTO: 44.2 FL (ref 37–54)
EGFRCR SERPLBLD CKD-EPI 2021: 94.8 ML/MIN/1.73
ERYTHROCYTE [DISTWIDTH] IN BLOOD BY AUTOMATED COUNT: 12.8 % (ref 12.3–15.4)
GLOBULIN UR ELPH-MCNC: 3.1 GM/DL
GLUCOSE SERPL-MCNC: 126 MG/DL (ref 65–99)
HCT VFR BLD AUTO: 45.5 % (ref 37.5–51)
HGB BLD-MCNC: 15.1 G/DL (ref 13–17.7)
LIPASE SERPL-CCNC: 103 U/L (ref 13–60)
MCH RBC QN AUTO: 31.1 PG (ref 26.6–33)
MCHC RBC AUTO-ENTMCNC: 33.2 G/DL (ref 31.5–35.7)
MCV RBC AUTO: 93.8 FL (ref 79–97)
PLATELET # BLD AUTO: 219 10*3/MM3 (ref 140–450)
PMV BLD AUTO: 9.1 FL (ref 6–12)
POTASSIUM SERPL-SCNC: 3.9 MMOL/L (ref 3.5–5.2)
PROT SERPL-MCNC: 6.5 G/DL (ref 6–8.5)
QT INTERVAL: 382 MS
QTC INTERVAL: 420 MS
RBC # BLD AUTO: 4.85 10*6/MM3 (ref 4.14–5.8)
SODIUM SERPL-SCNC: 135 MMOL/L (ref 136–145)
WBC NRBC COR # BLD AUTO: 6.87 10*3/MM3 (ref 3.4–10.8)

## 2025-01-29 PROCEDURE — 97116 GAIT TRAINING THERAPY: CPT

## 2025-01-29 PROCEDURE — 80053 COMPREHEN METABOLIC PANEL: CPT | Performed by: SURGERY

## 2025-01-29 PROCEDURE — 85027 COMPLETE CBC AUTOMATED: CPT | Performed by: FAMILY MEDICINE

## 2025-01-29 PROCEDURE — 83690 ASSAY OF LIPASE: CPT | Performed by: SURGERY

## 2025-01-29 PROCEDURE — 97530 THERAPEUTIC ACTIVITIES: CPT

## 2025-01-29 PROCEDURE — 25010000002 ENOXAPARIN PER 10 MG: Performed by: SURGERY

## 2025-01-29 PROCEDURE — 97110 THERAPEUTIC EXERCISES: CPT

## 2025-01-29 RX ORDER — NIFEDIPINE 60 MG/1
60 TABLET, EXTENDED RELEASE ORAL DAILY
Status: DISCONTINUED | OUTPATIENT
Start: 2025-01-29 | End: 2025-01-30 | Stop reason: HOSPADM

## 2025-01-29 RX ORDER — NIFEDIPINE 30 MG/1
30 TABLET, EXTENDED RELEASE ORAL DAILY
Status: DISCONTINUED | OUTPATIENT
Start: 2025-01-29 | End: 2025-01-29

## 2025-01-29 RX ORDER — METOPROLOL TARTRATE 1 MG/ML
2.5 INJECTION, SOLUTION INTRAVENOUS EVERY 6 HOURS PRN
Status: DISCONTINUED | OUTPATIENT
Start: 2025-01-29 | End: 2025-01-30 | Stop reason: HOSPADM

## 2025-01-29 RX ORDER — METOPROLOL TARTRATE 25 MG/1
12.5 TABLET, FILM COATED ORAL EVERY 12 HOURS SCHEDULED
Status: DISCONTINUED | OUTPATIENT
Start: 2025-01-29 | End: 2025-01-30 | Stop reason: HOSPADM

## 2025-01-29 RX ADMIN — OXYCODONE HYDROCHLORIDE 10 MG: 5 TABLET ORAL at 18:26

## 2025-01-29 RX ADMIN — GABAPENTIN 300 MG: 300 CAPSULE ORAL at 21:01

## 2025-01-29 RX ADMIN — METOPROLOL TARTRATE 25 MG: 25 TABLET, FILM COATED ORAL at 08:45

## 2025-01-29 RX ADMIN — PANTOPRAZOLE SODIUM 40 MG: 40 TABLET, DELAYED RELEASE ORAL at 06:23

## 2025-01-29 RX ADMIN — NIFEDIPINE 60 MG: 60 TABLET, FILM COATED, EXTENDED RELEASE ORAL at 10:37

## 2025-01-29 RX ADMIN — METOPROLOL TARTRATE 12.5 MG: 25 TABLET, FILM COATED ORAL at 21:01

## 2025-01-29 RX ADMIN — OXYCODONE HYDROCHLORIDE 10 MG: 5 TABLET ORAL at 14:29

## 2025-01-29 RX ADMIN — Medication 10 ML: at 08:49

## 2025-01-29 RX ADMIN — OXYCODONE HYDROCHLORIDE 10 MG: 5 TABLET ORAL at 23:38

## 2025-01-29 RX ADMIN — ENOXAPARIN SODIUM 40 MG: 100 INJECTION SUBCUTANEOUS at 21:01

## 2025-01-29 RX ADMIN — OXYCODONE HYDROCHLORIDE 10 MG: 5 TABLET ORAL at 08:44

## 2025-01-29 RX ADMIN — Medication 10 ML: at 21:02

## 2025-01-29 RX ADMIN — LISINOPRIL 10 MG: 10 TABLET ORAL at 08:44

## 2025-01-29 NOTE — PLAN OF CARE
Goal Outcome Evaluation:  Plan of Care Reviewed With: patient      Patient c/o abdominal pain, PRN pain med given. No c/o nausea. Patient on room air. A&O x4. Turn Q2, refusing at times. Lap sites c/d/I with steri-strips. External catheter in place, voiding. Tele on, patient began running A-fib in the 70's, provider notified, no med given at this time per provider.

## 2025-01-29 NOTE — PROGRESS NOTES
[facility] Medicine Services  INPATIENT PROGRESS NOTE    Patient Name: Jose Alfredo Kirkpatrick  Date of Admission: 1/25/2025  Today's Date: 01/29/25  Length of Stay: 3  Primary Care Physician: Otilio Ramos MD    Subjective   Chief Complaint: Gallbladder pancreatitis   HPI   Short episode of atrial fibs last night.  Echocardiogram reviewed.  Blood pressure is high, heart rates in the 60s decrease last night, cut back Lopressor . Put patient back on nifedipine.  Decrease in sodium . liver enzymes improving.  Lipase improving.  White blood cells normal.  Hemoglobin normal.  Patient is on room air.    Review of Systems   Constitutional:  Positive for activity change, appetite change and fatigue. Negative for chills and fever.   HENT:  Negative for hearing loss, nosebleeds, tinnitus and trouble swallowing.    Eyes:  Negative for visual disturbance.   Respiratory: Patient is on room air.  Negative for cough, chest tightness and wheezing.    Cardiovascular:  Negative for chest pain, palpitations and leg swelling.   Gastrointestinal:  Negative for abdominal distention, abdominal pain, blood in stool, constipation, diarrhea, nausea and vomiting.   Endocrine: Negative for cold intolerance, heat intolerance, polydipsia, polyphagia and polyuria.   Genitourinary:  Negative for decreased urine volume, difficulty urinating, dysuria, flank pain, frequency and hematuria.   Musculoskeletal:  Positive for arthralgias, gait problem and myalgias. Negative for joint swelling.   Skin:  Negative for rash.   Allergic/Immunologic: Negative for immunocompromised state.   Neurological:  Positive for weakness. Negative for dizziness, syncope, light-headedness and headaches.   Hematological:  Negative for adenopathy. Does not bruise/bleed easily.   Psychiatric/Behavioral: Confusion resolved.. Negative for sleep disturbance. The patient is not nervous/anxious.    All pertinent negatives and positives are as  above. All other systems have been reviewed and are negative unless otherwise stated.     Objective    Temp:  [97.6 °F (36.4 °C)-98.2 °F (36.8 °C)] 97.6 °F (36.4 °C)  Heart Rate:  [69-75] 69  Resp:  [16] 16  BP: (147-175)/(75-98) 153/92  Physical Exam  Vitals and nursing note reviewed.   Constitutional:       Appearance: He is well-developed.      Comments: Advanced age.  Cachectic   HENT:      Head: Normocephalic.   Eyes:      General: No scleral icterus.     Pupils: Pupils are equal, round, and reactive to light.   Neck:      Thyroid: No thyromegaly.      Vascular: No carotid bruit or JVD.      Trachea: No tracheal deviation.   Cardiovascular:      Rate and Rhythm: Regular rhythm.  Heart rates in the 60s.     Heart sounds: No murmur heard.     No friction rub. No gallop.   Pulmonary:      Effort: No respiratory distress.      Breath sounds: No wheezing or rales.      Comments: Diminished breath semilateral, clear, on room air.  Chest:      Chest wall: No tenderness.   Abdominal:      General: Hypoactive bowel sound. There is no distension.      Palpations: Abdomen is soft.      Tenderness: Status post abdomen surgery.  Musculoskeletal:      Cervical back: Normal range of motion and neck supple.   Skin:     General: Skin is warm and dry.      Capillary Refill: Capillary refill takes 2 to 3 seconds.      Findings: No rash.   Neurological:      Cranial Nerves: No cranial nerve deficit.      Motor: Weakness present.      Coordination: Coordination abnormal.      Gait: Gait abnormal.   Psychiatric:         Mood and Affect: Mood normal.         Behavior: Behavior normal.          Results Review:  I have reviewed the labs, radiology results, and diagnostic studies.    Laboratory Data:   Results from last 7 days   Lab Units 01/29/25  0421 01/28/25  0400 01/27/25  0511   WBC 10*3/mm3 6.87 8.73 9.65   HEMOGLOBIN g/dL 15.1 14.5 12.0*   HEMATOCRIT % 45.5 45.1 37.6   PLATELETS 10*3/mm3 219 190 146        Results from last 7  days   Lab Units 01/29/25  0421 01/28/25  0400 01/27/25  0511   SODIUM mmol/L 135* 137 139   POTASSIUM mmol/L 3.9 3.9 4.3   CHLORIDE mmol/L 98 99 107   CO2 mmol/L 22.0 22.0 21.0*   BUN mg/dL 9 11 19   CREATININE mg/dL 0.56* 0.74* 0.83   CALCIUM mg/dL 9.1 9.4 8.4*   BILIRUBIN mg/dL 0.8 0.9 0.9   ALK PHOS U/L 151* 167* 148*   ALT (SGPT) U/L 103* 160* 209*   AST (SGOT) U/L 32 59* 96*   GLUCOSE mg/dL 126* 92 86       Culture Data:   Blood Culture   Date Value Ref Range Status   01/25/2025 No growth at 3 days  Preliminary   01/25/2025 No growth at 3 days  Preliminary     Urine Culture   Date Value Ref Range Status   01/25/2025 No growth  Final       Radiology Data:   Imaging Results (Last 24 Hours)       ** No results found for the last 24 hours. **            I have reviewed the patient's current medications.     Assessment/Plan   Assessment  Active Hospital Problems    Diagnosis     **Gallstone pancreatitis     Acute kidney injury     Elevated LFTs     Hyperbilirubinemia     Acute UTI (urinary tract infection)        History of Present Illness  Patient is an 89-year-old male patient with a medical history of hypertension, GERD, brought in by EMS from [facility] for the evaluation of his abdominal pain and generalized weakness.  As reported, for the last 2 days he has been feeling weak and dizzy and this has been associated with abdominal cramps.  He denies any nausea vomiting or diarrhea.  He denies any fever or chills.  He denies any chest pain or shortness of breath.  He usually ambulates with a walker, but he felt very weak and stayed in bed all day.     Treatment Plan     Gallbladder pancreatitis/elevated liver enzyme.  Consult GI.  Consult general surgery.  Discontinue Zosyn antibiotics 1/20/2025, recommendation from general surgery.  Leukocytosis resolved.  Lipase improving.  Liver enzymes improving.    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  Status post 1/27/2025-Robotic cholecystectomy with intraoperative cholangiogram.  Tissue pathology is pending.    Hypertension/short episode of atrial fibrillation.  Possibly due to irritation pancreatitis.  Currently in sinus rhythm.  Cut back Lopressor due to episode of bradycardia last night.  Continue lisinopril.  Restart Procardia today... Nitro as needed.  Echocardiogram- 61 - 65%, moderate concentric hypertrophy, Left ventricular diastolic function is consistent with (grade Ia w/high LAP) impaired relaxation, Left atrial volume is mildly increased, Mild aortic valve stenosis is present, Estimated right ventricular systolic pressure from tricuspid regurgitation is normal (<35 mmHg), Mild dilation of the aortic root is present.     Hypoxia.  Patient is on room air.     Acute kidney injury.  Creatinine normalized.     Anemia.  Hemoglobin normal.     Reflux.  Protonix.  Zofran as needed.    Hyponatremia     Anxiety/depression .  Hold BuSpar twice daily.     Lovenox prophylaxis.     Chronic pain/neuropathy.  Neurontin at night.  Flexeril as needed.  Dilaudid as needed.  Percocet as needed.     Advanced age.  89 years old.     Nutrition.  GI soft diet.     Deconditioning.  PT and OT consult     Blood culture-no growth in 3 days.  Urine culture no growth.  Pathology is pending.     Patient is from [facility].     Medical Decision Making  Number and Complexity of problems: 1 acute moderate complexity others chronic  Differential Diagnosis: As above      Conditions and Status        Improving.     [facility] Data  External documents reviewed: Reviewed  Cardiac tracing (EKG, telemetry) interpretation: Reviewed  Radiology interpretation: Reviewed  Labs reviewed: As above  Any tests that were considered but not ordered: None     Decision rules/scores evaluated (example [id number], Wells, etc): None     Discussed with: Patient and his daughter and general surgery     Care Planning  Shared decision making: Patient apprised of current labs, vitals, imaging and treatment plan.  They are agreeable with proceeding with plans as discussed.   Code status and discussions: No CPR     Disposition  Social Determinants of Health that impact treatment or disposition: None  I expect the patient to be discharged to [facility] in TBD days.            Medical Decision Making  Number and Complexity of problems: Gallbladder pancreatitis  Differential Diagnosis: None     Conditions and Status        Condition is unchanged.     [facility] Data  External documents reviewed: Previous note  Cardiac tracing (EKG, telemetry) interpretation: Sinus bradycardia   Radiology interpretation: CT scan  Labs reviewed: Laboratory  Any tests that were considered but not ordered: Lab in a.m.     Decision rules/scores evaluated (example [id number], Wells, etc): None     Discussed with: Patient and nursing     Care Planning  Shared decision making: Patient and nursing  Code status and discussions: DNR . DNI.     Disposition  Social Determinants of Health that impact treatment or disposition: From assisted living  1 to 2 days.               Electronically signed by Parth Byrd MD, 01/29/25, 08:41 CST.

## 2025-01-29 NOTE — PLAN OF CARE
Goal Outcome Evaluation:           Progress: no change  Outcome Evaluation: Patient c/o pain. See Mar. Up with assist. voiding. Alert and oriented.

## 2025-01-29 NOTE — PLAN OF CARE
"Goal Outcome Evaluation:  Plan of Care Reviewed With: patient        Progress: improving  Outcome Evaluation: Pt up in chair. sit-stand mod x1 w/ significant posterior lean, took a little bit to get weight fwd over toes in order to amb. Pt was able to amb from chair to door and then back to bed. Needed help w/ AD, cues for safety and step length. slow gaurded gait. pt c/o increased pain in low back. \"my back hurts worse than my belly\". discussed home safety and POC. pt will need HH upon d/c but may still need few more days in hospital to increase strength and medical status in order to be safe for this transition.                             "

## 2025-01-29 NOTE — THERAPY TREATMENT NOTE
Acute Care - Physical Therapy Treatment Note  [facility]     Patient Name: Jose Alfredo Kirkpatrick  : 1935  MRN: [id number]  Today's Date: 2025      Visit Dx:     ICD-10-CM ICD-9-CM   1. Acute pancreatitis, unspecified complication status, unspecified pancreatitis type  K85.90 577.0   2. Acute non-recurrent maxillary sinusitis  J01.00 461.0   3. ISHA (acute kidney injury)  N17.9 584.9   4. Acute UTI (urinary tract infection)  N39.0 599.0   5. Elevated liver enzymes  R74.8 790.5   6. Elevated troponin level  R79.89 790.6   7. Gallstone pancreatitis  K85.10 577.0     574.20   8. Impaired mobility [Z74.09]  Z74.09 799.89   9. Acute gallstone pancreatitis  K85.10 577.0     574.20     Patient Active Problem List   Diagnosis    Spinal stenosis, lumbar    Hx of colonic polyps    Drug-induced constipation    Primary hypertension    Acute pulmonary embolism with acute cor pulmonale    Acute bilateral deep vein thrombosis (DVT) of femoral veins    History of DVT of left lower extremity    Positive fecal occult blood test    Melena    Vitamin B12 deficiency    FARZANA (iron deficiency anemia)    Cholelithiasis    Gastric ulcer with hemorrhage    Chronic bilateral low back pain without sciatica    Chronic pain of both shoulders    Kyphosis    Overweight (BMI 25.0-29.9)    Non-smoker    Pneumonia of left lower lobe due to infectious organism    Erosive esophagitis    Large hiatal hernia    Weight loss    Paraesophageal hiatal hernia    Severe malnutrition    E. coli UTI, present on admission (urinary tract infection)    Gallstone pancreatitis    Acute kidney injury    Elevated LFTs    Hyperbilirubinemia    Acute UTI (urinary tract infection)     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     Past Surgical  History:   Procedure Laterality Date    BACK SURGERY      cervical    CATARACT EXTRACTION Bilateral     CHOLECYSTECTOMY WITH INTRAOPERATIVE CHOLANGIOGRAM N/A 1/27/2025    Procedure: LAPAROSCOPIC CHOLECYSTECTOMY INTRAOPERATIVE CHOLANGIOGRAM WITH DAVINCI ROBOT;  Surgeon: Ronak Roche MD;  Location: [facility] OR;  Service: Robotics - DaVinci;  Laterality: N/A;    COLONOSCOPY  02/11/2013    Hyperplastic polyp at 25 cm, Diverticulosis repeat exam in 5 years    COLONOSCOPY N/A 10/16/2018    Tubular adenoma ascending colon repet prn    ENDOSCOPY  01/15/2014    HH    ENDOSCOPY N/A 10/03/2019    Large HH, non-bleeding gastric ulcer    ENDOSCOPY N/A 11/25/2019    Procedure: ESOPHAGOGASTRODUODENOSCOPY WITH ANESTHESIA;  Surgeon: Marques Pérez MD;  Location: [facility] ENDOSCOPY;  Service: Gastroenterology    ENDOSCOPY N/A 2/9/2023    Procedure: ESOPHAGOGASTRODUODENOSCOPY WITH ANESTHESIA;  Surgeon: Sushant Edmond MD;  Location: [facility] ENDOSCOPY;  Service: Gastroenterology;  Laterality: N/A;  pre GI bleed  post hiatal hernia; esophagitis  Otilio Ramos MD    HERNIA REPAIR Right     INGUINAL     HIP FRACTURE SURGERY Right 02/04/2023    REPLACEMENT TOTAL KNEE BILATERAL      THORACIC LAMINECTOMY DECOMPRESSIVE POSTERIOR N/A 12/14/2016    Procedure: LAMINECTOMY DECOMPRESSION, UNINSTRUMENTED POSTERIOR SPINAL FUSION, T 11-12;  Surgeon: LUZ Adan MD;  Location: [facility] OR;  Service:     VENA CAVA FILTER INSERTION N/A 10/04/2019    Procedure: VENA CAVA FILTER INSERTION;  Surgeon: Dallin Coronado MD;  Location: [facility] HYBRID OR 12;  Service: Vascular     PT Assessment (Last 12 Hours)       PT Evaluation and Treatment       Row Name 01/29/25 0995          Physical Therapy Time and Intention    Subjective Information complains of;pain;weakness;fatigue  -NW     Document Type therapy note (daily note)  -NW     Mode of Treatment physical therapy  -NW     Comment incontient  -NW       Row Name 01/29/25 0952          General  "Information    Existing Precautions/Restrictions fall  -NW       Row Name 01/29/25 0940          Pain    Posttreatment Pain Rating 7/10  -NW     Pain Location back  my back hurts worse than my \"belly\"  -NW       Row Name 01/29/25 0940          Bed Mobility    Sidelying-Sit [place] (Bed Mobility) verbal cues;minimum assist (75% patient effort)  -NW       Row Name 01/29/25 0940          Sit-Stand Transfer    Sit-Stand [place] (Transfers) verbal cues;moderate assist (50% patient effort)  from chair w/ posterior lean took some time to get wt over toes in order to amb fwd  -NW       Row Name 01/29/25 0940          Stand-Sit Transfer    Stand-Sit [place] (Transfers) verbal cues;contact guard;minimum assist (75% patient effort)  -NW       Row Name 01/29/25 0940          Gait/Stairs (Locomotion)    [place] Level (Gait) minimum assist (75% patient effort)  -NW     Assistive Device (Gait) walker, front-wheeled  -NW     Distance in Feet (Gait) 25  -NW     Pattern (Gait) step-to  -NW     Deviations/Abnormal Patterns (Gait) esau decreased;base of support, narrow  -NW     Comment, (Gait/Stairs) needs help w/ AD and cues for step length, takes small steps  -NW       Row Name 01/29/25 0940          Safety Issues/Impairments Affecting Functional Mobility    Impairments Affecting Function (Mobility) balance;strength;pain  -NW       Row Name 01/29/25 0940          Aerobic Exercise    Time Performed (Aerobic Exercise) AROM BLEs sitting  -NW       Row Name             Wound 01/27/25 1256 abdomen    Wound - Properties Group Placement Date: 01/27/25  -HW Placement Time: 1256  -HW Location: abdomen  -HW Primary Wound Type: Incision  -HW    Retired Wound - Properties Group Placement Date: 01/27/25  -HW Placement Time: 1256  -HW Location: abdomen  -HW Primary Wound Type: Incision  -HW    Retired Wound - Properties Group Placement Date: 01/27/25  -HW Placement Time: 1256  -HW Location: abdomen  -HW Primary Wound Type: " Incision  -HW    Retired Wound - Properties Group Date first assessed: 01/27/25  - Time first assessed: 1256  - Location: abdomen  -HW Primary Wound Type: Incision  -HW      Row Name 01/29/25 0940          Positioning and Restraints    Pre-Treatment Position sitting in chair/recliner  -NW     Post Treatment Position bed  -NW     In Bed fowlers;encouraged to call for assist;call light within reach;with family/caregiver  -NW               User Key  (r) = Recorded By, (t) = Taken By, (c) = Cosigned By      Initials Name Provider Type    NW Iliana Avila PTA Physical Therapist Assistant     Eduar White, RN Registered Nurse                    Physical Therapy Education       Title: PT OT SLP Therapies (In Progress)       Topic: Physical Therapy (Done)       Point: Mobility training (Done)       Learning Progress Summary            Patient Acceptance, E, VU by  at 1/27/2025 0945    Comment: role of PT, safety with AD, fall risk, d/c plans                      Point: Home exercise program (Done)       Learning Progress Summary            Patient Acceptance, E, VU by  at 1/27/2025 0945    Comment: role of PT, safety with AD, fall risk, d/c plans                      Point: Body mechanics (Done)       Learning Progress Summary            Patient Acceptance, E, VU by  at 1/27/2025 0945    Comment: role of PT, safety with AD, fall risk, d/c plans                      Point: Precautions (Done)       Learning Progress Summary            Patient Acceptance, E, VU by  at 1/27/2025 0945    Comment: role of PT, safety with AD, fall risk, d/c plans                                      User Key       Initials Effective Dates Name Provider Type Discipline     08/15/24 -  Elias Zaidi, PT DPT Physical Therapist PT                  PT Recommendation and Plan     Progress: improving  Outcome Evaluation: Pt up in chair. sit-stand mod x1 w/ significant posterior lean, took a little bit to get weight fwd over toes  "in order to amb. Pt was able to amb from chair to door and then back to bed. Needed help w/ AD, cues for safety and step length. slow gaurded gait. pt c/o increased pain in low back. \"my back hurts worse than my belly\". discussed home safety and POC. pt will need HH upon d/c but may still need few more days in hospital to increase strength and medical status in order to be safe for this transition.       Time Calculation:    PT Charges       Row Name 01/29/25 1025             Time Calculation    Start Time 0940  -NW      Stop Time 1018  -NW      Time Calculation (min) 38 min  -NW      PT Received On 01/29/25  -NW      PT Goal Re-Cert Due Date 01/06/25  -NW         Time Calculation- PT    Total Timed Code Minutes- PT 38 minute(s)  -NW         Timed Charges    76862 - PT Therapeutic Exercise Minutes 10  -NW      39965 - Gait Training Minutes  18  -NW      66610 - PT Therapeutic Activity Minutes 10  -NW         Total Minutes    Timed Charges Total Minutes 38  -NW       Total Minutes 38  -NW                User Key  (r) = Recorded By, (t) = Taken By, (c) = Cosigned By      Initials Name Provider Type    NW Iliana Avila PTA Physical Therapist Assistant                  Therapy Charges for Today       Code Description Service Date Service Provider Modifiers Qty    11367347911 HC PT THER PROC EA 15 MIN 1/28/2025 Iliana Avila PTA GP 1    19517570730 HC GAIT TRAINING EA 15 MIN 1/28/2025 Iliana Avila, PTA GP 1    94100183601 HC PT THER PROC EA 15 MIN 1/29/2025 Iliana Avila, PTA GP 1    22618283403 HC GAIT TRAINING EA 15 MIN 1/29/2025 Iliana Avila, PTA GP 1    24589152635 HC PT THERAPEUTIC ACT EA 15 MIN 1/29/2025 Iliana Avila PTA GP 1            PT G-Codes  Outcome Measure Options: AM-PAC 6 Clicks Daily Activity (OT)  AM-PAC 6 Clicks Score (PT): 13  AM-PAC 6 Clicks Score (OT): 17    Iliana Avila PTA  1/29/2025    "

## 2025-01-29 NOTE — PAYOR COMM NOTE
"1/28 CLINICAL    Jose Alfredo Kirkpatrick (89 y.o. Male)       Date of Birth   1935    Social Security Number       Address   [address] [id number]    Home Phone   [phone]    MRN   [id number]       Muslim   [facility]    Marital Status                               Admission Date   1/25/25    Admission Type   Emergency    Admitting Provider   Parth Byrd MD    Attending Provider   Parth Byrd MD    Department, Room/Bed   [facility] 3C, 394/1       Discharge Date       Discharge Disposition       Discharge Destination                                 Attending Provider: Parth Byrd MD    Allergies: Meloxicam    Isolation: None   Infection: COVID (History) (02/09/23)   Code Status: No CPR    Ht: 188 cm (74\")   Wt: 85.3 kg (188 lb)    Admission Cmt: None   Principal Problem: Gallstone pancreatitis [K85.10]                   Active Insurance as of 1/25/2025       Primary Coverage       Payor Plan Insurance Group Employer/Plan Group    ANTHEM MEDICARE REPLACEMENT ANTHEM MED ADV HMO [id number]       Payor Plan Address Payor Plan Phone Number Payor Plan Fax Number Effective Dates    [address] [phone]  1/1/2024 - None Entered    [facility] [id number]         Subscriber Name Subscriber Birth Date Member ID       JOSE ALFREDO KIRKPATRICK 1935 [id number]                     Emergency Contacts        (Rel.) Home Phone Work Phone Mobile Phone    LOUIS WELLS (Daughter) -- -- [phone]              Current Facility-Administered Medications   Medication Dose Route Frequency Provider Last Rate Last Admin    [Held by provider] busPIRone (BUSPAR) tablet 5 mg  5 mg Oral BID With Meals Ronak Roche MD   5 mg at 01/26/25 1714    cyclobenzaprine (FLEXERIL) tablet 5 mg  5 mg Oral BID PRN Ronak Roche MD   5 mg at 01/27/25 1908    Enoxaparin Sodium (LOVENOX) syringe 40 mg  40 mg Subcutaneous Nightly Ronak Roche MD   40 mg at 01/28/25 2018    " gabapentin (NEURONTIN) capsule 300 mg  300 mg Oral Nightly Ronak Roche MD   300 mg at 01/28/25 2017    lisinopril (PRINIVIL,ZESTRIL) tablet 10 mg  10 mg Oral Daily Parth Byrd MD   10 mg at 01/26/25 1714    metoprolol tartrate (LOPRESSOR) injection 2.5 mg  2.5 mg Intravenous Q6H PRN Parth Byrd MD        metoprolol tartrate (LOPRESSOR) tablet 25 mg  25 mg Oral Q12H Parth Byrd MD   25 mg at 01/28/25 2017    naloxone (NARCAN) injection 0.4 mg  0.4 mg Intravenous Q5 Min PRN Ronak Roche MD        [Held by provider] NIFEdipine XL (PROCARDIA XL) 24 hr tablet 60 mg  60 mg Oral Daily Ronak Roche MD   60 mg at 01/26/25 1714    nitroglycerin (NITROSTAT) SL tablet 0.4 mg  0.4 mg Sublingual Q5 Min PRN Ronak Roche MD        ondansetron ODT (ZOFRAN-ODT) disintegrating tablet 4 mg  4 mg Oral Q6H PRN Ronak Roche MD        Or    ondansetron (ZOFRAN) injection 4 mg  4 mg Intravenous Q6H PRN Ronak Roche MD   4 mg at 01/25/25 2346    oxyCODONE (ROXICODONE) immediate release tablet 10 mg  10 mg Oral Q4H PRN Ronak Roche MD   10 mg at 01/28/25 2159    oxyCODONE (ROXICODONE) immediate release tablet 5 mg  5 mg Oral Q4H PRN Ronak Roche MD   5 mg at 01/28/25 1159    pantoprazole (PROTONIX) EC tablet 40 mg  40 mg Oral Q AM Ronak Roche MD   40 mg at 01/29/25 0623    sodium chloride 0.9 % flush 10 mL  10 mL Intravenous PRN Ronak Roche MD        sodium chloride 0.9 % flush 10 mL  10 mL Intravenous Q12H Ronak Roche MD   10 mL at 01/28/25 2018    sodium chloride 0.9 % infusion 40 mL  40 mL Intravenous PRN Ronak Roche MD         Orders (last 24 hrs)        Start     Ordered    01/29/25 0600  CBC (No Diff)  Daily       01/28/25 1218    01/29/25 0426  metoprolol tartrate (LOPRESSOR) injection 2.5 mg  Every 6 Hours PRN         01/29/25 0427    01/28/25 2100  metoprolol tartrate (LOPRESSOR) tablet 25 mg  Every 12 Hours Scheduled         01/28/25 1431    01/28/25 1530  metoprolol tartrate (LOPRESSOR) injection 2.5 mg   Once,   Status:  Discontinued         01/28/25 1431    01/28/25 1433  Adult Transthoracic Echo Complete W/ Cont if Necessary Per Protocol  Once         01/28/25 1432    01/28/25 1432  metoprolol tartrate (LOPRESSOR) injection 2.5 mg  Every 6 Hours PRN,   Status:  Discontinued         01/28/25 1432    01/28/25 1431  ECG 12 Lead Rhythm Change  Once         01/28/25 1431    01/27/25 2100  Enoxaparin Sodium (LOVENOX) syringe 40 mg  Nightly         01/27/25 1620    01/27/25 2000  acetaminophen (TYLENOL) tablet 1,000 mg  Every 8 Hours         01/27/25 1923    01/27/25 1923  oxyCODONE (ROXICODONE) immediate release tablet 10 mg  Every 4 Hours PRN         01/27/25 1923    01/27/25 1923  HYDROmorphone (DILAUDID) injection 0.5 mg  Every 4 Hours PRN,   Status:  Discontinued         01/27/25 1923    01/27/25 1923  oxyCODONE (ROXICODONE) immediate release tablet 5 mg  Every 4 Hours PRN         01/27/25 1923    01/27/25 0600  Lipase  Daily       01/26/25 1928    01/27/25 0000  cyclobenzaprine (FLEXERIL) 5 MG tablet  Every 8 Hours PRN         01/27/25 1612    01/27/25 0000  traMADol (Ultram) 50 MG tablet  Every 6 Hours PRN         01/27/25 1612    01/26/25 2100  gabapentin (NEURONTIN) capsule 300 mg  Nightly         01/26/25 1542    01/26/25 1929  Comprehensive Metabolic Panel  Daily       01/26/25 1928    01/26/25 1800  [Held by provider]  busPIRone (BUSPAR) tablet 5 mg  2 Times Daily With Meals        (On hold since Mon 1/27/2025 at 1713 until manually unheld; held by Parth Byrd MDHold Reason: Other (Comment Required))    01/26/25 1542    01/26/25 1630  lisinopril (PRINIVIL,ZESTRIL) tablet 10 mg  Daily         01/26/25 1542    01/26/25 1630  [Held by provider]  NIFEdipine XL (PROCARDIA XL) 24 hr tablet 60 mg  Daily        (On hold since Mon 1/27/2025 at 1713 until manually unheld; held by Parth Byrd MDHold Reason: Other (Comment Required))    01/26/25 1542    01/26/25 1540  cyclobenzaprine (FLEXERIL) tablet 5 mg  2  "Times Daily PRN         01/26/25 1542    01/26/25 0800  Oral Care  2 Times Daily       01/25/25 2332    01/26/25 0600  pantoprazole (PROTONIX) EC tablet 40 mg  Every Early Morning         01/25/25 2332 01/26/25 0600  Incentive Spirometry  Every 4 Hours While Awake       01/25/25 2332 01/26/25 0300  piperacillin-tazobactam (ZOSYN) 3.375 g IVPB in 100 mL NS MBP (CD)  Every 8 Hours,   Status:  Discontinued         01/25/25 2342    01/26/25 0000  Vital Signs  Every 4 Hours       01/25/25 2332 01/25/25 2348  sodium chloride 0.9 % flush 10 mL  Every 12 Hours Scheduled         01/25/25 2332 01/25/25 2333  Intake & Output  Every Shift       01/25/25 2332 01/25/25 2332  Pharmacy to Dose Zosyn  Continuous PRN,   Status:  Discontinued         01/25/25 2332 01/25/25 2332  sodium chloride 0.9 % infusion 40 mL  As Needed         01/25/25 2332    01/25/25 2332  nitroglycerin (NITROSTAT) SL tablet 0.4 mg  Every 5 Minutes PRN         01/25/25 2332    01/25/25 2332  naloxone (NARCAN) injection 0.4 mg  Every 5 Minutes PRN        Placed in \"And\" Linked Group    01/25/25 2332    01/25/25 2332  ondansetron ODT (ZOFRAN-ODT) disintegrating tablet 4 mg  Every 6 Hours PRN        Placed in \"Or\" Linked Group    01/25/25 2332    01/25/25 2332  ondansetron (ZOFRAN) injection 4 mg  Every 6 Hours PRN        Placed in \"Or\" Linked Group    01/25/25 2332    01/25/25 1724  sodium chloride 0.9 % flush 10 mL  As Needed        Placed in \"And\" Linked Group    01/25/25 1724    --  lisinopril (PRINIVIL,ZESTRIL) 40 MG tablet  Daily         01/26/25 1627    --  oxyCODONE-acetaminophen (PERCOCET)  MG per tablet  Every 6 Hours PRN         01/26/25 1631    Pending  lisinopril (PRINIVIL,ZESTRIL) tablet 40 mg  Daily         Pending                     Operative/Procedure Notes (all)        Ronak Roche MD at 01/27/25 1256  Version 1 of 1         PREOPERATIVE DIAGNOSIS: Gallstone pancreatitis     POSTOPERATIVE DIAGNOSIS: Acute gallstone " pancreatitis, cholecystitis      PROCEDURE PERFORMED: Robotic cholecystectomy with intraoperative cholangiogram     SURGEON: Ronak Roche MD     ASSISTANT:  Mini RN, Radha CST, Reyna CST     SPECIMENS: Gallbladder for permanent     ANESTHESIA: General tracheal anesthesia with bilateral TAP block     BLOOD LOSS:  2 mL    FLUIDS: 1000 mL     WOUND CLASSIFICATION: Class 3, contaminated     FINDINGS:   Fairly distended and inflamed gallbladder with a stone in the neck.  Critical view of safety obtained with a solitary cystic duct and a solitary cystic artery and a clear space the entire way up the gallbladder fossa.  Normal biliary anatomy.  Cystic duct and common bile duct clearly visualized with ICG under firefly.  Cholangiogram was unremarkable.  Excellent hemostasis.      INDICATIONS:   Jose Alfredo Kirkpatrick is a 89 y.o. male with acute gallstone pancreatitis     DESCRIPTION OF PROCEDURE:      Informed consent was obtained. The patient was taken to the operating room and placed on the operating table in the supine position. Bilateral SCD were placed. General anesthesia was administered and the patient was intubated without difficulty. Ancef 2 g was administered for preoperative antibiotics. The abdomen was prepped and draped in the usual fashion.  A full surgical timeout was performed verifying the correct patient, correct procedure and correct site for surgery.     Local anesthesia was infiltrated into the left upper quadrant at Rudolph's point.  A small incision was made and a Veress needle was inserted with 2 satisfactory clicks.  An excellent saline drop test confirmed correct intra-abdominal placement.  Pneumoperitoneum was initiated to 12 mmHg.  Patient was positioned in reverse Trendelenburg.  Local anesthesia was injected in the left lower hypogastrium.  An 8 mm robotic trocar was inserted under direct visualization in Optiview fashion.  The abdomen was entered safely.  A bilateral TAP block was performed using 10  mL of 1% ropivacaine and 100 mcg of Precedex and 100 mL of injectable saline.  Local was injected at the usual 3 additional trocar sites transversely across the abdomen.  Incisions were made and 3 more robotic trocars were inserted under direct visualization.     The robot was docked and instruments were placed.  The gallbladder was minimally inflamed and mildly distended. The gallbladder was retracted cephalad and the peritoneum was incised using hook cautery. A top down dissection was performed.  Careful dissection was undertaken in the cystohepatic triangle. The dissection was of moderate difficulty. There was normal biliary anatomy.  A top down dissection was performed and a critical view of safety was obtained of a solitary cystic duct and a solitary cystic artery was obtained with a clear space posteriorly. Clips were placed on the cystic artery which was divided with electrocautery. A clip was placed high up on the cystic duct up to the neck of the gallbladder and a ductotomy was made.  The cholangiogram catheter was assembled and inserted into the cystic duct.  Saline was flushed without extravasation.  Under fluoroscopy, contrast was injected with filling of the cystic duct. There was retrograde filling of the common hepatic ducts and the left and right hepatic duct branches.  There was appropriate filling of the secondary hepatic radicles.  Contrast migrated anterograde through the common bile duct without any obvious filling defects and entered the duodenum with appropriate transit time.  The catheter was flushed with saline and removed.  The cystic duct stump was ligated using clips.  The cystic duct was divided and the gallbladder was removed through the supraumbilical trocar site using an Endo Catch bag. The fascia of the extraction site was closed with a 0 Vicryl suture in a figure-of-eight fashion using a transfascial suture passer. There was meticulous hemostasis.  The trocars were removed under  direct visualization and pneumoperitoneum was released.  The incisions were irrigated using saline and closed with 4-0 Monocryl suture.  The incisions were dressed with Mastisol and Steri-Strips.     At the conclusion of the case all needle, sharp and sponge counts were correct x 2. The patient was awoken from anesthesia, extubated in the operating room and was taken to the post anesthesia care unit in stable condition without any immediate complications.      Electronically signed by Ronak Roche MD at 01/27/25 1405          Physician Progress Notes (last 24 hours)        Parth Byrd MD at 01/28/25 1217              [facility] Medicine Services  INPATIENT PROGRESS NOTE    Patient Name: Jose Alfredo Kirkpatrick  Date of Admission: 1/25/2025  Today's Date: 01/28/25  Length of Stay: 2  Primary Care Physician: Otilio Ramos MD    Subjective   Chief Complaint: Gallbladder pancreatitis   HPI   Blood pressure slightly high, put back patient on lisinopril.  Afebrile.  General surgery recommended stop IV antibiotics.  UA cultures was negative.  Liver enzymes improving.  White blood cells normal.  Hemoglobin normalized.    Review of Systems   Constitutional:  Positive for activity change, appetite change and fatigue. Negative for chills and fever.   HENT:  Negative for hearing loss, nosebleeds, tinnitus and trouble swallowing.    Eyes:  Negative for visual disturbance.   Respiratory:  Positive for shortness of breath. Negative for cough, chest tightness and wheezing.    Cardiovascular:  Negative for chest pain, palpitations and leg swelling.   Gastrointestinal:  Negative for abdominal distention, abdominal pain, blood in stool, constipation, diarrhea, nausea and vomiting.   Endocrine: Negative for cold intolerance, heat intolerance, polydipsia, polyphagia and polyuria.   Genitourinary:  Negative for decreased urine volume, difficulty urinating, dysuria, flank pain, frequency and  hematuria.   Musculoskeletal:  Positive for arthralgias, gait problem and myalgias. Negative for joint swelling.   Skin:  Negative for rash.   Allergic/Immunologic: Negative for immunocompromised state.   Neurological:  Positive for weakness. Negative for dizziness, syncope, light-headedness and headaches.   Hematological:  Negative for adenopathy. Does not bruise/bleed easily.   Psychiatric/Behavioral: Confusion resolved.. Negative for sleep disturbance. The patient is not nervous/anxious.    All pertinent negatives and positives are as above. All other systems have been reviewed and are negative unless otherwise stated.     Objective    Temp:  [97.1 °F (36.2 °C)-98.1 °F (36.7 °C)] 98 °F (36.7 °C)  Heart Rate:  [48-88] 75  Resp:  [16-18] 16  BP: (103-157)/(46-75) 147/75  Physical Exam  Vitals and nursing note reviewed.   Constitutional:       Appearance: He is well-developed.      Comments: Advanced age.  Cachectic   HENT:      Head: Normocephalic.   Eyes:      General: No scleral icterus.     Pupils: Pupils are equal, round, and reactive to light.   Neck:      Thyroid: No thyromegaly.      Vascular: No carotid bruit or JVD.      Trachea: No tracheal deviation.   Cardiovascular:      Rate and Rhythm: Regular rhythm.  Heart rates in the 70s.     Heart sounds: No murmur heard.     No friction rub. No gallop.   Pulmonary:      Effort: No respiratory distress.      Breath sounds: No wheezing or rales.      Comments: Diminished breath semilateral, clear, on 2.5 L.  Chest:      Chest wall: No tenderness.   Abdominal:      General: Hypoactive bowel sound. There is no distension.      Palpations: Abdomen is soft.      Tenderness: Status post abdomen surgery.  Musculoskeletal:      Cervical back: Normal range of motion and neck supple.   Skin:     General: Skin is warm and dry.      Capillary Refill: Capillary refill takes 2 to 3 seconds.      Findings: No rash.   Neurological:      Cranial Nerves: No cranial nerve deficit.       Motor: Weakness present.      Coordination: Coordination abnormal.      Gait: Gait abnormal.   Psychiatric:         Mood and Affect: Mood normal.         Behavior: Behavior normal.       Results Review:  I have reviewed the labs, radiology results, and diagnostic studies.    Laboratory Data:   Results from last 7 days   Lab Units 01/28/25  0400 01/27/25  0511 01/26/25  0437   WBC 10*3/mm3 8.73 9.65 11.04*   HEMOGLOBIN g/dL 14.5 12.0* 11.2*   HEMATOCRIT % 45.1 37.6 36.0*   PLATELETS 10*3/mm3 190 146 139*        Results from last 7 days   Lab Units 01/28/25  0400 01/27/25  0511 01/26/25  1938   SODIUM mmol/L 137 139 139   POTASSIUM mmol/L 3.9 4.3 4.5   CHLORIDE mmol/L 99 107 108*   CO2 mmol/L 22.0 21.0* 22.0   BUN mg/dL 11 19 24*   CREATININE mg/dL 0.74* 0.83 1.14   CALCIUM mg/dL 9.4 8.4* 8.3*   BILIRUBIN mg/dL 0.9 0.9 0.8   ALK PHOS U/L 167* 148* 150*   ALT (SGPT) U/L 160* 209* 228*   AST (SGOT) U/L 59* 96* 139*   GLUCOSE mg/dL 92 86 119*       Culture Data:   Blood Culture   Date Value Ref Range Status   01/25/2025 No growth at 2 days  Preliminary   01/25/2025 No growth at 2 days  Preliminary     Urine Culture   Date Value Ref Range Status   01/25/2025 No growth  Final       Radiology Data:   Imaging Results (Last 24 Hours)       Procedure Component Value Units Date/Time    FL Cholangiogram Operative [[id number]] Collected: 01/27/25 1457     Updated: 01/27/25 1501    Narrative:      FL CHOLANGIOGRAM OPERATIVE- 1/27/2025 12:10 PM     HISTORY: stones; K85.90-Acute pancreatitis without necrosis or  infection, unspecified; J01.00-Acute maxillary sinusitis, unspecified;  N17.9-Acute kidney failure, unspecified; N39.0-Urinary tract infection,  site not specified; R74.8-Abnormal levels of other serum enzymes;  R79.89-Other specified abnormal findings of blood chemistry;  K85.10-Biliary acute pancreatitis without necrosis or infection;  Z74.09-Oth     COMPARISON: None     FLUOROSCOPY TIME: 0.1-minute     FLUOROSCOPY  DOSE: 1.6 mGy     NUMBER OF IMAGES: 5       Impression:      Contrast was injected through the cystic duct stump, and multiple  fluoroscopic images of the RIGHT upper quadrant were obtained  intraoperatively. The opacified intrahepatic and extrahepatic ducts  demonstrate no filling defects or obstruction. Contrast is noted in the  small bowel. Surgical instruments and clips are noted in the RIGHT upper  quadrant.     Please refer to the operative note for more details.     This report was signed and finalized on 1/27/2025 2:58 PM by Dr Geremias Martinez.       FL C Arm During Surgery [[id number]] Resulted: 01/27/25 1336     Updated: 01/27/25 1336    Narrative:      This procedure was auto-finalized with no dictation required.            I have reviewed the patient's current medications.     Assessment/Plan   Assessment  Active Hospital Problems    Diagnosis     **Gallstone pancreatitis     Acute kidney injury     Elevated LFTs     Hyperbilirubinemia     Acute UTI (urinary tract infection)        History of Present Illness  Patient is an 89-year-old male patient with a medical history of hypertension, GERD, brought in by EMS from [facility] for the evaluation of his abdominal pain and generalized weakness.  As reported, for the last 2 days he has been feeling weak and dizzy and this has been associated with abdominal cramps.  He denies any nausea vomiting or diarrhea.  He denies any fever or chills.  He denies any chest pain or shortness of breath.  He usually ambulates with a walker, but he felt very weak and stayed in bed all day.     Treatment Plan     Gallbladder pancreatitis/elevated liver enzyme.  Consult GI.  Consult general surgery.  Discontinue Zosyn antibiotics 1/20/2025, recommendation from general surgery.  Leukocytosis resolved.  Lipase improving.  Liver enzymes improving.    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  Status post 1/27/2025-Robotic cholecystectomy with intraoperative cholangiogram.  Tissue pathology is pending.     Hypoxia.  Patient is clear on 2.5 L of oxygen.     Acute kidney injury.  Creatinine normalized.     Hypertension.  Restart lisinopril.  Hold Procardia, due to hypotension. Nitro as needed.     Anemia.  Hemoglobin stable.     Reflux.  Protonix.  Zofran as needed.     Anxiety/depression .  Hold BuSpar twice daily.     Lovenox prophylaxis.     Chronic pain/neuropathy.  Neurontin at night.  Flexeril as needed.  Dilaudid as needed.  Percocet as needed.     Advanced age.  89 years old.     Nutrition.  GI soft diet.     Deconditioning.  PT and OT consult     Blood culture-no growth in 24 hours.  Urine culture no growth.  Pathology is pending.     Patient is from [facility].     Medical Decision Making  Number and Complexity of problems: 1 acute moderate complexity others chronic  Differential Diagnosis: As above     Conditions and Status        Improving.     [facility] Data  External documents reviewed: Reviewed  Cardiac tracing (EKG, telemetry) interpretation: Reviewed  Radiology interpretation: Reviewed  Labs reviewed: As above  Any tests that were considered but not ordered: None     Decision rules/scores evaluated (example [id number], Wells, etc): None     Discussed with: Patient and his daughter and general surgery     Care Planning  Shared decision making: Patient apprised of current labs, vitals, imaging and treatment plan.  They are agreeable with proceeding  with plans as discussed.   Code status and discussions: No CPR     Disposition  Social Determinants of Health that impact treatment or disposition: None  I expect the patient to be discharged to [facility] in TBD days.            Medical Decision Making  Number and Complexity of problems: Gallbladder pancreatitis  Differential Diagnosis: None     Conditions and Status        Condition is unchanged.     [facility] Data  External documents reviewed: Previous note  Cardiac tracing (EKG, telemetry) interpretation: Sinus bradycardia   Radiology interpretation: CT scan  Labs reviewed: Laboratory  Any tests that were considered but not ordered: Lab in a.m.     Decision rules/scores evaluated (example [id number], Wells, etc): None     Discussed with: Patient and nursing     Care Planning  Shared decision making: Patient and nursing  Code status and discussions: DNR . DNI.     Disposition  Social Determinants of Health that impact treatment or disposition: From assisted living  1 to 3 days.         Electronically signed by Parth Byrd MD, 01/28/25, 12:17 CST.    Electronically signed by Parth Byrd MD at 01/28/25 1231       Ronak Roche MD at 01/28/25 1049                 GENERAL SURGERY INPATIENT PROGRESS NOTE    Patient Name:  Jose Alfredo Kirkpatrick  YOB: 1935  MRN: [id number]    SUBJECTIVE    No unexpected events overnight.  The patient reports that his incisional pain is tolerable.  He is tolerating a low-fat diet.  The previous pain he experienced with pancreatitis has nearly resolved.    Allergies:  Allergies   Allergen Reactions    Meloxicam Unknown - Low Severity       Medications:  acetaminophen, 1,000 mg, Oral, Q8H  [Held by provider] busPIRone, 5 mg, Oral, BID With Meals  enoxaparin, 40 mg, Subcutaneous, Nightly  gabapentin, 300 mg, Oral, Nightly  [Held by provider] lisinopril, 10 mg, Oral, Daily  [Held by provider] NIFEdipine XL, 60 mg, Oral, Daily  pantoprazole, 40 mg, Oral, Q  "AM  piperacillin-tazobactam, 3.375 g, Intravenous, Q8H  sodium chloride, 10 mL, Intravenous, Q12H    Pharmacy to Dose Zosyn,         OBJECTIVE    VITAL SIGNS  /74 (BP Location: Right arm, Patient Position: Lying)   Pulse 75   Temp 98.1 °F (36.7 °C)   Resp 16   Ht 188 cm (74\")   Wt 85.6 kg (188 lb 12.8 oz)   SpO2 94%   BMI 24.24 kg/m²     Intake/Output Summary (Last 24 hours) at 1/28/2025 1049  Last data filed at 1/28/2025 0836  Gross per 24 hour   Intake --   Output 3400 ml   Net -3400 ml       PHYSICAL EXAM    General: Pleasant elderly male, sitting up in bed, in no acute distress.  HEENT:  NCAT, PERRL, EOM intact bilaterally, hearing intact, nares patent  Heart:  Regular rate, normotensive, no JVD bilaterally  Lungs:  Normal respiratory effort, regular respiratory rate, no wheezing  Abdomen: Flat, soft, probably tender at his incisions which are clean dry and intact  Extremities:  No cyanosis, clubbing or edema.   Musculoskeletal:  Normal development of bilateral upper extremities. Normal development of bilateral lower extremities.  Range of motion intact.  No evidence of trauma.  Skin:  No rashes, ecchymosis or jaundice. Dry and non-diaphoretic. Skin color is consistent with ethnicity.    RESULTS    Labs:  I personally reviewed all pertinent labs.   Imaging:  I personally reviewed all pertinent imaging studies.   Pathology:        ASSESSMENT AND PLAN    Active Hospital Problems    Diagnosis     **Gallstone pancreatitis     Acute kidney injury     Elevated LFTs     Hyperbilirubinemia     Acute UTI (urinary tract infection)          DAILY CARE PLAN    Labs are trending back down towards normal.  Clinically he is improving.  Okay to discontinue antibiotics.  I have counseled him on staying on a low-fat diet until his pancreatitis resolves.  He is appropriate to be discharged home from a surgical standpoint, however he is deconditioned therefore he would benefit from ongoing physical therapy to be " discharged back to his nursing home.    I discussed the patient's findings and my recommendations with the patient/family, as well as the primary care team.    Ronak Roche MD, [facility]  General Surgery  1/28/2025  10:49 CST    Please note that portions of this note were completed with a voice recognition program.      Electronically signed by Ronak Roche MD at 01/28/25 1051       Consult Notes (last 24 hours)  Notes from 01/28/25 0638 through 01/29/25 0638   No notes of this type exist for this encounter.

## 2025-01-30 ENCOUNTER — READMISSION MANAGEMENT (OUTPATIENT)
Dept: CALL CENTER | Facility: HOSPITAL | Age: 89
End: 2025-01-30
Payer: MEDICARE

## 2025-01-30 VITALS
DIASTOLIC BLOOD PRESSURE: 68 MMHG | HEIGHT: 74 IN | WEIGHT: 188 LBS | SYSTOLIC BLOOD PRESSURE: 124 MMHG | BODY MASS INDEX: 24.13 KG/M2 | RESPIRATION RATE: 18 BRPM | TEMPERATURE: 97.4 F | OXYGEN SATURATION: 93 % | HEART RATE: 68 BPM

## 2025-01-30 LAB
ALBUMIN SERPL-MCNC: 3.1 G/DL (ref 3.5–5.2)
ALBUMIN/GLOB SERPL: 1.1 G/DL
ALP SERPL-CCNC: 123 U/L (ref 39–117)
ALT SERPL W P-5'-P-CCNC: 69 U/L (ref 1–41)
ANION GAP SERPL CALCULATED.3IONS-SCNC: 11 MMOL/L (ref 5–15)
AST SERPL-CCNC: 24 U/L (ref 1–40)
BACTERIA SPEC AEROBE CULT: NORMAL
BACTERIA SPEC AEROBE CULT: NORMAL
BILIRUB SERPL-MCNC: 0.6 MG/DL (ref 0–1.2)
BUN SERPL-MCNC: 15 MG/DL (ref 8–23)
BUN/CREAT SERPL: 18.3 (ref 7–25)
CALCIUM SPEC-SCNC: 9 MG/DL (ref 8.6–10.5)
CHLORIDE SERPL-SCNC: 102 MMOL/L (ref 98–107)
CO2 SERPL-SCNC: 25 MMOL/L (ref 22–29)
CREAT SERPL-MCNC: 0.82 MG/DL (ref 0.76–1.27)
DEPRECATED RDW RBC AUTO: 46.1 FL (ref 37–54)
EGFRCR SERPLBLD CKD-EPI 2021: 84.5 ML/MIN/1.73
ERYTHROCYTE [DISTWIDTH] IN BLOOD BY AUTOMATED COUNT: 12.9 % (ref 12.3–15.4)
GLOBULIN UR ELPH-MCNC: 2.9 GM/DL
GLUCOSE SERPL-MCNC: 110 MG/DL (ref 65–99)
HCT VFR BLD AUTO: 41.6 % (ref 37.5–51)
HGB BLD-MCNC: 13.5 G/DL (ref 13–17.7)
LIPASE SERPL-CCNC: 73 U/L (ref 13–60)
MCH RBC QN AUTO: 31.3 PG (ref 26.6–33)
MCHC RBC AUTO-ENTMCNC: 32.5 G/DL (ref 31.5–35.7)
MCV RBC AUTO: 96.3 FL (ref 79–97)
PLATELET # BLD AUTO: 217 10*3/MM3 (ref 140–450)
PMV BLD AUTO: 9.1 FL (ref 6–12)
POTASSIUM SERPL-SCNC: 4.1 MMOL/L (ref 3.5–5.2)
PROT SERPL-MCNC: 6 G/DL (ref 6–8.5)
RBC # BLD AUTO: 4.32 10*6/MM3 (ref 4.14–5.8)
SODIUM SERPL-SCNC: 138 MMOL/L (ref 136–145)
WBC NRBC COR # BLD AUTO: 7.84 10*3/MM3 (ref 3.4–10.8)

## 2025-01-30 PROCEDURE — 85027 COMPLETE CBC AUTOMATED: CPT | Performed by: FAMILY MEDICINE

## 2025-01-30 PROCEDURE — 97116 GAIT TRAINING THERAPY: CPT

## 2025-01-30 PROCEDURE — 80053 COMPREHEN METABOLIC PANEL: CPT | Performed by: SURGERY

## 2025-01-30 PROCEDURE — 83690 ASSAY OF LIPASE: CPT | Performed by: SURGERY

## 2025-01-30 RX ORDER — METOPROLOL TARTRATE 25 MG/1
12.5 TABLET, FILM COATED ORAL EVERY 12 HOURS SCHEDULED
Qty: 180 TABLET | Refills: 0 | Status: SHIPPED | OUTPATIENT
Start: 2025-01-30

## 2025-01-30 RX ORDER — LISINOPRIL 40 MG/1
20 TABLET ORAL DAILY
Start: 2025-01-30

## 2025-01-30 RX ORDER — GABAPENTIN 300 MG/1
300 CAPSULE ORAL NIGHTLY
Start: 2025-01-30

## 2025-01-30 RX ORDER — OXYCODONE AND ACETAMINOPHEN 10; 325 MG/1; MG/1
0.5 TABLET ORAL EVERY 6 HOURS PRN
Start: 2025-01-30

## 2025-01-30 RX ADMIN — OXYCODONE HYDROCHLORIDE 10 MG: 5 TABLET ORAL at 09:05

## 2025-01-30 RX ADMIN — OXYCODONE HYDROCHLORIDE 10 MG: 5 TABLET ORAL at 05:02

## 2025-01-30 RX ADMIN — LISINOPRIL 10 MG: 10 TABLET ORAL at 08:12

## 2025-01-30 RX ADMIN — PANTOPRAZOLE SODIUM 40 MG: 40 TABLET, DELAYED RELEASE ORAL at 05:02

## 2025-01-30 RX ADMIN — METOPROLOL TARTRATE 12.5 MG: 25 TABLET, FILM COATED ORAL at 08:12

## 2025-01-30 RX ADMIN — Medication 10 ML: at 08:13

## 2025-01-30 RX ADMIN — NIFEDIPINE 60 MG: 60 TABLET, FILM COATED, EXTENDED RELEASE ORAL at 08:12

## 2025-01-30 NOTE — THERAPY DISCHARGE NOTE
Acute Care - Physical Therapy Discharge Summary  [facility]       Patient Name: Jose Alfredo Kirkpatrick  : 1935  MRN: [id number]    Today's Date: 2025                 Admit Date: 2025      PT Recommendation and Plan    Visit Dx:    ICD-10-CM ICD-9-CM   1. Acute pancreatitis, unspecified complication status, unspecified pancreatitis type  K85.90 577.0   2. Acute non-recurrent maxillary sinusitis  J01.00 461.0   3. ISHA (acute kidney injury)  N17.9 584.9   4. Acute UTI (urinary tract infection)  N39.0 599.0   5. Elevated liver enzymes  R74.8 790.5   6. Elevated troponin level  R79.89 790.6   7. Gallstone pancreatitis  K85.10 577.0     574.20   8. Impaired mobility [Z74.09]  Z74.09 799.89   9. Acute gallstone pancreatitis  K85.10 577.0     574.20   10. Hyperbilirubinemia  E80.6 782.4   11. Elevated LFTs  R79.89 790.6   12. Acute kidney injury  N17.9 584.9   13. E. coli UTI, present on admission (urinary tract infection)  N39.0 599.0    B96.20 041.49   14. Severe malnutrition  E43 261   15. Paraesophageal hiatal hernia  K44.9 553.3   16. Weight loss  R63.4 783.21   17. Large hiatal hernia  K44.9 553.3   18. Erosive esophagitis  K22.10 530.19   19. Pneumonia of left lower lobe due to infectious organism  J18.9 486   20. Non-smoker  Z78.9 V49.89   21. Overweight (BMI 25.0-29.9)  E66.3 278.02   22. Other secondary kyphosis, thoracic region  M40.14 737.41   23. Chronic pain of both shoulders  M25.511 719.41    G89.29 338.29    M25.512    24. Chronic bilateral low back pain without sciatica  M54.50 724.2    G89.29 338.29   25. Gastric ulcer with hemorrhage, unspecified chronicity  K25.4 531.40   26. Biliary calculus of other site without obstruction  K80.80 574.20   27. Iron deficiency anemia, unspecified iron deficiency anemia type  D50.9 280.9   28. Vitamin B12 deficiency  E53.8 266.2   29. Melena  K92.1 578.1   30. Positive fecal occult blood test  R19.5 792.1   31. History of DVT of left lower extremity   Z86.718 V12.51   32. Acute bilateral deep vein thrombosis (DVT) of femoral veins  I82.413 453.41   33. Acute pulmonary embolism with acute cor pulmonale, unspecified pulmonary embolism type  I26.09 415.19     415.0   34. Primary hypertension  I10 401.9   35. Drug-induced constipation  K59.03 564.09     E980.5   36. Hx of colonic polyps  Z86.0100 V12.72   37. Spinal stenosis of lumbar region without neurogenic claudication  M48.061 724.02            PT Charges       Row Name 01/30/25 1004             Time Calculation    Start Time 0908  -NW      Stop Time 0931  -NW      Time Calculation (min) 23 min  -NW      PT Received On 01/30/25  -NW      PT Goal Re-Cert Due Date 02/06/25  -NW         Time Calculation- PT    Total Timed Code Minutes- PT 23 minute(s)  -NW         Timed Charges    60020 - Gait Training Minutes  23  -NW         Total Minutes    Timed Charges Total Minutes 23  -NW       Total Minutes 23  -NW                User Key  (r) = Recorded By, (t) = Taken By, (c) = Cosigned By      Initials Name Provider Type    Iliana Alcantar, PTA Physical Therapist Assistant                     PT Rehab Goals       Row Name 01/30/25 1153             Bed Mobility Goal 1 (PT)    Activity/Assistive Device (Bed Mobility Goal 1, PT) bed mobility activities, all;rolling to left;rolling to right;supine to sit;sit to supine  -NW      [place] Level/Cues Needed (Bed Mobility Goal 1, PT) supervision required  -NW      Time Frame (Bed Mobility Goal 1, PT) long term goal (LTG);10 days  -NW      Progress/Outcomes (Bed Mobility Goal 1, PT) goal not met  -NW         Transfer Goal 1 (PT)    Activity/Assistive Device (Transfer Goal 1, PT) sit-to-stand/stand-to-sit;bed-to-chair/chair-to-bed;toilet;walker, rolling  -NW      [place] Level/Cues Needed (Transfer Goal 1, PT) standby assist  -NW      Time Frame (Transfer Goal 1, PT) long term goal (LTG);10 days  -NW      Progress/Outcome (Transfer Goal 1, PT) goal not met  -NW          Gait Training Goal 1 (PT)    Activity/Assistive Device (Gait Training Goal 1, PT) gait (walking locomotion);decrease asymmetrical patterns;decrease fall risk;diminish gait deviation;forward stepping;improve balance and speed;increase endurance/gait distance;increase energy conservation;assistive device use;walker, rolling  -NW      [place] Level (Gait Training Goal 1, PT) standby assist  -NW      Distance (Gait Training Goal 1, PT) 50' with pain 2/10 or less  -NW      Time Frame (Gait Training Goal 1, PT) long term goal (LTG);10 days  -NW      Progress/Outcome (Gait Training Goal 1, PT) goal not met  -NW         Balance Goal 1 (PT)    Activity/Assistive Device (Balance Goal) sitting static balance;sitting dynamic balance;sit to stand dynamic balance;standing static balance;standing dynamic balance;unsupported;with functional activities/occupations;with functional mobility activities;with functional reaching activities  -NW      [place] Level/Cues Needed (Balance Goal 1, PT) supervision required  -NW      Time Frame (Balance Goal 1, PT) long-term goal (LTG);by discharge  -NW      Progress/Outcomes (Balance Goal 1, PT) goal not met  -NW                User Key  (r) = Recorded By, (t) = Taken By, (c) = Cosigned By      Initials Name Provider Type Discipline    NW Iliana Avila PTA Physical Therapist Assistant PT                    Therapy Charges for Today       Code Description Service Date Service Provider Modifiers Qty    37151882605 HC PT THER PROC EA 15 MIN 1/29/2025 Iliana Avila PTA GP 1    76698731222 HC GAIT TRAINING EA 15 MIN 1/29/2025 Iliana Avila PTA GP 1    30922484860 HC PT THERAPEUTIC ACT EA 15 MIN 1/29/2025 Iliana Avila PTA GP 1    43817184052 HC GAIT TRAINING EA 15 MIN 1/30/2025 Iliana Avila, CARLOS GP 2            PT Discharge Summary  Anticipated Discharge Disposition (PT): assisted living  Reason for Discharge: Discharge from facility  Outcomes Achieved: Refer to plan   care for updates on goals achieved  Discharge Destination: Assisted Living Facility      Iliana Avila, PTA   1/30/2025

## 2025-01-30 NOTE — THERAPY DISCHARGE NOTE
Acute Care - Occupational Therapy Discharge Summary  [facility]     Patient Name: Jose Alfredo Kirkpatrick  : 1935  MRN: [id number]    Today's Date: 2025                 Admit Date: 2025        OT Recommendation and Plan    Visit Dx:    ICD-10-CM ICD-9-CM   1. Acute pancreatitis, unspecified complication status, unspecified pancreatitis type  K85.90 577.0   2. Acute non-recurrent maxillary sinusitis  J01.00 461.0   3. ISHA (acute kidney injury)  N17.9 584.9   4. Acute UTI (urinary tract infection)  N39.0 599.0   5. Elevated liver enzymes  R74.8 790.5   6. Elevated troponin level  R79.89 790.6   7. Gallstone pancreatitis  K85.10 577.0     574.20   8. Impaired mobility [Z74.09]  Z74.09 799.89   9. Acute gallstone pancreatitis  K85.10 577.0     574.20   10. Hyperbilirubinemia  E80.6 782.4   11. Elevated LFTs  R79.89 790.6   12. Acute kidney injury  N17.9 584.9   13. E. coli UTI, present on admission (urinary tract infection)  N39.0 599.0    B96.20 041.49   14. Severe malnutrition  E43 261   15. Paraesophageal hiatal hernia  K44.9 553.3   16. Weight loss  R63.4 783.21   17. Large hiatal hernia  K44.9 553.3   18. Erosive esophagitis  K22.10 530.19   19. Pneumonia of left lower lobe due to infectious organism  J18.9 486   20. Non-smoker  Z78.9 V49.89   21. Overweight (BMI 25.0-29.9)  E66.3 278.02   22. Other secondary kyphosis, thoracic region  M40.14 737.41   23. Chronic pain of both shoulders  M25.511 719.41    G89.29 338.29    M25.512    24. Chronic bilateral low back pain without sciatica  M54.50 724.2    G89.29 338.29   25. Gastric ulcer with hemorrhage, unspecified chronicity  K25.4 531.40   26. Biliary calculus of other site without obstruction  K80.80 574.20   27. Iron deficiency anemia, unspecified iron deficiency anemia type  D50.9 280.9   28. Vitamin B12 deficiency  E53.8 266.2   29. Melena  K92.1 578.1   30. Positive fecal occult blood test  R19.5 792.1   31. History of DVT of left lower extremity   Z86.718 V12.51   32. Acute bilateral deep vein thrombosis (DVT) of femoral veins  I82.413 453.41   33. Acute pulmonary embolism with acute cor pulmonale, unspecified pulmonary embolism type  I26.09 415.19     415.0   34. Primary hypertension  I10 401.9   35. Drug-induced constipation  K59.03 564.09     E980.5   36. Hx of colonic polyps  Z86.0100 V12.72   37. Spinal stenosis of lumbar region without neurogenic claudication  M48.061 724.02          Time Calculation- OT       Row Name 01/30/25 1004             Timed Charges    09944 - Gait Training Minutes  23  -NW         Total Minutes    Timed Charges Total Minutes 23  -NW       Total Minutes 23  -NW                User Key  (r) = Recorded By, (t) = Taken By, (c) = Cosigned By      Initials Name Provider Type    Iliana Alcantar, PTA Physical Therapist Assistant                       OT Rehab Goals       Row Name 01/30/25 1200             Bed Mobility Goal 1 (OT)    Activity/Assistive Device (Bed Mobility Goal 1, OT) supine to sit  -LS      [place] Level/Cues Needed (Bed Mobility Goal 1, OT) modified independence  -LS      Time Frame (Bed Mobility Goal 1, OT) long term goal (LTG);10 days  -LS      Progress/Outcomes (Bed Mobility Goal 1, OT) goal not met  -LS         Transfer Goal 1 (OT)    Activity/Assistive Device (Transfer Goal 1, OT) sit-to-stand/stand-to-sit;bed-to-chair/chair-to-bed;toilet;shower chair;walker, rolling  -LS      [place] Level/Cues Needed (Transfer Goal 1, OT) modified independence  -LS      Time Frame (Transfer Goal 1, OT) long term goal (LTG);10 days  -LS      Progress/Outcome (Transfer Goal 1, OT) goal not met  -LS         Dressing Goal 1 (OT)    Activity/Device (Dressing Goal 1, OT) upper body dressing;lower body dressing  -LS      [place]/Cues Needed (Dressing Goal 1, OT) modified independence  -LS      Time Frame (Dressing Goal 1, OT) long term goal (LTG);10 days  -LS      Progress/Outcome (Dressing Goal 1, OT) goal not met   -LS         Strength Goal 1 (OT)    Strength Goal 1 (OT) Pt will have 4/5 strength in all BUE to improve BADL performance.  -LS      Time Frame (Strength Goal 1, OT) long term goal (LTG);10 days  -LS      Progress/Outcome (Strength Goal 1, OT) goal not met  -LS                User Key  (r) = Recorded By, (t) = Taken By, (c) = Cosigned By      Initials Name Provider Type Discipline    LS Nadira Aparicio COTA Occupational Therapist Assistant THERAPIES                                OT Discharge Summary  Anticipated Discharge Disposition (OT): assisted living  Reason for Discharge: Discharge from facility  Outcomes Achieved: Refer to plan of care for updates on goals achieved  Discharge Destination: Assisted Living Facility      ALLYSON Mabry  1/30/2025

## 2025-01-30 NOTE — DISCHARGE SUMMARY
[facility] Medicine Services  DISCHARGE SUMMARY       Date of Admission: 1/25/2025  Date of Discharge:  1/30/2025  Primary Care Physician: Otilio Ramos MD    Presenting Problem/History of Present Illness:      Final Discharge Diagnoses:  Active Hospital Problems    Diagnosis     **Gallstone pancreatitis     Acute kidney injury     Elevated LFTs     Hyperbilirubinemia     Acute UTI (urinary tract infection)        Consults: General Surgery.    Procedures Performed:   PROCEDURE PERFORMED: Robotic cholecystectomy with intraoperative cholangiogram      Pertinent Test Results:   Results for orders placed during the hospital encounter of 01/25/25    Adult Transthoracic Echo Complete W/ Cont if Necessary Per Protocol    Interpretation Summary    Left ventricular ejection fraction appears to be 61 - 65%.    Left ventricular wall thickness is consistent with moderate concentric hypertrophy.    Left ventricular diastolic function is consistent with (grade Ia w/high LAP) impaired relaxation.    Left atrial volume is mildly increased.    Mild aortic valve stenosis is present.    Estimated right ventricular systolic pressure from tricuspid regurgitation is normal (<35 mmHg).    Mild dilation of the aortic root is present.      Imaging Results (All)       Procedure Component Value Units Date/Time    FL Cholangiogram Operative [[id number]] Collected: 01/27/25 1457     Updated: 01/27/25 1501    Narrative:      FL CHOLANGIOGRAM OPERATIVE- 1/27/2025 12:10 PM     HISTORY: stones; K85.90-Acute pancreatitis without necrosis or  infection, unspecified; J01.00-Acute maxillary sinusitis, unspecified;  N17.9-Acute kidney failure, unspecified; N39.0-Urinary tract infection,  site not specified; R74.8-Abnormal levels of other serum enzymes;  R79.89-Other specified abnormal findings of blood chemistry;  K85.10-Biliary acute pancreatitis without necrosis or infection;  Z74.09-Oth     COMPARISON:  None     FLUOROSCOPY TIME: 0.1-minute     FLUOROSCOPY DOSE: 1.6 mGy     NUMBER OF IMAGES: 5       Impression:      Contrast was injected through the cystic duct stump, and multiple  fluoroscopic images of the RIGHT upper quadrant were obtained  intraoperatively. The opacified intrahepatic and extrahepatic ducts  demonstrate no filling defects or obstruction. Contrast is noted in the  small bowel. Surgical instruments and clips are noted in the RIGHT upper  quadrant.     Please refer to the operative note for more details.     This report was signed and finalized on 1/27/2025 2:58 PM by Dr Geremias Martinez.       FL C Arm During Surgery [[id number]] Resulted: 01/27/25 1336     Updated: 01/27/25 1336    Narrative:      This procedure was auto-finalized with no dictation required.    CT Abdomen Pelvis With Contrast [[id number]] Collected: 01/27/25 1148     Updated: 01/27/25 1201    Narrative:      CT ABDOMEN PELVIS W CONTRAST- 1/27/2025 10:31 AM     HISTORY: Acute pancreatitis; K85.90-Acute pancreatitis without necrosis  or infection, unspecified; J01.00-Acute maxillary sinusitis,  unspecified; N17.9-Acute kidney failure, unspecified; N39.0-Urinary  tract infection, site not specified; R74.8-Abnormal levels of other  serum enzymes; R79.89-Other specified abnormal findings of blood  chemistry; K85.10-Biliary acute pancreatitis without necrosis or  infection       COMPARISON: CT scan dated 2/8/2023.     DOSE LENGTH PRODUCT: 662.62 mGy.cm. Automated exposure control was also  utilized to decrease patient radiation dose.     TECHNIQUE: Following the intravenous administration of contrast, helical  CT tomographic images of the abdomen and pelvis were acquired.  Multiplanar reformatted images were provided for review.     FINDINGS:     Bilateral trace pleural effusions. There is a very large hiatal hernia  which mostly opacifies the left lung base. Coronary atheromatous  calcification.     LIVER: No suspicious liver lesion. The  portal veins are patent.     BILIARY SYSTEM: Cholelithiasis present. Gallbladder is mildly dilated.  No obvious wall thickening or para cholecystic inflammatory change.  Common bile duct is nondilated for age measuring up to 7 mm along its  pancreatic head segment.     PANCREAS: There is a mild peripancreatic stranding. Pancreatic  parenchymal enhancement is fairly homogeneous. The main pancreatic duct  is nondilated. No obvious focal pancreatic mass.     SPLEEN: Normal size.     KIDNEYS AND ADRENALS: Adrenal glands are unremarkable. Kidneys are  unremarkable. The ureters are decompressed and normal in appearance.     RETROPERITONEUM: No mass, lymphadenopathy or hemorrhage.     GI TRACT: There is a large hiatal hernia containing essentially the  entire stomach. The stomach is nondistended. Small bowel loops are  nondistended. Colonic diverticulosis. Moderate colonic stool.     OTHER: Suprarenal abdominal aorta measures up to 3.4 cm in diameter.  Splenic vein is patent. IVC filter is present. No mesenteric adenopathy.  No intraperitoneal free fluid. Right hip arthroplasty. Advanced lumbar  spine osteoarthritis changes, especially at the L1-L2 level. No acute  bony abnormality.     PELVIS: No mass lesion, fluid collection or significant lymphadenopathy  is seen in the pelvis. The urinary bladder is normal in appearance.       Impression:         1.  Provided clinical history of acute pancreatitis. CT reveals a fairly  minimal peripancreatic stranding, with the pancreatic parenchymal  enhancement appearing fairly homogeneous. No obvious pancreatic mass and  the main pancreatic duct is nondilated. Cholelithiasis is present and  the biliary tree is nondilated.  2.  There is a very large hiatal hernia which includes the entirety of  the stomach.  3.  Bilateral trace pleural effusions.  4.  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           This report was signed and  finalized on 1/27/2025 11:58 AM by Dr Geremias Martinez.       US Gallbladder [[id number]] Collected: 01/25/25 2113     Updated: 01/25/25 2118    Narrative:      EXAMINATION: US GALLBLADDER-  1/25/2025 9:13 PM     REASON FOR EXAM: elevated liver enzymes, elevated bilirubin, r/o biliary  obstruction; J01.00-Acute maxillary sinusitis, unspecified; K85.90-Acute  pancreatitis without necrosis or infection, unspecified; N17.9-Acute  kidney failure, unspecified; N39.0-Urinary tract infection, site not  specified     COMPARISON: 10/19/2020     TECHNIQUE: Multiple longitudinal and transverse real time sonographic  images of the right upper quadrant of the abdomen are obtained. Doppler  and grayscale images were provided. Images and report are stored per  institutional and state regulations.     FINDINGS:    PANCREAS: Obscured by bowel gas.     LIVER: Normal in size, echogenicity and echotexture. No focal lesion. No  definite intrahepatic ductal dilation.     GALLBLADDER: There is a 1.2 cm shadowing stone in the lumen of the  gallbladder. No pericholecystic fluid or gallbladder wall thickening.     BILE DUCTS: The CBD measures 0.3 cm in diameter. There is no  intrahepatic or extrahepatic ductal dilation.     OTHER: The visualized portion of the RIGHT kidney has a normal  appearance. The aorta is obscured by bowel gas.          Impression:         1.  Exam limited by overlying bowel gas. Pancreas and aorta are poorly  visualized.     2.  There is a 1.2 cm stone in the lumen of the gallbladder. The common  bile duct is normal in caliber. No intrahepatic ductal dilation  identified.     This report was signed and finalized on 1/25/2025 9:15 PM by Dr. Jacky Hall MD.       CT Cervical Spine Without Contrast [[id number]] Collected: 01/25/25 1819     Updated: 01/25/25 1827    Narrative:      EXAMINATION: CT CERVICAL SPINE WO CONTRAST-  1/25/2025 6:19 PM     HISTORY: posterior neck pain     COMPARISON: 11/19/2023     DLP: 1031.66  mGy.cm     TECHNIQUE: Serial helical tomographic images of the cervical spine were  obtained without the use of intravenous contrast. Additionally, sagittal  and coronal reformatted images were also provided for review.  In order  to have a CT radiation dose as low as reasonably achievable, Automated  Exposure Control was utilized for adjustment of the mA and/or KV  according to patient size.     FINDINGS:     OSSEOUS: No definite acute fracture. There is evidence of previous  cervical fusion posteriorly at C4-C6. Posterior decompressive changes at  C4-C6 as well. No perihardware lucency in the posterior fusion screws.  No destructive bone lesion. I suspect partial bony fusion of the C3 and  C4 vertebral bodies and the posterior elements.     SKULL BASE: Arthritis at the C1-C2 articulation with the odontoid  process. No acute fracture in the visualized portion of the skull base.     ALIGNMENT: Straightening of the normal lordosis.     SURROUNDING SOFT TISSUES: Evaluation of the surrounding soft tissues  demonstrates calcified atherosclerotic vascular disease in the bilateral  carotid bifurcations.     UPPER THORAX: The visualized thorax demonstrates no acute abnormality.     LEVELS:  C2-C3: Facet arthropathy noted in the RIGHT C2-C3 facet. There is  moderate RIGHT foraminal narrowing. No high-grade thecal sac narrowing.     C3-C4: Partial fusion of the vertebral bodies and the posterior elements  at C3-C4 with posterior disc osteophyte complex noted indenting the  ventral aspect of the CSF space, likely contributing to at least a mild  thecal sac narrowing. At the C4 level there is posterior decompression.  There is moderate LEFT and RIGHT foraminal narrowing.     C4-C5: Fusion changes at this level. Posterior disc osteophyte complex  indents the ventral aspect of the thecal sac. Posterior decompressive  changes are noted. This is similar to the exam from 11/19/2023. Moderate  bilateral foraminal narrowing.      C5-C6: Fusion changes at this level with marginal endplate osteophytes.  Moderate RIGHT and severe LEFT foraminal narrowing. Posterior  decompressive changes.     C6-C7: Posterior decompressive changes at C6. Diffuse disc space height  loss with vacuum disc phenomenon and marginal endplate osteophytes.  Suspect at moderate thecal sac stenosis at the caudal aspect of this  disc space. There is moderate bilateral foraminal narrowing, LEFT  greater than RIGHT.     C7-T1: Anterior end plate osteophyte formation. At least mild LEFT  foraminal narrowing. No high-grade thecal sac narrowing.          Impression:         1.  Multilevel degenerative changes and fusion changes as discussed  above. No definite acute osseous abnormality on this exam.     2.  Atherosclerotic vascular disease in the carotid bifurcations  bilaterally.      This report was signed and finalized on 1/25/2025 6:24 PM by Dr. Jacky Hall MD.       CT Head Without Contrast [[id number]] Collected: 01/25/25 1814     Updated: 01/25/25 1820    Narrative:      EXAMINATION: CT HEAD WO CONTRAST-  1/25/2025 6:14 PM     HISTORY: dizziness     neck pain     COMPARISON: 2/1/2022     DLP: 1031.66 mGy.cm     In order to have a CT radiation dose as low as reasonably achievable,  Automated Exposure Control was utilized for adjustment of the mA and/or  KV according to patient size.     TECHNIQUE: Serial axial tomographic images of the brain were obtained  without the use of intravenous contrast.  Coronal and sagittal  reformatted images were obtained reviewed as well.     FINDINGS:  Moderate ventricular dilation. Low density in the subcortical and  periventricular white matter, similar to the exam from 2/1/2022.  Vascular calcifications in the intracranial vasculature. No acute  hemorrhage. No mass effect or midline shift. Cavum septum pellucidum et  vergae. The structures of the posterior fossa are unremarkable. 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     The included orbits and their contents are unremarkable. Opacification  of the bilateral maxillary sinuses. The visualized osseous structures  and overlying soft tissues of the skull and face are unremarkable.          Impression:         1.  Moderate ventricular dilation again seen with low-attenuation in the  subcortical and periventricular white matter may be related to chronic  microvascular change or could be related to normal pressure  hydrocephalus. Overall, this is very similar to the exam from 2/1/2022.     2.  Opacification of the bilateral maxillary sinuses, raising concern  for maxillary sinusitis.     This report was signed and finalized on 1/25/2025 6:17 PM by Dr. Jacky Hall MD.             LAB RESULTS:      Lab 01/30/25 0216 01/29/25 0421 01/28/25  0400 01/27/25  0511 01/26/25  0437 01/25/25  2215 01/25/25  1835 01/25/25  1729   WBC 7.84 6.87 8.73 9.65 11.04*  --   --  16.21*   HEMOGLOBIN 13.5 15.1 14.5 12.0* 11.2*  --   --  13.2   HEMATOCRIT 41.6 45.5 45.1 37.6 36.0*  --   --  41.2   PLATELETS 217 219 190 146 139*  --   --  169   NEUTROS ABS  --   --  7.05* 7.46*  --   --   --  13.40*   IMMATURE GRANS (ABS)  --   --  0.04 0.04  --   --   --  0.11*   LYMPHS ABS  --   --  1.05 1.31  --   --   --  1.79   MONOS ABS  --   --  0.44 0.37  --   --   --  0.71   EOS ABS  --   --  0.11 0.42*  --   --   --  0.15   MCV 96.3 93.8 97.0 97.9* 100.8*  --   --  99.5*   PROCALCITONIN  --   --   --   --   --   --  41.03*  --    LACTATE  --   --   --   --   --  1.4 2.1*  --          Lab 01/30/25  0216 01/29/25  0421 01/28/25  0400 01/27/25  0511 01/26/25  1938 01/26/25  0437 01/25/25  1835   SODIUM 138 135* 137 139 139   < > 140   POTASSIUM 4.1 3.9 3.9 4.3 4.5   < > 5.1   CHLORIDE 102 98 99 107 108*   < > 103   CO2 25.0 22.0 22.0 21.0* 22.0   < > 24.0   ANION GAP 11.0 15.0 16.0* 11.0 9.0   < > 13.0   BUN 15 9 11 19 24*   < > 30*   CREATININE 0.82 0.56* 0.74* 0.83 1.14   < > 2.05*   EGFR  84.5 94.8 87.2 84.2 61.9   < > 30.6*   GLUCOSE 110* 126* 92 86 119*   < > 102*   CALCIUM 9.0 9.1 9.4 8.4* 8.3*   < > 9.1   MAGNESIUM  --   --   --   --   --   --  1.8   HEMOGLOBIN A1C  --   --  5.80*  --   --   --   --    TSH  --   --  2.990  --   --   --   --     < > = values in this interval not displayed.         Lab 01/30/25  0216 01/29/25  0421 01/28/25  0400 01/27/25  0511 01/26/25  1938 01/26/25  0437   TOTAL PROTEIN 6.0 6.5 6.8 5.2* 5.1* 5.2*   ALBUMIN 3.1* 3.4* 3.6 2.9* 2.9* 3.0*   GLOBULIN 2.9 3.1 3.2 2.3 2.2 2.2   ALT (SGPT) 69* 103* 160* 209* 228* 311*   AST (SGOT) 24 32 59* 96* 139* 239*   BILIRUBIN 0.6 0.8 0.9 0.9 0.8 1.8*   ALK PHOS 123* 151* 167* 148* 150* 139*   LIPASE 73* 103* 131* 399*  --  1,574*         Lab 01/25/25 2040 01/25/25  1835   HSTROP T 42* 45*         Lab 01/28/25  0400 01/25/25  1835   CHOLESTEROL 128  --    LDL CHOL 74  --    HDL CHOL 30*  --    TRIGLYCERIDES 131 64             Brief Urine Lab Results  (Last result in the past 365 days)        Color   Clarity   Blood   Leuk Est   Nitrite   Protein   CREAT   Urine HCG        01/25/25 1942 Dark Yellow   Cloudy   Negative   Trace   Positive   30 mg/dL (1+)                 Microbiology Results (last 10 days)       Procedure Component Value - Date/Time    Blood Culture - Blood, Arm, Right [[id number]]  (Normal) Collected: 01/25/25 2040    Lab Status: Preliminary result Specimen: Blood from Arm, Right Updated: 01/29/25 2100     Blood Culture No growth at 4 days    Blood Culture - Blood, Arm, Left [[id number]]  (Normal) Collected: 01/25/25 2039    Lab Status: Preliminary result Specimen: Blood from Arm, Left Updated: 01/29/25 2115     Blood Culture No growth at 4 days    Urine Culture - Urine, Straight Cath [[id number]]  (Normal) Collected: 01/25/25 1942    Lab Status: Final result Specimen: Urine from Straight Cath Updated: 01/27/25 1022     Urine Culture No growth        History of Present Illness  Patient is an 89-year-old male patient  with a medical history of hypertension, GERD, brought in by EMS from [facility] for the evaluation of his abdominal pain and generalized weakness.  As reported, for the last 2 days he has been feeling weak and dizzy and this has been associated with abdominal cramps.  He denies any nausea vomiting or diarrhea.  He denies any fever or chills.  He denies any chest pain or shortness of breath.  He usually ambulates with a walker, but he felt very weak and stayed in bed all day.    Hospital Course:   Gallbladder pancreatitis/elevated liver enzyme.  Consult GI.  Consult general surgery.  Discontinue Zosyn antibiotics 1/20/2025, recommendation from general surgery.  Leukocytosis resolved.  Lipase improving.  Liver enzymes improving.    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  Status post 1/27/2025-Robotic cholecystectomy with intraoperative cholangiogram.  Tissue pathology is pending.     Hypertension/short episode of atrial fibrillation.  Possibly due to irritation pancreatitis.  Currently in sinus rhythm.  Cut back Lopressor due to episode of bradycardia last night.  Continue lisinopril.  Restart Procardia today... Nitro as needed.  Echocardiogram- 61 - 65%, moderate concentric hypertrophy, Left ventricular diastolic function is consistent with (grade Ia w/high LAP) impaired relaxation, Left atrial volume is mildly increased, Mild aortic valve stenosis is present, Estimated right ventricular systolic pressure from  "tricuspid regurgitation is normal (<35 mmHg), Mild dilation of the aortic root is present.     Hypoxia.  Patient is on room air.     Acute kidney injury.  Creatinine normalized.     Anemia.  Hemoglobin normal.     Reflux.  Protonix.  Zofran as needed.     Hyponatremia.  Resolved.     Anxiety/depression .  Hold BuSpar twice daily.     Lovenox prophylaxis.     Chronic pain/neuropathy.  Neurontin at night.  Flexeril as needed.  Dilaudid as needed.  Percocet as needed.     Advanced age.  89 years old.     Nutrition.  GI soft diet.     Deconditioning.  PT and OT consult     Blood culture-no growth in 4 days.  Urine culture no growth.  Pathology is pending.     Patient is from [facility].  Vital signs stable, afebrile.  Plan to discharge patient home today.  Follow with PCP in 1 week.  Follow-up with general surgery in 2 weeks.    Physical Exam on Discharge:  /68   Pulse 68   Temp 97.4 °F (36.3 °C) (Oral)   Resp 18   Ht 188 cm (74\")   Wt 85.3 kg (188 lb)   SpO2 93%   BMI 24.14 kg/m²   Physical Exam  Vitals and nursing note reviewed.   Constitutional:       Appearance: He is well-developed.      Comments: Advanced age.  Cachectic   HENT:      Head: Normocephalic.   Eyes:      General: No scleral icterus.     Pupils: Pupils are equal, round, and reactive to light.   Neck:      Thyroid: No thyromegaly.      Vascular: No carotid bruit or JVD.      Trachea: No tracheal deviation.   Cardiovascular:      Rate and Rhythm: Regular rhythm.  Heart rates in the 60s.     Heart sounds: No murmur heard.     No friction rub. No gallop.   Pulmonary:      Effort: No respiratory distress.      Breath sounds: No wheezing or rales.      Comments: Diminished breath semilateral, clear, on room air.  Chest:      Chest wall: No tenderness.   Abdominal:      General: Hypoactive bowel sound. There is no distension.      Palpations: Abdomen is soft.      Tenderness: Status post abdomen surgery.  Musculoskeletal:    "   Cervical back: Normal range of motion and neck supple.   Skin:     General: Skin is warm and dry.      Capillary Refill: Capillary refill takes 2 to 3 seconds.      Findings: No rash.   Neurological:      Cranial Nerves: No cranial nerve deficit.      Motor: Weakness present.      Coordination: Coordination abnormal.      Gait: Gait abnormal.   Psychiatric:         Mood and Affect: Mood normal.         Behavior: Behavior normal.           Condition on Discharge: Stable.    Discharge Disposition: Discharge home with family.  Home or Self Care    Discharge Medications:     Discharge Medications        New Medications        Instructions Start Date   cyclobenzaprine 5 MG tablet  Commonly known as: FLEXERIL   5 mg, Oral, Every 8 Hours PRN      metoprolol tartrate 25 MG tablet  Commonly known as: LOPRESSOR   12.5 mg, Oral, Every 12 Hours Scheduled      traMADol 50 MG tablet  Commonly known as: Ultram   50 mg, Oral, Every 6 Hours PRN             Changes to Medications        Instructions Start Date   gabapentin 300 MG capsule  Commonly known as: NEURONTIN  What changed: when to take this   300 mg, Oral, Nightly      lisinopril 40 MG tablet  Commonly known as: PRINIVIL,ZESTRIL  What changed: how much to take   20 mg, Oral, Daily      oxyCODONE-acetaminophen  MG per tablet  Commonly known as: PERCOCET  What changed: how much to take   0.5 tablets, Oral, Every 6 Hours PRN               Discharge Diet:   Diet Instructions       Advance Diet As Tolerated -Target Diet: Cardiac      Target Diet: Cardiac            Activity at Discharge:   Activity Instructions       Activity as Tolerated              Follow-up Appointments:   Follow-up with PCP 1 week.  Follow-up with general surgery in 2 weeks.      Electronically signed by Parth Byrd MD, 01/30/25, 09:21 CST.    Time: Greater than 30 minutes.

## 2025-01-30 NOTE — PAYOR COMM NOTE
"REF:  [id number]     [facility]  FAX  [phone]       Jose Alfredo Kirkpatrick (89 y.o. Male)       Date of Birth   1935    Social Security Number       Address   [address] [place] [address]    Home Phone   [phone]    MRN   [id number]       Scientologist   [employer]    Marital Status                               Admission Date   1/25/25    Admission Type   Emergency    Admitting Provider   Parth Byrd MD    Attending Provider       Department, Room/Bed   [facility] 3C, 394/1       Discharge Date   1/30/2025    Discharge Disposition   Home or Self Care    Discharge Destination                                 Attending Provider: (none)   Allergies: Meloxicam    Isolation: None   Infection: COVID (History) (02/09/23)   Code Status: Prior    Ht: 188 cm (74\")   Wt: 85.3 kg (188 lb)    Admission Cmt: None   Principal Problem: Gallstone pancreatitis [K85.10]                   Active Insurance as of 1/25/2025       Primary Coverage       Payor Plan Insurance Group Employer/Plan Group    ANTHEM MEDICARE REPLACEMENT ANTHEM MED ADV HMO [id number]       Payor Plan Address Payor Plan Phone Number Payor Plan Fax Number Effective Dates    [address] [phone]  1/1/2024 - None Entered    [facility] [id number]         Subscriber Name Subscriber Birth Date Member ID       JOSE ALFREDO KIRKPATRICK 1935 [id number]                     Emergency Contacts        (Rel.) Home Phone Work Phone Mobile Phone    LOUIS WELLS (Daughter) -- -- [phone]                 Discharge Summary        Parth Byrd MD at 01/30/25 0907                [facility] Medicine Services  DISCHARGE SUMMARY       Date of Admission: 1/25/2025  Date of Discharge:  1/30/2025  Primary Care Physician: Otilio Ramos MD    Presenting Problem/History of Present Illness:      Final Discharge Diagnoses:  Active Hospital Problems    Diagnosis     " **Gallstone pancreatitis     Acute kidney injury     Elevated LFTs     Hyperbilirubinemia     Acute UTI (urinary tract infection)        Consults: General Surgery.    Procedures Performed:   PROCEDURE PERFORMED: Robotic cholecystectomy with intraoperative cholangiogram      Pertinent Test Results:   Results for orders placed during the hospital encounter of 01/25/25    Adult Transthoracic Echo Complete W/ Cont if Necessary Per Protocol    Interpretation Summary    Left ventricular ejection fraction appears to be 61 - 65%.    Left ventricular wall thickness is consistent with moderate concentric hypertrophy.    Left ventricular diastolic function is consistent with (grade Ia w/high LAP) impaired relaxation.    Left atrial volume is mildly increased.    Mild aortic valve stenosis is present.    Estimated right ventricular systolic pressure from tricuspid regurgitation is normal (<35 mmHg).    Mild dilation of the aortic root is present.      Imaging Results (All)       Procedure Component Value Units Date/Time    FL Cholangiogram Operative [[id number]] Collected: 01/27/25 1457     Updated: 01/27/25 1501    Narrative:      FL CHOLANGIOGRAM OPERATIVE- 1/27/2025 12:10 PM     HISTORY: stones; K85.90-Acute pancreatitis without necrosis or  infection, unspecified; J01.00-Acute maxillary sinusitis, unspecified;  N17.9-Acute kidney failure, unspecified; N39.0-Urinary tract infection,  site not specified; R74.8-Abnormal levels of other serum enzymes;  R79.89-Other specified abnormal findings of blood chemistry;  K85.10-Biliary acute pancreatitis without necrosis or infection;  Z74.09-Oth     COMPARISON: None     FLUOROSCOPY TIME: 0.1-minute     FLUOROSCOPY DOSE: 1.6 mGy     NUMBER OF IMAGES: 5       Impression:      Contrast was injected through the cystic duct stump, and multiple  fluoroscopic images of the RIGHT upper quadrant were obtained  intraoperatively. The opacified intrahepatic and extrahepatic ducts  demonstrate no  filling defects or obstruction. Contrast is noted in the  small bowel. Surgical instruments and clips are noted in the RIGHT upper  quadrant.     Please refer to the operative note for more details.     This report was signed and finalized on 1/27/2025 2:58 PM by Dr Geremias Martinez.       FL C Arm During Surgery [[id number]] Resulted: 01/27/25 1336     Updated: 01/27/25 1336    Narrative:      This procedure was auto-finalized with no dictation required.    CT Abdomen Pelvis With Contrast [[id number]] Collected: 01/27/25 1148     Updated: 01/27/25 1201    Narrative:      CT ABDOMEN PELVIS W CONTRAST- 1/27/2025 10:31 AM     HISTORY: Acute pancreatitis; K85.90-Acute pancreatitis without necrosis  or infection, unspecified; J01.00-Acute maxillary sinusitis,  unspecified; N17.9-Acute kidney failure, unspecified; N39.0-Urinary  tract infection, site not specified; R74.8-Abnormal levels of other  serum enzymes; R79.89-Other specified abnormal findings of blood  chemistry; K85.10-Biliary acute pancreatitis without necrosis or  infection       COMPARISON: CT scan dated 2/8/2023.     DOSE LENGTH PRODUCT: 662.62 mGy.cm. Automated exposure control was also  utilized to decrease patient radiation dose.     TECHNIQUE: Following the intravenous administration of contrast, helical  CT tomographic images of the abdomen and pelvis were acquired.  Multiplanar reformatted images were provided for review.     FINDINGS:     Bilateral trace pleural effusions. There is a very large hiatal hernia  which mostly opacifies the left lung base. Coronary atheromatous  calcification.     LIVER: No suspicious liver lesion. The portal veins are patent.     BILIARY SYSTEM: Cholelithiasis present. Gallbladder is mildly dilated.  No obvious wall thickening or para cholecystic inflammatory change.  Common bile duct is nondilated for age measuring up to 7 mm along its  pancreatic head segment.     PANCREAS: There is a mild peripancreatic stranding.  Pancreatic  parenchymal enhancement is fairly homogeneous. The main pancreatic duct  is nondilated. No obvious focal pancreatic mass.     SPLEEN: Normal size.     KIDNEYS AND ADRENALS: Adrenal glands are unremarkable. Kidneys are  unremarkable. The ureters are decompressed and normal in appearance.     RETROPERITONEUM: No mass, lymphadenopathy or hemorrhage.     GI TRACT: There is a large hiatal hernia containing essentially the  entire stomach. The stomach is nondistended. Small bowel loops are  nondistended. Colonic diverticulosis. Moderate colonic stool.     OTHER: Suprarenal abdominal aorta measures up to 3.4 cm in diameter.  Splenic vein is patent. IVC filter is present. No mesenteric adenopathy.  No intraperitoneal free fluid. Right hip arthroplasty. Advanced lumbar  spine osteoarthritis changes, especially at the L1-L2 level. No acute  bony abnormality.     PELVIS: No mass lesion, fluid collection or significant lymphadenopathy  is seen in the pelvis. The urinary bladder is normal in appearance.       Impression:         1.  Provided clinical history of acute pancreatitis. CT reveals a fairly  minimal peripancreatic stranding, with the pancreatic parenchymal  enhancement appearing fairly homogeneous. No obvious pancreatic mass and  the main pancreatic duct is nondilated. Cholelithiasis is present and  the biliary tree is nondilated.  2.  There is a very large hiatal hernia which includes the entirety of  the stomach.  3.  Bilateral trace pleural effusions.  4.  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           This report was signed and finalized on 1/27/2025 11:58 AM by Dr Geremias Martinez.       US Gallbladder [[id number]] Collected: 01/25/25 2113     Updated: 01/25/25 2118    Narrative:      EXAMINATION: US GALLBLADDER-  1/25/2025 9:13 PM     REASON FOR EXAM: elevated liver enzymes, elevated bilirubin, r/o biliary  obstruction; J01.00-Acute maxillary  sinusitis, unspecified; K85.90-Acute  pancreatitis without necrosis or infection, unspecified; N17.9-Acute  kidney failure, unspecified; N39.0-Urinary tract infection, site not  specified     COMPARISON: 10/19/2020     TECHNIQUE: Multiple longitudinal and transverse real time sonographic  images of the right upper quadrant of the abdomen are obtained. Doppler  and grayscale images were provided. Images and report are stored per  institutional and state regulations.     FINDINGS:    PANCREAS: Obscured by bowel gas.     LIVER: Normal in size, echogenicity and echotexture. No focal lesion. No  definite intrahepatic ductal dilation.     GALLBLADDER: There is a 1.2 cm shadowing stone in the lumen of the  gallbladder. No pericholecystic fluid or gallbladder wall thickening.     BILE DUCTS: The CBD measures 0.3 cm in diameter. There is no  intrahepatic or extrahepatic ductal dilation.     OTHER: The visualized portion of the RIGHT kidney has a normal  appearance. The aorta is obscured by bowel gas.          Impression:         1.  Exam limited by overlying bowel gas. Pancreas and aorta are poorly  visualized.     2.  There is a 1.2 cm stone in the lumen of the gallbladder. The common  bile duct is normal in caliber. No intrahepatic ductal dilation  identified.     This report was signed and finalized on 1/25/2025 9:15 PM by Dr. Jacky Hall MD.       CT Cervical Spine Without Contrast [[id number]] Collected: 01/25/25 1819     Updated: 01/25/25 1827    Narrative:      EXAMINATION: CT CERVICAL SPINE WO CONTRAST-  1/25/2025 6:19 PM     HISTORY: posterior neck pain     COMPARISON: 11/19/2023     DLP: 1031.66 mGy.cm     TECHNIQUE: Serial helical tomographic images of the cervical spine were  obtained without the use of intravenous contrast. Additionally, sagittal  and coronal reformatted images were also provided for review.  In order  to have a CT radiation dose as low as reasonably achievable, Automated  Exposure Control  was utilized for adjustment of the mA and/or KV  according to patient size.     FINDINGS:     OSSEOUS: No definite acute fracture. There is evidence of previous  cervical fusion posteriorly at C4-C6. Posterior decompressive changes at  C4-C6 as well. No perihardware lucency in the posterior fusion screws.  No destructive bone lesion. I suspect partial bony fusion of the C3 and  C4 vertebral bodies and the posterior elements.     SKULL BASE: Arthritis at the C1-C2 articulation with the odontoid  process. No acute fracture in the visualized portion of the skull base.     ALIGNMENT: Straightening of the normal lordosis.     SURROUNDING SOFT TISSUES: Evaluation of the surrounding soft tissues  demonstrates calcified atherosclerotic vascular disease in the bilateral  carotid bifurcations.     UPPER THORAX: The visualized thorax demonstrates no acute abnormality.     LEVELS:  C2-C3: Facet arthropathy noted in the RIGHT C2-C3 facet. There is  moderate RIGHT foraminal narrowing. No high-grade thecal sac narrowing.     C3-C4: Partial fusion of the vertebral bodies and the posterior elements  at C3-C4 with posterior disc osteophyte complex noted indenting the  ventral aspect of the CSF space, likely contributing to at least a mild  thecal sac narrowing. At the C4 level there is posterior decompression.  There is moderate LEFT and RIGHT foraminal narrowing.     C4-C5: Fusion changes at this level. Posterior disc osteophyte complex  indents the ventral aspect of the thecal sac. Posterior decompressive  changes are noted. This is similar to the exam from 11/19/2023. Moderate  bilateral foraminal narrowing.     C5-C6: Fusion changes at this level with marginal endplate osteophytes.  Moderate RIGHT and severe LEFT foraminal narrowing. Posterior  decompressive changes.     C6-C7: Posterior decompressive changes at C6. Diffuse disc space height  loss with vacuum disc phenomenon and marginal endplate osteophytes.  Suspect at moderate  thecal sac stenosis at the caudal aspect of this  disc space. There is moderate bilateral foraminal narrowing, LEFT  greater than RIGHT.     C7-T1: Anterior end plate osteophyte formation. At least mild LEFT  foraminal narrowing. No high-grade thecal sac narrowing.          Impression:         1.  Multilevel degenerative changes and fusion changes as discussed  above. No definite acute osseous abnormality on this exam.     2.  Atherosclerotic vascular disease in the carotid bifurcations  bilaterally.      This report was signed and finalized on 1/25/2025 6:24 PM by Dr. Jacky Hall MD.       CT Head Without Contrast [[id number]] Collected: 01/25/25 1814     Updated: 01/25/25 1820    Narrative:      EXAMINATION: CT HEAD WO CONTRAST-  1/25/2025 6:14 PM     HISTORY: dizziness     neck pain     COMPARISON: 2/1/2022     DLP: 1031.66 mGy.cm     In order to have a CT radiation dose as low as reasonably achievable,  Automated Exposure Control was utilized for adjustment of the mA and/or  KV according to patient size.     TECHNIQUE: Serial axial tomographic images of the brain were obtained  without the use of intravenous contrast.  Coronal and sagittal  reformatted images were obtained reviewed as well.     FINDINGS:  Moderate ventricular dilation. Low density in the subcortical and  periventricular white matter, similar to the exam from 2/1/2022.  Vascular calcifications in the intracranial vasculature. No acute  hemorrhage. No mass effect or midline shift. Cavum septum pellucidum et  vergae. The structures of the posterior fossa are unremarkable. 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     The included orbits and their contents are unremarkable. Opacification  of the bilateral maxillary sinuses. The visualized osseous structures  and overlying soft tissues of the skull and face are unremarkable.          Impression:         1.  Moderate ventricular dilation again seen with low-attenuation  in the  subcortical and periventricular white matter may be related to chronic  microvascular change or could be related to normal pressure  hydrocephalus. Overall, this is very similar to the exam from 2/1/2022.     2.  Opacification of the bilateral maxillary sinuses, raising concern  for maxillary sinusitis.     This report was signed and finalized on 1/25/2025 6:17 PM by Dr. Jacky Hall MD.             LAB RESULTS:      Lab 01/30/25 0216 01/29/25 0421 01/28/25 0400 01/27/25 0511 01/26/25 0437 01/25/25 2215 01/25/25  1835 01/25/25  1729   WBC 7.84 6.87 8.73 9.65 11.04*  --   --  16.21*   HEMOGLOBIN 13.5 15.1 14.5 12.0* 11.2*  --   --  13.2   HEMATOCRIT 41.6 45.5 45.1 37.6 36.0*  --   --  41.2   PLATELETS 217 219 190 146 139*  --   --  169   NEUTROS ABS  --   --  7.05* 7.46*  --   --   --  13.40*   IMMATURE GRANS (ABS)  --   --  0.04 0.04  --   --   --  0.11*   LYMPHS ABS  --   --  1.05 1.31  --   --   --  1.79   MONOS ABS  --   --  0.44 0.37  --   --   --  0.71   EOS ABS  --   --  0.11 0.42*  --   --   --  0.15   MCV 96.3 93.8 97.0 97.9* 100.8*  --   --  99.5*   PROCALCITONIN  --   --   --   --   --   --  41.03*  --    LACTATE  --   --   --   --   --  1.4 2.1*  --          Lab 01/30/25 0216 01/29/25 0421 01/28/25 0400 01/27/25 0511 01/26/25  1938 01/26/25 0437 01/25/25  1835   SODIUM 138 135* 137 139 139   < > 140   POTASSIUM 4.1 3.9 3.9 4.3 4.5   < > 5.1   CHLORIDE 102 98 99 107 108*   < > 103   CO2 25.0 22.0 22.0 21.0* 22.0   < > 24.0   ANION GAP 11.0 15.0 16.0* 11.0 9.0   < > 13.0   BUN 15 9 11 19 24*   < > 30*   CREATININE 0.82 0.56* 0.74* 0.83 1.14   < > 2.05*   EGFR 84.5 94.8 87.2 84.2 61.9   < > 30.6*   GLUCOSE 110* 126* 92 86 119*   < > 102*   CALCIUM 9.0 9.1 9.4 8.4* 8.3*   < > 9.1   MAGNESIUM  --   --   --   --   --   --  1.8   HEMOGLOBIN A1C  --   --  5.80*  --   --   --   --    TSH  --   --  2.990  --   --   --   --     < > = values in this interval not displayed.         Lab  01/30/25  0216 01/29/25  0421 01/28/25  0400 01/27/25  0511 01/26/25  1938 01/26/25  0437   TOTAL PROTEIN 6.0 6.5 6.8 5.2* 5.1* 5.2*   ALBUMIN 3.1* 3.4* 3.6 2.9* 2.9* 3.0*   GLOBULIN 2.9 3.1 3.2 2.3 2.2 2.2   ALT (SGPT) 69* 103* 160* 209* 228* 311*   AST (SGOT) 24 32 59* 96* 139* 239*   BILIRUBIN 0.6 0.8 0.9 0.9 0.8 1.8*   ALK PHOS 123* 151* 167* 148* 150* 139*   LIPASE 73* 103* 131* 399*  --  1,574*         Lab 01/25/25 2040 01/25/25 1835   HSTROP T 42* 45*         Lab 01/28/25 0400 01/25/25  1835   CHOLESTEROL 128  --    LDL CHOL 74  --    HDL CHOL 30*  --    TRIGLYCERIDES 131 64             Brief Urine Lab Results  (Last result in the past 365 days)        Color   Clarity   Blood   Leuk Est   Nitrite   Protein   CREAT   Urine HCG        01/25/25 1942 Dark Yellow   Cloudy   Negative   Trace   Positive   30 mg/dL (1+)                 Microbiology Results (last 10 days)       Procedure Component Value - Date/Time    Blood Culture - Blood, Arm, Right [[id number]]  (Normal) Collected: 01/25/25 2040    Lab Status: Preliminary result Specimen: Blood from Arm, Right Updated: 01/29/25 2100     Blood Culture No growth at 4 days    Blood Culture - Blood, Arm, Left [[id number]]  (Normal) Collected: 01/25/25 2039    Lab Status: Preliminary result Specimen: Blood from Arm, Left Updated: 01/29/25 2115     Blood Culture No growth at 4 days    Urine Culture - Urine, Straight Cath [[id number]]  (Normal) Collected: 01/25/25 1942    Lab Status: Final result Specimen: Urine from Straight Cath Updated: 01/27/25 1022     Urine Culture No growth        History of Present Illness  Patient is an 89-year-old male patient with a medical history of hypertension, GERD, brought in by EMS from [facility] for the evaluation of his abdominal pain and generalized weakness.  As reported, for the last 2 days he has been feeling weak and dizzy and this has been associated with abdominal cramps.  He denies any nausea vomiting or  diarrhea.  He denies any fever or chills.  He denies any chest pain or shortness of breath.  He usually ambulates with a walker, but he felt very weak and stayed in bed all day.    Hospital Course:   Gallbladder pancreatitis/elevated liver enzyme.  Consult GI.  Consult general surgery.  Discontinue Zosyn antibiotics 1/20/2025, recommendation from general surgery.  Leukocytosis resolved.  Lipase improving.  Liver enzymes improving.    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  Status post 1/27/2025-Robotic cholecystectomy with intraoperative cholangiogram.  Tissue pathology is pending.     Hypertension/short episode of atrial fibrillation.  Possibly due to irritation pancreatitis.  Currently in sinus rhythm.  Cut back Lopressor due to episode of bradycardia last night.  Continue lisinopril.  Restart Procardia today... Nitro as needed.  Echocardiogram- 61 - 65%, moderate concentric hypertrophy, Left ventricular diastolic function is consistent with (grade Ia w/high LAP) impaired relaxation, Left atrial volume is mildly increased, Mild aortic valve stenosis is present, Estimated right ventricular systolic pressure from tricuspid regurgitation is normal (<35 mmHg), Mild dilation of the aortic root is present.     Hypoxia.  Patient is on room air.     Acute kidney injury.  Creatinine normalized.     Anemia.  Hemoglobin normal.     Reflux.  Protonix.  Zofran as needed.     Hyponatremia.  Resolved.     Anxiety/depression .  Hold BuSpar twice  "daily.     Lovenox prophylaxis.     Chronic pain/neuropathy.  Neurontin at night.  Flexeril as needed.  Dilaudid as needed.  Percocet as needed.     Advanced age.  89 years old.     Nutrition.  GI soft diet.     Deconditioning.  PT and OT consult     Blood culture-no growth in 4 days.  Urine culture no growth.  Pathology is pending.     Patient is from [facility].  Vital signs stable, afebrile.  Plan to discharge patient home today.  Follow with PCP in 1 week.  Follow-up with general surgery in 2 weeks.    Physical Exam on Discharge:  /68   Pulse 68   Temp 97.4 °F (36.3 °C) (Oral)   Resp 18   Ht 188 cm (74\")   Wt 85.3 kg (188 lb)   SpO2 93%   BMI 24.14 kg/m²   Physical Exam  Vitals and nursing note reviewed.   Constitutional:       Appearance: He is well-developed.      Comments: Advanced age.  Cachectic   HENT:      Head: Normocephalic.   Eyes:      General: No scleral icterus.     Pupils: Pupils are equal, round, and reactive to light.   Neck:      Thyroid: No thyromegaly.      Vascular: No carotid bruit or JVD.      Trachea: No tracheal deviation.   Cardiovascular:      Rate and Rhythm: Regular rhythm.  Heart rates in the 60s.     Heart sounds: No murmur heard.     No friction rub. No gallop.   Pulmonary:      Effort: No respiratory distress.      Breath sounds: No wheezing or rales.      Comments: Diminished breath semilateral, clear, on room air.  Chest:      Chest wall: No tenderness.   Abdominal:      General: Hypoactive bowel sound. There is no distension.      Palpations: Abdomen is soft.      Tenderness: Status post abdomen surgery.  Musculoskeletal:      Cervical back: Normal range of motion and neck supple.   Skin:     General: Skin is warm and dry.      Capillary Refill: Capillary refill takes 2 to 3 seconds.      Findings: No rash.   Neurological:      Cranial Nerves: No cranial nerve deficit.      Motor: Weakness present.      Coordination: Coordination abnormal.     "  Gait: Gait abnormal.   Psychiatric:         Mood and Affect: Mood normal.         Behavior: Behavior normal.           Condition on Discharge: Stable.    Discharge Disposition: Discharge home with family.  Home or Self Care    Discharge Medications:     Discharge Medications        New Medications        Instructions Start Date   cyclobenzaprine 5 MG tablet  Commonly known as: FLEXERIL   5 mg, Oral, Every 8 Hours PRN      metoprolol tartrate 25 MG tablet  Commonly known as: LOPRESSOR   12.5 mg, Oral, Every 12 Hours Scheduled      traMADol 50 MG tablet  Commonly known as: Ultram   50 mg, Oral, Every 6 Hours PRN             Changes to Medications        Instructions Start Date   gabapentin 300 MG capsule  Commonly known as: NEURONTIN  What changed: when to take this   300 mg, Oral, Nightly      lisinopril 40 MG tablet  Commonly known as: PRINIVIL,ZESTRIL  What changed: how much to take   20 mg, Oral, Daily      oxyCODONE-acetaminophen  MG per tablet  Commonly known as: PERCOCET  What changed: how much to take   0.5 tablets, Oral, Every 6 Hours PRN               Discharge Diet:   Diet Instructions       Advance Diet As Tolerated -Target Diet: Cardiac      Target Diet: Cardiac            Activity at Discharge:   Activity Instructions       Activity as Tolerated              Follow-up Appointments:   Follow-up with PCP 1 week.  Follow-up with general surgery in 2 weeks.      Electronically signed by Rigoberto Simmons MD, 01/30/25, 09:21 CST.    Time: Greater than 30 minutes.         Electronically signed by Rigoberto Simmons MD at 01/30/25 0921       Discharge Order (From admission, onward)       Start     Ordered    01/30/25 0911  Discharge patient  Once        Expected Discharge Date: 01/30/25   Discharge Disposition: Home or Self Care   Physician of Record for Attribution - Please select from Treatment Team: RIGOBERTO SIMMONS [[id number]]   Review needed by CMO to determine Physician of Record: No      Question Answer  Comment   Physician of Record for Attribution - Please select from Treatment Team RIGOBERTO SIMMONS    Review needed by CMO to determine Physician of Record No        01/30/25 0976

## 2025-01-30 NOTE — THERAPY TREATMENT NOTE
Acute Care - Physical Therapy Treatment Note  [facility]     Patient Name: Jose Alfredo Kirkpatrick  : 1935  MRN: [id number]  Today's Date: 2025      Visit Dx:     ICD-10-CM ICD-9-CM   1. Acute pancreatitis, unspecified complication status, unspecified pancreatitis type  K85.90 577.0   2. Acute non-recurrent maxillary sinusitis  J01.00 461.0   3. ISHA (acute kidney injury)  N17.9 584.9   4. Acute UTI (urinary tract infection)  N39.0 599.0   5. Elevated liver enzymes  R74.8 790.5   6. Elevated troponin level  R79.89 790.6   7. Gallstone pancreatitis  K85.10 577.0     574.20   8. Impaired mobility [Z74.09]  Z74.09 799.89   9. Acute gallstone pancreatitis  K85.10 577.0     574.20   10. Hyperbilirubinemia  E80.6 782.4   11. Elevated LFTs  R79.89 790.6   12. Acute kidney injury  N17.9 584.9   13. E. coli UTI, present on admission (urinary tract infection)  N39.0 599.0    B96.20 041.49   14. Severe malnutrition  E43 261   15. Paraesophageal hiatal hernia  K44.9 553.3   16. Weight loss  R63.4 783.21   17. Large hiatal hernia  K44.9 553.3   18. Erosive esophagitis  K22.10 530.19   19. Pneumonia of left lower lobe due to infectious organism  J18.9 486   20. Non-smoker  Z78.9 V49.89   21. Overweight (BMI 25.0-29.9)  E66.3 278.02   22. Other secondary kyphosis, thoracic region  M40.14 737.41   23. Chronic pain of both shoulders  M25.511 719.41    G89.29 338.29    M25.512    24. Chronic bilateral low back pain without sciatica  M54.50 724.2    G89.29 338.29   25. Gastric ulcer with hemorrhage, unspecified chronicity  K25.4 531.40   26. Biliary calculus of other site without obstruction  K80.80 574.20   27. Iron deficiency anemia, unspecified iron deficiency anemia type  D50.9 280.9   28. Vitamin B12 deficiency  E53.8 266.2   29. Melena  K92.1 578.1   30. Positive fecal occult blood test  R19.5 792.1   31. History of DVT of left lower extremity  Z86.718 V12.51   32. Acute bilateral deep vein thrombosis (DVT) of femoral  veins  I82.413 453.41   33. Acute pulmonary embolism with acute cor pulmonale, unspecified pulmonary embolism type  I26.09 415.19     415.0   34. Primary hypertension  I10 401.9   35. Drug-induced constipation  K59.03 564.09     E980.5   36. Hx of colonic polyps  Z86.0100 V12.72   37. Spinal stenosis of lumbar region without neurogenic claudication  M48.061 724.02     Patient Active Problem List   Diagnosis    Spinal stenosis, lumbar    Hx of colonic polyps    Drug-induced constipation    Primary hypertension    Acute pulmonary embolism with acute cor pulmonale    Acute bilateral deep vein thrombosis (DVT) of femoral veins    History of DVT of left lower extremity    Positive fecal occult blood test    Melena    Vitamin B12 deficiency    FARZANA (iron deficiency anemia)    Cholelithiasis    Gastric ulcer with hemorrhage    Chronic bilateral low back pain without sciatica    Chronic pain of both shoulders    Kyphosis    Overweight (BMI 25.0-29.9)    Non-smoker    Pneumonia of left lower lobe due to infectious organism    Erosive esophagitis    Large hiatal hernia    Weight loss    Paraesophageal hiatal hernia    Severe malnutrition    E. coli UTI, present on admission (urinary tract infection)    Gallstone pancreatitis    Acute kidney injury    Elevated LFTs    Hyperbilirubinemia    Acute UTI (urinary tract infection)     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     Past Surgical History:   Procedure Laterality Date    BACK SURGERY      cervical    CATARACT EXTRACTION Bilateral     CHOLECYSTECTOMY WITH INTRAOPERATIVE CHOLANGIOGRAM N/A 1/27/2025    Procedure: LAPAROSCOPIC CHOLECYSTECTOMY INTRAOPERATIVE CHOLANGIOGRAM WITH DAVINCI ROBOT;  Surgeon: Ronak Roche MD;  Location: [facility];  Service:  Robotics - [place];  Laterality: N/A;    COLONOSCOPY  02/11/2013    Hyperplastic polyp at 25 cm, Diverticulosis repeat exam in 5 years    COLONOSCOPY N/A 10/16/2018    Tubular adenoma ascending colon repet prn    ENDOSCOPY  01/15/2014    HH    ENDOSCOPY N/A 10/03/2019    Large HH, non-bleeding gastric ulcer    ENDOSCOPY N/A 11/25/2019    Procedure: ESOPHAGOGASTRODUODENOSCOPY WITH ANESTHESIA;  Surgeon: Marques Pérez MD;  Location: [facility] ENDOSCOPY;  Service: Gastroenterology    ENDOSCOPY N/A 2/9/2023    Procedure: ESOPHAGOGASTRODUODENOSCOPY WITH ANESTHESIA;  Surgeon: Sushant Edmond MD;  Location: [facility] ENDOSCOPY;  Service: Gastroenterology;  Laterality: N/A;  pre GI bleed  post hiatal hernia; esophagitis  Otilio Ramos MD    HERNIA REPAIR Right     INGUINAL     HIP FRACTURE SURGERY Right 02/04/2023    REPLACEMENT TOTAL KNEE BILATERAL      THORACIC LAMINECTOMY DECOMPRESSIVE POSTERIOR N/A 12/14/2016    Procedure: LAMINECTOMY DECOMPRESSION, UNINSTRUMENTED POSTERIOR SPINAL FUSION, T 11-12;  Surgeon: LUZ Adan MD;  Location: [facility] OR;  Service:     VENA CAVA FILTER INSERTION N/A 10/04/2019    Procedure: VENA CAVA FILTER INSERTION;  Surgeon: Dallin Coronado MD;  Location: [facility] HYBRID OR 12;  Service: Vascular     PT Assessment (Last 12 Hours)       PT Evaluation and Treatment       Row Name 01/30/25 0908          Physical Therapy Time and Intention    Subjective Information complains of;weakness;fatigue  -NW     Document Type therapy note (daily note)  -NW     Mode of Treatment physical therapy  -NW       Row Name 01/30/25 0908          General Information    Existing Precautions/Restrictions fall  -NW       Row Name 01/30/25 0908          Pain    Posttreatment Pain Rating 7/10  -NW     Pain Location back  -NW       Row Name 01/30/25 0908          Bed Mobility    Supine-Sit McDonald (Bed Mobility) verbal cues;moderate assist (50% patient effort)  -NW     Assistive Device (Bed Mobility) bed  rails;repositioning sheet  -NW     Comment, (Bed Mobility) extra time  -NW       Row Name 01/30/25 0908          Sit-Stand Transfer    Sit-Stand Hendry (Transfers) verbal cues;moderate assist (50% patient effort)  attempt to stand x2 before able to get upright, pt w/ posterior lean took some time to get wt over toes in order to amb fwd  -NW       Row Name 01/30/25 0908          Stand-Sit Transfer    Stand-Sit Hendry (Transfers) verbal cues;contact guard;minimum assist (75% patient effort)  -NW       Row Name 01/30/25 0908          Gait/Stairs (Locomotion)    Hendry Level (Gait) minimum assist (75% patient effort)  -NW     Assistive Device (Gait) walker, front-wheeled  -NW     Distance in Feet (Gait) 10  10x3  -NW     Pattern (Gait) step-to  -NW     Deviations/Abnormal Patterns (Gait) esau decreased;base of support, narrow  -NW     Comment, (Gait/Stairs) cont to need help w/ AD and cues for safety. pt takes ray steps and requires mult standing rests. able to amb from bed-door-chair  -NW       Row Name 01/30/25 0908          Safety Issues/Impairments Affecting Functional Mobility    Impairments Affecting Function (Mobility) balance;strength;pain  -NW       Row Name             Wound 01/27/25 1256 abdomen    Wound - Properties Group Placement Date: 01/27/25  - Placement Time: 1256  -HW Location: abdomen  -HW Primary Wound Type: Incision  -HW    Retired Wound - Properties Group Placement Date: 01/27/25  -HW Placement Time: 1256  -HW Location: abdomen  -HW Primary Wound Type: Incision  -HW    Retired Wound - Properties Group Placement Date: 01/27/25  - Placement Time: 1256  -HW Location: abdomen  -HW Primary Wound Type: Incision  -HW    Retired Wound - Properties Group Date first assessed: 01/27/25  - Time first assessed: 1256  -HW Location: abdomen  -HW Primary Wound Type: Incision  -HW      Row Name 01/30/25 0908          Positioning and Restraints    Pre-Treatment Position in bed  -NW      "Post Treatment Position chair  -NW     In Chair reclined;call light within reach;encouraged to call for assist  -NW               User Key  (r) = Recorded By, (t) = Taken By, (c) = Cosigned By      Initials Name Provider Type    Iliana Alcantar PTA Physical Therapist Assistant    Eduar Wei, RN Registered Nurse                    Physical Therapy Education       Title: PT OT SLP Therapies (In Progress)       Topic: Physical Therapy (Done)       Point: Mobility training (Done)       Learning Progress Summary            Patient Acceptance, E, VU by  at 1/27/2025 0945    Comment: role of PT, safety with AD, fall risk, d/c plans                      Point: Home exercise program (Done)       Learning Progress Summary            Patient Acceptance, E, VU by  at 1/27/2025 0945    Comment: role of PT, safety with AD, fall risk, d/c plans                      Point: Body mechanics (Done)       Learning Progress Summary            Patient Acceptance, E, VU by  at 1/27/2025 0945    Comment: role of PT, safety with AD, fall risk, d/c plans                      Point: Precautions (Done)       Learning Progress Summary            Patient Acceptance, E, VU by  at 1/27/2025 0945    Comment: role of PT, safety with AD, fall risk, d/c plans                                      User Key       Initials Effective Dates Name Provider Type Discipline     08/15/24 -  Elias Zaidi, VITALY DPT Physical Therapist PT                  PT Recommendation and Plan     Progress: improving  Outcome Evaluation: Pt up in chair. sit-stand mod x1 w/ significant posterior lean, took a little bit to get weight fwd over toes in order to amb. Pt was able to amb from chair to door and then back to bed. Needed help w/ AD, cues for safety and step length. slow gaurded gait. pt c/o increased pain in low back. \"my back hurts worse than my belly\". discussed home safety and POC. pt will need HH upon d/c but may still need few more days in " hospital to increase strength and medical status in order to be safe for this transition.       Time Calculation:    PT Charges       Row Name 01/30/25 1004             Time Calculation    Start Time 0908  -NW      Stop Time 0931  -NW      Time Calculation (min) 23 min  -NW      PT Received On 01/30/25  -NW      PT Goal Re-Cert Due Date 02/06/25  -NW         Time Calculation- PT    Total Timed Code Minutes- PT 23 minute(s)  -NW         Timed Charges    15003 - Gait Training Minutes  23  -NW         Total Minutes    Timed Charges Total Minutes 23  -NW       Total Minutes 23  -NW                User Key  (r) = Recorded By, (t) = Taken By, (c) = Cosigned By      Initials Name Provider Type    NW Iliana Avila PTA Physical Therapist Assistant                  Therapy Charges for Today       Code Description Service Date Service Provider Modifiers Qty    99314193490 HC PT THER PROC EA 15 MIN 1/29/2025 Iliana Avila, PTA GP 1    51733235891 HC GAIT TRAINING EA 15 MIN 1/29/2025 Iliana Avila, PTA GP 1    70206809443 HC PT THERAPEUTIC ACT EA 15 MIN 1/29/2025 Iliana Avila, PTA GP 1    41478278164 HC GAIT TRAINING EA 15 MIN 1/30/2025 Iliana Avila, CARLOS GP 2            PT G-Codes  Outcome Measure Options: AM-PAC 6 Clicks Daily Activity (OT)  AM-PAC 6 Clicks Score (PT): 15  AM-PAC 6 Clicks Score (OT): 17    Iliana Avila PTA  1/30/2025

## 2025-01-30 NOTE — PLAN OF CARE
Goal Outcome Evaluation:  Plan of Care Reviewed With: patient        Progress: improving  Outcome Evaluation: Patient stable overnight. A+OX4. RA. c/o chronic pain, see mar. refused turns. external cath in place. call light within reach and safety maintained

## 2025-01-31 NOTE — OUTREACH NOTE
Prep Survey      Flowsheet Row Responses   [facility]ianist facility patient discharged from? [place]   Is LACE score < 7 ? No   Eligibility Readm Mgmt   Discharge diagnosis CHOLECYSTECTOMY WITH INTRAOPERATIVE CHOLANGIOGRAM-Gallstone pancreatitis   Does the patient have one of the following disease processes/diagnoses(primary or secondary)? General Surgery   Does the patient have Home health ordered? No   Is there a DME ordered? No   Prep survey completed? Yes            MAYELA PEREZ - Registered Nurse

## 2025-01-31 NOTE — OUTREACH NOTE
Prep Survey      Flowsheet Row Responses   Jain facility patient discharged from? [place]   Is LACE score < 7 ? No   Eligibility Readm Mgmt   Discharge diagnosis CHOLECYSTECTOMY WITH INTRAOPERATIVE CHOLANGIOGRAM-Gallstone pancreatitis   Does the patient have one of the following disease processes/diagnoses(primary or secondary)? General Surgery   Does the patient have Home health ordered? No   Is there a DME ordered? No   Prep survey completed? Yes            MAYELA PEREZ - Registered Nurse

## 2025-02-03 ENCOUNTER — READMISSION MANAGEMENT (OUTPATIENT)
Dept: CALL CENTER | Facility: HOSPITAL | Age: 89
End: 2025-02-03
Payer: MEDICARE

## 2025-02-03 NOTE — OUTREACH NOTE
General Surgery Week 1 Survey      Flowsheet Row Responses   Latter day facility patient discharged from? [place]   Does the patient have one of the following disease processes/diagnoses(primary or secondary)? General Surgery   Week 1 attempt successful? No   Unsuccessful attempts Attempt 1            Faiza FANG - Registered Nurse

## 2025-02-04 LAB
CYTO UR: NORMAL
LAB AP CASE REPORT: NORMAL
Lab: NORMAL
PATH REPORT.FINAL DX SPEC: NORMAL
PATH REPORT.GROSS SPEC: NORMAL

## 2025-02-07 ENCOUNTER — READMISSION MANAGEMENT (OUTPATIENT)
Dept: CALL CENTER | Facility: HOSPITAL | Age: 89
End: 2025-02-07
Payer: MEDICARE

## 2025-02-14 ENCOUNTER — TELEPHONE (OUTPATIENT)
Dept: SURGERY | Facility: CLINIC | Age: 89
End: 2025-02-14
Payer: MEDICARE

## 2025-02-14 NOTE — TELEPHONE ENCOUNTER
Spoke with his POA Lula and she stated that the appointment didn't seem necessary and that she wanted to cancel. She stated that PT was doing well. I told her that we advised that he be seen for this visit, but PT stated that transportation was an issue. I told her to call us if they need to schedule or had any concerns.     [facility]  2/14/25

## 2025-03-01 ENCOUNTER — APPOINTMENT (OUTPATIENT)
Dept: CT IMAGING | Facility: HOSPITAL | Age: 89
End: 2025-03-01
Payer: MEDICARE

## 2025-03-01 ENCOUNTER — APPOINTMENT (OUTPATIENT)
Dept: GENERAL RADIOLOGY | Facility: HOSPITAL | Age: 89
End: 2025-03-01
Payer: MEDICARE

## 2025-03-01 ENCOUNTER — HOSPITAL ENCOUNTER (INPATIENT)
Facility: HOSPITAL | Age: 89
LOS: 3 days | Discharge: HOME | End: 2025-03-04
Attending: STUDENT IN AN ORGANIZED HEALTH CARE EDUCATION/TRAINING PROGRAM | Admitting: INTERNAL MEDICINE
Payer: MEDICARE

## 2025-03-01 DIAGNOSIS — I50.9 ACUTE HEART FAILURE, UNSPECIFIED HEART FAILURE TYPE: Primary | ICD-10-CM

## 2025-03-01 DIAGNOSIS — Z86.0100 HISTORY OF COLONIC POLYPS: ICD-10-CM

## 2025-03-01 DIAGNOSIS — R09.02 HYPOXIA: ICD-10-CM

## 2025-03-01 DIAGNOSIS — R63.8 DECREASED ORAL INTAKE: ICD-10-CM

## 2025-03-01 DIAGNOSIS — K44.9 HIATAL HERNIA: ICD-10-CM

## 2025-03-01 DIAGNOSIS — R41.82 ALTERED MENTAL STATUS, UNSPECIFIED ALTERED MENTAL STATUS TYPE: ICD-10-CM

## 2025-03-01 DIAGNOSIS — R03.1 LOW BLOOD PRESSURE READING: ICD-10-CM

## 2025-03-01 DIAGNOSIS — R53.1 GENERALIZED WEAKNESS: ICD-10-CM

## 2025-03-01 DIAGNOSIS — Z74.09 IMPAIRED MOBILITY: ICD-10-CM

## 2025-03-01 DIAGNOSIS — R79.89 ELEVATED BRAIN NATRIURETIC PEPTIDE (BNP) LEVEL: ICD-10-CM

## 2025-03-01 DIAGNOSIS — Z86.16 HISTORY OF COVID-19: ICD-10-CM

## 2025-03-01 DIAGNOSIS — R79.89 ELEVATED TROPONIN: ICD-10-CM

## 2025-03-01 DIAGNOSIS — N17.9 ACUTE KIDNEY INJURY: ICD-10-CM

## 2025-03-01 DIAGNOSIS — R13.11 ORAL PHASE DYSPHAGIA: ICD-10-CM

## 2025-03-01 DIAGNOSIS — Z90.49 HISTORY OF LAPAROSCOPIC CHOLECYSTECTOMY: ICD-10-CM

## 2025-03-01 DIAGNOSIS — I44.0 1ST DEGREE AV BLOCK: ICD-10-CM

## 2025-03-01 DIAGNOSIS — M79.2 NEUROPATHIC PAIN: ICD-10-CM

## 2025-03-01 PROBLEM — U07.1 COVID-19 VIRUS INFECTION: Status: ACTIVE | Noted: 2025-03-01

## 2025-03-01 PROBLEM — R53.81 DEBILITY: Status: ACTIVE | Noted: 2025-03-01

## 2025-03-01 LAB
ALBUMIN SERPL-MCNC: 3.8 G/DL (ref 3.5–5.2)
ALBUMIN/GLOB SERPL: 1.4 G/DL
ALP SERPL-CCNC: 75 U/L (ref 39–117)
ALT SERPL W P-5'-P-CCNC: 9 U/L (ref 1–41)
ANION GAP SERPL CALCULATED.3IONS-SCNC: 14 MMOL/L (ref 5–15)
AST SERPL-CCNC: 21 U/L (ref 1–40)
BACTERIA UR QL AUTO: NORMAL /HPF
BASOPHILS # BLD AUTO: 0.05 10*3/MM3 (ref 0–0.2)
BASOPHILS NFR BLD AUTO: 0.6 % (ref 0–1.5)
BILIRUB SERPL-MCNC: 0.4 MG/DL (ref 0–1.2)
BILIRUB UR QL STRIP: ABNORMAL
BUN SERPL-MCNC: 21 MG/DL (ref 8–23)
BUN/CREAT SERPL: 10.2 (ref 7–25)
CALCIUM SPEC-SCNC: 8.9 MG/DL (ref 8.6–10.5)
CHLORIDE SERPL-SCNC: 103 MMOL/L (ref 98–107)
CLARITY UR: CLEAR
CO2 SERPL-SCNC: 23 MMOL/L (ref 22–29)
COLOR UR: ABNORMAL
CREAT SERPL-MCNC: 2.06 MG/DL (ref 0.76–1.27)
DEPRECATED RDW RBC AUTO: 46.1 FL (ref 37–54)
EGFRCR SERPLBLD CKD-EPI 2021: 30.4 ML/MIN/1.73
EOSINOPHIL # BLD AUTO: 0.3 10*3/MM3 (ref 0–0.4)
EOSINOPHIL NFR BLD AUTO: 3.5 % (ref 0.3–6.2)
ERYTHROCYTE [DISTWIDTH] IN BLOOD BY AUTOMATED COUNT: 12.8 % (ref 12.3–15.4)
FLUAV RNA RESP QL NAA+PROBE: NOT DETECTED
FLUBV RNA RESP QL NAA+PROBE: NOT DETECTED
GEN 5 1HR TROPONIN T REFLEX: 80 NG/L
GLOBULIN UR ELPH-MCNC: 2.7 GM/DL
GLUCOSE SERPL-MCNC: 154 MG/DL (ref 65–99)
GLUCOSE UR STRIP-MCNC: NEGATIVE MG/DL
HCT VFR BLD AUTO: 41.7 % (ref 37.5–51)
HGB BLD-MCNC: 13.1 G/DL (ref 13–17.7)
HGB UR QL STRIP.AUTO: NEGATIVE
HOLD SPECIMEN: NORMAL
HYALINE CASTS UR QL AUTO: NORMAL /LPF
IMM GRANULOCYTES # BLD AUTO: 0.03 10*3/MM3 (ref 0–0.05)
IMM GRANULOCYTES NFR BLD AUTO: 0.3 % (ref 0–0.5)
KETONES UR QL STRIP: ABNORMAL
LEUKOCYTE ESTERASE UR QL STRIP.AUTO: ABNORMAL
LIPASE SERPL-CCNC: 66 U/L (ref 13–60)
LYMPHOCYTES # BLD AUTO: 2.29 10*3/MM3 (ref 0.7–3.1)
LYMPHOCYTES NFR BLD AUTO: 26.5 % (ref 19.6–45.3)
MAGNESIUM SERPL-MCNC: 1.7 MG/DL (ref 1.6–2.4)
MAGNESIUM SERPL-MCNC: 1.7 MG/DL (ref 1.6–2.4)
MCH RBC QN AUTO: 30.7 PG (ref 26.6–33)
MCHC RBC AUTO-ENTMCNC: 31.4 G/DL (ref 31.5–35.7)
MCV RBC AUTO: 97.7 FL (ref 79–97)
MONOCYTES # BLD AUTO: 0.53 10*3/MM3 (ref 0.1–0.9)
MONOCYTES NFR BLD AUTO: 6.1 % (ref 5–12)
NEUTROPHILS NFR BLD AUTO: 5.43 10*3/MM3 (ref 1.7–7)
NEUTROPHILS NFR BLD AUTO: 63 % (ref 42.7–76)
NITRITE UR QL STRIP: NEGATIVE
NRBC BLD AUTO-RTO: 0 /100 WBC (ref 0–0.2)
NT-PROBNP SERPL-MCNC: 5982 PG/ML (ref 0–1800)
PH UR STRIP.AUTO: <=5 [PH] (ref 5–8)
PLATELET # BLD AUTO: 197 10*3/MM3 (ref 140–450)
PMV BLD AUTO: 9.9 FL (ref 6–12)
POTASSIUM SERPL-SCNC: 4 MMOL/L (ref 3.5–5.2)
PROT SERPL-MCNC: 6.5 G/DL (ref 6–8.5)
PROT UR QL STRIP: ABNORMAL
RBC # BLD AUTO: 4.27 10*6/MM3 (ref 4.14–5.8)
RBC # UR STRIP: NORMAL /HPF
REF LAB TEST METHOD: NORMAL
SARS-COV-2 RNA RESP QL NAA+PROBE: DETECTED
SODIUM SERPL-SCNC: 140 MMOL/L (ref 136–145)
SP GR UR STRIP: >1.03 (ref 1–1.03)
SQUAMOUS #/AREA URNS HPF: NORMAL /HPF
TROPONIN T % DELTA: -4
TROPONIN T NUMERIC DELTA: -3 NG/L
TROPONIN T SERPL HS-MCNC: 83 NG/L
UROBILINOGEN UR QL STRIP: ABNORMAL
WBC # UR STRIP: NORMAL /HPF
WBC NRBC COR # BLD AUTO: 8.63 10*3/MM3 (ref 3.4–10.8)
WHOLE BLOOD HOLD COAG: NORMAL
WHOLE BLOOD HOLD SPECIMEN: NORMAL

## 2025-03-01 PROCEDURE — 84484 ASSAY OF TROPONIN QUANT: CPT | Performed by: STUDENT IN AN ORGANIZED HEALTH CARE EDUCATION/TRAINING PROGRAM

## 2025-03-01 PROCEDURE — 70450 CT HEAD/BRAIN W/O DYE: CPT

## 2025-03-01 PROCEDURE — 93005 ELECTROCARDIOGRAM TRACING: CPT | Performed by: STUDENT IN AN ORGANIZED HEALTH CARE EDUCATION/TRAINING PROGRAM

## 2025-03-01 PROCEDURE — 85025 COMPLETE CBC W/AUTO DIFF WBC: CPT | Performed by: STUDENT IN AN ORGANIZED HEALTH CARE EDUCATION/TRAINING PROGRAM

## 2025-03-01 PROCEDURE — 93010 ELECTROCARDIOGRAM REPORT: CPT | Performed by: INTERNAL MEDICINE

## 2025-03-01 PROCEDURE — 83735 ASSAY OF MAGNESIUM: CPT | Performed by: STUDENT IN AN ORGANIZED HEALTH CARE EDUCATION/TRAINING PROGRAM

## 2025-03-01 PROCEDURE — 84439 ASSAY OF FREE THYROXINE: CPT | Performed by: FAMILY MEDICINE

## 2025-03-01 PROCEDURE — 83735 ASSAY OF MAGNESIUM: CPT | Performed by: FAMILY MEDICINE

## 2025-03-01 PROCEDURE — 99285 EMERGENCY DEPT VISIT HI MDM: CPT

## 2025-03-01 PROCEDURE — 84100 ASSAY OF PHOSPHORUS: CPT | Performed by: FAMILY MEDICINE

## 2025-03-01 PROCEDURE — 84443 ASSAY THYROID STIM HORMONE: CPT | Performed by: FAMILY MEDICINE

## 2025-03-01 PROCEDURE — 81001 URINALYSIS AUTO W/SCOPE: CPT | Performed by: STUDENT IN AN ORGANIZED HEALTH CARE EDUCATION/TRAINING PROGRAM

## 2025-03-01 PROCEDURE — 83880 ASSAY OF NATRIURETIC PEPTIDE: CPT | Performed by: STUDENT IN AN ORGANIZED HEALTH CARE EDUCATION/TRAINING PROGRAM

## 2025-03-01 PROCEDURE — 71045 X-RAY EXAM CHEST 1 VIEW: CPT

## 2025-03-01 PROCEDURE — 83690 ASSAY OF LIPASE: CPT | Performed by: STUDENT IN AN ORGANIZED HEALTH CARE EDUCATION/TRAINING PROGRAM

## 2025-03-01 PROCEDURE — 25510000001 IOPAMIDOL PER 1 ML: Performed by: STUDENT IN AN ORGANIZED HEALTH CARE EDUCATION/TRAINING PROGRAM

## 2025-03-01 PROCEDURE — 25810000003 LACTATED RINGERS SOLUTION: Performed by: STUDENT IN AN ORGANIZED HEALTH CARE EDUCATION/TRAINING PROGRAM

## 2025-03-01 PROCEDURE — 71275 CT ANGIOGRAPHY CHEST: CPT

## 2025-03-01 PROCEDURE — 36415 COLL VENOUS BLD VENIPUNCTURE: CPT

## 2025-03-01 PROCEDURE — 80053 COMPREHEN METABOLIC PANEL: CPT | Performed by: STUDENT IN AN ORGANIZED HEALTH CARE EDUCATION/TRAINING PROGRAM

## 2025-03-01 PROCEDURE — 87636 SARSCOV2 & INF A&B AMP PRB: CPT | Performed by: STUDENT IN AN ORGANIZED HEALTH CARE EDUCATION/TRAINING PROGRAM

## 2025-03-01 PROCEDURE — 25010000002 FUROSEMIDE PER 20 MG: Performed by: STUDENT IN AN ORGANIZED HEALTH CARE EDUCATION/TRAINING PROGRAM

## 2025-03-01 RX ORDER — GABAPENTIN 100 MG/1
100 CAPSULE ORAL 4 TIMES DAILY
Status: DISCONTINUED | OUTPATIENT
Start: 2025-03-02 | End: 2025-03-04 | Stop reason: HOSPADM

## 2025-03-01 RX ORDER — SODIUM CHLORIDE 9 MG/ML
40 INJECTION, SOLUTION INTRAVENOUS AS NEEDED
Status: DISCONTINUED | OUTPATIENT
Start: 2025-03-01 | End: 2025-03-04 | Stop reason: HOSPADM

## 2025-03-01 RX ORDER — ACETAMINOPHEN 325 MG/1
650 TABLET ORAL EVERY 4 HOURS PRN
Status: DISCONTINUED | OUTPATIENT
Start: 2025-03-01 | End: 2025-03-04 | Stop reason: HOSPADM

## 2025-03-01 RX ORDER — NITROGLYCERIN 0.4 MG/1
0.4 TABLET SUBLINGUAL
Status: DISCONTINUED | OUTPATIENT
Start: 2025-03-01 | End: 2025-03-04 | Stop reason: HOSPADM

## 2025-03-01 RX ORDER — ENOXAPARIN SODIUM 100 MG/ML
30 INJECTION SUBCUTANEOUS DAILY
Status: DISCONTINUED | OUTPATIENT
Start: 2025-03-02 | End: 2025-03-01

## 2025-03-01 RX ORDER — AMOXICILLIN 250 MG
2 CAPSULE ORAL 2 TIMES DAILY PRN
Status: DISCONTINUED | OUTPATIENT
Start: 2025-03-01 | End: 2025-03-04 | Stop reason: HOSPADM

## 2025-03-01 RX ORDER — SODIUM CHLORIDE 0.9 % (FLUSH) 0.9 %
10 SYRINGE (ML) INJECTION AS NEEDED
Status: DISCONTINUED | OUTPATIENT
Start: 2025-03-01 | End: 2025-03-04 | Stop reason: HOSPADM

## 2025-03-01 RX ORDER — ACETAMINOPHEN 160 MG/5ML
650 SOLUTION ORAL EVERY 4 HOURS PRN
Status: DISCONTINUED | OUTPATIENT
Start: 2025-03-01 | End: 2025-03-04 | Stop reason: HOSPADM

## 2025-03-01 RX ORDER — IOPAMIDOL 755 MG/ML
100 INJECTION, SOLUTION INTRAVASCULAR
Status: COMPLETED | OUTPATIENT
Start: 2025-03-01 | End: 2025-03-01

## 2025-03-01 RX ORDER — OXYCODONE AND ACETAMINOPHEN 10; 325 MG/1; MG/1
1 TABLET ORAL EVERY 6 HOURS PRN
Status: DISCONTINUED | OUTPATIENT
Start: 2025-03-01 | End: 2025-03-04 | Stop reason: HOSPADM

## 2025-03-01 RX ORDER — BISACODYL 10 MG
10 SUPPOSITORY, RECTAL RECTAL DAILY PRN
Status: DISCONTINUED | OUTPATIENT
Start: 2025-03-01 | End: 2025-03-04 | Stop reason: HOSPADM

## 2025-03-01 RX ORDER — ENOXAPARIN SODIUM 100 MG/ML
1 INJECTION SUBCUTANEOUS DAILY
Status: DISCONTINUED | OUTPATIENT
Start: 2025-03-02 | End: 2025-03-03

## 2025-03-01 RX ORDER — BISACODYL 5 MG/1
5 TABLET, DELAYED RELEASE ORAL DAILY PRN
Status: DISCONTINUED | OUTPATIENT
Start: 2025-03-01 | End: 2025-03-04 | Stop reason: HOSPADM

## 2025-03-01 RX ORDER — SODIUM CHLORIDE 0.9 % (FLUSH) 0.9 %
10 SYRINGE (ML) INJECTION EVERY 12 HOURS SCHEDULED
Status: DISCONTINUED | OUTPATIENT
Start: 2025-03-02 | End: 2025-03-04 | Stop reason: HOSPADM

## 2025-03-01 RX ORDER — ACETAMINOPHEN 650 MG/1
650 SUPPOSITORY RECTAL EVERY 4 HOURS PRN
Status: DISCONTINUED | OUTPATIENT
Start: 2025-03-01 | End: 2025-03-04 | Stop reason: HOSPADM

## 2025-03-01 RX ORDER — FUROSEMIDE 10 MG/ML
40 INJECTION INTRAMUSCULAR; INTRAVENOUS ONCE
Status: COMPLETED | OUTPATIENT
Start: 2025-03-01 | End: 2025-03-01

## 2025-03-01 RX ORDER — SODIUM CHLORIDE 9 MG/ML
50 INJECTION, SOLUTION INTRAVENOUS CONTINUOUS
Status: DISCONTINUED | OUTPATIENT
Start: 2025-03-02 | End: 2025-03-03

## 2025-03-01 RX ORDER — POLYETHYLENE GLYCOL 3350 17 G/17G
17 POWDER, FOR SOLUTION ORAL DAILY PRN
Status: DISCONTINUED | OUTPATIENT
Start: 2025-03-01 | End: 2025-03-04 | Stop reason: HOSPADM

## 2025-03-01 RX ADMIN — SODIUM CHLORIDE, SODIUM LACTATE, POTASSIUM CHLORIDE, CALCIUM CHLORIDE 1000 ML: 20; 30; 600; 310 INJECTION, SOLUTION INTRAVENOUS at 19:23

## 2025-03-01 RX ADMIN — IOPAMIDOL 100 ML: 755 INJECTION, SOLUTION INTRAVENOUS at 20:57

## 2025-03-01 RX ADMIN — FUROSEMIDE 40 MG: 10 INJECTION, SOLUTION INTRAMUSCULAR; INTRAVENOUS at 20:37

## 2025-03-02 ENCOUNTER — APPOINTMENT (OUTPATIENT)
Dept: CARDIOLOGY | Facility: HOSPITAL | Age: 89
End: 2025-03-02
Payer: MEDICARE

## 2025-03-02 LAB
ANION GAP SERPL CALCULATED.3IONS-SCNC: 10 MMOL/L (ref 5–15)
BASOPHILS # BLD AUTO: 0.04 10*3/MM3 (ref 0–0.2)
BASOPHILS NFR BLD AUTO: 0.4 % (ref 0–1.5)
BUN SERPL-MCNC: 22 MG/DL (ref 8–23)
BUN/CREAT SERPL: 12.3 (ref 7–25)
CALCIUM SPEC-SCNC: 8.4 MG/DL (ref 8.6–10.5)
CHLORIDE SERPL-SCNC: 103 MMOL/L (ref 98–107)
CO2 SERPL-SCNC: 24 MMOL/L (ref 22–29)
CREAT SERPL-MCNC: 1.79 MG/DL (ref 0.76–1.27)
D DIMER PPP FEU-MCNC: 2.32 MCGFEU/ML (ref 0–0.88)
DEPRECATED RDW RBC AUTO: 45.2 FL (ref 37–54)
EGFRCR SERPLBLD CKD-EPI 2021: 36 ML/MIN/1.73
EOSINOPHIL # BLD AUTO: 0.42 10*3/MM3 (ref 0–0.4)
EOSINOPHIL NFR BLD AUTO: 4.7 % (ref 0.3–6.2)
ERYTHROCYTE [DISTWIDTH] IN BLOOD BY AUTOMATED COUNT: 12.8 % (ref 12.3–15.4)
GLUCOSE SERPL-MCNC: 92 MG/DL (ref 65–99)
HCT VFR BLD AUTO: 38.4 % (ref 37.5–51)
HGB BLD-MCNC: 12.6 G/DL (ref 13–17.7)
IMM GRANULOCYTES # BLD AUTO: 0.03 10*3/MM3 (ref 0–0.05)
IMM GRANULOCYTES NFR BLD AUTO: 0.3 % (ref 0–0.5)
LYMPHOCYTES # BLD AUTO: 3.06 10*3/MM3 (ref 0.7–3.1)
LYMPHOCYTES NFR BLD AUTO: 34 % (ref 19.6–45.3)
MCH RBC QN AUTO: 31.4 PG (ref 26.6–33)
MCHC RBC AUTO-ENTMCNC: 32.8 G/DL (ref 31.5–35.7)
MCV RBC AUTO: 95.8 FL (ref 79–97)
MONOCYTES # BLD AUTO: 0.49 10*3/MM3 (ref 0.1–0.9)
MONOCYTES NFR BLD AUTO: 5.5 % (ref 5–12)
NEUTROPHILS NFR BLD AUTO: 4.95 10*3/MM3 (ref 1.7–7)
NEUTROPHILS NFR BLD AUTO: 55.1 % (ref 42.7–76)
NRBC BLD AUTO-RTO: 0 /100 WBC (ref 0–0.2)
PHOSPHATE SERPL-MCNC: 3.8 MG/DL (ref 2.5–4.5)
PLATELET # BLD AUTO: 177 10*3/MM3 (ref 140–450)
PMV BLD AUTO: 9.3 FL (ref 6–12)
POTASSIUM SERPL-SCNC: 3.5 MMOL/L (ref 3.5–5.2)
QT INTERVAL: 462 MS
QTC INTERVAL: 462 MS
RBC # BLD AUTO: 4.01 10*6/MM3 (ref 4.14–5.8)
SODIUM SERPL-SCNC: 137 MMOL/L (ref 136–145)
T4 FREE SERPL-MCNC: 1.45 NG/DL (ref 0.93–1.7)
TSH SERPL DL<=0.05 MIU/L-ACNC: 2.08 UIU/ML (ref 0.27–4.2)
WBC NRBC COR # BLD AUTO: 8.99 10*3/MM3 (ref 3.4–10.8)

## 2025-03-02 PROCEDURE — 85379 FIBRIN DEGRADATION QUANT: CPT | Performed by: FAMILY MEDICINE

## 2025-03-02 PROCEDURE — 97165 OT EVAL LOW COMPLEX 30 MIN: CPT | Performed by: OCCUPATIONAL THERAPIST

## 2025-03-02 PROCEDURE — 80048 BASIC METABOLIC PNL TOTAL CA: CPT | Performed by: FAMILY MEDICINE

## 2025-03-02 PROCEDURE — 25810000003 SODIUM CHLORIDE 0.9 % SOLUTION: Performed by: FAMILY MEDICINE

## 2025-03-02 PROCEDURE — 85025 COMPLETE CBC W/AUTO DIFF WBC: CPT | Performed by: FAMILY MEDICINE

## 2025-03-02 PROCEDURE — 92610 EVALUATE SWALLOWING FUNCTION: CPT

## 2025-03-02 PROCEDURE — 25010000002 ENOXAPARIN PER 10 MG: Performed by: FAMILY MEDICINE

## 2025-03-02 RX ORDER — NIRMATRELVIR AND RITONAVIR 150-100 MG
3 KIT ORAL 2 TIMES DAILY
COMMUNITY
End: 2025-03-04 | Stop reason: HOSPADM

## 2025-03-02 RX ORDER — GABAPENTIN 300 MG/1
300 CAPSULE ORAL 4 TIMES DAILY
COMMUNITY
End: 2025-03-04 | Stop reason: HOSPADM

## 2025-03-02 RX ORDER — OXYCODONE AND ACETAMINOPHEN 10; 325 MG/1; MG/1
1 TABLET ORAL EVERY 6 HOURS PRN
COMMUNITY

## 2025-03-02 RX ADMIN — ENOXAPARIN SODIUM 80 MG: 80 INJECTION SUBCUTANEOUS at 00:09

## 2025-03-02 RX ADMIN — GABAPENTIN 100 MG: 100 CAPSULE ORAL at 12:16

## 2025-03-02 RX ADMIN — GABAPENTIN 100 MG: 100 CAPSULE ORAL at 08:38

## 2025-03-02 RX ADMIN — Medication 10 ML: at 00:06

## 2025-03-02 RX ADMIN — OXYCODONE AND ACETAMINOPHEN 1 TABLET: 325; 10 TABLET ORAL at 08:38

## 2025-03-02 RX ADMIN — GABAPENTIN 100 MG: 100 CAPSULE ORAL at 20:32

## 2025-03-02 RX ADMIN — GABAPENTIN 100 MG: 100 CAPSULE ORAL at 17:43

## 2025-03-02 RX ADMIN — GABAPENTIN 100 MG: 100 CAPSULE ORAL at 00:06

## 2025-03-02 RX ADMIN — SODIUM CHLORIDE 50 ML/HR: 9 INJECTION, SOLUTION INTRAVENOUS at 00:06

## 2025-03-02 NOTE — H&P
[facility] Medicine Services  HISTORY AND PHYSICAL    Date of Admission: 3/1/2025  Primary Care Physician: Otilio Ramos MD    Subjective   Primary Historian: Patient    Chief Complaint: Weakness    History of Present Illness  89-year-old male with past medical history hypertension, GERD, cholecystectomy a month ago, COVID for 1 week that presented to the emergency room with complaints of weakness.  He had been getting weaker throughout the week and oral intake has been poor.  Tonight he was so debilitated that he fell and he could not get up from the ground.  He was brought to the emergency room for evaluation.  On presentation blood pressures 9780, heart rate 67, respiratory rate 18 and oxygen saturation is 94% on room air.  He received a liter of lactated ringer bolus in the ER with improvement in blood pressure but he was noted that his oxygen dropped at that point, he was given Lasix after that and placed on oxygen.  Blood work showed a creatinine of 2.06, BNP of 5982, troponin 83 and 80.  Urinalysis shows concentrated urine with ketones.  COVID test is still positive.     During recent hospitalization EKG on 1/28/2025 showed A-fib with a ventricular rate of 73 bpm.  Tonight's EKG shows sinus rhythm with first-degree AV block ND of 266 ms ventricular rate 60 bpm.    Review of Systems   Otherwise complete ROS reviewed and negative except as mentioned in the HPI.    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     Past Surgical History:  Past Surgical History:   Procedure Laterality Date    BACK SURGERY      cervical    CATARACT EXTRACTION Bilateral     CHOLECYSTECTOMY WITH INTRAOPERATIVE CHOLANGIOGRAM N/A 1/27/2025     Procedure: LAPAROSCOPIC CHOLECYSTECTOMY INTRAOPERATIVE CHOLANGIOGRAM WITH DAVINCI ROBOT;  Surgeon: Ronak Roche MD;  Location: [facility] OR;  Service: Robotics - DaVinci;  Laterality: N/A;    COLONOSCOPY  02/11/2013    Hyperplastic polyp at 25 cm, Diverticulosis repeat exam in 5 years    COLONOSCOPY N/A 10/16/2018    Tubular adenoma ascending colon repet prn    ENDOSCOPY  01/15/2014    HH    ENDOSCOPY N/A 10/03/2019    Large HH, non-bleeding gastric ulcer    ENDOSCOPY N/A 11/25/2019    Procedure: ESOPHAGOGASTRODUODENOSCOPY WITH ANESTHESIA;  Surgeon: Marques Pérez MD;  Location: [facility] ENDOSCOPY;  Service: Gastroenterology    ENDOSCOPY N/A 2/9/2023    Procedure: ESOPHAGOGASTRODUODENOSCOPY WITH ANESTHESIA;  Surgeon: Sushant Edmond MD;  Location: [facility] ENDOSCOPY;  Service: Gastroenterology;  Laterality: N/A;  pre GI bleed  post hiatal hernia; esophagitis  Otilio Ramos MD    HERNIA REPAIR Right     INGUINAL     HIP FRACTURE SURGERY Right 02/04/2023    REPLACEMENT TOTAL KNEE BILATERAL      THORACIC LAMINECTOMY DECOMPRESSIVE POSTERIOR N/A 12/14/2016    Procedure: LAMINECTOMY DECOMPRESSION, UNINSTRUMENTED POSTERIOR SPINAL FUSION, T 11-12;  Surgeon: LUZ Adan MD;  Location: [facility] OR;  Service:     VENA CAVA FILTER INSERTION N/A 10/04/2019    Procedure: VENA CAVA FILTER INSERTION;  Surgeon: Dallin Coronado MD;  Location: [facility] HYBRID OR 12;  Service: Vascular     Social History:  reports that he has never smoked. He has never been exposed to tobacco smoke. He has never used smokeless tobacco. He reports that he does not drink alcohol and does not use drugs.    Family History: family history includes Colon cancer in his father.       Allergies:  Allergies   Allergen Reactions    Meloxicam Unknown - Low Severity       Medications:  Prior to Admission medications    Medication Sig Start Date End Date Taking? Authorizing Provider   gabapentin (NEURONTIN) 300 MG capsule Take 1 capsule by mouth Every  "Night. 1/30/25   Parth Byrd MD   lisinopril (PRINIVIL,ZESTRIL) 40 MG tablet Take 0.5 tablets by mouth Daily. 1/30/25   Parth Byrd MD   metoprolol tartrate (LOPRESSOR) 25 MG tablet Take 0.5 tablets by mouth Every 12 (Twelve) Hours. 1/30/25   Parth Byrd MD   oxyCODONE-acetaminophen (PERCOCET)  MG per tablet Take 0.5 tablets by mouth Every 6 (Six) Hours As Needed for Moderate Pain. 1/30/25   Parth Byrd MD     I have utilized all available immediate resources to obtain, update, or review the patient's current medications (including all prescriptions, over-the-counter products, herbals, cannabis/cannabidiol products, and vitamin/mineral/dietary (nutritional) supplements).    Objective     Vital Signs: /52   Pulse 56   Temp 97.7 °F (36.5 °C) (Oral)   Resp 18   Ht 182.9 cm (72\")   Wt 83 kg (183 lb)   SpO2 98%   BMI 24.82 kg/m²   Physical Exam  Vitals reviewed.   Constitutional:       General: He is not in acute distress.     Appearance: He is ill-appearing. He is not toxic-appearing.   HENT:      Head: Normocephalic and atraumatic.      Mouth/Throat:      Mouth: Mucous membranes are moist.      Pharynx: Oropharynx is clear.   Eyes:      Extraocular Movements: Extraocular movements intact.      Conjunctiva/sclera: Conjunctivae normal.      Pupils: Pupils are equal, round, and reactive to light.   Cardiovascular:      Rate and Rhythm: Regular rhythm. Bradycardia present.      Pulses: Normal pulses.      Heart sounds: No murmur heard.  Pulmonary:      Effort: Pulmonary effort is normal. No respiratory distress.      Breath sounds: Normal breath sounds. No wheezing or rhonchi.   Abdominal:      General: Bowel sounds are normal. There is no distension.      Palpations: Abdomen is soft.      Tenderness: There is no abdominal tenderness.   Musculoskeletal:         General: No swelling or tenderness. Normal range of motion.      Cervical back: Normal range of motion and neck supple. No " muscular tenderness.      Right lower leg: No edema.      Left lower leg: No edema.   Skin:     General: Skin is warm and dry.      Findings: No erythema or rash.   Neurological:      General: No focal deficit present.      Mental Status: He is alert and oriented to person, place, and time.      Cranial Nerves: No cranial nerve deficit.      Sensory: No sensory deficit.      Motor: Weakness present.   Psychiatric:         Mood and Affect: Mood normal.         Behavior: Behavior normal.     Results Reviewed:  Lab Results (last 24 hours)       Procedure Component Value Units Date/Time    High Sensitivity Troponin T 1Hr [[id number]]  (Abnormal) Collected: 03/01/25 2029    Specimen: Blood Updated: 03/01/25 2106     HS Troponin T 80 ng/L      Troponin T Numeric Delta -3 ng/L      Troponin T % Delta -4    Narrative:      High Sensitive Troponin T Reference Range:  <14.0 ng/L- Negative Female for AMI  <22.0 ng/L- Negative Male for AMI  >=14 - Abnormal Female indicating possible myocardial injury.  >=22 - Abnormal Male indicating possible myocardial injury.   Clinicians would have to utilize clinical acumen, EKG, Troponin, and serial changes to determine if it is an Acute Myocardial Infarction or myocardial injury due to an underlying chronic condition.         Urinalysis With Culture If Indicated - Urine, Clean Catch [[id number]]  (Abnormal) Collected: 03/1936    Specimen: Urine, Clean Catch Updated: 03/01/25 2009     Color, UA Dark Yellow     Appearance, UA Clear     pH, UA <=5.0     Specific Gravity, UA >1.030     Glucose, UA Negative     Ketones, UA 15 mg/dL (1+)     Bilirubin, UA Small (1+)     Blood, UA Negative     Protein, UA >=300 mg/dL (3+)     Leuk Esterase, UA Trace     Nitrite, UA Negative     Urobilinogen, UA 1.0 E.U./dL    Narrative:      In absence of clinical symptoms, the presence of pyuria, bacteria, and/or nitrites on the urinalysis result does not correlate with infection.    Urinalysis, Microscopic  Only - Urine, Clean Catch [[id number]] Collected: 03/1936    Specimen: Urine, Clean Catch Updated: 03/01/25 2009     RBC, UA 0-2 /HPF      WBC, UA 0-2 /HPF      Comment: Urine culture not indicated.        Bacteria, UA None Seen /HPF      Squamous Epithelial Cells, UA 0-2 /HPF      Hyaline Casts, UA 21-30 /LPF      Methodology Manual Light Microscopy    COVID-19 and FLU A/B PCR, 1 HR TAT - Swab, Nasopharynx [[id number]]  (Abnormal) Collected: 03/01/25 1927    Specimen: Swab from Nasopharynx Updated: 03/01/25 2008     COVID19 Detected     Influenza A PCR Not Detected     Influenza B PCR Not Detected    Narrative:      Fact sheet for providers: https://www.fda.gov/media/342206/download    Fact sheet for patients: https://www.fda.gov/media/685609/download    Test performed by PCR.  Influenza A and Influenza B negative results should be considered presumptive in samples that have a positive SARS-CoV-2 result.    Competitive inhibition studies showed that SARS-CoV-2 virus, when present at concentrations above 3.6E+04 copies/mL, can inhibit the detection and amplification of influenza A and influenza B virus RNA if present at or below 1.8E+02 copies/mL or 4.9E+02 copies/mL, respectively, and may lead to false negative influenza virus results. If co-infection with influenza A or influenza B virus is suspected in samples with a positive SARS-CoV-2 result, the sample should be re-tested with another FDA cleared, approved, or authorized influenza test, if influenza virus detection would change clinical management.    Comprehensive Metabolic Panel [[id number]]  (Abnormal) Collected: 03/01/25 1926    Specimen: Blood Updated: 03/01/25 2002     Glucose 154 mg/dL      BUN 21 mg/dL      Creatinine 2.06 mg/dL      Sodium 140 mmol/L      Potassium 4.0 mmol/L      Comment: Slight hemolysis detected by analyzer. Result may be falsely elevated.        Chloride 103 mmol/L      CO2 23.0 mmol/L      Calcium 8.9 mg/dL      Total Protein  6.5 g/dL      Albumin 3.8 g/dL      ALT (SGPT) 9 U/L      AST (SGOT) 21 U/L      Comment: Slight hemolysis detected by analyzer. Result may be falsely elevated.        Alkaline Phosphatase 75 U/L      Total Bilirubin 0.4 mg/dL      Globulin 2.7 gm/dL      A/G Ratio 1.4 g/dL      BUN/Creatinine Ratio 10.2     Anion Gap 14.0 mmol/L      eGFR 30.4 mL/min/1.73     Narrative:      GFR Categories in Chronic Kidney Disease (CKD)      GFR Category          GFR (mL/min/1.73)    Interpretation  G1                     90 or greater         Normal or high (1)  G2                      60-89                Mild decrease (1)  G3a                   45-59                Mild to moderate decrease  G3b                   30-44                Moderate to severe decrease  G4                    15-29                Severe decrease  G5                    14 or less           Kidney failure          (1)In the absence of evidence of kidney disease, neither GFR category G1 or G2 fulfill the criteria for CKD.    eGFR calculation 2021 CKD-EPI creatinine equation, which does not include race as a factor    High Sensitivity Troponin T [[id number]]  (Abnormal) Collected: 03/01/25 1926    Specimen: Blood Updated: 03/01/25 2000     HS Troponin T 83 ng/L     Narrative:      High Sensitive Troponin T Reference Range:  <14.0 ng/L- Negative Female for AMI  <22.0 ng/L- Negative Male for AMI  >=14 - Abnormal Female indicating possible myocardial injury.  >=22 - Abnormal Male indicating possible myocardial injury.   Clinicians would have to utilize clinical acumen, EKG, Troponin, and serial changes to determine if it is an Acute Myocardial Infarction or myocardial injury due to an underlying chronic condition.         Magnesium [[id number]]  (Normal) Collected: 03/01/25 1926    Specimen: Blood Updated: 03/01/25 2000     Magnesium 1.7 mg/dL     BNP [[id number]]  (Abnormal) Collected: 03/01/25 1926    Specimen: Blood Updated: 03/01/25 1958     proBNP 5,982.0  pg/mL     Narrative:      This assay is used as an aid in the diagnosis of individuals suspected of having heart failure. It can be used as an aid in the diagnosis of acute decompensated heart failure (ADHF) in patients presenting with signs and symptoms of ADHF to the emergency department (ED). In addition, NT-proBNP of <300 pg/mL indicates ADHF is not likely.    Age Range Result Interpretation  NT-proBNP Concentration (pg/mL:      <50             Positive            >450                   Gray                 300-450                    Negative             <300    50-75           Positive            >900                  Gray                300-900                  Negative            <300      >75             Positive            >1800                  Gray                300-1800                  Negative            <300    Lipase [[id number]]  (Abnormal) Collected: 03/01/25 1926    Specimen: Blood Updated: 03/01/25 1957     Lipase 66 U/L     [place] Urine - Urine, Clean Catch [[id number]] Collected: 03/1936    Specimen: Urine, Clean Catch Updated: 03/01/25 1946     Extra Tube Hold for add-ons.     Comment: Auto resulted.       [place] Draw [[id number]] Collected: 03/01/25 1926    Specimen: Blood Updated: 03/01/25 1946    Narrative:      The following orders were created for panel order [place] Draw.  Procedure                               Abnormality         Status                     ---------                               -----------         ------                     Green Top (Gel)[[id number]]                                  Final result               Lavender Top[[id number]]                                     Final result               Red Top[[id number]]                                          Final result               Light Blue Top[[id number]]                                   Final result                 Please view results for these tests on the individual orders.    Green Top (Gel) [[id number]]  Collected: 03/01/25 1926    Specimen: Blood Updated: 03/01/25 1946     Extra Tube Hold for add-ons.     Comment: Auto resulted.       Lavender Top [[id number]] Collected: 03/01/25 1926    Specimen: Blood Updated: 03/01/25 1946     Extra Tube hold for add-on     Comment: Auto resulted       Red Top [[id number]] Collected: 03/01/25 1926    Specimen: Blood Updated: 03/01/25 1946     Extra Tube Hold for add-ons.     Comment: Auto resulted.       Light Blue Top [[id number]] Collected: 03/01/25 1926    Specimen: Blood Updated: 03/01/25 1946     Extra Tube Hold for add-ons.     Comment: Auto resulted       CBC & Differential [[id number]]  (Abnormal) Collected: 03/01/25 1926    Specimen: Blood Updated: 03/01/25 1939    Narrative:      The following orders were created for panel order CBC & Differential.  Procedure                               Abnormality         Status                     ---------                               -----------         ------                     CBC Auto Differential[[id number]]        Abnormal            Final result                 Please view results for these tests on the individual orders.    CBC Auto Differential [[id number]]  (Abnormal) Collected: 03/01/25 1926    Specimen: Blood Updated: 03/01/25 1939     WBC 8.63 10*3/mm3      RBC 4.27 10*6/mm3      Hemoglobin 13.1 g/dL      Hematocrit 41.7 %      MCV 97.7 fL      MCH 30.7 pg      MCHC 31.4 g/dL      RDW 12.8 %      RDW-SD 46.1 fl      MPV 9.9 fL      Platelets 197 10*3/mm3      Neutrophil % 63.0 %      Lymphocyte % 26.5 %      Monocyte % 6.1 %      Eosinophil % 3.5 %      Basophil % 0.6 %      Immature Grans % 0.3 %      Neutrophils, Absolute 5.43 10*3/mm3      Lymphocytes, Absolute 2.29 10*3/mm3      Monocytes, Absolute 0.53 10*3/mm3      Eosinophils, Absolute 0.30 10*3/mm3      Basophils, Absolute 0.05 10*3/mm3      Immature Grans, Absolute 0.03 10*3/mm3      nRBC 0.0 /100 WBC           Imaging Results (Last 24 Hours)       Procedure  Component Value Units Date/Time    CT Angiogram Chest [[id number]] Collected: 03/01/25 2059     Updated: 03/01/25 2111    Narrative:      EXAMINATION: CT ANGIOGRAM CHEST-  3/1/2025 8:59 PM      HISTORY: covid, acute trop BNP elevation, eval PE; I50.9-Heart failure,  unspecified; R53.1-Weakness; Z86.0100-Personal history of colon polyps,  unspecified; R41.82-Altered mental status, unspecified;  R03.1-Nonspecific low blood-pressure reading; R63.8-Other symptoms and  signs concerning food and fluid intake; Z90.49-Acquired absence of other  specified parts of digestive tract; I44.0-Atrioventricular block     COMPARISON: 2/8/2023. 1/27/2025     DLP: 262.51 mGy.cm     In order to have a CT radiation dose as low as reasonably achievable,  Automated Exposure Control was utilized for adjustment of the mA and/or  KV according to patient size.     TECHNIQUE: Helical tomographic images of the chest were obtained after  the administration of intravenous contrast following angiogram protocol.  Additionally,  reconstructions in the coronal and sagittal planes and 3D  reconstructions were provided.       FINDINGS:    BASE OF THE NECK: The imaged portion of the base of the neck appears  unremarkable.     PULMONARY ARTERIES: There is adequate enhancement of the pulmonary  arteries to evaluate for central and segmental pulmonary emboli. There  are no filling defects within the main, lobar, segmental or visualized  subsegmental pulmonary arteries. The pulmonary vessels are within normal  limits.     LUNGS: Some scarring at the lateral RIGHT lung apex. No suspicious  airspace consolidation in the RIGHT lung. Some dependent atelectasis at  the RIGHT lung base. Atelectasis in the LEFT lower lobe adjacent to the  large hiatal hernia.     AIRWAY: The airways are clear.     PLEURA: No effusion or pleural mass.     VASCULATURE: There is calcified atheromatous plaque in the aorta without  aneurysmal dilation. The thoracic aorta is very  tortuous. The pulmonary  arteries are normal in caliber.     HEART: The heart is enlarged. There is no pericardial effusion. Moderate  coronary atheromatous calcification.     MEDIASTINUM AND LYMPH NODES: No suspicious hilar or mediastinal  adenopathy. Incidental granulomatous calcification..     CHEST WALL AND BONY STRUCTURES: Chest wall soft tissue structures are  unremarkable. 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 Degenerative changes throughout the  spine. No destructive bone lesion or acute fracture identified.  Multilevel bridging endplate osteophytes in the thoracic spine. Chronic  changes at the thoracolumbar junction redemonstrated. Appearance at  L1-L2 is unchanged when compared to 1/27/2025.     UPPER ABDOMEN: Atherosclerotic vascular disease in the visualized  portion of the aorta. The tip of an IVC filter is visualized. Previous  cholecystectomy. No acute findings in the visualized portion of the  upper abdomen. Very large hiatal hernia.          Impression:         1.  No evidence of pulmonary embolus or acute cardiopulmonary process.     2.  Very large hiatal hernia with adjacent compressive atelectasis in  the LEFT lower lung.     3.  Cardiomegaly and moderate atherosclerotic vascular disease in the  coronary arteries.           This report was signed and finalized on 3/1/2025 9:08 PM by Dr. Jacky Hall MD.       CT Head Without Contrast [[id number]] Collected: 03/01/25 1951     Updated: 03/01/25 1959    Narrative:      EXAMINATION: CT HEAD WO CONTRAST-  3/1/2025 7:51 PM     HISTORY: delerium, on anticoagulation, eval bleed mass; R53.1-Weakness;  Z86.0100-Personal history of colon polyps, unspecified; R41.82-Altered  mental status, unspecified; R03.1-Nonspecific low blood-pressure  reading; R63.8-Other symptoms and signs concerning food and fluid  intake; Z90.49-Acquired absence of other specified parts of digestive  tract; I44.0-Atrioventricular  block, first degree     COMPARISON: 1/25/2025     DLP: 641.66 mGy.cm     In order to have a CT radiation dose as low as reasonably achievable,  Automated Exposure Control was utilized for adjustment of the mA and/or  KV according to patient size.     TECHNIQUE: Serial axial tomographic images of the brain were obtained  without the use of intravenous contrast.  Coronal and sagittal  reformatted images were obtained reviewed as well.     FINDINGS:  Atrophy and ventriculomegaly with cavum septum pellucidum et vergae.  Low-attenuation throughout the subcortical and periventricular white  matter. No mass effect, midline shift, or hemorrhage. There is  atherosclerotic vascular disease in the V4 segment of the LEFT vertebral  artery and in the bilateral internal carotid arteries.     There is an air-fluid level in the posterior RIGHT sphenoid sinus with  some mucosal thickening suggesting mild sphenoid sinusitis. Mucosal  thickening in the RIGHT maxillary sinus and opacification of the LEFT  maxillary sinus. Mastoid air cells are clear. No acute calvarial  abnormality. Orbits and globes are preserved. Postoperative changes to  the eyes.          Impression:         1.  Moderate ventricular dilation redemonstrated with low-attenuation in  the subcortical and periventricular white matter, likely related to  chronic microvascular change. Normal pressure hydrocephalus would be  another consideration. Overall, similar to the exam from 1/25/2025.     2.  Paranasal sinus disease as discussed above.     This report was signed and finalized on 3/1/2025 7:56 PM by Dr. Jacky Hall MD.       XR Chest 1 View [[id number]] Collected: 03/01/25 1919     Updated: 03/01/25 1924    Narrative:      EXAMINATION: XR CHEST 1 VW-  3/1/2025 7:19 PM 1 view     HISTORY: generalized weakness, eval PNA; R53.1-Weakness;  Z86.0100-Personal history of colon polyps, unspecified     COMPARISON: 7/16/2024, 1/27/2025     TECHNIQUE: A single frontal view  of the chest was obtained.     FINDINGS:  Very large hiatal hernia redemonstrated with associated adjacent LEFT  basilar atelectasis. Shallow depth of inspiration. No pneumothorax or  confluent pneumonic airspace consolidation within the limits of this  exam. Ectasia of the aortic arch with atherosclerotic vascular disease  and tortuous descending thoracic aorta. High riding humeral head on the  LEFT suggesting chronic full-thickness rotator cuff tear. AC joint  arthropathy also noted on the LEFT. No definite acute osseous  abnormality. Dental amalgam noted.       Impression:         1.  Large hiatal hernia with associated LEFT basilar atelectasis.     2.  Shallow inspiration but no definite acute airspace consolidation.     3.  Redemonstration of tortuous ectatic thoracic aorta, similar to the  prior study.           This report was signed and finalized on 3/1/2025 7:20 PM by Dr. Jacky Hall MD.             I have personally reviewed and interpreted the radiology studies and ECG obtained at time of admission.     Assessment / Plan   Assessment:   Active Hospital Problems    Diagnosis     **Acute kidney injury     Elevated brain natriuretic peptide (BNP) level     Debility     COVID-19 virus infection     Chronic bilateral low back pain without sciatica     Primary hypertension      Treatment Plan  The patient will be admitted to my service here at [facility].   Admit to med floor with telemetry  Vitals every 4 hours  Diet cardiac  IV fluids normal saline 50 cc an hour  Activity up with assistance and fall precautions    COVID-19 with debility poor oral intake dehydration and ISHA  IV fluids and debility  Tylenol as needed  Onset was 7 days ago  Oxygen therapy as needed.  During my exam he was on room air and maintaining oxygen saturation of 94 to 97%.  Continue to monitor oxygen saturation with continuous pulse ox  Check TSH, T4, magnesium and phosphorus and replace as needed  PT and OT  evaluations    ISHA  Suspect prerenal due to above  IV fluids  BMP daily    Elevated BNP and elevated troponin  Echo 2D in a.m. to compare for any changes with echo from 1/28/2025  D-dimer checked from ER blood and pulse and if elevated will order  VQ scan in a.m. and Doppler lower extremities  Full dose Lovenox until ruled out    Chronic pain  Continue home pain medication    DVT prophylaxis Lovenox    Medical Decision Making  Number and Complexity of problems: 4 complex problems  Differential Diagnosis: See above    Conditions and Status        Condition is unchanged.     MDM Data  External documents reviewed: [employer] EHR  Cardiac tracing (EKG, telemetry) interpretation: Sinus rhythm first-degree AV block  Radiology interpretation: See above  Labs reviewed: See above  Any tests that were considered but not ordered: None     Decision rules/scores evaluated (example [id number], Wells, etc): Not applicable     Discussed with: Patient     Care Planning  Shared decision making: Patient  Code status and discussions: DNR per patient request    Disposition  Social Determinants of Health that impact treatment or disposition: None  Estimated length of stay is over 2 midnights.     I confirmed that the patient's advanced care plan is present, code status is documented, and a surrogate decision maker is listed in the patient's medical record.     The patient's surrogate decision maker is family, see records.     The patient was seen and examined by me on 3/1/2025 at 2215.    Electronically signed by Ariel Bella MD, 03/01/25, 22:15 CST.

## 2025-03-02 NOTE — CASE MANAGEMENT/SOCIAL WORK
Discharge Planning Assessment   Mohit     Patient Name: Jose Alfredo Kirkpatrick  MRN: [id number]  Today's Date: 3/2/2025    Admit Date: 3/1/2025    Plan: Will return to [facility]   Discharge Needs Assessment       Row Name 03/02/25 1056       Living Environment    People in Home facility resident    Name(s) of People in Home Lives at [facility]    Current Living Arrangements assisted living facility    Potentially Unsafe Housing Conditions none    In the past 12 months has the electric, gas, oil, or water company threatened to shut off services in your home? No    Primary Care Provided by self;other (see comments)    Provides Primary Care For no one, unable/limited ability to care for self    Quality of Family Relationships supportive;involved;helpful    Able to Return to Prior Arrangements yes       Resource/Environmental Concerns    Resource/Environmental Concerns none    Transportation Concerns none       Transportation Needs    In the past 12 months, has lack of transportation kept you from medical appointments or from getting medications? no    In the past 12 months, has lack of transportation kept you from meetings, work, or from getting things needed for daily living? No       Food Insecurity    Within the past 12 months, you worried that your food would run out before you got the money to buy more. Never true    Within the past 12 months, the food you bought just didn't last and you didn't have money to get more. Never true       Transition Planning    Patient/Family Anticipates Transition to other (see comments)    Transportation Anticipated family or friend will provide       Discharge Needs Assessment    Current Outpatient/Agency/Support Group --  [place] goes to [facility] 3x weekly for pt's therapy    Concerns to be Addressed denies needs/concerns at this time    Equipment Needed After Discharge none    Outpatient/Agency/Support Group Needs assisted living facility     Current Discharge Risk chronically ill                   Discharge Plan       Row Name 03/02/25 1058       Plan    Plan Will return to [facility]    Patient/Family in Agreement with Plan yes    Plan Comments Spoke with L[facility] Daughter   [phone] to assess for d/c planning. Pt will return to [facility] as before, services can be increased there to assist if needed. They are aware pt has covid and already had pt isolated there.  Pt does have [facility] that sees him there 3x weekly.  Pt has needed DME.  Pt has RX coverage/PCP. Will follow.                  Continued Care and Services - Admitted Since 3/1/2025    No active coordination exists for this encounter.          Demographic Summary    No documentation.                  Functional Status    No documentation.                  Psychosocial    No documentation.                  Abuse/Neglect    No documentation.                  Legal    No documentation.                  Substance Abuse    No documentation.                  Patient Forms    No documentation.                     MYCHAL ValverdeW

## 2025-03-02 NOTE — ED NOTES
Nursing report ED to floor  oJse Alfredo Kirkpatrick  89 y.o.  male    HPI:   Chief Complaint   Patient presents with    Fatigue       Admitting doctor:   Ariel Bella MD    Consulting provider(s):  Consults       No orders found from 1/31/2025 to 3/2/2025.             Admitting diagnosis:   The primary encounter diagnosis was Acute heart failure, unspecified heart failure type. Diagnoses of Generalized weakness, History of colonic polyps, Altered mental status, unspecified altered mental status type, Low blood pressure reading, Decreased oral intake, History of laparoscopic cholecystectomy, 1st degree AV block, History of COVID-19, Hiatal hernia, Elevated troponin, Elevated brain natriuretic peptide (BNP) level, and Hypoxia were also pertinent to this visit.    Code status:   Current Code Status       Date Active Code Status Order ID Comments User Context       Prior            Allergies:   Meloxicam    Intake and Output  No intake or output data in the 24 hours ending 03/01/25 2142    Weight:       03/01/25  1837   Weight: 83 kg (183 lb)       Most recent vitals:   Vitals:    03/01/25 2011 03/01/25 2012 03/01/25 2031 03/01/25 2131   BP:   100/52 115/63   BP Location:    Right arm   Patient Position:    Sitting   Pulse: 53 55 56 60   Resp:    16   Temp:    97.8 °F (36.6 °C)   TempSrc:    Oral   SpO2: (!) 87% 93% 98% 98%   Weight:       Height:         Oxygen Therapy: .    Active LDAs/IV Access:   Lines, Drains & Airways       Active LDAs       Name Placement date Placement time Site Days    Peripheral IV 03/01/25 1923 Left;Posterior Hand 03/01/25 1923  Hand  less than 1    Peripheral IV 03/01/25 2029 Left;Posterior Forearm 03/01/25 2029  Forearm  less than 1                    Labs (abnormal labs have a star):   Labs Reviewed   COVID-19 AND FLU A/B, NP SWAB IN TRANSPORT MEDIA 1 HR TAT - Abnormal; Notable for the following components:       Result Value    COVID19 Detected (*)     All other components within  normal limits    Narrative:     Fact sheet for providers: https://www.fda.gov/media/656930/download    Fact sheet for patients: https://www.fda.gov/media/630455/download    Test performed by PCR.  Influenza A and Influenza B negative results should be considered presumptive in samples that have a positive SARS-CoV-2 result.    Competitive inhibition studies showed that SARS-CoV-2 virus, when present at concentrations above 3.6E+04 copies/mL, can inhibit the detection and amplification of influenza A and influenza B virus RNA if present at or below 1.8E+02 copies/mL or 4.9E+02 copies/mL, respectively, and may lead to false negative influenza virus results. If co-infection with influenza A or influenza B virus is suspected in samples with a positive SARS-CoV-2 result, the sample should be re-tested with another FDA cleared, approved, or authorized influenza test, if influenza virus detection would change clinical management.   URINALYSIS W/ CULTURE IF INDICATED - Abnormal; Notable for the following components:    Color, UA Dark Yellow (*)     Specific Gravity, UA >1.030 (*)     Ketones, UA 15 mg/dL (1+) (*)     Bilirubin, UA Small (1+) (*)     Protein, UA >=300 mg/dL (3+) (*)     Leuk Esterase, UA Trace (*)     All other components within normal limits    Narrative:     In absence of clinical symptoms, the presence of pyuria, bacteria, and/or nitrites on the urinalysis result does not correlate with infection.   COMPREHENSIVE METABOLIC PANEL - Abnormal; Notable for the following components:    Glucose 154 (*)     Creatinine 2.06 (*)     eGFR 30.4 (*)     All other components within normal limits    Narrative:     GFR Categories in Chronic Kidney Disease (CKD)      GFR Category          GFR (mL/min/1.73)    Interpretation  G1                     90 or greater         Normal or high (1)  G2                      60-89                Mild decrease (1)  G3a                   45-59                Mild to moderate  decrease  G3b                   30-44                Moderate to severe decrease  G4                    15-29                Severe decrease  G5                    14 or less           Kidney failure          (1)In the absence of evidence of kidney disease, neither GFR category G1 or G2 fulfill the criteria for CKD.    eGFR calculation 2021 CKD-EPI creatinine equation, which does not include race as a factor   BNP (IN-HOUSE) - Abnormal; Notable for the following components:    proBNP 5,982.0 (*)     All other components within normal limits    Narrative:     This assay is used as an aid in the diagnosis of individuals suspected of having heart failure. It can be used as an aid in the diagnosis of acute decompensated heart failure (ADHF) in patients presenting with signs and symptoms of ADHF to the emergency department (ED). In addition, NT-proBNP of <300 pg/mL indicates ADHF is not likely.    Age Range Result Interpretation  NT-proBNP Concentration (pg/mL:      <50             Positive            >450                   Gray                 300-450                    Negative             <300    50-75           Positive            >900                  Gray                300-900                  Negative            <300      >75             Positive            >1800                  Gray                300-1800                  Negative            <300   CBC WITH AUTO DIFFERENTIAL - Abnormal; Notable for the following components:    MCV 97.7 (*)     MCHC 31.4 (*)     All other components within normal limits   TROPONIN - Abnormal; Notable for the following components:    HS Troponin T 83 (*)     All other components within normal limits    Narrative:     High Sensitive Troponin T Reference Range:  <14.0 ng/L- Negative Female for AMI  <22.0 ng/L- Negative Male for AMI  >=14 - Abnormal Female indicating possible myocardial injury.  >=22 - Abnormal Male indicating possible myocardial injury.   Clinicians would have to  utilize clinical acumen, EKG, Troponin, and serial changes to determine if it is an Acute Myocardial Infarction or myocardial injury due to an underlying chronic condition.        LIPASE - Abnormal; Notable for the following components:    Lipase 66 (*)     All other components within normal limits   HIGH SENSITIVITIY TROPONIN T 1HR - Abnormal; Notable for the following components:    HS Troponin T 80 (*)     All other components within normal limits    Narrative:     High Sensitive Troponin T Reference Range:  <14.0 ng/L- Negative Female for AMI  <22.0 ng/L- Negative Male for AMI  >=14 - Abnormal Female indicating possible myocardial injury.  >=22 - Abnormal Male indicating possible myocardial injury.   Clinicians would have to utilize clinical acumen, EKG, Troponin, and serial changes to determine if it is an Acute Myocardial Infarction or myocardial injury due to an underlying chronic condition.        MAGNESIUM - Normal   RAINBOW DRAW    Narrative:     The following orders were created for panel order [place] Draw.  Procedure                               Abnormality         Status                     ---------                               -----------         ------                     Green Top (Gel)[[id number]]                                  Final result               Lavender Top[[id number]]                                     Final result               Red Top[[id number]]                                          Final result               Light Blue Top[[id number]]                                   Final result                 Please view results for these tests on the individual orders.   RAINBOW URINE   URINALYSIS, MICROSCOPIC ONLY   GREEN TOP   LAVENDER TOP   RED TOP   LIGHT BLUE TOP   CBC AND DIFFERENTIAL    Narrative:     The following orders were created for panel order CBC & Differential.  Procedure                               Abnormality         Status                     ---------                                -----------         ------                     CBC Auto Differential[[id number]]        Abnormal            Final result                 Please view results for these tests on the individual orders.       Meds given in ED:   Medications   sodium chloride 0.9 % flush 10 mL (has no administration in time range)   lactated ringers bolus 1,000 mL (0 mL Intravenous Stopped 3/1/25 2029)   furosemide (LASIX) injection 40 mg (40 mg Intravenous Given 3/1/25 2037)   iopamidol (ISOVUE-370) 76 % injection 100 mL (100 mL Intravenous Given 3/1/25 2057)           NIH Stroke Scale:       Isolation/Infection(s):  Enhanced Droplet/Contact    COVID (History), COVID (confirmed)     COVID Testing  Collected .  Resulted .    Nursing report ED to floor:  Mental status: .  Ambulatory status: .  Precautions: .    ED nurse phone extentsion- ..

## 2025-03-02 NOTE — PLAN OF CARE
Goal Outcome Evaluation:  Plan of Care Reviewed With: patient, caregiver, child     Progress: improving    Anticipated Discharge Disposition (SLP): home with assist    SLP Swallowing Diagnosis: mild, oral dysphagia, R/O pharyngeal dysphagia, esophageal dysphagia (03/02/25 0920)     SPEECH-LANGUAGE PATHOLOGY EVALUATION - SWALLOW    Subjective: The patient was seen on this date for a Clinical Swallow evaluation.  Patient was alert and cooperative.  Significant history: Patient is admitted with a recent fall, weakness, debility, acute heart failure, and ISHA. Patient medical history includes an extensive hiatal hernia, colon ployps, AV block, hypoxia, and decreased oral intake. Patient and daughter report patient consumed 100% of breakfast without difficulty this am.     Objective: Textures given included thin liquid and regular consistency.    Assessment: Difficulties were noted with regular consistency.    Observations: Oral structures WFL. Patient is on room air. Patient reports dry mouth contributes to bolus cohesion while eating. Patient uses liquids to assist. During swallow trials, oral residue was noted after the swallow with regular consistency solid. However the patient reported he had been able to masticate foods during meals well, as he displayed on this date . No overt s/s aspiration indicated. Reflux precautions discussed due to nature and severity of hiatal hernia with concerns for possible aspiration. Both patient and daughter expressed understanding.      SLP Findings:  Patient presents with mild oral dysphagia, with esophageal component.     Recommendations: Diet Textures: thin liquid, regular consistency food.  Medications should be taken whole with puree. May have water and ice between meals after oral care, under staff or family supervision and with the recommended strategies for safe swallowing.     Recommended Strategies:  Observe reflux precautions, Upright for PO, small bites and sips, may use  straw, and alternate liquids and solids. Oral care before breakfast, after all meals and PRN.    Other Recommended Evaluations:  n/a.       Dysphagia therapy is recommended x1 session. Rationale: education follow up.    Veronika Diaz, SLP 3/2/2025 10:22 CST

## 2025-03-02 NOTE — ED PROVIDER NOTES
"EMERGENCY DEPARTMENT ATTENDING NOTE    Patient Name: Jose Alfredo Kirkpatrick    Chief Complaint   Patient presents with    Fatigue       PATIENT PRESENTATION:  Jose Alfredo Kirkpatrick is a very pleasant 89 y.o. male with history of laparoscopic cholecystectomy about a month ago with Dr. Roche as well as .  Recently diagnosed atrial fibrillation now on anticoagulation presenting emergency department due to generalized weakness.      History is obtained by the patient and corroborated by his daughter at the bedside.  He states that he really has not been eating or drinking much she states she is post to drink boost and often forgets but has not really been drinking or eating anything.  He denies any abdominal pain from his fairly recent surgery.  Denies any chest pain or shortness of breath.  Daughter states that he just been more \"foggy \"not today altered but just slightly not his normal self/worried he might have some kind of infection.  No history of urinary tract infections.  Reportedly tested positive for COVID a week ago and so could not go to his follow-up appointment for his surgery but not having abdominal pain.      PHYSICAL EXAM:   VS: /61 (BP Location: Left arm, Patient Position: Lying)   Pulse 58   Temp 98.1 °F (36.7 °C) (Oral)   Resp 18   Ht 188 cm (74\")   Wt 79 kg (174 lb 2.6 oz)   SpO2 94%   BMI 22.36 kg/m²   GENERAL: Chronically ill-appearing elderly gentleman sitting in stretcher no acute distress; well-nourished, well-developed, awake, alert, no acute distress, nontoxic appearing, comfortable  EYES: PERRL, sclerae anicteric, extraocular movements grossly intact, symmetric lids  EARS, NOSE, MOUTH, THROAT: atraumatic external nose and ears, moist mucous membranes  NECK: symmetric, trachea midline  RESPIRATORY: unlabored respiratory effort, clear to auscultation bilaterally, good air movement  CARDIOVASCULAR: no murmurs, peripheral pulses 2+ and equal in all extremities  GI: soft, nontender, " nondistended  MUSCULOSKELETAL/EXTREMITIES: extremities without obvious deformity  SKIN: warm and dry with no obvious rashes  NEUROLOGIC: moving all 4 extremities symmetrically, CN II-XII grossly intact; awake and alert  PSYCHIATRIC: alert, pleasant and cooperative. Appropriate mood and affect.      MEDICAL DECISION MAKING:    Jose Alfredo Kirkpatrick is a 89 y.o. male who presented to the ED due to generalized weakness in the setting of decreased p.o. intake and also with reported positive COVID test 1 week prior.  Patient fairly well-appearing with slightly soft blood pressures although still with MAP normal for age afebrile no tachycardia no tachypnea no hypoxia.  Patient quite thin a lot of decreased p.o. intake so dehydration definitely could be contributing some element of delirium which could be secondary to dehydration as well so obtaining fairly broad workup.  CT head shows no acute findings.  Chest x-ray shows no acute findings.  On my initial evaluation I suspect dehydration's initiate IV fluids given family confirmed patient is no history of heart failure but labs came back with profoundly elevated BNP and elevated troponin as well as show I suspect this is acute heart failure.  He is COVID-positive.  Ultimately proceed CT angio of the chest to ensure no PE and there is no PE.  Patient became hypoxic and fluids due to clear heart failure so initiated Lasix to remove that fluid and ultimately we will definitely be admitting patient for acute heart failure.  Did not initiate antibiotics as patient is not septic and there is no pneumonia seen on CT.  Case discussed with the inpatient hospitalist, Dr. Bella, patient was admitted to his service for further management.  Told that I initially given IV fluids and chasing with Lasix as this is clearly new heart failure so we will likely need additional diuresis and he expressed understanding as well.    Differential Diagnosis Considered: Delirium, dementia,  dehydration, urinary tract infection, acute kidney injury, viral illness such as COVID influenza, new heart failure, atypical ACS    Labs Ordered:  Labs Reviewed   COVID-19 AND FLU A/B, NP SWAB IN TRANSPORT MEDIA 1 HR TAT - Abnormal; Notable for the following components:       Result Value    COVID19 Detected (*)     All other components within normal limits    Narrative:     Fact sheet for providers: https://www.fda.gov/media/838392/download    Fact sheet for patients: https://www.fda.gov/media/551627/download    Test performed by PCR.  Influenza A and Influenza B negative results should be considered presumptive in samples that have a positive SARS-CoV-2 result.    Competitive inhibition studies showed that SARS-CoV-2 virus, when present at concentrations above 3.6E+04 copies/mL, can inhibit the detection and amplification of influenza A and influenza B virus RNA if present at or below 1.8E+02 copies/mL or 4.9E+02 copies/mL, respectively, and may lead to false negative influenza virus results. If co-infection with influenza A or influenza B virus is suspected in samples with a positive SARS-CoV-2 result, the sample should be re-tested with another FDA cleared, approved, or authorized influenza test, if influenza virus detection would change clinical management.   URINALYSIS W/ CULTURE IF INDICATED - Abnormal; Notable for the following components:    Color, UA Dark Yellow (*)     Specific Gravity, UA >1.030 (*)     Ketones, UA 15 mg/dL (1+) (*)     Bilirubin, UA Small (1+) (*)     Protein, UA >=300 mg/dL (3+) (*)     Leuk Esterase, UA Trace (*)     All other components within normal limits    Narrative:     In absence of clinical symptoms, the presence of pyuria, bacteria, and/or nitrites on the urinalysis result does not correlate with infection.   COMPREHENSIVE METABOLIC PANEL - Abnormal; Notable for the following components:    Glucose 154 (*)     Creatinine 2.06 (*)     eGFR 30.4 (*)     All other components  within normal limits    Narrative:     GFR Categories in Chronic Kidney Disease (CKD)      GFR Category          GFR (mL/min/1.73)    Interpretation  G1                     90 or greater         Normal or high (1)  G2                      60-89                Mild decrease (1)  G3a                   45-59                Mild to moderate decrease  G3b                   30-44                Moderate to severe decrease  G4                    15-29                Severe decrease  G5                    14 or less           Kidney failure          (1)In the absence of evidence of kidney disease, neither GFR category G1 or G2 fulfill the criteria for CKD.    eGFR calculation 2021 CKD-EPI creatinine equation, which does not include race as a factor   BNP (IN-HOUSE) - Abnormal; Notable for the following components:    proBNP 5,982.0 (*)     All other components within normal limits    Narrative:     This assay is used as an aid in the diagnosis of individuals suspected of having heart failure. It can be used as an aid in the diagnosis of acute decompensated heart failure (ADHF) in patients presenting with signs and symptoms of ADHF to the emergency department (ED). In addition, NT-proBNP of <300 pg/mL indicates ADHF is not likely.    Age Range Result Interpretation  NT-proBNP Concentration (pg/mL:      <50             Positive            >450                   Gray                 300-450                    Negative             <300    50-75           Positive            >900                  Gray                300-900                  Negative            <300      >75             Positive            >1800                  Gray                300-1800                  Negative            <300   CBC WITH AUTO DIFFERENTIAL - Abnormal; Notable for the following components:    MCV 97.7 (*)     MCHC 31.4 (*)     All other components within normal limits   TROPONIN - Abnormal; Notable for the following components:    HS Troponin  T 83 (*)     All other components within normal limits    Narrative:     High Sensitive Troponin T Reference Range:  <14.0 ng/L- Negative Female for AMI  <22.0 ng/L- Negative Male for AMI  >=14 - Abnormal Female indicating possible myocardial injury.  >=22 - Abnormal Male indicating possible myocardial injury.   Clinicians would have to utilize clinical acumen, EKG, Troponin, and serial changes to determine if it is an Acute Myocardial Infarction or myocardial injury due to an underlying chronic condition.        LIPASE - Abnormal; Notable for the following components:    Lipase 66 (*)     All other components within normal limits   HIGH SENSITIVITIY TROPONIN T 1HR - Abnormal; Notable for the following components:    HS Troponin T 80 (*)     All other components within normal limits    Narrative:     High Sensitive Troponin T Reference Range:  <14.0 ng/L- Negative Female for AMI  <22.0 ng/L- Negative Male for AMI  >=14 - Abnormal Female indicating possible myocardial injury.  >=22 - Abnormal Male indicating possible myocardial injury.   Clinicians would have to utilize clinical acumen, EKG, Troponin, and serial changes to determine if it is an Acute Myocardial Infarction or myocardial injury due to an underlying chronic condition.        BASIC METABOLIC PANEL - Abnormal; Notable for the following components:    Creatinine 1.79 (*)     Calcium 8.4 (*)     eGFR 36.0 (*)     All other components within normal limits    Narrative:     GFR Categories in Chronic Kidney Disease (CKD)      GFR Category          GFR (mL/min/1.73)    Interpretation  G1                     90 or greater         Normal or high (1)  G2                      60-89                Mild decrease (1)  G3a                   45-59                Mild to moderate decrease  G3b                   30-44                Moderate to severe decrease  G4                    15-29                Severe decrease  G5                    14 or less           Kidney  "failure          (1)In the absence of evidence of kidney disease, neither GFR category G1 or G2 fulfill the criteria for CKD.    eGFR calculation 2021 CKD-EPI creatinine equation, which does not include race as a factor   CBC WITH AUTO DIFFERENTIAL - Abnormal; Notable for the following components:    RBC 4.01 (*)     Hemoglobin 12.6 (*)     Eosinophils, Absolute 0.42 (*)     All other components within normal limits   D-DIMER, QUANTITATIVE - Abnormal; Notable for the following components:    D-Dimer, Quantitative 2.32 (*)     All other components within normal limits    Narrative:     According to the assay 's published package insert, a normal (<0.50 MCGFEU/mL) D-dimer result in conjunction with a non-high clinical probability assessment, excludes deep vein thrombosis (DVT) and pulmonary embolism (PE) with high sensitivity.    D-dimer values increase with age and this can make VTE exclusion of an older population difficult. To address this, the [employer], based on best available evidence and recent guidelines, recommends that clinicians use age-adjusted D-dimer thresholds in patients greater than 50 years of age with: a) a low probability of PE who do not meet all Pulmonary Embolism Rule Out Criteria, or b) in those with intermediate probability of PE.   The formula for an age-adjusted D-dimer cut-off is \"age/100\".  For example, a 60 year old patient would have an age-adjusted cut-off of 0.60 MCGFEU/mL and an 80 year old 0.80 MCGFEU/mL.   MAGNESIUM - Normal   MAGNESIUM - Normal   PHOSPHORUS - Normal   TSH - Normal   T4, FREE - Normal   RAINBOW DRAW    Narrative:     The following orders were created for panel order [place] Draw.  Procedure                               Abnormality         Status                     ---------                               -----------         ------                     Green Top (Gel)[[id number]]                                  Final result            "    Lavender Top[[id number]]                                     Final result               Red Top[[id number]]                                          Final result               Light Blue Top[[id number]]                                   Final result                 Please view results for these tests on the individual orders.   RAINBOW URINE   URINALYSIS, MICROSCOPIC ONLY   GREEN TOP   LAVENDER TOP   RED TOP   LIGHT BLUE TOP   CBC AND DIFFERENTIAL    Narrative:     The following orders were created for panel order CBC & Differential.  Procedure                               Abnormality         Status                     ---------                               -----------         ------                     CBC Auto Differential[[id number]]        Abnormal            Final result                 Please view results for these tests on the individual orders.   CBC AND DIFFERENTIAL    Narrative:     The following orders were created for panel order CBC & Differential.  Procedure                               Abnormality         Status                     ---------                               -----------         ------                     CBC Auto Differential[[id number]]        Abnormal            Final result                 Please view results for these tests on the individual orders.        Imaging Ordered:   CT Angiogram Chest   Final Result       1.  No evidence of pulmonary embolus or acute cardiopulmonary process.       2.  Very large hiatal hernia with adjacent compressive atelectasis in   the LEFT lower lung.       3.  Cardiomegaly and moderate atherosclerotic vascular disease in the   coronary arteries.               This report was signed and finalized on 3/1/2025 9:08 PM by Dr. Jacky Hall MD.          CT Head Without Contrast   Final Result       1.  Moderate ventricular dilation redemonstrated with low-attenuation in   the subcortical and periventricular white matter, likely related to   chronic  microvascular change. Normal pressure hydrocephalus would be   another consideration. Overall, similar to the exam from 1/25/2025.       2.  Paranasal sinus disease as discussed above.       This report was signed and finalized on 3/1/2025 7:56 PM by Dr. Jacky Hall MD.          XR Chest 1 View   Final Result       1.  Large hiatal hernia with associated LEFT basilar atelectasis.       2.  Shallow inspiration but no definite acute airspace consolidation.       3.  Redemonstration of tortuous ectatic thoracic aorta, similar to the   prior study.               This report was signed and finalized on 3/1/2025 7:20 PM by Dr. Jacky Hall MD.          NM Lung Ventilation Perfusion    (Results Pending)       Internal chart review:   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       Past Surgical History:   Procedure Laterality Date    BACK SURGERY      cervical    CATARACT EXTRACTION Bilateral     CHOLECYSTECTOMY WITH INTRAOPERATIVE CHOLANGIOGRAM N/A 1/27/2025    Procedure: LAPAROSCOPIC CHOLECYSTECTOMY INTRAOPERATIVE CHOLANGIOGRAM WITH DAVINCI ROBOT;  Surgeon: Ronak Roche MD;  Location: [facility] OR;  Service: Robotics - DaVinci;  Laterality: N/A;    COLONOSCOPY  02/11/2013    Hyperplastic polyp at 25 cm, Diverticulosis repeat exam in 5 years    COLONOSCOPY N/A 10/16/2018    Tubular adenoma ascending colon repet prn    ENDOSCOPY  01/15/2014    HH    ENDOSCOPY N/A 10/03/2019    Large HH, non-bleeding gastric ulcer    ENDOSCOPY N/A 11/25/2019    Procedure: ESOPHAGOGASTRODUODENOSCOPY WITH ANESTHESIA;  Surgeon: Marques Pérez MD;  Location: [facility] ENDOSCOPY;  Service: Gastroenterology    ENDOSCOPY N/A 2/9/2023    Procedure: ESOPHAGOGASTRODUODENOSCOPY WITH ANESTHESIA;  Surgeon: Sushant Edmond MD;   Location:  [facility] ENDOSCOPY;  Service: Gastroenterology;  Laterality: N/A;  pre GI bleed  post hiatal hernia; esophagitis  Otilio Ramos MD    HERNIA REPAIR Right     INGUINAL     HIP FRACTURE SURGERY Right 02/04/2023    REPLACEMENT TOTAL KNEE BILATERAL      THORACIC LAMINECTOMY DECOMPRESSIVE POSTERIOR N/A 12/14/2016    Procedure: LAMINECTOMY DECOMPRESSION, UNINSTRUMENTED POSTERIOR SPINAL FUSION, T 11-12;  Surgeon: LUZ Adan MD;  Location:  [facility] OR;  Service:     VENA CAVA FILTER INSERTION N/A 10/04/2019    Procedure: VENA CAVA FILTER INSERTION;  Surgeon: Dallin Coronado MD;  Location:  [facility] HYBRID OR 12;  Service: Vascular       Allergies   Allergen Reactions    Meloxicam Unknown - Low Severity         Current Facility-Administered Medications:     acetaminophen (TYLENOL) tablet 650 mg, 650 mg, Oral, Q4H PRN **OR** acetaminophen (TYLENOL) 160 MG/5ML oral solution 650 mg, 650 mg, Oral, Q4H PRN **OR** acetaminophen (TYLENOL) suppository 650 mg, 650 mg, Rectal, Q4H PRN, Ariel Bella MD    sennosides-docusate (PERICOLACE) 8.6-50 MG per tablet 2 tablet, 2 tablet, Oral, BID PRN **AND** polyethylene glycol (MIRALAX) packet 17 g, 17 g, Oral, Daily PRN **AND** bisacodyl (DULCOLAX) EC tablet 5 mg, 5 mg, Oral, Daily PRN **AND** bisacodyl (DULCOLAX) suppository 10 mg, 10 mg, Rectal, Daily PRN, Ariel Bella MD    enoxaparin sodium (LOVENOX) syringe 80 mg, 1 mg/kg, Subcutaneous, Daily, Ariel Bella MD, 80 mg at 03/02/25 0009    gabapentin (NEURONTIN) capsule 100 mg, 100 mg, Oral, 4x Daily, Ariel Bella MD, 100 mg at 03/02/25 0006    nitroglycerin (NITROSTAT) SL tablet 0.4 mg, 0.4 mg, Sublingual, Q5 Min PRN, Ariel Bella MD    oxyCODONE-acetaminophen (PERCOCET)  MG per tablet 1 tablet, 1 tablet, Oral, Q6H PRN, Ariel Bella MD    [COMPLETED] Insert Peripheral IV, , , Once **AND** sodium chloride 0.9 % flush 10 mL, 10 mL,  Intravenous, PRN, Ariel Bella MD    sodium chloride 0.9 % flush 10 mL, 10 mL, Intravenous, Q12H, Ariel Bella MD, 10 mL at 03/02/25 0006    sodium chloride 0.9 % flush 10 mL, 10 mL, Intravenous, PRN, Ariel Bella MD    sodium chloride 0.9 % infusion 40 mL, 40 mL, Intravenous, PRNJena Mariano Ariel, MD    sodium chloride 0.9 % infusion, 50 mL/hr, Intravenous, Continuous, Ariel Bella MD, Last Rate: 50 mL/hr at 03/02/25 0006, 50 mL/hr at 03/02/25 0006    External documents reviewed: Reviewed most recent echocardiogram report which was performed on January 20, 2025 which showed an EF of 61 to 65% moderate concentric left ventricular hypertrophy some mild aortic valve stenosis but otherwise no evidence of heart failure.    My EKG interpretation: Normal sinus rhythm with a rate of 60 with first-degree AV block.  Otherwise no acute ST elevations ST depressions or T wave inversions.    My lab interpretation: Troponin 83 with repeat 80 with negative delta.  .  Positive COVID testing.  Normal with blood count hemoglobin.  CMP with elevated creatinine to 2 from 1 consistent acute kidney injury.  Urinalysis otherwise with no evidence of infection.  Normal mag.    My imaging interpretation: Chest x-ray with hiatal hernia but otherwise no acute findings.  CT head with some ventricular dilation possible normal pressure hydrocephalus though no other acute findings.  CT angiogram of the chest with no evidence of pulmonary embolism.      ED Diagnosis:  (I50.9) Acute heart failure, unspecified heart failure type    (R53.1) Generalized weakness    (Z86.0100) History of colonic polyps    (R41.82) Altered mental status, unspecified altered mental status type    (R03.1) Low blood pressure reading    (R63.8) Decreased oral intake    (Z90.49) History of laparoscopic cholecystectomy    (I44.0) 1st degree AV block    (Z86.16) History of COVID-19    (K44.9) Hiatal  hernia    (R79.89) Elevated troponin    (R79.89) Elevated brain natriuretic peptide (BNP) level    (R09.02) Hypoxia     Disposition: admit to hospitalist        Signed:  Rodrigo Castro MD  Emergency Medicine Physician    Please note that portions of this note were completed with a voice recognition program.      Rodrigo Castro MD  03/02/25 0218

## 2025-03-02 NOTE — THERAPY EVALUATION
Acute Care - Speech Language Pathology   Swallow Initial Evaluation [facility]     Patient Name: Jose Alfredo Kirkpatrick  : 1935  MRN: [id number]  Today's Date: 3/2/2025               Admit Date: 3/1/2025    SPEECH-LANGUAGE PATHOLOGY EVALUATION - SWALLOW    Subjective: The patient was seen on this date for a Clinical Swallow evaluation.  Patient was alert and cooperative.  Significant history: Patient is admitted with a recent fall, weakness, debility, acute heart failure, and ISHA. Patient medical history includes an extensive hiatal hernia, colon ployps, AV block, hypoxia, and decreased oral intake. Patient and daughter report patient consumed 100% of breakfast without difficulty this am.     Objective: Textures given included thin liquid and regular consistency.    Assessment: Difficulties were noted with regular consistency.    Observations: Oral structures WFL. Patient is on room air. Patient reports dry mouth contributes to bolus cohesion while eating. Patient uses liquids to assist. During swallow trials, oral residue was noted after the swallow with regular consistency solid. However the patient reported he had been able to masticate foods during meals well, as he displayed on this date . No overt s/s aspiration indicated. Reflux precautions discussed due to nature and severity of hiatal hernia with concerns for possible aspiration. Both patient and daughter expressed understanding.      SLP Findings:  Patient presents with mild oral dysphagia, with esophageal component.     Recommendations: Diet Textures: thin liquid, regular consistency food.  Medications should be taken whole with puree. May have water and ice between meals after oral care, under staff or family supervision and with the recommended strategies for safe swallowing.     Recommended Strategies: Observe reflux precautions, Upright for PO, small bites and sips, may use straw, and alternate liquids and solids. Oral care before breakfast, after  all meals and PRN.    Other Recommended Evaluations: n/a.      Dysphagia therapy is recommended x1 session. Rationale: education follow up.    Veronika Diaz, SLP 3/2/2025 10:25 CST      Visit Dx:     ICD-10-CM ICD-9-CM   1. Acute heart failure, unspecified heart failure type  I50.9 428.9   2. Generalized weakness  R53.1 780.79   3. History of colonic polyps  Z86.0100 V12.72   4. Altered mental status, unspecified altered mental status type  R41.82 780.97   5. Low blood pressure reading  R03.1 796.3   6. Decreased oral intake  R63.8 783.9   7. History of laparoscopic cholecystectomy  Z90.49 V45.89   8. 1st degree AV block  I44.0 426.11   9. History of COVID-19  Z86.16 V12.09   10. Hiatal hernia  K44.9 553.3   11. Elevated troponin  R79.89 790.6   12. Elevated brain natriuretic peptide (BNP) level  R79.89 790.99   13. Hypoxia  R09.02 799.02   14. Acute kidney injury  N17.9 584.9   15. Oral phase dysphagia  R13.11 787.21     Patient Active Problem List   Diagnosis    Spinal stenosis, lumbar    Hx of colonic polyps    Drug-induced constipation    Primary hypertension    Acute pulmonary embolism with acute cor pulmonale    Acute bilateral deep vein thrombosis (DVT) of femoral veins    History of DVT of left lower extremity    Positive fecal occult blood test    Melena    Vitamin B12 deficiency    FARZANA (iron deficiency anemia)    Cholelithiasis    Gastric ulcer with hemorrhage    Chronic bilateral low back pain without sciatica    Chronic pain of both shoulders    Kyphosis    Overweight (BMI 25.0-29.9)    Non-smoker    Pneumonia of left lower lobe due to infectious organism    Erosive esophagitis    Large hiatal hernia    Weight loss    Paraesophageal hiatal hernia    Severe malnutrition    E. coli UTI, present on admission (urinary tract infection)    Gallstone pancreatitis    Acute kidney injury    Elevated LFTs    Hyperbilirubinemia    Acute UTI (urinary tract infection)    Elevated brain natriuretic peptide (BNP) level     Debility    COVID-19 virus infection     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     Past Surgical History:   Procedure Laterality Date    BACK SURGERY      cervical    CATARACT EXTRACTION Bilateral     CHOLECYSTECTOMY WITH INTRAOPERATIVE CHOLANGIOGRAM N/A 1/27/2025    Procedure: LAPAROSCOPIC CHOLECYSTECTOMY INTRAOPERATIVE CHOLANGIOGRAM WITH DAVINCI ROBOT;  Surgeon: Ronak Roche MD;  Location: [facility] OR;  Service: Robotics - DaVinci;  Laterality: N/A;    COLONOSCOPY  02/11/2013    Hyperplastic polyp at 25 cm, Diverticulosis repeat exam in 5 years    COLONOSCOPY N/A 10/16/2018    Tubular adenoma ascending colon repet prn    ENDOSCOPY  01/15/2014    HH    ENDOSCOPY N/A 10/03/2019    Large HH, non-bleeding gastric ulcer    ENDOSCOPY N/A 11/25/2019    Procedure: ESOPHAGOGASTRODUODENOSCOPY WITH ANESTHESIA;  Surgeon: Marques Pérez MD;  Location: [facility] ENDOSCOPY;  Service: Gastroenterology    ENDOSCOPY N/A 2/9/2023    Procedure: ESOPHAGOGASTRODUODENOSCOPY WITH ANESTHESIA;  Surgeon: Sushant Edmond MD;  Location: [facility] ENDOSCOPY;  Service: Gastroenterology;  Laterality: N/A;  pre GI bleed  post hiatal hernia; esophagitis  Otilio Ramos MD    HERNIA REPAIR Right     INGUINAL     HIP FRACTURE SURGERY Right 02/04/2023    REPLACEMENT TOTAL KNEE BILATERAL      THORACIC LAMINECTOMY DECOMPRESSIVE POSTERIOR N/A 12/14/2016    Procedure: LAMINECTOMY DECOMPRESSION, UNINSTRUMENTED POSTERIOR SPINAL FUSION, T 11-12;  Surgeon: LUZ Adan MD;  Location: [facility] OR;  Service:     VENA CAVA FILTER INSERTION N/A 10/04/2019    Procedure: VENA CAVA FILTER INSERTION;  Surgeon: Dallin Coronado MD;  Location: [facility] HYBRID OR 12;  Service: Vascular       SLP Recommendation and Plan  SLP Swallowing  Diagnosis: mild, oral dysphagia, R/O pharyngeal dysphagia, esophageal dysphagia (03/02/25 0920)  SLP Diet Recommendation: regular textures, thin liquids (03/02/25 0920)  Recommended Precautions and Strategies: upright posture during/after eating, small bites of food and sips of liquid, alternate between small bites of food and sips of liquid, check mouth frequently for oral residue/pocketing, general aspiration precautions, reflux precautions (03/02/25 0920)  SLP Rec. for Method of Medication Administration: meds whole, with thin liquids (03/02/25 0920)     Monitor for Signs of Aspiration: yes, notify SLP if any concerns (03/02/25 0920)     Swallow Criteria for Skilled Therapeutic Interventions Met: demonstrates skilled criteria (03/02/25 0920)  Anticipated Discharge Disposition (SLP): home with assist (03/02/25 0920)  Rehab Potential/Prognosis, Swallowing: good, to achieve stated therapy goals (03/02/25 0920)  Therapy Frequency (Swallow): PRN (03/02/25 0920)  Predicted Duration Therapy Intervention (Days): 1 week (03/02/25 0920)  Oral Care Recommendations: Oral Care before breakfast, after meals and PRN (03/02/25 0920)                                        Progress: improving      SWALLOW EVALUATION (Last 72 Hours)       SLP Adult Swallow Evaluation       Row Name 03/02/25 0920                   Rehab Evaluation    Document Type evaluation  -MD        Subjective Information no complaints  -MD        Patient Observations alert;cooperative;agree to therapy  -MD        Patient/Family/Caregiver Comments/Observations Daughter present.  -MD        Patient Effort excellent  -MD        Symptoms Noted During/After Treatment none  -MD           General Information    Patient Profile Reviewed yes  -MD        Pertinent History Of Current Problem Patient is admitted with a recent fall, weakness, debility, acute heart failure, and ISHA. Patient medical history includes an extensive hiatal hernia, colon ployps, AV block, hypoxia,  and decreased oral intake. Patient and daughter report patient consumed 100% of breakfast without difficulty this am.  -MD        Current Method of Nutrition regular textures;thin liquids  -MD        Precautions/Limitations, Vision WFL;for purposes of eval  -MD        Precautions/Limitations, Hearing WFL;for purposes of eval  -MD        Prior Level of Function-Communication WFL  -MD        Prior Level of Function-Swallowing esophageal concerns;compensations (maneuvers, postures) needed;no diet consistency restrictions  -MD        Plans/Goals Discussed with patient;family  -MD        Barriers to Rehab previous functional deficit  -MD        Patient's Goals for Discharge return home  -MD        Family Goals for Discharge family did not state  -MD           Pain    Pretreatment Pain Rating 0/10 - no pain  -MD        Posttreatment Pain Rating 0/10 - no pain  -MD           Oral Motor Structure and Function    Dentition Assessment natural, present and adequate  -MD        Secretion Management WNL/WFL  -MD        Mucosal Quality moist, healthy;other (see comments)  reports dry mouth  -MD           Oral Musculature and Cranial Nerve Assessment    Oral Motor General Assessment WFL  -MD           General Eating/Swallowing Observations    Respiratory Support Currently in Use room air  -MD        Eating/Swallowing Skills self-fed  -MD        Positioning During Eating upright in bed  -MD        Utensils Used straw  -MD        Consistencies Trialed regular textures;thin liquids  -MD           Respiratory    Respiratory Status WFL;room air  -MD           Clinical Swallow Eval    Oral Prep Phase WFL  -MD        Oral Transit WFL  -MD        Oral Residue impaired  -MD        Pharyngeal Phase no overt signs/symptoms of pharyngeal impairment  -MD        Esophageal Phase suspected esophageal impairment  -MD        Clinical Swallow Evaluation Summary see note  -MD           Oral Residue Concerns    Oral Residue Concerns diffuse residue  throughout oral cavity  -MD        Diffuse Residue Throughout Oral Cavity regular consistencies  -MD           Esophageal Phase Concerns    Esophageal Phase Concerns other (see comments)  history of extensive hiatal hernia, not safe to have surgically corrected  -MD           SLP Evaluation Clinical Impression    SLP Swallowing Diagnosis mild;oral dysphagia;R/O pharyngeal dysphagia;esophageal dysphagia  -MD        Functional Impact risk of aspiration/pneumonia;risk of malnutrition  -MD        Rehab Potential/Prognosis, Swallowing good, to achieve stated therapy goals  -MD        Swallow Criteria for Skilled Therapeutic Interventions Met demonstrates skilled criteria  -MD           Recommendations    Therapy Frequency (Swallow) PRN  -MD        Predicted Duration Therapy Intervention (Days) 1 week  -MD        SLP Diet Recommendation regular textures;thin liquids  -MD        Recommended Precautions and Strategies upright posture during/after eating;small bites of food and sips of liquid;alternate between small bites of food and sips of liquid;check mouth frequently for oral residue/pocketing;general aspiration precautions;reflux precautions  -MD        Oral Care Recommendations Oral Care before breakfast, after meals and PRN  -MD        SLP Rec. for Method of Medication Administration meds whole;with thin liquids  -MD        Monitor for Signs of Aspiration yes;notify SLP if any concerns  -MD        Anticipated Discharge Disposition (SLP) home with assist  -MD           Swallow Goals (SLP)    Swallow LTGs Patient will demonstrate functional swallow for  -MD        Swallow STGs diet tolerance goal selection (SLP)  -MD        Diet Tolerance Goal Selection (SLP) Patient will tolerate trials of  -MD           (LTG) Patient will demonstrate functional swallow for    Diet Texture (Demonstrate functional swallow) regular textures  -MD        Liquid viscosity (Demonstrate functional swallow) thin liquids  -MD         [place] (Demonstrate functional swallow) independently (over 90% accuracy)  -MD        Time Frame (Demonstrate functional swallow) 1 week  -MD        Barriers (Demonstrate functional swallow) previous functional deficit  -MD        Progress/Outcomes (Demonstrate functional swallow) new goal  -MD           (STG) Patient will tolerate trials of    Consistencies Trialed (Tolerate trials) regular textures;thin liquids  -MD        Desired Outcome (Tolerate trials) with adequate oral prep/transit/clearance;with use of compensatory strategies (see comments)  -MD        [place] (Tolerate trials) independently (over 90% accuracy)  -MD        Time Frame (Tolerate trials) 1 week  -MD        Progress/Outcomes (Tolerate trials) new goal  -MD                  User Key  (r) = Recorded By, (t) = Taken By, (c) = Cosigned By      Initials Name Effective Dates    Veronika Simmons, SLP 06/21/22 -                     EDUCATION  The patient has been educated in the following areas:   Dysphagia (Swallowing Impairment).        SLP GOALS       Row Name 03/02/25 0920             (LTG) Patient will demonstrate functional swallow for    Diet Texture (Demonstrate functional swallow) regular textures  -MD      Liquid viscosity (Demonstrate functional swallow) thin liquids  -MD      [place] (Demonstrate functional swallow) independently (over 90% accuracy)  -MD      Time Frame (Demonstrate functional swallow) 1 week  -MD      Barriers (Demonstrate functional swallow) previous functional deficit  -MD      Progress/Outcomes (Demonstrate functional swallow) new goal  -MD         (STG) Patient will tolerate trials of    Consistencies Trialed (Tolerate trials) regular textures;thin liquids  -MD      Desired Outcome (Tolerate trials) with adequate oral prep/transit/clearance;with use of compensatory strategies (see comments)  -MD      [place] (Tolerate trials) independently (over 90% accuracy)  -MD      Time Frame (Tolerate trials)  1 week  -MD      Progress/Outcomes (Tolerate trials) new goal  -MD                User Key  (r) = Recorded By, (t) = Taken By, (c) = Cosigned By      Initials Name Provider Type    Veronika Simmons, SLP Speech and Language Pathologist                         Time Calculation:    Time Calculation- SLP       Row Name 03/02/25 1023             Time Calculation- SLP    SLP Start Time 0920  -MD      SLP Stop Time 1019  -MD      SLP Time Calculation (min) 59 min  -MD      SLP Received On 03/02/25  -MD      SLP Goal Re-Cert Due Date 03/12/25  -MD         Untimed Charges    87541-LI Eval Oral Pharyng Swallow Minutes 59  -MD         Total Minutes    Untimed Charges Total Minutes 59  -MD       Total Minutes 59  -MD                User Key  (r) = Recorded By, (t) = Taken By, (c) = Cosigned By      Initials Name Provider Type    Veronika Simmons, SLP Speech and Language Pathologist                    Therapy Charges for Today       Code Description Service Date Service Provider Modifiers Qty    [id number] HC ST EVAL ORAL PHARYNG SWALLOW 4 3/2/2025 Veronika Diaz, SLP GN 1                 Veronika Diaz, SLP  3/2/2025

## 2025-03-02 NOTE — PLAN OF CARE
Goal Outcome Evaluation:  Plan of Care Reviewed With: patient, daughter           Outcome Evaluation: OT eval completed. Pt presents alert and oriented x4, sitting up in bed with daughter at bedside. He reports at baseline being independent with all adls and mobility, residing at [facility]. Today he demonstrates generalized weakness, decreased balance, activity tolerance and strength. He was able to bring self to sitting at EOB with Mod A and HOB/bedrails. Mr. Kirkpatrick was able to don socks and slip on shoes with Mod A but did require Max A to tie shoe laces. He fatigues quickly with minimal activity but was agreeable to chair t/f. Min-Mod A for sit <> stand t/f from EOB and Min A to take small steps from bed to chair with use of rwx. Pt was left sitting up in chair at end of session. He would benefit from skilled oT services to address these deficits and assist with return to [facility]. Recommend d/c to short term rehab at d/ for continued therapy services prior to returning to [facility].    Anticipated Discharge Disposition (OT): skilled nursing facility

## 2025-03-02 NOTE — PROGRESS NOTES
"    [facility] Medicine Services  INPATIENT PROGRESS NOTE    Patient Name: Jose Alfredo Kirkpatrick  Date of Admission: 3/1/2025  Today's Date: 03/02/25  Length of Stay: 1  Primary Care Physician: Otilio Ramos MD    Subjective   Chief Complaint: Follow-up  HPI     Patient is an 89-year-old man who presented with weakness  He has past medical history including:  hypertension, GERD, cholecystectomy a month ago.  He tested positive for COVID 19 on March 1.  He was hospitalized from January 25 to January 30 manage for gallstone pancreatitis.  At that time record indicates diagnosis of acute kidney failure.  His creatinine on admission that time was 2.05.  He was discharged with creatinine as of 0.82.  He came in with creatinine of 2.06.  This is downtrending at 1.79.  He was not on any diuretic at the time of discharge.  He initially came in with low normal blood pressure 97/80  proBNP 5982  Normal white count troponin is elevated 883, 80  D-dimer positive  He had CT angiogram of the chest that showed no evidence of PE, very large Gorman hernia with compressive atelectasis in the left lower lung.  Has cardiomegaly and moderate atherosclerotic vascular disease in the coronary arteries.      Results for orders placed during the hospital encounter of 01/25/25    Adult Transthoracic Echo Complete W/ Cont if Necessary Per Protocol    Interpretation Summary    Left ventricular ejection fraction appears to be 61 - 65%.    Left ventricular wall thickness is consistent with moderate concentric hypertrophy.    Left ventricular diastolic function is consistent with (grade Ia w/high LAP) impaired relaxation.    Left atrial volume is mildly increased.    Mild aortic valve stenosis is present.    Estimated right ventricular systolic pressure from tricuspid regurgitation is normal (<35 mmHg).    Mild dilation of the aortic root is present.       \"Brain fog\"  Been lethargic and bedbound according to " "daughter at the assisted living  No nausea or vomiting or abdominal pain but has decreased appetite  Patient requesting to go back in his own room.  Tested positive for COVID last Sunday.  Had taken Paxlovid.  Review of Systems   All pertinent negatives and positives are as above. All other systems have been reviewed and are negative unless otherwise stated.     Objective    Temp:  [97.7 °F (36.5 °C)-98.1 °F (36.7 °C)] 97.8 °F (36.6 °C)  Heart Rate:  [53-67] 59  Resp:  [16-18] 18  BP: ()/(52-84) 138/72  Physical Exam  Seated comfortably on recliner no distress  Receiving IV fluid  GEN: Awake, alert, interactive, in NAD  HEENT: Atraumatic, PERRLA, EOMI, Anicteric, Trachea midline  Lungs: CTAB, no wheezing/rales/rhonchi  Heart: RRR, +S1/s2, no rub  ABD: soft, nt/nd, +BS, no guarding/rebound  Extremities: atraumatic, no cyanosis, no edema  Skin: no rashes or lesions  Neuro: AAOx3, no focal deficits  Psych: normal mood & affect        Results Review:  I have reviewed the labs, radiology results, and diagnostic studies.    Laboratory Data:   Results from last 7 days   Lab Units 03/02/25  0016 03/01/25  1926   WBC 10*3/mm3 8.99 8.63   HEMOGLOBIN g/dL 12.6* 13.1   HEMATOCRIT % 38.4 41.7   PLATELETS 10*3/mm3 177 197        Results from last 7 days   Lab Units 03/02/25  0016 03/01/25  1926   SODIUM mmol/L 137 140   POTASSIUM mmol/L 3.5 4.0   CHLORIDE mmol/L 103 103   CO2 mmol/L 24.0 23.0   BUN mg/dL 22 21   CREATININE mg/dL 1.79* 2.06*   CALCIUM mg/dL 8.4* 8.9   BILIRUBIN mg/dL  --  0.4   ALK PHOS U/L  --  75   ALT (SGPT) U/L  --  9   AST (SGOT) U/L  --  21   GLUCOSE mg/dL 92 154*       Culture Data:   No results found for: \"BLOODCX\", \"URINECX\", \"WOUNDCX\", \"MRSACX\", \"RESPCX\", \"STOOLCX\"    Radiology Data:   Imaging Results (Last 24 Hours)       Procedure Component Value Units Date/Time    CT Angiogram Chest [[id number]] Collected: 03/01/25 2059     Updated: 03/01/25 2111    Narrative:      EXAMINATION: CT ANGIOGRAM " CHEST-  3/1/2025 8:59 PM      HISTORY: covid, acute trop BNP elevation, eval PE; I50.9-Heart failure,  unspecified; R53.1-Weakness; Z86.0100-Personal history of colon polyps,  unspecified; R41.82-Altered mental status, unspecified;  R03.1-Nonspecific low blood-pressure reading; R63.8-Other symptoms and  signs concerning food and fluid intake; Z90.49-Acquired absence of other  specified parts of digestive tract; I44.0-Atrioventricular block     COMPARISON: 2/8/2023. 1/27/2025     DLP: 262.51 mGy.cm     In order to have a CT radiation dose as low as reasonably achievable,  Automated Exposure Control was utilized for adjustment of the mA and/or  KV according to patient size.     TECHNIQUE: Helical tomographic images of the chest were obtained after  the administration of intravenous contrast following angiogram protocol.  Additionally,  reconstructions in the coronal and sagittal planes and 3D  reconstructions were provided.       FINDINGS:    BASE OF THE NECK: The imaged portion of the base of the neck appears  unremarkable.     PULMONARY ARTERIES: There is adequate enhancement of the pulmonary  arteries to evaluate for central and segmental pulmonary emboli. There  are no filling defects within the main, lobar, segmental or visualized  subsegmental pulmonary arteries. The pulmonary vessels are within normal  limits.     LUNGS: Some scarring at the lateral RIGHT lung apex. No suspicious  airspace consolidation in the RIGHT lung. Some dependent atelectasis at  the RIGHT lung base. Atelectasis in the LEFT lower lobe adjacent to the  large hiatal hernia.     AIRWAY: The airways are clear.     PLEURA: No effusion or pleural mass.     VASCULATURE: There is calcified atheromatous plaque in the aorta without  aneurysmal dilation. The thoracic aorta is very tortuous. The pulmonary  arteries are normal in caliber.     HEART: The heart is enlarged. There is no pericardial effusion. Moderate  coronary atheromatous  calcification.     MEDIASTINUM AND LYMPH NODES: No suspicious hilar or mediastinal  adenopathy. Incidental granulomatous calcification..     CHEST WALL AND BONY STRUCTURES: Chest wall soft tissue structures are  unremarkable. 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 Degenerative changes throughout the  spine. No destructive bone lesion or acute fracture identified.  Multilevel bridging endplate osteophytes in the thoracic spine. Chronic  changes at the thoracolumbar junction redemonstrated. Appearance at  L1-L2 is unchanged when compared to 1/27/2025.     UPPER ABDOMEN: Atherosclerotic vascular disease in the visualized  portion of the aorta. The tip of an IVC filter is visualized. Previous  cholecystectomy. No acute findings in the visualized portion of the  upper abdomen. Very large hiatal hernia.          Impression:         1.  No evidence of pulmonary embolus or acute cardiopulmonary process.     2.  Very large hiatal hernia with adjacent compressive atelectasis in  the LEFT lower lung.     3.  Cardiomegaly and moderate atherosclerotic vascular disease in the  coronary arteries.           This report was signed and finalized on 3/1/2025 9:08 PM by Dr. Jacky Hall MD.       CT Head Without Contrast [[id number]] Collected: 03/01/25 1951     Updated: 03/01/25 1959    Narrative:      EXAMINATION: CT HEAD WO CONTRAST-  3/1/2025 7:51 PM     HISTORY: delerium, on anticoagulation, eval bleed mass; R53.1-Weakness;  Z86.0100-Personal history of colon polyps, unspecified; R41.82-Altered  mental status, unspecified; R03.1-Nonspecific low blood-pressure  reading; R63.8-Other symptoms and signs concerning food and fluid  intake; Z90.49-Acquired absence of other specified parts of digestive  tract; I44.0-Atrioventricular block, first degree     COMPARISON: 1/25/2025     DLP: 641.66 mGy.cm     In order to have a CT radiation dose as low as reasonably achievable,  Automated  Exposure Control was utilized for adjustment of the mA and/or  KV according to patient size.     TECHNIQUE: Serial axial tomographic images of the brain were obtained  without the use of intravenous contrast.  Coronal and sagittal  reformatted images were obtained reviewed as well.     FINDINGS:  Atrophy and ventriculomegaly with cavum septum pellucidum et vergae.  Low-attenuation throughout the subcortical and periventricular white  matter. No mass effect, midline shift, or hemorrhage. There is  atherosclerotic vascular disease in the V4 segment of the LEFT vertebral  artery and in the bilateral internal carotid arteries.     There is an air-fluid level in the posterior RIGHT sphenoid sinus with  some mucosal thickening suggesting mild sphenoid sinusitis. Mucosal  thickening in the RIGHT maxillary sinus and opacification of the LEFT  maxillary sinus. Mastoid air cells are clear. No acute calvarial  abnormality. Orbits and globes are preserved. Postoperative changes to  the eyes.          Impression:         1.  Moderate ventricular dilation redemonstrated with low-attenuation in  the subcortical and periventricular white matter, likely related to  chronic microvascular change. Normal pressure hydrocephalus would be  another consideration. Overall, similar to the exam from 1/25/2025.     2.  Paranasal sinus disease as discussed above.     This report was signed and finalized on 3/1/2025 7:56 PM by Dr. Jacky Hall MD.       XR Chest 1 View [[id number]] Collected: 03/01/25 1919     Updated: 03/01/25 1924    Narrative:      EXAMINATION: XR CHEST 1 VW-  3/1/2025 7:19 PM 1 view     HISTORY: generalized weakness, eval PNA; R53.1-Weakness;  Z86.0100-Personal history of colon polyps, unspecified     COMPARISON: 7/16/2024, 1/27/2025     TECHNIQUE: A single frontal view of the chest was obtained.     FINDINGS:  Very large hiatal hernia redemonstrated with associated adjacent LEFT  basilar atelectasis. Shallow depth of  inspiration. No pneumothorax or  confluent pneumonic airspace consolidation within the limits of this  exam. Ectasia of the aortic arch with atherosclerotic vascular disease  and tortuous descending thoracic aorta. High riding humeral head on the  LEFT suggesting chronic full-thickness rotator cuff tear. AC joint  arthropathy also noted on the LEFT. No definite acute osseous  abnormality. Dental amalgam noted.       Impression:         1.  Large hiatal hernia with associated LEFT basilar atelectasis.     2.  Shallow inspiration but no definite acute airspace consolidation.     3.  Redemonstration of tortuous ectatic thoracic aorta, similar to the  prior study.           This report was signed and finalized on 3/1/2025 7:20 PM by Dr. Jacky Hall MD.               I have reviewed the patient's current medications.     Assessment/Plan   Assessment  Active Hospital Problems    Diagnosis     **Acute kidney injury     Elevated brain natriuretic peptide (BNP) level     Debility     COVID-19 virus infection     Chronic bilateral low back pain without sciatica     Primary hypertension              Medical Decision Making  Number and Complexity of problems:   Problem list:  Acute kidney injury recurrent  COVID-19 positive  Generalized weakness secondary to above  Elevated proBNP in the setting of kidney disease, hypertension with hypertensive cardiovascular disease  Elevated troponin; EKG reviewed as outlined below unclear of etiology.  Possibility episode of hypoxia, effect of renal disease, no EKG changes of ischemia, not consistent with acute coronary syndrome.  No clinical history of chest pain  History of gallstone pancreatitis in January 2025 status post cholecystectomy.  ?  Postoperative atrial fibrillation in January-EKG now showed normal sinus rhythm with first-degree AV block.  He had total hernia with associated left basilar atelectasis-could be contributing to hypoxia        Treatment Plan  Supportive  care    No current need for Paxlovid as patient recently been treated using it.    Awaiting nuclear scan lower extremities.  Noted that patient received contrast exam by a CT scan of the chest that ruled out PE.    It is my understanding that if there is concern about chronic PE, VQ scan would be test of choice however in this case she already had a CT angiogram that did not show evidence of pulmonary embolism and from note reviewed from Dr. Bella, we are not looking after chronic PE.  Therefore I will discontinue the VQ scan.    Despite contrast exam, renal function improving.  Will continue on monitoring renal recovery    Continue to hold off on ACE inhibitor, beta-blocker for now due to low normal blood pressure and ISHA.  Maintain systolic blood pressure to reasonable degree to allow for renal perfusion    Venous ultrasound lower extremities negative, anticipate that she will only need anticoagulant for DVT prophylaxis  Medications reviewed  enoxaparin sodium, 1 mg/kg, Subcutaneous, Daily  gabapentin, 100 mg, Oral, 4x Daily  sodium chloride, 10 mL, Intravenous, Q12H      Agree with PT OT evaluation to check on functional capacity to return at an assisted living situation.      Conditions and Status  Fair     MDM Data  External documents reviewed: Reviewed epic record  Cardiac tracing (EKG, telemetry) interpretation: EKG showed sinus rhythm with first-degree AV block and LVH.  Isolated T wave inversion in lead III  Radiology interpretation: Reviewed as outlined above  Labs reviewed: Yes  Any tests that were considered but not ordered: None     Decision rules/scores evaluated (example [id number], Wells, etc): -     Discussed with: Patient and nursing staff     Care Planning  Shared decision making: Patient  Code status and discussions: No CPR, DO NOT INTUBATE, no cardioversion      Disposition  Social Determinants of Health that impact treatment or disposition: None identified at this time  I expect the  patient to be discharged to (TBD)   Probably in 1 to 2 days  Monitor renal function recovery and means to prevent recurrent ISHA  Monitor blood pressure  Awaiting completion of workup      Electronically signed by Domo Elliott MD, 03/02/25, 11:12 CST.

## 2025-03-02 NOTE — PLAN OF CARE
Goal Outcome Evaluation:           Progress: improving  Outcome Evaluation: SB/SR 46-95

## 2025-03-03 ENCOUNTER — APPOINTMENT (OUTPATIENT)
Dept: ULTRASOUND IMAGING | Facility: HOSPITAL | Age: 89
End: 2025-03-03
Payer: MEDICARE

## 2025-03-03 ENCOUNTER — APPOINTMENT (OUTPATIENT)
Dept: CARDIOLOGY | Facility: HOSPITAL | Age: 89
End: 2025-03-03
Payer: MEDICARE

## 2025-03-03 PROBLEM — I82.409 ACUTE DVT (DEEP VENOUS THROMBOSIS): Status: ACTIVE | Noted: 2025-03-03

## 2025-03-03 PROBLEM — R53.1 GENERALIZED WEAKNESS: Status: ACTIVE | Noted: 2025-03-03

## 2025-03-03 LAB
ANION GAP SERPL CALCULATED.3IONS-SCNC: 10 MMOL/L (ref 5–15)
AV MEAN PRESS GRAD SYS DOP V1V2: 9 MMHG
AV VMAX SYS DOP: 205 CM/SEC
BASOPHILS # BLD AUTO: 0.04 10*3/MM3 (ref 0–0.2)
BASOPHILS NFR BLD AUTO: 0.6 % (ref 0–1.5)
BH CV ECHO MEAS - AO MAX PG: 16.8 MMHG
BH CV ECHO MEAS - AO ROOT DIAM: 3.6 CM
BH CV ECHO MEAS - AO V2 VTI: 53.7 CM
BH CV ECHO MEAS - AVA(I,D): 1.53 CM2
BH CV ECHO MEAS - EDV(CUBED): 103.2 ML
BH CV ECHO MEAS - EDV(MOD-SP4): 75.8 ML
BH CV ECHO MEAS - EF(MOD-SP4): 50.4 %
BH CV ECHO MEAS - ESV(CUBED): 22 ML
BH CV ECHO MEAS - ESV(MOD-SP4): 37.6 ML
BH CV ECHO MEAS - FS: 40.3 %
BH CV ECHO MEAS - IVS/LVPW: 1.22 CM
BH CV ECHO MEAS - IVSD: 1.39 CM
BH CV ECHO MEAS - LA DIMENSION: 3.7 CM
BH CV ECHO MEAS - LAT PEAK E' VEL: 7.2 CM/SEC
BH CV ECHO MEAS - LV DIASTOLIC VOL/BSA (35-75): 37 CM2
BH CV ECHO MEAS - LV MASS(C)D: 227.9 GRAMS
BH CV ECHO MEAS - LV MAX PG: 4.7 MMHG
BH CV ECHO MEAS - LV MEAN PG: 2 MMHG
BH CV ECHO MEAS - LV SYSTOLIC VOL/BSA (12-30): 18.4 CM2
BH CV ECHO MEAS - LV V1 MAX: 108 CM/SEC
BH CV ECHO MEAS - LV V1 VTI: 21.6 CM
BH CV ECHO MEAS - LVIDD: 4.7 CM
BH CV ECHO MEAS - LVIDS: 2.8 CM
BH CV ECHO MEAS - LVOT AREA: 3.8 CM2
BH CV ECHO MEAS - LVOT DIAM: 2.2 CM
BH CV ECHO MEAS - LVPWD: 1.14 CM
BH CV ECHO MEAS - MED PEAK E' VEL: 6.2 CM/SEC
BH CV ECHO MEAS - MR MAX PG: 149.3 MMHG
BH CV ECHO MEAS - MR MAX VEL: 611 CM/SEC
BH CV ECHO MEAS - MR MEAN PG: 96 MMHG
BH CV ECHO MEAS - MR MEAN VEL: 454 CM/SEC
BH CV ECHO MEAS - MR VTI: 171 CM
BH CV ECHO MEAS - MV A MAX VEL: 101 CM/SEC
BH CV ECHO MEAS - MV DEC TIME: 0.27 SEC
BH CV ECHO MEAS - MV E MAX VEL: 69.4 CM/SEC
BH CV ECHO MEAS - MV E/A: 0.69
BH CV ECHO MEAS - RAP SYSTOLE: 3 MMHG
BH CV ECHO MEAS - RVSP: 37.6 MMHG
BH CV ECHO MEAS - SV(LVOT): 82.1 ML
BH CV ECHO MEAS - SV(MOD-SP4): 38.2 ML
BH CV ECHO MEAS - SVI(LVOT): 40.1 ML/M2
BH CV ECHO MEAS - SVI(MOD-SP4): 18.7 ML/M2
BH CV ECHO MEAS - TR MAX PG: 34.6 MMHG
BH CV ECHO MEAS - TR MAX VEL: 294 CM/SEC
BH CV ECHO MEASUREMENTS AVERAGE E/E' RATIO: 10.36
BUN SERPL-MCNC: 17 MG/DL (ref 8–23)
BUN/CREAT SERPL: 16.5 (ref 7–25)
CALCIUM SPEC-SCNC: 8.3 MG/DL (ref 8.6–10.5)
CHLORIDE SERPL-SCNC: 103 MMOL/L (ref 98–107)
CO2 SERPL-SCNC: 26 MMOL/L (ref 22–29)
CREAT SERPL-MCNC: 1.03 MG/DL (ref 0.76–1.27)
DEPRECATED RDW RBC AUTO: 43.2 FL (ref 37–54)
EGFRCR SERPLBLD CKD-EPI 2021: 69.9 ML/MIN/1.73
EOSINOPHIL # BLD AUTO: 0.54 10*3/MM3 (ref 0–0.4)
EOSINOPHIL NFR BLD AUTO: 7.5 % (ref 0.3–6.2)
ERYTHROCYTE [DISTWIDTH] IN BLOOD BY AUTOMATED COUNT: 12.4 % (ref 12.3–15.4)
GLUCOSE SERPL-MCNC: 109 MG/DL (ref 65–99)
HCT VFR BLD AUTO: 37.8 % (ref 37.5–51)
HGB BLD-MCNC: 12.3 G/DL (ref 13–17.7)
IMM GRANULOCYTES # BLD AUTO: 0.05 10*3/MM3 (ref 0–0.05)
IMM GRANULOCYTES NFR BLD AUTO: 0.7 % (ref 0–0.5)
LEFT ATRIUM VOLUME INDEX: 26.7 ML/M2
LEFT ATRIUM VOLUME: 54.7 ML
LYMPHOCYTES # BLD AUTO: 2.53 10*3/MM3 (ref 0.7–3.1)
LYMPHOCYTES NFR BLD AUTO: 35.2 % (ref 19.6–45.3)
MCH RBC QN AUTO: 31 PG (ref 26.6–33)
MCHC RBC AUTO-ENTMCNC: 32.5 G/DL (ref 31.5–35.7)
MCV RBC AUTO: 95.2 FL (ref 79–97)
MONOCYTES # BLD AUTO: 0.64 10*3/MM3 (ref 0.1–0.9)
MONOCYTES NFR BLD AUTO: 8.9 % (ref 5–12)
NEUTROPHILS NFR BLD AUTO: 3.38 10*3/MM3 (ref 1.7–7)
NEUTROPHILS NFR BLD AUTO: 47.1 % (ref 42.7–76)
NRBC BLD AUTO-RTO: 0 /100 WBC (ref 0–0.2)
PLATELET # BLD AUTO: 188 10*3/MM3 (ref 140–450)
PMV BLD AUTO: 9.3 FL (ref 6–12)
POTASSIUM SERPL-SCNC: 3.9 MMOL/L (ref 3.5–5.2)
RBC # BLD AUTO: 3.97 10*6/MM3 (ref 4.14–5.8)
SODIUM SERPL-SCNC: 139 MMOL/L (ref 136–145)
WBC NRBC COR # BLD AUTO: 7.18 10*3/MM3 (ref 3.4–10.8)

## 2025-03-03 PROCEDURE — 25010000002 ENOXAPARIN PER 10 MG: Performed by: FAMILY MEDICINE

## 2025-03-03 PROCEDURE — 85025 COMPLETE CBC W/AUTO DIFF WBC: CPT | Performed by: FAMILY MEDICINE

## 2025-03-03 PROCEDURE — 93306 TTE W/DOPPLER COMPLETE: CPT | Performed by: INTERNAL MEDICINE

## 2025-03-03 PROCEDURE — 92526 ORAL FUNCTION THERAPY: CPT

## 2025-03-03 PROCEDURE — 93970 EXTREMITY STUDY: CPT | Performed by: SURGERY

## 2025-03-03 PROCEDURE — 80048 BASIC METABOLIC PNL TOTAL CA: CPT | Performed by: FAMILY MEDICINE

## 2025-03-03 PROCEDURE — 25010000002 ENOXAPARIN PER 10 MG: Performed by: INTERNAL MEDICINE

## 2025-03-03 PROCEDURE — 93306 TTE W/DOPPLER COMPLETE: CPT

## 2025-03-03 PROCEDURE — 97161 PT EVAL LOW COMPLEX 20 MIN: CPT | Performed by: PHYSICAL THERAPIST

## 2025-03-03 PROCEDURE — 93970 EXTREMITY STUDY: CPT

## 2025-03-03 RX ORDER — ENOXAPARIN SODIUM 100 MG/ML
1 INJECTION SUBCUTANEOUS EVERY 12 HOURS
Status: DISCONTINUED | OUTPATIENT
Start: 2025-03-03 | End: 2025-03-04

## 2025-03-03 RX ORDER — FAMOTIDINE 20 MG/1
20 TABLET, FILM COATED ORAL
Status: DISCONTINUED | OUTPATIENT
Start: 2025-03-03 | End: 2025-03-04 | Stop reason: HOSPADM

## 2025-03-03 RX ORDER — LABETALOL HYDROCHLORIDE 5 MG/ML
10 INJECTION, SOLUTION INTRAVENOUS EVERY 4 HOURS PRN
Status: DISCONTINUED | OUTPATIENT
Start: 2025-03-03 | End: 2025-03-04 | Stop reason: HOSPADM

## 2025-03-03 RX ORDER — AMLODIPINE BESYLATE 5 MG/1
5 TABLET ORAL
Status: DISCONTINUED | OUTPATIENT
Start: 2025-03-03 | End: 2025-03-04 | Stop reason: HOSPADM

## 2025-03-03 RX ORDER — CLONIDINE HYDROCHLORIDE 0.1 MG/1
0.1 TABLET ORAL EVERY 6 HOURS PRN
Status: DISCONTINUED | OUTPATIENT
Start: 2025-03-03 | End: 2025-03-04 | Stop reason: HOSPADM

## 2025-03-03 RX ADMIN — ENOXAPARIN SODIUM 80 MG: 100 INJECTION SUBCUTANEOUS at 20:46

## 2025-03-03 RX ADMIN — GABAPENTIN 100 MG: 100 CAPSULE ORAL at 12:52

## 2025-03-03 RX ADMIN — FAMOTIDINE 20 MG: 20 TABLET, FILM COATED ORAL at 17:24

## 2025-03-03 RX ADMIN — Medication 10 ML: at 20:47

## 2025-03-03 RX ADMIN — GABAPENTIN 100 MG: 100 CAPSULE ORAL at 20:46

## 2025-03-03 RX ADMIN — OXYCODONE AND ACETAMINOPHEN 1 TABLET: 325; 10 TABLET ORAL at 20:46

## 2025-03-03 RX ADMIN — CLONIDINE HYDROCHLORIDE 0.1 MG: 0.1 TABLET ORAL at 18:12

## 2025-03-03 RX ADMIN — AMLODIPINE BESYLATE 5 MG: 5 TABLET ORAL at 13:42

## 2025-03-03 RX ADMIN — ENOXAPARIN SODIUM 80 MG: 80 INJECTION SUBCUTANEOUS at 08:43

## 2025-03-03 RX ADMIN — GABAPENTIN 100 MG: 100 CAPSULE ORAL at 17:37

## 2025-03-03 RX ADMIN — GABAPENTIN 100 MG: 100 CAPSULE ORAL at 08:43

## 2025-03-03 RX ADMIN — Medication 10 ML: at 08:43

## 2025-03-03 NOTE — PLAN OF CARE
Goal Outcome Evaluation:      Patient alert and oriented during shift. Respiration even and unlabored. Iv site patent, intact and infusing at this time. Patient complaint of pain during shift, prn medication given. Family was at bedside with patient today. Call light and water within reach of patient. Bed lock and low. Bed alarm present and activated.       Problem: Heart Failure  Goal: Optimal Coping  Outcome: Progressing  Intervention: Support Psychosocial Response  Recent Flowsheet Documentation  Taken 3/2/2025 0759 by Annita Alcantar RN  Family/Support System Care:   caregiver stress acknowledged   self-care encouraged   support provided  Goal: Optimal Cardiac Output and Blood Flow  Outcome: Progressing  Goal: Stable Heart Rate and Rhythm  Outcome: Progressing  Goal: Fluid and Electrolyte Balance  Outcome: Progressing  Goal: Optimal Functional Ability  Outcome: Progressing  Goal: Improved Oral Intake  Outcome: Progressing  Goal: Effective Oxygenation and Ventilation  Outcome: Progressing  Intervention: Optimize Oxygenation and Ventilation  Recent Flowsheet Documentation  Taken 3/2/2025 0759 by Annita Alcantar RN  Head of Bed (HOB) Positioning: HOB elevated  Goal: Effective Breathing Pattern During Sleep  Outcome: Progressing     Problem: Adult Inpatient Plan of Care  Goal: Plan of Care Review  Outcome: Progressing  Goal: Patient-Specific Goal (Individualized)  Outcome: Progressing  Goal: Absence of Hospital-Acquired Illness or Injury  Outcome: Progressing  Intervention: Identify and Manage Fall Risk  Recent Flowsheet Documentation  Taken 3/2/2025 0759 by Annita Alcantar RN  Safety Promotion/Fall Prevention: safety round/check completed  Intervention: Prevent Skin Injury  Recent Flowsheet Documentation  Taken 3/2/2025 0759 by Annita Alcantar RN  Body Position: position changed independently  Intervention: Prevent and Manage VTE (Venous Thromboembolism) Risk  Recent Flowsheet Documentation  Taken 3/2/2025 0759 by  Annita Alcantar, RN  VTE Prevention/Management: SCDs (sequential compression devices) on  Goal: Optimal Comfort and Wellbeing  Outcome: Progressing  Intervention: Provide Person-Centered Care  Recent Flowsheet Documentation  Taken 3/2/2025 0759 by Annita Alcantar, RN  Trust Relationship/Rapport: care explained  Goal: Readiness for Transition of Care  Outcome: Progressing     Problem: Comorbidity Management  Goal: Maintenance of Heart Failure Symptom Control  Outcome: Progressing  Goal: Blood Pressure in Desired Range  Outcome: Progressing     Problem: Skin Injury Risk Increased  Goal: Skin Health and Integrity  Outcome: Progressing  Intervention: Optimize Skin Protection  Recent Flowsheet Documentation  Taken 3/2/2025 0759 by Annita Alcantar, RN  Head of Bed (HOB) Positioning: HOB elevated

## 2025-03-03 NOTE — THERAPY EVALUATION
Patient Name: Jose Alfredo Kirkpatrick  : 1935    MRN: [id number]                              Today's Date: 3/3/2025       Admit Date: 3/1/2025    Visit Dx:     ICD-10-CM ICD-9-CM   1. Acute heart failure, unspecified heart failure type  I50.9 428.9   2. Generalized weakness  R53.1 780.79   3. History of colonic polyps  Z86.0100 V12.72   4. Altered mental status, unspecified altered mental status type  R41.82 780.97   5. Low blood pressure reading  R03.1 796.3   6. Decreased oral intake  R63.8 783.9   7. History of laparoscopic cholecystectomy  Z90.49 V45.89   8. 1st degree AV block  I44.0 426.11   9. History of COVID-19  Z86.16 V12.09   10. Hiatal hernia  K44.9 553.3   11. Elevated troponin  R79.89 790.6   12. Elevated brain natriuretic peptide (BNP) level  R79.89 790.99   13. Hypoxia  R09.02 799.02   14. Acute kidney injury  N17.9 584.9   15. Oral phase dysphagia  R13.11 787.21   16. Impaired mobility [Z74.09]  Z74.09 799.89     Patient Active Problem List   Diagnosis    Spinal stenosis, lumbar    Hx of colonic polyps    Drug-induced constipation    Primary hypertension    Acute pulmonary embolism with acute cor pulmonale    Acute bilateral deep vein thrombosis (DVT) of femoral veins    History of DVT of left lower extremity    Positive fecal occult blood test    Melena    Vitamin B12 deficiency    FARZANA (iron deficiency anemia)    Cholelithiasis    Gastric ulcer with hemorrhage    Chronic bilateral low back pain without sciatica    Chronic pain of both shoulders    Kyphosis    Overweight (BMI 25.0-29.9)    Non-smoker    Pneumonia of left lower lobe due to infectious organism    Erosive esophagitis    Hiatal hernia    Weight loss    Paraesophageal hiatal hernia    Severe malnutrition    E. coli UTI, present on admission (urinary tract infection)    Gallstone pancreatitis    Acute kidney injury    Elevated LFTs    Hyperbilirubinemia    Acute UTI (urinary tract infection)    Elevated brain natriuretic peptide (BNP)  level    Debility    COVID-19 virus infection    Acute DVT (deep venous thrombosis)    Generalized weakness     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     Past Surgical History:   Procedure Laterality Date    BACK SURGERY      cervical    CATARACT EXTRACTION Bilateral     CHOLECYSTECTOMY WITH INTRAOPERATIVE CHOLANGIOGRAM N/A 1/27/2025    Procedure: LAPAROSCOPIC CHOLECYSTECTOMY INTRAOPERATIVE CHOLANGIOGRAM WITH DAVINCI ROBOT;  Surgeon: Ronak Roche MD;  Location: [facility] OR;  Service: Robotics - DaVinci;  Laterality: N/A;    COLONOSCOPY  02/11/2013    Hyperplastic polyp at 25 cm, Diverticulosis repeat exam in 5 years    COLONOSCOPY N/A 10/16/2018    Tubular adenoma ascending colon repet prn    ENDOSCOPY  01/15/2014    HH    ENDOSCOPY N/A 10/03/2019    Large HH, non-bleeding gastric ulcer    ENDOSCOPY N/A 11/25/2019    Procedure: ESOPHAGOGASTRODUODENOSCOPY WITH ANESTHESIA;  Surgeon: Marques Pérez MD;  Location: [facility] ENDOSCOPY;  Service: Gastroenterology    ENDOSCOPY N/A 2/9/2023    Procedure: ESOPHAGOGASTRODUODENOSCOPY WITH ANESTHESIA;  Surgeon: Sushant Edmond MD;  Location: [facility] ENDOSCOPY;  Service: Gastroenterology;  Laterality: N/A;  pre GI bleed  post hiatal hernia; esophagitis  Otilio Ramos MD    HERNIA REPAIR Right     INGUINAL     HIP FRACTURE SURGERY Right 02/04/2023    REPLACEMENT TOTAL KNEE BILATERAL      THORACIC LAMINECTOMY DECOMPRESSIVE POSTERIOR N/A 12/14/2016    Procedure: LAMINECTOMY DECOMPRESSION, UNINSTRUMENTED POSTERIOR SPINAL FUSION, T 11-12;  Surgeon: LUZ Adan MD;  Location: [facility] OR;  Service:     VENA CAVA FILTER INSERTION N/A 10/04/2019    Procedure: VENA CAVA FILTER INSERTION;  Surgeon: Dallin Coronado MD;  Location: [facility] HYBRID OR 12;   Service: Vascular      General Information       Row Name 03/03/25 1038          Physical Therapy Time and Intention    Document Type evaluation  ED s/p fall. Recently dx with COVID approx 1 week ago. Has had progressive weakness since then. Dx: ISHA.  -SB     Mode of Treatment physical therapy  -SB       Row Name 03/03/25 1038          General Information    Patient Profile Reviewed yes  -SB     Prior Level of Function independent:;all household mobility;ADL's;w/c or scooter  rollator, power chair  -SB     Existing Precautions/Restrictions fall  -SB     Barriers to Rehab medically complex  -SB       Row Name 03/03/25 1038          Living Environment    People in Home facility resident  -SB       Row Name 03/03/25 1038          Home Main Entrance    Number of Stairs, Main Entrance none  -SB       Row Name 03/03/25 1038          Stairs Within Home, Primary    Number of Stairs, Within Home, Primary none  -SB       Row Name 03/03/25 1038          Cognition    Orientation Status (Cognition) oriented x 4  -SB       Row Name 03/03/25 1038          Safety Issues/Impairments Affecting Functional Mobility    Safety Issues Affecting Function (Mobility) friction/shear risk  -SB     Impairments Affecting Function (Mobility) strength;balance;endurance/activity tolerance;pain;postural/trunk control  -SB               User Key  (r) = Recorded By, (t) = Taken By, (c) = Cosigned By      Initials Name Provider Type    SB Kathleen Morales PT DPT Physical Therapist                   Mobility       Row Name 03/03/25 1038          Bed Mobility    Bed Mobility supine-sit  -SB     Supine-Sit [place] (Bed Mobility) minimum assist (75% patient effort)  -SB     Assistive Device (Bed Mobility) head of bed elevated;bed rails  -SB       Row Name 03/03/25 1038          Bed-Chair Transfer    Bed-Chair [place] (Transfers) verbal cues;contact guard  -SB     Assistive Device (Bed-Chair Transfers) walker, front-wheeled  -SB     Comment,  (Bed-Chair Transfer) took a couple of steps to chair from bed  -SB       Row Name 03/03/25 1038          Sit-Stand Transfer    Sit-Stand [place] (Transfers) moderate assist (50% patient effort);verbal cues  -SB     Assistive Device (Sit-Stand Transfers) walker, front-wheeled  -SB               User Key  (r) = Recorded By, (t) = Taken By, (c) = Cosigned By      Initials Name Provider Type    SB Kathleen Morales PT DPT Physical Therapist                   Obj/Interventions       Row Name 03/03/25 1038          Range of Motion Comprehensive    General Range of Motion bilateral lower extremity ROM WFL  -SB       Row Name 03/03/25 1038          Strength Comprehensive (MMT)    General Manual Muscle Testing (MMT) Assessment lower extremity strength deficits identified  -SB     Comment, General Manual Muscle Testing (MMT) Assessment BLE 4-/5  -SB       Row Name 03/03/25 1038          Balance    Balance Assessment sitting static balance;sitting dynamic balance;standing static balance;standing dynamic balance  -SB     Static Sitting Balance standby assist  -SB     Dynamic Sitting Balance contact guard  -SB     Position, Sitting Balance sitting edge of bed;unsupported;supported  -SB     Static Standing Balance contact guard;minimal assist;verbal cues  -SB     Dynamic Standing Balance minimal assist;verbal cues  -SB     Position/Device Used, Standing Balance supported;walker, front-wheeled  -SB     Comment, Balance posterior lean when sitting EOB during dynamic activity  -SB       Row Name 03/03/25 1038          Sensory Assessment (Somatosensory)    Sensory Assessment (Somatosensory) LE sensation intact  -SB               User Key  (r) = Recorded By, (t) = Taken By, (c) = Cosigned By      Initials Name Provider Type    Kathleen Putnam PT DPT Physical Therapist                   Goals/Plan       Row Name 03/03/25 1038          Bed Mobility Goal 1 (PT)    Activity/Assistive Device (Bed Mobility Goal 1, PT) sit to  supine;supine to sit;rolling to left;rolling to right  -SB     [place] Level/Cues Needed (Bed Mobility Goal 1, PT) standby assist  -SB     Time Frame (Bed Mobility Goal 1, PT) long term goal (LTG)  -SB     Progress/Outcomes (Bed Mobility Goal 1, PT) new goal  -SB       Row Name 03/03/25 1038          Transfer Goal 1 (PT)    Activity/Assistive Device (Transfer Goal 1, PT) sit-to-stand/stand-to-sit;bed-to-chair/chair-to-bed;walker, rolling  -SB     [place] Level/Cues Needed (Transfer Goal 1, PT) standby assist  -SB     Time Frame (Transfer Goal 1, PT) long term goal (LTG)  -SB     Progress/Outcome (Transfer Goal 1, PT) new goal  -SB       Row Name 03/03/25 1038          Gait Training Goal 1 (PT)    Activity/Assistive Device (Gait Training Goal 1, PT) gait (walking locomotion);increase endurance/gait distance;increase energy conservation;decrease fall risk;walker, rolling  -SB     [place] Level (Gait Training Goal 1, PT) standby assist  -SB     Distance (Gait Training Goal 1, PT) 50  -SB     Time Frame (Gait Training Goal 1, PT) long term goal (LTG)  -SB     Progress/Outcome (Gait Training Goal 1, PT) new goal  -SB       Row Name 03/03/25 1038          Balance Goal 1 (PT)    Activity/Assistive Device (Balance Goal) sitting static balance;sitting dynamic balance;standing static balance;standing dynamic balance  -SB     [place] Level/Cues Needed (Balance Goal 1, PT) supervision required  -SB     Time Frame (Balance Goal 1, PT) long-term goal (LTG)  -SB     Progress/Outcomes (Balance Goal 1, PT) goal ongoing  -SB       Row Name 03/03/25 1038          Therapy Assessment/Plan (PT)    Planned Therapy Interventions (PT) balance training;strengthening;bed mobility training;gait training;patient/family education;transfer training;postural re-education  -SB               User Key  (r) = Recorded By, (t) = Taken By, (c) = Cosigned By      Initials Name Provider Type    Kathleen Putnam, PT DPT Physical  Therapist                   Clinical Impression       Row Name 03/03/25 1038          Pain    Pretreatment Pain Rating 5/10  -SB     Posttreatment Pain Rating 0/10 - no pain  -SB     Pain Location back  -SB     Pain Side/Orientation lower  -SB       Row Name 03/03/25 1038          Plan of Care Review    Plan of Care Reviewed With patient  -SB     Progress no change  -SB     Outcome Evaluation PT eval completed. Pt alert and oriented to person and place with c/o low back pain. Pt reports independence at [facility] prior to arrival with use of rollator and motorized w/c. Today, pt performs sup to sit with min A and demos posterior lean upon initially sitting EOB. Pt demos generalized weakness in LEs and trunk. Pt performs sit to stand t/f with mod A and RW, but once upright was able to take a couple of steps to chair with CGA. Pt demos forward flexed posture while standing. Pt left sitting in chair with LEs elevated. Pt will benefit from skilled PT to improve fxl mob and strength. Rec d/c [facility].  -SB       Row Name 03/03/25 1038          Therapy Assessment/Plan (PT)    Patient/Family Therapy Goals Statement (PT) improve function  -SB     Rehab Potential (PT) good  -SB     Criteria for Skilled Interventions Met (PT) yes;meets criteria;skilled treatment is necessary  -SB     Therapy Frequency (PT) 2 times/day  -SB     Predicted Duration of Therapy Intervention (PT) until d/c or goals met  -SB       Row Name 03/03/25 1038          Vital Signs    Pretreatment Heart Rate (beats/min) 67  -SB     Pre SpO2 (%) 94  -SB     O2 Delivery Pre Treatment room air  -SB     O2 Delivery Post Treatment room air  -SB       Row Name 03/03/25 1038          Positioning and Restraints    Pre-Treatment Position in bed  -SB     Post Treatment Position chair  -SB     In Chair reclined;call light within reach;encouraged to call for assist  -SB               User Key  (r) = Recorded By, (t) = Taken By, (c) = Cosigned By      Initials Name Provider Type     Kathleen Putnam, PT DPT Physical Therapist                   Outcome Measures       Row Name 03/03/25 1038          How much help from another person do you currently need...    Turning from your back to your side while in flat bed without using bedrails? 3  -SB     Moving from lying on back to sitting on the side of a flat bed without bedrails? 3  -SB     Moving to and from a bed to a chair (including a wheelchair)? 3  -SB     Standing up from a chair using your arms (e.g., wheelchair, bedside chair)? 3  -SB     Climbing 3-5 steps with a railing? 2  -SB     To walk in hospital room? 3  -SB     AM-PAC 6 Clicks Score (PT) 17  -SB     Highest Level of Mobility Goal 5 --> Static standing  -SB       Row Name 03/03/25 1038          Functional Assessment    Outcome Measure Options AM-PAC 6 Clicks Basic Mobility (PT)  -SB               User Key  (r) = Recorded By, (t) = Taken By, (c) = Cosigned By      Initials Name Provider Type    SB Kathleen Morales, PT DPT Physical Therapist                                 Physical Therapy Education       Title: PT OT SLP Therapies (In Progress)       Topic: Physical Therapy (In Progress)       Point: Mobility training (Done)       Learning Progress Summary            Patient Acceptance, E, VU by SB at 3/3/2025 1054    Comment: pt edu on POC, benefits of act and d/c plans                      Point: Home exercise program (Not Started)       Learner Progress:  Not documented in this visit.              Point: Body mechanics (Not Started)       Learner Progress:  Not documented in this visit.              Point: Precautions (Done)       Learning Progress Summary            Patient Acceptance, E, VU by SB at 3/3/2025 1054    Comment: pt edu on POC, benefits of act and d/c plans                                      User Key       Initials Effective Dates Name Provider Type Discipline    SB 07/11/23 -  Kathleen Morales, PT DPT Physical Therapist PT                  PT Recommendation and  Plan  Planned Therapy Interventions (PT): balance training, strengthening, bed mobility training, gait training, patient/family education, transfer training, postural re-education  Progress: no change  Outcome Evaluation: PT eval completed. Pt alert and oriented to person and place with c/o low back pain. Pt reports independence at [facility] prior to arrival with use of rollator and motorized w/c. Today, pt performs sup to sit with min A and demos posterior lean upon initially sitting EOB. Pt demos generalized weakness in LEs and trunk. Pt performs sit to stand t/f with mod A and RW, but once upright was able to take a couple of steps to chair with CGA. Pt demos forward flexed posture while standing. Pt left sitting in chair with LEs elevated. Pt will benefit from skilled PT to improve fxl mob and strength. Rec d/c [facility].     Time Calculation:         PT Charges       Row Name 03/03/25 1554             Time Calculation    Start Time 1038  5 min CR for total of 53 min  -SB      Stop Time 1126  -SB      Time Calculation (min) 48 min  -SB      PT Received On 03/03/25  -SB      PT Goal Re-Cert Due Date 03/13/25  -SB         Untimed Charges    PT Eval/Re-eval Minutes 53  -SB         Total Minutes    Untimed Charges Total Minutes 53  -SB       Total Minutes 53  -SB                User Key  (r) = Recorded By, (t) = Taken By, (c) = Cosigned By      Initials Name Provider Type    SB Kathleen Morales, PT DPT Physical Therapist                  Therapy Charges for Today       Code Description Service Date Service Provider Modifiers Qty    [id number] HC PT EVAL LOW COMPLEXITY 4 3/3/2025 Kathleen Morales PT DPT GP 1            PT G-Codes  Outcome Measure Options: AM-PAC 6 Clicks Basic Mobility (PT)  AM-PAC 6 Clicks Score (PT): 17  AM-PAC 6 Clicks Score (OT): 16  PT Discharge Summary  Anticipated Discharge Disposition (PT): assisted living    Kathleen Morales PT DPT  3/3/2025

## 2025-03-03 NOTE — THERAPY TREATMENT NOTE
Acute Care - Speech Language Pathology   Swallow Treatment Note [facility]     Patient Name: Jose Alfredo Kirkpatrick  : 1935  MRN: [id number]  Today's Date: 3/3/2025               Admit Date: 3/1/2025  Pt was semi-reclined in bed, alert and cooperative. SLP assisted with pulling pt up in bed. He completed trials of thin liquids with no overt s/s of aspiration or reflux. He was unable to teach back aspiration/reflux precautions from previous evaluation. SLP educated on general aspiration and reflux precautions. Pt verbalized understanding. Recommend continuing a regular solid diet and thin liquids. SLP will sign off.   Marcelino Coy, CCC-SLP 3/3/2025 14:31 CST    Visit Dx:     ICD-10-CM ICD-9-CM   1. Acute heart failure, unspecified heart failure type  I50.9 428.9   2. Generalized weakness  R53.1 780.79   3. History of colonic polyps  Z86.0100 V12.72   4. Altered mental status, unspecified altered mental status type  R41.82 780.97   5. Low blood pressure reading  R03.1 796.3   6. Decreased oral intake  R63.8 783.9   7. History of laparoscopic cholecystectomy  Z90.49 V45.89   8. 1st degree AV block  I44.0 426.11   9. History of COVID-19  Z86.16 V12.09   10. Hiatal hernia  K44.9 553.3   11. Elevated troponin  R79.89 790.6   12. Elevated brain natriuretic peptide (BNP) level  R79.89 790.99   13. Hypoxia  R09.02 799.02   14. Acute kidney injury  N17.9 584.9   15. Oral phase dysphagia  R13.11 787.21     Patient Active Problem List   Diagnosis    Spinal stenosis, lumbar    Hx of colonic polyps    Drug-induced constipation    Primary hypertension    Acute pulmonary embolism with acute cor pulmonale    Acute bilateral deep vein thrombosis (DVT) of femoral veins    History of DVT of left lower extremity    Positive fecal occult blood test    Melena    Vitamin B12 deficiency    FARZANA (iron deficiency anemia)    Cholelithiasis    Gastric ulcer with hemorrhage    Chronic bilateral low back pain without sciatica    Chronic  pain of both shoulders    Kyphosis    Overweight (BMI 25.0-29.9)    Non-smoker    Pneumonia of left lower lobe due to infectious organism    Erosive esophagitis    Hiatal hernia    Weight loss    Paraesophageal hiatal hernia    Severe malnutrition    E. coli UTI, present on admission (urinary tract infection)    Gallstone pancreatitis    Acute kidney injury    Elevated LFTs    Hyperbilirubinemia    Acute UTI (urinary tract infection)    Elevated brain natriuretic peptide (BNP) level    Debility    COVID-19 virus infection    Acute DVT (deep venous thrombosis)    Generalized weakness     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     Past Surgical History:   Procedure Laterality Date    BACK SURGERY      cervical    CATARACT EXTRACTION Bilateral     CHOLECYSTECTOMY WITH INTRAOPERATIVE CHOLANGIOGRAM N/A 1/27/2025    Procedure: LAPAROSCOPIC CHOLECYSTECTOMY INTRAOPERATIVE CHOLANGIOGRAM WITH DAVINCI ROBOT;  Surgeon: Ronak Roche MD;  Location: [facility] OR;  Service: Robotics - DaVinci;  Laterality: N/A;    COLONOSCOPY  02/11/2013    Hyperplastic polyp at 25 cm, Diverticulosis repeat exam in 5 years    COLONOSCOPY N/A 10/16/2018    Tubular adenoma ascending colon repet prn    ENDOSCOPY  01/15/2014    HH    ENDOSCOPY N/A 10/03/2019    Large HH, non-bleeding gastric ulcer    ENDOSCOPY N/A 11/25/2019    Procedure: ESOPHAGOGASTRODUODENOSCOPY WITH ANESTHESIA;  Surgeon: Marques Pérez MD;  Location: [facility] ENDOSCOPY;  Service: Gastroenterology    ENDOSCOPY N/A 2/9/2023    Procedure: ESOPHAGOGASTRODUODENOSCOPY WITH ANESTHESIA;  Surgeon: Sushant Edmond MD;  Location: [facility] ENDOSCOPY;  Service: Gastroenterology;  Laterality: N/A;  pre GI bleed  post hiatal hernia; esophagitis  Otilio Ramos MD    HERNIA  REPAIR Right     INGUINAL     HIP FRACTURE SURGERY Right 02/04/2023    REPLACEMENT TOTAL KNEE BILATERAL      THORACIC LAMINECTOMY DECOMPRESSIVE POSTERIOR N/A 12/14/2016    Procedure: LAMINECTOMY DECOMPRESSION, UNINSTRUMENTED POSTERIOR SPINAL FUSION, T 11-12;  Surgeon: LUZ Adan MD;  Location:  [facility] OR;  Service:     VENA CAVA FILTER INSERTION N/A 10/04/2019    Procedure: VENA CAVA FILTER INSERTION;  Surgeon: Dallin Coronado MD;  Location:  [facility] HYBRID OR 12;  Service: Vascular       SLP Recommendation and Plan                          Anticipated Discharge Disposition (SLP): home with assist (03/03/25 1430)                    Daily Summary of Progress (SLP): progress toward functional goals as expected (03/03/25 1406)               Treatment Assessment (SLP): improved (03/03/25 1406)  Treatment Assessment Comments (SLP): Discharge (03/03/25 1406)  Plan for Continued Treatment (SLP): treatment no longer indicated as all goals met (03/03/25 1406)    Reason for Discharge: all goals and outcomes met, no further needs identified (03/03/25 1430)    Progress: improving  Outcome Evaluation: See note      SWALLOW EVALUATION (Last 72 Hours)       SLP Adult Swallow Evaluation       Row Name 03/03/25 1406 03/02/25 0920                Rehab Evaluation    Document Type discharge treatment  -MB evaluation  -MD       Subjective Information no complaints  -MB no complaints  -MD       Patient Observations alert;cooperative  -MB alert;cooperative;agree to therapy  -MD       Patient/Family/Caregiver Comments/Observations No family present  -MB Daughter present.  -MD       Patient Effort good  -MB excellent  -MD       Symptoms Noted During/After Treatment -- none  -MD          General Information    Patient Profile Reviewed yes  -MB yes  -MD       Pertinent History Of Current Problem -- Patient is admitted with a recent fall, weakness, debility, acute heart failure, and ISHA. Patient medical history includes an  extensive hiatal hernia, colon ployps, AV block, hypoxia, and decreased oral intake. Patient and daughter report patient consumed 100% of breakfast without difficulty this am.  -MD       Current Method of Nutrition -- regular textures;thin liquids  -MD       Precautions/Limitations, Vision -- WFL;for purposes of eval  -MD       Precautions/Limitations, Hearing -- WFL;for purposes of eval  -MD       Prior Level of Function-Communication -- WFL  -MD       Prior Level of Function-Swallowing -- esophageal concerns;compensations (maneuvers, postures) needed;no diet consistency restrictions  -MD       Plans/Goals Discussed with -- patient;family  -MD       Barriers to Rehab -- previous functional deficit  -MD       Patient's Goals for Discharge -- return home  -MD       Family Goals for Discharge -- family did not state  -MD          Pain    Pretreatment Pain Rating -- 0/10 - no pain  -MD       Posttreatment Pain Rating -- 0/10 - no pain  -MD       Additional Documentation Pain Scale: FACES Pre/Post-Treatment (Group)  -MB --          Pain Scale: FACES Pre/Post-Treatment    Pain: FACES Scale, Pretreatment 0-->no hurt  -MB --          Oral Motor Structure and Function    Dentition Assessment -- natural, present and adequate  -MD       Secretion Management -- WNL/WFL  -MD       Mucosal Quality -- moist, healthy;other (see comments)  reports dry mouth  -MD          Oral Musculature and Cranial Nerve Assessment    Oral Motor General Assessment -- WFL  -MD          General Eating/Swallowing Observations    Respiratory Support Currently in Use -- room air  -MD       Eating/Swallowing Skills -- self-fed  -MD       Positioning During Eating -- upright in bed  -MD       Utensils Used -- straw  -MD       Consistencies Trialed -- regular textures;thin liquids  -MD          Respiratory    Respiratory Status -- WFL;room air  -MD          Clinical Swallow Eval    Oral Prep Phase -- WFL  -MD       Oral Transit -- WFL  -MD       Oral  Residue -- impaired  -MD       Pharyngeal Phase -- no overt signs/symptoms of pharyngeal impairment  -MD       Esophageal Phase -- suspected esophageal impairment  -MD       Clinical Swallow Evaluation Summary -- see note  -MD          Oral Residue Concerns    Oral Residue Concerns -- diffuse residue throughout oral cavity  -MD       Diffuse Residue Throughout Oral Cavity -- regular consistencies  -MD          Esophageal Phase Concerns    Esophageal Phase Concerns -- other (see comments)  history of extensive hiatal hernia, not safe to have surgically corrected  -MD          SLP Evaluation Clinical Impression    SLP Swallowing Diagnosis -- mild;oral dysphagia;R/O pharyngeal dysphagia;esophageal dysphagia  -MD       Functional Impact -- risk of aspiration/pneumonia;risk of malnutrition  -MD       Rehab Potential/Prognosis, Swallowing -- good, to achieve stated therapy goals  -MD       Swallow Criteria for Skilled Therapeutic Interventions Met -- demonstrates skilled criteria  -MD          SLP Treatment Clinical Impressions    Treatment Assessment (SLP) improved  -MB --       Treatment Assessment Comments (SLP) Discharge  -MB --       Daily Summary of Progress (SLP) progress toward functional goals as expected  -MB --       Plan for Continued Treatment (SLP) treatment no longer indicated as all goals met  -MB --          Recommendations    Therapy Frequency (Swallow) -- PRN  -MD       Predicted Duration Therapy Intervention (Days) -- 1 week  -MD       SLP Diet Recommendation -- regular textures;thin liquids  -MD       Recommended Precautions and Strategies -- upright posture during/after eating;small bites of food and sips of liquid;alternate between small bites of food and sips of liquid;check mouth frequently for oral residue/pocketing;general aspiration precautions;reflux precautions  -MD       Oral Care Recommendations -- Oral Care before breakfast, after meals and PRN  -MD       SLP Rec. for Method of Medication  Administration -- meds whole;with thin liquids  -MD       Monitor for Signs of Aspiration -- yes;notify SLP if any concerns  -MD       Anticipated Discharge Disposition (SLP) -- home with assist  -MD          Swallow Goals (SLP)    Swallow LTGs -- Patient will demonstrate functional swallow for  -MD       Swallow STGs -- diet tolerance goal selection (SLP)  -MD       Diet Tolerance Goal Selection (SLP) -- Patient will tolerate trials of  -MD          (LTG) Patient will demonstrate functional swallow for    Diet Texture (Demonstrate functional swallow) regular textures  -MB regular textures  -MD       Liquid viscosity (Demonstrate functional swallow) thin liquids  -MB thin liquids  -MD       [place] (Demonstrate functional swallow) independently (over 90% accuracy)  -MB independently (over 90% accuracy)  -MD       Time Frame (Demonstrate functional swallow) 1 week  -MB 1 week  -MD       Barriers (Demonstrate functional swallow) -- previous functional deficit  -MD       Progress/Outcomes (Demonstrate functional swallow) goal met  -MB new goal  -MD          (STG) Patient will tolerate trials of    Consistencies Trialed (Tolerate trials) regular textures;thin liquids  -MB regular textures;thin liquids  -MD       Desired Outcome (Tolerate trials) with adequate oral prep/transit/clearance;with use of compensatory strategies (see comments)  -MB with adequate oral prep/transit/clearance;with use of compensatory strategies (see comments)  -MD       [place] (Tolerate trials) independently (over 90% accuracy)  -MB independently (over 90% accuracy)  -MD       Time Frame (Tolerate trials) 1 week  -MB 1 week  -MD       Progress/Outcomes (Tolerate trials) goal met  -MB new goal  -MD                 User Key  (r) = Recorded By, (t) = Taken By, (c) = Cosigned By      Initials Name Effective Dates    Marcelino Beck, CCC-SLP 02/03/23 -     Veronika Simmons, SLP 06/21/22 -                     EDUCATION  The patient  has been educated in the following areas:   Dysphagia (Swallowing Impairment).        SLP GOALS       Row Name 03/03/25 1406 03/02/25 0920          (LTG) Patient will demonstrate functional swallow for    Diet Texture (Demonstrate functional swallow) regular textures  -MB regular textures  -MD     Liquid viscosity (Demonstrate functional swallow) thin liquids  -MB thin liquids  -MD     [place] (Demonstrate functional swallow) independently (over 90% accuracy)  -MB independently (over 90% accuracy)  -MD     Time Frame (Demonstrate functional swallow) 1 week  -MB 1 week  -MD     Barriers (Demonstrate functional swallow) -- previous functional deficit  -MD     Progress/Outcomes (Demonstrate functional swallow) goal met  -MB new goal  -MD        (STG) Patient will tolerate trials of    Consistencies Trialed (Tolerate trials) regular textures;thin liquids  -MB regular textures;thin liquids  -MD     Desired Outcome (Tolerate trials) with adequate oral prep/transit/clearance;with use of compensatory strategies (see comments)  -MB with adequate oral prep/transit/clearance;with use of compensatory strategies (see comments)  -MD     [place] (Tolerate trials) independently (over 90% accuracy)  -MB independently (over 90% accuracy)  -MD     Time Frame (Tolerate trials) 1 week  -MB 1 week  -MD     Progress/Outcomes (Tolerate trials) goal met  -MB new goal  -MD               User Key  (r) = Recorded By, (t) = Taken By, (c) = Cosigned By      Initials Name Provider Type    Marcelino Beck, CCC-SLP Speech and Language Pathologist    Veronika Simmons, SLP Speech and Language Pathologist                         Time Calculation:    Time Calculation- SLP       Row Name 03/03/25 1430             Time Calculation- SLP    SLP Start Time 1406  -MB      SLP Stop Time 1430  -MB      SLP Time Calculation (min) 24 min  -MB      SLP Received On 03/03/25  -MB         Untimed Charges    49119-NF Treatment Swallow Minutes 24  -MB          Total Minutes    Untimed Charges Total Minutes 24  -MB       Total Minutes 24  -MB                User Key  (r) = Recorded By, (t) = Taken By, (c) = Cosigned By      Initials Name Provider Type    Marcelino Beck CCC-SLP Speech and Language Pathologist                    Therapy Charges for Today       Code Description Service Date Service Provider Modifiers Qty    [id number]  ST TREATMENT SWALLOW 2 3/3/2025 Marcelino Coy CCC-SLP GN 1                 HUI Osorio  3/3/2025

## 2025-03-03 NOTE — PLAN OF CARE
Goal Outcome Evaluation:  Plan of Care Reviewed With: patient        Progress: no change  Outcome Evaluation: PT eval completed. Pt alert and oriented to person and place with c/o low back pain. Pt reports independence at [facility] prior to arrival with use of rollator and motorized w/c. Today, pt performs sup to sit with min A and demos posterior lean upon initially sitting EOB. Pt demos generalized weakness in LEs and trunk. Pt performs sit to stand t/f with mod A and RW, but once upright was able to take a couple of steps to chair with CGA. Pt demos forward flexed posture while standing. Pt left sitting in chair with LEs elevated. Pt will benefit from skilled PT to improve fxl mob and strength. Rec d/c [facility].    Anticipated Discharge Disposition (PT): assisted living

## 2025-03-03 NOTE — PLAN OF CARE
Problem: Adult Inpatient Plan of Care  Goal: Plan of Care Review  Outcome: Progressing  Flowsheets (Taken 3/3/2025 4789)  Progress: no change  Outcome Evaluation: VSS. pt A&Ox4. no c/o pain during this shift. pt on room air. Sinus/SB 52-78 on tele. safety maintained.  Plan of Care Reviewed With: patient

## 2025-03-03 NOTE — PAYOR COMM NOTE
"3/3/25 [facility] [phone]  [phone]      ER ADMIT TO INPATIENT ON 3/1/25 FAXING FOR INPATIENT REVIEW.    [id number]              Jose Alfredo Kirkpatrick (89 y.o. Male)       Date of Birth   1935    Social Security Number       Address   [address] [place] [address]    Home Phone   [phone]    MRN   [id number]       Religious   [employer]    Marital Status                               Admission Date   3/1/25    Admission Type   Emergency    Admitting Provider   Julio Self MD    Attending Provider   Julio Self MD    Department, Room/Bed   [facility] 4B, 440/1       Discharge Date       Discharge Disposition       Discharge Destination                                 Attending Provider: Julio Self MD    Allergies: Meloxicam    Isolation: Enh Drop/Con   Infection: COVID (History) (02/09/23), COVID (confirmed) (03/01/25)   Code Status: No CPR    Ht: 188 cm (74\")   Wt: 79 kg (174 lb 2.6 oz)    Admission Cmt: None   Principal Problem: Acute kidney injury [N17.9]                   Active Insurance as of 3/1/2025       Primary Coverage       Payor Plan Insurance Group Employer/Plan Group    ANTHEM MEDICARE REPLACEMENT ANTH MEDICARE ADVANTAGE HMO [id number]       Payor Plan Address Payor Plan Phone Number Payor Plan Fax Number Effective Dates    [address] [phone]  1/1/2024 - None Entered    [facility] [address]         Subscriber Name Subscriber Birth Date Member ID       JOSE ALFREDO KIRKPATRICK 1935 [id number]                     Emergency Contacts        (Rel.) Home Phone Work Phone Mobile Phone    LOUIS WELLS (Daughter) -- -- [phone]             [facility] Encounter Date/Time: 3/1/2025 1833   Hospital Account: [id number]    MRN: [id number]   Patient:  Jose Alfredo Kirkpatrick   Contact Serial #: [id number]   SSN:          ENCOUNTER             Patient Class: " Inpatient   Unit: [address] Service: Medicine     Bed: 440/1   Admitting Provider: Julio Self MD   Referring Physician:     Attending Provider: Julio Self MD   Adm Diagnosis: Acute heart failure [I50*               PATIENT             Name: Jose Alfredo Kirkpatrick : 1935 (89 yrs)   Address: [address] [place] [address] Sex: Male   City: [place] [address]   County: [place]   Marital Status:  Ethnicity: NOT        Race: WHITE   Primary Care Provider: Otilio Ramos MD Patients Phone: Home Phone: [phone]     Mobile Phone: [phone]     EMERGENCY CONTACT   Contact Name Legal Guardian? Relationship to Patient Home Phone Work Phone Mobile Phone   1. LOUIS WELLS  2. *No Contact Specified* No    Daughter              [phone]      GUARANTOR             Guarantor: Jose Alfredo Kirkpatrick     : 1935   Address: [address] [place] [address] Sex: [place] [address]     Relation to Patient: Self       Home Phone: [phone]   Guarantor ID: [id number]       Work Phone:     GUARANTOR EMPLOYER   Employer:           Status: RETIRED   COVERAGE          PRIMARY INSURANCE   Payor: ANTHEM MEDICARE REPLACEMENT Plan: [employer] MEDICARE ADVANTAGE HMO   Group Number: [id number] Insurance Type: INDEMNITY   Subscriber Name: JOSE ALFREDO KIRKPATRICK Subscriber : 1935   Subscriber ID: [id number] Coverage Address: [facility] [address]  [place] [address]   Pat. Rel. to Subscriber: Self Coverage Phone: [phone]   SECONDARY INSURANCE   Payor: N/A Plan: N/A   Group Number:   Insurance Type:     Subscriber Name:   Subscriber :     Subscriber ID:   Coverage Address:     Pat. Rel. to Subscriber:   Coverage Phone:        Contact Serial # ([id number])         March 3, 2025    Chart ID ([id number])         Ariel Bella MD   Physician  Medicine     H&P     Signed     Date of Service: 25  Creation Time: 25     Signed       Expand All Collapse  All         [facility] Medicine Services  HISTORY AND PHYSICAL     Date of Admission: 3/1/2025  Primary Care Physician: Otilio Ramos MD     Subjective   Primary Historian: Patient     Chief Complaint: Weakness     History of Present Illness  89-year-old male with past medical history hypertension, GERD, cholecystectomy a month ago, COVID for 1 week that presented to the emergency room with complaints of weakness.  He had been getting weaker throughout the week and oral intake has been poor.  Tonight he was so debilitated that he fell and he could not get up from the ground.  He was brought to the emergency room for evaluation.  On presentation blood pressures 9780, heart rate 67, respiratory rate 18 and oxygen saturation is 94% on room air.  He received a liter of lactated ringer bolus in the ER with improvement in blood pressure but he was noted that his oxygen dropped at that point, he was given Lasix after that and placed on oxygen.  Blood work showed a creatinine of 2.06, BNP of 5982, troponin 83 and 80.  Urinalysis shows concentrated urine with ketones.  COVID test is still positive.      During recent hospitalization EKG on 1/28/2025 showed A-fib with a ventricular rate of 73 bpm.  Tonight's EKG shows sinus rhythm with first-degree AV block ND of 266 ms ventricular rate 60 bpm.     Review of Systems   Otherwise complete ROS reviewed and negative except as mentioned in the HPI.     Past Medical History:   Medical History[]Expand by Default        Past Medical History:   Diagnosis Date    Arthritis      Cataract       BILATERAL    GERD (gastroesophageal reflux disease)      History of transfusion      Hx of colonic polyps      Hypertension      Reflux esophagitis      Streptococcosis       IN SPINE FROM BACK INJECTIONS         Past Surgical History:  Surgical History         Past Surgical History:   Procedure Laterality Date    BACK SURGERY         cervical    CATARACT  EXTRACTION Bilateral      CHOLECYSTECTOMY WITH INTRAOPERATIVE CHOLANGIOGRAM N/A 1/27/2025     Procedure: LAPAROSCOPIC CHOLECYSTECTOMY INTRAOPERATIVE CHOLANGIOGRAM WITH DAVINCI ROBOT;  Surgeon: Ronak Rohce MD;  Location: [facility] OR;  Service: Robotics - DaVinci;  Laterality: N/A;    COLONOSCOPY   02/11/2013     Hyperplastic polyp at 25 cm, Diverticulosis repeat exam in 5 years    COLONOSCOPY N/A 10/16/2018     Tubular adenoma ascending colon repet prn    ENDOSCOPY   01/15/2014     HH    ENDOSCOPY N/A 10/03/2019     Large HH, non-bleeding gastric ulcer    ENDOSCOPY N/A 11/25/2019     Procedure: ESOPHAGOGASTRODUODENOSCOPY WITH ANESTHESIA;  Surgeon: Marques Pérez MD;  Location:  [facility] ENDOSCOPY;  Service: Gastroenterology    ENDOSCOPY N/A 2/9/2023     Procedure: ESOPHAGOGASTRODUODENOSCOPY WITH ANESTHESIA;  Surgeon: Sushant Edmond MD;  Location: [facility] ENDOSCOPY;  Service: Gastroenterology;  Laterality: N/A;  pre GI bleed  post hiatal hernia; esophagitis  Otilio Ramos MD    HERNIA REPAIR Right       INGUINAL     HIP FRACTURE SURGERY Right 02/04/2023    REPLACEMENT TOTAL KNEE BILATERAL        THORACIC LAMINECTOMY DECOMPRESSIVE POSTERIOR N/A 12/14/2016     Procedure: LAMINECTOMY DECOMPRESSION, UNINSTRUMENTED POSTERIOR SPINAL FUSION, T 11-12;  Surgeon: LUZ Adan MD;  Location: [facility] OR;  Service:     VENA CAVA FILTER INSERTION N/A 10/04/2019     Procedure: VENA CAVA FILTER INSERTION;  Surgeon: Dallin Coronado MD;  Location: [facility] HYBRID OR 12;  Service: Vascular         Social History:  reports that he has never smoked. He has never been exposed to tobacco smoke. He has never used smokeless tobacco. He reports that he does not drink alcohol and does not use drugs.     Family History: family history includes Colon cancer in his father.        Allergies:  Allergies        Allergies   Allergen Reactions    Meloxicam Unknown - Low Severity            Medications:          Prior to Admission  "medications    Medication Sig Start Date End Date Taking? Authorizing Provider   gabapentin (NEURONTIN) 300 MG capsule Take 1 capsule by mouth Every Night. 1/30/25     Parth Byrd MD   lisinopril (PRINIVIL,ZESTRIL) 40 MG tablet Take 0.5 tablets by mouth Daily. 1/30/25     Parth Byrd MD   metoprolol tartrate (LOPRESSOR) 25 MG tablet Take 0.5 tablets by mouth Every 12 (Twelve) Hours. 1/30/25     Parth Byrd MD   oxyCODONE-acetaminophen (PERCOCET)  MG per tablet Take 0.5 tablets by mouth Every 6 (Six) Hours As Needed for Moderate Pain. 1/30/25     Parth Byrd MD      I have utilized all available immediate resources to obtain, update, or review the patient's current medications (including all prescriptions, over-the-counter products, herbals, cannabis/cannabidiol products, and vitamin/mineral/dietary (nutritional) supplements).     Objective      Vital Signs: /52   Pulse 56   Temp 97.7 °F (36.5 °C) (Oral)   Resp 18   Ht 182.9 cm (72\")   Wt 83 kg (183 lb)   SpO2 98%   BMI 24.82 kg/m²   Physical Exam  Vitals reviewed.   Constitutional:       General: He is not in acute distress.     Appearance: He is ill-appearing. He is not toxic-appearing.   HENT:      Head: Normocephalic and atraumatic.      Mouth/Throat:      Mouth: Mucous membranes are moist.      Pharynx: Oropharynx is clear.   Eyes:      Extraocular Movements: Extraocular movements intact.      Conjunctiva/sclera: Conjunctivae normal.      Pupils: Pupils are equal, round, and reactive to light.   Cardiovascular:      Rate and Rhythm: Regular rhythm. Bradycardia present.      Pulses: Normal pulses.      Heart sounds: No murmur heard.  Pulmonary:      Effort: Pulmonary effort is normal. No respiratory distress.      Breath sounds: Normal breath sounds. No wheezing or rhonchi.   Abdominal:      General: Bowel sounds are normal. There is no distension.      Palpations: Abdomen is soft.      Tenderness: There is no abdominal " tenderness.   Musculoskeletal:         General: No swelling or tenderness. Normal range of motion.      Cervical back: Normal range of motion and neck supple. No muscular tenderness.      Right lower leg: No edema.      Left lower leg: No edema.   Skin:     General: Skin is warm and dry.      Findings: No erythema or rash.   Neurological:      General: No focal deficit present.      Mental Status: He is alert and oriented to person, place, and time.      Cranial Nerves: No cranial nerve deficit.      Sensory: No sensory deficit.      Motor: Weakness present.   Psychiatric:         Mood and Affect: Mood normal.         Behavior: Behavior normal.      Results Reviewed:  Lab Results (last 24 hours)         Procedure Component Value Units Date/Time     High Sensitivity Troponin T 1Hr [[id number]]  (Abnormal) Collected: 03/01/25 2029     Specimen: Blood Updated: 03/01/25 2106       HS Troponin T 80 ng/L         Troponin T Numeric Delta -3 ng/L         Troponin T % Delta -4     Narrative:       High Sensitive Troponin T Reference Range:  <14.0 ng/L- Negative Female for AMI  <22.0 ng/L- Negative Male for AMI  >=14 - Abnormal Female indicating possible myocardial injury.  >=22 - Abnormal Male indicating possible myocardial injury.   Clinicians would have to utilize clinical acumen, EKG, Troponin, and serial changes to determine if it is an Acute Myocardial Infarction or myocardial injury due to an underlying chronic condition.           Urinalysis With Culture If Indicated - Urine, Clean Catch [[id number]]  (Abnormal) Collected: 03/1935     Specimen: Urine, Clean Catch Updated: 03/01/25 2009       Color, UA Dark Yellow       Appearance, UA Clear       pH, UA <=5.0       Specific Gravity, UA >1.030       Glucose, UA Negative       Ketones, UA 15 mg/dL (1+)       Bilirubin, UA Small (1+)       Blood, UA Negative       Protein, UA >=300 mg/dL (3+)       Leuk Esterase, UA Trace       Nitrite, UA Negative       Urobilinogen,  UA 1.0 E.U./dL     Narrative:       In absence of clinical symptoms, the presence of pyuria, bacteria, and/or nitrites on the urinalysis result does not correlate with infection.     Urinalysis, Microscopic Only - Urine, Clean Catch [[id number]] Collected: 03/1935     Specimen: Urine, Clean Catch Updated: 03/01/25 2009       RBC, UA 0-2 /HPF         WBC, UA 0-2 /HPF         Comment: Urine culture not indicated.          Bacteria, UA None Seen /HPF         Squamous Epithelial Cells, UA 0-2 /HPF         Hyaline Casts, UA 21-30 /LPF         Methodology Manual Light Microscopy     COVID-19 and FLU A/B PCR, 1 HR TAT - Swab, Nasopharynx [[id number]]  (Abnormal) Collected: 03/01/25 1927     Specimen: Swab from Nasopharynx Updated: 03/01/25 2008       COVID19 Detected       Influenza A PCR Not Detected       Influenza B PCR Not Detected     Narrative:       Fact sheet for providers: https://www.fda.gov/media/184621/download     Fact sheet for patients: https://www.fda.gov/media/668323/download     Test performed by PCR.  Influenza A and Influenza B negative results should be considered presumptive in samples that have a positive SARS-CoV-2 result.     Competitive inhibition studies showed that SARS-CoV-2 virus, when present at concentrations above 3.6E+04 copies/mL, can inhibit the detection and amplification of influenza A and influenza B virus RNA if present at or below 1.8E+02 copies/mL or 4.9E+02 copies/mL, respectively, and may lead to false negative influenza virus results. If co-infection with influenza A or influenza B virus is suspected in samples with a positive SARS-CoV-2 result, the sample should be re-tested with another FDA cleared, approved, or authorized influenza test, if influenza virus detection would change clinical management.     Comprehensive Metabolic Panel [[id number]]  (Abnormal) Collected: 03/01/25 1926     Specimen: Blood Updated: 03/01/25 2002       Glucose 154 mg/dL         BUN 21 mg/dL          Creatinine 2.06 mg/dL         Sodium 140 mmol/L         Potassium 4.0 mmol/L         Comment: Slight hemolysis detected by analyzer. Result may be falsely elevated.          Chloride 103 mmol/L         CO2 23.0 mmol/L         Calcium 8.9 mg/dL         Total Protein 6.5 g/dL         Albumin 3.8 g/dL         ALT (SGPT) 9 U/L         AST (SGOT) 21 U/L         Comment: Slight hemolysis detected by analyzer. Result may be falsely elevated.          Alkaline Phosphatase 75 U/L         Total Bilirubin 0.4 mg/dL         Globulin 2.7 gm/dL         A/G Ratio 1.4 g/dL         BUN/Creatinine Ratio 10.2       Anion Gap 14.0 mmol/L         eGFR 30.4 mL/min/1.73       Narrative:       GFR Categories in Chronic Kidney Disease (CKD)                         GFR Category          GFR (mL/min/1.73)    Interpretation  G1                       90 or greater              Normal or high (1)  G2                               60-89                Mild decrease (1)  G3a                   45-59                Mild to moderate decrease  G3b                   30-44                Moderate to severe decrease  G4                    15-29                Severe decrease  G5                    14 or less           Kidney failure                                                 (1)In the absence of evidence of kidney disease, neither GFR category G1 or G2 fulfill the criteria for CKD.     eGFR calculation 2021 CKD-EPI creatinine equation, which does not include race as a factor     High Sensitivity Troponin T [[id number]]  (Abnormal) Collected: 03/01/25 1926     Specimen: Blood Updated: 03/01/25 2000       HS Troponin T 83 ng/L       Narrative:       High Sensitive Troponin T Reference Range:  <14.0 ng/L- Negative Female for AMI  <22.0 ng/L- Negative Male for AMI  >=14 - Abnormal Female indicating possible myocardial injury.  >=22 - Abnormal Male indicating possible myocardial injury.   Clinicians would have to utilize clinical acumen, EKG, Troponin,  and serial changes to determine if it is an Acute Myocardial Infarction or myocardial injury due to an underlying chronic condition.           Magnesium [[id number]]  (Normal) Collected: 03/01/25 1926     Specimen: Blood Updated: 03/01/25 2000       Magnesium 1.7 mg/dL       BNP [[id number]]  (Abnormal) Collected: 03/01/25 1926     Specimen: Blood Updated: 03/01/25 1958       proBNP 5,982.0 pg/mL       Narrative:       This assay is used as an aid in the diagnosis of individuals suspected of having heart failure. It can be used as an aid in the diagnosis of acute decompensated heart failure (ADHF) in patients presenting with signs and symptoms of ADHF to the emergency department (ED). In addition, NT-proBNP of <300 pg/mL indicates ADHF is not likely.     Age Range         Result Interpretation  NT-proBNP Concentration (pg/mL:        <50             Positive            >450                         Hagen                           300-450                           Negative               <300     50-75           Positive            >900                  Gray                300-900                  Negative            <300        >75             Positive            >1800                  Gray                300-1800                  Negative            <300     Lipase [[id number]]  (Abnormal) Collected: 03/01/25 1926     Specimen: Blood Updated: 03/01/25 1957       Lipase 66 U/L       [place] Urine - Urine, Clean Catch [[id number]] Collected: 03/1935     Specimen: Urine, Clean Catch Updated: 03/01/25 1946       Extra Tube Hold for add-ons.       Comment: Auto resulted.        [place] Draw [[id number]] Collected: 03/01/25 1926     Specimen: Blood Updated: 03/01/25 1946     Narrative:       The following orders were created for panel order [place] Draw.  Procedure                               Abnormality         Status                     ---------                               -----------         ------                      Green Top (Gel)[[id number]]                                  Final result               Lavender Top[[id number]]                                     Final result               Red Top[[id number]]                                          Final result               Light Blue Top[[id number]]                                   Final result                  Please view results for these tests on the individual orders.     Green Top (Gel) [[id number]] Collected: 03/01/25 1926     Specimen: Blood Updated: 03/01/25 1946       Extra Tube Hold for add-ons.       Comment: Auto resulted.        Lavender Top [[id number]] Collected: 03/01/25 1926     Specimen: Blood Updated: 03/01/25 1946       Extra Tube hold for add-on       Comment: Auto resulted        Red Top [[id number]] Collected: 03/01/25 1926     Specimen: Blood Updated: 03/01/25 1946       Extra Tube Hold for add-ons.       Comment: Auto resulted.        Light Blue Top [[id number]] Collected: 03/01/25 1926     Specimen: Blood Updated: 03/01/25 1946       Extra Tube Hold for add-ons.       Comment: Auto resulted        CBC & Differential [[id number]]  (Abnormal) Collected: 03/01/25 1926     Specimen: Blood Updated: 03/01/25 1939     Narrative:       The following orders were created for panel order CBC & Differential.  Procedure                               Abnormality         Status                     ---------                               -----------         ------                     CBC Auto Differential[[id number]]        Abnormal            Final result                  Please view results for these tests on the individual orders.     CBC Auto Differential [[id number]]  (Abnormal) Collected: 03/01/25 1926     Specimen: Blood Updated: 03/01/25 1939       WBC 8.63 10*3/mm3         RBC 4.27 10*6/mm3         Hemoglobin 13.1 g/dL         Hematocrit 41.7 %         MCV 97.7 fL         MCH 30.7 pg         MCHC 31.4 g/dL         RDW 12.8 %         RDW-SD 46.1 fl         MPV 9.9  fL         Platelets 197 10*3/mm3         Neutrophil % 63.0 %         Lymphocyte % 26.5 %         Monocyte % 6.1 %         Eosinophil % 3.5 %         Basophil % 0.6 %         Immature Grans % 0.3 %         Neutrophils, Absolute 5.43 10*3/mm3         Lymphocytes, Absolute 2.29 10*3/mm3         Monocytes, Absolute 0.53 10*3/mm3         Eosinophils, Absolute 0.30 10*3/mm3         Basophils, Absolute 0.05 10*3/mm3         Immature Grans, Absolute 0.03 10*3/mm3         nRBC 0.0 /100 WBC               Imaging Results (Last 24 Hours)         Procedure Component Value Units Date/Time     CT Angiogram Chest [[id number]] Collected: 03/01/25 2059       Updated: 03/01/25 2111     Narrative:       EXAMINATION: CT ANGIOGRAM CHEST-  3/1/2025 8:59 PM      HISTORY: covid, acute trop BNP elevation, eval PE; I50.9-Heart failure,  unspecified; R53.1-Weakness; Z86.0100-Personal history of colon polyps,  unspecified; R41.82-Altered mental status, unspecified;  R03.1-Nonspecific low blood-pressure reading; R63.8-Other symptoms and  signs concerning food and fluid intake; Z90.49-Acquired absence of other  specified parts of digestive tract; I44.0-Atrioventricular block     COMPARISON: 2/8/2023. 1/27/2025     DLP: 262.51 mGy.cm     In order to have a CT radiation dose as low as reasonably achievable,  Automated Exposure Control was utilized for adjustment of the mA and/or  KV according to patient size.     TECHNIQUE: Helical tomographic images of the chest were obtained after  the administration of intravenous contrast following angiogram protocol.  Additionally,  reconstructions in the coronal and sagittal planes and 3D  reconstructions were provided.       FINDINGS:    BASE OF THE NECK: The imaged portion of the base of the neck appears  unremarkable.     PULMONARY ARTERIES: There is adequate enhancement of the pulmonary  arteries to evaluate for central and segmental pulmonary emboli. There  are no filling defects within the main, lobar,  segmental or visualized  subsegmental pulmonary arteries. The pulmonary vessels are within normal  limits.     LUNGS: Some scarring at the lateral RIGHT lung apex. No suspicious  airspace consolidation in the RIGHT lung. Some dependent atelectasis at  the RIGHT lung base. Atelectasis in the LEFT lower lobe adjacent to the  large hiatal hernia.     AIRWAY: The airways are clear.     PLEURA: No effusion or pleural mass.     VASCULATURE: There is calcified atheromatous plaque in the aorta without  aneurysmal dilation. The thoracic aorta is very tortuous. The pulmonary  arteries are normal in caliber.     HEART: The heart is enlarged. There is no pericardial effusion. Moderate  coronary atheromatous calcification.     MEDIASTINUM AND LYMPH NODES: No suspicious hilar or mediastinal  adenopathy. Incidental granulomatous calcification..     CHEST WALL AND BONY STRUCTURES: Chest wall soft tissue structures are  unremarkable. 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 Degenerative changes throughout the  spine. No destructive bone lesion or acute fracture identified.  Multilevel bridging endplate osteophytes in the thoracic spine. Chronic  changes at the thoracolumbar junction redemonstrated. Appearance at  L1-L2 is unchanged when compared to 1/27/2025.     UPPER ABDOMEN: Atherosclerotic vascular disease in the visualized  portion of the aorta. The tip of an IVC filter is visualized. Previous  cholecystectomy. No acute findings in the visualized portion of the  upper abdomen. Very large hiatal hernia.           Impression:          1.  No evidence of pulmonary embolus or acute cardiopulmonary process.     2.  Very large hiatal hernia with adjacent compressive atelectasis in  the LEFT lower lung.     3.  Cardiomegaly and moderate atherosclerotic vascular disease in the  coronary arteries.           This report was signed and finalized on 3/1/2025 9:08 PM by Dr. Jacky Hall  MD.        CT Head Without Contrast [[id number]] Collected: 03/01/25 1951       Updated: 03/01/25 1959     Narrative:       EXAMINATION: CT HEAD WO CONTRAST-  3/1/2025 7:51 PM     HISTORY: delerium, on anticoagulation, eval bleed mass; R53.1-Weakness;  Z86.0100-Personal history of colon polyps, unspecified; R41.82-Altered  mental status, unspecified; R03.1-Nonspecific low blood-pressure  reading; R63.8-Other symptoms and signs concerning food and fluid  intake; Z90.49-Acquired absence of other specified parts of digestive  tract; I44.0-Atrioventricular block, first degree     COMPARISON: 1/25/2025     DLP: 641.66 mGy.cm     In order to have a CT radiation dose as low as reasonably achievable,  Automated Exposure Control was utilized for adjustment of the mA and/or  KV according to patient size.     TECHNIQUE: Serial axial tomographic images of the brain were obtained  without the use of intravenous contrast.  Coronal and sagittal  reformatted images were obtained reviewed as well.     FINDINGS:  Atrophy and ventriculomegaly with cavum septum pellucidum et vergae.  Low-attenuation throughout the subcortical and periventricular white  matter. No mass effect, midline shift, or hemorrhage. There is  atherosclerotic vascular disease in the V4 segment of the LEFT vertebral  artery and in the bilateral internal carotid arteries.     There is an air-fluid level in the posterior RIGHT sphenoid sinus with  some mucosal thickening suggesting mild sphenoid sinusitis. Mucosal  thickening in the RIGHT maxillary sinus and opacification of the LEFT  maxillary sinus. Mastoid air cells are clear. No acute calvarial  abnormality. Orbits and globes are preserved. Postoperative changes to  the eyes.           Impression:          1.  Moderate ventricular dilation redemonstrated with low-attenuation in  the subcortical and periventricular white matter, likely related to  chronic microvascular change. Normal pressure hydrocephalus would  be  another consideration. Overall, similar to the exam from 1/25/2025.     2.  Paranasal sinus disease as discussed above.     This report was signed and finalized on 3/1/2025 7:56 PM by Dr. Jacky Hall MD.        XR Chest 1 View [[id number]] Collected: 03/01/25 1919       Updated: 03/01/25 1924     Narrative:       EXAMINATION: XR CHEST 1 VW-  3/1/2025 7:19 PM 1 view     HISTORY: generalized weakness, eval PNA; R53.1-Weakness;  Z86.0100-Personal history of colon polyps, unspecified     COMPARISON: 7/16/2024, 1/27/2025     TECHNIQUE: A single frontal view of the chest was obtained.     FINDINGS:  Very large hiatal hernia redemonstrated with associated adjacent LEFT  basilar atelectasis. Shallow depth of inspiration. No pneumothorax or  confluent pneumonic airspace consolidation within the limits of this  exam. Ectasia of the aortic arch with atherosclerotic vascular disease  and tortuous descending thoracic aorta. High riding humeral head on the  LEFT suggesting chronic full-thickness rotator cuff tear. AC joint  arthropathy also noted on the LEFT. No definite acute osseous  abnormality. Dental amalgam noted.        Impression:          1.  Large hiatal hernia with associated LEFT basilar atelectasis.     2.  Shallow inspiration but no definite acute airspace consolidation.     3.  Redemonstration of tortuous ectatic thoracic aorta, similar to the  prior study.           This report was signed and finalized on 3/1/2025 7:20 PM by Dr. Jacky Hall MD.                I have personally reviewed and interpreted the radiology studies and ECG obtained at time of admission.      Assessment / Plan   Assessment:        Active Hospital Problems     Diagnosis      **Acute kidney injury      Elevated brain natriuretic peptide (BNP) level      Debility      COVID-19 virus infection      Chronic bilateral low back pain without sciatica      Primary hypertension        Treatment Plan  The patient will be admitted to my  service here at [facility].   Admit to med floor with telemetry  Vitals every 4 hours  Diet cardiac  IV fluids normal saline 50 cc an hour  Activity up with assistance and fall precautions     COVID-19 with debility poor oral intake dehydration and ISHA  IV fluids and debility  Tylenol as needed  Onset was 7 days ago  Oxygen therapy as needed.  During my exam he was on room air and maintaining oxygen saturation of 94 to 97%.  Continue to monitor oxygen saturation with continuous pulse ox  Check TSH, T4, magnesium and phosphorus and replace as needed  PT and OT evaluations     ISHA  Suspect prerenal due to above  IV fluids  BMP daily     Elevated BNP and elevated troponin  Echo 2D in a.m. to compare for any changes with echo from 1/28/2025  D-dimer checked from ER blood and pulse and if elevated will order  VQ scan in a.m. and Doppler lower extremities  Full dose Lovenox until ruled out     Chronic pain  Continue home pain medication     DVT prophylaxis Lovenox     Medical Decision Making  Number and Complexity of problems: 4 complex problems  Differential Diagnosis: See above     Conditions and Status        Condition is unchanged.     [facility] Data  External documents reviewed: [employer] EHR  Cardiac tracing (EKG, telemetry) interpretation: Sinus rhythm first-degree AV block  Radiology interpretation: See above  Labs reviewed: See above  Any tests that were considered but not ordered: None     Decision rules/scores evaluated (example [id number], Wells, etc): Not applicable     Discussed with: Patient     Care Planning  Shared decision making: Patient  Code status and discussions: DNR per patient request     Disposition  Social Determinants of Health that impact treatment or disposition: None  Estimated length of stay is over 2 midnights.      I confirmed that the patient's advanced care plan is present, code status is documented, and a surrogate decision maker is listed in the patient's medical record.      The  patient's surrogate decision maker is family, see records.      The patient was seen and examined by me on 3/1/2025 at 2215.     Electronically signed by Ariel Bella MD, 03/01/25, 22:15 CST.                      Domo Elliott MD   Physician  Hospitalist     Progress Notes     Signed     Date of Service: 03/02/25 1112  Creation Time: 03/02/25 1112     Signed       Expand All [facility] Medicine Services  INPATIENT PROGRESS NOTE     Patient Name: Jose Alfredo Kirkpatirck  Date of Admission: 3/1/2025  Today's Date: 03/02/25  Length of Stay: 1  Primary Care Physician: Otilio Ramos MD     Subjective   Chief Complaint: Follow-up  HPI      Patient is an 89-year-old man who presented with weakness  He has past medical history including:  hypertension, GERD, cholecystectomy a month ago.  He tested positive for COVID 19 on March 1.  He was hospitalized from January 25 to January 30 manage for gallstone pancreatitis.  At that time record indicates diagnosis of acute kidney failure.  His creatinine on admission that time was 2.05.  He was discharged with creatinine as of 0.82.  He came in with creatinine of 2.06.  This is downtrending at 1.79.  He was not on any diuretic at the time of discharge.  He initially came in with low normal blood pressure 97/80  proBNP 5982  Normal white count troponin is elevated 883, 80  D-dimer positive  He had CT angiogram of the chest that showed no evidence of PE, very large [place] hernia with compressive atelectasis in the left lower lung.  Has cardiomegaly and moderate atherosclerotic vascular disease in the coronary arteries.        Results for orders placed during the hospital encounter of 01/25/25     Adult Transthoracic Echo Complete W/ Cont if Necessary Per Protocol     Interpretation Summary    Left ventricular ejection fraction appears to be 61 - 65%.    Left ventricular wall thickness is consistent with  "moderate concentric hypertrophy.    Left ventricular diastolic function is consistent with (grade Ia w/high LAP) impaired relaxation.    Left atrial volume is mildly increased.    Mild aortic valve stenosis is present.    Estimated right ventricular systolic pressure from tricuspid regurgitation is normal (<35 mmHg).    Mild dilation of the aortic root is present.        \"Brain fog\"  Been lethargic and bedbound according to daughter at the assisted living  No nausea or vomiting or abdominal pain but has decreased appetite  Patient requesting to go back in his own room.  Tested positive for COVID last Sunday.  Had taken Paxlovid.  Review of Systems   All pertinent negatives and positives are as above. All other systems have been reviewed and are negative unless otherwise stated.      Objective    Temp:  [97.7 °F (36.5 °C)-98.1 °F (36.7 °C)] 97.8 °F (36.6 °C)  Heart Rate:  [53-67] 59  Resp:  [16-18] 18  BP: ()/(52-84) 138/72  Physical Exam  Seated comfortably on recliner no distress  Receiving IV fluid  GEN: Awake, alert, interactive, in NAD  HEENT: Atraumatic, PERRLA, EOMI, Anicteric, Trachea midline  Lungs: CTAB, no wheezing/rales/rhonchi  Heart: RRR, +S1/s2, no rub  ABD: soft, nt/nd, +BS, no guarding/rebound  Extremities: atraumatic, no cyanosis, no edema  Skin: no rashes or lesions  Neuro: AAOx3, no focal deficits  Psych: normal mood & affect           Results Review:  I have reviewed the labs, radiology results, and diagnostic studies.     Laboratory Data:         Results from last 7 days   Lab Units 03/02/25  0016 03/01/25 1926   WBC 10*3/mm3 8.99 8.63   HEMOGLOBIN g/dL 12.6* 13.1   HEMATOCRIT % 38.4 41.7   PLATELETS 10*3/mm3 177 197               Results from last 7 days   Lab Units 03/02/25  0016 03/01/25  1926   SODIUM mmol/L 137 140   POTASSIUM mmol/L 3.5 4.0   CHLORIDE mmol/L 103 103   CO2 mmol/L 24.0 23.0   BUN mg/dL 22 21   CREATININE mg/dL 1.79* 2.06*   CALCIUM mg/dL 8.4* 8.9   BILIRUBIN mg/dL  -- " " 0.4   ALK PHOS U/L  --  75   ALT (SGPT) U/L  --  9   AST (SGOT) U/L  --  21   GLUCOSE mg/dL 92 154*         Culture Data:   No results found for: \"BLOODCX\", \"URINECX\", \"WOUNDCX\", \"MRSACX\", \"RESPCX\", \"STOOLCX\"     Radiology Data:   Imaging Results (Last 24 Hours)         Procedure Component Value Units Date/Time     CT Angiogram Chest [[id number]] Collected: 03/01/25 2059       Updated: 03/01/25 2111     Narrative:       EXAMINATION: CT ANGIOGRAM CHEST-  3/1/2025 8:59 PM      HISTORY: covid, acute trop BNP elevation, eval PE; I50.9-Heart failure,  unspecified; R53.1-Weakness; Z86.0100-Personal history of colon polyps,  unspecified; R41.82-Altered mental status, unspecified;  R03.1-Nonspecific low blood-pressure reading; R63.8-Other symptoms and  signs concerning food and fluid intake; Z90.49-Acquired absence of other  specified parts of digestive tract; I44.0-Atrioventricular block     COMPARISON: 2/8/2023. 1/27/2025     DLP: 262.51 mGy.cm     In order to have a CT radiation dose as low as reasonably achievable,  Automated Exposure Control was utilized for adjustment of the mA and/or  KV according to patient size.     TECHNIQUE: Helical tomographic images of the chest were obtained after  the administration of intravenous contrast following angiogram protocol.  Additionally,  reconstructions in the coronal and sagittal planes and 3D  reconstructions were provided.       FINDINGS:    BASE OF THE NECK: The imaged portion of the base of the neck appears  unremarkable.     PULMONARY ARTERIES: There is adequate enhancement of the pulmonary  arteries to evaluate for central and segmental pulmonary emboli. There  are no filling defects within the main, lobar, segmental or visualized  subsegmental pulmonary arteries. The pulmonary vessels are within normal  limits.     LUNGS: Some scarring at the lateral RIGHT lung apex. No suspicious  airspace consolidation in the RIGHT lung. Some dependent atelectasis at  the RIGHT lung " base. Atelectasis in the LEFT lower lobe adjacent to the  large hiatal hernia.     AIRWAY: The airways are clear.     PLEURA: No effusion or pleural mass.     VASCULATURE: There is calcified atheromatous plaque in the aorta without  aneurysmal dilation. The thoracic aorta is very tortuous. The pulmonary  arteries are normal in caliber.     HEART: The heart is enlarged. There is no pericardial effusion. Moderate  coronary atheromatous calcification.     MEDIASTINUM AND LYMPH NODES: No suspicious hilar or mediastinal  adenopathy. Incidental granulomatous calcification..     CHEST WALL AND BONY STRUCTURES: Chest wall soft tissue structures are  unremarkable. 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 Degenerative changes throughout the  spine. No destructive bone lesion or acute fracture identified.  Multilevel bridging endplate osteophytes in the thoracic spine. Chronic  changes at the thoracolumbar junction redemonstrated. Appearance at  L1-L2 is unchanged when compared to 1/27/2025.     UPPER ABDOMEN: Atherosclerotic vascular disease in the visualized  portion of the aorta. The tip of an IVC filter is visualized. Previous  cholecystectomy. No acute findings in the visualized portion of the  upper abdomen. Very large hiatal hernia.           Impression:          1.  No evidence of pulmonary embolus or acute cardiopulmonary process.     2.  Very large hiatal hernia with adjacent compressive atelectasis in  the LEFT lower lung.     3.  Cardiomegaly and moderate atherosclerotic vascular disease in the  coronary arteries.           This report was signed and finalized on 3/1/2025 9:08 PM by Dr. Jacky Hall MD.        CT Head Without Contrast [[id number]] Collected: 03/01/25 1951       Updated: 03/01/25 1959     Narrative:       EXAMINATION: CT HEAD WO CONTRAST-  3/1/2025 7:51 PM     HISTORY: delerium, on anticoagulation, eval bleed mass; R53.1-Weakness;  Z86.0100-Personal  history of colon polyps, unspecified; R41.82-Altered  mental status, unspecified; R03.1-Nonspecific low blood-pressure  reading; R63.8-Other symptoms and signs concerning food and fluid  intake; Z90.49-Acquired absence of other specified parts of digestive  tract; I44.0-Atrioventricular block, first degree     COMPARISON: 1/25/2025     DLP: 641.66 mGy.cm     In order to have a CT radiation dose as low as reasonably achievable,  Automated Exposure Control was utilized for adjustment of the mA and/or  KV according to patient size.     TECHNIQUE: Serial axial tomographic images of the brain were obtained  without the use of intravenous contrast.  Coronal and sagittal  reformatted images were obtained reviewed as well.     FINDINGS:  Atrophy and ventriculomegaly with cavum septum pellucidum et vergae.  Low-attenuation throughout the subcortical and periventricular white  matter. No mass effect, midline shift, or hemorrhage. There is  atherosclerotic vascular disease in the V4 segment of the LEFT vertebral  artery and in the bilateral internal carotid arteries.     There is an air-fluid level in the posterior RIGHT sphenoid sinus with  some mucosal thickening suggesting mild sphenoid sinusitis. Mucosal  thickening in the RIGHT maxillary sinus and opacification of the LEFT  maxillary sinus. Mastoid air cells are clear. No acute calvarial  abnormality. Orbits and globes are preserved. Postoperative changes to  the eyes.           Impression:          1.  Moderate ventricular dilation redemonstrated with low-attenuation in  the subcortical and periventricular white matter, likely related to  chronic microvascular change. Normal pressure hydrocephalus would be  another consideration. Overall, similar to the exam from 1/25/2025.     2.  Paranasal sinus disease as discussed above.     This report was signed and finalized on 3/1/2025 7:56 PM by Dr. Jacky Hall MD.        XR Chest 1 View [[id number]] Collected: 03/01/25 1919        Updated: 03/01/25 1924     Narrative:       EXAMINATION: XR CHEST 1 VW-  3/1/2025 7:19 PM 1 view     HISTORY: generalized weakness, eval PNA; R53.1-Weakness;  Z86.0100-Personal history of colon polyps, unspecified     COMPARISON: 7/16/2024, 1/27/2025     TECHNIQUE: A single frontal view of the chest was obtained.     FINDINGS:  Very large hiatal hernia redemonstrated with associated adjacent LEFT  basilar atelectasis. Shallow depth of inspiration. No pneumothorax or  confluent pneumonic airspace consolidation within the limits of this  exam. Ectasia of the aortic arch with atherosclerotic vascular disease  and tortuous descending thoracic aorta. High riding humeral head on the  LEFT suggesting chronic full-thickness rotator cuff tear. AC joint  arthropathy also noted on the LEFT. No definite acute osseous  abnormality. Dental amalgam noted.        Impression:          1.  Large hiatal hernia with associated LEFT basilar atelectasis.     2.  Shallow inspiration but no definite acute airspace consolidation.     3.  Redemonstration of tortuous ectatic thoracic aorta, similar to the  prior study.           This report was signed and finalized on 3/1/2025 7:20 PM by Dr. Jacky Hall MD.                   I have reviewed the patient's current medications.      Assessment/Plan   Assessment       Active Hospital Problems     Diagnosis      **Acute kidney injury      Elevated brain natriuretic peptide (BNP) level      Debility      COVID-19 virus infection      Chronic bilateral low back pain without sciatica      Primary hypertension                    Medical Decision Making  Number and Complexity of problems:   Problem list:  Acute kidney injury recurrent  COVID-19 positive  Generalized weakness secondary to above  Elevated proBNP in the setting of kidney disease, hypertension with hypertensive cardiovascular disease  Elevated troponin; EKG reviewed as outlined below unclear of etiology.  Possibility episode of  hypoxia, effect of renal disease, no EKG changes of ischemia, not consistent with acute coronary syndrome.  No clinical history of chest pain  History of gallstone pancreatitis in January 2025 status post cholecystectomy.  ?  Postoperative atrial fibrillation in January-EKG now showed normal sinus rhythm with first-degree AV block.  He had total hernia with associated left basilar atelectasis-could be contributing to hypoxia           Treatment Plan  Supportive care     No current need for Paxlovid as patient recently been treated using it.     Awaiting nuclear scan lower extremities.  Noted that patient received contrast exam by a CT scan of the chest that ruled out PE.     It is my understanding that if there is concern about chronic PE, VQ scan would be test of choice however in this case she already had a CT angiogram that did not show evidence of pulmonary embolism and from note reviewed from Dr. Bella, we are not looking after chronic PE.  Therefore I will discontinue the VQ scan.     Despite contrast exam, renal function improving.  Will continue on monitoring renal recovery     Continue to hold off on ACE inhibitor, beta-blocker for now due to low normal blood pressure and ISHA.  Maintain systolic blood pressure to reasonable degree to allow for renal perfusion     Venous ultrasound lower extremities negative, anticipate that she will only need anticoagulant for DVT prophylaxis  Medications reviewed    Scheduled Medication   enoxaparin sodium, 1 mg/kg, Subcutaneous, Daily  gabapentin, 100 mg, Oral, 4x Daily  sodium chloride, 10 mL, Intravenous, Q12H         Agree with PT OT evaluation to check on functional capacity to return at an assisted living situation.       Conditions and Status  Fair     [facility] Data  External documents reviewed: Reviewed epic record  Cardiac tracing (EKG, telemetry) interpretation: EKG showed sinus rhythm with first-degree AV block and LVH.  Isolated T wave inversion in lead  "III  Radiology interpretation: Reviewed as outlined above  Labs reviewed: Yes  Any tests that were considered but not ordered: None     Decision rules/scores evaluated (example [id number], Wells, etc): -     Discussed with: Patient and nursing staff     Care Planning  Shared decision making: Patient  Code status and discussions: No CPR, DO NOT INTUBATE, no cardioversion        Disposition  Social Determinants of Health that impact treatment or disposition: None identified at this time  I expect the patient to be discharged to (TBD)   Probably in 1 to 2 days  Monitor renal function recovery and means to prevent recurrent ISHA  Monitor blood pressure  Awaiting completion of workup        Electronically signed by Domo Elliott MD, 03/02/25, 11:12 CST.                   Rodrigo Castro MD   Physician  Emergency Medicine     ED Provider Notes     Signed     Date of Service: 03/01/25 1835  Creation Time: 03/01/25 1835     Signed       Expand All Collapse All    EMERGENCY DEPARTMENT ATTENDING NOTE     Patient Name: Jose Alfredo Kirkpatrick         Chief Complaint   Patient presents with    Fatigue         PATIENT PRESENTATION:  Jose Alfredo Kirkpatrick is a very pleasant 89 y.o. male with history of laparoscopic cholecystectomy about a month ago with Dr. Roche as well as .  Recently diagnosed atrial fibrillation now on anticoagulation presenting emergency department due to generalized weakness.        History is obtained by the patient and corroborated by his daughter at the bedside.  He states that he really has not been eating or drinking much she states she is post to drink boost and often forgets but has not really been drinking or eating anything.  He denies any abdominal pain from his fairly recent surgery.  Denies any chest pain or shortness of breath.  Daughter states that he just been more \"foggy \"not today altered but just slightly not his normal self/worried he might have some kind of infection.  No history of " "urinary tract infections.  Reportedly tested positive for COVID a week ago and so could not go to his follow-up appointment for his surgery but not having abdominal pain.        PHYSICAL EXAM:   VS: /61 (BP Location: Left arm, Patient Position: Lying)   Pulse 58   Temp 98.1 °F (36.7 °C) (Oral)   Resp 18   Ht 188 cm (74\")   Wt 79 kg (174 lb 2.6 oz)   SpO2 94%   BMI 22.36 kg/m²   GENERAL: Chronically ill-appearing elderly gentleman sitting in stretcher no acute distress; well-nourished, well-developed, awake, alert, no acute distress, nontoxic appearing, comfortable  EYES: PERRL, sclerae anicteric, extraocular movements grossly intact, symmetric lids  EARS, NOSE, MOUTH, THROAT: atraumatic external nose and ears, moist mucous membranes  NECK: symmetric, trachea midline  RESPIRATORY: unlabored respiratory effort, clear to auscultation bilaterally, good air movement  CARDIOVASCULAR: no murmurs, peripheral pulses 2+ and equal in all extremities  GI: soft, nontender, nondistended  MUSCULOSKELETAL/EXTREMITIES: extremities without obvious deformity  SKIN: warm and dry with no obvious rashes  NEUROLOGIC: moving all 4 extremities symmetrically, CN II-XII grossly intact; awake and alert  PSYCHIATRIC: alert, pleasant and cooperative. Appropriate mood and affect.        MEDICAL DECISION MAKING:     Jose Alfredo Kirkpatrick is a 89 y.o. male who presented to the ED due to generalized weakness in the setting of decreased p.o. intake and also with reported positive COVID test 1 week prior.  Patient fairly well-appearing with slightly soft blood pressures although still with MAP normal for age afebrile no tachycardia no tachypnea no hypoxia.  Patient quite thin a lot of decreased p.o. intake so dehydration definitely could be contributing some element of delirium which could be secondary to dehydration as well so obtaining fairly broad workup.  CT head shows no acute findings.  Chest x-ray shows no acute findings.  On my " initial evaluation I suspect dehydration's initiate IV fluids given family confirmed patient is no history of heart failure but labs came back with profoundly elevated BNP and elevated troponin as well as show I suspect this is acute heart failure.  He is COVID-positive.  Ultimately proceed CT angio of the chest to ensure no PE and there is no PE.  Patient became hypoxic and fluids due to clear heart failure so initiated Lasix to remove that fluid and ultimately we will definitely be admitting patient for acute heart failure.  Did not initiate antibiotics as patient is not septic and there is no pneumonia seen on CT.  Case discussed with the inpatient hospitalist, Dr. Bella, patient was admitted to his service for further management.  Told that I initially given IV fluids and chasing with Lasix as this is clearly new heart failure so we will likely need additional diuresis and he expressed understanding as well.     Differential Diagnosis Considered: Delirium, dementia, dehydration, urinary tract infection, acute kidney injury, viral illness such as COVID influenza, new heart failure, atypical ACS                       nal Result       1.  Moderate ventricular dilation redemonstrated with low-attenuation in   the subcortical and periventricular white matter, likely related to   chronic microvascular change. Normal pressure hydrocephalus would be   another consideration. Overall, similar to the exam from 1/25/2025.       2.  Paranasal sinus disease as discussed above.       This report was signed and finalized on 3/1/2025 7:56 PM by Dr. Jacky Hall MD.           XR Chest 1 View   Final Result       1.  Large hiatal hernia with associated LEFT basilar atelectasis.       2.  Shallow inspiration but no definite acute airspace consolidation.       3.  Redemonstration of tortuous ectatic thoracic aorta, similar to the   prior study.               This report was signed and finalized on 3/1/2025 7:20 PM by   Jacky Hall MD.           NM Lung Ventilation Perfusion    (Results Pending)         Internal chart review:   Medical History        Past Medical History:   Diagnosis Date    Arthritis      Cataract       BILATERAL    GERD (gastroesophageal reflux disease)      History of transfusion      Hx of colonic polyps      Hypertension      Reflux esophagitis      Streptococcosis       IN SPINE FROM BACK INJECTIONS            Surgical History         Past Surgical History:   Procedure Laterality Date    BACK SURGERY         cervical    CATARACT EXTRACTION Bilateral      CHOLECYSTECTOMY WITH INTRAOPERATIVE CHOLANGIOGRAM N/A 1/27/2025     Procedure: LAPAROSCOPIC CHOLECYSTECTOMY INTRAOPERATIVE CHOLANGIOGRAM WITH DAVINCI ROBOT;  Surgeon: Ronak Roche MD;  Location:  [facility] OR;  Service: Robotics - DaVinci;  Laterality: N/A;    COLONOSCOPY   02/11/2013     Hyperplastic polyp at 25 cm, Diverticulosis repeat exam in 5 years    COLONOSCOPY N/A 10/16/2018     Tubular adenoma ascending colon repet prn    ENDOSCOPY   01/15/2014     HH    ENDOSCOPY N/A 10/03/2019     Large HH, non-bleeding gastric ulcer    ENDOSCOPY N/A 11/25/2019     Procedure: ESOPHAGOGASTRODUODENOSCOPY WITH ANESTHESIA;  Surgeon: Marques Pérez MD;  Location:  [facility] ENDOSCOPY;  Service: Gastroenterology    ENDOSCOPY N/A 2/9/2023     Procedure: ESOPHAGOGASTRODUODENOSCOPY WITH ANESTHESIA;  Surgeon: Sushant Edmond MD;  Location: [facility] ENDOSCOPY;  Service: Gastroenterology;  Laterality: N/A;  pre GI bleed  post hiatal hernia; esophagitis  Otilio Ramos MD    HERNIA REPAIR Right       INGUINAL     HIP FRACTURE SURGERY Right 02/04/2023    REPLACEMENT TOTAL KNEE BILATERAL        THORACIC LAMINECTOMY DECOMPRESSIVE POSTERIOR N/A 12/14/2016     Procedure: LAMINECTOMY DECOMPRESSION, UNINSTRUMENTED POSTERIOR SPINAL FUSION, T 11-12;  Surgeon: LUZ Adan MD;  Location: [facility] OR;  Service:     VENA CAVA FILTER INSERTION N/A 10/04/2019     Procedure: VENA CAVA  FILTER INSERTION;  Surgeon: Dallin Coronado MD;  Location: [facility] OR 12;  Service: Vascular            Allergies        Allergies   Allergen Reactions    Meloxicam Unknown - Low Severity              Current Medications       y EKG interpretation: Normal sinus rhythm with a rate of 60 with first-degree AV block.  Otherwise no acute ST elevations ST depressions or T wave inversions.     My lab interpretation: Troponin 83 with repeat 80 with negative delta.  .  Positive COVID testing.  Normal with blood count hemoglobin.  CMP with elevated creatinine to 2 from 1 consistent acute kidney injury.  Urinalysis otherwise with no evidence of infection.  Normal mag.     My imaging interpretation: Chest x-ray with hiatal hernia but otherwise no acute findings.  CT head with some ventricular dilation possible normal pressure hydrocephalus though no other acute findings.  CT angiogram of the chest with no evidence of pulmonary embolism.       D Diagnosis:  (I50.9) Acute heart failure, unspecified heart failure type     (R53.1) Generalized weakness     (Z86.0100) History of colonic polyps     (R41.82) Altered mental status, unspecified altered mental status type     (R03.1) Low blood pressure reading     (R63.8) Decreased oral intake     (Z90.49) History of laparoscopic cholecystectomy     (I44.0) 1st degree AV block     (Z86.16) History of COVID-19     (K44.9) Hiatal hernia     (R79.89) Elevated troponin        Expand All Collapse All    EMERGENCY DEPARTMENT ATTENDING NOTE     Patient Name: Jose Alfredo Kirkpatrick         Chief Complaint   Patient presents with    Fatigue         PATIENT PRESENTATION:  Jose Alfredo Kirkpatrick is a very pleasant 89 y.o. male with history of laparoscopic cholecystectomy about a month ago with Dr. Roche as well as .  Recently diagnosed atrial fibrillation now on anticoagulation presenting emergency department due to generalized weakness.        History is obtained by the patient and  "corroborated by his daughter at the bedside.  He states that he really has not been eating or drinking much she states she is post to drink boost and often forgets but has not really been drinking or eating anything.  He denies any abdominal pain from his fairly recent surgery.  Denies any chest pain or shortness of breath.  Daughter states that he just been more \"foggy \"not today altered but just slightly not his normal self/worried he might have some kind of infection.  No history of urinary tract infections.  Reportedly tested positive for COVID a week ago and so could not go to his follow-up appointment for his surgery but not having abdominal pain.        PHYSICAL EXAM:   VS: /61 (BP Location: Left arm, Patient Position: Lying)   Pulse 58   Temp 98.1 °F (36.7 °C) (Oral)   Resp 18   Ht 188 cm (74\")   Wt 79 kg (174 lb 2.6 oz)   SpO2 94%   BMI 22.36 kg/m²   GENERAL: Chronically ill-appearing elderly gentleman sitting in stretcher no acute distress; well-nourished, well-developed, awake, alert, no acute distress, nontoxic appearing, comfortable  EYES: PERRL, sclerae anicteric, extraocular movements grossly intact, symmetric lids  EARS, NOSE, MOUTH, THROAT: atraumatic external nose and ears, moist mucous membranes  NECK: symmetric, trachea midline  RESPIRATORY: unlabored respiratory effort, clear to auscultation bilaterally, good air movement  CARDIOVASCULAR: no murmurs, peripheral pulses 2+ and equal in all extremities  GI: soft, nontender, nondistended  MUSCULOSKELETAL/EXTREMITIES: extremities without obvious deformity  SKIN: warm and dry with no obvious rashes  NEUROLOGIC: moving all 4 extremities symmetrically, CN II-XII grossly intact; awake and alert  PSYCHIATRIC: alert, pleasant and cooperative. Appropriate mood and affect.        MEDICAL DECISION MAKING:     Jose Alfredo Kirkpatrick is a 89 y.o. male who presented to the ED due to generalized weakness in the setting of decreased p.o. intake and also " with reported positive COVID test 1 week prior.  Patient fairly well-appearing with slightly soft blood pressures although still with MAP normal for age afebrile no tachycardia no tachypnea no hypoxia.  Patient quite thin a lot of decreased p.o. intake so dehydration definitely could be contributing some element of delirium which could be secondary to dehydration as well so obtaining fairly broad workup.  CT head shows no acute findings.  Chest x-ray shows no acute findings.  On my initial evaluation I suspect dehydration's initiate IV fluids given family confirmed patient is no history of heart failure but labs came back with profoundly elevated BNP and elevated troponin as well as show I suspect this is acute heart failure.  He is COVID-positive.  Ultimately proceed CT angio of the chest to ensure no PE and there is no PE.  Patient became hypoxic and fluids due to clear heart failure so initiated Lasix to remove that fluid and ultimately we will definitely be admitting patient for acute heart failure.  Did not initiate antibiotics as patient is not septic and there is no pneumonia seen on CT.  Case discussed with the inpatient hospitalist, Dr. Bella, patient was admitted to his service for further management.  Told that I initially given IV fluids and chasing with Lasix as this is clearly new heart failure so we will likely need additional diuresis and he expressed understanding as well.     Differential Diagnosis Considered: Delirium, dementia, dehydration, urinary tract infection, acute kidney injury, viral illness such as COVID influenza, new heart failure, atypical ACS     Labs Ordered:        Labs Reviewed   COVID-19 AND FLU A/B, NP SWAB IN TRANSPORT MEDIA 1 HR TAT - Abnormal; Notable for the following components:       Result Value      COVID19 Detected (*)       All other components within normal limits     Narrative:      Fact sheet for providers: https://www.fda.gov/media/528671/download     Fact sheet  for patients: https://www.fda.gov/media/464456/download     Test performed by PCR.  Influenza A and Influenza B negative results should be considered presumptive in samples that have a positive SARS-CoV-2 result.     Competitive inhibition studies showed that SARS-CoV-2 virus, when present at concentrations above 3.6E+04 copies/mL, can inhibit the detection and amplification of influenza A and influenza B virus RNA if present at or below 1.8E+02 copies/mL or 4.9E+02 copies/mL, respectively, and may lead to false negative influenza virus results. If co-infection with influenza A or influenza B virus is suspected in samples with a positive SARS-CoV-2 result, the sample should be re-tested with another FDA cleared, approved, or authorized influenza test, if influenza virus detection would change clinical management.   URINALYSIS W/ CULTURE IF INDICATED - Abnormal; Notable for the following components:     Color, UA Dark Yellow (*)       Specific Gravity, UA >1.030 (*)       Ketones, UA 15 mg/dL (1+) (*)       Bilirubin, UA Small (1+) (*)       Protein, UA >=300 mg/dL (3+) (*)       Leuk Esterase, UA Trace (*)       All other components within normal limits     Narrative:      In absence of clinical symptoms, the presence of pyuria, bacteria, and/or nitrites on the urinalysis result does not correlate with infection.   COMPREHENSIVE METABOLIC PANEL - Abnormal; Notable for the following components:     Glucose 154 (*)       Creatinine 2.06 (*)       eGFR 30.4 (*)       All other components within normal limits     Narrative:      GFR Categories in Chronic Kidney Disease (CKD)                          GFR Category          GFR (mL/min/1.73)    Interpretation  G1                                90 or greater               Normal or high (1)  G2                                            60-89                Mild decrease (1)  G3a                   45-59                Mild to moderate decrease  G3b                   30-44                 Moderate to severe decrease  G4                    15-29                Severe decrease  G5                    14 or less           Kidney failure                                                                   (1)In the absence of evidence of kidney disease, neither GFR category G1 or G2 fulfill the criteria for CKD.     eGFR calculation 2021 CKD-EPI creatinine equation, which does not include race as a factor   BNP (IN-HOUSE) - Abnormal; Notable for the following components:     proBNP 5,982.0 (*)       All other components within normal limits     Narrative:      This assay is used as an aid in the diagnosis of individuals suspected of having heart failure. It can be used as an aid in the diagnosis of acute decompensated heart failure (ADHF) in patients presenting with signs and symptoms of ADHF to the emergency department (ED). In addition, NT-proBNP of <300 pg/mL indicates ADHF is not likely.     Age Range Result Interpretation  NT-proBNP Concentration (pg/mL:        <50             Positive            >450                             Gray                 300-450                               Negative                 <300     50-75           Positive            >900                  Gray                300-900                  Negative            <300        >75             Positive            >1800                  Gray                300-1800                  Negative            <300   CBC WITH AUTO DIFFERENTIAL - Abnormal; Notable for the following components:     MCV 97.7 (*)       MCHC 31.4 (*)       All other components within normal limits   TROPONIN - Abnormal; Notable for the following components:     HS Troponin T 83 (*)       All other components within normal limits     Narrative:      High Sensitive Troponin T Reference Range:  <14.0 ng/L- Negative Female for AMI  <22.0 ng/L- Negative Male for AMI  >=14 - Abnormal Female indicating possible myocardial injury.  >=22 - Abnormal Male  indicating possible myocardial injury.   Clinicians would have to utilize clinical acumen, EKG, Troponin, and serial changes to determine if it is an Acute Myocardial Infarction or myocardial injury due to an underlying chronic condition.         LIPASE - Abnormal; Notable for the following components:     Lipase 66 (*)       All other components within normal limits   HIGH SENSITIVITIY TROPONIN T 1HR - Abnormal; Notable for the following components:     HS Troponin T 80 (*)       All other components within normal limits     Narrative:      High Sensitive Troponin T Reference Range:  <14.0 ng/L- Negative Female for AMI  <22.0 ng/L- Negative Male for AMI  >=14 - Abnormal Female indicating possible myocardial injury.  >=22 - Abnormal Male indicating possible myocardial injury.   Clinicians would have to utilize clinical acumen, EKG, Troponin, and serial changes to determine if it is an Acute Myocardial Infarction or myocardial injury due to an underlying chronic condition.         BASIC METABOLIC PANEL - Abnormal; Notable for the following components:     Creatinine 1.79 (*)       Calcium 8.4 (*)       eGFR 36.0 (*)       All other components within normal limits     Narrative:      GFR Categories in Chronic Kidney Disease (CKD)                          GFR Category          GFR (mL/min/1.73)    Interpretation  G1                                90 or greater               Normal or high (1)  G2                                            60-89                Mild decrease (1)  G3a                   45-59                Mild to moderate decrease  G3b                   30-44                Moderate to severe decrease  G4                    15-29                Severe decrease  G5                    14 or less           Kidney failure                                                                   (1)In the absence of evidence of kidney disease, neither GFR category G1 or G2 fulfill the criteria for CKD.     eGFR  "calculation 2021 CKD-EPI creatinine equation, which does not include race as a factor   CBC WITH AUTO DIFFERENTIAL - Abnormal; Notable for the following components:     RBC 4.01 (*)       Hemoglobin 12.6 (*)       Eosinophils, Absolute 0.42 (*)       All other components within normal limits   D-DIMER, QUANTITATIVE - Abnormal; Notable for the following components:     D-Dimer, Quantitative 2.32 (*)       All other components within normal limits     Narrative:      According to the assay 's published package insert, a normal (<0.50 MCGFEU/mL) D-dimer result in conjunction with a non-high clinical probability assessment, excludes deep vein thrombosis (DVT) and pulmonary embolism (PE) with high sensitivity.     D-dimer values increase with age and this can make VTE exclusion of an older population difficult. To address this, the [employer], based on best available evidence and recent guidelines, recommends that clinicians use age-adjusted D-dimer thresholds in patients greater than 50 years of age with: a) a low probability of PE who do not meet all Pulmonary Embolism Rule Out Criteria, or b) in those with intermediate probability of PE.   The formula for an age-adjusted D-dimer cut-off is \"age/100\".  For example, a 60 year old patient would have an age-adjusted cut-off of 0.60 MCGFEU/mL and an 80 year old 0.80 MCGFEU/mL.   MAGNESIUM - Normal   MAGNESIUM - Normal   PHOSPHORUS - Normal   TSH - Normal   T4, FREE - Normal   RAINBOW DRAW     Narrative:      The following orders were created for panel order [place] Draw.  Procedure                               Abnormality         Status                     ---------                               -----------         ------                     Green Top (Gel)[[id number]]                                  Final result               Lavender Top[[id number]]                                     Final result               Red Top[[id number]]           "                                Final result               Light Blue Top[[id number]]                                   Final result                  Please view results for these tests on the individual orders.   RAINBOW URINE   URINALYSIS, MICROSCOPIC ONLY   GREEN TOP   LAVENDER TOP   RED TOP   LIGHT BLUE TOP   CBC AND DIFFERENTIAL     Narrative:      The following orders were created for panel order CBC & Differential.  Procedure                               Abnormality         Status                     ---------                               -----------         ------                     CBC Auto Differential[[id number]]        Abnormal            Final result                  Please view results for these tests on the individual orders.   CBC AND DIFFERENTIAL     Narrative:      The following orders were created for panel order CBC & Differential.  Procedure                               Abnormality         Status                     ---------                               -----------         ------                     CBC Auto Differential[[id number]]        Abnormal            Final result                  Please view results for these tests on the individual orders.         Imaging Ordered:   CT Angiogram Chest   Final Result       1.  No evidence of pulmonary embolus or acute cardiopulmonary process.       2.  Very large hiatal hernia with adjacent compressive atelectasis in   the LEFT lower lung.       3.  Cardiomegaly and moderate atherosclerotic vascular disease in the   coronary arteries.               This report was signed and finalized on 3/1/2025 9:08 PM by Dr. Jacky Hall MD.           CT Head Without Contrast   Final Result       1.  Moderate ventricular dilation redemonstrated with low-attenuation in   the subcortical and periventricular white matter, likely related to   chronic microvascular change. Normal pressure hydrocephalus would be   another consideration. Overall, similar to the exam  from 1/25/2025.       2.  Paranasal sinus disease as discussed above.       This report was signed and finalized on 3/1/2025 7:56 PM by Dr. Jacky Hall MD.           XR Chest 1 View   Final Result       1.  Large hiatal hernia with associated LEFT basilar atelectasis.       2.  Shallow inspiration but no definite acute airspace consolidation.       3.  Redemonstration of tortuous ectatic thoracic aorta, similar to the   prior study.               This report was signed and finalized on 3/1/2025 7:20 PM by Dr. Jacky Hall MD.           NM Lung Ventilation Perfusion    (Results Pending)         Internal chart review:   Medical History        Past Medical History:   Diagnosis Date    Arthritis      Cataract       BILATERAL    GERD (gastroesophageal reflux disease)      History of transfusion      Hx of colonic polyps      Hypertension      Reflux esophagitis      Streptococcosis       IN SPINE FROM BACK INJECTIONS            Surgical History         Past Surgical History:   Procedure Laterality Date    BACK SURGERY         cervical    CATARACT EXTRACTION Bilateral      CHOLECYSTECTOMY WITH INTRAOPERATIVE CHOLANGIOGRAM N/A 1/27/2025     Procedure: LAPAROSCOPIC CHOLECYSTECTOMY INTRAOPERATIVE CHOLANGIOGRAM WITH DAVINCI ROBOT;  Surgeon: Ronak Roche MD;  Location: [facility] OR;  Service: Robotics - DaVinci;  Laterality: N/A;    COLONOSCOPY   02/11/2013     Hyperplastic polyp at 25 cm, Diverticulosis repeat exam in 5 years    COLONOSCOPY N/A 10/16/2018     Tubular adenoma ascending colon repet prn    ENDOSCOPY   01/15/2014     HH    ENDOSCOPY N/A 10/03/2019     Large HH, non-bleeding gastric ulcer    ENDOSCOPY N/A 11/25/2019     Procedure: ESOPHAGOGASTRODUODENOSCOPY WITH ANESTHESIA;  Surgeon: Marques Pérez MD;  Location: [facility] ENDOSCOPY;  Service: Gastroenterology    ENDOSCOPY N/A 2/9/2023     Procedure: ESOPHAGOGASTRODUODENOSCOPY WITH ANESTHESIA;  Surgeon: Sushant Edmond MD;  Location: [facility] ENDOSCOPY;   Service: Gastroenterology;  Laterality: N/A;  pre GI bleed  post hiatal hernia; esophagitis  Otilio Ramos MD    HERNIA REPAIR Right       INGUINAL     HIP FRACTURE SURGERY Right 02/04/2023    REPLACEMENT TOTAL KNEE BILATERAL        THORACIC LAMINECTOMY DECOMPRESSIVE POSTERIOR N/A 12/14/2016     Procedure: LAMINECTOMY DECOMPRESSION, UNINSTRUMENTED POSTERIOR SPINAL FUSION, T 11-12;  Surgeon: LUZ Adan MD;  Location:  [facility] OR;  Service:     VENA CAVA FILTER INSERTION N/A 10/04/2019     Procedure: VENA CAVA FILTER INSERTION;  Surgeon: Dallin Coronado MD;  Location:  [facility] HYBRID OR 12;  Service: Vascular            Allergies        Allergies   Allergen Reactions    Meloxicam Unknown - Low Severity              Current Medications      Current Facility-Administered Medications:     acetaminophen (TYLENOL) tablet 650 mg, 650 mg, Oral, Q4H PRN **OR** acetaminophen (TYLENOL) 160 MG/5ML oral solution 650 mg, 650 mg, Oral, Q4H PRN **OR** acetaminophen (TYLENOL) suppository 650 mg, 650 mg, Rectal, Q4H PRN, Ariel Bella MD    sennosides-docusate (PERICOLACE) 8.6-50 MG per tablet 2 tablet, 2 tablet, Oral, BID PRN **AND** polyethylene glycol (MIRALAX) packet 17 g, 17 g, Oral, Daily PRN **AND** bisacodyl (DULCOLAX) EC tablet 5 mg, 5 mg, Oral, Daily PRN **AND** bisacodyl (DULCOLAX) suppository 10 mg, 10 mg, Rectal, Daily PRN, Ariel Bella MD    enoxaparin sodium (LOVENOX) syringe 80 mg, 1 mg/kg, Subcutaneous, Daily, Ariel Bella MD, 80 mg at 03/02/25 0009    gabapentin (NEURONTIN) capsule 100 mg, 100 mg, Oral, 4x Daily, Ariel Bella MD, 100 mg at 03/02/25 0006    nitroglycerin (NITROSTAT) SL tablet 0.4 mg, 0.4 mg, Sublingual, Q5 Min PRN, Ariel Bella MD    oxyCODONE-acetaminophen (PERCOCET)  MG per tablet 1 tablet, 1 tablet, Oral, Q6H PRN, Ariel Bella MD    [COMPLETED] Insert Peripheral IV, , , Once **AND** sodium chloride 0.9 %  flush 10 mL, 10 mL, Intravenous, PRN, Ariel Bella MD    sodium chloride 0.9 % flush 10 mL, 10 mL, Intravenous, Q12H, Ariel Bella MD, 10 mL at 03/02/25 0006    sodium chloride 0.9 % flush 10 mL, 10 mL, Intravenous, PRNJena Mariano Ariel, MD    sodium chloride 0.9 % infusion 40 mL, 40 mL, Intravenous, PRNJena Mariano Ariel, MD    sodium chloride 0.9 % infusion, 50 mL/hr, Intravenous, Continuous, Ariel Bella MD, Last Rate: 50 mL/hr at 03/02/25 0006, 50 mL/hr at 03/02/25 0006        External documents reviewed: Reviewed most recent echocardiogram report which was performed on January 20, 2025 which showed an EF of 61 to 65% moderate concentric left ventricular hypertrophy some mild aortic valve stenosis but otherwise no evidence of heart failure.     My EKG interpretation: Normal sinus rhythm with a rate of 60 with first-degree AV block.  Otherwise no acute ST elevations ST depressions or T wave inversions.     My lab interpretation: Troponin 83 with repeat 80 with negative delta.  .  Positive COVID testing.  Normal with blood count hemoglobin.  CMP with elevated creatinine to 2 from 1 consistent acute kidney injury.  Urinalysis otherwise with no evidence of infection.  Normal mag.     My imaging interpretation: Chest x-ray with hiatal hernia but otherwise no acute findings.  CT head with some ventricular dilation possible normal pressure hydrocephalus though no other acute findings.  CT angiogram of the chest with no evidence of pulmonary embolism.        ED Diagnosis:  (I50.9) Acute heart failure, unspecified heart failure type     (R53.1) Generalized weakness     (Z86.0100) History of colonic polyps     (R41.82) Altered mental status, unspecified altered mental status type     (R03.1) Low blood pressure reading     (R63.8) Decreased oral intake     (Z90.49) History of laparoscopic cholecystectomy     (I44.0) 1st degree AV block     (Z86.16) History of  COVID-19     (K44.9) Hiatal hernia     (R79.89) Elevated troponin     (R79.89) Elevated brain natriuretic peptide (BNP) level     (R09.02) Hypoxia      Disposition: admit to hospitalist           Signed:  Rodrigo Castro MD  Emergency Medicine Physician     Please note that portions of this note were completed with a voice recognition program.      Rodrigo Castro MD  03/02/25 0218             03/01/25 2011 -- 53 -- -- -- -- -- 87 Abnormal        /Time Temp Pulse Resp BP Patient Position Device (Oxygen Therapy) Flow (L/min) (Oxygen Therapy) SpO2   03/03/25 0837 98.3 (36.8) 60 17 198/77 Abnormal  Lying room air -- 97   03/03/25 0442 97.9 (36.6) 64 16 175/77 Lying room air -- 97   03/02/25 2032 -- -- -- -- -- room air -- --   03/02/25 2014 -- 58 16 123/62 Lying room air -- 94   03/02/25 1100 97.3 (36.3) 66 16 133/68 Sitting room air -- 96   03/02/25 0759 -- -- -- -- -- room air -- --   03/02/25 0700 97.8 (36.6) 59 18 138/72 Lying room air -- 91   03/02/25 0529 97.8 (36.6) 62 16 145/60 Lying room air -- 93   03/02/25 0013 -- -- -- -- -- room air -- 94   03/01/25 2208 98.1 (36.7) 58 18 120/61 Lying nasal cannula 2 97   03/01/25 2200 -- -- -- -- -- nasal cannula 2 --   03/               Basic Metabolic Panel [LAB15] (Order [id number])  Order  Date: 3/2/2025 Department: [address] Released By/Authorizing: Ariel Bella MD (auto-released)     Reprint Order Requisition    Basic Metabolic Panel (Order #[id number]) on 3/2/25         Contains abnormal data Basic Metabolic Panel  Order: [id number]  Status: Final result       Visible to patient: Yes (seen)       Next appt: None    Specimen Information: Blood   0 Result Notes            Component  Ref Range & Units 02:48  (3/3/25) 1 d ago  (3/2/25) 2 d ago  (3/1/25) 1 mo ago  (1/30/25) 1 mo ago  (1/29/25) 1 mo ago  (1/28/25) 1 mo ago  (1/27/25)   Glucose  65 - 99 mg/dL 109 High  92 154 High  110 High  126 High  92 86   BUN  8 - 23 mg/dL 17 22 21  15 9 11 19   Creatinine  0.76 - 1.27 mg/dL 1.03 1.79 High  2.06 High  0.82 0.56 Low  0.74 Low  0.83   Sodium  136 - 145 mmol/L 139 137 140 138 135 Low  137 139   Potassium  3.5 - 5.2 mmol/L 3.9 3.5 4.0 CM 4.1 3.9 CM 3.9 4.3   Chloride  98 - 107 mmol/L 103 103 103 102 98 99 107   CO2  22.0 - 29.0 mmol/L 26.0 24.0 23.0 25.0 22.0 22.0 21.0 Low    Calcium  8.6 - 10.5 mg/dL 8.3 Low  8.4 Low  8.9 9.0 9.1 9.4 8.4 Low    BUN/Creatinine Ratio  7.0 - 25.0 16.5 12.3 10.2 18.3 16.1 14.9 22.9   Anion Gap  5.0 - 15.0 mmol/L 10.0 10.0 14.0 11.0 15.0 16.0 High  11.0   eGFR  >60.0 mL/min/1.73 69.                Current Facility-Administered Medications   Medication Dose Route Frequency Provider Last Rate Last Admin    acetaminophen (TYLENOL) tablet 650 mg  650 mg Oral Q4H PRN Ariel Bella MD        Or    acetaminophen (TYLENOL) 160 MG/5ML oral solution 650 mg  650 mg Oral Q4H PRN Ariel Bella MD        Or    acetaminophen (TYLENOL) suppository 650 mg  650 mg Rectal Q4H PRN Ariel Bella MD        sennosides-docusate (PERICOLACE) 8.6-50 MG per tablet 2 tablet  2 tablet Oral BID PRN Ariel Bella MD        And    polyethylene glycol (MIRALAX) packet 17 g  17 g Oral Daily PRN Ariel Bella MD        And    bisacodyl (DULCOLAX) EC tablet 5 mg  5 mg Oral Daily PRN Ariel Bella MD        And    bisacodyl (DULCOLAX) suppository 10 mg  10 mg Rectal Daily PRN Ariel Bella MD        enoxaparin sodium (LOVENOX) syringe 80 mg  1 mg/kg Subcutaneous Daily Ariel Bella MD   80 mg at 03/03/25 0843    gabapentin (NEURONTIN) capsule 100 mg  100 mg Oral 4x Daily Ariel Bella MD   100 mg at 03/03/25 0843    nitroglycerin (NITROSTAT) SL tablet 0.4 mg  0.4 mg Sublingual Q5 Min PRN Ariel Bella MD        oxyCODONE-acetaminophen (PERCOCET)  MG per tablet 1 tablet  1 tablet Oral Q6H PRN Ariel Bella MD   1 tablet at 03/02/25  0838    sodium chloride 0.9 % flush 10 mL  10 mL Intravenous PRN PolidoAriel pedro MD        sodium chloride 0.9 % flush 10 mL  10 mL Intravenous Q12H Ariel Bella MD   10 mL at 03/03/25 0843    sodium chloride 0.9 % flush 10 mL  10 mL Intravenous PRN PolidoAriel pedro MD        sodium chloride 0.9 % infusion 40 mL  40 mL Intravenous PRN PolidoAriel pedro MD        sodium chloride 0.9 % infusion  50 mL/hr Intravenous Continuous PolidoAriel pedro MD 50 mL/hr at 03/02/25 0006 50 mL/hr at 03/02/25 0006

## 2025-03-03 NOTE — CONSULTS
Adult Nutrition  Assessment/PES    Patient Name:  Jose Alfredo Kirkpatrick  YOB: 1935  MRN: [id number]  Admit Date:  3/1/2025    Assessment Date:  3/3/2025   Reason for Assessment       Row Name 03/03/25 1220          Reason for Assessment    Reason For Assessment physician consult     Diagnosis renal disease;cardiac disease  COVID 19     Identified At Risk by Screening Criteria reduced oral intake over the last month;difficulty chewing/swallowing               Nutrition/Diet History       Row Name 03/03/25 1220          Nutrition/Diet History    Typical Intake (Food/Fluid/EN/PN) Pt in COVID isolation. Room phone not answered. Pt is a readmission, previously had a cholecystectomy a month ago. PMH includes Acute Disease or Injury-Related Malnutrition. Nutrition consult for decreased oral intake and difficulty chewing/swallowing. SLP following. Diet Textures: thin liquid, regular consistency food. Per recorded weights, pt was 188 lbs on 1/25/2025 (standing scale); current weight is 174 lbs (bed scale), reflecting an 8% weight loss in 3 months. Per provider notes, pt has had COVID since last week, presenting with weakness and poor appetite. IV fluids at 50 ml/hr due to ISHA. Will add Boost BID for additional nutrition. Will try to visit again during follow-up to gather more information. Will monitor     Food Intolerance(s) NKFA     Factors Affecting Nutritional Intake appetite;respiratory difficulty/therapies;fatigue     Additional Information Advanced age               Labs/Tests/Procedures/Meds       Row Name 03/03/25 1237          Labs/Procedures/Meds    Lab Results Reviewed reviewed     Lab Results Comments Glu 109        Medications    Pertinent Medications Reviewed reviewed     Pertinent Medications Comments IV fluids 50ml/hr               Physical Findings       Row Name 03/03/25 1237          Physical Findings    Overall Physical Appearance Juan Score 15, missing teeth, room air                "Estimated/Assessed Needs - Anthropometrics       Row Name 03/03/25 1517 03/03/25 1238       Anthropometrics    Height 188 cm (74\") --    Weight 78.9 kg (174 lb) --    Calculation Weight -- 79 kg (174 lb 2.6 oz)       Estimated/Assessed Needs    Additional Documentation -- KCAL/KG (Group);Protein Requirements (Group);Fluid Requirements (Group)       KCAL/KG    KCAL/KG -- 25 Kcal/Kg (kcal);20 Kcal/Kg (kcal)    20 Kcal/Kg (kcal) -- 1580    25 Kcal/Kg (kcal) -- 1975       Protein Requirements    Weight Used For Protein Calculations -- 79 kg (174 lb 2.6 oz)    Est Protein Requirement Amount (gms/kg) -- 1.2 gm protein    Estimated Protein Requirements (gms/day) -- 94.8       Fluid Requirements    Fluid Requirements (mL/day) -- 1580              Nutrition Prescription Ordered       Row Name 03/03/25 1239          Nutrition Prescription PO    Fluid Consistency Thin     Common Modifiers Cardiac               Evaluation of Received Nutrient/Fluid Intake       Row Name 03/03/25 1239          Nutrient/Fluid Evaluation    Number of Days Evaluated 2 days        Fluid Intake Evaluation    Oral Fluid (mL) 720        PO Evaluation    Number of Days PO Intake Evaluated 2 days     Number of Meals 3     % PO Intake 67%            Problem/Interventions:   Problem 1       Row Name 03/03/25 1239          Nutrition Diagnoses Problem 1    Problem 1 Predicted Suboptimal Intake     Etiology (related to) Medical Diagnosis;Factors Affecting Nutrition     Pulmonary/Critical Care Other (comment)  COVID 19     Renal ISHA     Appetite Poor     Mental State/Condition Weakness     Signs/Symptoms (evidenced by) Report of Mnimal PO Intake;Unintended Weight Change;PO Intake     Percent (%) intake recorded 67 %     Over number of meals 3     Unintended Weight Change Loss     Number of Pounds Lost 14     Weight loss time period 3 month               Intervention Goal       Row Name 03/03/25 1244          Intervention Goal    General Meet nutritional needs " for age/condition;Disease management/therapy;Reduce/improve symptoms     PO Increase intake;Meet estimated needs;PO intake (%)     PO Intake % 75 %     Weight No significant weight loss               Nutrition Intervention       Row Name 03/03/25 1245          Nutrition Intervention    RD/Tech Action Follow Tx progress;Care plan reviewd;Recommend/ordered     Recommended/Ordered Supplement               Nutrition Prescription       Row Name 03/03/25 1245          Nutrition Prescription PO    PO Prescription Begin/change supplement     Supplement Boost     Supplement Frequency 2 times a day               Education/Evaluation       Row Name 03/03/25 1245          Education    Education No discharge needs identified at this time        Monitor/Evaluation    Monitor Per protocol            Electronically signed by:  Daly Machuca RDN, ELOISE  03/03/25 16:20 CST

## 2025-03-03 NOTE — PLAN OF CARE
Goal Outcome Evaluation:  Plan of Care Reviewed With: patient        Progress: improving  Outcome Evaluation: See note                  Treatment Assessment (SLP): improved (03/03/25 1406)  Treatment Assessment Comments (SLP): Discharge (03/03/25 1406)  Plan for Continued Treatment (SLP): treatment no longer indicated as all goals met (03/03/25 1406)

## 2025-03-03 NOTE — PROGRESS NOTES
[facility] Medicine Services  INPATIENT PROGRESS NOTE    Patient Name: Jose Alfredo Kirkpatrick  Date of Admission: 3/1/2025  Today's Date: 03/03/25  Length of Stay: 2  Primary Care Physician: Otilio Ramos MD    Subjective   Chief Complaint: follow-up weakness  HPI   Patient indicates that he has not worked with a physical therapist, but is eager to do so.  He was hoping that he would be able to leave the hospital today return to his assisted living facility.  He denies any respiratory symptoms.  Denies any other new or evolving symptoms related to COVID.  We discussed his preliminary duplex study including findings of a possible DVT involving his left lower extremity, and patient indicates that he has had a clot there in the past, and even questions some chronicity regarding this finding.    Review of Systems   All pertinent negatives and positives are as above. All other systems have been reviewed and are negative unless otherwise stated.     Objective    Temp:  [97.9 °F (36.6 °C)-98.4 °F (36.9 °C)] 98.4 °F (36.9 °C)  Heart Rate:  [58-70] 70  Resp:  [16-18] 18  BP: (123-198)/(62-99) 176/99  Physical Exam  Vitals reviewed.   Constitutional:       General: He is not in acute distress.     Appearance: He is not toxic-appearing.   HENT:      Mouth/Throat:      Mouth: Mucous membranes are moist.   Pulmonary:      Effort: Pulmonary effort is normal. No respiratory distress.      Comments: On room air; no work of breathing or accessory muscle use  Neurological:      General: No focal deficit present.      Mental Status: He is alert.      Motor: Weakness present.   Psychiatric:         Mood and Affect: Mood normal.         Results Review:  I have reviewed the labs, radiology results, and diagnostic studies.    Laboratory Data:   Results from last 7 days   Lab Units 03/03/25  0248 03/02/25  0016 03/01/25  1926   WBC 10*3/mm3 7.18 8.99 8.63   HEMOGLOBIN g/dL 12.3* 12.6* 13.1  "  HEMATOCRIT % 37.8 38.4 41.7   PLATELETS 10*3/mm3 188 177 197        Results from last 7 days   Lab Units 03/03/25  0248 03/02/25  0016 03/01/25  1926   SODIUM mmol/L 139 137 140   POTASSIUM mmol/L 3.9 3.5 4.0   CHLORIDE mmol/L 103 103 103   CO2 mmol/L 26.0 24.0 23.0   BUN mg/dL 17 22 21   CREATININE mg/dL 1.03 1.79* 2.06*   CALCIUM mg/dL 8.3* 8.4* 8.9   BILIRUBIN mg/dL  --   --  0.4   ALK PHOS U/L  --   --  75   ALT (SGPT) U/L  --   --  9   AST (SGOT) U/L  --   --  21   GLUCOSE mg/dL 109* 92 154*       Culture Data:   No results found for: \"BLOODCX\", \"URINECX\", \"WOUNDCX\", \"MRSACX\", \"RESPCX\", \"STOOLCX\"    Radiology Data:   Imaging Results (Last 24 Hours)       Procedure Component Value Units Date/Time    US Venous Doppler Lower Extremity Bilateral (duplex) [[id number]] Resulted: 03/03/25 0811     Updated: 03/03/25 1013            I have reviewed the patient's current medications.     Assessment/Plan   Assessment  Active Hospital Problems    Diagnosis     **Acute kidney injury     Acute DVT (deep venous thrombosis)     Generalized weakness     Elevated brain natriuretic peptide (BNP) level     Debility     COVID-19 virus infection     Hiatal hernia     Chronic bilateral low back pain without sciatica     Primary hypertension        Treatment Plan  Preliminary read on duplex study of left lower extremity reveals suspected  thrombus extending from mid to prox FV; follow-up final read  Transition Lovenox to twice daily dosing; likely will then transition to PO DOAC such as Eliquis starting tomorrow  Stop IVFs  BPs now trending up (BPs were soft on admission).  Patient takes Metoprolol and Lisinopril in outpatient setting.  HR noted to be upper 50s, low 60s.  I will start PO Norvasc.  PO Clonidine PRN per parameters  Steady improvement in renal function noted - Creatinine now down to 1.03 this AM  Follow-up Echo  PT and OT  With large hiatal hernia, will place him on trial of Pepcid  Dispo planning.  Patient currently " lives at [facility] and intends to return there at discharge - would like to see how he does with PT and OT      Medical Decision Making  Number and Complexity of problems: Moderate complexity      Conditions and Status        Condition is improving.     [facility] Data  External documents reviewed: none  Cardiac tracing (EKG, telemetry) interpretation: no new EKGs  Radiology interpretation: CTA Chest with no evidence of PE; no other acute infiltrates identified  Labs reviewed: as above  Any tests that were considered but not ordered: none     Decision rules/scores evaluated (example [id number], Wells, etc): none     Discussed with: patient and bedside nurse (Michelle)     Care Planning  Shared decision making: Discussed with patient with agreement to proceed with treatment plan as outlined  Code status and discussions: DO NOT RESUSCITATE    Disposition  Social Determinants of Health that impact treatment or disposition: None apparent at this time  I expect the patient to be discharged to: Hopefully he can return to his assisted living facility but would like to see how he does with PT and OT.  Medically, I anticipate patient will be ready for discharge soon, possibly even tomorrow.        Electronically signed by Julio Self MD, 03/03/25, 13:26 CST.

## 2025-03-04 VITALS
HEART RATE: 66 BPM | OXYGEN SATURATION: 95 % | TEMPERATURE: 98.1 F | HEIGHT: 74 IN | DIASTOLIC BLOOD PRESSURE: 63 MMHG | BODY MASS INDEX: 22.33 KG/M2 | WEIGHT: 174 LBS | SYSTOLIC BLOOD PRESSURE: 134 MMHG | RESPIRATION RATE: 16 BRPM

## 2025-03-04 PROCEDURE — 97165 OT EVAL LOW COMPLEX 30 MIN: CPT | Performed by: OCCUPATIONAL THERAPIST

## 2025-03-04 PROCEDURE — 25010000002 ENOXAPARIN PER 10 MG: Performed by: INTERNAL MEDICINE

## 2025-03-04 PROCEDURE — 97530 THERAPEUTIC ACTIVITIES: CPT

## 2025-03-04 RX ORDER — AMLODIPINE BESYLATE 5 MG/1
5 TABLET ORAL
Qty: 30 TABLET | Refills: 2 | Status: SHIPPED | OUTPATIENT
Start: 2025-03-05

## 2025-03-04 RX ORDER — FAMOTIDINE 20 MG/1
20 TABLET, FILM COATED ORAL DAILY
Qty: 30 TABLET | Refills: 2 | Status: SHIPPED | OUTPATIENT
Start: 2025-03-04

## 2025-03-04 RX ORDER — GABAPENTIN 100 MG/1
100 CAPSULE ORAL 4 TIMES DAILY
Qty: 120 CAPSULE | Refills: 0 | Status: SHIPPED | OUTPATIENT
Start: 2025-03-04 | End: 2025-04-03

## 2025-03-04 RX ORDER — CLONIDINE HYDROCHLORIDE 0.1 MG/1
0.1 TABLET ORAL EVERY 6 HOURS PRN
Qty: 30 TABLET | Refills: 0 | Status: SHIPPED | OUTPATIENT
Start: 2025-03-04

## 2025-03-04 RX ORDER — LISINOPRIL 40 MG/1
20 TABLET ORAL DAILY
Start: 2025-03-04

## 2025-03-04 RX ADMIN — AMLODIPINE BESYLATE 5 MG: 5 TABLET ORAL at 09:05

## 2025-03-04 RX ADMIN — FAMOTIDINE 20 MG: 20 TABLET, FILM COATED ORAL at 09:05

## 2025-03-04 RX ADMIN — ENOXAPARIN SODIUM 80 MG: 100 INJECTION SUBCUTANEOUS at 09:05

## 2025-03-04 RX ADMIN — GABAPENTIN 100 MG: 100 CAPSULE ORAL at 14:38

## 2025-03-04 RX ADMIN — GABAPENTIN 100 MG: 100 CAPSULE ORAL at 09:05

## 2025-03-04 RX ADMIN — Medication 10 ML: at 09:05

## 2025-03-04 NOTE — DISCHARGE SUMMARY
[facility]  DISCHARGE SUMMARY       Date of Admission: 3/1/2025  Date of Discharge:  3/4/2025  Primary Care Physician: Otilio Ramos MD    Presenting Problem/History of Present Illness:  Weakness    Final Discharge Diagnoses:  Active Hospital Problems    Diagnosis     **Acute kidney injury     Acute DVT (deep venous thrombosis)     Generalized weakness     Elevated brain natriuretic peptide (BNP) level     Debility     COVID-19 virus infection     Hiatal hernia     Chronic bilateral low back pain without sciatica     Primary hypertension        Pertinent Test Results:   Results for orders placed during the hospital encounter of 03/01/25    Adult Transthoracic Echo Complete W/ Cont if Necessary Per Protocol    Interpretation Summary    Left ventricular systolic function is normal. Left ventricular ejection fraction appears to be 56 - 60%.    Left ventricular wall thickness is consistent with mild to moderate concentric hypertrophy.    Normal right ventricular cavity size and systolic function noted.    Mild aortic valve regurgitation is present.    Moderate mitral valve regurgitation is present.      Imaging Results (All)       Procedure Component Value Units Date/Time    US Venous Doppler Lower Extremity Bilateral (duplex) [[id number]] Collected: 03/03/25 1534     Updated: 03/03/25 1538    Narrative:      History: Swelling       Impression:      Impression: There is evidence of deep venous thrombosis in the left  lower extremity.     Comments: Bilateral lower extremity venous duplex exam was performed  using color Doppler flow, Doppler waveform analysis, and grayscale  imaging, with and without compression. There is evidence of deep venous  thrombosis from the proximal to mid superficial femoral vein in the left  lower extremity. There is no evidence of deep venous thrombosis in the  right lower extremity. No thrombus is identified in the  saphenofemoral  junctions and greater saphenous veins bilaterally.            This report was signed and finalized on 3/3/2025 3:35 PM by Dr. Kareem Mcdonnell MD.       CT Angiogram Chest [[id number]] Collected: 03/01/25 2059     Updated: 03/01/25 2111    Narrative:      EXAMINATION: CT ANGIOGRAM CHEST-  3/1/2025 8:59 PM      HISTORY: covid, acute trop BNP elevation, eval PE; I50.9-Heart failure,  unspecified; R53.1-Weakness; Z86.0100-Personal history of colon polyps,  unspecified; R41.82-Altered mental status, unspecified;  R03.1-Nonspecific low blood-pressure reading; R63.8-Other symptoms and  signs concerning food and fluid intake; Z90.49-Acquired absence of other  specified parts of digestive tract; I44.0-Atrioventricular block     COMPARISON: 2/8/2023. 1/27/2025     DLP: 262.51 mGy.cm     In order to have a CT radiation dose as low as reasonably achievable,  Automated Exposure Control was utilized for adjustment of the mA and/or  KV according to patient size.     TECHNIQUE: Helical tomographic images of the chest were obtained after  the administration of intravenous contrast following angiogram protocol.  Additionally,  reconstructions in the coronal and sagittal planes and 3D  reconstructions were provided.       FINDINGS:    BASE OF THE NECK: The imaged portion of the base of the neck appears  unremarkable.     PULMONARY ARTERIES: There is adequate enhancement of the pulmonary  arteries to evaluate for central and segmental pulmonary emboli. There  are no filling defects within the main, lobar, segmental or visualized  subsegmental pulmonary arteries. The pulmonary vessels are within normal  limits.     LUNGS: Some scarring at the lateral RIGHT lung apex. No suspicious  airspace consolidation in the RIGHT lung. Some dependent atelectasis at  the RIGHT lung base. Atelectasis in the LEFT lower lobe adjacent to the  large hiatal hernia.     AIRWAY: The airways are clear.     PLEURA: No effusion or pleural  mass.     VASCULATURE: There is calcified atheromatous plaque in the aorta without  aneurysmal dilation. The thoracic aorta is very tortuous. The pulmonary  arteries are normal in caliber.     HEART: The heart is enlarged. There is no pericardial effusion. Moderate  coronary atheromatous calcification.     MEDIASTINUM AND LYMPH NODES: No suspicious hilar or mediastinal  adenopathy. Incidental granulomatous calcification..     CHEST WALL AND BONY STRUCTURES: Chest wall soft tissue structures are  unremarkable. 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 Degenerative changes throughout the  spine. No destructive bone lesion or acute fracture identified.  Multilevel bridging endplate osteophytes in the thoracic spine. Chronic  changes at the thoracolumbar junction redemonstrated. Appearance at  L1-L2 is unchanged when compared to 1/27/2025.     UPPER ABDOMEN: Atherosclerotic vascular disease in the visualized  portion of the aorta. The tip of an IVC filter is visualized. Previous  cholecystectomy. No acute findings in the visualized portion of the  upper abdomen. Very large hiatal hernia.          Impression:         1.  No evidence of pulmonary embolus or acute cardiopulmonary process.     2.  Very large hiatal hernia with adjacent compressive atelectasis in  the LEFT lower lung.     3.  Cardiomegaly and moderate atherosclerotic vascular disease in the  coronary arteries.           This report was signed and finalized on 3/1/2025 9:08 PM by Dr. Jacky Hall MD.       CT Head Without Contrast [[id number]] Collected: 03/01/25 1951     Updated: 03/01/25 1959    Narrative:      EXAMINATION: CT HEAD WO CONTRAST-  3/1/2025 7:51 PM     HISTORY: delerium, on anticoagulation, eval bleed mass; R53.1-Weakness;  Z86.0100-Personal history of colon polyps, unspecified; R41.82-Altered  mental status, unspecified; R03.1-Nonspecific low blood-pressure  reading; R63.8-Other symptoms and  signs concerning food and fluid  intake; Z90.49-Acquired absence of other specified parts of digestive  tract; I44.0-Atrioventricular block, first degree     COMPARISON: 1/25/2025     DLP: 641.66 mGy.cm     In order to have a CT radiation dose as low as reasonably achievable,  Automated Exposure Control was utilized for adjustment of the mA and/or  KV according to patient size.     TECHNIQUE: Serial axial tomographic images of the brain were obtained  without the use of intravenous contrast.  Coronal and sagittal  reformatted images were obtained reviewed as well.     FINDINGS:  Atrophy and ventriculomegaly with cavum septum pellucidum et vergae.  Low-attenuation throughout the subcortical and periventricular white  matter. No mass effect, midline shift, or hemorrhage. There is  atherosclerotic vascular disease in the V4 segment of the LEFT vertebral  artery and in the bilateral internal carotid arteries.     There is an air-fluid level in the posterior RIGHT sphenoid sinus with  some mucosal thickening suggesting mild sphenoid sinusitis. Mucosal  thickening in the RIGHT maxillary sinus and opacification of the LEFT  maxillary sinus. Mastoid air cells are clear. No acute calvarial  abnormality. Orbits and globes are preserved. Postoperative changes to  the eyes.          Impression:         1.  Moderate ventricular dilation redemonstrated with low-attenuation in  the subcortical and periventricular white matter, likely related to  chronic microvascular change. Normal pressure hydrocephalus would be  another consideration. Overall, similar to the exam from 1/25/2025.     2.  Paranasal sinus disease as discussed above.     This report was signed and finalized on 3/1/2025 7:56 PM by Dr. Jacky Hall MD.       XR Chest 1 View [[id number]] Collected: 03/01/25 1919     Updated: 03/01/25 1924    Narrative:      EXAMINATION: XR CHEST 1 VW-  3/1/2025 7:19 PM 1 view     HISTORY: generalized weakness, eval PNA;  R53.1-Weakness;  Z86.0100-Personal history of colon polyps, unspecified     COMPARISON: 7/16/2024, 1/27/2025     TECHNIQUE: A single frontal view of the chest was obtained.     FINDINGS:  Very large hiatal hernia redemonstrated with associated adjacent LEFT  basilar atelectasis. Shallow depth of inspiration. No pneumothorax or  confluent pneumonic airspace consolidation within the limits of this  exam. Ectasia of the aortic arch with atherosclerotic vascular disease  and tortuous descending thoracic aorta. High riding humeral head on the  LEFT suggesting chronic full-thickness rotator cuff tear. AC joint  arthropathy also noted on the LEFT. No definite acute osseous  abnormality. Dental amalgam noted.       Impression:         1.  Large hiatal hernia with associated LEFT basilar atelectasis.     2.  Shallow inspiration but no definite acute airspace consolidation.     3.  Redemonstration of tortuous ectatic thoracic aorta, similar to the  prior study.           This report was signed and finalized on 3/1/2025 7:20 PM by Dr. Jacky Hall MD.             LAB RESULTS:      Lab 03/03/25 0248 03/02/25 0016 03/01/25 1926   WBC 7.18 8.99 8.63   HEMOGLOBIN 12.3* 12.6* 13.1   HEMATOCRIT 37.8 38.4 41.7   PLATELETS 188 177 197   NEUTROS ABS 3.38 4.95 5.43   IMMATURE GRANS (ABS) 0.05 0.03 0.03   LYMPHS ABS 2.53 3.06 2.29   MONOS ABS 0.64 0.49 0.53   EOS ABS 0.54* 0.42* 0.30   MCV 95.2 95.8 97.7*   D DIMER QUANT  --  2.32*  --          Lab 03/03/25 0248 03/02/25 0016 03/01/25 2029 03/01/25 1926   SODIUM 139 137  --  140   POTASSIUM 3.9 3.5  --  4.0   CHLORIDE 103 103  --  103   CO2 26.0 24.0  --  23.0   ANION GAP 10.0 10.0  --  14.0   BUN 17 22  --  21   CREATININE 1.03 1.79*  --  2.06*   EGFR 69.9 36.0*  --  30.4*   GLUCOSE 109* 92  --  154*   CALCIUM 8.3* 8.4*  --  8.9   MAGNESIUM  --   --  1.7 1.7   PHOSPHORUS  --   --  3.8  --    TSH  --   --  2.080  --          Lab 03/01/25 1926   TOTAL PROTEIN 6.5   ALBUMIN 3.8    GLOBULIN 2.7   ALT (SGPT) 9   AST (SGOT) 21   BILIRUBIN 0.4   ALK PHOS 75   LIPASE 66*         Lab 03/01/25 2029 03/01/25 1926   PROBNP  --  5,982.0*   HSTROP T 80* 83*                 Brief Urine Lab Results  (Last result in the past 365 days)        Color   Clarity   Blood   Leuk Est   Nitrite   Protein   CREAT   Urine HCG        03/1936 Dark Yellow   Clear   Negative   Trace   Negative   >=300 mg/dL (3+)                 Microbiology Results (last 10 days)       Procedure Component Value - Date/Time    COVID-19 and FLU A/B PCR, 1 HR TAT - Swab, Nasopharynx [[id number]]  (Abnormal) Collected: 03/01/25 1927    Lab Status: Final result Specimen: Swab from Nasopharynx Updated: 03/01/25 2008     COVID19 Detected     Influenza A PCR Not Detected     Influenza B PCR Not Detected    Narrative:      Fact sheet for providers: https://www.fda.gov/media/130395/download    Fact sheet for patients: https://www.fda.gov/media/888190/download    Test performed by PCR.  Influenza A and Influenza B negative results should be considered presumptive in samples that have a positive SARS-CoV-2 result.    Competitive inhibition studies showed that SARS-CoV-2 virus, when present at concentrations above 3.6E+04 copies/mL, can inhibit the detection and amplification of influenza A and influenza B virus RNA if present at or below 1.8E+02 copies/mL or 4.9E+02 copies/mL, respectively, and may lead to false negative influenza virus results. If co-infection with influenza A or influenza B virus is suspected in samples with a positive SARS-CoV-2 result, the sample should be re-tested with another FDA cleared, approved, or authorized influenza test, if influenza virus detection would change clinical management.            Hospital Course:   Patient is a very pleasant 89-year-old  male with past medical history significant for hypertension, known hiatal hernia, history of recent cholecystectomy after hospitalization for gallstone  pancreatitis (patient discharged on 1/30/2025) that presented to our facility on 3/1/2025 secondary to worsening generalized weakness.  Patient lives at [facility] living [facility].  He was recently diagnosed with COVID, and it sounds like his main symptom has been progressively worsening weakness, lack of stamina, poor p.o. intake, and unfortunately experienced a fall and was unable to get back up from the ground.  Patient was brought to the emergency department for further workup and assessment, and ultimately admitted to the hospitalist service.  On arrival he was found to have evidence of acute kidney injury with a creatinine of 2.06.    Patient was started on intravenous fluids.  No compelling indication to continue Paxlovid treatment and patient was monitored off of this therapy.  From a COVID perspective he has done quite well.  He has not had any new or evolving COVID-specific symptoms, remains on room air, and denies any new respiratory complaints.    Following administration of intravenous fluids patient has had steady improvement in his renal function.  His creatinine is trended from 2.06 down to 1.03 on the day of discharge.  His BUN is 17.  The remainder of his labs do appear to be stable.    He had noticed some swelling in his legs, particularly his left lower extremity.  He was found to have an elevated D-dimer, and especially in the setting of recent COVID-19 with worsening weakness and debility, decision was made to move forward with a duplex study of the left lower extremity.  This did confirm the presence of a DVT involving his left lower extremity.  He was initially placed on anticoagulation with Lovenox, with plans to transition over to Eliquis per DVT dosing guidelines which include 10 mg for the next 7 days then transition to 5 mg twice daily thereafter.    I would like him to follow-up with his primary care provider, Dr. Ramos, in approximately 1-2 weeks with repeat CBC and BMP to  confirm stability.    Please note that an echocardiogram was performed during this hospitalization with the following results:  Results for orders placed during the hospital encounter of 03/01/25    Adult Transthoracic Echo Complete W/ Cont if Necessary Per Protocol    Interpretation Summary    Left ventricular systolic function is normal. Left ventricular ejection fraction appears to be 56 - 60%.    Left ventricular wall thickness is consistent with mild to moderate concentric hypertrophy.    Normal right ventricular cavity size and systolic function noted.    Mild aortic valve regurgitation is present.    Moderate mitral valve regurgitation is present.    We have reduced his dose of gabapentin during this hospitalization from 300 mg to 100 mg 4 times daily.  He appears to be tolerating this without problem.  In addition, his heart rates have been on the slow side, with rates in the upper 50s, and I have been holding his beta-blocker with metoprolol tartrate.  Instead, have placed him on amlodipine to assist with blood pressure support, and have reduced his lisinopril down to 20 mg, taking into account his recent renal insufficiency.  His most recent blood pressure this afternoon was 154/61 and this will need continued monitoring moving forward.  An order for as needed clonidine can be utilized for systolic blood pressures greater than 170 or diastolic blood pressures greater than 100.    All in all, patient is doing much better.  He is very eager to get back home to [facility] assisted living facility.  Would recommend that he continue working with [facility] at least 3 times per week, and based upon the conversation I had with patient's daughter on the phone this morning it sounds like she has been in communication with the assisted living staff so that patient does have a means to notify staff to assist him with transfers, position changes, etc., in effort to promote safety and avoid falls while he  "continues to build back his stamina.      Physical Exam on Discharge:  /61 (BP Location: Left arm, Patient Position: Lying)   Pulse 63   Temp 98.1 °F (36.7 °C) (Oral)   Resp 16   Ht 188 cm (74\")   Wt 78.9 kg (174 lb)   SpO2 92%   BMI 22.34 kg/m²   Physical Exam  Vitals reviewed.   Constitutional:       General: He is not in acute distress.     Appearance: He is not toxic-appearing.   HENT:      Head: Normocephalic.      Mouth/Throat:      Mouth: Mucous membranes are moist.   Pulmonary:      Effort: Pulmonary effort is normal. No respiratory distress.      Comments: On room air  Skin:     General: Skin is warm.   Neurological:      General: No focal deficit present.      Mental Status: He is alert. Mental status is at baseline.      Motor: Weakness present.   Psychiatric:         Mood and Affect: Mood normal.       Condition on Discharge: medically stable    Discharge Disposition:  Home or Self Care    Discharge Medications:     Discharge Medications        New Medications        Instructions Start Date   amLODIPine 5 MG tablet  Commonly known as: NORVASC   5 mg, Oral, Every 24 Hours Scheduled   Start Date: March 5, 2025     apixaban 5 MG tablet tablet  Commonly known as: ELIQUIS   Take 2 tablets by mouth Every 12 (Twelve) Hours for 7 days, THEN 1 tablet Every 12 (Twelve) Hours for 30 days. Indications: DVT/PE (active thrombosis)   Start Date: March 4, 2025     cloNIDine 0.1 MG tablet  Commonly known as: CATAPRES   0.1 mg, Oral, Every 6 Hours PRN      famotidine 20 MG tablet  Commonly known as: PEPCID   20 mg, Oral, Daily             Changes to Medications        Instructions Start Date   gabapentin 100 MG capsule  Commonly known as: NEURONTIN  What changed:   medication strength  how much to take  when to take this  Another medication with the same name was removed. Continue taking this medication, and follow the directions you see here.   100 mg, Oral, 4 Times Daily      lisinopril 40 MG " tablet  Commonly known as: PRINIVIL,ZESTRIL  What changed: how much to take   20 mg, Oral, Daily             Continue These Medications        Instructions Start Date   oxyCODONE-acetaminophen  MG per tablet  Commonly known as: PERCOCET   1 tablet, Oral, Every 6 Hours PRN             Stop These Medications      metoprolol tartrate 25 MG tablet  Commonly known as: LOPRESSOR     Paxlovid (150/100) 10 x 150 MG & 10 x 100MG tablet therapy pack tablet (for renal adjustment)  Generic drug: Nirmatrelvir&Ritonavir 150/100              Discharge Diet: as tolerated    Activity at Discharge: as tolerated. Please continue PT/OT with [facility] 3 times per week.  Fall precautions.    Follow-up Appointments:   Plan for 1 week follow-up appointment with primary care provider, Dr. Otilio Ramos, with plans for repeat CBC and basic metabolic panel    Test Results Pending at Discharge: none    Electronically signed by Julio Self MD, 03/04/25, 11:52 CST.    Time: 35 minutes.

## 2025-03-04 NOTE — CASE MANAGEMENT/SOCIAL WORK
Continued Stay Note   [place]     Patient Name: Jose Alfredo Kirkpatrick  MRN: [id number]  Today's Date: 3/4/2025    Admit Date: 3/1/2025    Plan: [facility]   Discharge Plan       Row Name 03/04/25 0932       Plan    Plan [facility]    Patient/Family in Agreement with Plan yes    Plan Comments [facility], services can be increased there to assist if needed. They are aware pt has covid and already had isolated there. Pt does have [place] [facility] therapy that sees him there 3x weekly.                   Discharge Codes    No documentation.                       MYCHAL VieraW

## 2025-03-04 NOTE — PLAN OF CARE
Goal Outcome Evaluation:           Progress: improving  Outcome Evaluation: No c/o pain. UOP adequate. Home BP meds resumed. PRN BP med given x 1. On RA. No c/o SOA.

## 2025-03-04 NOTE — THERAPY TREATMENT NOTE
Acute Care - Physical Therapy Treatment Note  [facility]     Patient Name: Jose Alfredo Kirkpatrick  : 1935  MRN: [id number]  Today's Date: 3/4/2025      Visit Dx:     ICD-10-CM ICD-9-CM   1. Acute heart failure, unspecified heart failure type  I50.9 428.9   2. Generalized weakness  R53.1 780.79   3. History of colonic polyps  Z86.0100 V12.72   4. Altered mental status, unspecified altered mental status type  R41.82 780.97   5. Low blood pressure reading  R03.1 796.3   6. Decreased oral intake  R63.8 783.9   7. History of laparoscopic cholecystectomy  Z90.49 V45.89   8. 1st degree AV block  I44.0 426.11   9. History of COVID-19  Z86.16 V12.09   10. Hiatal hernia  K44.9 553.3   11. Elevated troponin  R79.89 790.6   12. Elevated brain natriuretic peptide (BNP) level  R79.89 790.99   13. Hypoxia  R09.02 799.02   14. Acute kidney injury  N17.9 584.9   15. Oral phase dysphagia  R13.11 787.21   16. Impaired mobility [Z74.09]  Z74.09 799.89   17. Neuropathic pain  M79.2 729.2     Patient Active Problem List   Diagnosis    Spinal stenosis, lumbar    Hx of colonic polyps    Drug-induced constipation    Primary hypertension    Acute pulmonary embolism with acute cor pulmonale    Acute bilateral deep vein thrombosis (DVT) of femoral veins    History of DVT of left lower extremity    Positive fecal occult blood test    Melena    Vitamin B12 deficiency    FARZANA (iron deficiency anemia)    Cholelithiasis    Gastric ulcer with hemorrhage    Chronic bilateral low back pain without sciatica    Chronic pain of both shoulders    Kyphosis    Overweight (BMI 25.0-29.9)    Non-smoker    Pneumonia of left lower lobe due to infectious organism    Erosive esophagitis    Hiatal hernia    Weight loss    Paraesophageal hiatal hernia    Severe malnutrition    E. coli UTI, present on admission (urinary tract infection)    Gallstone pancreatitis    Acute kidney injury    Elevated LFTs    Hyperbilirubinemia    Acute UTI (urinary tract infection)     Elevated brain natriuretic peptide (BNP) level    Debility    COVID-19 virus infection    Acute DVT (deep venous thrombosis)    Generalized weakness     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     Past Surgical History:   Procedure Laterality Date    BACK SURGERY      cervical    CATARACT EXTRACTION Bilateral     CHOLECYSTECTOMY WITH INTRAOPERATIVE CHOLANGIOGRAM N/A 1/27/2025    Procedure: LAPAROSCOPIC CHOLECYSTECTOMY INTRAOPERATIVE CHOLANGIOGRAM WITH DAVINCI ROBOT;  Surgeon: Ronak Roche MD;  Location:  [facility] OR;  Service: Robotics - DaVinci;  Laterality: N/A;    COLONOSCOPY  02/11/2013    Hyperplastic polyp at 25 cm, Diverticulosis repeat exam in 5 years    COLONOSCOPY N/A 10/16/2018    Tubular adenoma ascending colon repet prn    ENDOSCOPY  01/15/2014    HH    ENDOSCOPY N/A 10/03/2019    Large HH, non-bleeding gastric ulcer    ENDOSCOPY N/A 11/25/2019    Procedure: ESOPHAGOGASTRODUODENOSCOPY WITH ANESTHESIA;  Surgeon: Marques Pérez MD;  Location:  [facility] ENDOSCOPY;  Service: Gastroenterology    ENDOSCOPY N/A 2/9/2023    Procedure: ESOPHAGOGASTRODUODENOSCOPY WITH ANESTHESIA;  Surgeon: Sushant Edmond MD;  Location: [facility] ENDOSCOPY;  Service: Gastroenterology;  Laterality: N/A;  pre GI bleed  post hiatal hernia; esophagitis  Otilio Ramos MD    HERNIA REPAIR Right     INGUINAL     HIP FRACTURE SURGERY Right 02/04/2023    REPLACEMENT TOTAL KNEE BILATERAL      THORACIC LAMINECTOMY DECOMPRESSIVE POSTERIOR N/A 12/14/2016    Procedure: LAMINECTOMY DECOMPRESSION, UNINSTRUMENTED POSTERIOR SPINAL FUSION, T 11-12;  Surgeon: LUZ Adan MD;  Location: [facility] OR;  Service:     VENA CAVA FILTER INSERTION N/A 10/04/2019    Procedure: VENA CAVA FILTER INSERTION;  Surgeon: Dallin Coronado  MD Osmin;  Location: [facility] HYBRID OR 12;  Service: Vascular     PT Assessment (Last 12 Hours)       PT Evaluation and Treatment       Row Name 03/04/25 1517          Physical Therapy Time and Intention    Subjective Information no complaints  -     Document Type therapy note (daily note)  -     Mode of Treatment physical therapy  -     Comment nsg approached PTA with concerns about pt being discharged to [facility] today. DUring session, pt's arely arrived and explained that she would be able to increase his services at the [facility] to have more assistanc. also, pt will have alert button to call for assistance before he would transfer or move. Pt had this prior to admission to hospital. Pt will also be getting PT 3 days a week per daughter. With this information, pt should be able to return home to [facility] safely.  -       Row Name 03/04/25 1517          General Information    Existing Precautions/Restrictions fall  -     Limitations/Impairments hearing  -       Row Name 03/04/25 1517          Pain    Pretreatment Pain Rating 0/10 - no pain  -     Posttreatment Pain Rating 0/10 - no pain  -       Row Name 03/04/25 1517          Bed Mobility    Supine-Sit [place] (Bed Mobility) verbal cues;contact guard;minimum assist (75% patient effort)  -     Assistive Device (Bed Mobility) bed rails;head of bed elevated  -       Row Name 03/04/25 1517          Sit-Stand Transfer    Sit-Stand [place] (Transfers) verbal cues;minimum assist (75% patient effort)  -       Row Name 03/04/25 1517          Stand-Sit Transfer    Stand-Sit [place] (Transfers) verbal cues;minimum assist (75% patient effort)  -       Row Name 03/04/25 1517          Toilet Transfer    Type (Toilet Transfer) sit-stand;stand-sit  -     [place] Level (Toilet Transfer) verbal cues;minimum assist (75% patient effort);contact guard  -     Assistive Device (Toilet Transfer) commode;grab bars/safety frame  -       Row Name  03/04/25 1517          Gait/Stairs (Locomotion)    [place] Level (Gait) verbal cues;contact guard  -MF     Assistive Device (Gait) walker, front-wheeled  -MF     Distance in Feet (Gait) 15  x 2  -MF     Deviations/Abnormal Patterns (Gait) esau decreased;stride length decreased  -MF     Bilateral Gait Deviations heel strike decreased  -MF       Row Name 03/04/25 1517          Positioning and Restraints    Pre-Treatment Position in bed  -MF     Post Treatment Position bed  -MF     In Bed sitting EOB;call light within reach;encouraged to call for assist;with family/caregiver;with nsg;side rails up x2  -MF               User Key  (r) = Recorded By, (t) = Taken By, (c) = Cosigned By      Initials Name Provider Type    Nena Cordova, PTA Physical Therapist Assistant                    Physical Therapy Education       Title: PT OT SLP Therapies (In Progress)       Topic: Physical Therapy (In Progress)       Point: Mobility training (Done)       Learning Progress Summary            Patient Acceptance, E, VU by SB at 3/3/2025 1054    Comment: pt edu on POC, benefits of act and d/c plans                      Point: Home exercise program (Not Started)       Learner Progress:  Not documented in this visit.              Point: Body mechanics (Not Started)       Learner Progress:  Not documented in this visit.              Point: Precautions (Done)       Learning Progress Summary            Patient Acceptance, E, VU by SB at 3/3/2025 1054    Comment: pt edu on POC, benefits of act and d/c plans                                      User Key       Initials Effective Dates Name Provider Type Discipline     07/11/23 -  Kathleen Morales, PT DPT Physical Therapist PT                  PT Recommendation and Plan         Outcome Measures       Row Name 03/04/25 1517             How much help from another person do you currently need...    Turning from your back to your side while in flat bed without using bedrails? 3  -MF       Moving from lying on back to sitting on the side of a flat bed without bedrails? 3  -MF      Moving to and from a bed to a chair (including a wheelchair)? 3  -MF      Standing up from a chair using your arms (e.g., wheelchair, bedside chair)? 3  -MF      Climbing 3-5 steps with a railing? 1  -MF      To walk in hospital room? 3  -MF      AM-PAC 6 Clicks Score (PT) 16  -MF         Functional Assessment    Outcome Measure Options AM-PAC 6 Clicks Basic Mobility (PT)  -MF                User Key  (r) = Recorded By, (t) = Taken By, (c) = Cosigned By      Initials Name Provider Type    Nena Cordova PTA Physical Therapist Assistant                     Time Calculation:    PT Charges       Row Name 03/04/25 1542             Time Calculation    Start Time 1517  -MF      Stop Time 1540  -MF      Time Calculation (min) 23 min  -MF      PT Received On 03/04/25  -MF         Time Calculation- PT    Total Timed Code Minutes- PT 23 minute(s)  -MF         Timed Charges    75507 - PT Therapeutic Activity Minutes 23  -MF         Total Minutes    Timed Charges Total Minutes 23  -MF       Total Minutes 23  -MF                User Key  (r) = Recorded By, (t) = Taken By, (c) = Cosigned By      Initials Name Provider Type     Nena Hernandez PTA Physical Therapist Assistant                  Therapy Charges for Today       Code Description Service Date Service Provider Modifiers Qty    [id number]  PT THERAPEUTIC ACT EA 15 MIN 3/4/2025 Nena Hernandez PTA GP 2            PT G-Codes  Outcome Measure Options: AM-PAC 6 Clicks Basic Mobility (PT)  AM-PAC 6 Clicks Score (PT): 16  AM-PAC 6 Clicks Score (OT): 16    Nena Hernandez PTA  3/4/2025

## 2025-03-05 ENCOUNTER — READMISSION MANAGEMENT (OUTPATIENT)
Dept: CALL CENTER | Facility: HOSPITAL | Age: 89
End: 2025-03-05
Payer: MEDICARE

## 2025-03-05 NOTE — PAYOR COMM NOTE
"[facility] 3-4-25    Jose Alfredo Kirkpatrick (89 y.o. Male)       Date of Birth   1935    Social Security Number       Address   [address] [place] [address]    Home Phone   [phone]    MRN   [id number]       [employer]   [facility]    Marital Status                               Admission Date   3/1/25    Admission Type   Emergency    Admitting Provider   Julio Self MD    Attending Provider       Department, Room/Bed   [facility] 4B, 440/1       Discharge Date   3/4/2025    Discharge Disposition   Home or Self Care    Discharge Destination                                 Attending Provider: (none)   Allergies: Meloxicam    Isolation: None   Infection: COVID (History) (02/09/23), COVID (confirmed) (03/01/25)   Code Status: Prior    Ht: 188 cm (74\")   Wt: 78.9 kg (174 lb)    Admission Cmt: None   Principal Problem: Acute kidney injury [N17.9]                   Active Insurance as of 3/1/2025       Primary Coverage       Payor Plan Insurance Group Employer/Plan Group    ANTHEM MEDICARE REPLACEMENT ANTH MEDICARE ADVANTAGE HMO [id number]       Payor Plan Address Payor Plan Phone Number Payor Plan Fax Number Effective Dates    [address] [phone]  1/1/2024 - None Entered    [facility] [id number]         Subscriber Name Subscriber Birth Date Member ID       JOSE ALFREDO KIRKPATRICK 1935 [id number]                     Emergency Contacts        (Rel.) Home Phone Work Phone Mobile Phone    LOUIS WELLS (Daughter) -- -- [phone]                 Discharge Summary        Julio Self MD at 03/04/25 [address] [place] [address] Medicine Services  DISCHARGE SUMMARY       Date of Admission: 3/1/2025  Date of Discharge:  3/4/2025  Primary Care Physician: Otilio Ramos MD    Presenting Problem/History of Present Illness:  Weakness    Final Discharge Diagnoses:  Active Hospital Problems    Diagnosis     " **Acute kidney injury     Acute DVT (deep venous thrombosis)     Generalized weakness     Elevated brain natriuretic peptide (BNP) level     Debility     COVID-19 virus infection     Hiatal hernia     Chronic bilateral low back pain without sciatica     Primary hypertension        Pertinent Test Results:   Results for orders placed during the hospital encounter of 03/01/25    Adult Transthoracic Echo Complete W/ Cont if Necessary Per Protocol    Interpretation Summary    Left ventricular systolic function is normal. Left ventricular ejection fraction appears to be 56 - 60%.    Left ventricular wall thickness is consistent with mild to moderate concentric hypertrophy.    Normal right ventricular cavity size and systolic function noted.    Mild aortic valve regurgitation is present.    Moderate mitral valve regurgitation is present.      Imaging Results (All)       Procedure Component Value Units Date/Time    US Venous Doppler Lower Extremity Bilateral (duplex) [[id number]] Collected: 03/03/25 1534     Updated: 03/03/25 1538    Narrative:      History: Swelling       Impression:      Impression: There is evidence of deep venous thrombosis in the left  lower extremity.     Comments: Bilateral lower extremity venous duplex exam was performed  using color Doppler flow, Doppler waveform analysis, and grayscale  imaging, with and without compression. There is evidence of deep venous  thrombosis from the proximal to mid superficial femoral vein in the left  lower extremity. There is no evidence of deep venous thrombosis in the  right lower extremity. No thrombus is identified in the saphenofemoral  junctions and greater saphenous veins bilaterally.            This report was signed and finalized on 3/3/2025 3:35 PM by Dr. Kareem Mcdonnell MD.       CT Angiogram Chest [[id number]] Collected: 03/01/25 2059     Updated: 03/01/25 2111    Narrative:      EXAMINATION: CT ANGIOGRAM CHEST-  3/1/2025 8:59 PM      HISTORY: covid,  acute trop BNP elevation, eval PE; I50.9-Heart failure,  unspecified; R53.1-Weakness; Z86.0100-Personal history of colon polyps,  unspecified; R41.82-Altered mental status, unspecified;  R03.1-Nonspecific low blood-pressure reading; R63.8-Other symptoms and  signs concerning food and fluid intake; Z90.49-Acquired absence of other  specified parts of digestive tract; I44.0-Atrioventricular block     COMPARISON: 2/8/2023. 1/27/2025     DLP: 262.51 mGy.cm     In order to have a CT radiation dose as low as reasonably achievable,  Automated Exposure Control was utilized for adjustment of the mA and/or  KV according to patient size.     TECHNIQUE: Helical tomographic images of the chest were obtained after  the administration of intravenous contrast following angiogram protocol.  Additionally,  reconstructions in the coronal and sagittal planes and 3D  reconstructions were provided.       FINDINGS:    BASE OF THE NECK: The imaged portion of the base of the neck appears  unremarkable.     PULMONARY ARTERIES: There is adequate enhancement of the pulmonary  arteries to evaluate for central and segmental pulmonary emboli. There  are no filling defects within the main, lobar, segmental or visualized  subsegmental pulmonary arteries. The pulmonary vessels are within normal  limits.     LUNGS: Some scarring at the lateral RIGHT lung apex. No suspicious  airspace consolidation in the RIGHT lung. Some dependent atelectasis at  the RIGHT lung base. Atelectasis in the LEFT lower lobe adjacent to the  large hiatal hernia.     AIRWAY: The airways are clear.     PLEURA: No effusion or pleural mass.     VASCULATURE: There is calcified atheromatous plaque in the aorta without  aneurysmal dilation. The thoracic aorta is very tortuous. The pulmonary  arteries are normal in caliber.     HEART: The heart is enlarged. There is no pericardial effusion. Moderate  coronary atheromatous calcification.     MEDIASTINUM AND LYMPH NODES: No  suspicious hilar or mediastinal  adenopathy. Incidental granulomatous calcification..     CHEST WALL AND BONY STRUCTURES: Chest wall soft tissue structures are  unremarkable. 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 Degenerative changes throughout the  spine. No destructive bone lesion or acute fracture identified.  Multilevel bridging endplate osteophytes in the thoracic spine. Chronic  changes at the thoracolumbar junction redemonstrated. Appearance at  L1-L2 is unchanged when compared to 1/27/2025.     UPPER ABDOMEN: Atherosclerotic vascular disease in the visualized  portion of the aorta. The tip of an IVC filter is visualized. Previous  cholecystectomy. No acute findings in the visualized portion of the  upper abdomen. Very large hiatal hernia.          Impression:         1.  No evidence of pulmonary embolus or acute cardiopulmonary process.     2.  Very large hiatal hernia with adjacent compressive atelectasis in  the LEFT lower lung.     3.  Cardiomegaly and moderate atherosclerotic vascular disease in the  coronary arteries.           This report was signed and finalized on 3/1/2025 9:08 PM by Dr. Jacky Hall MD.       CT Head Without Contrast [[id number]] Collected: 03/01/25 1951     Updated: 03/01/25 1959    Narrative:      EXAMINATION: CT HEAD WO CONTRAST-  3/1/2025 7:51 PM     HISTORY: delerium, on anticoagulation, eval bleed mass; R53.1-Weakness;  Z86.0100-Personal history of colon polyps, unspecified; R41.82-Altered  mental status, unspecified; R03.1-Nonspecific low blood-pressure  reading; R63.8-Other symptoms and signs concerning food and fluid  intake; Z90.49-Acquired absence of other specified parts of digestive  tract; I44.0-Atrioventricular block, first degree     COMPARISON: 1/25/2025     DLP: 641.66 mGy.cm     In order to have a CT radiation dose as low as reasonably achievable,  Automated Exposure Control was utilized for adjustment of the  mA and/or  KV according to patient size.     TECHNIQUE: Serial axial tomographic images of the brain were obtained  without the use of intravenous contrast.  Coronal and sagittal  reformatted images were obtained reviewed as well.     FINDINGS:  Atrophy and ventriculomegaly with cavum septum pellucidum et vergae.  Low-attenuation throughout the subcortical and periventricular white  matter. No mass effect, midline shift, or hemorrhage. There is  atherosclerotic vascular disease in the V4 segment of the LEFT vertebral  artery and in the bilateral internal carotid arteries.     There is an air-fluid level in the posterior RIGHT sphenoid sinus with  some mucosal thickening suggesting mild sphenoid sinusitis. Mucosal  thickening in the RIGHT maxillary sinus and opacification of the LEFT  maxillary sinus. Mastoid air cells are clear. No acute calvarial  abnormality. Orbits and globes are preserved. Postoperative changes to  the eyes.          Impression:         1.  Moderate ventricular dilation redemonstrated with low-attenuation in  the subcortical and periventricular white matter, likely related to  chronic microvascular change. Normal pressure hydrocephalus would be  another consideration. Overall, similar to the exam from 1/25/2025.     2.  Paranasal sinus disease as discussed above.     This report was signed and finalized on 3/1/2025 7:56 PM by Dr. Jacky Hall MD.       XR Chest 1 View [[id number]] Collected: 03/01/25 1919     Updated: 03/01/25 1924    Narrative:      EXAMINATION: XR CHEST 1 VW-  3/1/2025 7:19 PM 1 view     HISTORY: generalized weakness, eval PNA; R53.1-Weakness;  Z86.0100-Personal history of colon polyps, unspecified     COMPARISON: 7/16/2024, 1/27/2025     TECHNIQUE: A single frontal view of the chest was obtained.     FINDINGS:  Very large hiatal hernia redemonstrated with associated adjacent LEFT  basilar atelectasis. Shallow depth of inspiration. No pneumothorax or  confluent pneumonic  airspace consolidation within the limits of this  exam. Ectasia of the aortic arch with atherosclerotic vascular disease  and tortuous descending thoracic aorta. High riding humeral head on the  LEFT suggesting chronic full-thickness rotator cuff tear. AC joint  arthropathy also noted on the LEFT. No definite acute osseous  abnormality. Dental amalgam noted.       Impression:         1.  Large hiatal hernia with associated LEFT basilar atelectasis.     2.  Shallow inspiration but no definite acute airspace consolidation.     3.  Redemonstration of tortuous ectatic thoracic aorta, similar to the  prior study.           This report was signed and finalized on 3/1/2025 7:20 PM by Dr. Jacky Hall MD.             LAB RESULTS:      Lab 03/03/25 0248 03/02/25 0016 03/01/25 1926   WBC 7.18 8.99 8.63   HEMOGLOBIN 12.3* 12.6* 13.1   HEMATOCRIT 37.8 38.4 41.7   PLATELETS 188 177 197   NEUTROS ABS 3.38 4.95 5.43   IMMATURE GRANS (ABS) 0.05 0.03 0.03   LYMPHS ABS 2.53 3.06 2.29   MONOS ABS 0.64 0.49 0.53   EOS ABS 0.54* 0.42* 0.30   MCV 95.2 95.8 97.7*   D DIMER QUANT  --  2.32*  --          Lab 03/03/25 0248 03/02/25 0016 03/01/25 2029 03/01/25 1926   SODIUM 139 137  --  140   POTASSIUM 3.9 3.5  --  4.0   CHLORIDE 103 103  --  103   CO2 26.0 24.0  --  23.0   ANION GAP 10.0 10.0  --  14.0   BUN 17 22  --  21   CREATININE 1.03 1.79*  --  2.06*   EGFR 69.9 36.0*  --  30.4*   GLUCOSE 109* 92  --  154*   CALCIUM 8.3* 8.4*  --  8.9   MAGNESIUM  --   --  1.7 1.7   PHOSPHORUS  --   --  3.8  --    TSH  --   --  2.080  --          Lab 03/01/25 1926   TOTAL PROTEIN 6.5   ALBUMIN 3.8   GLOBULIN 2.7   ALT (SGPT) 9   AST (SGOT) 21   BILIRUBIN 0.4   ALK PHOS 75   LIPASE 66*         Lab 03/01/25 2029 03/01/25 1926   PROBNP  --  5,982.0*   HSTROP T 80* 83*                 Brief Urine Lab Results  (Last result in the past 365 days)        Color   Clarity   Blood   Leuk Est   Nitrite   Protein   CREAT   Urine HCG        03/01/25  1935 Dark Yellow   Clear   Negative   Trace   Negative   >=300 mg/dL (3+)                 Microbiology Results (last 10 days)       Procedure Component Value - Date/Time    COVID-19 and FLU A/B PCR, 1 HR TAT - Swab, Nasopharynx [[id number]]  (Abnormal) Collected: 03/01/25 1927    Lab Status: Final result Specimen: Swab from Nasopharynx Updated: 03/01/25 2008     COVID19 Detected     Influenza A PCR Not Detected     Influenza B PCR Not Detected    Narrative:      Fact sheet for providers: https://www.fda.gov/media/677361/download    Fact sheet for patients: https://www.fda.gov/media/012306/download    Test performed by PCR.  Influenza A and Influenza B negative results should be considered presumptive in samples that have a positive SARS-CoV-2 result.    Competitive inhibition studies showed that SARS-CoV-2 virus, when present at concentrations above 3.6E+04 copies/mL, can inhibit the detection and amplification of influenza A and influenza B virus RNA if present at or below 1.8E+02 copies/mL or 4.9E+02 copies/mL, respectively, and may lead to false negative influenza virus results. If co-infection with influenza A or influenza B virus is suspected in samples with a positive SARS-CoV-2 result, the sample should be re-tested with another FDA cleared, approved, or authorized influenza test, if influenza virus detection would change clinical management.            Hospital Course:   Patient is a very pleasant 89-year-old  male with past medical history significant for hypertension, known hiatal hernia, history of recent cholecystectomy after hospitalization for gallstone pancreatitis (patient discharged on 1/30/2025) that presented to our facility on 3/1/2025 secondary to worsening generalized weakness.  Patient lives at [facility] living [facility].  He was recently diagnosed with COVID, and it sounds like his main symptom has been progressively worsening weakness, lack of stamina, poor p.o. intake, and  unfortunately experienced a fall and was unable to get back up from the ground.  Patient was brought to the emergency department for further workup and assessment, and ultimately admitted to the hospitalist service.  On arrival he was found to have evidence of acute kidney injury with a creatinine of 2.06.    Patient was started on intravenous fluids.  No compelling indication to continue Paxlovid treatment and patient was monitored off of this therapy.  From a COVID perspective he has done quite well.  He has not had any new or evolving COVID-specific symptoms, remains on room air, and denies any new respiratory complaints.    Following administration of intravenous fluids patient has had steady improvement in his renal function.  His creatinine is trended from 2.06 down to 1.03 on the day of discharge.  His BUN is 17.  The remainder of his labs do appear to be stable.    He had noticed some swelling in his legs, particularly his left lower extremity.  He was found to have an elevated D-dimer, and especially in the setting of recent COVID-19 with worsening weakness and debility, decision was made to move forward with a duplex study of the left lower extremity.  This did confirm the presence of a DVT involving his left lower extremity.  He was initially placed on anticoagulation with Lovenox, with plans to transition over to Eliquis per DVT dosing guidelines which include 10 mg for the next 7 days then transition to 5 mg twice daily thereafter.    I would like him to follow-up with his primary care provider, Dr. Ramos, in approximately 1-2 weeks with repeat CBC and BMP to confirm stability.    Please note that an echocardiogram was performed during this hospitalization with the following results:  Results for orders placed during the hospital encounter of 03/01/25    Adult Transthoracic Echo Complete W/ Cont if Necessary Per Protocol    Interpretation Summary    Left ventricular systolic function is normal. Left  "ventricular ejection fraction appears to be 56 - 60%.    Left ventricular wall thickness is consistent with mild to moderate concentric hypertrophy.    Normal right ventricular cavity size and systolic function noted.    Mild aortic valve regurgitation is present.    Moderate mitral valve regurgitation is present.    We have reduced his dose of gabapentin during this hospitalization from 300 mg to 100 mg 4 times daily.  He appears to be tolerating this without problem.  In addition, his heart rates have been on the slow side, with rates in the upper 50s, and I have been holding his beta-blocker with metoprolol tartrate.  Instead, have placed him on amlodipine to assist with blood pressure support, and have reduced his lisinopril down to 20 mg, taking into account his recent renal insufficiency.  His most recent blood pressure this afternoon was 154/61 and this will need continued monitoring moving forward.  An order for as needed clonidine can be utilized for systolic blood pressures greater than 170 or diastolic blood pressures greater than 100.    All in all, patient is doing much better.  He is very eager to get back home to [facility] assisted living facility.  Would recommend that he continue working with [facility] at least 3 times per week, and based upon the conversation I had with patient's daughter on the phone this morning it sounds like she has been in communication with the assisted living staff so that patient does have a means to notify staff to assist him with transfers, position changes, etc., in effort to promote safety and avoid falls while he continues to build back his stamina.      Physical Exam on Discharge:  /61 (BP Location: Left arm, Patient Position: Lying)   Pulse 63   Temp 98.1 °F (36.7 °C) (Oral)   Resp 16   Ht 188 cm (74\")   Wt 78.9 kg (174 lb)   SpO2 92%   BMI 22.34 kg/m²   Physical Exam  Vitals reviewed.   Constitutional:       General: He is not in acute " distress.     Appearance: He is not toxic-appearing.   HENT:      Head: Normocephalic.      Mouth/Throat:      Mouth: Mucous membranes are moist.   Pulmonary:      Effort: Pulmonary effort is normal. No respiratory distress.      Comments: On room air  Skin:     General: Skin is warm.   Neurological:      General: No focal deficit present.      Mental Status: He is alert. Mental status is at baseline.      Motor: Weakness present.   Psychiatric:         Mood and Affect: Mood normal.       Condition on Discharge: medically stable    Discharge Disposition:  Home or Self Care    Discharge Medications:     Discharge Medications        New Medications        Instructions Start Date   amLODIPine 5 MG tablet  Commonly known as: NORVASC   5 mg, Oral, Every 24 Hours Scheduled   Start Date: March 5, 2025     apixaban 5 MG tablet tablet  Commonly known as: ELIQUIS   Take 2 tablets by mouth Every 12 (Twelve) Hours for 7 days, THEN 1 tablet Every 12 (Twelve) Hours for 30 days. Indications: DVT/PE (active thrombosis)   Start Date: March 4, 2025     cloNIDine 0.1 MG tablet  Commonly known as: CATAPRES   0.1 mg, Oral, Every 6 Hours PRN      famotidine 20 MG tablet  Commonly known as: PEPCID   20 mg, Oral, Daily             Changes to Medications        Instructions Start Date   gabapentin 100 MG capsule  Commonly known as: NEURONTIN  What changed:   medication strength  how much to take  when to take this  Another medication with the same name was removed. Continue taking this medication, and follow the directions you see here.   100 mg, Oral, 4 Times Daily      lisinopril 40 MG tablet  Commonly known as: PRINIVIL,ZESTRIL  What changed: how much to take   20 mg, Oral, Daily             Continue These Medications        Instructions Start Date   oxyCODONE-acetaminophen  MG per tablet  Commonly known as: PERCOCET   1 tablet, Oral, Every 6 Hours PRN             Stop These Medications      metoprolol tartrate 25 MG  tablet  Commonly known as: LOPRESSOR     Paxlovid (150/100) 10 x 150 MG & 10 x 100MG tablet therapy pack tablet (for renal adjustment)  Generic drug: Nirmatrelvir&Ritonavir 150/100              Discharge Diet: as tolerated    Activity at Discharge: as tolerated. Please continue PT/OT with [facility] 3 times per week.  Fall precautions.    Follow-up Appointments:   Plan for 1 week follow-up appointment with primary care provider, Dr. Otilio Ramos, with plans for repeat CBC and basic metabolic panel    Test Results Pending at Discharge: none    Electronically signed by Julio Self MD, 03/04/25, 11:52 CST.    Time: 35 minutes.          Electronically signed by Julio Self MD at 03/04/25 1202

## 2025-03-05 NOTE — THERAPY DISCHARGE NOTE
Acute Care - Physical Therapy Discharge Summary  [facility]       Patient Name: Jose Alfredo Kirkpatrick  : 1935  MRN: [id number]    Today's Date: 3/5/2025                 Admit Date: 3/1/2025      PT Recommendation and Plan    Visit Dx:    ICD-10-CM ICD-9-CM   1. Acute heart failure, unspecified heart failure type  I50.9 428.9   2. Generalized weakness  R53.1 780.79   3. History of colonic polyps  Z86.0100 V12.72   4. Altered mental status, unspecified altered mental status type  R41.82 780.97   5. Low blood pressure reading  R03.1 796.3   6. Decreased oral intake  R63.8 783.9   7. History of laparoscopic cholecystectomy  Z90.49 V45.89   8. 1st degree AV block  I44.0 426.11   9. History of COVID-19  Z86.16 V12.09   10. Hiatal hernia  K44.9 553.3   11. Elevated troponin  R79.89 790.6   12. Elevated brain natriuretic peptide (BNP) level  R79.89 790.99   13. Hypoxia  R09.02 799.02   14. Acute kidney injury  N17.9 584.9   15. Oral phase dysphagia  R13.11 787.21   16. Impaired mobility [Z74.09]  Z74.09 799.89   17. Neuropathic pain  M79.2 729.2        Outcome Measures       Row Name 25 1517             How much help from another person do you currently need...    Turning from your back to your side while in flat bed without using bedrails? 3  -MF      Moving from lying on back to sitting on the side of a flat bed without bedrails? 3  -MF      Moving to and from a bed to a chair (including a wheelchair)? 3  -MF      Standing up from a chair using your arms (e.g., wheelchair, bedside chair)? 3  -MF      Climbing 3-5 steps with a railing? 1  -MF      To walk in hospital room? 3  -MF      AM-PAC 6 Clicks Score (PT) 16  -MF         Functional Assessment    Outcome Measure Options AM-PAC 6 Clicks Basic Mobility (PT)  -MF                User Key  (r) = Recorded By, (t) = Taken By, (c) = Cosigned By      Initials Name Provider Type    Nena Cordova, PTA Physical Therapist Assistant                         PT  Rehab Goals       Row Name 03/05/25 0900             Bed Mobility Goal 1 (PT)    Activity/Assistive Device (Bed Mobility Goal 1, PT) sit to supine;supine to sit;rolling to left;rolling to right  -AB      [place] Level/Cues Needed (Bed Mobility Goal 1, PT) standby assist  -AB      Time Frame (Bed Mobility Goal 1, PT) long term goal (LTG)  -AB      Progress/Outcomes (Bed Mobility Goal 1, PT) goal not met  -AB         Transfer Goal 1 (PT)    Activity/Assistive Device (Transfer Goal 1, PT) sit-to-stand/stand-to-sit;bed-to-chair/chair-to-bed;walker, rolling  -AB      [place] Level/Cues Needed (Transfer Goal 1, PT) standby assist  -AB      Time Frame (Transfer Goal 1, PT) long term goal (LTG)  -AB      Progress/Outcome (Transfer Goal 1, PT) goal not met  -AB         Gait Training Goal 1 (PT)    Activity/Assistive Device (Gait Training Goal 1, PT) gait (walking locomotion);increase endurance/gait distance;increase energy conservation;decrease fall risk;walker, rolling  -AB      [place] Level (Gait Training Goal 1, PT) standby assist  -AB      Distance (Gait Training Goal 1, PT) 50  -AB      Time Frame (Gait Training Goal 1, PT) long term goal (LTG)  -AB      Progress/Outcome (Gait Training Goal 1, PT) goal not met  -AB         Balance Goal 1 (PT)    Activity/Assistive Device (Balance Goal) sitting static balance;sitting dynamic balance;standing static balance;standing dynamic balance  -AB      [place] Level/Cues Needed (Balance Goal 1, PT) supervision required  -AB      Time Frame (Balance Goal 1, PT) long-term goal (LTG)  -AB      Progress/Outcomes (Balance Goal 1, PT) goal not met  -AB                User Key  (r) = Recorded By, (t) = Taken By, (c) = Cosigned By      Initials Name Provider Type Discipline    AB Jolanta Avery, PTA Physical Therapist Assistant PT                        PT Discharge Summary  Anticipated Discharge Disposition (PT): assisted living  Reason for Discharge: Discharge from  facility  Outcomes Achieved: Refer to plan of care for updates on goals achieved  Discharge Destination: Assisted Living Facility      Jolanta Avery, PTA   3/5/2025

## 2025-03-05 NOTE — SIGNIFICANT NOTE
03/02/25 1030   OT Time and Intention   Document Type evaluation  (ED s/p fall. Recently dx with COVID approx 1 week ago. Has had progressive weakness since then. Dx: ISHA.)   Mode of Treatment occupational therapy   Patient Effort good   General Information   Patient Profile Reviewed yes   Prior Level of Function independent:;all household mobility;transfer;bed mobility;ADL's   Existing Precautions/Restrictions fall   Barriers to Rehab medically complex   Living Environment   People in Home facility resident  ([facility])   Home Main Entrance   Number of Stairs, Main Entrance none   Stairs Within Home, Primary   Number of Stairs, Within Home, Primary none   Pain Assessment   Pretreatment Pain Rating 0/10 - no pain   Posttreatment Pain Rating 0/10 - no pain   Cognition   Orientation Status (Cognition) oriented x 4   Range of Motion Comprehensive   General Range of Motion bilateral upper extremity ROM WFL   Strength Comprehensive (MMT)   Comment, General Manual Muscle Testing (MMT) Assessment B UE strength 4/5   Vision Assessment/Intervention   Visual Impairment/Limitations WFL   Sensory Assessment (Somatosensory)   Sensory Assessment (Somatosensory) UE sensation intact   Activities of Daily Living   BADL Assessment/Intervention lower body dressing   Lower Body Dressing Assessment/Training   [place] Level (Lower Body Dressing) don;moderate assist (50% patient effort);shoes/slippers;socks   Position (Lower Body Dressing) edge of bed sitting   Comment, (Lower Body Dressing) Mod A to don socks, slip on shoes but Max A to tie laces.   Bed Mobility   Bed Mobility supine-sit   Supine-Sit [place] (Bed Mobility) moderate assist (50% patient effort)   Functional Mobility   Functional Mobility- Ind. Level minimum assist (75% patient effort)   Functional Mobility- Device walker, front-wheeled   Functional Mobility- Safety Issues other (see comments)   Functional Mobility- Comment amb short distance from bed to  chair. Decreased step length and foot clearance noted B.   Transfer Assessment/Treatment   Transfers sit-stand transfer;stand-sit transfer   Sit-Stand Transfer   Sit-Stand [place] (Transfers) minimum assist (75% patient effort);moderate assist (50% patient effort)   Assistive Device (Sit-Stand Transfers) walker, front-wheeled   Stand-Sit Transfer   Stand-Sit [place] (Transfers) minimum assist (75% patient effort);moderate assist (50% patient effort)   Assistive Device (Stand-Sit Transfers) walker, front-wheeled   Safety Issues/Impairments Affecting Functional Mobility   Impairments Affecting Function (Mobility) strength;balance;endurance/activity tolerance   Balance   Balance Assessment sitting static balance;sitting dynamic balance;standing static balance;standing dynamic balance   Static Sitting Balance standby assist   Dynamic Sitting Balance contact guard   Position, Sitting Balance unsupported;sitting edge of bed   Static Standing Balance contact guard;minimal assist   Dynamic Standing Balance minimal assist   Position/Device Used, Standing Balance supported;walker, front-wheeled   Plan of Care Review   Plan of Care Reviewed With patient;daughter   Outcome Evaluation OT eval completed. Pt presents alert and oriented x4, sitting up in bed with daughter at bedside. He reports at baseline being independent with all adls and mobility, residing at [facility]. Today he demonstrates generalized weakness, decreased balance, activity tolerance and strength. He was able to bring self to sitting at EOB with Mod A and HOB/bedrails. Mr. Kirkpatrick was able to don socks and slip on shoes with Mod A but did require Max A to tie shoe laces. He fatigues quickly with minimal activity but was agreeable to chair t/f. Min-Mod A for sit <> stand t/f from EOB and Min A to take small steps from bed to chair with use of rwx. Pt was left sitting up in chair at end of session. He would benefit from skilled oT services to  address these deficits and assist with return to OF. Recommend d/c to short term rehab at d/ for continued therapy services prior to returning to [facility].   Positioning and Restraints   Pre-Treatment Position in bed   Post Treatment Position chair   In Chair notified nsg;reclined;call light within reach;encouraged to call for assist;with family/caregiver   Therapy Assessment/Plan (OT)   Rehab Potential (OT) good   Criteria for Skilled Therapeutic Interventions Met (OT) yes;skilled treatment is necessary   Therapy Frequency (OT) 5 times/wk   Predicted Duration of Therapy Intervention (OT) 10 days   Planned Therapy Interventions (OT) activity tolerance training;adaptive equipment training;BADL retraining;cognitive/visual perception retraining;functional balance retraining;transfer/mobility retraining;strengthening exercise;occupation/activity based interventions;patient/caregiver education/training;neuromuscular control/coordination retraining   Therapy Plan Review/Discharge Plan (OT)   Anticipated Discharge Disposition (OT) skilled nursing facility   Transfer Goal 1 (OT)   Activity/Assistive Device (Transfer Goal 1, OT) shower chair;toilet   [place] Level/Cues Needed (Transfer Goal 1, OT) contact guard required   Time Frame (Transfer Goal 1, OT) long term goal (LTG);by discharge   Progress/Outcome (Transfer Goal 1, OT) new goal   Bathing Goal 1 (OT)   Activity/Device (Bathing Goal 1, OT) bathing skills, all   [place] Level/Cues Needed (Bathing Goal 1, OT) minimum assist (75% or more patient effort)   Time Frame (Bathing Goal 1, OT) long term goal (LTG);by discharge   Progress/Outcomes (Bathing Goal 1, OT) new goal   Dressing Goal 1 (OT)   Activity/Device (Dressing Goal 1, OT) dressing skills, all   [place]/Cues Needed (Dressing Goal 1, OT) minimum assist (75% or more patient effort)   Time Frame (Dressing Goal 1, OT) long term goal (LTG);by discharge   Progress/Outcome (Dressing Goal 1, OT)  new goal   Strength Goal 1 (OT)   Strength Goal 1 (OT) Pt will increase B UE strength to 4+/5 for improved independence with adls.   Time Frame (Strength Goal 1, OT) long term goal (LTG);by discharge   Progress/Outcome (Strength Goal 1, OT) new goal

## 2025-03-05 NOTE — OUTREACH NOTE
Prep Survey      Flowsheet Row Responses   Religious facility patient discharged from? [place]   Is LACE score < 7 ? No   Eligibility Readm Mgmt   Discharge diagnosis Acute on chronic renal failure,   Acute DVT (deep venous thrombosis)   Does the patient have one of the following disease processes/diagnoses(primary or secondary)? Other   Does the patient have Home health ordered? No   Is there a DME ordered? No   Prep survey completed? Yes            January AVERY - Registered Nurse

## 2025-03-06 NOTE — THERAPY DISCHARGE NOTE
Acute Care - Occupational Therapy Discharge Summary  [facility]     Patient Name: Jose Alfredo Kirkpatrick  : 1935  MRN: [id number]    Today's Date: 3/6/2025                 Admit Date: 3/1/2025        OT Recommendation and Plan    Visit Dx:    ICD-10-CM ICD-9-CM   1. Acute heart failure, unspecified heart failure type  I50.9 428.9   2. Generalized weakness  R53.1 780.79   3. History of colonic polyps  Z86.0100 V12.72   4. Altered mental status, unspecified altered mental status type  R41.82 780.97   5. Low blood pressure reading  R03.1 796.3   6. Decreased oral intake  R63.8 783.9   7. History of laparoscopic cholecystectomy  Z90.49 V45.89   8. 1st degree AV block  I44.0 426.11   9. History of COVID-19  Z86.16 V12.09   10. Hiatal hernia  K44.9 553.3   11. Elevated troponin  R79.89 790.6   12. Elevated brain natriuretic peptide (BNP) level  R79.89 790.99   13. Hypoxia  R09.02 799.02   14. Acute kidney injury  N17.9 584.9   15. Oral phase dysphagia  R13.11 787.21   16. Impaired mobility [Z74.09]  Z74.09 799.89   17. Neuropathic pain  M79.2 729.2                OT Rehab Goals       Row Name 25 0700             Transfer Goal 1 (OT)    Activity/Assistive Device (Transfer Goal 1, OT) shower chair;toilet  -      [place] Level/Cues Needed (Transfer Goal 1, OT) contact guard required  -      Time Frame (Transfer Goal 1, OT) long term goal (LTG);by discharge  -      Progress/Outcome (Transfer Goal 1, OT) goal not met;discharged from facility  -         Bathing Goal 1 (OT)    Activity/Device (Bathing Goal 1, OT) bathing skills, all  -      [place] Level/Cues Needed (Bathing Goal 1, OT) minimum assist (75% or more patient effort)  -      Time Frame (Bathing Goal 1, OT) long term goal (LTG);by discharge  -      Progress/Outcomes (Bathing Goal 1, OT) goal not met;discharged from facility  -JW         Dressing Goal 1 (OT)    Activity/Device (Dressing Goal 1, OT) dressing skills, all  -JW       [place]/Cues Needed (Dressing Goal 1, OT) minimum assist (75% or more patient effort)  -      Time Frame (Dressing Goal 1, OT) long term goal (LTG);by discharge  -      Progress/Outcome (Dressing Goal 1, OT) goal not met;discharged from facility  -         Strength Goal 1 (OT)    Strength Goal 1 (OT) Pt will increase B UE strength to 4+/5 for improved independence with adls.  -      Time Frame (Strength Goal 1, OT) long term goal (LTG);by discharge  -      Progress/Outcome (Strength Goal 1, OT) goal not met  -                User Key  (r) = Recorded By, (t) = Taken By, (c) = Cosigned By      Initials Name Provider Type Discipline    Radhika Dye OTR/L, CSRS Occupational Therapist OT                     Outcome Measures       Row Name 03/04/25 1517             How much help from another person do you currently need...    Turning from your back to your side while in flat bed without using bedrails? 3  -MF      Moving from lying on back to sitting on the side of a flat bed without bedrails? 3  -MF      Moving to and from a bed to a chair (including a wheelchair)? 3  -MF      Standing up from a chair using your arms (e.g., wheelchair, bedside chair)? 3  -MF      Climbing 3-5 steps with a railing? 1  -MF      To walk in hospital room? 3  -MF      AM-PAC 6 Clicks Score (PT) 16  -         Functional Assessment    Outcome Measure Options AM-PAC 6 Clicks Basic Mobility (PT)  -                User Key  (r) = Recorded By, (t) = Taken By, (c) = Cosigned By      Initials Name Provider Type    Nena Cordova PTA Physical Therapist Assistant                            OT Discharge Summary  Anticipated Discharge Disposition (OT): skilled nursing facility  Reason for Discharge: Discharge from facility  Outcomes Achieved: Refer to plan of care for updates on goals achieved  Discharge Destination: Home with assist, Home with home health      CHAD Maher, MALLORY  3/6/2025

## 2025-03-11 ENCOUNTER — READMISSION MANAGEMENT (OUTPATIENT)
Dept: CALL CENTER | Facility: HOSPITAL | Age: 89
End: 2025-03-11
Payer: MEDICARE

## 2025-03-11 NOTE — OUTREACH NOTE
Medical Week 1 Survey      Flowsheet Row Responses   [facility] patient discharged from? [place]   Does the patient have one of the following disease processes/diagnoses(primary or secondary)? Other   Week 1 attempt successful? Yes   Call start time 1937   Call end time 1943   Discharge diagnosis Acute on chronic renal failure,   Acute DVT (deep venous thrombosis)   Meds reviewed with patient/caregiver? Yes   Is the patient having any side effects they believe may be caused by any medication additions or changes? No   Does the patient have all medications ordered at discharge? Yes   Is the patient taking all medications as directed (includes completed medication regime)? Yes   Does the patient have a primary care provider?  Yes   Does the patient have an appointment with their PCP within 7 days of discharge? Yes   Has the patient kept scheduled appointments due by today? Yes   Psychosocial issues? No   Did the patient receive a copy of their discharge instructions? Yes   Nursing interventions Reviewed instructions with patient   What is the patient's perception of their health status since discharge? Improving   Is the patient/caregiver able to teach back the hierarchy of who to call/visit for symptoms/problems? PCP, Specialist, Home health nurse, Urgent Care, ED, 911 Yes   If the patient is a current smoker, are they able to teach back resources for cessation? Not a smoker   Additional teach back comments States he is doing reasonably well.  Had f/u with PCP.  Lives in assisted living facility and advised to monitor bp due to changes in medications.  States he will have them do that.   Week 1 call completed? Yes   Wrap up additional comments Pt to monitor bp due to med changes   Call end time 1943            Sendy BENTON - Licensed Nurse

## 2025-03-20 ENCOUNTER — READMISSION MANAGEMENT (OUTPATIENT)
Dept: CALL CENTER | Facility: HOSPITAL | Age: 89
End: 2025-03-20
Payer: MEDICARE

## 2025-03-20 NOTE — OUTREACH NOTE
Medical Week 2 Survey      Flowsheet Row Responses   [facility] patient discharged from? [place]   Does the patient have one of the following disease processes/diagnoses(primary or secondary)? Other   Week 2 attempt successful? Yes   Call start time 1235   Discharge diagnosis Acute on chronic renal failure,   Acute DVT (deep venous thrombosis)   Call end time 1235   Person spoke with today (if not patient) and relationship DaughterAndreina Cotton   Does the patient have a primary care provider?  Yes   Has home health visited the patient within 72 hours of discharge? N/A   Psychosocial issues? No   Did the patient receive a copy of their discharge instructions? Yes   Nursing interventions Reviewed instructions with patient   What is the patient's perception of their health status since discharge? Improving   Is the patient/caregiver able to teach back signs and symptoms related to disease process for when to call PCP? Yes   Is the patient/caregiver able to teach back signs and symptoms related to disease process for when to call 911? Yes   Is the patient/caregiver able to teach back the hierarchy of who to call/visit for symptoms/problems? PCP, Specialist, Home health nurse, Urgent Care, ED, 911 Yes   Week 2 Call Completed? Yes   Graduated Yes   Wrap up additional comments Daughter states patient is doing well. PT still coming to see patient at assisted living. Has seen pcp since DC. Improving well.   Call end time 1235            Faiza FANG - Registered Nurse

## 2025-05-12 ENCOUNTER — NURSE TRIAGE (OUTPATIENT)
Dept: CALL CENTER | Facility: HOSPITAL | Age: 89
End: 2025-05-12
Payer: MEDICARE

## 2025-08-25 ENCOUNTER — HOSPITAL ENCOUNTER (OUTPATIENT)
Dept: GENERAL RADIOLOGY | Facility: HOSPITAL | Age: 89
Discharge: HOME | End: 2025-08-25
Admitting: NURSE PRACTITIONER
Payer: MEDICARE

## 2025-08-25 ENCOUNTER — TRANSCRIBE ORDERS (OUTPATIENT)
Dept: ADMINISTRATIVE | Facility: HOSPITAL | Age: 89
End: 2025-08-25
Payer: MEDICARE

## 2025-08-25 DIAGNOSIS — M51.360 DEGENERATION OF INTERVERTEBRAL DISC OF LUMBAR REGION WITH DISCOGENIC BACK PAIN: Primary | ICD-10-CM

## 2025-08-25 PROCEDURE — 72100 X-RAY EXAM L-S SPINE 2/3 VWS: CPT

## (undated) DEVICE — ENDOGATOR AUXILIARY WATER JET CONNECTOR: Brand: ENDOGATOR

## (undated) DEVICE — THE CHANNEL CLEANING BRUSH IS A NYLON FLEXI BRUSH ATTACHED TO A FLEXIBLE PLASTIC SHEATH DESIGNED TO SAFELY REMOVE DEBRIS FROM FLEXIBLE ENDOSCOPES.

## (undated) DEVICE — FOR PERCUTANEOUS INSERTION, COMBO PACK INCLUDES:(PMIORCA20) DISPOSABLE OPERATIVE CHOLANGIOGRAM CATHETER 20" & (PIN-15) DISPOSABLE PERCUTANEOUS INTRODUCER 4" LENGTH AND 14 GAUGE: Brand: PMI

## (undated) DEVICE — YANKAUER,BULB TIP WITH VENT: Brand: ARGYLE

## (undated) DEVICE — GLV SURG TRIUMPH GREEN W/ALOE PF LTX 8 STRL

## (undated) DEVICE — SYR LUERLOK 50ML

## (undated) DEVICE — ST MIC/INTRO ACC SHRP/NDL TUNG/TP NITNL 5F 45CM 7CM

## (undated) DEVICE — PK TURNOVER RM ADV

## (undated) DEVICE — MASK,OXYGEN,MED CONC,ADLT,7' TUB, UC: Brand: PENDING

## (undated) DEVICE — FRCP BX RADJAW4 NDL 2.8 240 STD OG

## (undated) DEVICE — CVR UNIV C/ARM

## (undated) DEVICE — CONMED SCOPE SAVER BITE BLOCK, 20X27 MM: Brand: SCOPE SAVER

## (undated) DEVICE — Device: Brand: DEFENDO AIR/WATER/SUCTION AND BIOPSY VALVE

## (undated) DEVICE — SYR LUERLOK 20CC BX/50

## (undated) DEVICE — TIP COVER ACCESSORY

## (undated) DEVICE — SUTURE VCRL SZ 1 L27IN ABSRB UD L36MM CP-1 1/2 CIR REV CUT J268H

## (undated) DEVICE — TBG INSUFFLATION LUER LOCK: Brand: MEDLINE INDUSTRIES, INC.

## (undated) DEVICE — 3M™ IOBAN™ 2 ANTIMICROBIAL INCISE DRAPE 6650EZ: Brand: IOBAN™ 2

## (undated) DEVICE — SOLUTION IRRIG 3000ML 0.9% SOD CHL USP UROMATIC PLAS CONT

## (undated) DEVICE — GLV SURG SENSICARE W/ALOE PF LF 7.5 STRL

## (undated) DEVICE — SYR SLP TP 10ML DISP

## (undated) DEVICE — CUFF,BP,DISP,1 TUBE,ADULT,HP: Brand: MEDLINE

## (undated) DEVICE — STRIP,CLOSURE,WOUND,MEDI-STRIP,1/2X4: Brand: MEDLINE

## (undated) DEVICE — ELECTRD BLD EZ CLN MOD XLNG 2.75IN

## (undated) DEVICE — THE SINGLE USE ETRAP – POLYP TRAP IS USED FOR SUCTION RETRIEVAL OF ENDOSCOPICALLY REMOVED POLYPS.: Brand: ETRAP

## (undated) DEVICE — ANTIBACTERIAL UNDYED BRAIDED (POLYGLACTIN 910), SYNTHETIC ABSORBABLE SUTURE: Brand: COATED VICRYL

## (undated) DEVICE — GLOVE SURG SZ 8 CRM LTX FREE POLYISOPRENE POLYMER BEAD ANTI

## (undated) DEVICE — SENSR O2 OXIMAX FNGR A/ 18IN NONSTR

## (undated) DEVICE — UNDERGLOVE SURG SZ 8 FNGR THK0.21MIL GRN LTX BEAD CUF

## (undated) DEVICE — SPNG GZ STRL 2S 4X4 12PLY

## (undated) DEVICE — KT CLN CLEANOR SCPE

## (undated) DEVICE — ARM DRAPE

## (undated) DEVICE — SUTURE VCRL SZ 3-0 L27IN ABSRB UD L26MM SH 1/2 CIR J416H

## (undated) DEVICE — TBG SMPL FLTR LINE NASL 02/C02 A/ BX/100

## (undated) DEVICE — DAVINCI: Brand: MEDLINE INDUSTRIES, INC.

## (undated) DEVICE — TUBE ET 7.5MM NSL ORAL BASIC CUF INTMED MURPHY EYE RADPQ

## (undated) DEVICE — DUAL CUT SAGITTAL BLADE

## (undated) DEVICE — ENDOPATH XCEL BLADELESS TROCARS WITH STABILITY SLEEVES: Brand: ENDOPATH XCEL

## (undated) DEVICE — PILLOW POS W15XH6XL22IN RASPBERRY FOAM ABD W/ STRP DISP FOR

## (undated) DEVICE — SNAR POLYP SENSATION MICRO OVL 13 240X40

## (undated) DEVICE — SYSTEM SKIN CLSR 22CM DERMBND PRINEO

## (undated) DEVICE — SYR LL TP 10ML STRL

## (undated) DEVICE — PATIENT RETURN ELECTRODE, SINGLE-USE, CONTACT QUALITY MONITORING, ADULT, WITH 9FT CORD, FOR PATIENTS WEIGING OVER 33LBS. (15KG): Brand: MEGADYNE

## (undated) DEVICE — SUT VIC 0 SUTUPAK TIES 18IN J906G

## (undated) DEVICE — TISSUE RETRIEVAL SYSTEM: Brand: INZII RETRIEVAL SYSTEM

## (undated) DEVICE — BLADELESS OBTURATOR: Brand: WECK VISTA

## (undated) DEVICE — MONOPOLAR METZENBAUM SCISSOR, MINI BLADE TIP, DISPOSABLE: Brand: MONOPOLAR METZENBAUM SCISSOR, MINI BLADE TIP, DISPOSABLE

## (undated) DEVICE — RADIFOCUS GLIDEWIRE ADVANTAGE GUIDEWIRE: Brand: GLIDEWIRE ADVANTAGE

## (undated) DEVICE — PAD ENDOVASCULAR: Brand: MEDLINE INDUSTRIES, INC.

## (undated) DEVICE — ADHS LIQ MASTISOL 2/3ML

## (undated) DEVICE — NDL FLTR BLNT 18G 1 1/2IN

## (undated) DEVICE — DRSNG SURESITE WNDW 4X4.5

## (undated) DEVICE — APPL CHLORAPREP W/TINT 26ML ORNG

## (undated) DEVICE — SUTURE RETRIEVER

## (undated) DEVICE — CVR PROB GEN PURP W ISOSILK 6X48

## (undated) DEVICE — SEAL

## (undated) DEVICE — SUTURE VCRL SZ 2-0 L36IN ABSRB UD L36MM CT-1 1/2 CIR J945H

## (undated) DEVICE — PROXIMATE RH ROTATING HEAD SKIN STAPLERS (35 WIDE) CONTAINS 35 STAINLESS STEEL STAPLES: Brand: PROXIMATE

## (undated) DEVICE — SURGICAL PROCEDURE PACK HIP TOT DBD CDS LOURDES HOSP LTX

## (undated) DEVICE — LARYNGOSCOPE BLDE MAC HNDL M SZ 35 ST CURAPLEX CURAVIEW LED